# Patient Record
Sex: MALE | Race: WHITE | NOT HISPANIC OR LATINO | Employment: OTHER | URBAN - METROPOLITAN AREA
[De-identification: names, ages, dates, MRNs, and addresses within clinical notes are randomized per-mention and may not be internally consistent; named-entity substitution may affect disease eponyms.]

---

## 2017-01-16 DIAGNOSIS — N52.1 TYPE 2 DIABETES WITH CIRCULATORY DISORDER CAUSING ERECTILE DYSFUNCTION (H): Primary | ICD-10-CM

## 2017-01-16 DIAGNOSIS — E11.59 TYPE 2 DIABETES WITH CIRCULATORY DISORDER CAUSING ERECTILE DYSFUNCTION (H): Primary | ICD-10-CM

## 2017-01-16 RX ORDER — GLIPIZIDE 5 MG/1
5 TABLET, FILM COATED, EXTENDED RELEASE ORAL DAILY
Qty: 90 TABLET | Refills: 0 | Status: SHIPPED | OUTPATIENT
Start: 2017-01-16 | End: 2017-05-08

## 2017-01-16 NOTE — TELEPHONE ENCOUNTER
glipizide         Last Written Prescription Date: 9/30/16  Last Fill Quantity: 90, # refills: 0  Last Office Visit with G, P or [facility] prescribing provider:  9/30/16        BP Readings from Last 3 Encounters:   09/30/16 138/78   05/09/16 130/80   09/28/15 118/62     MICROL      148   5/9/2016  No results found for this basename: microalbumin  CREATININE   Date Value Ref Range Status   05/09/2016 1.60* 0.66 - 1.25 mg/dL Final   ]  GFR ESTIMATE   Date Value Ref Range Status   05/09/2016 44* >60 mL/min/1.7m2 Final     Comment:     Non  GFR Calc   09/28/2015 30* >60 mL/min/1.7m2 Final     Comment:     Non  GFR Calc   05/23/2014 60* >60 mL/min/1.7m2 Final     GFR ESTIMATE IF BLACK   Date Value Ref Range Status   05/09/2016 54* >60 mL/min/1.7m2 Final     Comment:      GFR Calc   09/28/2015 36* >60 mL/min/1.7m2 Final     Comment:      GFR Calc   05/23/2014 72 >60 mL/min/1.7m2 Final     CHOL      205   5/9/2016  HDL       40   5/9/2016  LDL       95   5/9/2016  TRIG      350   5/9/2016  CHOLHDLRATIO      4.5   9/28/2015  AST       20   5/9/2016  ALT       34   5/9/2016  A1C      6.9   9/30/2016  A1C      9.9   5/9/2016  A1C      7.5   9/28/2015  A1C      7.0   5/23/2014  A1C      6.8   7/30/2013  POTASSIUM   Date Value Ref Range Status   05/09/2016 4.3 3.4 - 5.3 mmol/L Final

## 2017-01-16 NOTE — TELEPHONE ENCOUNTER
Routing refill request to provider for review/approval because:  --Labs out of range:  Scr, microalbumin     Last office visit : 9/30/16 plan = Follow-up visit in 3-6 months     CREATININE   Date Value Ref Range Status   05/09/2016 1.60* 0.66 - 1.25 mg/dL Final   ]    CHOL      205   5/9/2016  HDL       40   5/9/2016  LDL       95   5/9/2016  TRIG      350   5/9/2016  CHOLHDLRATIO      4.5   9/28/2015    MICROL      148   5/9/2016  No results found for this basename: microalbumin    BP Readings from Last 1 Encounters:   09/30/16 138/78       A1C      6.9   9/30/2016  A1C      9.9   5/9/2016  A1C      7.5   9/28/2015  A1C      7.0   5/23/2014  A1C      6.8   7/30/2013

## 2017-01-23 DIAGNOSIS — E11.59 TYPE 2 DIABETES WITH CIRCULATORY DISORDER CAUSING ERECTILE DYSFUNCTION (H): Primary | ICD-10-CM

## 2017-01-23 DIAGNOSIS — N52.1 TYPE 2 DIABETES WITH CIRCULATORY DISORDER CAUSING ERECTILE DYSFUNCTION (H): Primary | ICD-10-CM

## 2017-01-23 NOTE — TELEPHONE ENCOUNTER
metformin         Last Written Prescription Date: 9/30/16  Last Fill Quantity: 180, # refills: 0  Last Office Visit with [facility], [facility] or [facility] prescribing provider:  9/30/16        BP Readings from Last 3 Encounters:   09/30/16 138/78   05/09/16 130/80   09/28/15 118/62     MICROL      148   5/9/2016  No results found for this basename: microalbumin  CREATININE   Date Value Ref Range Status   05/09/2016 1.60* 0.66 - 1.25 mg/dL Final   ]  GFR ESTIMATE   Date Value Ref Range Status   05/09/2016 44* >60 mL/min/1.7m2 Final     Comment:     Non  GFR Calc   09/28/2015 30* >60 mL/min/1.7m2 Final     Comment:     Non  GFR Calc   05/23/2014 60* >60 mL/min/1.7m2 Final     GFR ESTIMATE IF BLACK   Date Value Ref Range Status   05/09/2016 54* >60 mL/min/1.7m2 Final     Comment:      GFR Calc   09/28/2015 36* >60 mL/min/1.7m2 Final     Comment:      GFR Calc   05/23/2014 72 >60 mL/min/1.7m2 Final     CHOL      205   5/9/2016  HDL       40   5/9/2016  LDL       95   5/9/2016  TRIG      350   5/9/2016  CHOLHDLRATIO      4.5   9/28/2015  AST       20   5/9/2016  ALT       34   5/9/2016  A1C      6.9   9/30/2016  A1C      9.9   5/9/2016  A1C      7.5   9/28/2015  A1C      7.0   5/23/2014  A1C      6.8   7/30/2013  POTASSIUM   Date Value Ref Range Status   05/09/2016 4.3 3.4 - 5.3 mmol/L Final

## 2017-01-24 NOTE — TELEPHONE ENCOUNTER
At 9/30/16 Dr Zepeda advised 3-6 months f/u. Not sure if ok for another 3 months refill    Refill request to MD/primary care provider.  Susan Nathan RN

## 2017-04-05 ENCOUNTER — OFFICE VISIT (OUTPATIENT)
Dept: FAMILY MEDICINE | Facility: CLINIC | Age: 62
End: 2017-04-05
Payer: COMMERCIAL

## 2017-04-05 VITALS
SYSTOLIC BLOOD PRESSURE: 140 MMHG | RESPIRATION RATE: 18 BRPM | WEIGHT: 299 LBS | HEART RATE: 76 BPM | TEMPERATURE: 96.5 F | OXYGEN SATURATION: 97 % | BODY MASS INDEX: 43.52 KG/M2 | DIASTOLIC BLOOD PRESSURE: 88 MMHG

## 2017-04-05 DIAGNOSIS — I10 HYPERTENSION GOAL BP (BLOOD PRESSURE) < 140/90: ICD-10-CM

## 2017-04-05 DIAGNOSIS — M54.2 NECK PAIN: ICD-10-CM

## 2017-04-05 DIAGNOSIS — N18.30 CKD (CHRONIC KIDNEY DISEASE) STAGE 3, GFR 30-59 ML/MIN (H): ICD-10-CM

## 2017-04-05 DIAGNOSIS — Z12.11 SCREENING FOR COLON CANCER: ICD-10-CM

## 2017-04-05 DIAGNOSIS — E11.65 TYPE 2 DIABETES MELLITUS WITH HYPERGLYCEMIA, WITHOUT LONG-TERM CURRENT USE OF INSULIN (H): ICD-10-CM

## 2017-04-05 LAB — HBA1C MFR BLD: 7.7 % (ref 4.3–6)

## 2017-04-05 PROCEDURE — 36415 COLL VENOUS BLD VENIPUNCTURE: CPT | Performed by: FAMILY MEDICINE

## 2017-04-05 PROCEDURE — 99214 OFFICE O/P EST MOD 30 MIN: CPT | Performed by: FAMILY MEDICINE

## 2017-04-05 PROCEDURE — 83036 HEMOGLOBIN GLYCOSYLATED A1C: CPT | Performed by: FAMILY MEDICINE

## 2017-04-05 RX ORDER — CYCLOBENZAPRINE HCL 10 MG
5-10 TABLET ORAL 3 TIMES DAILY PRN
Qty: 30 TABLET | Refills: 0 | Status: SHIPPED | OUTPATIENT
Start: 2017-04-05 | End: 2017-08-02

## 2017-04-05 NOTE — PATIENT INSTRUCTIONS
Please schedule Tylenol 500 to 1000 mg every 8 hours, maximum daily dose is 3, 000 mg   Flexeril 5 to 10 mg every 8 hours as needed for pain, medication makes you sleepy, please do not drive or take it with alcohol.   Ice or heat packs once daily for 5 minutes.   Follow if symptoms worsen or fail to improve.

## 2017-04-05 NOTE — NURSING NOTE
"Chief Complaint   Patient presents with     Hypertension       Initial BP (!) 144/96 (BP Location: Right arm, Patient Position: Chair, Cuff Size: Adult Large)  Pulse 76  Temp 96.5  F (35.8  C) (Tympanic)  Resp 18  Wt 299 lb (135.6 kg)  SpO2 97%  BMI 43.52 kg/m2 Estimated body mass index is 43.52 kg/(m^2) as calculated from the following:    Height as of 9/30/16: 5' 9.5\" (1.765 m).    Weight as of this encounter: 299 lb (135.6 kg).  Medication Reconciliation: complete     Tammi Salvador MA      "

## 2017-04-05 NOTE — MR AVS SNAPSHOT
After Visit Summary   4/5/2017    Booker Brown    MRN: [id number]           Patient Information     Date Of Birth          1955        Visit Information        Provider Department      4/5/2017 8:40 AM Gabe Elder MD [facility]        Today's Diagnoses     Type 2 diabetes mellitus with hyperglycemia, without long-term current use of insulin (H)    -  1    Screening for diabetic peripheral neuropathy        Screening for colon cancer        Right low back pain, unspecified chronicity, with sciatica presence unspecified          Care Instructions    Please schedule Tylenol 500 to 1000 mg every 8 hours, maximum daily dose is 3, 000 mg   Flexeril 5 to 10 mg every 8 hours as needed for pain, medication makes you sleepy, please do not drive or take it with alcohol.   Ice or heat packs once daily for 5 minutes.   Follow if symptoms worsen or fail to improve.         Follow-ups after your visit        Future tests that were ordered for you today     Open Future Orders        Priority Expected Expires Ordered    Fecal colorectal cancer screen (FIT) Routine 4/26/2017 6/28/2017 4/5/2017            Who to contact     If you have questions or need follow up information about today's clinic visit or your schedule please contact [facility] directly at [phone].  Normal or non-critical lab and imaging results will be communicated to you by MyChart, letter or phone within 4 business days after the clinic has received the results. If you do not hear from us within 7 days, please contact the clinic through MyChart or phone. If you have a critical or abnormal lab result, we will notify you by phone as soon as possible.  Submit refill requests through [employer]zat or call your pharmacy and they will forward the refill request to us. Please allow 3 business days for your refill to be completed.          Additional Information About Your Visit        MyChart Information      "[employer] lets you send messages to your doctor, view your test results, renew your prescriptions, schedule appointments and more. To sign up, go to [email or web address] . Click on \"Log in\" on the left side of the screen, which will take you to the Welcome page. Then click on \"Sign up Now\" on the right side of the page.     You will be asked to enter the access code listed below, as well as some personal information. Please follow the directions to create your username and password.     Your access code is: [email or web address]  Expires: 2017  9:25 AM     Your access code will  in 90 days. If you need help or a new code, please call your [facility] clinic or [phone].        Care EveryWhere ID     This is your [facility] EveryWhere ID. This could be used by other organizations to access your [facility] medical records  [id number]        Your Vitals Were     Pulse Temperature Respirations Pulse Oximetry BMI (Body Mass Index)       76 96.5  F (35.8  C) (Tympanic) 18 97% 43.52 kg/m2        Blood Pressure from Last 3 Encounters:   17 (!) 144/96   16 138/78   16 130/80    Weight from Last 3 Encounters:   17 299 lb (135.6 kg)   16 (!) 300 lb 4 oz (136.2 kg)   16 290 lb (131.5 kg)              We Performed the Following     FOOT EXAM  NO CHARGE [[id number]]     HEMOGLOBIN A1C          Where to get your medicines      These medications were sent to [facility]/pharmacy #[id number] - [place], [place] - [address] [address]     Phone:  [phone]     cyclobenzaprine 10 MG tablet          Primary Care Provider Office Phone # Fax #    Gage Zepeda [phone]       [facility] [address] [place] [address]        Thank you!     Thank you for choosing [facility]  for your care. Our goal is always to provide you with excellent care. Hearing back from our patients is one way we can continue to improve " our services. Please take a few minutes to complete the written survey that you may receive in the mail after your visit with us. Thank you!             Your Updated Medication List - Protect others around you: Learn how to safely use, store and throw away your medicines at www.disposemymeds.org.          This list is accurate as of: 4/5/17  9:25 AM.  Always use your most recent med list.                   Brand Name Dispense Instructions for use    atenolol-chlorthalidone 100-25 MG per tablet    TENORETIC    90 tablet    Take 1 tablet by mouth daily       atorvastatin 10 MG tablet    LIPITOR    90 tablet    Take 1 tablet (10 mg) by mouth daily       blood glucose monitoring lancets     1 Box    1 each 2 times daily       blood glucose monitoring test strip    ACCU-CHEK SMARTVIEW    1 Box    100 strips by In Vitro route 2 times daily       cyclobenzaprine 10 MG tablet    FLEXERIL    30 tablet    Take 0.5-1 tablets (5-10 mg) by mouth 3 times daily as needed for muscle spasms       glipiZIDE 5 MG 24 hr tablet    glipiZIDE XL    90 tablet    Take 1 tablet (5 mg) by mouth daily       lisinopril 20 MG tablet    PRINIVIL/ZESTRIL    180 tablet    Take 2 tablets (40 mg) by mouth daily       metFORMIN 1000 MG tablet    GLUCOPHAGE    180 tablet    Take 1 tablet (1,000 mg) by mouth 2 times daily (with meals)       ranitidine 150 MG tablet    ZANTAC    180 tablet    Take 1 tablet (150 mg) by mouth 2 times daily

## 2017-04-05 NOTE — PROGRESS NOTES
SUBJECTIVE:                                                    Booker Brown is a 62 year old male who presents to clinic today for the following health issues:    Pt went to the [facility] clinic a few days ago, for a sore throat. He gets into these coughing fits and he felt that he popped something in his neck. He has left sided neck pain and due to pain, he has difficulty sleeping at night. He is taking Ibuprofen 400 mg X 2 and lotion, which took the edge off the pain. He is having difficulty ROM. No numbness/tingling of the upper extremities. No recent injuries.     At the [facility] clinic, his blood pressure was 180. It was recommended that he f/u with PCP.     Hypertension Follow-up      Outpatient blood pressures are not being checked at home    Low Salt Diet: low salt   No chest pain, shortness of breath or claudication.     Diabetes - Pt was more active and he is now starting to get more active. He has changed job locations and is more active. Pt has also been eating less.     Problem list and histories reviewed & adjusted, as indicated.  Additional history: as documented    Labs reviewed in EPIC    Reviewed and updated as needed this visit by clinical staff       Reviewed and updated as needed this visit by Provider         ROS:  Constitutional, HEENT, cardiovascular, pulmonary, gi and gu systems are negative, except as otherwise noted.    OBJECTIVE:                                                    BP (!) 144/96 (BP Location: Right arm, Patient Position: Chair, Cuff Size: Adult Large)  Pulse 76  Temp 96.5  F (35.8  C) (Tympanic)  Resp 18  Wt 299 lb (135.6 kg)  SpO2 97%  BMI 43.52 kg/m2  Body mass index is 43.52 kg/(m^2).  GENERAL: healthy, alert and no distress  EYES: Eyes grossly normal to inspection  HENT:   nose and mouth without ulcers or lesions  NECK: + left neck tenderness, limited ROM of neck due to pain   RESP: lungs clear to auscultation - no rales, rhonchi or wheezes  CV: regular rate and  rhythm, normal S1 S2    Diagnostic Test Results:  Results for orders placed or performed in visit on 04/05/17 (from the past 24 hour(s))   HEMOGLOBIN A1C   Result Value Ref Range    Hemoglobin A1C 7.7 (H) 4.3 - 6.0 %        ASSESSMENT/PLAN:                                                      ## Neck pain  - Due to h/o CKD III, recommended to avoid NSAIDS  - Please schedule Tylenol 500 to 1000 mg every 8 hours, maximum daily dose is 3, 000 mg   - Flexeril 5 to 10 mg every 8 hours as needed for pain, medication makes you sleepy, please do not drive or take it with alcohol.   - Ice or heat packs once daily for 5 minutes.   - cyclobenzaprine (FLEXERIL) 10 MG tablet; Take 0.5-1 tablets (5-10 mg) by mouth 3 times daily as needed for muscle spasms  Dispense: 30 tablet; Refill: 0  - Follow if symptoms worsen or fail to improve.    ## Hypertension goal BP (blood pressure) < 140/90  - B/P was mildly elevated.   - Pt will follow up at [facility] in 2-3 weeks for MA blood pressure check.    ## Type 2 diabetes mellitus with hyperglycemia, without long-term current use of insulin   - HEMOGLOBIN A1C  -   Lab Results   Component Value Date    A1C 7.7 04/05/2017    A1C 6.9 09/30/2016    A1C 9.9 05/09/2016    A1C 7.5 09/28/2015    A1C 7.0 05/23/2014     - Pt due for hgba1c, slightly higher than last check but within goal.   - Continue current medication. Pt has started being more active, which will improve hgba1c.     ## Screening for colon cancer  - Fecal colorectal cancer screen (FIT); Future    Deqminesh Elder MD  [facility]

## 2017-05-08 DIAGNOSIS — K21.9 GASTROESOPHAGEAL REFLUX DISEASE WITHOUT ESOPHAGITIS: ICD-10-CM

## 2017-05-08 DIAGNOSIS — Z13.6 CARDIOVASCULAR SCREENING; LDL GOAL LESS THAN 100: Primary | ICD-10-CM

## 2017-05-08 DIAGNOSIS — N52.1 TYPE 2 DIABETES WITH CIRCULATORY DISORDER CAUSING ERECTILE DYSFUNCTION (H): ICD-10-CM

## 2017-05-08 DIAGNOSIS — E11.59 TYPE 2 DIABETES WITH CIRCULATORY DISORDER CAUSING ERECTILE DYSFUNCTION (H): ICD-10-CM

## 2017-05-08 RX ORDER — GLIPIZIDE 5 MG/1
TABLET, FILM COATED, EXTENDED RELEASE ORAL
Qty: 90 TABLET | Refills: 0 | Status: SHIPPED | OUTPATIENT
Start: 2017-05-08 | End: 2017-08-02

## 2017-05-08 RX ORDER — ATORVASTATIN CALCIUM 10 MG/1
TABLET, FILM COATED ORAL
Qty: 30 TABLET | Refills: 0 | Status: SHIPPED | OUTPATIENT
Start: 2017-05-08 | End: 2017-06-13

## 2017-05-08 NOTE — TELEPHONE ENCOUNTER
Routing refill request to provider for review/approval because:  --Labs out of range:  Scr and microalbumin for glipizide.  --Appears to be a break in atorvastatin and patient due for FLP.    --  --Please call patient  and ask him to schedule a lab only for annual fasting cholesterol check.  A refill request for below medication  was sent to the provider.     Thank you,  Esther Saxena RN  --Fast for 8-10 hours prior to labs: nothing to eat or drink except plain water and your pills for ten hours prior to appointment. In addition, avoid fatty foods and alcohol for 24 hours prior to appointment    Last office visit : 4/5/17     Creatinine   Date Value Ref Range Status   05/09/2016 1.60 (H) 0.66 - 1.25 mg/dL Final   09/28/2015 2.26 (H) 0.66 - 1.25 mg/dL Final   05/23/2014 1.25 0.66 - 1.25 mg/dL Final   07/30/2013 1.26 (H) 0.66 - 1.25 mg/dL Final   08/07/2012 1.62 (H) 0.66 - 1.25 mg/dL Final     Lab Results   Component Value Date    CHOL 205 05/09/2016     Lab Results   Component Value Date    HDL 40 05/09/2016     Lab Results   Component Value Date    LDL 95 05/09/2016     Lab Results   Component Value Date    TRIG 350 05/09/2016     Lab Results   Component Value Date    CHOLHDLRATIO 4.5 09/28/2015       Lab Results   Component Value Date    UMALCR 121.31 (H) 05/09/2016       BP Readings from Last 1 Encounters:   04/05/17 140/88       Lab Results   Component Value Date    A1C 7.7 04/05/2017    A1C 6.9 09/30/2016    A1C 9.9 05/09/2016    A1C 7.5 09/28/2015    A1C 7.0 05/23/2014

## 2017-06-11 DIAGNOSIS — I10 ESSENTIAL HYPERTENSION WITH GOAL BLOOD PRESSURE LESS THAN 140/90: ICD-10-CM

## 2017-06-11 DIAGNOSIS — E11.59 TYPE 2 DIABETES WITH CIRCULATORY DISORDER CAUSING ERECTILE DYSFUNCTION (H): ICD-10-CM

## 2017-06-11 DIAGNOSIS — N52.1 TYPE 2 DIABETES WITH CIRCULATORY DISORDER CAUSING ERECTILE DYSFUNCTION (H): ICD-10-CM

## 2017-06-13 DIAGNOSIS — E11.59 TYPE 2 DIABETES WITH CIRCULATORY DISORDER CAUSING ERECTILE DYSFUNCTION (H): ICD-10-CM

## 2017-06-13 DIAGNOSIS — I10 ESSENTIAL HYPERTENSION WITH GOAL BLOOD PRESSURE LESS THAN 140/90: ICD-10-CM

## 2017-06-13 DIAGNOSIS — Z13.6 CARDIOVASCULAR SCREENING; LDL GOAL LESS THAN 100: ICD-10-CM

## 2017-06-13 DIAGNOSIS — N52.1 TYPE 2 DIABETES WITH CIRCULATORY DISORDER CAUSING ERECTILE DYSFUNCTION (H): ICD-10-CM

## 2017-06-13 RX ORDER — LISINOPRIL 20 MG/1
TABLET ORAL
Qty: 180 TABLET | Refills: 1 | Status: SHIPPED | OUTPATIENT
Start: 2017-06-13 | End: 2017-07-05

## 2017-06-13 RX ORDER — ATENOLOL AND CHLORTHALIDONE TABLET 100; 25 MG/1; MG/1
TABLET ORAL
Qty: 90 TABLET | Refills: 1 | Status: SHIPPED | OUTPATIENT
Start: 2017-06-13 | End: 2017-08-02

## 2017-06-13 NOTE — TELEPHONE ENCOUNTER
Routing refill request to provider for review/approval because:  Labs out of range:  Creatinine, GFR  -BP elevated  -Overdue labs: AST, ALT. Lipid panel    Labs ordered.    Dr. Zepeda-Please sign if agree.    Team coordinators-Please call patient to schedule fasting lab appt.  These are monitoring labs for metformin. Patient should fast 10 hours, sips of water and taking usual morning medications is fine.    Thank you!  ESEQUIEL WeinerN, RN          lisinopril (PRINIVIL,ZESTRIL) 20 MG tablet/    atenolol-chlorthalidone (TENORETIC) 100-25 MG per tablet   Last Written Prescription Date: 11/9/16  Last Fill Quantity: 180/  90, # refills: 1  Last Office Visit with [facility] or [facility] prescribing provider: 4/5/17 with Dr. Elder       Potassium   Date Value Ref Range Status   05/09/2016 4.3 3.4 - 5.3 mmol/L Final     Creatinine   Date Value Ref Range Status   05/09/2016 1.60 (H) 0.66 - 1.25 mg/dL Final     BP Readings from Last 3 Encounters:   04/05/17 140/88   09/30/16 138/78   05/09/16 130/80       metFORMIN (GLUCOPHAGE) 1000 MG tablet         Last Written Prescription Date: 1/24/17  Last Fill Quantity: 180, # refills: 0  Last Office Visit with [facility], [facility] or [facility] prescribing provider:  4/5/17        BP Readings from Last 3 Encounters:   04/05/17 140/88   09/30/16 138/78   05/09/16 130/80     Lab Results   Component Value Date    MICROL 148 05/09/2016     Lab Results   Component Value Date    UMALCR 121.31 05/09/2016     Creatinine   Date Value Ref Range Status   05/09/2016 1.60 (H) 0.66 - 1.25 mg/dL Final   ]  GFR Estimate   Date Value Ref Range Status   05/09/2016 44 (L) >60 mL/min/1.7m2 Final     Comment:     Non  GFR Calc   09/28/2015 30 (L) >60 mL/min/1.7m2 Final     Comment:     Non  GFR Calc   05/23/2014 60 (L) >60 mL/min/1.7m2 Final     GFR Estimate If Black   Date Value Ref Range Status   05/09/2016 54 (L) >60 mL/min/1.7m2 Final     Comment:      GFR Calc    09/28/2015 36 (L) >60 mL/min/1.7m2 Final     Comment:      GFR Calc   05/23/2014 72 >60 mL/min/1.7m2 Final     Lab Results   Component Value Date    CHOL 205 05/09/2016     Lab Results   Component Value Date    HDL 40 05/09/2016     Lab Results   Component Value Date    LDL 95 05/09/2016     Lab Results   Component Value Date    TRIG 350 05/09/2016     Lab Results   Component Value Date    CHOLHDLRATIO 4.5 09/28/2015     Lab Results   Component Value Date    AST 20 05/09/2016     Lab Results   Component Value Date    ALT 34 05/09/2016     Lab Results   Component Value Date    A1C 7.7 04/05/2017    A1C 6.9 09/30/2016    A1C 9.9 05/09/2016    A1C 7.5 09/28/2015    A1C 7.0 05/23/2014     Potassium   Date Value Ref Range Status   05/09/2016 4.3 3.4 - 5.3 mmol/L Final

## 2017-06-14 RX ORDER — LISINOPRIL 20 MG/1
TABLET ORAL
Qty: 180 TABLET | Refills: 1 | OUTPATIENT
Start: 2017-06-14

## 2017-06-14 RX ORDER — ATORVASTATIN CALCIUM 10 MG/1
TABLET, FILM COATED ORAL
Qty: 30 TABLET | Refills: 0 | Status: SHIPPED | OUTPATIENT
Start: 2017-06-14 | End: 2017-08-02

## 2017-06-14 NOTE — TELEPHONE ENCOUNTER
Routing refill request to provider for review/approval because:  --Barbara refill sent 5/8/17 with reminder due for annual cholesterol labs.  --He was given a reminder call 5/9/17 and today.    Last office visit : 4/5/17     Lab Results   Component Value Date    CHOL 205 05/09/2016     Lab Results   Component Value Date    HDL 40 05/09/2016     Lab Results   Component Value Date    LDL 95 05/09/2016     Lab Results   Component Value Date    TRIG 350 05/09/2016     Lab Results   Component Value Date    CHOLHDLRATIO 4.5 09/28/2015

## 2017-06-28 ENCOUNTER — TELEPHONE (OUTPATIENT)
Dept: FAMILY MEDICINE | Facility: CLINIC | Age: 62
End: 2017-06-28

## 2017-06-28 NOTE — TELEPHONE ENCOUNTER
ALMA ROSAM to schedule office visit with Dr. Zepeda for annual, fasting labs, diabetes and blood pressure.  Please schedule when he calls back.    Thanks,  Elana Schroeder

## 2017-07-05 ENCOUNTER — TELEPHONE (OUTPATIENT)
Dept: FAMILY MEDICINE | Facility: CLINIC | Age: 62
End: 2017-07-05

## 2017-07-05 DIAGNOSIS — I10 ESSENTIAL HYPERTENSION WITH GOAL BLOOD PRESSURE LESS THAN 140/90: ICD-10-CM

## 2017-07-05 RX ORDER — LISINOPRIL 20 MG/1
TABLET ORAL
Qty: 60 TABLET | Refills: 0 | Status: SHIPPED | OUTPATIENT
Start: 2017-07-05 | End: 2017-08-02

## 2017-07-05 NOTE — TELEPHONE ENCOUNTER
**Please see TE from 06/13/17    Reason for Call:  Medication or medication refill:    Do you use a [facility]?  Name of the pharmacy and phone number for the current request:  [facility]/pharmacy #[id number] [facility] [address] [facility]    Name of the medication requested: lisinopril (PRINIVIL/ZESTRIL) 20 MG tablet    Other request: Patient has not received this refill. Writer contacted pharmacy & they stated that it was inactivated in March and would need it sent in again. Please advise, thank you!    Can we leave a detailed message on this number? YES    Phone number patient can be reached at: Home number on file [phone] (home)    Best Time: Anytime    Call taken on 7/5/2017 at 11:43 AM by Johanna Man

## 2017-07-05 NOTE — TELEPHONE ENCOUNTER
Routing refill request to provider for review/approval because:  Labs out of range:  creatinine  Labs not current:  Potassium and creatinine    Labs ordered.    Dr. Zepeda-Please sign if agree.    Team coordinators-please contact patient to schedule lab appt.  Patient does have other lab orders pended for future and one is lipid panel.  Patient should fast 10 hours.  Sips of water and taking usual morning medications is fine.    Thank you!  PAM Weiner, RN          Potassium   Date Value Ref Range Status   05/09/2016 4.3 3.4 - 5.3 mmol/L Final     Creatinine   Date Value Ref Range Status   05/09/2016 1.60 (H) 0.66 - 1.25 mg/dL Final     BP Readings from Last 3 Encounters:   04/05/17 140/88   09/30/16 138/78   05/09/16 130/80

## 2017-08-02 ENCOUNTER — OFFICE VISIT (OUTPATIENT)
Dept: FAMILY MEDICINE | Facility: CLINIC | Age: 62
End: 2017-08-02
Payer: COMMERCIAL

## 2017-08-02 VITALS
OXYGEN SATURATION: 97 % | TEMPERATURE: 98.6 F | SYSTOLIC BLOOD PRESSURE: 138 MMHG | HEART RATE: 66 BPM | BODY MASS INDEX: 42.2 KG/M2 | WEIGHT: 294.75 LBS | RESPIRATION RATE: 20 BRPM | HEIGHT: 70 IN | DIASTOLIC BLOOD PRESSURE: 83 MMHG

## 2017-08-02 DIAGNOSIS — Z00.00 ROUTINE GENERAL MEDICAL EXAMINATION AT A HEALTH CARE FACILITY: Primary | ICD-10-CM

## 2017-08-02 DIAGNOSIS — I10 ESSENTIAL HYPERTENSION WITH GOAL BLOOD PRESSURE LESS THAN 140/90: ICD-10-CM

## 2017-08-02 DIAGNOSIS — E66.01 MORBID OBESITY, UNSPECIFIED OBESITY TYPE (H): ICD-10-CM

## 2017-08-02 DIAGNOSIS — Z12.11 SPECIAL SCREENING FOR MALIGNANT NEOPLASMS, COLON: ICD-10-CM

## 2017-08-02 DIAGNOSIS — Z13.6 CARDIOVASCULAR SCREENING; LDL GOAL LESS THAN 100: ICD-10-CM

## 2017-08-02 DIAGNOSIS — E11.59 TYPE 2 DIABETES WITH CIRCULATORY DISORDER CAUSING ERECTILE DYSFUNCTION (H): ICD-10-CM

## 2017-08-02 DIAGNOSIS — K21.9 GASTROESOPHAGEAL REFLUX DISEASE WITHOUT ESOPHAGITIS: ICD-10-CM

## 2017-08-02 DIAGNOSIS — N52.1 TYPE 2 DIABETES WITH CIRCULATORY DISORDER CAUSING ERECTILE DYSFUNCTION (H): ICD-10-CM

## 2017-08-02 LAB — HBA1C MFR BLD: 7.1 % (ref 4.3–6)

## 2017-08-02 PROCEDURE — 99396 PREV VISIT EST AGE 40-64: CPT | Performed by: FAMILY MEDICINE

## 2017-08-02 PROCEDURE — 82043 UR ALBUMIN QUANTITATIVE: CPT | Performed by: FAMILY MEDICINE

## 2017-08-02 PROCEDURE — 80061 LIPID PANEL: CPT | Performed by: FAMILY MEDICINE

## 2017-08-02 PROCEDURE — 36415 COLL VENOUS BLD VENIPUNCTURE: CPT | Performed by: FAMILY MEDICINE

## 2017-08-02 PROCEDURE — 83036 HEMOGLOBIN GLYCOSYLATED A1C: CPT | Performed by: FAMILY MEDICINE

## 2017-08-02 PROCEDURE — 80053 COMPREHEN METABOLIC PANEL: CPT | Performed by: FAMILY MEDICINE

## 2017-08-02 PROCEDURE — 99207 C FOOT EXAM  NO CHARGE: CPT | Mod: 25 | Performed by: FAMILY MEDICINE

## 2017-08-02 RX ORDER — GLIPIZIDE 5 MG/1
TABLET, FILM COATED, EXTENDED RELEASE ORAL
Qty: 90 TABLET | Refills: 1 | Status: SHIPPED | OUTPATIENT
Start: 2017-08-02 | End: 2018-04-01

## 2017-08-02 RX ORDER — ATORVASTATIN CALCIUM 10 MG/1
TABLET, FILM COATED ORAL
Qty: 90 TABLET | Refills: 1 | Status: SHIPPED | OUTPATIENT
Start: 2017-08-02 | End: 2018-06-13

## 2017-08-02 RX ORDER — ATENOLOL AND CHLORTHALIDONE TABLET 100; 25 MG/1; MG/1
1 TABLET ORAL DAILY
Qty: 90 TABLET | Refills: 1 | Status: SHIPPED | OUTPATIENT
Start: 2017-08-02 | End: 2018-10-05

## 2017-08-02 RX ORDER — LISINOPRIL 20 MG/1
TABLET ORAL
Qty: 180 TABLET | Refills: 1 | Status: SHIPPED | OUTPATIENT
Start: 2017-08-02 | End: 2018-02-05

## 2017-08-02 NOTE — PROGRESS NOTES
SUBJECTIVE:   CC: Booker Brown is an 62 year old male who presents for preventative health visit.     Physical   Annual:     Getting at least 3 servings of Calcium per day::  Yes    Bi-annual eye exam::  NO    Dental care twice a year::  NO    Sleep apnea or symptoms of sleep apnea::  None    Diet::  Regular (no restrictions)    Frequency of exercise::  2-3 days/week    Duration of exercise::  15-30 minutes    Taking medications regularly::  Yes    Medication side effects::  None    Additional concerns today::  No    Today's PHQ-2 Score:   PHQ-2 ( 1999 [employer]) 8/2/2017   Q1: Little interest or pleasure in doing things 0   Q2: Feeling down, depressed or hopeless 0   PHQ-2 Score 0   Q1: Little interest or pleasure in doing things Not at all   Q2: Feeling down, depressed or hopeless Not at all   PHQ-2 Score 0       Abuse: Current or Past(Physical, Sexual or Emotional)- No  Do you feel safe in your environment - Yes    Social History   Substance Use Topics     Smoking status: Former Smoker     Packs/day: 3.00     Years: 16.00     Types: Cigarettes     Quit date: 9/14/1987     Smokeless tobacco: Never Used      Comment: 17 years ago     Alcohol use No     The patient does not drink >3 drinks per day nor >7 drinks per week.    Last PSA:   PSA   Date Value Ref Range Status   05/23/2014 0.55 0 - 4 ug/L Final     Reviewed orders with patient. Reviewed health maintenance and updated orders accordingly - Yes     Reviewed and updated as needed this visit by clinical staff    Reviewed and updated as needed this visit by Provider.     ROS:  C: NEGATIVE for fever, chills, change in weight  I: NEGATIVE for worrisome rashes, moles or lesions  E: NEGATIVE for vision changes or irritation  ENT: NEGATIVE for ear, mouth and throat problems  R: NEGATIVE for significant cough or SOB  CV: NEGATIVE for chest pain, palpitations or peripheral edema  GI: NEGATIVE for nausea, abdominal pain, heartburn, or change in bowel habits   male:  "negative for dysuria, hematuria, decreased urinary stream, erectile dysfunction, urethral discharge  M: NEGATIVE for significant arthralgias or myalgia  N: NEGATIVE for weakness, dizziness or paresthesias  P: NEGATIVE for changes in mood or affect    OBJECTIVE:   /83 (Cuff Size: Adult Large)  Pulse 66  Temp 98.6  F (37  C) (Oral)  Resp 20  Ht 5' 9.75\" (177.2 cm)  Wt 294 lb 12 oz (133.7 kg)  SpO2 97%  BMI 42.6 kg/m2    EXAM:  GENERAL: healthy, alert and no distress  EYES: Eyes grossly normal to inspection, PERRL and conjunctivae and sclerae normal  HENT: ear canals and TM's normal, nose and mouth without ulcers or lesions  NECK: no adenopathy, no asymmetry, masses, or scars and thyroid normal to palpation  RESP: lungs clear to auscultation - no rales, rhonchi or wheezes  CV: regular rate and rhythm, normal S1 S2, no S3 or S4, no murmur, click or rub, no peripheral edema and peripheral pulses strong  ABDOMEN: soft, nontender, no hepatosplenomegaly, no masses and bowel sounds normal  MS: no gross musculoskeletal defects noted, no edema  SKIN: no suspicious lesions or rashes  NEURO: Normal strength and tone, mentation intact and speech normal, normal feet exam.   PSYCH: mentation appears normal, affect normal/bright    ASSESSMENT/PLAN:   1. Routine general medical examination at a health care facility       2. Essential hypertension with goal blood pressure less than 140/90  Patient is tolerating current medication without any major side effects of concerns and current dose seems reasonable too.  Current medication regime is effective. Continue current treatment without any changes.   - lisinopril (PRINIVIL/ZESTRIL) 20 MG tablet; TAKE 2 TABLETS (40 MG) BY MOUTH DAILY  Dispense: 180 tablet; Refill: 1  - atenolol-chlorthalidone (TENORETIC) 100-25 MG per tablet; Take 1 tablet by mouth daily  Dispense: 90 tablet; Refill: 1    3. Type 2 diabetes with circulatory disorder causing erectile dysfunction (H)   " "Patient is tolerating current medication without any major side effects of concerns and current dose seems reasonable too.  Current medication regime is effective. Continue current treatment without any changes.   - atorvastatin (LIPITOR) 10 MG tablet; TAKE 1 TABLET (10 MG) BY MOUTH DAILY  Dispense: 90 tablet; Refill: 1  - metFORMIN (GLUCOPHAGE) 1000 MG tablet; TAKE 1 TABLET (1,000 MG) BY MOUTH 2 TIMES DAILY (WITH MEALS)  Dispense: 180 tablet; Refill: 1  - glipiZIDE (GLUCOTROL XL) 5 MG 24 hr tablet; TAKE 1 TABLET (5 MG) BY MOUTH DAILY  Dispense: 90 tablet; Refill: 1  - Lipid panel reflex to direct LDL  - Albumin Random Urine Quantitative  - Hemoglobin A1c  - Comprehensive metabolic panel  - C FOOT EXAM  NO CHARGE    4. CARDIOVASCULAR SCREENING; LDL GOAL LESS THAN 100     - atorvastatin (LIPITOR) 10 MG tablet; TAKE 1 TABLET (10 MG) BY MOUTH DAILY  Dispense: 90 tablet; Refill: 1    5. Gastroesophageal reflux disease without esophagitis     - ranitidine (ZANTAC) 150 MG tablet; Take 1 tablet (150 mg) by mouth 2 times daily  Dispense: 180 tablet; Refill: 1    6. Special screening for malignant neoplasms, colon  Needs FIT.     7. Morbid obesity, unspecified obesity type (H)  Body mass index is 42.6 kg/(m^2).offered weight loss clinic - he declined interventino.       COUNSELING:   Reviewed preventive health counseling, as reflected in patient instructions  Special attention given to:        Regular exercise       Healthy diet/nutrition       Vision screening       Hearing screening       Colon cancer screening       Prostate cancer screening           reports that he quit smoking about 29 years ago. His smoking use included Cigarettes. He has a 48.00 pack-year smoking history. He has never used smokeless tobacco.      Estimated body mass index is 42.6 kg/(m^2) as calculated from the following:    Height as of this encounter: 5' 9.75\" (177.2 cm).    Weight as of this encounter: 294 lb 12 oz (133.7 kg).   Weight management " plan: Discussed healthy diet and exercise guidelines and patient will follow up in 6 months in clinic to re-evaluate. weight loss clinic referral offered - he declined.     Counseling Resources:  ATP IV Guidelines  Pooled Cohorts Equation Calculator  FRAX Risk Assessment  ICSI Preventive Guidelines  Dietary Guidelines for Americans, 2010  USDA's MyPlate  ASA Prophylaxis  Lung CA Screening    Gage Zepeda MD  [facility]  Answers for HPI/ROS submitted by the patient on 8/2/2017   PHQ-2 Score: 0

## 2017-08-02 NOTE — NURSING NOTE
"Chief Complaint   Patient presents with     Physical     pt is fasting        Initial /83 (Cuff Size: Adult Large)  Pulse 66  Temp 98.6  F (37  C) (Oral)  Resp 20  Ht 5' 9.75\" (1.772 m)  Wt 294 lb 12 oz (133.7 kg)  SpO2 97%  BMI 42.6 kg/m2 Estimated body mass index is 42.6 kg/(m^2) as calculated from the following:    Height as of this encounter: 5' 9.75\" (1.772 m).    Weight as of this encounter: 294 lb 12 oz (133.7 kg).  Medication Reconciliation: complete     Renata Ratliff, AUDELIA      "

## 2017-08-02 NOTE — MR AVS SNAPSHOT
After Visit Summary   8/2/2017    Booker Brown    MRN: [id number]           Patient Information     Date Of Birth          1955        Visit Information        Provider Department      8/2/2017 11:40 Gage Mera MD Formerly named [facility]        Today's Diagnoses     Routine general medical examination at a health care facility    -  1    Essential hypertension with goal blood pressure less than 140/90        Type 2 diabetes with circulatory disorder causing erectile dysfunction (H)        CARDIOVASCULAR SCREENING; LDL GOAL LESS THAN 100        Gastroesophageal reflux disease without esophagitis        Special screening for malignant neoplasms, colon        Morbid obesity, unspecified obesity type (H)          Care Instructions      Preventive Health Recommendations  Male Ages 50 - 64    Yearly exam:             See your health care provider every year in order to  o   Review health changes.   o   Discuss preventive care.    o   Review your medicines if your doctor has prescribed any.     Have a cholesterol test every 5 years, or more frequently if you are at risk for high cholesterol/heart disease.     Have a diabetes test (fasting glucose) every three years. If you are at risk for diabetes, you should have this test more often.     Have a colonoscopy at age 50, or have a yearly FIT test (stool test). These exams will check for colon cancer.      Talk with your health care provider about whether or not a prostate cancer screening test (PSA) is right for you.    You should be tested each year for STDs (sexually transmitted diseases), if you re at risk.     Shots: Get a flu shot each year. Get a tetanus shot every 10 years.     Nutrition:    Eat at least 5 servings of fruits and vegetables daily.     Eat whole-grain bread, whole-wheat pasta and brown rice instead of white grains and rice.     Talk to your provider about Calcium and Vitamin D.     Lifestyle    Exercise for at  "least 150 minutes a week (30 minutes a day, 5 days a week). This will help you control your weight and prevent disease.     Limit alcohol to one drink per day.     No smoking.     Wear sunscreen to prevent skin cancer.     See your dentist every six months for an exam and cleaning.     See your eye doctor every 1 to 2 years.            Follow-ups after your visit        Who to contact     If you have questions or need follow up information about today's clinic visit or your schedule please contact [facility] directly at [phone].  Normal or non-critical lab and imaging results will be communicated to you by [employer], letter or phone within 4 business days after the clinic has received the results. If you do not hear from us within 7 days, please contact the clinic through [employer] or phone. If you have a critical or abnormal lab result, we will notify you by phone as soon as possible.  Submit refill requests through [employer] or call your pharmacy and they will forward the refill request to us. Please allow 3 business days for your refill to be completed.          Additional Information About Your Visit        [employer] Information     [employer] lets you send messages to your doctor, view your test results, renew your prescriptions, schedule appointments and more. To sign up, go to [email or web address] . Click on \"Log in\" on the left side of the screen, which will take you to the Welcome page. Then click on \"Sign up Now\" on the right side of the page.     You will be asked to enter the access code listed below, as well as some personal information. Please follow the directions to create your username and password.     Your access code is: [email or web address]  Expires: 10/31/2017 12:00 PM     Your access code will  in 90 days. If you need help or a new code, please call your [place] clinic or [phone].        Care EveryWhere ID     This is your Care EveryWhere ID. This could be used by other " "organizations to access your [place] medical records  [id number]        Your Vitals Were     Pulse Temperature Respirations Height Pulse Oximetry BMI (Body Mass Index)    66 98.6  F (37  C) (Oral) 20 5' 9.75\" (1.772 m) 97% 42.6 kg/m2       Blood Pressure from Last 3 Encounters:   08/02/17 138/83   04/05/17 140/88   09/30/16 138/78    Weight from Last 3 Encounters:   08/02/17 294 lb 12 oz (133.7 kg)   04/05/17 299 lb (135.6 kg)   09/30/16 (!) 300 lb 4 oz (136.2 kg)              We Performed the Following     Albumin Random Urine Quantitative     C FOOT EXAM  NO CHARGE     Comprehensive metabolic panel     Hemoglobin A1c     Lipid panel reflex to direct LDL          Today's Medication Changes          These changes are accurate as of: 8/2/17 12:01 PM.  If you have any questions, ask your nurse or doctor.               These medicines have changed or have updated prescriptions.        Dose/Directions    atenolol-chlorthalidone 100-25 MG per tablet   Commonly known as:  TENORETIC   This may have changed:  See the new instructions.   Used for:  Essential hypertension with goal blood pressure less than 140/90   Changed by:  Gage Zepeda MD        Dose:  1 tablet   Take 1 tablet by mouth daily   Quantity:  90 tablet   Refills:  1       atorvastatin 10 MG tablet   Commonly known as:  LIPITOR   This may have changed:  See the new instructions.   Used for:  Type 2 diabetes with circulatory disorder causing erectile dysfunction (H), CARDIOVASCULAR SCREENING; LDL GOAL LESS THAN 100   Changed by:  Gage Zepeda MD        TAKE 1 TABLET (10 MG) BY MOUTH DAILY   Quantity:  90 tablet   Refills:  1       glipiZIDE 5 MG 24 hr tablet   Commonly known as:  GLUCOTROL XL   This may have changed:  See the new instructions.   Used for:  Type 2 diabetes with circulatory disorder causing erectile dysfunction (H)   Changed by:  Gage Zepeda MD        TAKE 1 TABLET (5 MG) BY MOUTH DAILY   Quantity:  90 " tablet   Refills:  1       metFORMIN 1000 MG tablet   Commonly known as:  GLUCOPHAGE   This may have changed:  See the new instructions.   Used for:  Type 2 diabetes with circulatory disorder causing erectile dysfunction (H)   Changed by:  Gage Zepeda MD        TAKE 1 TABLET (1,000 MG) BY MOUTH 2 TIMES DAILY (WITH MEALS)   Quantity:  180 tablet   Refills:  1       ranitidine 150 MG tablet   Commonly known as:  ZANTAC   This may have changed:  See the new instructions.   Used for:  Gastroesophageal reflux disease without esophagitis   Changed by:  Gage Zepeda MD        Dose:  150 mg   Take 1 tablet (150 mg) by mouth 2 times daily   Quantity:  180 tablet   Refills:  1         Stop taking these medicines if you haven't already. Please contact your care team if you have questions.     cyclobenzaprine 10 MG tablet   Commonly known as:  FLEXERIL   Stopped by:  Gage Zepeda MD                Where to get your medicines      These medications were sent to [facility]/pharmacy #[id number] - [place], [place] - [address] [address]     Phone:  [phone]     atenolol-chlorthalidone 100-25 MG per tablet    atorvastatin 10 MG tablet    glipiZIDE 5 MG 24 hr tablet    lisinopril 20 MG tablet    metFORMIN 1000 MG tablet    ranitidine 150 MG tablet                Primary Care Provider Office Phone # Fax #    Gage Zepeda [phone]       [address] [address]        Equal Access to Services     CICI FRAZIER AH: Hadii maria de jesus villanuevao Sotevin, waaxda luqadaha, qaybta kaalmada adeegyacynthia, waxay emily mccain. So [facility] [phone].    ATENCIÓN: Si habla español, tiene a whatley disposición servicios gratuitos de asistencia lingüística. Avinash al [phone].    We comply with applicable federal civil rights laws and [place] laws. We do not discriminate on the basis of race, color,  national origin, age, disability sex, sexual orientation or gender identity.            Thank you!     Thank you for choosing [facility]  for your care. Our goal is always to provide you with excellent care. Hearing back from our patients is one way we can continue to improve our services. Please take a few minutes to complete the written survey that you may receive in the mail after your visit with us. Thank you!             Your Updated Medication List - Protect others around you: Learn how to safely use, store and throw away your medicines at www.disposemymeds.org.          This list is accurate as of: 8/2/17 12:01 PM.  Always use your most recent med list.                   Brand Name Dispense Instructions for use Diagnosis    atenolol-chlorthalidone 100-25 MG per tablet    TENORETIC    90 tablet    Take 1 tablet by mouth daily    Essential hypertension with goal blood pressure less than 140/90       atorvastatin 10 MG tablet    LIPITOR    90 tablet    TAKE 1 TABLET (10 MG) BY MOUTH DAILY    Type 2 diabetes with circulatory disorder causing erectile dysfunction (H), CARDIOVASCULAR SCREENING; LDL GOAL LESS THAN 100       blood glucose monitoring lancets     1 Box    1 each 2 times daily    Type 2 diabetes with circulatory disorder causing erectile dysfunction (H)       blood glucose monitoring test strip    ACCU-CHEK SMARTVIEW    1 Box    100 strips by In Vitro route 2 times daily    Type 2 diabetes with circulatory disorder causing erectile dysfunction (H)       glipiZIDE 5 MG 24 hr tablet    GLUCOTROL XL    90 tablet    TAKE 1 TABLET (5 MG) BY MOUTH DAILY    Type 2 diabetes with circulatory disorder causing erectile dysfunction (H)       lisinopril 20 MG tablet    PRINIVIL/ZESTRIL    180 tablet    TAKE 2 TABLETS (40 MG) BY MOUTH DAILY    Essential hypertension with goal blood pressure less than 140/90       metFORMIN 1000 MG tablet    GLUCOPHAGE    180 tablet    TAKE 1 TABLET (1,000 MG) BY MOUTH  2 TIMES DAILY (WITH MEALS)    Type 2 diabetes with circulatory disorder causing erectile dysfunction (H)       ranitidine 150 MG tablet    ZANTAC    180 tablet    Take 1 tablet (150 mg) by mouth 2 times daily    Gastroesophageal reflux disease without esophagitis

## 2017-08-02 NOTE — LETTER
Booker Brown  [address] [facility] [address]        August 3, 2017          Dear ,    We are writing to inform you of your test results.    A1c is slightly better than last time which is reassuring.  Urine test shows protein leak which improves from better control of diabetes. It is better than last time.  Cholesterol is worse than last time. Your good cholesterol (HDL) is low, which improves with exercise. The triglycerides are high. Lowering  the amount of sugar ,alcohol and sweets in the diet helps to control this.Exercise and weight loss helps.   Continue same cholesterol medication.   Kidney function is abnormal but stable.   Liver function is fine.  Follow up with me in 6 months.     Resulted Orders   Lipid panel reflex to direct LDL   Result Value Ref Range    Cholesterol 168 <200 mg/dL    Triglycerides 284 (H) <150 mg/dL      Comment:      Borderline high:  150-199 mg/dl   High:             200-499 mg/dl   Very high:       >499 mg/dl   Fasting specimen      HDL Cholesterol 35 (L) >39 mg/dL    LDL Cholesterol Calculated 76 <100 mg/dL      Comment:      Desirable:       <100 mg/dl    Non HDL Cholesterol 133 (H) <130 mg/dL      Comment:      Above Desirable:  130-159 mg/dl   Borderline high:  160-189 mg/dl   High:             190-219 mg/dl   Very high:       >219 mg/dl     Albumin Random Urine Quantitative   Result Value Ref Range    Creatinine Urine 114 mg/dL    Albumin Urine mg/L 72 mg/L    Albumin Urine mg/g Cr 63.60 (H) 0 - 17 mg/g Cr   Hemoglobin A1c   Result Value Ref Range    Hemoglobin A1C 7.1 (H) 4.3 - 6.0 %   Comprehensive metabolic panel   Result Value Ref Range    Sodium 138 133 - 144 mmol/L    Potassium 4.2 3.4 - 5.3 mmol/L    Chloride 105 94 - 109 mmol/L    Carbon Dioxide 26 20 - 32 mmol/L    Anion Gap 7 3 - 14 mmol/L    Glucose 143 (H) 70 - 99 mg/dL      Comment:      Fasting specimen    Urea Nitrogen 35 (H) 7 - 30 mg/dL    Creatinine 1.53 (H) 0.66 - 1.25 mg/dL     GFR Estimate 47 (L) >60 mL/min/1.7m2      Comment:      Non  GFR Calc    GFR Estimate If Black 56 (L) >60 mL/min/1.7m2      Comment:       GFR Calc    Calcium 9.4 8.5 - 10.1 mg/dL    Bilirubin Total 0.5 0.2 - 1.3 mg/dL    Albumin 4.3 3.4 - 5.0 g/dL    Protein Total 8.0 6.8 - 8.8 g/dL    Alkaline Phosphatase 51 40 - 150 U/L    ALT 34 0 - 70 U/L    AST 21 0 - 45 U/L       If you have any questions or concerns, please call the clinic at the number listed above.       Sincerely,        Gage Zepeda MD/nr

## 2017-08-03 LAB
ALBUMIN SERPL-MCNC: 4.3 G/DL (ref 3.4–5)
ALP SERPL-CCNC: 51 U/L (ref 40–150)
ALT SERPL W P-5'-P-CCNC: 34 U/L (ref 0–70)
ANION GAP SERPL CALCULATED.3IONS-SCNC: 7 MMOL/L (ref 3–14)
AST SERPL W P-5'-P-CCNC: 21 U/L (ref 0–45)
BILIRUB SERPL-MCNC: 0.5 MG/DL (ref 0.2–1.3)
BUN SERPL-MCNC: 35 MG/DL (ref 7–30)
CALCIUM SERPL-MCNC: 9.4 MG/DL (ref 8.5–10.1)
CHLORIDE SERPL-SCNC: 105 MMOL/L (ref 94–109)
CHOLEST SERPL-MCNC: 168 MG/DL
CO2 SERPL-SCNC: 26 MMOL/L (ref 20–32)
CREAT SERPL-MCNC: 1.53 MG/DL (ref 0.66–1.25)
CREAT UR-MCNC: 114 MG/DL
GFR SERPL CREATININE-BSD FRML MDRD: 47 ML/MIN/1.7M2
GLUCOSE SERPL-MCNC: 143 MG/DL (ref 70–99)
HDLC SERPL-MCNC: 35 MG/DL
LDLC SERPL CALC-MCNC: 76 MG/DL
MICROALBUMIN UR-MCNC: 72 MG/L
MICROALBUMIN/CREAT UR: 63.6 MG/G CR (ref 0–17)
NONHDLC SERPL-MCNC: 133 MG/DL
POTASSIUM SERPL-SCNC: 4.2 MMOL/L (ref 3.4–5.3)
PROT SERPL-MCNC: 8 G/DL (ref 6.8–8.8)
SODIUM SERPL-SCNC: 138 MMOL/L (ref 133–144)
TRIGL SERPL-MCNC: 284 MG/DL

## 2017-08-07 DIAGNOSIS — E11.59 TYPE 2 DIABETES WITH CIRCULATORY DISORDER CAUSING ERECTILE DYSFUNCTION (H): ICD-10-CM

## 2017-08-07 DIAGNOSIS — I10 ESSENTIAL HYPERTENSION WITH GOAL BLOOD PRESSURE LESS THAN 140/90: ICD-10-CM

## 2017-08-07 DIAGNOSIS — N52.1 TYPE 2 DIABETES WITH CIRCULATORY DISORDER CAUSING ERECTILE DYSFUNCTION (H): ICD-10-CM

## 2017-08-09 NOTE — TELEPHONE ENCOUNTER
Received call from pt's pharmacy. Pharmacy rep states this is a duplicate refill request. Please disregard. Thanks!

## 2017-08-10 RX ORDER — GLIPIZIDE 5 MG/1
TABLET, FILM COATED, EXTENDED RELEASE ORAL
Qty: 90 TABLET | Refills: 0 | OUTPATIENT
Start: 2017-08-10

## 2017-08-10 RX ORDER — LISINOPRIL 20 MG/1
TABLET ORAL
Qty: 60 TABLET | Refills: 0 | OUTPATIENT
Start: 2017-08-10

## 2018-02-05 DIAGNOSIS — I10 ESSENTIAL HYPERTENSION WITH GOAL BLOOD PRESSURE LESS THAN 140/90: ICD-10-CM

## 2018-02-05 NOTE — TELEPHONE ENCOUNTER
"Requested Prescriptions   Pending Prescriptions Disp Refills     lisinopril (PRINIVIL/ZESTRIL) 20 MG tablet [Pharmacy Med Name: LISINOPRIL 20 MG TABLET]  Last Written Prescription Date:  8/2/2017  Last Fill Quantity: 180 tablet,  # refills: 1   Last Office Visit: 8/2/2017   Future Office Visit:      180 tablet 1     Sig: TAKE 2 TABLETS (40 MG) BY MOUTH DAILY    ACE Inhibitors (Including Combos) Protocol Failed    2/5/2018 10:48 AM       Failed - Normal serum creatinine on file in past 12 months    Recent Labs   Lab Test  08/02/17   1205   CR  1.53*          Passed - Blood pressure under 140/90    BP Readings from Last 3 Encounters:   08/02/17 138/83   04/05/17 140/88   09/30/16 138/78          Passed - Recent or future visit with authorizing provider's specialty    Patient had office visit in the last year or has a visit in the next 30 days with authorizing provider.  See \"Patient Info\" tab in inbasket, or \"Choose Columns\" in Meds & Orders section of the refill encounter.          Passed - Patient is age 18 or older       Passed - Normal serum potassium on file in past 12 months    Recent Labs   Lab Test  08/02/17   1205   POTASSIUM  4.2               "

## 2018-02-09 RX ORDER — LISINOPRIL 20 MG/1
TABLET ORAL
Qty: 180 TABLET | Refills: 1 | Status: SHIPPED | OUTPATIENT
Start: 2018-02-09 | End: 2018-06-13

## 2018-04-01 DIAGNOSIS — E11.59 TYPE 2 DIABETES WITH CIRCULATORY DISORDER CAUSING ERECTILE DYSFUNCTION (H): ICD-10-CM

## 2018-04-01 DIAGNOSIS — N52.1 TYPE 2 DIABETES WITH CIRCULATORY DISORDER CAUSING ERECTILE DYSFUNCTION (H): ICD-10-CM

## 2018-04-02 NOTE — TELEPHONE ENCOUNTER
"Requested Prescriptions   Pending Prescriptions Disp Refills     glipiZIDE (GLUCOTROL XL) 5 MG 24 hr tablet [Pharmacy Med Name: GLIPIZIDE ER 5 MG TABLET]  Last Written Prescription Date:  8/2/2017  Last Fill Quantity: 90 tablet,  # refills: 1   Last Office Visit: 8/2/2017   Future Office Visit:      90 tablet 1     Sig: TAKE 1 TABLET (5 MG) BY MOUTH DAILY    Sulfonylurea Agents Failed    4/1/2018  9:12 AM       Failed - Blood pressure less than 140/90 in past 6 months    BP Readings from Last 3 Encounters:   08/02/17 138/83   04/05/17 140/88   09/30/16 138/78          Failed - Patient has documented A1c within the specified period of time.    Recent Labs   Lab Test  08/02/17   1205   A1C  7.1*          Failed - Patient has a recent creatinine (normal) within the past 12 mos.    Recent Labs   Lab Test  08/02/17   1205   CR  1.53*          Failed - Recent (6 mo) or future (30 days) visit within the authorizing provider's specialty    Patient had office visit in the last 6 months or has a visit in the next 30 days with authorizing provider or within the authorizing provider's specialty.  See \"Patient Info\" tab in inbasket, or \"Choose Columns\" in Meds & Orders section of the refill encounter.           Passed - Patient has documented LDL within the past 12 mos.    Recent Labs   Lab Test  08/02/17   1205   LDL  76          Passed - Patient has had a Microalbumin in the past 12 mos.    Recent Labs   Lab Test  08/02/17   1205   MICROL  72   UMALCR  63.60*          Passed - Patient is age 18 or older          "

## 2018-04-05 RX ORDER — GLIPIZIDE 5 MG/1
TABLET, FILM COATED, EXTENDED RELEASE ORAL
Qty: 30 TABLET | Refills: 0 | Status: SHIPPED | OUTPATIENT
Start: 2018-04-05 | End: 2018-05-02

## 2018-04-06 DIAGNOSIS — E11.59 TYPE 2 DIABETES WITH CIRCULATORY DISORDER CAUSING ERECTILE DYSFUNCTION (H): ICD-10-CM

## 2018-04-06 DIAGNOSIS — N52.1 TYPE 2 DIABETES WITH CIRCULATORY DISORDER CAUSING ERECTILE DYSFUNCTION (H): ICD-10-CM

## 2018-04-06 NOTE — TELEPHONE ENCOUNTER
"Requested Prescriptions   Pending Prescriptions Disp Refills     atorvastatin (LIPITOR) 10 MG tablet [Pharmacy Med Name: ATORVASTATIN 10 MG TABLET]  Last Written Prescription Date:  8/2/2017  Last Fill Quantity: 90 tabs,  # refills: 1   Last office visit: 8/2/2017 with prescribing provider:  Katelyn   Future Office Visit:     90 tablet 1     Sig: TAKE 1 TABLET (10 MG) BY MOUTH DAILY    Statins Protocol Passed    4/6/2018  4:48 PM       Passed - LDL on file in past 12 months    Recent Labs   Lab Test  08/02/17   1205   LDL  76            Passed - No abnormal creatine kinase in past 12 months    No lab results found.            Passed - Recent (12 mo) or future (30 days) visit within the authorizing provider's specialty    Patient had office visit in the last 12 months or has a visit in the next 30 days with authorizing provider or within the authorizing provider's specialty.  See \"Patient Info\" tab in inbasket, or \"Choose Columns\" in Meds & Orders section of the refill encounter.           Passed - Patient is age 18 or older          "

## 2018-04-10 RX ORDER — ATORVASTATIN CALCIUM 10 MG/1
TABLET, FILM COATED ORAL
Qty: 90 TABLET | Refills: 1 | Status: SHIPPED | OUTPATIENT
Start: 2018-04-10 | End: 2018-10-17

## 2018-04-10 NOTE — TELEPHONE ENCOUNTER
Possible break - called [facility] tevin  Last refill: 1/15/2018  Disp: 90  Refills: 0    Prescription approved per [facility] Refill Protocol.    Signed Prescriptions:                        Disp   Refills    atorvastatin (LIPITOR) 10 MG tablet        90 tab*1        Sig: TAKE 1 TABLET (10 MG) BY MOUTH DAILY  Authorizing Provider: ANTOINETTE ASHBY  Ordering User: NETTE SOLANO      Closing encounter - no further actions needed at this time    Nette Solano RN

## 2018-05-02 DIAGNOSIS — N52.1 TYPE 2 DIABETES WITH CIRCULATORY DISORDER CAUSING ERECTILE DYSFUNCTION (H): ICD-10-CM

## 2018-05-02 DIAGNOSIS — E11.59 TYPE 2 DIABETES WITH CIRCULATORY DISORDER CAUSING ERECTILE DYSFUNCTION (H): ICD-10-CM

## 2018-05-02 NOTE — TELEPHONE ENCOUNTER
"Requested Prescriptions   Pending Prescriptions Disp Refills     glipiZIDE (GLUCOTROL XL) 5 MG 24 hr tablet [Pharmacy Med Name: GLIPIZIDE ER 5 MG TABLET]  Last Written Prescription Date:  4/5/2018  Last Fill Quantity: 30 tablet,  # refills: 0   Last Office Visit: 8/2/2017   Future Office Visit:      30 tablet 0     Sig: TAKE 1 TABLET (5 MG) BY MOUTH DAILY    Sulfonylurea Agents Failed    5/2/2018 11:26 AM       Failed - Blood pressure less than 140/90 in past 6 months    BP Readings from Last 3 Encounters:   08/02/17 138/83   04/05/17 140/88   09/30/16 138/78          Failed - Patient has documented A1c within the specified period of time.    Recent Labs   Lab Test  08/02/17   1205   A1C  7.1*          Failed - Patient has a recent creatinine (normal) within the past 12 mos.    Recent Labs   Lab Test  08/02/17   1205   CR  1.53*          Failed - Recent (6 mo) or future (30 days) visit within the authorizing provider's specialty    Patient had office visit in the last 6 months or has a visit in the next 30 days with authorizing provider or within the authorizing provider's specialty.  See \"Patient Info\" tab in inbasket, or \"Choose Columns\" in Meds & Orders section of the refill encounter.           Passed - Patient has documented LDL within the past 12 mos.    Recent Labs   Lab Test  08/02/17   1205   LDL  76          Passed - Patient has had a Microalbumin in the past 12 mos.    Recent Labs   Lab Test  08/02/17   1205   MICROL  72   UMALCR  63.60*          Passed - Patient is age 18 or older          "

## 2018-05-07 RX ORDER — GLIPIZIDE 5 MG/1
TABLET, FILM COATED, EXTENDED RELEASE ORAL
Qty: 15 TABLET | Refills: 0 | Status: SHIPPED | OUTPATIENT
Start: 2018-05-07 | End: 2018-05-25

## 2018-05-07 NOTE — TELEPHONE ENCOUNTER
Elevated creatinine  Patient has received nellie refill and message to schedule follow-up.  No appointment has been scheduled  Message added to refill and 2 week supply pended.  Esther Amanda RN

## 2018-05-17 NOTE — TELEPHONE ENCOUNTER
ED General





- General


Chief Complaint: Vaginal Pain


Stated Complaint: VAGINAL PAIN


Time Seen by Provider: 05/17/18 22:00


Notes: 


Patient is a 4 year 9-month-old female presents with complaint of pain of her 

mother wipes area after she pees.  Mother said just started today.  She has 

never done this before.  No fevers.  No vomiting.  No diarrhea.  She has not 

complained of abdominal pain.  The only time she complained of pain is with her 

mother wipes area.  She has not had any vaginal bleeding or discharge.  No 

other complaints at this time.





TRAVEL OUTSIDE OF THE [place] IN LAST 30 DAYS: No





- Related Data


Allergies/Adverse Reactions: 


 





No Known Allergies Allergy (Verified 12/19/17 17:47)


 











Past Medical History





- Social History


Smoking Status: Never Smoker


Frequency of alcohol use: None


Drug Abuse: None


Family History: Reviewed & Not Pertinent


Renal/ Medical History: Denies: Hx Peritoneal Dialysis





- Immunizations


Immunizations up to date: Yes


Hx Diphtheria, Pertussis, Tetanus Vaccination: Yes





Review of Systems





- Review of Systems


Notes: 





My Normal Review Basic





REVIEW OF SYSTEMS:


CONSTITUTIONAL :  Denies fever,  chills, or sweats.  Denies recent illness.


GASTROINTESTINAL:  Denies abdominal pain.  Denies nausea, vomiting, or 

diarrhea.  


GENITOURINARY:  Denies difficulty urinating, painful urination, burning, 

frequency, or blood in urine.


FEMALE  GENITOURINARY: Pain over her vaginal area when wiping with tissue.  No 

abnormal vaginal discharge or bleeding.


MUSCULOSKELETAL:  Denies neck or back pain or joint pain or swelling.


SKIN:   Denies rash or skin lesions.


NEUROLOGICAL:  Denies altered mental status or loss of consciousness.  Denies 

headache.  Denies weakness or paralysis or loss of use of either side.  Denies 

problems with gait or speech.  Denies sensory or motor loss.


ALL OTHER SYSTEMS REVIEWED AND NEGATIVE.





Physical Exam





- Vital signs


Vitals: 


 











Temp Pulse Resp Pulse Ox


 


 97.7 F   116   20   99 


 


 05/17/18 20:56  05/17/18 20:56  05/17/18 20:56  05/17/18 20:56














- Notes


Notes: 


General Appearance: Well nourished, alert, cooperative, no acute distress, no 

obvious discomfort.  Appearing.


Vitals: reviewed, See vital signs table.


Eyes: PERRL, EOMI, Conjuctiva clear


Lungs: No wheezing, No rales, No rhonci, No accessory muscle use, good air 

exchange bilaterally.


Heart: Normal rate, Regular rythm, No murmur, no rub


Abdomen: Normal BS, soft, No rigidity, No abdominal tenderness, No guarding, no 

rebound, 


Genital: No blood coming from the vaginal area.  Vaginal area is not red or 

swollen or irritated appearing.  No abnormal discharge or foul-smelling odor 

coming from the vaginal area. Female chaperone Jody Mcneal RN was at bedside 

during exam.


Extremities: good pulses in all extremities, no swelling or tenderness in the 

extremities, no edema.


Skin: warm, dry, appropriate color, no rash


Neuro: speech clear, normal affect, responds appropriately to questions.








Course





- Re-evaluation


Re-evalutation: 





05/18/18 05:51


Patient has appears to be urinary tract infection and urinalysis which could 

cause the pain she has with wiping.  I do not see any evidence of pelvic 

irritation or signs of trauma or abuse on exam.  Feel patient safe to discharge 

home and she looks clinically very well.  We will start her on Keflex.  

Encourage mother to have her follow-up with pediatrician in 1-2 days for close 

reevaluation.  I encourage him return to ER if she has fevers, worsening pain, 

difficulty urinating, or if she has any further concerns.  Mother agrees with 

plan and patient will be discharged home.





Dictation of this chart was performed using voice recognition software; 

therefore, there may be some unintended grammatical errors.





- Vital Signs


Vital signs: 


 











Temp Pulse Resp BP Pulse Ox


 


 97.7 F   116   20      99 


 


 05/17/18 20:56  05/17/18 20:56  05/17/18 20:56     05/17/18 20:56














- Laboratory


Laboratory results interpreted by me: 


 











  05/17/18





  21:45


 


Ur Leukocyte Esterase  LARGE H


 


Urine Ascorbic Acid  40 H














Discharge





- Discharge


Clinical Impression: 


UTI (urinary tract infection)


Qualifiers:


 Urinary tract infection type: site unspecified Hematuria presence: without 

hematuria Qualified Code(s): N39.0 - Urinary tract infection, site not specified





Disposition: HOME, SELF-CARE


Additional Instructions: 


Robbi's urine shows evidence of a UTI. Please take the antibiotic as 

prescribed. please return to the ER immediately if Robbi has fevers, vomiting, 

or worsening pain. Follow up with your pediatrician in 2 days for reevaluation.


Prescriptions: 


Cephalexin Monohydrate [Keflex 250 mg/5 ml Susp] 250 mg PO BID 7 Days  ml


Referrals: 


NICKIE MEEHAN MD [Primary Care Provider] - 05/19/18 Medication Detail      Disp Refills Start End PEDRO   lisinopril (PRINIVIL/ZESTRIL) 20 MG tablet 180 tablet 1 8/2/2017  No   Sig: TAKE 2 TABLETS (40 MG) BY MOUTH DAILY       Last Office Visit with [facility], [facility] or [facility] prescribing provider: 8/2/17       Potassium   Date Value Ref Range Status   08/02/2017 4.2 3.4 - 5.3 mmol/L Final     Creatinine   Date Value Ref Range Status   08/02/2017 1.53 (H) 0.66 - 1.25 mg/dL Final     BP Readings from Last 3 Encounters:   08/02/17 138/83   04/05/17 140/88   09/30/16 138/78     Medication Detail      Disp Refills Start End PEDRO   glipiZIDE (GLUCOTROL XL) 5 MG 24 hr tablet 90 tablet 1 8/2/2017  No   Sig: TAKE 1 TABLET (5 MG) BY MOUTH DAILY       Last Office Visit with [facility], [facility] or [facility] prescribing provider:  8/2/17        BP Readings from Last 3 Encounters:   08/02/17 138/83   04/05/17 140/88   09/30/16 138/78     Lab Results   Component Value Date    MICROL 72 08/02/2017     Lab Results   Component Value Date    UMALCR 63.60 08/02/2017     Creatinine   Date Value Ref Range Status   08/02/2017 1.53 (H) 0.66 - 1.25 mg/dL Final   ]  GFR Estimate   Date Value Ref Range Status   08/02/2017 47 (L) >60 mL/min/1.7m2 Final     Comment:     Non  GFR Calc   05/09/2016 44 (L) >60 mL/min/1.7m2 Final     Comment:     Non  GFR Calc   09/28/2015 30 (L) >60 mL/min/1.7m2 Final     Comment:     Non  GFR Calc     GFR Estimate If Black   Date Value Ref Range Status   08/02/2017 56 (L) >60 mL/min/1.7m2 Final     Comment:      GFR Calc   05/09/2016 54 (L) >60 mL/min/1.7m2 Final     Comment:      GFR Calc   09/28/2015 36 (L) >60 mL/min/1.7m2 Final     Comment:      GFR Calc     Lab Results   Component Value Date    CHOL 168 08/02/2017     Lab Results   Component Value Date    HDL 35 08/02/2017     Lab Results   Component Value Date    LDL 76 08/02/2017     Lab Results   Component Value Date    TRIG 284  08/02/2017     Lab Results   Component Value Date    CHOLHDLRATIO 4.5 09/28/2015     Lab Results   Component Value Date    AST 21 08/02/2017     Lab Results   Component Value Date    ALT 34 08/02/2017     Lab Results   Component Value Date    A1C 7.1 08/02/2017    A1C 7.7 04/05/2017    A1C 6.9 09/30/2016    A1C 9.9 05/09/2016    A1C 7.5 09/28/2015     Potassium   Date Value Ref Range Status   08/02/2017 4.2 3.4 - 5.3 mmol/L Final

## 2018-05-25 DIAGNOSIS — N52.1 TYPE 2 DIABETES WITH CIRCULATORY DISORDER CAUSING ERECTILE DYSFUNCTION (H): ICD-10-CM

## 2018-05-25 DIAGNOSIS — E11.59 TYPE 2 DIABETES WITH CIRCULATORY DISORDER CAUSING ERECTILE DYSFUNCTION (H): ICD-10-CM

## 2018-05-25 NOTE — TELEPHONE ENCOUNTER
"Requested Prescriptions   Pending Prescriptions Disp Refills     glipiZIDE (GLUCOTROL XL) 5 MG 24 hr tablet [Pharmacy Med Name: GLIPIZIDE ER 5 MG TABLET]  Last Written Prescription Date:  5/7/2018  Last Fill Quantity: 15 tabs,  # refills: 0   Last office visit: 8/2/2017 with prescribing provider:  Katelyn   Future Office Visit:     15 tablet 0     Sig: TAKE 1 TABLET BY MOUTH EVERY DAY    Sulfonylurea Agents Failed    5/25/2018 10:09 AM       Failed - Blood pressure less than 140/90 in past 6 months    BP Readings from Last 3 Encounters:   08/02/17 138/83   04/05/17 140/88   09/30/16 138/78                Failed - Patient has documented A1c within the specified period of time.    If HgbA1C is 8 or greater, it needs to be on file within the past 3 months.  If less than 8, must be on file within the past 6 months.     Recent Labs   Lab Test  08/02/17   1205   A1C  7.1*            Failed - Patient has a recent creatinine (normal) within the past 12 mos.    Recent Labs   Lab Test  08/02/17   1205   CR  1.53*            Failed - Recent (6 mo) or future (30 days) visit within the authorizing provider's specialty    Patient had office visit in the last 6 months or has a visit in the next 30 days with authorizing provider or within the authorizing provider's specialty.  See \"Patient Info\" tab in inbasket, or \"Choose Columns\" in Meds & Orders section of the refill encounter.           Passed - Patient has documented LDL within the past 12 mos.    Recent Labs   Lab Test  08/02/17   1205   LDL  76            Passed - Patient has had a Microalbumin in the past 12 mos.    Recent Labs   Lab Test  08/02/17   1205   MICROL  72   UMALCR  63.60*            Passed - Patient is age 18 or older          "

## 2018-05-29 NOTE — TELEPHONE ENCOUNTER
I have attempted to contact this patient by phone with the following results: Related the message below, state pt will find time to make an appointment.      1st attempt    Roxy Salvador MA

## 2018-05-30 DIAGNOSIS — E11.59 TYPE 2 DIABETES WITH CIRCULATORY DISORDER CAUSING ERECTILE DYSFUNCTION (H): ICD-10-CM

## 2018-05-30 DIAGNOSIS — N52.1 TYPE 2 DIABETES WITH CIRCULATORY DISORDER CAUSING ERECTILE DYSFUNCTION (H): ICD-10-CM

## 2018-05-30 RX ORDER — GLIPIZIDE 5 MG/1
TABLET, FILM COATED, EXTENDED RELEASE ORAL
Qty: 7 TABLET | Refills: 0 | Status: SHIPPED | OUTPATIENT
Start: 2018-05-30 | End: 2018-06-13

## 2018-05-30 NOTE — TELEPHONE ENCOUNTER
5/2/18 refill TE states pt was informed to make appt.  2 week nellie period already given.  No appt made.  Routing to pcp.  KUNAL Alcantara

## 2018-05-30 NOTE — TELEPHONE ENCOUNTER
[facility] is calling to find out status of this refill.  Please call today if possible.

## 2018-05-30 NOTE — TELEPHONE ENCOUNTER
"Requested Prescriptions   Pending Prescriptions Disp Refills     metFORMIN (GLUCOPHAGE) 1000 MG tablet [Pharmacy Med Name: METFORMIN HCL 1,000 MG TABLET]  Last Written Prescription Date:  8/2/2017  Last Fill Quantity: 180 tablet,  # refills: 1   Last Office Visit: 8/2/2017   Future Office Visit:      180 tablet 0     Sig: TAKE 1 TABLET (1,000 MG) BY MOUTH 2 TIMES DAILY (WITH MEALS)*LAB WORK NEEDED BEFORE FUTHER REFILLS*    Biguanide Agents Failed    5/30/2018  1:55 AM       Failed - Blood pressure less than 140/90 in past 6 months    BP Readings from Last 3 Encounters:   08/02/17 138/83   04/05/17 140/88   09/30/16 138/78          Failed - Patient has documented A1c within the specified period of time.    If HgbA1C is 8 or greater, it needs to be on file within the past 3 months.  If less than 8, must be on file within the past 6 months.     Recent Labs   Lab Test  08/02/17   1205   A1C  7.1*          Failed - Patient's CR is NOT>1.4 OR Patient's EGFR is NOT<45 within past 12 mos.    Recent Labs   Lab Test  08/02/17   1205   GFRESTIMATED  47*   GFRESTBLACK  56*     Recent Labs   Lab Test  08/02/17   1205   CR  1.53*          Failed - Recent (6 mo) or future (30 days) visit within the authorizing provider's specialty    Patient had office visit in the last 6 months or has a visit in the next 30 days with authorizing provider or within the authorizing provider's specialty.  See \"Patient Info\" tab in inbasket, or \"Choose Columns\" in Meds & Orders section of the refill encounter.           Passed - Patient has documented LDL within the past 12 mos.    Recent Labs   Lab Test  08/02/17   1205   LDL  76          Passed - Patient has had a Microalbumin in the past 12 mos.    Recent Labs   Lab Test  08/02/17   1205   MICROL  72   UMALCR  63.60*          Passed - Patient is age 10 or older       Passed - Patient does NOT have a diagnosis of CHF.          "

## 2018-06-04 NOTE — TELEPHONE ENCOUNTER
Routing refill request to provider for review/approval because:  Barbara given x1 and patient did not follow up, please advise  Patient needs to be seen because:  Overdue for 6 month Diabetes check  Routing to provider and scheduling  1 month supply pended.  Esther Amanda RN

## 2018-06-13 ENCOUNTER — OFFICE VISIT (OUTPATIENT)
Dept: FAMILY MEDICINE | Facility: CLINIC | Age: 63
End: 2018-06-13
Payer: COMMERCIAL

## 2018-06-13 VITALS
TEMPERATURE: 98.3 F | SYSTOLIC BLOOD PRESSURE: 138 MMHG | BODY MASS INDEX: 42.34 KG/M2 | HEIGHT: 70 IN | DIASTOLIC BLOOD PRESSURE: 75 MMHG | OXYGEN SATURATION: 97 % | HEART RATE: 64 BPM | WEIGHT: 295.75 LBS | RESPIRATION RATE: 20 BRPM

## 2018-06-13 DIAGNOSIS — N52.1 TYPE 2 DIABETES WITH CIRCULATORY DISORDER CAUSING ERECTILE DYSFUNCTION (H): Primary | ICD-10-CM

## 2018-06-13 DIAGNOSIS — E11.59 TYPE 2 DIABETES WITH CIRCULATORY DISORDER CAUSING ERECTILE DYSFUNCTION (H): Primary | ICD-10-CM

## 2018-06-13 DIAGNOSIS — Z11.4 SCREENING FOR HIV (HUMAN IMMUNODEFICIENCY VIRUS): ICD-10-CM

## 2018-06-13 DIAGNOSIS — Z12.11 SCREEN FOR COLON CANCER: ICD-10-CM

## 2018-06-13 DIAGNOSIS — I10 ESSENTIAL HYPERTENSION WITH GOAL BLOOD PRESSURE LESS THAN 140/90: ICD-10-CM

## 2018-06-13 DIAGNOSIS — E66.01 MORBIDLY OBESE (H): ICD-10-CM

## 2018-06-13 LAB — HBA1C MFR BLD: 7 % (ref 0–5.6)

## 2018-06-13 PROCEDURE — 36415 COLL VENOUS BLD VENIPUNCTURE: CPT | Performed by: FAMILY MEDICINE

## 2018-06-13 PROCEDURE — 80061 LIPID PANEL: CPT | Performed by: FAMILY MEDICINE

## 2018-06-13 PROCEDURE — 99214 OFFICE O/P EST MOD 30 MIN: CPT | Performed by: FAMILY MEDICINE

## 2018-06-13 PROCEDURE — 80053 COMPREHEN METABOLIC PANEL: CPT | Performed by: FAMILY MEDICINE

## 2018-06-13 PROCEDURE — 87389 HIV-1 AG W/HIV-1&-2 AB AG IA: CPT | Performed by: FAMILY MEDICINE

## 2018-06-13 PROCEDURE — 84439 ASSAY OF FREE THYROXINE: CPT | Performed by: FAMILY MEDICINE

## 2018-06-13 PROCEDURE — 84443 ASSAY THYROID STIM HORMONE: CPT | Performed by: FAMILY MEDICINE

## 2018-06-13 PROCEDURE — 83036 HEMOGLOBIN GLYCOSYLATED A1C: CPT | Performed by: FAMILY MEDICINE

## 2018-06-13 RX ORDER — LISINOPRIL 20 MG/1
TABLET ORAL
Qty: 180 TABLET | Refills: 1 | Status: SHIPPED | OUTPATIENT
Start: 2018-06-13 | End: 2018-10-17

## 2018-06-13 RX ORDER — AMLODIPINE BESYLATE 5 MG/1
5 TABLET ORAL DAILY
Qty: 90 TABLET | Refills: 1 | Status: SHIPPED | OUTPATIENT
Start: 2018-06-13 | End: 2018-10-17

## 2018-06-13 RX ORDER — GLIPIZIDE 5 MG/1
5 TABLET, FILM COATED, EXTENDED RELEASE ORAL DAILY
Qty: 90 TABLET | Refills: 1 | Status: SHIPPED | OUTPATIENT
Start: 2018-06-13 | End: 2018-10-17

## 2018-06-13 NOTE — PROGRESS NOTES
SUBJECTIVE:   Booker Brown is a 63 year old male who presents to clinic today for the following health issues:      Diabetes Follow-up      Patient is checking blood sugars: not at all- out of battery     Diabetic concerns: None     Symptoms of hypoglycemia (low blood sugar): none     Paresthesias (numbness or burning in feet) or sores: No     Date of last diabetic eye exam: a while ago     BP Readings from Last 2 Encounters:   08/02/17 138/83   04/05/17 140/88     Hemoglobin A1C (%)   Date Value   06/13/2018 7.0 (H)   08/02/2017 7.1 (H)     LDL Cholesterol Calculated (mg/dL)   Date Value   08/02/2017 76   05/09/2016 95     Hypertension Follow-up      Outpatient blood pressures are not being checked.    Low Salt Diet: no added salt      Amount of exercise or physical activity: physical job     Problems taking medications regularly: No    Medication side effects: none    Diet: regular (no restrictions)    Problem list and histories reviewed & adjusted, as indicated.  Additional history: as documented    Labs reviewed in EPIC    Reviewed and updated as needed this visit by clinical staff  Tobacco  Allergies  Meds  Med Hx  Surg Hx  Fam Hx  Soc Hx      Reviewed and updated as needed this visit by Provider           Social History     Social History     Marital status:      Spouse name: N/A     Number of children: N/A     Years of education: N/A     Occupational History     Not on file.     Social History Main Topics     Smoking status: Former Smoker     Packs/day: 3.00     Years: 16.00     Types: Cigarettes     Quit date: 9/14/1987     Smokeless tobacco: Never Used      Comment: 17 years ago     Alcohol use No     Drug use: No     Sexual activity: Yes     Partners: Female     Other Topics Concern     Parent/Sibling W/ Cabg, Mi Or Angioplasty Before 65f 55m? Yes      Service No     Blood Transfusions No     Caffeine Concern No     Occupational Exposure No     Hobby Hazards No     Sleep Concern No  "    Stress Concern No     Weight Concern Yes     Special Diet No     Back Care No     Exercise Yes     Bike Helmet No     n/a     Seat Belt Yes     Self-Exams No     Social History Narrative     Allergies   Allergen Reactions     No Known Allergies      Patient Active Problem List   Diagnosis     erectile dysfunction     obese bmi over 35     GERD (gastroesophageal reflux disease)     Hypertension goal BP (blood pressure) < 140/90     Advanced directives, counseling/discussion     CARDIOVASCULAR SCREENING; LDL GOAL LESS THAN 100     Type 2 diabetes with circulatory disorder causing erectile dysfunction (H)     Type 2 diabetes mellitus with hyperglycemia (H)     Reviewed medications, social history and  past medical and surgical history.    Review of system: for general, respiratory, CVS, GI and psychiatry negative except for noted above.     EXAM:  /75  Pulse 64  Temp 98.3  F (36.8  C) (Oral)  Resp 20  Ht 5' 9.75\" (1.772 m)  Wt 295 lb 12 oz (134.2 kg)  SpO2 97%  BMI 42.74 kg/m2  Constitutional: healthy, alert and no distress   Psychiatric: mentation appears normal and affect normal/bright  Cardiovascular: RRR. No murmurs,  Respiratory: negative, Lungs clear. No crackles or wheezing. No tachypnea.      ASSESSMENT / PLAN:  (E11.59,  N52.1) Type 2 diabetes with circulatory disorder causing erectile dysfunction (H)  (primary encounter diagnosis)  Comment: Patient is tolerating current medication without any major side effects of concerns and current dose seems reasonable too.  Current medication regime is effective. Continue current treatment without any changes.   Plan: Lipid panel reflex to direct LDL Fasting, TSH         with free T4 reflex, Hemoglobin A1c,         Comprehensive metabolic panel, glipiZIDE         (GLUCOTROL XL) 5 MG 24 hr tablet, metFORMIN         (GLUCOPHAGE) 1000 MG tablet             (I10) Essential hypertension with goal blood pressure less than 140/90  Comment: His blood pressure " has been consistently on higher side and repeat blood pressure today is just barely below the goal.  He is on the highest dose of lisinopril and atenolol chlorthalidone combo.  Today we discussed about continue to monitor and work on weight and checking his blood pressure versus adding low-dose of amlodipine.  He agreed to start a low dose of amlodipine.  We discussed about side effects.  Plan: lisinopril (PRINIVIL/ZESTRIL) 20 MG tablet,         amLODIPine (NORVASC) 5 MG tablet             (E66.01) obese bmi over 35  Comment:  Body mass index is 42.74 kg/(m^2).  Plan:  Can contribute adversely to his sugar and bp control. He understands.     (Z12.11) Screen for colon cancer  Comment:    Plan: Fecal colorectal cancer screen FIT - Future         (S+30)             (Z11.4) Screening for HIV (human immunodeficiency virus)  Comment:    Plan: HIV Screening               Patient Instructions   Shingles vaccine - shingrix.     Follow up with 6 months.

## 2018-06-13 NOTE — LETTER
Heidi 15, 2018      Booker Brown  [address]  [place] [id number]        Dear ,    We are writing to inform you of your test results.    Your cholesterol is okay.  Your good cholesterol HDL is low which improves with exercise.  Your kidney function is abnormal but stable.  Your liver function test is normal.  Thyroid test is slightly abnormal but your free thyroid test is normal.  We are going to recheck your thyroid when I see you next.  Your HIV test is negative    Resulted Orders   Lipid panel reflex to direct LDL Fasting   Result Value Ref Range    Cholesterol 126 <200 mg/dL    Triglycerides 176 (H) <150 mg/dL      Comment:      Borderline high:  150-199 mg/dl  High:             200-499 mg/dl  Very high:       >499 mg/dl  Fasting specimen      HDL Cholesterol 32 (L) >39 mg/dL    LDL Cholesterol Calculated 59 <100 mg/dL      Comment:      Desirable:       <100 mg/dl    Non HDL Cholesterol 94 <130 mg/dL   TSH with free T4 reflex   Result Value Ref Range    TSH 0.28 (L) 0.40 - 4.00 mU/L   Hemoglobin A1c   Result Value Ref Range    Hemoglobin A1C 7.0 (H) 0 - 5.6 %      Comment:      Normal <5.7% Prediabetes 5.7-6.4%  Diabetes 6.5% or higher - adopted from ADA   consensus guidelines.     Comprehensive metabolic panel   Result Value Ref Range    Sodium 142 133 - 144 mmol/L    Potassium 4.4 3.4 - 5.3 mmol/L    Chloride 109 94 - 109 mmol/L    Carbon Dioxide 24 20 - 32 mmol/L    Anion Gap 9 3 - 14 mmol/L    Glucose 167 (H) 70 - 99 mg/dL      Comment:      Fasting specimen    Urea Nitrogen 34 (H) 7 - 30 mg/dL    Creatinine 1.87 (H) 0.66 - 1.25 mg/dL    GFR Estimate 37 (L) >60 mL/min/1.7m2      Comment:      Non  GFR Calc    GFR Estimate If Black 45 (L) >60 mL/min/1.7m2      Comment:       GFR Calc    Calcium 9.2 8.5 - 10.1 mg/dL    Bilirubin Total 0.5 0.2 - 1.3 mg/dL    Albumin 4.0 3.4 - 5.0 g/dL    Protein Total 7.5 6.8 - 8.8 g/dL    Alkaline Phosphatase 53 40 - 150  U/L    ALT 32 0 - 70 U/L    AST 17 0 - 45 U/L   HIV Screening   Result Value Ref Range    HIV Antigen Antibody Combo Nonreactive NR^Nonreactive          Comment:      HIV-1 p24 Ag & HIV-1/HIV-2 Ab Not Detected   T4 free   Result Value Ref Range    T4 Free 1.16 0.76 - 1.46 ng/dL       If you have any questions or concerns, please call the clinic at the number listed above.       Sincerely,        Gage Zepeda MD/nr

## 2018-06-13 NOTE — MR AVS SNAPSHOT
After Visit Summary   6/13/2018    Booker Brown    MRN: [id number]           Patient Information     Date Of Birth          1955        Visit Information        Provider Department      6/13/2018 10:40 AM Gage Zepeda MD [facility]        Today's Diagnoses     Type 2 diabetes with circulatory disorder causing erectile dysfunction (H)    -  1    Essential hypertension with goal blood pressure less than 140/90        Screen for colon cancer        Screening for HIV (human immunodeficiency virus)        Screening for diabetic retinopathy        obese bmi over 35          Care Instructions    Shingles vaccine - shingrix.     Follow up with 6 months.           Follow-ups after your visit        Future tests that were ordered for you today     Open Future Orders        Priority Expected Expires Ordered    Fecal colorectal cancer screen FIT - Future (S+30) Routine 7/4/2018 7/13/2018 6/13/2018            Who to contact     If you have questions or need follow up information about today's clinic visit or your schedule please contact [facility] directly at [phone].  Normal or non-critical lab and imaging results will be communicated to you by MyChart, letter or phone within 4 business days after the clinic has received the results. If you do not hear from us within 7 days, please contact the clinic through [employer] or phone. If you have a critical or abnormal lab result, we will notify you by phone as soon as possible.  Submit refill requests through [employer] or call your pharmacy and they will forward the refill request to us. Please allow 3 business days for your refill to be completed.          Additional Information About Your Visit        Care EveryWhere ID     This is your Care EveryWhere ID. This could be used by other organizations to access your [facility] medical records  [id number]        Your Vitals Were     Pulse Temperature Respirations Height  "Pulse Oximetry BMI (Body Mass Index)    64 98.3  F (36.8  C) (Oral) 20 5' 9.75\" (1.772 m) 97% 42.74 kg/m2       Blood Pressure from Last 3 Encounters:   06/13/18 138/75   08/02/17 138/83   04/05/17 140/88    Weight from Last 3 Encounters:   06/13/18 295 lb 12 oz (134.2 kg)   08/02/17 294 lb 12 oz (133.7 kg)   04/05/17 299 lb (135.6 kg)              We Performed the Following     Comprehensive metabolic panel     Hemoglobin A1c     HIV Screening     Lipid panel reflex to direct LDL Fasting     TSH with free T4 reflex          Today's Medication Changes          These changes are accurate as of 6/13/18 11:20 AM.  If you have any questions, ask your nurse or doctor.               Start taking these medicines.        Dose/Directions    amLODIPine 5 MG tablet   Commonly known as:  NORVASC   Used for:  Essential hypertension with goal blood pressure less than 140/90   Started by:  Gage Zepeda MD        Dose:  5 mg   Take 1 tablet (5 mg) by mouth daily   Quantity:  90 tablet   Refills:  1         These medicines have changed or have updated prescriptions.        Dose/Directions    atorvastatin 10 MG tablet   Commonly known as:  LIPITOR   This may have changed:  Another medication with the same name was removed. Continue taking this medication, and follow the directions you see here.   Used for:  Type 2 diabetes with circulatory disorder causing erectile dysfunction (H)   Changed by:  Gage Zepeda MD        TAKE 1 TABLET (10 MG) BY MOUTH DAILY   Quantity:  90 tablet   Refills:  1       glipiZIDE 5 MG 24 hr tablet   Commonly known as:  GLUCOTROL XL   This may have changed:  See the new instructions.   Used for:  Type 2 diabetes with circulatory disorder causing erectile dysfunction (H)   Changed by:  Gage Zepeda MD        Dose:  5 mg   Take 1 tablet (5 mg) by mouth daily   Quantity:  90 tablet   Refills:  1       metFORMIN 1000 MG tablet   Commonly known as:  GLUCOPHAGE   This may " have changed:    - See the new instructions.  - Another medication with the same name was removed. Continue taking this medication, and follow the directions you see here.   Used for:  Type 2 diabetes with circulatory disorder causing erectile dysfunction (H)   Changed by:  Gage Zepeda MD        TAKE 1 TABLET (1,000 MG) BY MOUTH 2 TIMES DAILY (WITH MEALS)   Quantity:  180 tablet   Refills:  1            Where to get your medicines      These medications were sent to [facility]/pharmacy #[id number] - [place], [place] - [address] [address]     Phone:  [phone]     amLODIPine 5 MG tablet    glipiZIDE 5 MG 24 hr tablet    lisinopril 20 MG tablet    metFORMIN 1000 MG tablet                Primary Care Provider Office Phone # Fax #    Gage Zepeda [phone] [address] [place] [address]        Equal Access to Services     [facility]: Hadii maria de jesus camara hadasho Soomaali, waaxda luqadaha, qaybta kaalmada adeegyada, waxay borisin haynoellen carmen king . So [facility] [phone].    ATENCIÓN: Si habla español, tiene a whatley disposición servicios gratuitos de asistencia lingüística. [facility] [phone].    We comply with applicable federal civil rights laws and [place] laws. We do not discriminate on the basis of race, color, national origin, age, disability, sex, sexual orientation, or gender identity.            Thank you!     Thank you for choosing [facility]  for your care. Our goal is always to provide you with excellent care. Hearing back from our patients is one way we can continue to improve our services. Please take a few minutes to complete the written survey that you may receive in the mail after your visit with us. Thank you!             Your Updated Medication List - Protect others around you: Learn how to safely use, store and throw away your medicines at www.disposemymeds.org.          This list is accurate  as of 6/13/18 11:20 AM.  Always use your most recent med list.                   Brand Name Dispense Instructions for use Diagnosis    amLODIPine 5 MG tablet    NORVASC    90 tablet    Take 1 tablet (5 mg) by mouth daily    Essential hypertension with goal blood pressure less than 140/90       atenolol-chlorthalidone 100-25 MG per tablet    TENORETIC    90 tablet    Take 1 tablet by mouth daily    Essential hypertension with goal blood pressure less than 140/90       atorvastatin 10 MG tablet    LIPITOR    90 tablet    TAKE 1 TABLET (10 MG) BY MOUTH DAILY    Type 2 diabetes with circulatory disorder causing erectile dysfunction (H)       blood glucose monitoring lancets     1 Box    1 each 2 times daily    Type 2 diabetes with circulatory disorder causing erectile dysfunction (H)       blood glucose monitoring test strip    ACCU-CHEK SMARTVIEW    1 Box    100 strips by In Vitro route 2 times daily    Type 2 diabetes with circulatory disorder causing erectile dysfunction (H)       glipiZIDE 5 MG 24 hr tablet    GLUCOTROL XL    90 tablet    Take 1 tablet (5 mg) by mouth daily    Type 2 diabetes with circulatory disorder causing erectile dysfunction (H)       lisinopril 20 MG tablet    PRINIVIL/ZESTRIL    180 tablet    TAKE 2 TABLETS (40 MG) BY MOUTH DAILY    Essential hypertension with goal blood pressure less than 140/90       metFORMIN 1000 MG tablet    GLUCOPHAGE    180 tablet    TAKE 1 TABLET (1,000 MG) BY MOUTH 2 TIMES DAILY (WITH MEALS)    Type 2 diabetes with circulatory disorder causing erectile dysfunction (H)       ranitidine 150 MG tablet    ZANTAC    180 tablet    Take 1 tablet (150 mg) by mouth 2 times daily    Gastroesophageal reflux disease without esophagitis

## 2018-06-14 LAB
ALBUMIN SERPL-MCNC: 4 G/DL (ref 3.4–5)
ALP SERPL-CCNC: 53 U/L (ref 40–150)
ALT SERPL W P-5'-P-CCNC: 32 U/L (ref 0–70)
ANION GAP SERPL CALCULATED.3IONS-SCNC: 9 MMOL/L (ref 3–14)
AST SERPL W P-5'-P-CCNC: 17 U/L (ref 0–45)
BILIRUB SERPL-MCNC: 0.5 MG/DL (ref 0.2–1.3)
BUN SERPL-MCNC: 34 MG/DL (ref 7–30)
CALCIUM SERPL-MCNC: 9.2 MG/DL (ref 8.5–10.1)
CHLORIDE SERPL-SCNC: 109 MMOL/L (ref 94–109)
CHOLEST SERPL-MCNC: 126 MG/DL
CO2 SERPL-SCNC: 24 MMOL/L (ref 20–32)
CREAT SERPL-MCNC: 1.87 MG/DL (ref 0.66–1.25)
GFR SERPL CREATININE-BSD FRML MDRD: 37 ML/MIN/1.7M2
GLUCOSE SERPL-MCNC: 167 MG/DL (ref 70–99)
HDLC SERPL-MCNC: 32 MG/DL
HIV 1+2 AB+HIV1 P24 AG SERPL QL IA: NONREACTIVE
LDLC SERPL CALC-MCNC: 59 MG/DL
NONHDLC SERPL-MCNC: 94 MG/DL
POTASSIUM SERPL-SCNC: 4.4 MMOL/L (ref 3.4–5.3)
PROT SERPL-MCNC: 7.5 G/DL (ref 6.8–8.8)
SODIUM SERPL-SCNC: 142 MMOL/L (ref 133–144)
T4 FREE SERPL-MCNC: 1.16 NG/DL (ref 0.76–1.46)
TRIGL SERPL-MCNC: 176 MG/DL
TSH SERPL DL<=0.005 MIU/L-ACNC: 0.28 MU/L (ref 0.4–4)

## 2018-07-09 DIAGNOSIS — Z12.11 SCREEN FOR COLON CANCER: ICD-10-CM

## 2018-07-09 PROCEDURE — 82274 ASSAY TEST FOR BLOOD FECAL: CPT | Performed by: FAMILY MEDICINE

## 2018-07-11 ENCOUNTER — TELEPHONE (OUTPATIENT)
Dept: FAMILY MEDICINE | Facility: CLINIC | Age: 63
End: 2018-07-11

## 2018-07-11 DIAGNOSIS — R19.5 POSITIVE FIT (FECAL IMMUNOCHEMICAL TEST): Primary | ICD-10-CM

## 2018-07-11 LAB — HEMOCCULT STL QL IA: POSITIVE

## 2018-07-11 NOTE — TELEPHONE ENCOUNTER
Left message for patient to call back and speak to a nurse.    Keily Nathan, ESEQUIELN, RN  [facility]

## 2018-07-11 NOTE — TELEPHONE ENCOUNTER
Positive FIT test.  Should have colonoscopy to rule out colon cancer.  He should never have repeat FIT test due to positive test.  Colonoscopy referral signed.   Ask him to call for colonoscopy. [phone]

## 2018-07-11 NOTE — LETTER
[address] [address] [phone]          July 23, 2018    Booker Brown                                                                                                                     [address] [facility] [address]            Dear Booker,    We have been unable to reach you by phone.  Your recent FIT test came back positive.  You should have colonoscopy to rule out colon cancer.  Because your FIT test was positive this time, you should never have a repeat FIT Test.    Please call [phone] to schedule a colonoscopy.    Please contact the clinic with any questions at [phone].        Sincerely,         Gage Zepeda MD/tb

## 2018-07-15 DIAGNOSIS — K21.9 GASTROESOPHAGEAL REFLUX DISEASE WITHOUT ESOPHAGITIS: ICD-10-CM

## 2018-07-16 NOTE — TELEPHONE ENCOUNTER
"Requested Prescriptions   Pending Prescriptions Disp Refills     ranitidine (ZANTAC) 150 MG tablet [Pharmacy Med Name: RANITIDINE 150 MG TABLET]  Last Written Prescription Date:  8/2/17  Last Fill Quantity: 180 tablet,  # refills: 1   Last Office Visit: 6/13/2018   Future Office Visit:      180 tablet 1     Sig: TAKE 1 TABLET (150 MG) BY MOUTH 2 TIMES DAILY    H2 Blockers Protocol Passed    7/15/2018  7:37 PM       Passed - Patient is age 12 or older       Passed - Recent (12 mo) or future (30 days) visit within the authorizing provider's specialty    Patient had office visit in the last 12 months or has a visit in the next 30 days with authorizing provider or within the authorizing provider's specialty.  See \"Patient Info\" tab in inbasket, or \"Choose Columns\" in Meds & Orders section of the refill encounter.              "

## 2018-07-16 NOTE — TELEPHONE ENCOUNTER
Attempted to reach, unable. Left message  to call back.  He may call [phone] to schedule.   Susan Nathan RN

## 2018-07-19 NOTE — TELEPHONE ENCOUNTER
Prescription approved per [facility] Refill Protocol. Tara Tian RN

## 2018-07-19 NOTE — TELEPHONE ENCOUNTER
Left message to call back.  Ask to speak to an RN, let them know it's a return call.    Leave a number and time that you can be reached.   Esther Amanda, RN

## 2018-08-24 ENCOUNTER — SURGERY (OUTPATIENT)
Age: 63
End: 2018-08-24

## 2018-08-24 ENCOUNTER — HOSPITAL ENCOUNTER (OUTPATIENT)
Facility: CLINIC | Age: 63
Discharge: HOME | End: 2018-08-24
Attending: COLON & RECTAL SURGERY | Admitting: COLON & RECTAL SURGERY
Payer: COMMERCIAL

## 2018-08-24 VITALS
DIASTOLIC BLOOD PRESSURE: 69 MMHG | HEIGHT: 70 IN | OXYGEN SATURATION: 95 % | BODY MASS INDEX: 42.23 KG/M2 | RESPIRATION RATE: 16 BRPM | SYSTOLIC BLOOD PRESSURE: 140 MMHG | WEIGHT: 295 LBS

## 2018-08-24 LAB
COLONOSCOPY: NORMAL
GLUCOSE BLDC GLUCOMTR-MCNC: 177 MG/DL (ref 70–99)

## 2018-08-24 PROCEDURE — 88305 TISSUE EXAM BY PATHOLOGIST: CPT | Mod: 26 | Performed by: COLON & RECTAL SURGERY

## 2018-08-24 PROCEDURE — 45385 COLONOSCOPY W/LESION REMOVAL: CPT | Performed by: COLON & RECTAL SURGERY

## 2018-08-24 PROCEDURE — 25000128 H RX IP 250 OP 636: Performed by: COLON & RECTAL SURGERY

## 2018-08-24 PROCEDURE — G0500 MOD SEDAT ENDO SERVICE >5YRS: HCPCS | Performed by: COLON & RECTAL SURGERY

## 2018-08-24 PROCEDURE — 45380 COLONOSCOPY AND BIOPSY: CPT | Mod: 59 | Performed by: COLON & RECTAL SURGERY

## 2018-08-24 PROCEDURE — 82962 GLUCOSE BLOOD TEST: CPT

## 2018-08-24 PROCEDURE — 88305 TISSUE EXAM BY PATHOLOGIST: CPT | Performed by: COLON & RECTAL SURGERY

## 2018-08-24 RX ORDER — LIDOCAINE 40 MG/G
CREAM TOPICAL
Status: DISCONTINUED | OUTPATIENT
Start: 2018-08-24 | End: 2018-08-24 | Stop reason: HOSPADM

## 2018-08-24 RX ORDER — FENTANYL CITRATE 50 UG/ML
INJECTION, SOLUTION INTRAMUSCULAR; INTRAVENOUS PRN
Status: DISCONTINUED | OUTPATIENT
Start: 2018-08-24 | End: 2018-08-24 | Stop reason: HOSPADM

## 2018-08-24 RX ORDER — NALOXONE HYDROCHLORIDE 0.4 MG/ML
.1-.4 INJECTION, SOLUTION INTRAMUSCULAR; INTRAVENOUS; SUBCUTANEOUS
Status: CANCELLED | OUTPATIENT
Start: 2018-08-24 | End: 2018-08-25

## 2018-08-24 RX ORDER — FLUMAZENIL 0.1 MG/ML
0.2 INJECTION, SOLUTION INTRAVENOUS
Status: CANCELLED | OUTPATIENT
Start: 2018-08-24 | End: 2018-08-25

## 2018-08-24 RX ORDER — ONDANSETRON 4 MG/1
4 TABLET, ORALLY DISINTEGRATING ORAL EVERY 6 HOURS PRN
Status: CANCELLED | OUTPATIENT
Start: 2018-08-24

## 2018-08-24 RX ORDER — ONDANSETRON 2 MG/ML
4 INJECTION INTRAMUSCULAR; INTRAVENOUS
Status: DISCONTINUED | OUTPATIENT
Start: 2018-08-24 | End: 2018-08-24 | Stop reason: HOSPADM

## 2018-08-24 RX ORDER — ONDANSETRON 2 MG/ML
4 INJECTION INTRAMUSCULAR; INTRAVENOUS EVERY 6 HOURS PRN
Status: CANCELLED | OUTPATIENT
Start: 2018-08-24

## 2018-08-24 RX ADMIN — MIDAZOLAM 2 MG: 1 INJECTION INTRAMUSCULAR; INTRAVENOUS at 14:53

## 2018-08-24 RX ADMIN — FENTANYL CITRATE 100 MCG: 50 INJECTION, SOLUTION INTRAMUSCULAR; INTRAVENOUS at 14:53

## 2018-08-24 NOTE — H&P
Pre-Endoscopy History and Physical     Booker Brown MRN# [id number]   YOB: 1955 Age: 63 year old     Date of Procedure: (Not on file)  Primary care provider: Gage Zepeda  Type of Endoscopy: Colonoscopy  Reason for Procedure: Screening, positive FIT test  Type of Anesthesia Anticipated: Moderate Sedation    HPI:    Booker is a 63 year old male who will be undergoing the above procedure.      A history and physical has been performed. The patient's medications and allergies have been reviewed. The risks and benefits of the procedure and the sedation options and risks were discussed with the patient.  All questions were answered and informed consent was obtained.      He denies a personal or family history of anesthesia complications or bleeding disorders.     Allergies   Allergen Reactions     No Known Allergies           No current facility-administered medications on file prior to encounter.   Current Outpatient Prescriptions on File Prior to Encounter:  amLODIPine (NORVASC) 5 MG tablet Take 1 tablet (5 mg) by mouth daily   atenolol-chlorthalidone (TENORETIC) 100-25 MG per tablet Take 1 tablet by mouth daily   atorvastatin (LIPITOR) 10 MG tablet TAKE 1 TABLET (10 MG) BY MOUTH DAILY   lisinopril (PRINIVIL/ZESTRIL) 20 MG tablet TAKE 2 TABLETS (40 MG) BY MOUTH DAILY   ranitidine (ZANTAC) 150 MG tablet TAKE 1 TABLET (150 MG) BY MOUTH 2 TIMES DAILY   blood glucose monitoring (ACCU-CHEK SMARTVIEW) test strip 100 strips by In Vitro route 2 times daily   blood glucose monitoring (AUBRIE MICROLET) lancets 1 each 2 times daily   glipiZIDE (GLUCOTROL XL) 5 MG 24 hr tablet Take 1 tablet (5 mg) by mouth daily   metFORMIN (GLUCOPHAGE) 1000 MG tablet TAKE 1 TABLET (1,000 MG) BY MOUTH 2 TIMES DAILY (WITH MEALS)       Patient Active Problem List   Diagnosis     erectile dysfunction     obese bmi over 35     GERD (gastroesophageal reflux disease)     Hypertension goal BP (blood pressure) < 140/90      "Advanced directives, counseling/discussion     CARDIOVASCULAR SCREENING; LDL GOAL LESS THAN 100     Type 2 diabetes with circulatory disorder causing erectile dysfunction (H)     Type 2 diabetes mellitus with hyperglycemia (H)        Past Medical History:   Diagnosis Date     Diabetes (H)      HTN      Hyperlipidemia         No past surgical history on file.    Social History   Substance Use Topics     Smoking status: Former Smoker     Packs/day: 3.00     Years: 16.00     Types: Cigarettes     Quit date: 9/14/1987     Smokeless tobacco: Never Used      Comment: 17 years ago     Alcohol use No       Family History   Problem Relation Age of Onset     Diabetes Mother      dx at 70       REVIEW OF SYSTEMS:     5 point ROS negative except as noted above in HPI, including Gen., Resp., CV, GI &  system review.      PHYSICAL EXAM:   /84  Ht 1.778 m (5' 10\")  Wt 133.8 kg (295 lb)  SpO2 97%  BMI 42.33 kg/m2 Estimated body mass index is 42.33 kg/(m^2) as calculated from the following:    Height as of this encounter: 1.778 m (5' 10\").    Weight as of this encounter: 133.8 kg (295 lb).   GENERAL APPEARANCE: healthy and alert  MENTAL STATUS: alert  RESP: lungs clear to auscultation - no rales, rhonchi or wheezes  CV: regular rates and rhythm and normal S1 S2, no S3 or S4      IMPRESSION   ASA Class 2 - Mild systemic disease        PLAN:     Plan for colonoscopy. We discussed the risks, benefits and alternatives and the patient wished to proceed.    The above has been forwarded to the consulting provider.      Lawrence Mata MD  [facility]  Phone: [phone]  August 24, 2018    "

## 2018-08-28 LAB — COPATH REPORT: NORMAL

## 2018-10-05 DIAGNOSIS — I10 ESSENTIAL HYPERTENSION WITH GOAL BLOOD PRESSURE LESS THAN 140/90: ICD-10-CM

## 2018-10-05 NOTE — TELEPHONE ENCOUNTER
"Requested Prescriptions   Pending Prescriptions Disp Refills     atenolol-chlorthalidone (TENORETIC) 100-25 MG per tablet [Pharmacy Med Name: ATENOLOL-CHLORTHAL 100-25 TB]  Last Written Prescription Date:  8/2/2017  Last Fill Quantity: 90 tabs,  # refills: 1  Last office visit: 6/13/2018 with prescribing provider:  Katelyn   Future Office Visit:     90 tablet 1     Sig: TAKE 1 TABLET BY MOUTH DAILY    Beta-Blockers Protocol Failed    10/5/2018  7:15 AM       Failed - Blood pressure under 140/90 in past 12 months    BP Readings from Last 3 Encounters:   08/24/18 140/69   06/13/18 138/75   08/02/17 138/83                Passed - Patient is age 6 or older       Passed - Recent (12 mo) or future (30 days) visit within the authorizing provider's specialty    Patient had office visit in the last 12 months or has a visit in the next 30 days with authorizing provider or within the authorizing provider's specialty.  See \"Patient Info\" tab in inbasket, or \"Choose Columns\" in Meds & Orders section of the refill encounter.              "

## 2018-10-08 RX ORDER — ATENOLOL AND CHLORTHALIDONE TABLET 100; 25 MG/1; MG/1
TABLET ORAL
Qty: 90 TABLET | Refills: 0 | Status: SHIPPED | OUTPATIENT
Start: 2018-10-08 | End: 2018-10-17

## 2018-10-17 ENCOUNTER — OFFICE VISIT (OUTPATIENT)
Dept: FAMILY MEDICINE | Facility: CLINIC | Age: 63
End: 2018-10-17
Payer: COMMERCIAL

## 2018-10-17 VITALS
HEIGHT: 70 IN | OXYGEN SATURATION: 98 % | WEIGHT: 301.25 LBS | BODY MASS INDEX: 43.13 KG/M2 | DIASTOLIC BLOOD PRESSURE: 90 MMHG | TEMPERATURE: 97.7 F | HEART RATE: 62 BPM | SYSTOLIC BLOOD PRESSURE: 162 MMHG | RESPIRATION RATE: 16 BRPM

## 2018-10-17 DIAGNOSIS — I10 ESSENTIAL HYPERTENSION WITH GOAL BLOOD PRESSURE LESS THAN 140/90: Primary | ICD-10-CM

## 2018-10-17 DIAGNOSIS — Z23 NEED FOR PROPHYLACTIC VACCINATION AND INOCULATION AGAINST INFLUENZA: ICD-10-CM

## 2018-10-17 DIAGNOSIS — K21.9 GASTROESOPHAGEAL REFLUX DISEASE WITHOUT ESOPHAGITIS: ICD-10-CM

## 2018-10-17 DIAGNOSIS — E11.59 TYPE 2 DIABETES WITH CIRCULATORY DISORDER CAUSING ERECTILE DYSFUNCTION (H): ICD-10-CM

## 2018-10-17 DIAGNOSIS — N52.1 TYPE 2 DIABETES WITH CIRCULATORY DISORDER CAUSING ERECTILE DYSFUNCTION (H): ICD-10-CM

## 2018-10-17 PROCEDURE — 90682 RIV4 VACC RECOMBINANT DNA IM: CPT | Performed by: FAMILY MEDICINE

## 2018-10-17 PROCEDURE — 99214 OFFICE O/P EST MOD 30 MIN: CPT | Mod: 25 | Performed by: FAMILY MEDICINE

## 2018-10-17 PROCEDURE — 90471 IMMUNIZATION ADMIN: CPT | Performed by: FAMILY MEDICINE

## 2018-10-17 RX ORDER — ATORVASTATIN CALCIUM 10 MG/1
TABLET, FILM COATED ORAL
Qty: 90 TABLET | Refills: 1 | Status: SHIPPED | OUTPATIENT
Start: 2018-10-17 | End: 2019-04-24

## 2018-10-17 RX ORDER — LISINOPRIL 20 MG/1
TABLET ORAL
Qty: 180 TABLET | Refills: 1 | Status: SHIPPED | OUTPATIENT
Start: 2018-10-17 | End: 2019-10-30

## 2018-10-17 RX ORDER — GLIPIZIDE 5 MG/1
5 TABLET, FILM COATED, EXTENDED RELEASE ORAL DAILY
Qty: 90 TABLET | Refills: 1 | Status: SHIPPED | OUTPATIENT
Start: 2018-10-17 | End: 2019-09-01

## 2018-10-17 RX ORDER — ATENOLOL AND CHLORTHALIDONE TABLET 100; 25 MG/1; MG/1
1 TABLET ORAL DAILY
Qty: 90 TABLET | Refills: 1 | Status: SHIPPED | OUTPATIENT
Start: 2018-10-17 | End: 2019-07-13

## 2018-10-17 RX ORDER — AMLODIPINE BESYLATE 10 MG/1
10 TABLET ORAL DAILY
Qty: 90 TABLET | Refills: 0 | Status: SHIPPED | OUTPATIENT
Start: 2018-10-17 | End: 2019-10-30

## 2018-10-17 NOTE — PROGRESS NOTES
SUBJECTIVE:   Booker Brown is a 63 year old male who presents to clinic today for the following health issues:    Hypertension Follow-up      Outpatient blood pressures are not being checked.    Low Salt Diet: no added salt      Amount of exercise or physical activity: None    Problems taking medications regularly: No    Medication side effects: none    Diet: regular (no restrictions)    Problem list and histories reviewed & adjusted, as indicated.  Additional history: as documented    Labs reviewed in EPIC    Reviewed and updated as needed this visit by clinical staff       Reviewed and updated as needed this visit by Provider        Social History     Social History     Marital status:      Spouse name: N/A     Number of children: N/A     Years of education: N/A     Occupational History     Not on file.     Social History Main Topics     Smoking status: Former Smoker     Packs/day: 3.00     Years: 16.00     Types: Cigarettes     Quit date: 9/14/1987     Smokeless tobacco: Never Used      Comment: 17 years ago     Alcohol use No     Drug use: No     Sexual activity: Yes     Partners: Female     Other Topics Concern     Parent/Sibling W/ Cabg, Mi Or Angioplasty Before 65f 55m? Yes      Service No     Blood Transfusions No     Caffeine Concern No     Occupational Exposure No     Hobby Hazards No     Sleep Concern No     Stress Concern No     Weight Concern Yes     Special Diet No     Back Care No     Exercise Yes     Bike Helmet No     n/a     Seat Belt Yes     Self-Exams No     Social History Narrative     Allergies   Allergen Reactions     No Known Allergies      Patient Active Problem List   Diagnosis     erectile dysfunction     obese bmi over 35     GERD (gastroesophageal reflux disease)     Hypertension goal BP (blood pressure) < 140/90     Advanced directives, counseling/discussion     CARDIOVASCULAR SCREENING; LDL GOAL LESS THAN 100     Type 2 diabetes with circulatory disorder causing  "erectile dysfunction (H)     Type 2 diabetes mellitus with hyperglycemia (H)     Reviewed medications, social history and  past medical and surgical history.    Review of system: for general, respiratory, CVS, GI and psychiatry negative except for noted above.     EXAM:  /90 (BP Location: Right arm, Patient Position: Sitting, Cuff Size: Adult Large)  Pulse 62  Temp 97.7  F (36.5  C) (Tympanic)  Resp 16  Ht 5' 10\" (1.778 m)  Wt 301 lb 4 oz (136.6 kg)  SpO2 98%  BMI 43.22 kg/m2  Constitutional: healthy, alert and no distress   Psychiatric: mentation appears normal and affect normal/bright  Cardiovascular: RRR. No murmurs,  Respiratory: negative, Lungs clear. No crackles or wheezing. No tachypnea.      ASSESSMENT / PLAN:  (I10) Essential hypertension with goal blood pressure less than 140/90  (primary encounter diagnosis)  Comment: bp above goal.  We will increase amlodipine to 10 mg/day.  He is already on the max dose of atenolol, chlorthalidone, lisinopril.  Plan: amLODIPine (NORVASC) 10 MG tablet,         atenolol-chlorthalidone (TENORETIC) 100-25 MG         per tablet, lisinopril (PRINIVIL/ZESTRIL) 20 MG        tablet            (E11.59,  N52.1) Type 2 diabetes with circulatory disorder causing erectile dysfunction (H)  Comment: Patient is tolerating current medication without any major side effects of concerns and current dose seems reasonable too.  Current medication regime is effective. Continue current treatment without any changes.   Plan: atorvastatin (LIPITOR) 10 MG tablet, glipiZIDE         (GLUCOTROL XL) 5 MG 24 hr tablet, metFORMIN         (GLUCOPHAGE) 1000 MG tablet             (K21.9) Gastroesophageal reflux disease without esophagitis  Comment:  Patient is tolerating current medication without any major side effects of concerns and current dose seems reasonable too.  Current medication regime is effective. Continue current treatment without any changes.  Had positive FIT and negative " colonoscopy but multiple polyps. Going for another colonoscopy next year.   Plan: ranitidine (ZANTAC) 150 MG tablet             (Z23) Need for prophylactic vaccination and inoculation against influenza  Comment:    Plan: FLU VACCINE, (RIV4) RECOMBINANT HA  , IM         (FluBlok, egg free) [65893]- >18 YRS ([facility]         recommended  50-64 YRS), Vaccine         Administration, Initial [08292]                 Follow up: 1 month MA only for bp check and if fine -  6 months.     The above note was dictated using voice recognition. Although reviewed after completion, some word and grammatical error may remain .            Injectable Influenza Immunization Documentation    1.  Is the person to be vaccinated sick today?   No    2. Does the person to be vaccinated have an allergy to a component   of the vaccine?   No  Egg Allergy Algorithm Link    3. Has the person to be vaccinated ever had a serious reaction   to influenza vaccine in the past?   No    4. Has the person to be vaccinated ever had Guillain-Barré syndrome?   No    Form completed by Renata Ratliff [facility]

## 2018-10-17 NOTE — MR AVS SNAPSHOT
After Visit Summary   10/17/2018    Booker Brown    MRN: [id number]           Patient Information     Date Of Birth          1955        Visit Information        Provider Department      10/17/2018 10:00 AM Gage Zepeda MD [facility]        Today's Diagnoses     Need for prophylactic vaccination and inoculation against influenza    -  1    Essential hypertension with goal blood pressure less than 140/90        Type 2 diabetes with circulatory disorder causing erectile dysfunction (H)        Gastroesophageal reflux disease without esophagitis           Follow-ups after your visit        Follow-up notes from your care team     Return for schedule appointment for -, MA only for bp recheck 1 month..      Your next 10 appointments already scheduled     Nov 15, 2018 11:00 AM CST   Nurse Only with HW MEDICAL ASSISTANT   [facility] ([facility])    [address] [place] [id number] [phone]              Who to contact     If you have questions or need follow up information about today's clinic visit or your schedule please contact [facility] directly at [phone].  Normal or non-critical lab and imaging results will be communicated to you by MyChart, letter or phone within 4 business days after the clinic has received the results. If you do not hear from us within 7 days, please contact the clinic through MyChart or phone. If you have a critical or abnormal lab result, we will notify you by phone as soon as possible.  Submit refill requests through [employer] or call your pharmacy and they will forward the refill request to us. Please allow 3 business days for your refill to be completed.          Additional Information About Your Visit        Care EveryWhere ID     This is your Care EveryWhere ID. This could be used by other organizations to access your [place] medical records  [id number]        Your Vitals  "Were     Pulse Temperature Respirations Height Pulse Oximetry BMI (Body Mass Index)    62 97.7  F (36.5  C) (Tympanic) 16 5' 10\" (1.778 m) 98% 43.22 kg/m2       Blood Pressure from Last 3 Encounters:   10/17/18 162/90   08/24/18 140/69   06/13/18 138/75    Weight from Last 3 Encounters:   10/17/18 301 lb 4 oz (136.6 kg)   08/24/18 295 lb (133.8 kg)   06/13/18 295 lb 12 oz (134.2 kg)              We Performed the Following     FLU VACCINE, (RIV4) RECOMBINANT HA  , IM (FluBlok, egg free) [43008]- >18 YRS ([facility] recommended  50-64 YRS)     Vaccine Administration, Initial [04517]          Today's Medication Changes          These changes are accurate as of 10/17/18 10:35 AM.  If you have any questions, ask your nurse or doctor.               These medicines have changed or have updated prescriptions.        Dose/Directions    amLODIPine 10 MG tablet   Commonly known as:  NORVASC   This may have changed:    - medication strength  - how much to take   Used for:  Essential hypertension with goal blood pressure less than 140/90   Changed by:  Gage Zepeda MD        Dose:  10 mg   Take 1 tablet (10 mg) by mouth daily   Quantity:  90 tablet   Refills:  0            Where to get your medicines      These medications were sent to [facility]/pharmacy #[id number] - [address] [address]     Phone:  [phone]     amLODIPine 10 MG tablet    atenolol-chlorthalidone 100-25 MG per tablet    atorvastatin 10 MG tablet    glipiZIDE 5 MG 24 hr tablet    lisinopril 20 MG tablet    metFORMIN 1000 MG tablet    ranitidine 150 MG tablet                Primary Care Provider Office Phone # Fax #    Gage Zepeda [phone] [address] [facility] [address]        Equal Access to Services     CICI FRAZIER AH: Bismark Avila, waaxda luqadaha, qaybta kaalmacynthia griggs, mirtha king ah. So wa " [phone].    ATENCIÓN: Si shobha kelly, tiene a whatley disposición servicios gratuitos de asistencia lingüística. Avinash villarreal [phone].    We comply with applicable federal civil rights laws and [place] laws. We do not discriminate on the basis of race, color, national origin, age, disability, sex, sexual orientation, or gender identity.            Thank you!     Thank you for choosing [facility]  for your care. Our goal is always to provide you with excellent care. Hearing back from our patients is one way we can continue to improve our services. Please take a few minutes to complete the written survey that you may receive in the mail after your visit with us. Thank you!             Your Updated Medication List - Protect others around you: Learn how to safely use, store and throw away your medicines at www.disposemymeds.org.          This list is accurate as of 10/17/18 10:35 AM.  Always use your most recent med list.                   Brand Name Dispense Instructions for use Diagnosis    amLODIPine 10 MG tablet    NORVASC    90 tablet    Take 1 tablet (10 mg) by mouth daily    Essential hypertension with goal blood pressure less than 140/90       atenolol-chlorthalidone 100-25 MG per tablet    TENORETIC    90 tablet    Take 1 tablet by mouth daily    Essential hypertension with goal blood pressure less than 140/90       atorvastatin 10 MG tablet    LIPITOR    90 tablet    TAKE 1 TABLET (10 MG) BY MOUTH DAILY    Type 2 diabetes with circulatory disorder causing erectile dysfunction (H)       blood glucose monitoring lancets     1 Box    1 each 2 times daily    Type 2 diabetes with circulatory disorder causing erectile dysfunction (H)       blood glucose monitoring test strip    ACCU-CHEK SMARTVIEW    1 Box    100 strips by In Vitro route 2 times daily    Type 2 diabetes with circulatory disorder causing erectile dysfunction (H)       glipiZIDE 5 MG 24 hr tablet    GLUCOTROL XL    90 tablet     Take 1 tablet (5 mg) by mouth daily    Type 2 diabetes with circulatory disorder causing erectile dysfunction (H)       lisinopril 20 MG tablet    PRINIVIL/ZESTRIL    180 tablet    TAKE 2 TABLETS (40 MG) BY MOUTH DAILY    Essential hypertension with goal blood pressure less than 140/90       metFORMIN 1000 MG tablet    GLUCOPHAGE    180 tablet    TAKE 1 TABLET (1,000 MG) BY MOUTH 2 TIMES DAILY (WITH MEALS)    Type 2 diabetes with circulatory disorder causing erectile dysfunction (H)       ranitidine 150 MG tablet    ZANTAC    180 tablet    TAKE 1 TABLET (150 MG) BY MOUTH 2 TIMES DAILY    Gastroesophageal reflux disease without esophagitis

## 2018-11-15 ENCOUNTER — ALLIED HEALTH/NURSE VISIT (OUTPATIENT)
Dept: NURSING | Facility: CLINIC | Age: 63
End: 2018-11-15
Payer: COMMERCIAL

## 2018-11-15 VITALS — HEART RATE: 65 BPM | DIASTOLIC BLOOD PRESSURE: 85 MMHG | SYSTOLIC BLOOD PRESSURE: 159 MMHG

## 2018-11-15 DIAGNOSIS — I10 HYPERTENSION GOAL BP (BLOOD PRESSURE) < 140/90: Primary | ICD-10-CM

## 2018-11-15 PROCEDURE — 99207 ZZC NO CHARGE NURSE ONLY: CPT

## 2018-11-15 NOTE — Clinical Note
Booker Brown is a 63 year old patient who comes in today for a Blood Pressure check. Initial BP:  /85  Pulse 65    65 Disposition: results routed to provider and BP elevated.  Triage RN notified, patient asked to wait. Per Ranjit RN, we will send a message to Dr. Zepeda and see if he needs to be seen.  Onofre Aden CMA

## 2018-11-15 NOTE — MR AVS SNAPSHOT
After Visit Summary   11/15/2018    Booker Brown    MRN: [id number]           Patient Information     Date Of Birth          1955        Visit Information        Provider Department      11/15/2018 11:00 AM [facility]ICAL [facility]        Today's Diagnoses     Hypertension goal BP (blood pressure) < 140/90    -  1       Follow-ups after your visit        Who to contact     If you have questions or need follow up information about today's clinic visit or your schedule please contact [facility] directly at [phone].  Normal or non-critical lab and imaging results will be communicated to you by MyChart, letter or phone within 4 business days after the clinic has received the results. If you do not hear from us within 7 days, please contact the clinic through MyChart or phone. If you have a critical or abnormal lab result, we will notify you by phone as soon as possible.  Submit refill requests through [employer] or call your pharmacy and they will forward the refill request to us. Please allow 3 business days for your refill to be completed.          Additional Information About Your Visit        Care EveryWhere ID     This is your Care EveryWhere ID. This could be used by other organizations to access your [place] medical records  [id number]        Your Vitals Were     Pulse                   65            Blood Pressure from Last 3 Encounters:   11/15/18 159/85   10/17/18 162/90   08/24/18 140/69    Weight from Last 3 Encounters:   10/17/18 301 lb 4 oz (136.6 kg)   08/24/18 295 lb (133.8 kg)   06/13/18 295 lb 12 oz (134.2 kg)              Today, you had the following     No orders found for display       Primary Care Provider Office Phone # Fax #    Gage Frances Zepeda [phone] [address] [place] [address]        Equal Access to Services     CICI FRAZIER AH: juan Boo qaybta  mirtha navarropaola king ah. Karen [facility] [phone].    ATENCIÓN: Si shobha kelly, tiene a whatley disposición servicios gratuitos de asistencia lingüística. Avinash al [phone].    We comply with applicable federal civil rights laws and [place] laws. We do not discriminate on the basis of race, color, national origin, age, disability, sex, sexual orientation, or gender identity.            Thank you!     Thank you for choosing [facility]  for your care. Our goal is always to provide you with excellent care. Hearing back from our patients is one way we can continue to improve our services. Please take a few minutes to complete the written survey that you may receive in the mail after your visit with us. Thank you!             Your Updated Medication List - Protect others around you: Learn how to safely use, store and throw away your medicines at www.disposemymeds.org.          This list is accurate as of 11/15/18 11:17 AM.  Always use your most recent med list.                   Brand Name Dispense Instructions for use Diagnosis    amLODIPine 10 MG tablet    NORVASC    90 tablet    Take 1 tablet (10 mg) by mouth daily    Essential hypertension with goal blood pressure less than 140/90       atenolol-chlorthalidone 100-25 MG per tablet    TENORETIC    90 tablet    Take 1 tablet by mouth daily    Essential hypertension with goal blood pressure less than 140/90       atorvastatin 10 MG tablet    LIPITOR    90 tablet    TAKE 1 TABLET (10 MG) BY MOUTH DAILY    Type 2 diabetes with circulatory disorder causing erectile dysfunction (H)       blood glucose monitoring lancets     1 Box    1 each 2 times daily    Type 2 diabetes with circulatory disorder causing erectile dysfunction (H)       blood glucose monitoring test strip    ACCU-CHEK SMARTVIEW    1 Box    100 strips by In Vitro route 2 times daily    Type 2 diabetes with circulatory disorder causing erectile dysfunction  (H)       glipiZIDE 5 MG 24 hr tablet    GLUCOTROL XL    90 tablet    Take 1 tablet (5 mg) by mouth daily    Type 2 diabetes with circulatory disorder causing erectile dysfunction (H)       lisinopril 20 MG tablet    PRINIVIL/ZESTRIL    180 tablet    TAKE 2 TABLETS (40 MG) BY MOUTH DAILY    Essential hypertension with goal blood pressure less than 140/90       metFORMIN 1000 MG tablet    GLUCOPHAGE    180 tablet    TAKE 1 TABLET (1,000 MG) BY MOUTH 2 TIMES DAILY (WITH MEALS)    Type 2 diabetes with circulatory disorder causing erectile dysfunction (H)       ranitidine 150 MG tablet    ZANTAC    180 tablet    TAKE 1 TABLET (150 MG) BY MOUTH 2 TIMES DAILY    Gastroesophageal reflux disease without esophagitis

## 2018-11-16 NOTE — PROGRESS NOTES
Yes, he needs to be seen for bp follow up with me.   Please help him to schedule an appointment with me.

## 2018-11-23 ENCOUNTER — TELEPHONE (OUTPATIENT)
Dept: FAMILY MEDICINE | Facility: CLINIC | Age: 63
End: 2018-11-23

## 2018-11-23 NOTE — TELEPHONE ENCOUNTER
Spoke with patient and gave him providers message that he needs to schedule appt to see provider for his BP

## 2018-12-10 DIAGNOSIS — I10 ESSENTIAL HYPERTENSION WITH GOAL BLOOD PRESSURE LESS THAN 140/90: ICD-10-CM

## 2018-12-11 NOTE — TELEPHONE ENCOUNTER
"Requested Prescriptions   Pending Prescriptions Disp Refills     amLODIPine (NORVASC) 5 MG tablet [Pharmacy Med Name: AMLODIPINE BESYLATE 5 MG TAB]  Last Written Prescription Date:  10/17/2018  Last Fill Quantity: 90 tablet,  # refills: 0   Last Office Visit: 10/17/2018   Future Office Visit:      90 tablet 1     Sig: TAKE 1 TABLET BY MOUTH EVERY DAY    Calcium Channel Blockers Protocol  Failed - 12/10/2018  2:28 AM       Failed - Blood pressure under 140/90 in past 12 months    BP Readings from Last 3 Encounters:   11/15/18 159/85   10/17/18 162/90   08/24/18 140/69          Failed - Normal serum creatinine on file in past 12 months    Recent Labs   Lab Test 06/13/18  1039   CR 1.87*          Passed - Recent (12 mo) or future (30 days) visit within the authorizing provider's specialty    Patient had office visit in the last 12 months or has a visit in the next 30 days with authorizing provider or within the authorizing provider's specialty.  See \"Patient Info\" tab in inbasket, or \"Choose Columns\" in Meds & Orders section of the refill encounter.           Passed - Patient is age 18 or older          "

## 2018-12-12 RX ORDER — AMLODIPINE BESYLATE 5 MG/1
TABLET ORAL
Qty: 30 TABLET | Refills: 0 | Status: SHIPPED | OUTPATIENT
Start: 2018-12-12 | End: 2019-10-30 | Stop reason: DRUGHIGH

## 2018-12-12 NOTE — TELEPHONE ENCOUNTER
Routing refill request to provider for review/approval because:  BP Readings from Last 3 Encounters:   11/15/18 159/85   10/17/18 162/90   08/24/18 140/69

## 2018-12-17 ENCOUNTER — TELEPHONE (OUTPATIENT)
Dept: FAMILY MEDICINE | Facility: CLINIC | Age: 63
End: 2018-12-17

## 2019-03-04 ENCOUNTER — TELEPHONE (OUTPATIENT)
Dept: FAMILY MEDICINE | Facility: CLINIC | Age: 64
End: 2019-03-04

## 2019-03-04 NOTE — TELEPHONE ENCOUNTER
Called pt and spoke with pt. Told him needs to schedule OV with PCP. He states he did not have time right now and will call back.    Please sched. OV with PCP for BP.       Renata Ratliff, CMA

## 2019-04-24 DIAGNOSIS — E11.59 TYPE 2 DIABETES WITH CIRCULATORY DISORDER CAUSING ERECTILE DYSFUNCTION (H): ICD-10-CM

## 2019-04-24 DIAGNOSIS — N52.1 TYPE 2 DIABETES WITH CIRCULATORY DISORDER CAUSING ERECTILE DYSFUNCTION (H): ICD-10-CM

## 2019-04-24 RX ORDER — ATORVASTATIN CALCIUM 10 MG/1
TABLET, FILM COATED ORAL
Qty: 90 TABLET | Refills: 0 | Status: SHIPPED | OUTPATIENT
Start: 2019-04-24 | End: 2019-08-17

## 2019-04-24 NOTE — TELEPHONE ENCOUNTER
"Prescription approved per [facility] Refill Protocol.  ESEQUIEL QuinonesN, RN        Requested Prescriptions   Pending Prescriptions Disp Refills     atorvastatin (LIPITOR) 10 MG tablet [Pharmacy Med Name: ATORVASTATIN 10 MG TABLET] 90 tablet 1     Sig: TAKE 1 TABLET BY MOUTH EVERY DAY       Statins Protocol Passed - 4/24/2019  6:46 AM        Passed - LDL on file in past 12 months     Recent Labs   Lab Test 06/13/18  1039   LDL 59             Passed - No abnormal creatine kinase in past 12 months     No lab results found.             Passed - Recent (12 mo) or future (30 days) visit within the authorizing provider's specialty     Patient had office visit in the last 12 months or has a visit in the next 30 days with authorizing provider or within the authorizing provider's specialty.  See \"Patient Info\" tab in inbasket, or \"Choose Columns\" in Meds & Orders section of the refill encounter.              Passed - Medication is active on med list        Passed - Patient is age 18 or older          "

## 2019-07-13 DIAGNOSIS — I10 ESSENTIAL HYPERTENSION WITH GOAL BLOOD PRESSURE LESS THAN 140/90: ICD-10-CM

## 2019-07-15 NOTE — TELEPHONE ENCOUNTER
"Requested Prescriptions   Pending Prescriptions Disp Refills     atenolol-chlorthalidone (TENORETIC) 100-25 MG tablet [Pharmacy Med Name: ATENOLOL-CHLORTHAL 100-25 TB] 90 tablet 1     Sig: TAKE 1 TABLET BY MOUTH EVERY DAY  Last Written Prescription Date:  10/17/2018  Last Fill Quantity: 90 tablet,  # refills: 1   Last Office Visit: 10/17/2018   Future Office Visit:            Beta-Blockers Protocol Failed - 7/13/2019  1:07 PM        Failed - Blood pressure under 140/90 in past 12 months     BP Readings from Last 3 Encounters:   11/15/18 159/85   10/17/18 162/90   08/24/18 140/69           Passed - Patient is age 6 or older        Passed - Recent (12 mo) or future (30 days) visit within the authorizing provider's specialty     Patient had office visit in the last 12 months or has a visit in the next 30 days with authorizing provider or within the authorizing provider's specialty.  See \"Patient Info\" tab in inbasket, or \"Choose Columns\" in Meds & Orders section of the refill encounter.            Passed - Medication is active on med list          "

## 2019-07-16 RX ORDER — ATENOLOL AND CHLORTHALIDONE TABLET 100; 25 MG/1; MG/1
TABLET ORAL
Qty: 15 TABLET | Refills: 0 | Status: SHIPPED | OUTPATIENT
Start: 2019-07-16 | End: 2019-10-30

## 2019-07-16 NOTE — TELEPHONE ENCOUNTER
BP is high and out of range.  Last OV was 10/17/18.  11/23/18- pt was told to f/u with pcp after MA bp check was high.  3/4/19- PT was again told he needed to f/u with provider. Pt says he is too busy.    Sending this refill to pcp.  KUNAL Alcantara

## 2019-08-01 ENCOUNTER — TRANSFERRED RECORDS (OUTPATIENT)
Dept: MULTI SPECIALTY CLINIC | Facility: CLINIC | Age: 64
End: 2019-08-01

## 2019-08-01 LAB — RETINOPATHY: NORMAL

## 2019-08-02 ENCOUNTER — TELEPHONE (OUTPATIENT)
Dept: FAMILY MEDICINE | Facility: CLINIC | Age: 64
End: 2019-08-02

## 2019-08-02 NOTE — TELEPHONE ENCOUNTER
Panel Management Review      Patient has the following on his problem list:     Hypertension   Last three blood pressure readings:  BP Readings from Last 3 Encounters:   11/15/18 159/85   10/17/18 162/90   08/24/18 140/69     Blood pressure: FAILED    HTN Guidelines:  Less than 140/90      Composite cancer screening  Chart review shows that this patient is due/due soon for the following None  Summary:    Patient is due/failing the following:   BP CHECK    Action needed:   Patient needs office visit for BP with pcp .    Type of outreach:    Phone, left message for patient to call back.     Questions for provider review:    None                                                                                                                                    Renata Ratliff CMA       Chart routed to Care Team .

## 2019-08-17 DIAGNOSIS — E11.59 TYPE 2 DIABETES WITH CIRCULATORY DISORDER CAUSING ERECTILE DYSFUNCTION (H): ICD-10-CM

## 2019-08-17 DIAGNOSIS — N52.1 TYPE 2 DIABETES WITH CIRCULATORY DISORDER CAUSING ERECTILE DYSFUNCTION (H): ICD-10-CM

## 2019-08-18 RX ORDER — ATORVASTATIN CALCIUM 10 MG/1
TABLET, FILM COATED ORAL
Qty: 30 TABLET | Refills: 0 | Status: SHIPPED | OUTPATIENT
Start: 2019-08-18 | End: 2019-09-23

## 2019-08-19 NOTE — TELEPHONE ENCOUNTER
"Requested Prescriptions   Pending Prescriptions Disp Refills     metFORMIN (GLUCOPHAGE) 1000 MG tablet [Pharmacy Med Name: METFORMIN HCL 1,000 MG TABLET] 180 tablet 1     Sig: TAKE 1 TABLET (1,000 MG) BY MOUTH 2 TIMES DAILY (WITH MEALS)       Biguanide Agents Failed - 8/17/2019 10:11 AM        Failed - Blood pressure less than 140/90 in past 6 months     BP Readings from Last 3 Encounters:   11/15/18 159/85   10/17/18 162/90   08/24/18 140/69                 Failed - Patient has documented LDL within the past 12 mos.     Recent Labs   Lab Test 06/13/18  1039   LDL 59             Failed - Patient has had a Microalbumin in the past 15 mos.     Recent Labs   Lab Test 08/02/17  1205   MICROL 72   UMALCR 63.60*             Failed - Patient has documented A1c within the specified period of time.     If HgbA1C is 8 or greater, it needs to be on file within the past 3 months.  If less than 8, must be on file within the past 6 months.     Recent Labs   Lab Test 06/13/18  1039   A1C 7.0*             Failed - Patient's CR is NOT>1.4 OR Patient's EGFR is NOT<45 within past 12 mos.     Recent Labs   Lab Test 06/13/18  1039   GFRESTIMATED 37*   GFRESTBLACK 45*       Recent Labs   Lab Test 06/13/18  1039   CR 1.87*             Failed - Recent (6 mo) or future (30 days) visit within the authorizing provider's specialty     Patient had office visit in the last 6 months or has a visit in the next 30 days with authorizing provider or within the authorizing provider's specialty.  See \"Patient Info\" tab in inbasket, or \"Choose Columns\" in Meds & Orders section of the refill encounter.            Passed - Patient is age 10 or older        Passed - Patient does NOT have a diagnosis of CHF.        Passed - Medication is active on med list        atorvastatin (LIPITOR) 10 MG tablet [Pharmacy Med Name: ATORVASTATIN 10 MG TABLET] 90 tablet 0     Sig: TAKE 1 TABLET BY MOUTH EVERY DAY       Statins Protocol Failed - 8/17/2019 10:11 AM        " "Failed - LDL on file in past 12 months     Recent Labs   Lab Test 06/13/18  1039   LDL 59             Passed - No abnormal creatine kinase in past 12 months     No lab results found.             Passed - Recent (12 mo) or future (30 days) visit within the authorizing provider's specialty     Patient had office visit in the last 12 months or has a visit in the next 30 days with authorizing provider or within the authorizing provider's specialty.  See \"Patient Info\" tab in inbasket, or \"Choose Columns\" in Meds & Orders section of the refill encounter.              Passed - Medication is active on med list        Passed - Patient is age 18 or older        Medication is being filled for 1 time refill only due to:  Patient needs labs FLP. Future labs ordered FLP. Patient needs to be seen because due for 6 month f/u for diabetes.     Signed Prescriptions:                        Disp   Refills    metFORMIN (GLUCOPHAGE) 1000 MG tablet      60 tab*0        Sig: TAKE 1 TABLET (1,000 MG) BY MOUTH 2 TIMES DAILY (WITH           MEALS)  Authorizing Provider: ANTOINETTE ASHBY  Ordering User: NETTE SOLANO    atorvastatin (LIPITOR) 10 MG tablet        30 tab*0        Sig: TAKE 1 TABLET BY MOUTH EVERY DAY  Authorizing Provider: ANTOINETTE ASHBY  Ordering User: NETTE SOLANO     Reception - one month due for diabetes f/u office visit and fasting lab work    "

## 2019-09-06 NOTE — TELEPHONE ENCOUNTER
Panel Management Review      Patient has the following on his problem list:     Diabetes    ASA: Failed    Last A1C  Lab Results   Component Value Date    A1C 7.0 06/13/2018    A1C 7.1 08/02/2017    A1C 7.7 04/05/2017    A1C 6.9 09/30/2016    A1C 9.9 05/09/2016     A1C tested: FAILED    Last LDL:    Lab Results   Component Value Date    CHOL 126 06/13/2018     Lab Results   Component Value Date    HDL 32 06/13/2018     Lab Results   Component Value Date    LDL 59 06/13/2018     Lab Results   Component Value Date    TRIG 176 06/13/2018     Lab Results   Component Value Date    CHOLHDLRATIO 4.5 09/28/2015     Lab Results   Component Value Date    NHDL 94 06/13/2018       Is the patient on a Statin? YES             Is the patient on Aspirin? NO    Medications     HMG CoA Reductase Inhibitors     atorvastatin (LIPITOR) 10 MG tablet             Last three blood pressure readings:  BP Readings from Last 3 Encounters:   11/15/18 159/85   10/17/18 162/90   08/24/18 140/69       Date of last diabetes office visit: 10/17/2018     Tobacco History:     History   Smoking Status     Former Smoker     Packs/day: 3.00     Years: 16.00     Types: Cigarettes     Quit date: 9/14/1987   Smokeless Tobacco     Never Used     Comment: 17 years ago         Hypertension   Last three blood pressure readings:  BP Readings from Last 3 Encounters:   11/15/18 159/85   10/17/18 162/90   08/24/18 140/69     Blood pressure: MONITOR    HTN Guidelines:  Less than 140/90      Composite cancer screening  Chart review shows that this patient is due/due soon for the following None  Summary:    Patient is due/failing the following:   A1C and BP CHECK    Action needed:   Patient needs office visit for diabetes and bp check with PCP.    Type of outreach:    Phone, left message for patient to call back.     Questions for provider review:    None                                                                                                                                     Renata Ratliff [facility]       Chart routed to Care Team .

## 2019-09-22 DIAGNOSIS — N52.1 TYPE 2 DIABETES WITH CIRCULATORY DISORDER CAUSING ERECTILE DYSFUNCTION (H): ICD-10-CM

## 2019-09-22 DIAGNOSIS — E11.59 TYPE 2 DIABETES WITH CIRCULATORY DISORDER CAUSING ERECTILE DYSFUNCTION (H): ICD-10-CM

## 2019-09-23 DIAGNOSIS — E11.59 TYPE 2 DIABETES WITH CIRCULATORY DISORDER CAUSING ERECTILE DYSFUNCTION (H): ICD-10-CM

## 2019-09-23 DIAGNOSIS — N52.1 TYPE 2 DIABETES WITH CIRCULATORY DISORDER CAUSING ERECTILE DYSFUNCTION (H): ICD-10-CM

## 2019-09-23 RX ORDER — ATORVASTATIN CALCIUM 10 MG/1
TABLET, FILM COATED ORAL
Qty: 15 TABLET | Refills: 0 | Status: SHIPPED | OUTPATIENT
Start: 2019-09-23 | End: 2019-10-30

## 2019-09-23 NOTE — TELEPHONE ENCOUNTER
"Routing refill request to provider for review/approval because:  --Last refill order sent 8/18/19 was nellie refill with reminder due for visit.  --VM left for patient 8/20/19 - no response.  --Last Office Visit: 10/17/2018 plan - RTC in 6 months for diabetes visit.  --LDL overdue.       ,  --Please contact patient and ask him to schedule a diabetes visit with .    --Please send letter if not able to reach by phone.  --A refill request for below medication was sent to the provider.         Future Office Visit:  None      Requested Prescriptions   Pending Prescriptions Disp Refills     atorvastatin (LIPITOR) 10 MG tablet [Pharmacy Med Name: ATORVASTATIN 10 MG TABLET] 30 tablet 0     Sig: TAKE 1 TABLET BY MOUTH EVERY DAY       Statins Protocol Failed - 9/23/2019  2:20 AM        Failed - LDL on file in past 12 months     Recent Labs   Lab Test 06/13/18  1039   LDL 59             Passed - No abnormal creatine kinase in past 12 months     No lab results found.             Passed - Recent (12 mo) or future (30 days) visit within the authorizing provider's specialty     Patient had office visit in the last 12 months or has a visit in the next 30 days with authorizing provider or within the authorizing provider's specialty.  See \"Patient Info\" tab in inbasket, or \"Choose Columns\" in Meds & Orders section of the refill encounter.              Passed - Medication is active on med list        Passed - Patient is age 18 or older            "

## 2019-09-23 NOTE — TELEPHONE ENCOUNTER
Lm on vm and mailed letter to patient stating needs to be seen for his diabetes for further refills

## 2019-10-16 DIAGNOSIS — I10 ESSENTIAL HYPERTENSION WITH GOAL BLOOD PRESSURE LESS THAN 140/90: ICD-10-CM

## 2019-10-16 NOTE — TELEPHONE ENCOUNTER
"Requested Prescriptions   Pending Prescriptions Disp Refills     atenolol-chlorthalidone (TENORETIC) 100-25 MG tablet [Pharmacy Med Name: ATENOLOL-CHLORTHAL 100-25 TB] 90 tablet 1     Sig: TAKE 1 TABLET BY MOUTH EVERY DAY  Last Written Prescription Date:  9/4/2019  Last Fill Quantity: 15 tablet,  # refills: 0   Last Office Visit: 10/17/2018   Future Office Visit:            Beta-Blockers Protocol Failed - 10/16/2019 11:16 AM        Failed - Blood pressure under 140/90 in past 12 months     BP Readings from Last 3 Encounters:   11/15/18 159/85   10/17/18 162/90   08/24/18 140/69           Passed - Patient is age 6 or older        Passed - Recent (12 mo) or future (30 days) visit within the authorizing provider's specialty     Patient has had an office visit with the authorizing provider or a provider within the authorizing providers department within the previous 12 mos or has a future within next 30 days. See \"Patient Info\" tab in inbasket, or \"Choose Columns\" in Meds & Orders section of the refill encounter.            Passed - Medication is active on med list          "

## 2019-10-19 NOTE — TELEPHONE ENCOUNTER
Routing refill request to provider for review/approval because:  Abrbara given x1 and patient did not follow up, please advise

## 2019-10-21 RX ORDER — ATENOLOL AND CHLORTHALIDONE TABLET 100; 25 MG/1; MG/1
TABLET ORAL
Qty: 7 TABLET | Refills: 0 | Status: SHIPPED | OUTPATIENT
Start: 2019-10-21 | End: 2019-10-30

## 2019-10-30 ENCOUNTER — OFFICE VISIT (OUTPATIENT)
Dept: FAMILY MEDICINE | Facility: CLINIC | Age: 64
End: 2019-10-30
Payer: COMMERCIAL

## 2019-10-30 VITALS
OXYGEN SATURATION: 97 % | TEMPERATURE: 99 F | HEART RATE: 62 BPM | WEIGHT: 297.5 LBS | DIASTOLIC BLOOD PRESSURE: 82 MMHG | BODY MASS INDEX: 42.59 KG/M2 | HEIGHT: 70 IN | SYSTOLIC BLOOD PRESSURE: 136 MMHG | RESPIRATION RATE: 16 BRPM

## 2019-10-30 DIAGNOSIS — I10 ESSENTIAL HYPERTENSION WITH GOAL BLOOD PRESSURE LESS THAN 140/90: ICD-10-CM

## 2019-10-30 DIAGNOSIS — Z23 NEED FOR PROPHYLACTIC VACCINATION AND INOCULATION AGAINST INFLUENZA: ICD-10-CM

## 2019-10-30 DIAGNOSIS — E11.59 TYPE 2 DIABETES WITH CIRCULATORY DISORDER CAUSING ERECTILE DYSFUNCTION (H): Primary | ICD-10-CM

## 2019-10-30 DIAGNOSIS — N52.1 TYPE 2 DIABETES WITH CIRCULATORY DISORDER CAUSING ERECTILE DYSFUNCTION (H): Primary | ICD-10-CM

## 2019-10-30 LAB — HBA1C MFR BLD: 8.8 % (ref 0–5.6)

## 2019-10-30 PROCEDURE — 90471 IMMUNIZATION ADMIN: CPT | Performed by: FAMILY MEDICINE

## 2019-10-30 PROCEDURE — 99207 C FOOT EXAM  NO CHARGE: CPT | Mod: 25 | Performed by: FAMILY MEDICINE

## 2019-10-30 PROCEDURE — 99214 OFFICE O/P EST MOD 30 MIN: CPT | Mod: 25 | Performed by: FAMILY MEDICINE

## 2019-10-30 PROCEDURE — 36415 COLL VENOUS BLD VENIPUNCTURE: CPT | Performed by: FAMILY MEDICINE

## 2019-10-30 PROCEDURE — 83036 HEMOGLOBIN GLYCOSYLATED A1C: CPT | Performed by: FAMILY MEDICINE

## 2019-10-30 PROCEDURE — 80061 LIPID PANEL: CPT | Performed by: FAMILY MEDICINE

## 2019-10-30 PROCEDURE — 82043 UR ALBUMIN QUANTITATIVE: CPT | Performed by: FAMILY MEDICINE

## 2019-10-30 PROCEDURE — 90682 RIV4 VACC RECOMBINANT DNA IM: CPT | Performed by: FAMILY MEDICINE

## 2019-10-30 PROCEDURE — 80053 COMPREHEN METABOLIC PANEL: CPT | Performed by: FAMILY MEDICINE

## 2019-10-30 RX ORDER — ATENOLOL AND CHLORTHALIDONE TABLET 100; 25 MG/1; MG/1
1 TABLET ORAL DAILY
Qty: 90 TABLET | Refills: 0 | Status: SHIPPED | OUTPATIENT
Start: 2019-10-30 | End: 2020-02-03

## 2019-10-30 RX ORDER — LANCETS
EACH MISCELLANEOUS
Qty: 1 EACH | Refills: 6 | Status: SHIPPED | OUTPATIENT
Start: 2019-10-30 | End: 2024-01-01

## 2019-10-30 RX ORDER — GLUCOSAMINE HCL/CHONDROITIN SU 500-400 MG
CAPSULE ORAL
Qty: 100 EACH | Refills: 3 | Status: SHIPPED | OUTPATIENT
Start: 2019-10-30

## 2019-10-30 RX ORDER — METFORMIN HCL 500 MG
2000 TABLET, EXTENDED RELEASE 24 HR ORAL
Qty: 360 TABLET | Refills: 0 | Status: SHIPPED | OUTPATIENT
Start: 2019-10-30 | End: 2021-04-07

## 2019-10-30 RX ORDER — AMLODIPINE BESYLATE 10 MG/1
10 TABLET ORAL DAILY
Qty: 90 TABLET | Refills: 0 | Status: SHIPPED | OUTPATIENT
Start: 2019-10-30 | End: 2020-02-03

## 2019-10-30 RX ORDER — GLIPIZIDE 10 MG/1
10 TABLET, FILM COATED, EXTENDED RELEASE ORAL DAILY
Qty: 90 TABLET | Refills: 0 | Status: SHIPPED | OUTPATIENT
Start: 2019-10-30 | End: 2020-02-03

## 2019-10-30 RX ORDER — LISINOPRIL 40 MG/1
40 TABLET ORAL DAILY
Qty: 90 TABLET | Refills: 0 | Status: SHIPPED | OUTPATIENT
Start: 2019-10-30 | End: 2020-02-28

## 2019-10-30 RX ORDER — ATORVASTATIN CALCIUM 10 MG/1
10 TABLET, FILM COATED ORAL DAILY
Qty: 90 TABLET | Refills: 0 | Status: SHIPPED | OUTPATIENT
Start: 2019-10-30 | End: 2020-02-02

## 2019-10-30 ASSESSMENT — MIFFLIN-ST. JEOR: SCORE: 2147.76

## 2019-10-30 NOTE — PROGRESS NOTES
Subjective     Booker Brown is a 64 year old male who presents to clinic today for the following health issues:    HPI   Diabetes Follow-up      How often are you checking your blood sugar? Not at all    What concerns do you have today about your diabetes? None     Do you have any of these symptoms? (Select all that apply)  No numbness or tingling in feet.  No redness, sores or blisters on feet.  No complaints of excessive thirst.  No reports of blurry vision.  No significant changes to weight.     Have you had a diabetic eye exam in the last 12 months? No    BP Readings from Last 2 Encounters:   11/15/18 159/85   10/17/18 162/90     Hemoglobin A1C (%)   Date Value   06/13/2018 7.0 (H)   08/02/2017 7.1 (H)     LDL Cholesterol Calculated (mg/dL)   Date Value   06/13/2018 59   08/02/2017 76       Diabetes Management Resources    Hypertension Follow-up      Do you check your blood pressure regularly outside of the clinic? No     Are you following a low salt diet? No    Are your blood pressures ever more than 140 on the top number (systolic) OR more   than 90 on the bottom number (diastolic), for example 140/90? No      How many servings of fruits and vegetables do you eat daily?  0-1    On average, how many sweetened beverages do you drink each day (soda, juice, sweet tea, etc)?   1    How many days per week do you miss taking your medication? 0    Medication Followup of zantac    Taking Medication as prescribed: NO-would like alternative medication for acid reflux    Side Effects:  None    Medication Helping Symptoms:  not applicable     havent check blood sugars recently.     Blood sugars are ups and down. Weekend - higher.     Metformin - diarrhea. If having family events - skips dose.       Social History     Occupational History     Not on file   Tobacco Use     Smoking status: Former Smoker     Packs/day: 3.00     Years: 16.00     Pack years: 48.00     Types: Cigarettes     Last attempt to quit: 9/14/1987      "Years since quittin.1     Smokeless tobacco: Never Used     Tobacco comment: 17 years ago   Substance and Sexual Activity     Alcohol use: No     Drug use: No     Sexual activity: Yes     Partners: Female     Allergies   Allergen Reactions     No Known Allergies      Patient Active Problem List   Diagnosis     erectile dysfunction     obese bmi over 35     GERD (gastroesophageal reflux disease)     Hypertension goal BP (blood pressure) < 140/90     Advanced directives, counseling/discussion     CARDIOVASCULAR SCREENING; LDL GOAL LESS THAN 100     Type 2 diabetes with circulatory disorder causing erectile dysfunction (H)     Type 2 diabetes mellitus with hyperglycemia (H)     Reviewed medications, social history and  past medical and surgical history.    Review of system: for general, respiratory, CVS, GI and psychiatry negative except for noted above.     EXAM:  /82 (BP Location: Right arm, Patient Position: Sitting, Cuff Size: Adult Large)   Pulse 62   Temp 99  F (37.2  C) (Oral)   Resp 16   Ht 1.765 m (5' 9.5\")   Wt 134.9 kg (297 lb 8 oz)   SpO2 97%   BMI 43.30 kg/m    Constitutional: healthy, alert and no distress   Psychiatric: mentation appears normal and affect normal/bright  Abdomen: Morbidly obese  Both lower extremities monofilament examination negative.    ASSESSMENT / PLAN:  (E11.59,  N52.1) Type 2 diabetes with circulatory disorder causing erectile dysfunction (H)  (primary encounter diagnosis)  Comment: Uncontrolled.  Patient states that he is compliant with medication but many of his medications are not filled recently.  At this point we will go up on the dose of glipizide.  He is also having diarrhea with metformin and will switch metformin to extended release.  We discussed about GLP-1 agent.  He has no major concerns but he would like to hold off on for now.  Plan: Lipid panel reflex to direct LDL Fasting,         Albumin Random Urine Quantitative with Creat         Ratio, " Hemoglobin A1c, C FOOT EXAM  NO CHARGE,         Comprehensive metabolic panel, glipiZIDE         (GLUCOTROL XL) 10 MG 24 hr tablet, metFORMIN         (GLUCOPHAGE-XR) 500 MG 24 hr tablet,         atorvastatin (LIPITOR) 10 MG tablet, blood         glucose monitoring (NO BRAND SPECIFIED) meter         device kit, blood glucose (NO BRAND SPECIFIED)         test strip, blood glucose calibration (NO BRAND        SPECIFIED) solution, thin (NO BRAND SPECIFIED)         lancets, alcohol swab prep pads    (I10) Essential hypertension with goal blood pressure less than 140/90  Comment: Patient is tolerating current medication without any major side effects of concerns and current dose seems reasonable too.  Current medication regime is effective. Continue current treatment without any changes.   Plan: atenolol-chlorthalidone (TENORETIC) 100-25 MG         tablet, lisinopril (PRINIVIL/ZESTRIL) 40 MG         tablet, amLODIPine (NORVASC) 10 MG tablet             (Z23) Need for prophylactic vaccination and inoculation against influenza  Comment:    Plan: INFLUENZA QUAD, RECOMBINANT, P-FREE (RIV4)         (FLUBLOCK) [20919], Vaccine Administration,         Initial [35978]             Follow-up in 6 to 8 weeks    The above note was dictated using voice recognition. Although reviewed after completion, some word and grammatical error may remain .

## 2019-10-30 NOTE — LETTER
November 1, 2019      Booker Brown  [address]  [facility] [address]        Dear ,    We are writing to inform you of your test results.    Kidney function is still abnormal.  No major changes.  We should continue to work on controlling blood sugar, as it will improve your kidney function.  You are still in stage 3 kidney failure.  Your liver function tests are within normal limit.   Your urine test for leaking protein shows some leaking of protein Which is getting worse, which is a sign of early kidney damage from diabetes.  Better control of blood sugar is the key.   Good cholesterol HDL is low which improves with exercise.     Resulted Orders   Lipid panel reflex to direct LDL Fasting   Result Value Ref Range    Cholesterol 163 <200 mg/dL    Triglycerides 290 (H) <150 mg/dL      Comment:      Borderline high:  150-199 mg/dl  High:             200-499 mg/dl  Very high:       >499 mg/dl      HDL Cholesterol 31 (L) >39 mg/dL    LDL Cholesterol Calculated 74 <100 mg/dL      Comment:      Desirable:       <100 mg/dl    Non HDL Cholesterol 132 (H) <130 mg/dL      Comment:      Above Desirable:  130-159 mg/dl  Borderline high:  160-189 mg/dl  High:             190-219 mg/dl  Very high:       >219 mg/dl     Albumin Random Urine Quantitative with Creat Ratio   Result Value Ref Range    Creatinine Urine 88 mg/dL    Albumin Urine mg/L 240 mg/L    Albumin Urine mg/g Cr 273.04 (H) 0 - 17 mg/g Cr   Hemoglobin A1c   Result Value Ref Range    Hemoglobin A1C 8.8 (H) 0 - 5.6 %      Comment:      Normal <5.7% Prediabetes 5.7-6.4%  Diabetes 6.5% or higher - adopted from ADA   consensus guidelines.     Comprehensive metabolic panel   Result Value Ref Range    Sodium 136 133 - 144 mmol/L    Potassium 4.5 3.4 - 5.3 mmol/L    Chloride 104 94 - 109 mmol/L    Carbon Dioxide 24 20 - 32 mmol/L    Anion Gap 8 3 - 14 mmol/L    Glucose 170 (H) 70 - 99 mg/dL    Urea Nitrogen 41 (H) 7 - 30 mg/dL    Creatinine 1.86 (H)  0.66 - 1.25 mg/dL    GFR Estimate 38 (L) >60 mL/min/[1.73_m2]      Comment:      Non  GFR Calc  Starting 12/18/2018, serum creatinine based estimated GFR (eGFR) will be   calculated using the Chronic Kidney Disease Epidemiology Collaboration   (CKD-EPI) equation.      GFR Estimate If Black 44 (L) >60 mL/min/[1.73_m2]      Comment:       GFR Calc  Starting 12/18/2018, serum creatinine based estimated GFR (eGFR) will be   calculated using the Chronic Kidney Disease Epidemiology Collaboration   (CKD-EPI) equation.      Calcium 9.3 8.5 - 10.1 mg/dL    Bilirubin Total 0.7 0.2 - 1.3 mg/dL    Albumin 4.3 3.4 - 5.0 g/dL    Protein Total 7.5 6.8 - 8.8 g/dL    Alkaline Phosphatase 59 40 - 150 U/L    ALT 36 0 - 70 U/L    AST 20 0 - 45 U/L     If you have any questions or concerns, please call the clinic at the number listed above.     Sincerely,      Gage Zepeda MD/nr

## 2019-10-31 LAB
ALBUMIN SERPL-MCNC: 4.3 G/DL (ref 3.4–5)
ALP SERPL-CCNC: 59 U/L (ref 40–150)
ALT SERPL W P-5'-P-CCNC: 36 U/L (ref 0–70)
ANION GAP SERPL CALCULATED.3IONS-SCNC: 8 MMOL/L (ref 3–14)
AST SERPL W P-5'-P-CCNC: 20 U/L (ref 0–45)
BILIRUB SERPL-MCNC: 0.7 MG/DL (ref 0.2–1.3)
BUN SERPL-MCNC: 41 MG/DL (ref 7–30)
CALCIUM SERPL-MCNC: 9.3 MG/DL (ref 8.5–10.1)
CHLORIDE SERPL-SCNC: 104 MMOL/L (ref 94–109)
CHOLEST SERPL-MCNC: 163 MG/DL
CO2 SERPL-SCNC: 24 MMOL/L (ref 20–32)
CREAT SERPL-MCNC: 1.86 MG/DL (ref 0.66–1.25)
CREAT UR-MCNC: 88 MG/DL
GFR SERPL CREATININE-BSD FRML MDRD: 38 ML/MIN/{1.73_M2}
GLUCOSE SERPL-MCNC: 170 MG/DL (ref 70–99)
HDLC SERPL-MCNC: 31 MG/DL
LDLC SERPL CALC-MCNC: 74 MG/DL
MICROALBUMIN UR-MCNC: 240 MG/L
MICROALBUMIN/CREAT UR: 273.04 MG/G CR (ref 0–17)
NONHDLC SERPL-MCNC: 132 MG/DL
POTASSIUM SERPL-SCNC: 4.5 MMOL/L (ref 3.4–5.3)
PROT SERPL-MCNC: 7.5 G/DL (ref 6.8–8.8)
SODIUM SERPL-SCNC: 136 MMOL/L (ref 133–144)
TRIGL SERPL-MCNC: 290 MG/DL

## 2019-12-03 DIAGNOSIS — K21.9 GASTROESOPHAGEAL REFLUX DISEASE WITHOUT ESOPHAGITIS: ICD-10-CM

## 2019-12-03 NOTE — TELEPHONE ENCOUNTER
"Requested Prescriptions   Pending Prescriptions Disp Refills     ranitidine (ZANTAC) 150 MG tablet [Pharmacy Med Name: RANITIDINE 150 MG TABLET] 180 tablet 1     Sig: TAKE 1 TABLET (150 MG) BY MOUTH 2 TIMES DAILY  Not on current med list  Last Office Visit: 10/30/2019   Future Office Visit:            H2 Blockers Protocol Failed - 12/3/2019  6:49 AM        Failed - Medication is active on med list        Passed - Patient is age 12 or older        Passed - Recent (12 mo) or future (30 days) visit within the authorizing provider's specialty     Patient has had an office visit with the authorizing provider or a provider within the authorizing providers department within the previous 12 mos or has a future within next 30 days. See \"Patient Info\" tab in inbasket, or \"Choose Columns\" in Meds & Orders section of the refill encounter.                "

## 2019-12-04 NOTE — TELEPHONE ENCOUNTER
Team Coordinators: please contact patient. At office visit on 10/30/2019 patient reported he stopped taking this medication. Did he request this refill or was refill generated by pharmacy?    Thank You!  Evelyn Madrigal RN  Triage Nurse

## 2019-12-04 NOTE — TELEPHONE ENCOUNTER
Spoke to patient and he did request a refill & stating he wasn't taking because of the recall therefore, it wasn't available.  So, patient is still taking and thought Dr Zepeda still sent a prescription with all his others

## 2019-12-04 NOTE — TELEPHONE ENCOUNTER
Dr. Zepeda/[facility]:  Routing refill request to provider for review/approval because:  Drug not active on patient's medication list -- patient reported not taking this medication at 10/30/2019 office visit. Per patient, he stopped after the recall, but the pharmacy told him they are now dispensing from an unaffected lot -- he is out of medication and would like refill    Thank You!  Evelyn Madrigal, RN  Triage Nurse

## 2020-01-31 DIAGNOSIS — N52.1 TYPE 2 DIABETES WITH CIRCULATORY DISORDER CAUSING ERECTILE DYSFUNCTION (H): ICD-10-CM

## 2020-01-31 DIAGNOSIS — E11.59 TYPE 2 DIABETES WITH CIRCULATORY DISORDER CAUSING ERECTILE DYSFUNCTION (H): ICD-10-CM

## 2020-01-31 DIAGNOSIS — I10 ESSENTIAL HYPERTENSION WITH GOAL BLOOD PRESSURE LESS THAN 140/90: ICD-10-CM

## 2020-01-31 NOTE — TELEPHONE ENCOUNTER
"Requested Prescriptions   Pending Prescriptions Disp Refills     glipiZIDE (GLUCOTROL XL) 10 MG 24 hr tablet [Pharmacy Med Name: GLIPIZIDE ER 10 MG TABLET]  Last Written Prescription Date:  10/30/2019  Last Fill Quantity: 90 tabs,  # refills: 0   Last office visit: 10/30/2019 with prescribing provider:  Katelyn   Future Office Visit:   90 tablet 0     Sig: TAKE 1 TABLET BY MOUTH EVERY DAY       Sulfonylurea Agents Failed - 1/31/2020  2:32 AM        Failed - Patient has documented A1c within the specified period of time.     If HgbA1C is 8 or greater, it needs to be on file within the past 3 months.  If less than 8, must be on file within the past 6 months.     Recent Labs   Lab Test 10/30/19  1144   A1C 8.8*             Failed - Patient has a recent creatinine (normal) within the past 12 mos.     Recent Labs   Lab Test 10/30/19  1144   CR 1.86*             Passed - Blood pressure less than 140/90 in past 6 months     BP Readings from Last 3 Encounters:   10/30/19 136/82   11/15/18 159/85   10/17/18 162/90                 Passed - Patient has documented LDL within the past 12 mos.     Recent Labs   Lab Test 10/30/19  1144   LDL 74             Passed - Patient has had a Microalbumin in the past 15 mos.     Recent Labs   Lab Test 10/30/19  1145   MICROL 240   UMALCR 273.04*             Passed - Medication is active on med list        Passed - Patient is age 18 or older        Passed - Recent (6 mo) or future (30 days) visit within the authorizing provider's specialty     Patient had office visit in the last 6 months or has a visit in the next 30 days with authorizing provider or within the authorizing provider's specialty.  See \"Patient Info\" tab in inbasket, or \"Choose Columns\" in Meds & Orders section of the refill encounter.            atenolol-chlorthalidone (TENORETIC) 100-25 MG tablet [Pharmacy Med Name: ATENOLOL-CHLORTHAL 100-25 TB]  Last Written Prescription Date:  10/30/2019  Last Fill Quantity: 90 tabs,  # " "refills: 0   Last office visit: 10/30/2019 with prescribing provider:  Katelyn   Future Office Visit:   90 tablet 0     Sig: TAKE 1 TABLET BY MOUTH EVERY DAY       Beta-Blockers Protocol Passed - 1/31/2020  2:32 AM        Passed - Blood pressure under 140/90 in past 12 months     BP Readings from Last 3 Encounters:   10/30/19 136/82   11/15/18 159/85   10/17/18 162/90                 Passed - Patient is age 6 or older        Passed - Recent (12 mo) or future (30 days) visit within the authorizing provider's specialty     Patient has had an office visit with the authorizing provider or a provider within the authorizing providers department within the previous 12 mos or has a future within next 30 days. See \"Patient Info\" tab in inbasket, or \"Choose Columns\" in Meds & Orders section of the refill encounter.              Passed - Medication is active on med list        amLODIPine (NORVASC) 10 MG tablet [Pharmacy Med Name: AMLODIPINE BESYLATE 10 MG TAB]  Last Written Prescription Date:  10/30/2019  Last Fill Quantity: 90 tabs,  # refills: 0   Last office visit: 10/30/2019 with prescribing provider:  Katelyn   Future Office Visit:   90 tablet 0     Sig: TAKE 1 TABLET BY MOUTH EVERY DAY       Calcium Channel Blockers Protocol  Failed - 1/31/2020  2:32 AM        Failed - Normal serum creatinine on file in past 12 months     Recent Labs   Lab Test 10/30/19  1144   CR 1.86*             Passed - Blood pressure under 140/90 in past 12 months     BP Readings from Last 3 Encounters:   10/30/19 136/82   11/15/18 159/85   10/17/18 162/90                 Passed - Recent (12 mo) or future (30 days) visit within the authorizing provider's specialty     Patient has had an office visit with the authorizing provider or a provider within the authorizing providers department within the previous 12 mos or has a future within next 30 days. See \"Patient Info\" tab in inbasket, or \"Choose Columns\" in Meds & Orders section of the refill " encounter.              Passed - Medication is active on med list        Passed - Patient is age 18 or older

## 2020-02-01 DIAGNOSIS — N52.1 TYPE 2 DIABETES WITH CIRCULATORY DISORDER CAUSING ERECTILE DYSFUNCTION (H): ICD-10-CM

## 2020-02-01 DIAGNOSIS — E11.59 TYPE 2 DIABETES WITH CIRCULATORY DISORDER CAUSING ERECTILE DYSFUNCTION (H): ICD-10-CM

## 2020-02-02 RX ORDER — ATORVASTATIN CALCIUM 10 MG/1
TABLET, FILM COATED ORAL
Qty: 90 TABLET | Refills: 2 | Status: SHIPPED | OUTPATIENT
Start: 2020-02-02 | End: 2021-03-03

## 2020-02-02 NOTE — TELEPHONE ENCOUNTER
"Requested Prescriptions   Pending Prescriptions Disp Refills     atorvastatin (LIPITOR) 10 MG tablet [Pharmacy Med Name: ATORVASTATIN 10 MG TABLET] 90 tablet 0     Sig: TAKE 1 TABLET BY MOUTH EVERY DAY       Statins Protocol Passed - 2/1/2020  1:02 AM        Passed - LDL on file in past 12 months     Recent Labs   Lab Test 10/30/19  1144   LDL 74             Passed - No abnormal creatine kinase in past 12 months     No lab results found.             Passed - Recent (12 mo) or future (30 days) visit within the authorizing provider's specialty     Patient has had an office visit with the authorizing provider or a provider within the authorizing providers department within the previous 12 mos or has a future within next 30 days. See \"Patient Info\" tab in inbasket, or \"Choose Columns\" in Meds & Orders section of the refill encounter.              Passed - Medication is active on med list        Passed - Patient is age 18 or older        Signed Prescriptions:                        Disp   Refills    atorvastatin (LIPITOR) 10 MG tablet        90 tab*2        Sig: TAKE 1 TABLET BY MOUTH EVERY DAY  Authorizing Provider: ANTOINETTE ASHBY  Ordering User: NETTE SOLANO      "

## 2020-02-03 RX ORDER — ATENOLOL AND CHLORTHALIDONE TABLET 100; 25 MG/1; MG/1
TABLET ORAL
Qty: 30 TABLET | Refills: 0 | Status: SHIPPED | OUTPATIENT
Start: 2020-02-02 | End: 2020-02-28

## 2020-02-03 RX ORDER — GLIPIZIDE 10 MG/1
TABLET, FILM COATED, EXTENDED RELEASE ORAL
Qty: 30 TABLET | Refills: 0 | Status: SHIPPED | OUTPATIENT
Start: 2020-02-02 | End: 2020-02-28

## 2020-02-03 RX ORDER — AMLODIPINE BESYLATE 10 MG/1
TABLET ORAL
Qty: 30 TABLET | Refills: 0 | Status: SHIPPED | OUTPATIENT
Start: 2020-02-02 | End: 2020-02-28

## 2020-02-03 NOTE — TELEPHONE ENCOUNTER
Notes recorded by Antoinette Zepeda MD on 11/1/2019 at 11:41 AM CDT  Kidney function is still abnormal.  No major changes.  We should continue to work on controlling blood sugar, as it will improve your kidney function.  You are still in stage 3 kidney failure.  Your liver function tests are within normal limit.  Your urine test for leaking protein shows some leaking of protein Which is getting worse, which is a sign of early kidney damage from diabetes.  Better control of blood sugar is the key.  Good cholesterol HDL is low which improves with exercise.    HW Reception Medication is being filled for 1 time refill only due to:  Patient needs to be seen because due for 2 month f/u appointment.     Signed Prescriptions:                        Disp   Refills    glipiZIDE (GLUCOTROL XL) 10 MG 24 hr [id number] tab*0        Sig: TAKE 1 TABLET BY MOUTH EVERY DAY  Authorizing Provider: ANTOINETTE ZEPEDA  Ordering User: NETTE SOLANO    atenolol-chlorthalidone (TENORETIC) 100-25*30 tab*0        Sig: TAKE 1 TABLET BY MOUTH EVERY DAY  Authorizing Provider: ANTOINETTE ZEPEDA  Ordering User: NETTE SOLANO    amLODIPine (NORVASC) 10 MG tablet          30 tab*0        Sig: TAKE 1 TABLET BY MOUTH EVERY DAY  Authorizing Provider: ANTOINETTE ZEPEDA  Ordering User: NETTE SOLANO

## 2020-02-28 ENCOUNTER — OFFICE VISIT (OUTPATIENT)
Dept: FAMILY MEDICINE | Facility: CLINIC | Age: 65
End: 2020-02-28
Payer: COMMERCIAL

## 2020-02-28 VITALS
SYSTOLIC BLOOD PRESSURE: 116 MMHG | RESPIRATION RATE: 16 BRPM | WEIGHT: 299 LBS | HEIGHT: 70 IN | DIASTOLIC BLOOD PRESSURE: 68 MMHG | BODY MASS INDEX: 42.8 KG/M2 | OXYGEN SATURATION: 99 % | HEART RATE: 60 BPM | TEMPERATURE: 98 F

## 2020-02-28 DIAGNOSIS — N52.1 TYPE 2 DIABETES WITH CIRCULATORY DISORDER CAUSING ERECTILE DYSFUNCTION (H): Primary | ICD-10-CM

## 2020-02-28 DIAGNOSIS — I10 ESSENTIAL HYPERTENSION WITH GOAL BLOOD PRESSURE LESS THAN 140/90: ICD-10-CM

## 2020-02-28 DIAGNOSIS — E11.59 TYPE 2 DIABETES WITH CIRCULATORY DISORDER CAUSING ERECTILE DYSFUNCTION (H): Primary | ICD-10-CM

## 2020-02-28 DIAGNOSIS — J01.41 ACUTE RECURRENT PANSINUSITIS: ICD-10-CM

## 2020-02-28 LAB — HBA1C MFR BLD: 7.7 % (ref 0–5.6)

## 2020-02-28 PROCEDURE — 99214 OFFICE O/P EST MOD 30 MIN: CPT | Performed by: FAMILY MEDICINE

## 2020-02-28 PROCEDURE — 36415 COLL VENOUS BLD VENIPUNCTURE: CPT | Performed by: FAMILY MEDICINE

## 2020-02-28 PROCEDURE — 83036 HEMOGLOBIN GLYCOSYLATED A1C: CPT | Performed by: FAMILY MEDICINE

## 2020-02-28 RX ORDER — AMLODIPINE BESYLATE 10 MG/1
10 TABLET ORAL DAILY
Qty: 90 TABLET | Refills: 1 | Status: SHIPPED | OUTPATIENT
Start: 2020-02-28 | End: 2020-10-26

## 2020-02-28 RX ORDER — LISINOPRIL 40 MG/1
40 TABLET ORAL DAILY
Qty: 90 TABLET | Refills: 0 | Status: SHIPPED | OUTPATIENT
Start: 2020-05-20 | End: 2021-01-19

## 2020-02-28 RX ORDER — ATENOLOL AND CHLORTHALIDONE TABLET 100; 25 MG/1; MG/1
1 TABLET ORAL DAILY
Qty: 90 TABLET | Refills: 1 | Status: SHIPPED | OUTPATIENT
Start: 2020-02-28 | End: 2020-10-26

## 2020-02-28 RX ORDER — LISINOPRIL 40 MG/1
40 TABLET ORAL DAILY
Qty: 90 TABLET | Refills: 0 | Status: SHIPPED | OUTPATIENT
Start: 2020-02-28 | End: 2020-02-28

## 2020-02-28 RX ORDER — GLIPIZIDE 10 MG/1
10 TABLET, FILM COATED, EXTENDED RELEASE ORAL DAILY
Qty: 90 TABLET | Refills: 1 | Status: SHIPPED | OUTPATIENT
Start: 2020-02-28 | End: 2020-10-26

## 2020-02-28 ASSESSMENT — MIFFLIN-ST. JEOR: SCORE: 2157.51

## 2020-02-28 NOTE — PROGRESS NOTES
Subjective     Booker Brown is a 65 year old male who presents to clinic today for the following health issues:    HPI   Acute Illness   Acute illness concerns: sinus problem  Onset: ongoing since winter started, went to [facility] had sinus infection.    Fever: no    Chills/Sweats: no    Headache (location?): YES- top of head     Sinus Pressure:YES- tender, post-nasal drainage, facial pain and teeth pain    Conjunctivitis:  no    Ear Pain: no    Rhinorrhea: YES    Congestion: YES    Sore Throat: no     Cough: YES-non-productive, improving over time    Wheeze: no    Decreased Appetite: no    Nausea: no    Vomiting: no    Diarrhea:  no    Dysuria/Freq.: no    Fatigue/Achiness: YES- both    Sick/Strep Exposure: YES- wife has bronchitis      Therapies Tried and outcome: medication from [facility] did not help    Been to [facility] clinic - got some pills.   Cracked tooth - hurting.   Went to dentist - xray - was told to have sinus infection.   Got doxy and astelin nasal spray.   No pcn allergy.     Diabetes Follow-up    How often are you checking your blood sugar? A few times a week  What time of day are you checking your blood sugars (select all that apply)?  After meals  Have you had any blood sugars above 200?  Yes   Have you had any blood sugars below 70?  No    What symptoms do you notice when your blood sugar is low?  None    What concerns do you have today about your diabetes? None     Do you have any of these symptoms? (Select all that apply)  No numbness or tingling in feet.  No redness, sores or blisters on feet.  No complaints of excessive thirst.  No reports of blurry vision.  No significant changes to weight.    Have you had a diabetic eye exam in the last 12 months? Yes- Date of last eye exam: 6 months ago ,  Location: [facility]    BP Readings from Last 2 Encounters:   10/30/19 136/82   11/15/18 159/85     Hemoglobin A1C (%)   Date Value   02/28/2020 7.7 (H)   10/30/2019 8.8 (H)     LDL  "Cholesterol Calculated (mg/dL)   Date Value   10/30/2019 74   2018 59               Hypertension Follow-up      Do you check your blood pressure regularly outside of the clinic? No     Are you following a low salt diet? Yes    Are your blood pressures ever more than 140 on the top number (systolic) OR more   than 90 on the bottom number (diastolic), for example 140/90? No              Social History     Occupational History     Not on file   Tobacco Use     Smoking status: Former Smoker     Packs/day: 3.00     Years: 16.00     Pack years: 48.00     Types: Cigarettes     Last attempt to quit: 1987     Years since quittin.4     Smokeless tobacco: Never Used     Tobacco comment: 17 years ago   Substance and Sexual Activity     Alcohol use: No     Drug use: No     Sexual activity: Yes     Partners: Female     Allergies   Allergen Reactions     No Known Allergies      Patient Active Problem List   Diagnosis     erectile dysfunction     obese bmi over 35     GERD (gastroesophageal reflux disease)     Hypertension goal BP (blood pressure) < 140/90     Advanced directives, counseling/discussion     CARDIOVASCULAR SCREENING; LDL GOAL LESS THAN 100     Type 2 diabetes with circulatory disorder causing erectile dysfunction (H)     Type 2 diabetes mellitus with hyperglycemia (H)     Reviewed medications, social history and  past medical and surgical history.    Review of system: for general, respiratory, CVS, GI and psychiatry negative except for noted above.     EXAM:  /68 (BP Location: Left arm, Patient Position: Sitting, Cuff Size: Adult Large)   Pulse 60   Temp 98  F (36.7  C) (Oral)   Resp 16   Ht 1.778 m (5' 10\")   Wt 135.6 kg (299 lb)   SpO2 99%   BMI 42.90 kg/m    Constitutional: healthy, alert and no distress   Psychiatric: mentation appears normal and affect normal/bright  Cardiovascular: RRR. No murmurs,  Respiratory: negative, Lungs clear. No crackles or wheezing. No tachypnea.   ENT: " Both TM exam normal, both frontal and maxillary sinus tenderness left more than right.  Mild cervical adenopathy       ASSESSMENT / PLAN:  (E11.59,  N52.1) Type 2 diabetes with circulatory disorder causing erectile dysfunction (H)  (primary encounter diagnosis)  Comment: He is technically due to get refills on his medications but according to patient he has enough of Rx.  He will notify me when he is needing to get a refill.  Plan: Hemoglobin A1c, glipiZIDE (GLUCOTROL XL) 10 MG         24 hr tablet             (J01.41) Acute recurrent pansinusitis  Comment: Discussed about side effects and how to use.  Home care discussed.  Use probiotic or yogurt.  Plan: amoxicillin-clavulanate (AUGMENTIN) 875-125 MG         tablet             (I10) Essential hypertension with goal blood pressure less than 140/90  Comment:  Patient is tolerating current medication without any major side effects of concerns and current dose seems reasonable too.  Current medication regime is effective. Continue current treatment without any changes.   Plan: amLODIPine (NORVASC) 10 MG tablet,         atenolol-chlorthalidone (TENORETIC) 100-25 MG         tablet, lisinopril (ZESTRIL) 40 MG tablet,         DISCONTINUED: lisinopril (ZESTRIL) 40 MG tablet             Follow-up in 6 months    The above note was dictated using voice recognition. Although reviewed after completion, some word and grammatical error may remain .

## 2020-02-28 NOTE — PATIENT INSTRUCTIONS
- flonase (fluticasone) nasal spray twice per day.    Lots of water.     Complete course of antibiotics.     Take probiotics or yogurt with antibiotics.     If that does not help -- see ENT for further evaluation.

## 2020-03-17 ENCOUNTER — TELEPHONE (OUTPATIENT)
Dept: FAMILY MEDICINE | Facility: CLINIC | Age: 65
End: 2020-03-17

## 2020-03-17 NOTE — TELEPHONE ENCOUNTER
Left message on machine to call back.  Ask to speak to any triage nurse, let them know it's a return call.    Leave a number and time that you can be reached.   Esther Amanda RN

## 2020-03-17 NOTE — TELEPHONE ENCOUNTER
Reason for Call:  Other prescription- amoxicillin-clavulanate (AUGMENTIN) 875-125 MG tablet     Detailed comments: Patient finished the round of antibiotics & still has symptoms. He's wondering if he can get a refill. Please advise, thank you!     *Pharmacy loaded*    Phone Number Patient can be reached at: Home number on file [phone] (home)    Best Time: anytime    Can we leave a detailed message on this number? YES    Call taken on 3/17/2020 at 8:07 AM by Johanna Man

## 2020-06-29 DIAGNOSIS — K21.9 GASTROESOPHAGEAL REFLUX DISEASE WITHOUT ESOPHAGITIS: ICD-10-CM

## 2020-07-01 ENCOUNTER — TELEPHONE (OUTPATIENT)
Dept: FAMILY MEDICINE | Facility: CLINIC | Age: 65
End: 2020-07-01

## 2020-07-01 DIAGNOSIS — K21.9 GASTROESOPHAGEAL REFLUX DISEASE WITHOUT ESOPHAGITIS: Primary | ICD-10-CM

## 2020-07-01 RX ORDER — FAMOTIDINE 20 MG/1
20 TABLET, FILM COATED ORAL 2 TIMES DAILY
Qty: 180 TABLET | Refills: 1 | Status: SHIPPED | OUTPATIENT
Start: 2020-07-01 | End: 2020-07-09

## 2020-07-01 NOTE — TELEPHONE ENCOUNTER
Pt requesting new Rx: Pt looking for a replacement for ranitidine (ZANTAC) 150 MG tablet.  Please send new Rx.  Thanks.

## 2020-07-09 ENCOUNTER — TELEPHONE (OUTPATIENT)
Dept: FAMILY MEDICINE | Facility: CLINIC | Age: 65
End: 2020-07-09

## 2020-07-09 DIAGNOSIS — K21.9 GASTROESOPHAGEAL REFLUX DISEASE WITHOUT ESOPHAGITIS: Primary | ICD-10-CM

## 2020-07-09 RX ORDER — NIZATIDINE 150 MG/1
150 CAPSULE ORAL 2 TIMES DAILY
Qty: 180 CAPSULE | Refills: 0 | Status: SHIPPED | OUTPATIENT
Start: 2020-07-09 | End: 2020-07-15

## 2020-07-09 NOTE — TELEPHONE ENCOUNTER
Refill Request (famotidine (PEPCID) 20 MG tablet  on backorder requesting alternative)    Please advise.    Thanks! Anastasiia Amato RN

## 2020-07-14 ENCOUNTER — TELEPHONE (OUTPATIENT)
Dept: FAMILY MEDICINE | Facility: CLINIC | Age: 65
End: 2020-07-14

## 2020-07-14 DIAGNOSIS — I10 ESSENTIAL HYPERTENSION WITH GOAL BLOOD PRESSURE LESS THAN 140/90: ICD-10-CM

## 2020-07-14 DIAGNOSIS — K21.9 GASTROESOPHAGEAL REFLUX DISEASE WITHOUT ESOPHAGITIS: ICD-10-CM

## 2020-07-14 NOTE — TELEPHONE ENCOUNTER
Contact pharmacy and find out which H2 blockers are available and reroute message as we have tried all S1kgcyopoo for him except simetidine which is not most ideal.   If no other drug, notify patient and ask him to get OTC famotidine.

## 2020-07-14 NOTE — TELEPHONE ENCOUNTER
This Rx for nizatidine (AXID) 150 MG capsule, famotidine (PEPCID) 20 MG tablet and Cimetidine cannot be ordered.  All are on backorder.  Please put in a new Rx for an alternative.  Thanks

## 2020-07-15 RX ORDER — FAMOTIDINE 20 MG/1
20 TABLET, FILM COATED ORAL 2 TIMES DAILY
Qty: 180 TABLET | Refills: 1 | Status: SHIPPED | OUTPATIENT
Start: 2020-07-15 | End: 2021-04-29

## 2020-07-15 NOTE — TELEPHONE ENCOUNTER
Signed Rx.  In future, selecting medication and selecting pharmacy before routing message would expedite the process.

## 2020-10-23 DIAGNOSIS — N52.1 TYPE 2 DIABETES WITH CIRCULATORY DISORDER CAUSING ERECTILE DYSFUNCTION (H): ICD-10-CM

## 2020-10-23 DIAGNOSIS — I10 ESSENTIAL HYPERTENSION WITH GOAL BLOOD PRESSURE LESS THAN 140/90: ICD-10-CM

## 2020-10-23 DIAGNOSIS — E11.59 TYPE 2 DIABETES WITH CIRCULATORY DISORDER CAUSING ERECTILE DYSFUNCTION (H): ICD-10-CM

## 2020-10-25 NOTE — TELEPHONE ENCOUNTER
Routing refill request to provider for review/approval because:  Labs out of range:    Creatinine   Date Value Ref Range Status   10/30/2019 1.86 (H) 0.66 - 1.25 mg/dL Final

## 2020-10-26 RX ORDER — AMLODIPINE BESYLATE 10 MG/1
TABLET ORAL
Qty: 30 TABLET | Refills: 0 | Status: SHIPPED | OUTPATIENT
Start: 2020-10-26 | End: 2021-02-17

## 2020-10-26 RX ORDER — GLIPIZIDE 10 MG/1
TABLET, FILM COATED, EXTENDED RELEASE ORAL
Qty: 30 TABLET | Refills: 0 | Status: SHIPPED | OUTPATIENT
Start: 2020-10-26 | End: 2021-02-17

## 2020-10-26 RX ORDER — ATENOLOL AND CHLORTHALIDONE TABLET 100; 25 MG/1; MG/1
TABLET ORAL
Qty: 30 TABLET | Refills: 0 | Status: SHIPPED | OUTPATIENT
Start: 2020-10-26 | End: 2021-02-17

## 2021-01-17 DIAGNOSIS — I10 ESSENTIAL HYPERTENSION WITH GOAL BLOOD PRESSURE LESS THAN 140/90: ICD-10-CM

## 2021-01-19 RX ORDER — LISINOPRIL 40 MG/1
TABLET ORAL
Qty: 90 TABLET | Refills: 0 | Status: SHIPPED | OUTPATIENT
Start: 2021-01-19 | End: 2021-04-07

## 2021-02-11 DIAGNOSIS — N52.1 TYPE 2 DIABETES WITH CIRCULATORY DISORDER CAUSING ERECTILE DYSFUNCTION (H): ICD-10-CM

## 2021-02-11 DIAGNOSIS — E11.59 TYPE 2 DIABETES WITH CIRCULATORY DISORDER CAUSING ERECTILE DYSFUNCTION (H): ICD-10-CM

## 2021-02-11 DIAGNOSIS — I10 ESSENTIAL HYPERTENSION WITH GOAL BLOOD PRESSURE LESS THAN 140/90: ICD-10-CM

## 2021-02-17 RX ORDER — AMLODIPINE BESYLATE 10 MG/1
TABLET ORAL
Qty: 14 TABLET | Refills: 0 | Status: SHIPPED | OUTPATIENT
Start: 2021-02-17 | End: 2021-04-07

## 2021-02-17 RX ORDER — ATENOLOL AND CHLORTHALIDONE TABLET 100; 25 MG/1; MG/1
TABLET ORAL
Qty: 14 TABLET | Refills: 0 | Status: SHIPPED | OUTPATIENT
Start: 2021-02-17 | End: 2021-04-07

## 2021-02-17 RX ORDER — GLIPIZIDE 10 MG/1
TABLET, FILM COATED, EXTENDED RELEASE ORAL
Qty: 14 TABLET | Refills: 0 | Status: SHIPPED | OUTPATIENT
Start: 2021-02-17 | End: 2021-04-07

## 2021-02-17 NOTE — TELEPHONE ENCOUNTER
Dr. Zepeda-Please review and sign if agree.  Barbara refills given and patient did not follow up.  Two week supplies pended.  Patient was contacted to schedule office visit on 10/26/20 and stated patient would call back to schedule appt.    Thank you!  ESEQUIEL QuinonesN, RN  [facility]

## 2021-03-30 ENCOUNTER — APPOINTMENT (OUTPATIENT)
Dept: CARDIOLOGY | Facility: CLINIC | Age: 66
End: 2021-03-30
Attending: EMERGENCY MEDICINE
Payer: COMMERCIAL

## 2021-03-30 ENCOUNTER — APPOINTMENT (OUTPATIENT)
Dept: CT IMAGING | Facility: CLINIC | Age: 66
End: 2021-03-30
Attending: EMERGENCY MEDICINE
Payer: COMMERCIAL

## 2021-03-30 ENCOUNTER — HOSPITAL ENCOUNTER (EMERGENCY)
Facility: CLINIC | Age: 66
Discharge: HOME | End: 2021-03-30
Attending: EMERGENCY MEDICINE | Admitting: EMERGENCY MEDICINE
Payer: COMMERCIAL

## 2021-03-30 VITALS
DIASTOLIC BLOOD PRESSURE: 88 MMHG | RESPIRATION RATE: 18 BRPM | HEART RATE: 98 BPM | TEMPERATURE: 98.2 F | OXYGEN SATURATION: 98 % | SYSTOLIC BLOOD PRESSURE: 132 MMHG

## 2021-03-30 DIAGNOSIS — I48.91 NEW ONSET ATRIAL FIBRILLATION (H): ICD-10-CM

## 2021-03-30 DIAGNOSIS — I48.91 ATRIAL FIBRILLATION WITH RVR (H): ICD-10-CM

## 2021-03-30 DIAGNOSIS — N20.1 URETEROLITHIASIS: ICD-10-CM

## 2021-03-30 LAB
ALBUMIN SERPL-MCNC: 3.9 G/DL (ref 3.4–5)
ALBUMIN UR-MCNC: 100 MG/DL
ALP SERPL-CCNC: 68 U/L (ref 40–150)
ALT SERPL W P-5'-P-CCNC: 29 U/L (ref 0–70)
ANION GAP SERPL CALCULATED.3IONS-SCNC: 8 MMOL/L (ref 3–14)
APPEARANCE UR: CLEAR
AST SERPL W P-5'-P-CCNC: 14 U/L (ref 0–45)
BASOPHILS # BLD AUTO: 0.1 10E9/L (ref 0–0.2)
BASOPHILS NFR BLD AUTO: 0.5 %
BILIRUB SERPL-MCNC: 0.7 MG/DL (ref 0.2–1.3)
BILIRUB UR QL STRIP: NEGATIVE
BUN SERPL-MCNC: 39 MG/DL (ref 7–30)
CALCIUM SERPL-MCNC: 9.3 MG/DL (ref 8.5–10.1)
CHLORIDE SERPL-SCNC: 103 MMOL/L (ref 94–109)
CO2 SERPL-SCNC: 23 MMOL/L (ref 20–32)
COLOR UR AUTO: ABNORMAL
CREAT SERPL-MCNC: 1.87 MG/DL (ref 0.66–1.25)
DIFFERENTIAL METHOD BLD: ABNORMAL
EOSINOPHIL # BLD AUTO: 0.1 10E9/L (ref 0–0.7)
EOSINOPHIL NFR BLD AUTO: 0.3 %
ERYTHROCYTE [DISTWIDTH] IN BLOOD BY AUTOMATED COUNT: 14.1 % (ref 10–15)
GFR SERPL CREATININE-BSD FRML MDRD: 37 ML/MIN/{1.73_M2}
GLUCOSE SERPL-MCNC: 311 MG/DL (ref 70–99)
GLUCOSE UR STRIP-MCNC: 200 MG/DL
HCT VFR BLD AUTO: 45.3 % (ref 40–53)
HGB BLD-MCNC: 14.9 G/DL (ref 13.3–17.7)
HGB UR QL STRIP: ABNORMAL
IMM GRANULOCYTES # BLD: 0.1 10E9/L (ref 0–0.4)
IMM GRANULOCYTES NFR BLD: 0.3 %
KETONES UR STRIP-MCNC: NEGATIVE MG/DL
LACTATE BLD-SCNC: 2 MMOL/L (ref 0.7–2)
LEUKOCYTE ESTERASE UR QL STRIP: NEGATIVE
LIPASE SERPL-CCNC: 351 U/L (ref 73–393)
LYMPHOCYTES # BLD AUTO: 1.5 10E9/L (ref 0.8–5.3)
LYMPHOCYTES NFR BLD AUTO: 10 %
MAGNESIUM SERPL-MCNC: 1.7 MG/DL (ref 1.6–2.3)
MCH RBC QN AUTO: 27.5 PG (ref 26.5–33)
MCHC RBC AUTO-ENTMCNC: 32.9 G/DL (ref 31.5–36.5)
MCV RBC AUTO: 84 FL (ref 78–100)
MONOCYTES # BLD AUTO: 0.8 10E9/L (ref 0–1.3)
MONOCYTES NFR BLD AUTO: 5.5 %
MUCOUS THREADS #/AREA URNS LPF: PRESENT /LPF
NEUTROPHILS # BLD AUTO: 12.8 10E9/L (ref 1.6–8.3)
NEUTROPHILS NFR BLD AUTO: 83.4 %
NITRATE UR QL: NEGATIVE
NRBC # BLD AUTO: 0 10*3/UL
NRBC BLD AUTO-RTO: 0 /100
PH UR STRIP: 5.5 PH (ref 5–7)
PLATELET # BLD AUTO: 296 10E9/L (ref 150–450)
POTASSIUM SERPL-SCNC: 4.1 MMOL/L (ref 3.4–5.3)
PROT SERPL-MCNC: 7.7 G/DL (ref 6.8–8.8)
RBC # BLD AUTO: 5.41 10E12/L (ref 4.4–5.9)
RBC #/AREA URNS AUTO: 1 /HPF (ref 0–2)
SODIUM SERPL-SCNC: 134 MMOL/L (ref 133–144)
SOURCE: ABNORMAL
SP GR UR STRIP: 1.02 (ref 1–1.03)
SQUAMOUS #/AREA URNS AUTO: 0 /HPF (ref 0–1)
TROPONIN I SERPL-MCNC: <0.015 UG/L (ref 0–0.04)
TSH SERPL DL<=0.005 MIU/L-ACNC: 0.49 MU/L (ref 0.4–4)
UROBILINOGEN UR STRIP-MCNC: 0 MG/DL (ref 0–2)
WBC # BLD AUTO: 15.3 10E9/L (ref 4–11)
WBC #/AREA URNS AUTO: 1 /HPF (ref 0–5)

## 2021-03-30 PROCEDURE — 96374 THER/PROPH/DIAG INJ IV PUSH: CPT | Mod: 59

## 2021-03-30 PROCEDURE — 250N000013 HC RX MED GY IP 250 OP 250 PS 637: Performed by: EMERGENCY MEDICINE

## 2021-03-30 PROCEDURE — 81001 URINALYSIS AUTO W/SCOPE: CPT | Performed by: EMERGENCY MEDICINE

## 2021-03-30 PROCEDURE — 83690 ASSAY OF LIPASE: CPT | Performed by: EMERGENCY MEDICINE

## 2021-03-30 PROCEDURE — 99285 EMERGENCY DEPT VISIT HI MDM: CPT | Mod: 25

## 2021-03-30 PROCEDURE — 250N000011 HC RX IP 250 OP 636: Performed by: EMERGENCY MEDICINE

## 2021-03-30 PROCEDURE — 258N000003 HC RX IP 258 OP 636: Performed by: EMERGENCY MEDICINE

## 2021-03-30 PROCEDURE — 93242 EXT ECG>48HR<7D RECORDING: CPT

## 2021-03-30 PROCEDURE — 93244 EXT ECG>48HR<7D REV&INTERPJ: CPT | Performed by: INTERNAL MEDICINE

## 2021-03-30 PROCEDURE — 83735 ASSAY OF MAGNESIUM: CPT | Performed by: EMERGENCY MEDICINE

## 2021-03-30 PROCEDURE — 83605 ASSAY OF LACTIC ACID: CPT | Performed by: EMERGENCY MEDICINE

## 2021-03-30 PROCEDURE — 84443 ASSAY THYROID STIM HORMONE: CPT | Performed by: EMERGENCY MEDICINE

## 2021-03-30 PROCEDURE — 96375 TX/PRO/DX INJ NEW DRUG ADDON: CPT

## 2021-03-30 PROCEDURE — 96361 HYDRATE IV INFUSION ADD-ON: CPT

## 2021-03-30 PROCEDURE — 80053 COMPREHEN METABOLIC PANEL: CPT | Performed by: EMERGENCY MEDICINE

## 2021-03-30 PROCEDURE — 250N000009 HC RX 250: Performed by: EMERGENCY MEDICINE

## 2021-03-30 PROCEDURE — 74177 CT ABD & PELVIS W/CONTRAST: CPT

## 2021-03-30 PROCEDURE — 84484 ASSAY OF TROPONIN QUANT: CPT | Performed by: EMERGENCY MEDICINE

## 2021-03-30 PROCEDURE — 93005 ELECTROCARDIOGRAM TRACING: CPT

## 2021-03-30 PROCEDURE — 85025 COMPLETE CBC W/AUTO DIFF WBC: CPT | Performed by: EMERGENCY MEDICINE

## 2021-03-30 RX ORDER — TAMSULOSIN HYDROCHLORIDE 0.4 MG/1
0.4 CAPSULE ORAL DAILY
Qty: 7 CAPSULE | Refills: 0 | Status: SHIPPED | OUTPATIENT
Start: 2021-03-30 | End: 2021-04-06

## 2021-03-30 RX ORDER — IOPAMIDOL 755 MG/ML
135 INJECTION, SOLUTION INTRAVASCULAR ONCE
Status: COMPLETED | OUTPATIENT
Start: 2021-03-30 | End: 2021-03-30

## 2021-03-30 RX ORDER — KETOROLAC TROMETHAMINE 15 MG/ML
15 INJECTION, SOLUTION INTRAMUSCULAR; INTRAVENOUS ONCE
Status: COMPLETED | OUTPATIENT
Start: 2021-03-30 | End: 2021-03-30

## 2021-03-30 RX ORDER — ACETAMINOPHEN 500 MG
1000 TABLET ORAL ONCE
Status: COMPLETED | OUTPATIENT
Start: 2021-03-30 | End: 2021-03-30

## 2021-03-30 RX ORDER — ONDANSETRON 4 MG/1
4 TABLET, ORALLY DISINTEGRATING ORAL EVERY 8 HOURS PRN
Qty: 10 TABLET | Refills: 0 | Status: SHIPPED | OUTPATIENT
Start: 2021-03-30 | End: 2021-04-29

## 2021-03-30 RX ORDER — DILTIAZEM HYDROCHLORIDE 5 MG/ML
25 INJECTION INTRAVENOUS ONCE
Status: COMPLETED | OUTPATIENT
Start: 2021-03-30 | End: 2021-03-30

## 2021-03-30 RX ORDER — ONDANSETRON 2 MG/ML
4 INJECTION INTRAMUSCULAR; INTRAVENOUS ONCE
Status: COMPLETED | OUTPATIENT
Start: 2021-03-30 | End: 2021-03-30

## 2021-03-30 RX ADMIN — ONDANSETRON 4 MG: 2 INJECTION INTRAMUSCULAR; INTRAVENOUS at 09:56

## 2021-03-30 RX ADMIN — KETOROLAC TROMETHAMINE 15 MG: 15 INJECTION, SOLUTION INTRAMUSCULAR; INTRAVENOUS at 09:56

## 2021-03-30 RX ADMIN — SODIUM CHLORIDE 1000 ML: 9 INJECTION, SOLUTION INTRAVENOUS at 09:55

## 2021-03-30 RX ADMIN — DILTIAZEM HYDROCHLORIDE 25 MG: 5 INJECTION INTRAVENOUS at 11:30

## 2021-03-30 RX ADMIN — ACETAMINOPHEN 1000 MG: 500 TABLET, FILM COATED ORAL at 09:56

## 2021-03-30 RX ADMIN — SODIUM CHLORIDE 79 ML: 9 INJECTION, SOLUTION INTRAVENOUS at 10:28

## 2021-03-30 RX ADMIN — IOPAMIDOL 135 ML: 755 INJECTION, SOLUTION INTRAVENOUS at 10:28

## 2021-03-30 ASSESSMENT — ENCOUNTER SYMPTOMS
ABDOMINAL PAIN: 1
BLOOD IN STOOL: 0
FEVER: 0
PALPITATIONS: 0
NAUSEA: 1
VOMITING: 0
DYSURIA: 0
DIARRHEA: 0

## 2021-03-30 NOTE — DISCHARGE INSTRUCTIONS
For kidney stone:  Your kidney stone will likely pass on its own. To help, take Flomax until it has passed. Strain your urine to look for stone.  Use Tylenol for pain.  Use Zofran as needed for nausea or vomiting.  Drink lots of water.  Limit caffeine.  Follow up with urology.  Return to the ER with uncontrolled pain, fever >100.4F, decreased urine output, or any other new or concerning symptoms.     For atrial fibrillation (arrhythmia):   Start the blood thinner.  If you hit your head you can have bleeding inside your brain so you need to return to the emergency department.  It will also take you longer to stop bleeding if you cut yourself and if you cannot get it to stop or have uncontrolled nosebleeds or black or bloody stools, you need to return to the emergency department.  Limit aspirin and NSAIDs while on blood thinner.  Return the heart monitor as instructed.  Call cardiology today or tomorrow to arrange follow-up with an EP physician to discuss this arrhythmia.

## 2021-03-30 NOTE — ED PROVIDER NOTES
History   Chief Complaint:  Abdominal Pain       The history is provided by the patient.      Booker Brown is a 66 year old man with history of NIDDM, hypertension, and HLD who presents with lower abdominal pain. This pain started suddenly about 10 hours prior to arrival and has been constant, currently 7/10 in intenstiy. He has had nausea without vomiting. Resting supine makes the pain worse and it was not improved with Pepto Bismol or Gas-X. He had a normal bowel movement since onset of pain. He denies fever or dysuria and has never had abdominal surgery. He had a colonoscopy less than 2 years ago which he reports showed some colon polyps.    Booker denies chest pain, shortness of breath, or palpitations and has no history of atrial fibrillation.     Review of Systems   Constitutional: Negative for fever.   Respiratory: Negative for shortness of breath.    Cardiovascular: Negative for chest pain and palpitations.   Gastrointestinal: Positive for abdominal pain and nausea. Negative for blood in stool, constipation, diarrhea and vomiting.   Genitourinary: Negative for dysuria.   All other systems reviewed and are negative.    Allergies:  The patient has no known allergies.     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  No smoking or alcohol use    Physical Exam     Patient Vitals for the past 24 hrs:   BP Temp Temp src Pulse Resp SpO2   03/30/21 1400 -- -- -- 98 18 --   03/30/21 1330 132/88 -- -- 92 24 --   03/30/21 1300 -- -- -- 84 14 --   03/30/21 1230 134/89 -- -- 92 15 --   03/30/21 1215 129/82 -- -- 86 19 --   03/30/21 1200 109/85 -- -- 100 23 --   03/30/21 1145 125/79 -- -- 93 16 --   03/30/21 1130 (!) 144/101  -- -- 117 12 --   03/30/21 1100 139/74 -- -- 113 9 --   03/30/21 1000 (!) 138/113 -- -- 128 16 --   03/30/21 0930 (!) 164/111 -- -- 139 -- 98 %   03/30/21 0928 -- 98.2  F (36.8  C) Oral -- 20 91 %   03/30/21 0927 (!) 190/114 -- -- 136 -- --       Physical Exam  General: Well-developed and well-nourished. Well appearing middle aged  man. Cooperative.  Head:  Atraumatic.  Eyes:  Conjunctivae, lids, and sclerae are normal.  Neck:  Supple. Normal range of motion.  CV:  Tachycardic rate and irregularly irregular rhythm. Normal heart sounds with no murmurs, rubs, or gallops detected.  Resp:  No respiratory distress. Clear to auscultation bilaterally without decreased breath sounds, wheezing, rales, or rhonchi.  GI:  Soft. Non-distended.  Mild left lower quadrant tenderness.    MS:  Normal ROM.   Skin:  Warm. Non-diaphoretic. No pallor.  Neuro:  Awake. A&Ox3. Normal strength.  Psych: Normal mood and affect. Normal speech.  Vitals reviewed.    Emergency Department Course   EKG  Indication: Atrial fibrillation  Time: 1053  Rate 127 bpm. QRS duration 100. QT/QTc 346/502.   Atrial fibrillation with rapid ventricular response. Cannot rule out anterior infarct, age undetermined. Abnormal ECG.  No acute ST changes.  No previous for comparison.    Imaging:  CT Abdomen/Pelvis with IV contrast:   1.  Mild left hydronephrosis and hydroureter secondary to 2 mm  calculus in the distal left ureter near the ureterovesical junction.  2.  Relatively atrophic left kidney with delayed left nephrogram. This  could be related to obstructive uropathy or secondary to high-grade  left main renal artery stenosis at the origin.  3.  Bilateral calcified pleural plaques compatible with prior asbestos  exposure. There is a trace right pleural effusion. As per radiology.    Laboratory:   CBC: WBC: 15.3 (H), HGB: 14.9, PLT: 296    CMP: Glucose 311 (H), Urea Nitrogen: 39 (H), Creatinine: 1.87 (H), GFR: 37 (L), o/w WNL     UA: Glucose: 200 (A),  Blood: Small (A), Protein Albumin: 100 (A), Mucous: Present, o/w Negative    Troponin (Collected 0952): <0.015    Lactic acid (Resulted 1013): 2.0    Lipase: 351    TSH: 0.49    Magnesium: 1.7    Emergency Department Course:    Reviewed:  I reviewed nursing notes, vitals, and past medical history.    Assessments:  0931 I obtained history and examined the patient as noted above.     1105 I rechecked the patient and explained findings. He feels improved. Understands that he needs to follow-up regarding his new on-set atrial fibrillation.     1312 Rechecked the patient. Feels really well and comfortable with discharge.     1344 Rechecked patient after Zio Patch placement. Ready for discharge.     Consults:   1255 Spoke with Dr. Rico, cardiologist, regarding new onset atrial fibrillation.    Interventions:  0955 NS 1L IV  0956 Tylenol 1000 mg PO  0956 Zofran 4 mg IV  0956 Toradol 15 mg IV  1130 Cardizem 25 mg IV    Disposition:  The patient was discharged home.     AJDNV-5-Mkef Score  (calculator)  Background  Calculates overall risk of atrial fibrillation related CVA based on 7 factors: Age>=65-75, CHF, Htn, CVA/TIA, DM, vascular disease, female  Data  66 year old year old adult  Erectile dysfunction; obese bmi over 35; GERD (gastroesophageal reflux disease); Hypertension goal BP (blood pressure) < 140/90; CARDIOVASCULAR SCREENING; LDL GOAL LESS THAN 100; Type 2 diabetes with circulatory disorder causing erectile dysfunction (H); and Type 2 diabetes mellitus with hyperglycemia (H)   Criteria   Of possible 6 points for 5 possible items  1 point: Hypertension  1 point: Diabetes Mellitus    Interpretation  EDZMR-0-Glvv Score: 2  CHADS Score 2+ (CVA risk >5% per year): Warfarin with goal INR 2.0 to 3.0    Impression & Plan     Medical Decision Making:  Booker is a 66-year-old man who developed lower abdominal pain and nausea about 10 hours prior to arrival.  He denies other concerns and appears well on exam.  He does  have some mild tenderness in the left lower quadrant.  He was also incidentally found to be tachycardic with an irregularly irregular rate.  Regarding his presenting complaint, CT reveals a 2mm left ureterolithiasis near the UVJ and relatively atrophic left kidney with delayed left nephrogram possibly related to obstructive uropathy or high-grade left main renal artery stenosis.  Interestingly, urinalysis is without evidence of microscopic hematuria.  There is no evidence of UTI despite leukocytosis to 15.3 which is likely stress demarginalization.  Creatinine of 1.87 appears to be his baseline.  He has hyperglycemia to 311 without evidence of DKA.  There is no pancreatitis, hepatitis, or biliary obstruction.  He felt much improved with Toradol, Zofran, Tylenol, and IV fluids.  Regarding the kidney stone, I recommended he take Flomax and strain his urine until the stone has passed.  He will use Tylenol for pain with Zofran as needed for nausea and vomiting.  I recommended he follow-up with urology, particularly given the relatively atrophic left kidney and chronic kidney disease, though we did discuss return precautions for his kidney stone.    Regarding Booker's tachycardia with an irregularly irregular rhythm, an EKG was obtained which revealed atrial fibrillation with RVR.  Review of medical records and in discussion with Booker, this is noted to be a first-time atrial fibrillation.  As above, there are no significant electrolyte derangements.  Troponin is undetectable.  TSH is normal.  He was given Cardizem and had resolution of RVR although he remained in atrial fibrillation.  He was able to ambulate in the emergency department and did not become tachycardic again with heart rates typically in the 90s beat per minute range.  I discussed his case with Dr. Rico, cardiology, who agrees that Booker is appropriate for discharge with outpatient work-up of his new atrial fibrillation with RVR.  She requested a  Zio patch be placed prior to his discharge, which was completed.  We did discuss possibility of diltiazem or metoprolol as an outpatient although review of his most recent visit in 2020 indicates his heart rate was only 60 bpm at that time.  We elected not to change his antihypertensives (he is on atenolol) such that he does not become bradycardic should he convert to sinus rhythm.  He has a XHZLT-0-Laan score of 2 and Dr. Rico agreed with initiation of anticoagulants until he can be seen by electrophysiology.  I discussed this at length with Booker and he agrees to follow-up closely with electrophysiology and start Xarelto.  We discussed the use of blood thinners and bleeding risk/return precautions thereof.  All of Booker's questions were answered and he verbalized understanding.  He is amenable to discharge.    Diagnosis:    ICD-10-CM    1. Ureterolithiasis  N20.1    2. New onset atrial fibrillation (H)  I48.91    3. Atrial fibrillation with RVR (H)  I48.91        Discharge Medications:  New Prescriptions    ONDANSETRON (ZOFRAN ODT) 4 MG ODT TAB    Take 1 tablet (4 mg) by mouth every 8 hours as needed for nausea    RIVAROXABAN ANTICOAGULANT (XARELTO ANTICOAGULANT) 20 MG TABS TABLET    Take 1 tablet (20 mg) by mouth daily (with dinner)    TAMSULOSIN (FLOMAX) 0.4 MG CAPSULE    Take 1 capsule (0.4 mg) by mouth daily for 7 days       Scribe Disclosure:  Arthur LOVE, am serving as a scribe at 9:31 AM on 3/30/2021 to document services personally performed by Suha Gonzalez MD based on my observations and the provider's statements to me.            Suha Gonzalez MD  03/31/21 0666

## 2021-03-31 LAB — INTERPRETATION ECG - MUSE: NORMAL

## 2021-03-31 ASSESSMENT — ENCOUNTER SYMPTOMS
CONSTIPATION: 0
SHORTNESS OF BREATH: 0

## 2021-04-07 ENCOUNTER — VIRTUAL VISIT (OUTPATIENT)
Dept: INTERNAL MEDICINE | Facility: CLINIC | Age: 66
End: 2021-04-07
Payer: COMMERCIAL

## 2021-04-07 DIAGNOSIS — E11.59 TYPE 2 DIABETES WITH CIRCULATORY DISORDER CAUSING ERECTILE DYSFUNCTION (H): ICD-10-CM

## 2021-04-07 DIAGNOSIS — E78.5 HYPERLIPIDEMIA LDL GOAL <70: Primary | ICD-10-CM

## 2021-04-07 DIAGNOSIS — I10 ESSENTIAL HYPERTENSION WITH GOAL BLOOD PRESSURE LESS THAN 140/90: ICD-10-CM

## 2021-04-07 DIAGNOSIS — N52.1 TYPE 2 DIABETES WITH CIRCULATORY DISORDER CAUSING ERECTILE DYSFUNCTION (H): ICD-10-CM

## 2021-04-07 PROCEDURE — 99214 OFFICE O/P EST MOD 30 MIN: CPT | Mod: TEL | Performed by: FAMILY MEDICINE

## 2021-04-07 RX ORDER — GLIPIZIDE 10 MG/1
10 TABLET, FILM COATED, EXTENDED RELEASE ORAL DAILY
Qty: 90 TABLET | Refills: 0 | Status: SHIPPED | OUTPATIENT
Start: 2021-04-07 | End: 2021-07-14

## 2021-04-07 RX ORDER — LISINOPRIL 40 MG/1
40 TABLET ORAL DAILY
Qty: 90 TABLET | Refills: 0 | Status: SHIPPED | OUTPATIENT
Start: 2021-04-07 | End: 2022-03-09

## 2021-04-07 RX ORDER — ATENOLOL AND CHLORTHALIDONE TABLET 100; 25 MG/1; MG/1
1 TABLET ORAL DAILY
Qty: 90 TABLET | Refills: 0 | Status: SHIPPED | OUTPATIENT
Start: 2021-04-07 | End: 2021-05-28

## 2021-04-07 RX ORDER — AMLODIPINE BESYLATE 10 MG/1
10 TABLET ORAL DAILY
Qty: 90 TABLET | Refills: 0 | Status: SHIPPED | OUTPATIENT
Start: 2021-04-07 | End: 2021-07-14

## 2021-04-07 RX ORDER — METFORMIN HCL 500 MG
2000 TABLET, EXTENDED RELEASE 24 HR ORAL
Qty: 360 TABLET | Refills: 0 | Status: SHIPPED | OUTPATIENT
Start: 2021-04-07 | End: 2022-05-03

## 2021-04-07 RX ORDER — ATORVASTATIN CALCIUM 10 MG/1
10 TABLET, FILM COATED ORAL DAILY
Qty: 90 TABLET | Refills: 0 | Status: SHIPPED | OUTPATIENT
Start: 2021-04-07 | End: 2021-07-14

## 2021-04-07 NOTE — PROGRESS NOTES
Booker is a 66 year old who is being evaluated via a billable telephone visit.      What phone number would you like to be contacted at? [phone]  How would you like to obtain your AVS? MyChart    Assessment & Plan     Essential hypertension with goal blood pressure less than 140/90    - lisinopril (ZESTRIL) 40 MG tablet; Take 1 tablet (40 mg) by mouth daily  - amLODIPine (NORVASC) 10 MG tablet; Take 1 tablet (10 mg) by mouth daily  - atenolol-chlorthalidone (TENORETIC) 100-25 MG tablet; Take 1 tablet by mouth daily  - Basic metabolic panel; Future  - CBC with platelets; Future  - Prostate spec antigen screen; Future    Type 2 diabetes with circulatory disorder causing erectile dysfunction (H)    - atorvastatin (LIPITOR) 10 MG tablet; Take 1 tablet (10 mg) by mouth daily LAST REFILL NEEDS office VISIT AND LABS  - glipiZIDE (GLUCOTROL XL) 10 MG 24 hr tablet; Take 1 tablet (10 mg) by mouth daily  - metFORMIN (GLUCOPHAGE-XR) 500 MG 24 hr tablet; Take 4 tablets (2,000 mg) by mouth daily (with dinner)  - Hemoglobin A1c; Future  - TSH with free T4 reflex; Future    Hyperlipidemia LDL goal <70    - ALT; Future      25 minutes spent on the date of the encounter doing chart review, history and exam, documentation and further activities per the note       There are no Patient Instructions on file for this visit.    Return in about 3 months (around 7/7/2021) for preventive visit, diabetes, labs.    Trenton Angulo MD  [facility]   Booker is a 66 year old who presents for the following health issues     HPI     Diabetes Follow-up    How often are you checking your blood sugar? A few times a month  What time of day are you checking your blood sugars (select all that apply)?  After meals  Have you had any blood sugars above 200?  Yes   Have you had any blood sugars below 70?  No    What symptoms do you notice when your blood sugar is low?  None    What concerns do you  have today about your diabetes? None     Do you have any of these symptoms? (Select all that apply)  Numbness in feet    Have you had a diabetic eye exam in the last 12 months? No        BP Readings from Last 2 Encounters:   03/30/21 132/88   02/28/20 116/68     Hemoglobin A1C (%)   Date Value   02/28/2020 7.7 (H)   10/30/2019 8.8 (H)     LDL Cholesterol Calculated (mg/dL)   Date Value   10/30/2019 74   06/13/2018 59               Hypertension Follow-up      Do you check your blood pressure regularly outside of the clinic? No     Are you following a low salt diet? Yes    Are your blood pressures ever more than 140 on the top number (systolic) OR more   than 90 on the bottom number (diastolic), for example 140/90? No      How many servings of fruits and vegetables do you eat daily?  2-3    On average, how many sweetened beverages do you drink each day (Examples: soda, juice, sweet tea, etc.  Do NOT count diet or artificially sweetened beverages)?   0    How many days per week do you exercise enough to make your heart beat faster? 6    How many minutes a day do you exercise enough to make your heart beat faster? 30 - 60     How many days per week do you miss taking your medication? 1    What makes it hard for you to take your medications?  currently out of some medications    Hyperlipidemia Follow-Up      Are you regularly taking any medication or supplement to lower your cholesterol?   Yes- statin    Are you having muscle aches or other side effects that you think could be caused by your cholesterol lowering medication?  No    Review of Systems   Constitutional, HEENT, cardiovascular, pulmonary, gi and gu systems are negative, except as otherwise noted.      Objective           Vitals:  No vitals were obtained today due to virtual visit.    Physical Exam   healthy, alert and no distress  PSYCH: Alert and oriented times 3; coherent speech, normal   rate and volume, able to articulate logical thoughts, able   to  abstract reason, no tangential thoughts, no hallucinations   or delusions  His affect is normal  RESP: No cough, no audible wheezing, able to talk in full sentences  Remainder of exam unable to be completed due to telephone visits                Phone call duration: 20 minutes

## 2021-04-29 ENCOUNTER — OFFICE VISIT (OUTPATIENT)
Dept: CARDIOLOGY | Facility: CLINIC | Age: 66
End: 2021-04-29
Payer: COMMERCIAL

## 2021-04-29 VITALS
HEART RATE: 90 BPM | OXYGEN SATURATION: 96 % | BODY MASS INDEX: 41.5 KG/M2 | HEIGHT: 70 IN | WEIGHT: 289.9 LBS | DIASTOLIC BLOOD PRESSURE: 78 MMHG | SYSTOLIC BLOOD PRESSURE: 136 MMHG

## 2021-04-29 DIAGNOSIS — I10 HYPERTENSION GOAL BP (BLOOD PRESSURE) < 140/90: Primary | ICD-10-CM

## 2021-04-29 PROCEDURE — 93000 ELECTROCARDIOGRAM COMPLETE: CPT | Performed by: INTERNAL MEDICINE

## 2021-04-29 PROCEDURE — 99204 OFFICE O/P NEW MOD 45 MIN: CPT | Performed by: INTERNAL MEDICINE

## 2021-04-29 ASSESSMENT — MIFFLIN-ST. JEOR: SCORE: 2111.23

## 2021-04-29 NOTE — PROGRESS NOTES
Electrophysiology/ Clinic Note         H&P and Plan:     REASON FOR VISIT: Electrophysiology evaluation.      HISTORY OF PRESENT ILLNESS: This is a pleasant 66-year-old gentleman with a history of hypertension, hyperlipidemia, chronic kidney disease and diabetes, who is here for evaluation of atrial fibrillation.    He was recent evaluated in the ED with abdominal pain (3/30/2021) and was found to have kidney stones.  In the ED, he was found to be in A. fib with RVR (which was a new diagnosis for him).  He was treated with 2000 which control his heart rate and he was started on Xarelto.      Today, he informs he is doing well.  He continues to be in A. fib, however he appears to be completely asymptomatic with atrial fibrillation.    Denies any symptoms of chest pain, shortness of breath, palpitations, lightheadedness, near-syncope or syncope.    Previous EKG shows atrial fibrillation.    PREVIOUS STUDIES (personally reviewed):  -ZIO Patch monitor (3/30/2021): Persist atrial fibrillation with average heart rate of 110 bpm.      ASSESSMENT AND PLAN:   1.  Persistent atrial fibrillation.  Patient seems to be asymptomatic with A. fib and the duration of atrial fibrillation is unknown at this time.  Heart rate was mildly evaded on monitor.    We discussed options including continue rate control strategy or pursue a cardioversion.  Since this is the first time that he has diagnosis of A. fib, I favor an attempt of cardioversion. Additionally, he confessed that he has not been very compliant with Xarelto.  Therefore, I favor VIVEK/cardioversion.    I was explained procedure in details and he understands that his 1% risk of cases here procedure.  At this time, he will like to think about it.  He will contact our office in 1-2 weeks to let us know if you like to have VIVEK cardioversion.    The meantime, I recommend obtaining an echocardiogram and continue current therapy with atenolol.     2.  Embolic prevention.  CHADS-VASc  "score of 3.  Continue anticoagulation with Eliquis indefinitely.       Kj Mejias MD    Physical Exam:  Vitals: /78   Pulse 90   Ht 1.778 m (5' 10\")   Wt 131.5 kg (289 lb 14.4 oz)   SpO2 96%   BMI 41.60 kg/m      Constitutional:  AAO x3.  Pt is in NAD.  HEAD: normocephalic.  SKIN: Skin normal color, texture and turgor with no lesions or eruptions.  Eyes: PERRL, EOMI.  ENT:  Supple, normal JVP. No lymphadenopathy or thyroid enlargement.  Chest:  CTAB.  Cardiac:  RRR, normal  S1 and S2.  No murmurs rubs or gallop.   Abdomen:  Normal BS.  Soft, non-tender and non-distended.  No rebound or guarding.   Extremities:  Pedious pulses palpable B/L. LE edema noticed (2+).   Neurological: Strength and sensation grossly symmetric and intact throughout.       CURRENT MEDICATIONS:  Current Outpatient Medications   Medication Sig Dispense Refill     alcohol swab prep pads Use to swab area of injection/fariha as directed. 100 each 3     amLODIPine (NORVASC) 10 MG tablet Take 1 tablet (10 mg) by mouth daily 90 tablet 0     atenolol-chlorthalidone (TENORETIC) 100-25 MG tablet Take 1 tablet by mouth daily 90 tablet 0     atorvastatin (LIPITOR) 10 MG tablet Take 1 tablet (10 mg) by mouth daily LAST REFILL NEEDS office VISIT AND LABS 90 tablet 0     blood glucose (NO BRAND SPECIFIED) test strip Use to test blood sugar 1 times daily or as directed. To accompany: Blood Glucose Monitor Brands: per insurance. 100 strip 6     blood glucose calibration (NO BRAND SPECIFIED) solution To accompany: Blood Glucose Monitor Brands: per insurance. 1 Bottle 3     blood glucose monitoring (NO BRAND SPECIFIED) meter device kit Use to test blood sugar 1 times daily or as directed. : per insurance. 1 kit 0     famotidine (PEPCID) 20 MG tablet Take 1 tablet (20 mg) by mouth 2 times daily 180 tablet 1     glipiZIDE (GLUCOTROL XL) 10 MG 24 hr tablet Take 1 tablet (10 mg) by mouth daily 90 tablet 0     lisinopril (ZESTRIL) 40 MG tablet Take 1 " tablet (40 mg) by mouth daily 90 tablet 0     metFORMIN (GLUCOPHAGE-XR) 500 MG 24 hr tablet Take 4 tablets (2,000 mg) by mouth daily (with dinner) 360 tablet 0     ondansetron (ZOFRAN ODT) 4 MG ODT tab Take 1 tablet (4 mg) by mouth every 8 hours as needed for nausea 10 tablet 0     rivaroxaban ANTICOAGULANT (XARELTO ANTICOAGULANT) 20 MG TABS tablet Take 1 tablet (20 mg) by mouth daily (with dinner) 30 tablet 0     thin (NO BRAND SPECIFIED) lancets Use with lanceting device. To accompany: Blood Glucose Monitor Brands: per insurance. 1 each 6       ALLERGIES     Allergies   Allergen Reactions     No Known Allergies        PAST MEDICAL HISTORY:  Past Medical History:   Diagnosis Date     Diabetes (H)      HTN      Hyperlipidemia        PAST SURGICAL HISTORY:  Past Surgical History:   Procedure Laterality Date     COLONOSCOPY N/A 2018    Procedure: COMBINED COLONOSCOPY, SINGLE OR MULTIPLE BIOPSY/POLYPECTOMY BY BIOPSY;;  Surgeon: Lawrence Mata MD;  Location: [facility]       FAMILY HISTORY:  Family History   Problem Relation Age of Onset     Diabetes Mother         dx at 70       SOCIAL HISTORY:  Social History     Socioeconomic History     Marital status:      Spouse name: Not on file     Number of children: Not on file     Years of education: Not on file     Highest education level: Not on file   Occupational History     Not on file   Social Needs     Financial resource strain: Not on file     Food insecurity     Worry: Not on file     Inability: Not on file     Transportation needs     Medical: Not on file     Non-medical: Not on file   Tobacco Use     Smoking status: Former Smoker     Packs/day: 3.00     Years: 16.00     Pack years: 48.00     Types: Cigarettes     Quit date: 1987     Years since quittin.6     Smokeless tobacco: Never Used     Tobacco comment: 17 years ago   Substance and Sexual Activity     Alcohol use: No     Drug use: No     Sexual activity: Yes     Partners: Female   Lifestyle      Physical activity     Days per week: Not on file     Minutes per session: Not on file     Stress: Not on file   Relationships     Social connections     Talks on phone: Not on file     Gets together: Not on file     Attends Oriental orthodox service: Not on file     Active member of club or organization: Not on file     Attends meetings of clubs or organizations: Not on file     Relationship status: Not on file     Intimate partner violence     Fear of current or ex partner: Not on file     Emotionally abused: Not on file     Physically abused: Not on file     Forced sexual activity: Not on file   Other Topics Concern     Parent/sibling w/ CABG, MI or angioplasty before 65F 55M? Yes      Service No     Blood Transfusions No     Caffeine Concern No     Occupational Exposure No     Hobby Hazards No     Sleep Concern No     Stress Concern No     Weight Concern Yes     Special Diet No     Back Care No     Exercise Yes     Bike Helmet No     Comment: n/a     Seat Belt Yes     Self-Exams No   Social History Narrative     Not on file       Review of Systems:  Skin:        Eyes:       ENT:       Respiratory:       Cardiovascular:       Gastroenterology:      Genitourinary:       Musculoskeletal:       Neurologic:       Psychiatric:       Heme/Lymph/Imm:       Endocrine:           Recent Lab Results:  LIPID RESULTS:  Lab Results   Component Value Date    CHOL 163 10/30/2019    HDL 31 (L) 10/30/2019    LDL 74 10/30/2019    TRIG 290 (H) 10/30/2019    CHOLHDLRATIO 4.5 09/28/2015       LIVER ENZYME RESULTS:  Lab Results   Component Value Date    AST 14 03/30/2021    ALT 29 03/30/2021       CBC RESULTS:  Lab Results   Component Value Date    WBC 15.3 (H) 03/30/2021    RBC 5.41 03/30/2021    HGB 14.9 03/30/2021    HCT 45.3 03/30/2021    MCV 84 03/30/2021    MCH 27.5 03/30/2021    MCHC 32.9 03/30/2021    RDW 14.1 03/30/2021     03/30/2021       BMP RESULTS:  Lab Results   Component Value Date     03/30/2021     POTASSIUM 4.1 03/30/2021    CHLORIDE 103 03/30/2021    CO2 23 03/30/2021    ANIONGAP 8 03/30/2021     (H) 03/30/2021    BUN 39 (H) 03/30/2021    CR 1.87 (H) 03/30/2021    GFRESTIMATED 37 (L) 03/30/2021    GFRESTBLACK 43 (L) 03/30/2021    KURT 9.3 03/30/2021        A1C RESULTS:  Lab Results   Component Value Date    A1C 7.7 (H) 02/28/2020       INR RESULTS:  No results found for: INR      ECHOCARDIOGRAM  No results found for this or any previous visit (from the past 8760 hour(s)).      No orders of the defined types were placed in this encounter.    No orders of the defined types were placed in this encounter.    There are no discontinued medications.      No diagnosis found.      CC  No referring provider defined for this encounter.

## 2021-04-29 NOTE — LETTER
4/29/2021    Gage Zepeda MD, MD  [address] [place] [id number]    RE: Booker MICHELLE Harvinderchuck       Dear Colleague,    I had the pleasure of seeing Booker Brown in the [facility].    Electrophysiology/ Clinic Note         H&P and Plan:     REASON FOR VISIT: Electrophysiology evaluation.      HISTORY OF PRESENT ILLNESS: This is a pleasant 66-year-old gentleman with a history of hypertension, hyperlipidemia, chronic kidney disease and diabetes, who is here for evaluation of atrial fibrillation.    He was recent evaluated in the ED with abdominal pain (3/30/2021) and was found to have kidney stones.  In the ED, he was found to be in A. fib with RVR (which was a new diagnosis for him).  He was treated with 2000 which control his heart rate and he was started on Xarelto.      Today, he informs he is doing well.  He continues to be in A. fib, however he appears to be completely asymptomatic with atrial fibrillation.    Denies any symptoms of chest pain, shortness of breath, palpitations, lightheadedness, near-syncope or syncope.    Previous EKG shows atrial fibrillation.    PREVIOUS STUDIES (personally reviewed):  -ZIO Patch monitor (3/30/2021): Persist atrial fibrillation with average heart rate of 110 bpm.      ASSESSMENT AND PLAN:   1.  Persistent atrial fibrillation.  Patient seems to be asymptomatic with A. fib and the duration of atrial fibrillation is unknown at this time.  Heart rate was mildly evaded on monitor.    We discussed options including continue rate control strategy or pursue a cardioversion.  Since this is the first time that he has diagnosis of A. fib, I favor an attempt of cardioversion. Additionally, he confessed that he has not been very compliant with Xarelto.  Therefore, I favor VIVEK/cardioversion.    I was explained procedure in details and he understands that his 1% risk of cases here procedure.  At this time, he will  "like to think about it.  He will contact our office in 1-2 weeks to let us know if you like to have VIVEK cardioversion.    The meantime, I recommend obtaining an echocardiogram and continue current therapy with atenolol.     2.  Embolic prevention.  CHADS-VASc score of 3.  Continue anticoagulation with Eliquis indefinitely.       Kj Mejias MD    Physical Exam:  Vitals: /78   Pulse 90   Ht 1.778 m (5' 10\")   Wt 131.5 kg (289 lb 14.4 oz)   SpO2 96%   BMI 41.60 kg/m      Constitutional:  AAO x3.  Pt is in NAD.  HEAD: normocephalic.  SKIN: Skin normal color, texture and turgor with no lesions or eruptions.  Eyes: PERRL, EOMI.  ENT:  Supple, normal JVP. No lymphadenopathy or thyroid enlargement.  Chest:  CTAB.  Cardiac:  RRR, normal  S1 and S2.  No murmurs rubs or gallop.   Abdomen:  Normal BS.  Soft, non-tender and non-distended.  No rebound or guarding.   Extremities:  Pedious pulses palpable B/L. LE edema noticed (2+).   Neurological: Strength and sensation grossly symmetric and intact throughout.       CURRENT MEDICATIONS:  Current Outpatient Medications   Medication Sig Dispense Refill     alcohol swab prep pads Use to swab area of injection/fariha as directed. 100 each 3     amLODIPine (NORVASC) 10 MG tablet Take 1 tablet (10 mg) by mouth daily 90 tablet 0     atenolol-chlorthalidone (TENORETIC) 100-25 MG tablet Take 1 tablet by mouth daily 90 tablet 0     atorvastatin (LIPITOR) 10 MG tablet Take 1 tablet (10 mg) by mouth daily LAST REFILL NEEDS office VISIT AND LABS 90 tablet 0     blood glucose (NO BRAND SPECIFIED) test strip Use to test blood sugar 1 times daily or as directed. To accompany: Blood Glucose Monitor Brands: per insurance. 100 strip 6     blood glucose calibration (NO BRAND SPECIFIED) solution To accompany: Blood Glucose Monitor Brands: per insurance. 1 Bottle 3     blood glucose monitoring (NO BRAND SPECIFIED) meter device kit Use to test blood sugar 1 times daily or as directed. : " per insurance. 1 kit 0     famotidine (PEPCID) 20 MG tablet Take 1 tablet (20 mg) by mouth 2 times daily 180 tablet 1     glipiZIDE (GLUCOTROL XL) 10 MG 24 hr tablet Take 1 tablet (10 mg) by mouth daily 90 tablet 0     lisinopril (ZESTRIL) 40 MG tablet Take 1 tablet (40 mg) by mouth daily 90 tablet 0     metFORMIN (GLUCOPHAGE-XR) 500 MG 24 hr tablet Take 4 tablets (2,000 mg) by mouth daily (with dinner) 360 tablet 0     ondansetron (ZOFRAN ODT) 4 MG ODT tab Take 1 tablet (4 mg) by mouth every 8 hours as needed for nausea 10 tablet 0     rivaroxaban ANTICOAGULANT (XARELTO ANTICOAGULANT) 20 MG TABS tablet Take 1 tablet (20 mg) by mouth daily (with dinner) 30 tablet 0     thin (NO BRAND SPECIFIED) lancets Use with lanceting device. To accompany: Blood Glucose Monitor Brands: per insurance. 1 each 6       ALLERGIES     Allergies   Allergen Reactions     No Known Allergies        PAST MEDICAL HISTORY:  Past Medical History:   Diagnosis Date     Diabetes (H)      HTN      Hyperlipidemia        PAST SURGICAL HISTORY:  Past Surgical History:   Procedure Laterality Date     COLONOSCOPY N/A 8/24/2018    Procedure: COMBINED COLONOSCOPY, SINGLE OR MULTIPLE BIOPSY/POLYPECTOMY BY BIOPSY;;  Surgeon: Lawrence Mata MD;  Location:  GI       FAMILY HISTORY:  Family History   Problem Relation Age of Onset     Diabetes Mother         dx at 70       SOCIAL HISTORY:  Social History     Socioeconomic History     Marital status:      Spouse name: Not on file     Number of children: Not on file     Years of education: Not on file     Highest education level: Not on file   Occupational History     Not on file   Social Needs     Financial resource strain: Not on file     Food insecurity     Worry: Not on file     Inability: Not on file     Transportation needs     Medical: Not on file     Non-medical: Not on file   Tobacco Use     Smoking status: Former Smoker     Packs/day: 3.00     Years: 16.00     Pack years: 48.00     Types:  Cigarettes     Quit date: 1987     Years since quittin.6     Smokeless tobacco: Never Used     Tobacco comment: 17 years ago   Substance and Sexual Activity     Alcohol use: No     Drug use: No     Sexual activity: Yes     Partners: Female   Lifestyle     Physical activity     Days per week: Not on file     Minutes per session: Not on file     Stress: Not on file   Relationships     Social connections     Talks on phone: Not on file     Gets together: Not on file     Attends Yarsanism service: Not on file     Active member of club or organization: Not on file     Attends meetings of clubs or organizations: Not on file     Relationship status: Not on file     Intimate partner violence     Fear of current or ex partner: Not on file     Emotionally abused: Not on file     Physically abused: Not on file     Forced sexual activity: Not on file   Other Topics Concern     Parent/sibling w/ CABG, MI or angioplasty before 65F 55M? Yes      Service No     Blood Transfusions No     Caffeine Concern No     Occupational Exposure No     Hobby Hazards No     Sleep Concern No     Stress Concern No     Weight Concern Yes     Special Diet No     Back Care No     Exercise Yes     Bike Helmet No     Comment: n/a     Seat Belt Yes     Self-Exams No   Social History Narrative     Not on file       Review of Systems:  Skin:        Eyes:       ENT:       Respiratory:       Cardiovascular:       Gastroenterology:      Genitourinary:       Musculoskeletal:       Neurologic:       Psychiatric:       Heme/Lymph/Imm:       Endocrine:           Recent Lab Results:  LIPID RESULTS:  Lab Results   Component Value Date    CHOL 163 10/30/2019    HDL 31 (L) 10/30/2019    LDL 74 10/30/2019    TRIG 290 (H) 10/30/2019    CHOLHDLRATIO 4.5 2015       LIVER ENZYME RESULTS:  Lab Results   Component Value Date    AST 14 2021    ALT 29 2021       CBC RESULTS:  Lab Results   Component Value Date    WBC 15.3 (H) 2021     RBC 5.41 03/30/2021    HGB 14.9 03/30/2021    HCT 45.3 03/30/2021    MCV 84 03/30/2021    MCH 27.5 03/30/2021    MCHC 32.9 03/30/2021    RDW 14.1 03/30/2021     03/30/2021       BMP RESULTS:  Lab Results   Component Value Date     03/30/2021    POTASSIUM 4.1 03/30/2021    CHLORIDE 103 03/30/2021    CO2 23 03/30/2021    ANIONGAP 8 03/30/2021     (H) 03/30/2021    BUN 39 (H) 03/30/2021    CR 1.87 (H) 03/30/2021    GFRESTIMATED 37 (L) 03/30/2021    GFRESTBLACK 43 (L) 03/30/2021    KURT 9.3 03/30/2021        A1C RESULTS:  Lab Results   Component Value Date    A1C 7.7 (H) 02/28/2020       INR RESULTS:  No results found for: INR      ECHOCARDIOGRAM  No results found for this or any previous visit (from the past 8760 hour(s)).      No orders of the defined types were placed in this encounter.    No orders of the defined types were placed in this encounter.    There are no discontinued medications.      No diagnosis found.    Thank you for allowing me to participate in the care of your patient.      Sincerely,     Kj Mejias MD     [facility]    cc:   No referring provider defined for this encounter.

## 2021-05-03 DIAGNOSIS — Z11.59 ENCOUNTER FOR SCREENING FOR OTHER VIRAL DISEASES: ICD-10-CM

## 2021-05-09 ENCOUNTER — HEALTH MAINTENANCE LETTER (OUTPATIENT)
Age: 66
End: 2021-05-09

## 2021-05-10 ENCOUNTER — HOSPITAL ENCOUNTER (OUTPATIENT)
Facility: CLINIC | Age: 66
End: 2021-05-10
Payer: COMMERCIAL

## 2021-05-10 DIAGNOSIS — Z11.59 ENCOUNTER FOR SCREENING FOR OTHER VIRAL DISEASES: ICD-10-CM

## 2021-05-10 LAB
SARS-COV-2 RNA RESP QL NAA+PROBE: NORMAL
SPECIMEN SOURCE: NORMAL

## 2021-05-10 PROCEDURE — U0005 INFEC AGEN DETEC AMPLI PROBE: HCPCS | Performed by: INTERNAL MEDICINE

## 2021-05-10 PROCEDURE — U0003 INFECTIOUS AGENT DETECTION BY NUCLEIC ACID (DNA OR RNA); SEVERE ACUTE RESPIRATORY SYNDROME CORONAVIRUS 2 (SARS-COV-2) (CORONAVIRUS DISEASE [COVID-19]), AMPLIFIED PROBE TECHNIQUE, MAKING USE OF HIGH THROUGHPUT TECHNOLOGIES AS DESCRIBED BY CMS-2020-01-R: HCPCS | Performed by: INTERNAL MEDICINE

## 2021-05-11 LAB
LABORATORY COMMENT REPORT: NORMAL
SARS-COV-2 RNA RESP QL NAA+PROBE: NEGATIVE
SPECIMEN SOURCE: NORMAL

## 2021-05-12 ENCOUNTER — OFFICE VISIT (OUTPATIENT)
Dept: INTERNAL MEDICINE | Facility: CLINIC | Age: 66
End: 2021-05-12
Payer: COMMERCIAL

## 2021-05-12 VITALS
BODY MASS INDEX: 41.37 KG/M2 | WEIGHT: 289 LBS | HEIGHT: 70 IN | DIASTOLIC BLOOD PRESSURE: 76 MMHG | SYSTOLIC BLOOD PRESSURE: 118 MMHG | OXYGEN SATURATION: 97 % | HEART RATE: 97 BPM | TEMPERATURE: 97.5 F

## 2021-05-12 DIAGNOSIS — E11.59 TYPE 2 DIABETES WITH CIRCULATORY DISORDER CAUSING ERECTILE DYSFUNCTION (H): ICD-10-CM

## 2021-05-12 DIAGNOSIS — E78.5 HYPERLIPIDEMIA LDL GOAL <70: ICD-10-CM

## 2021-05-12 DIAGNOSIS — N18.31 STAGE 3A CHRONIC KIDNEY DISEASE (H): ICD-10-CM

## 2021-05-12 DIAGNOSIS — I48.19 PERSISTENT ATRIAL FIBRILLATION (H): ICD-10-CM

## 2021-05-12 DIAGNOSIS — N52.1 TYPE 2 DIABETES WITH CIRCULATORY DISORDER CAUSING ERECTILE DYSFUNCTION (H): ICD-10-CM

## 2021-05-12 DIAGNOSIS — Z00.00 ROUTINE MEDICAL EXAM: Primary | ICD-10-CM

## 2021-05-12 DIAGNOSIS — E11.65 TYPE 2 DIABETES MELLITUS WITH HYPERGLYCEMIA, WITHOUT LONG-TERM CURRENT USE OF INSULIN (H): ICD-10-CM

## 2021-05-12 DIAGNOSIS — K21.9 GASTROESOPHAGEAL REFLUX DISEASE WITHOUT ESOPHAGITIS: ICD-10-CM

## 2021-05-12 DIAGNOSIS — E66.01 MORBIDLY OBESE (H): ICD-10-CM

## 2021-05-12 DIAGNOSIS — I10 HYPERTENSION GOAL BP (BLOOD PRESSURE) < 140/90: ICD-10-CM

## 2021-05-12 LAB
ALBUMIN SERPL-MCNC: 3.9 G/DL (ref 3.4–5)
ALBUMIN UR-MCNC: 100 MG/DL
ALP SERPL-CCNC: 61 U/L (ref 40–150)
ALT SERPL W P-5'-P-CCNC: 28 U/L (ref 0–70)
ANION GAP SERPL CALCULATED.3IONS-SCNC: 4 MMOL/L (ref 3–14)
APPEARANCE UR: CLEAR
AST SERPL W P-5'-P-CCNC: 20 U/L (ref 0–45)
BACTERIA #/AREA URNS HPF: ABNORMAL /HPF
BASOPHILS # BLD AUTO: 0 10E9/L (ref 0–0.2)
BASOPHILS NFR BLD AUTO: 0.3 %
BILIRUB SERPL-MCNC: 0.6 MG/DL (ref 0.2–1.3)
BILIRUB UR QL STRIP: NEGATIVE
BUN SERPL-MCNC: 36 MG/DL (ref 7–30)
CALCIUM SERPL-MCNC: 9.2 MG/DL (ref 8.5–10.1)
CHLORIDE SERPL-SCNC: 104 MMOL/L (ref 94–109)
CHOLEST SERPL-MCNC: 124 MG/DL
CO2 SERPL-SCNC: 29 MMOL/L (ref 20–32)
COLOR UR AUTO: YELLOW
CREAT SERPL-MCNC: 1.87 MG/DL (ref 0.66–1.25)
CREAT UR-MCNC: 162 MG/DL
DIFFERENTIAL METHOD BLD: ABNORMAL
EOSINOPHIL # BLD AUTO: 0.1 10E9/L (ref 0–0.7)
EOSINOPHIL NFR BLD AUTO: 1.2 %
ERYTHROCYTE [DISTWIDTH] IN BLOOD BY AUTOMATED COUNT: 14.7 % (ref 10–15)
GFR SERPL CREATININE-BSD FRML MDRD: 37 ML/MIN/{1.73_M2}
GLUCOSE SERPL-MCNC: 240 MG/DL (ref 70–99)
GLUCOSE UR STRIP-MCNC: NEGATIVE MG/DL
HBA1C MFR BLD: 8.6 % (ref 0–5.6)
HCT VFR BLD AUTO: 44.2 % (ref 40–53)
HDLC SERPL-MCNC: 31 MG/DL
HGB BLD-MCNC: 14.1 G/DL (ref 13.3–17.7)
HGB UR QL STRIP: NEGATIVE
KETONES UR STRIP-MCNC: NEGATIVE MG/DL
LDLC SERPL CALC-MCNC: 70 MG/DL
LEUKOCYTE ESTERASE UR QL STRIP: NEGATIVE
LYMPHOCYTES # BLD AUTO: 1.6 10E9/L (ref 0.8–5.3)
LYMPHOCYTES NFR BLD AUTO: 14.9 %
MCH RBC QN AUTO: 27.4 PG (ref 26.5–33)
MCHC RBC AUTO-ENTMCNC: 31.9 G/DL (ref 31.5–36.5)
MCV RBC AUTO: 86 FL (ref 78–100)
MICROALBUMIN UR-MCNC: 347 MG/L
MICROALBUMIN/CREAT UR: 214.2 MG/G CR (ref 0–17)
MONOCYTES # BLD AUTO: 0.7 10E9/L (ref 0–1.3)
MONOCYTES NFR BLD AUTO: 6.1 %
NEUTROPHILS # BLD AUTO: 8.5 10E9/L (ref 1.6–8.3)
NEUTROPHILS NFR BLD AUTO: 77.5 %
NITRATE UR QL: NEGATIVE
NON-SQ EPI CELLS #/AREA URNS LPF: ABNORMAL /LPF
NONHDLC SERPL-MCNC: 93 MG/DL
PH UR STRIP: 5.5 PH (ref 5–7)
PLATELET # BLD AUTO: 234 10E9/L (ref 150–450)
POTASSIUM SERPL-SCNC: 4.5 MMOL/L (ref 3.4–5.3)
PROT SERPL-MCNC: 7.7 G/DL (ref 6.8–8.8)
PSA SERPL-ACNC: 0.82 UG/L (ref 0–4)
RBC # BLD AUTO: 5.15 10E12/L (ref 4.4–5.9)
RBC #/AREA URNS AUTO: ABNORMAL /HPF
SODIUM SERPL-SCNC: 137 MMOL/L (ref 133–144)
SOURCE: ABNORMAL
SP GR UR STRIP: >1.03 (ref 1–1.03)
TRIGL SERPL-MCNC: 116 MG/DL
TSH SERPL DL<=0.005 MIU/L-ACNC: 0.31 MU/L (ref 0.4–4)
UROBILINOGEN UR STRIP-ACNC: 0.2 EU/DL (ref 0.2–1)
WBC # BLD AUTO: 11 10E9/L (ref 4–11)
WBC #/AREA URNS AUTO: ABNORMAL /HPF

## 2021-05-12 PROCEDURE — 83036 HEMOGLOBIN GLYCOSYLATED A1C: CPT | Performed by: FAMILY MEDICINE

## 2021-05-12 PROCEDURE — 81001 URINALYSIS AUTO W/SCOPE: CPT | Performed by: FAMILY MEDICINE

## 2021-05-12 PROCEDURE — 80050 GENERAL HEALTH PANEL: CPT | Performed by: FAMILY MEDICINE

## 2021-05-12 PROCEDURE — 36415 COLL VENOUS BLD VENIPUNCTURE: CPT | Performed by: FAMILY MEDICINE

## 2021-05-12 PROCEDURE — G0103 PSA SCREENING: HCPCS | Performed by: FAMILY MEDICINE

## 2021-05-12 PROCEDURE — 99396 PREV VISIT EST AGE 40-64: CPT | Performed by: FAMILY MEDICINE

## 2021-05-12 PROCEDURE — 80061 LIPID PANEL: CPT | Performed by: FAMILY MEDICINE

## 2021-05-12 PROCEDURE — 82043 UR ALBUMIN QUANTITATIVE: CPT | Performed by: FAMILY MEDICINE

## 2021-05-12 PROCEDURE — 99214 OFFICE O/P EST MOD 30 MIN: CPT | Mod: 25 | Performed by: FAMILY MEDICINE

## 2021-05-12 ASSESSMENT — MIFFLIN-ST. JEOR: SCORE: 2103.18

## 2021-05-12 NOTE — PROGRESS NOTES
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)    - Albumin Random Urine Quantitative with Creat Ratio  - Hemoglobin A1c  - TSH    obese bmi over 35      Type 2 diabetes mellitus with hyperglycemia, without long-term current use of insulin (H)    - Albumin Random Urine Quantitative with Creat Ratio  - Hemoglobin A1c  - TSH    Hypertension goal BP (blood pressure) < 140/90    - UA with Microscopic  - CBC with platelets differential  - Comprehensive metabolic panel    Hyperlipidemia LDL goal <70    - Lipid Profile    Stage 3a chronic kidney disease      Gastroesophageal reflux disease without esophagitis      Persistent atrial fibrillation (H)                 BMI:   Estimated body mass index is 41.76 kg/m  as calculated from the following:    Height as of this encounter: 1.772 m (5' 9.75\").    Weight as of this encounter: 131.1 kg (289 lb).   Weight management plan: Discussed healthy diet and exercise guidelines    There are no Patient Instructions on file for this visit.    Return in about 3 months (around 8/12/2021) for diabetes, hypertension.    Trenton Angulo MD  [facility]   Conner is a 66 year old who presents for the following health issues     Healthy Habits:     Getting at least 3 servings of Calcium per day:  Yes    Bi-annual eye exam:  Yes    Dental care twice a year:  Yes    Sleep apnea or symptoms of sleep apnea:  None    Diet:  Low salt and Low fat/cholesterol    Frequency of exercise:  2-3 days/week    Duration of exercise:  45-60 minutes    Taking medications regularly:  Yes    Medication side effects:  None    PHQ-2 Total Score: 0    Additional concerns today:  Yes       Follow up kidney stones  Hematuria: no  Abdominal/flank pain: no  Other symptoms: no    Diabetes Follow-up    How often are you checking your blood sugar? A few times a " month  What time of day are you checking your blood sugars (select all that apply)?  between meals  Have you had any blood sugars above 200?  No  Have you had any blood sugars below 70?  No    What symptoms do you notice when your blood sugar is low?  None    What concerns do you have today about your diabetes? None     Do you have any of these symptoms? (Select all that apply)  Numbness in feet and Redness, sores, or blisters on feet    Have you had a diabetic eye exam in the last 12 months? No          Hyperlipidemia Follow-Up      Are you regularly taking any medication or supplement to lower your cholesterol?   Yes- Atorvastatin    Are you having muscle aches or other side effects that you think could be caused by your cholesterol lowering medication?  No    Hypertension Follow-up      Do you check your blood pressure regularly outside of the clinic? No     Are you following a low salt diet? Yes    Are your blood pressures ever more than 140 on the top number (systolic) OR more   than 90 on the bottom number (diastolic), for example 140/90? No        once at clinic 3-30-/101    BP Readings from Last 2 Encounters:   05/12/21 118/76   04/29/21 136/78     Hemoglobin A1C (%)   Date Value   02/28/2020 7.7 (H)   10/30/2019 8.8 (H)     LDL Cholesterol Calculated (mg/dL)   Date Value   10/30/2019 74   06/13/2018 59         How many servings of fruits and vegetables do you eat daily?  0-1    On average, how many sweetened beverages do you drink each day (Examples: soda, juice, sweet tea, etc.  Do NOT count diet or artificially sweetened beverages)?   3 per week    How many days per week do you exercise enough to make your heart beat faster? 5    How many minutes a day do you exercise enough to make your heart beat faster? Physical at work 8 hr shifts    How many days per week do you miss taking your medication? 0    Diabetes Follow-up    How often are you checking your blood sugar? A few times a month  What time of  "day are you checking your blood sugars (select all that apply)?  Before meals  Have you had any blood sugars above 200?  No  Have you had any blood sugars below 70?  No    What symptoms do you notice when your blood sugar is low?  None    What concerns do you have today about your diabetes? None     Do you have any of these symptoms? (Select all that apply)  No numbness or tingling in feet.  No redness, sores or blisters on feet.  No complaints of excessive thirst.  No reports of blurry vision.  No significant changes to weight.    Have you had a diabetic eye exam in the last 12 months? No          Hyperlipidemia Follow-Up      Are you regularly taking any medication or supplement to lower your cholesterol?   Yes- statin    Are you having muscle aches or other side effects that you think could be caused by your cholesterol lowering medication?  No    Hypertension Follow-up      Do you check your blood pressure regularly outside of the clinic? No     Are you following a low salt diet? Yes    Are your blood pressures ever more than 140 on the top number (systolic) OR more   than 90 on the bottom number (diastolic), for example 140/90? No    BP Readings from Last 2 Encounters:   05/12/21 118/76   04/29/21 136/78     Hemoglobin A1C (%)   Date Value   02/28/2020 7.7 (H)   10/30/2019 8.8 (H)     LDL Cholesterol Calculated (mg/dL)   Date Value   10/30/2019 74   06/13/2018 59         Review of Systems   Constitutional, HEENT, cardiovascular, pulmonary, gi and gu systems are negative, except as otherwise noted.      Objective    /76   Pulse 97   Temp 97.5  F (36.4  C) (Tympanic)   Ht 1.772 m (5' 9.75\")   Wt 131.1 kg (289 lb)   SpO2 97%   Breastfeeding No   BMI 41.76 kg/m    Body mass index is 41.76 kg/m .  Physical Exam   GENERAL: healthy, alert and no distress  EYES: Eyes grossly normal to inspection, PERRL and conjunctivae and sclerae normal  HENT: ear canals and TM's normal, nose and mouth without ulcers or " lesions  NECK: no adenopathy, no asymmetry, masses, or scars and thyroid normal to palpation  RESP: lungs clear to auscultation - no rales, rhonchi or wheezes  CV: regular rate and rhythm, normal S1 S2, no S3 or S4, no murmur, click or rub, no peripheral edema and peripheral pulses strong  ABDOMEN: soft, nontender, no hepatosplenomegaly, no masses and bowel sounds normal   (male): normal male genitalia without lesions or urethral discharge, no hernia  RECTAL: normal sphincter tone, no rectal masses, prostate normal size, smooth, nontender without nodules or masses  MS: no gross musculoskeletal defects noted, no edema  SKIN: no suspicious lesions or rashes  NEURO: Normal strength and tone, mentation intact and speech normal  PSYCH: mentation appears normal, affect normal/bright  LYMPH: no cervical, supraclavicular, axillary, or inguinal adenopathy

## 2021-05-13 ENCOUNTER — TELEPHONE (OUTPATIENT)
Dept: CARDIOLOGY | Facility: CLINIC | Age: 66
End: 2021-05-13

## 2021-05-13 DIAGNOSIS — I48.19 PERSISTENT ATRIAL FIBRILLATION (H): Primary | ICD-10-CM

## 2021-05-14 ENCOUNTER — HOSPITAL ENCOUNTER (OUTPATIENT)
Dept: CARDIOLOGY | Facility: CLINIC | Age: 66
Discharge: HOME | End: 2021-05-14
Attending: INTERNAL MEDICINE | Admitting: INTERNAL MEDICINE
Payer: COMMERCIAL

## 2021-05-14 ENCOUNTER — HOSPITAL ENCOUNTER (OUTPATIENT)
Dept: MEDSURG UNIT | Facility: CLINIC | Age: 66
End: 2021-05-14
Attending: INTERNAL MEDICINE | Admitting: INTERNAL MEDICINE
Payer: COMMERCIAL

## 2021-05-14 ENCOUNTER — ANESTHESIA (OUTPATIENT)
Dept: MEDSURG UNIT | Facility: CLINIC | Age: 66
End: 2021-05-14
Payer: COMMERCIAL

## 2021-05-14 ENCOUNTER — ANESTHESIA EVENT (OUTPATIENT)
Dept: SURGERY | Facility: CLINIC | Age: 66
End: 2021-05-14

## 2021-05-14 ENCOUNTER — ANESTHESIA EVENT (OUTPATIENT)
Dept: MEDSURG UNIT | Facility: CLINIC | Age: 66
End: 2021-05-14
Payer: COMMERCIAL

## 2021-05-14 ENCOUNTER — HOSPITAL ENCOUNTER (OUTPATIENT)
Facility: CLINIC | Age: 66
Discharge: HOME | End: 2021-05-14
Attending: INTERNAL MEDICINE | Admitting: INTERNAL MEDICINE
Payer: COMMERCIAL

## 2021-05-14 VITALS
HEART RATE: 80 BPM | RESPIRATION RATE: 11 BRPM | TEMPERATURE: 97.9 F | DIASTOLIC BLOOD PRESSURE: 68 MMHG | HEIGHT: 70 IN | OXYGEN SATURATION: 99 % | WEIGHT: 288 LBS | SYSTOLIC BLOOD PRESSURE: 133 MMHG | BODY MASS INDEX: 41.23 KG/M2

## 2021-05-14 DIAGNOSIS — I10 HYPERTENSION GOAL BP (BLOOD PRESSURE) < 140/90: ICD-10-CM

## 2021-05-14 LAB
MAGNESIUM SERPL-MCNC: 1.9 MG/DL (ref 1.6–2.3)
POTASSIUM SERPL-SCNC: 3.9 MMOL/L (ref 3.4–5.3)

## 2021-05-14 PROCEDURE — 92960 CARDIOVERSION ELECTRIC EXT: CPT

## 2021-05-14 PROCEDURE — 93325 DOPPLER ECHO COLOR FLOW MAPG: CPT | Mod: 26 | Performed by: INTERNAL MEDICINE

## 2021-05-14 PROCEDURE — 36415 COLL VENOUS BLD VENIPUNCTURE: CPT

## 2021-05-14 PROCEDURE — 84132 ASSAY OF SERUM POTASSIUM: CPT | Performed by: INTERNAL MEDICINE

## 2021-05-14 PROCEDURE — 83735 ASSAY OF MAGNESIUM: CPT | Performed by: INTERNAL MEDICINE

## 2021-05-14 PROCEDURE — 250N000011 HC RX IP 250 OP 636: Performed by: INTERNAL MEDICINE

## 2021-05-14 PROCEDURE — 99152 MOD SED SAME PHYS/QHP 5/>YRS: CPT | Performed by: INTERNAL MEDICINE

## 2021-05-14 PROCEDURE — 999N000183 HC STATISTIC TEE INCLUDES SEDATION

## 2021-05-14 PROCEDURE — 250N000009 HC RX 250: Performed by: INTERNAL MEDICINE

## 2021-05-14 PROCEDURE — 93325 DOPPLER ECHO COLOR FLOW MAPG: CPT

## 2021-05-14 PROCEDURE — 93005 ELECTROCARDIOGRAM TRACING: CPT

## 2021-05-14 PROCEDURE — 258N000003 HC RX IP 258 OP 636: Performed by: INTERNAL MEDICINE

## 2021-05-14 PROCEDURE — 250N000011 HC RX IP 250 OP 636: Performed by: NURSE ANESTHETIST, CERTIFIED REGISTERED

## 2021-05-14 PROCEDURE — 999N000184 HC STATISTIC TELEMETRY

## 2021-05-14 PROCEDURE — 93320 DOPPLER ECHO COMPLETE: CPT | Mod: 26 | Performed by: INTERNAL MEDICINE

## 2021-05-14 PROCEDURE — 92960 CARDIOVERSION ELECTRIC EXT: CPT | Performed by: INTERNAL MEDICINE

## 2021-05-14 PROCEDURE — 93312 ECHO TRANSESOPHAGEAL: CPT | Mod: 26 | Performed by: INTERNAL MEDICINE

## 2021-05-14 RX ORDER — POTASSIUM CHLORIDE 1500 MG/1
20 TABLET, EXTENDED RELEASE ORAL
Status: DISCONTINUED | OUTPATIENT
Start: 2021-05-14 | End: 2021-05-14 | Stop reason: HOSPADM

## 2021-05-14 RX ORDER — LIDOCAINE HYDROCHLORIDE 40 MG/ML
1.5 SOLUTION TOPICAL ONCE
Status: COMPLETED | OUTPATIENT
Start: 2021-05-14 | End: 2021-05-14

## 2021-05-14 RX ORDER — GLYCOPYRROLATE 0.2 MG/ML
0.1 INJECTION, SOLUTION INTRAMUSCULAR; INTRAVENOUS ONCE
Status: COMPLETED | OUTPATIENT
Start: 2021-05-14 | End: 2021-05-14

## 2021-05-14 RX ORDER — LIDOCAINE 50 MG/G
OINTMENT TOPICAL ONCE
Status: COMPLETED | OUTPATIENT
Start: 2021-05-14 | End: 2021-05-14

## 2021-05-14 RX ORDER — NALOXONE HYDROCHLORIDE 0.4 MG/ML
0.2 INJECTION, SOLUTION INTRAMUSCULAR; INTRAVENOUS; SUBCUTANEOUS
Status: DISCONTINUED | OUTPATIENT
Start: 2021-05-14 | End: 2021-05-14 | Stop reason: HOSPADM

## 2021-05-14 RX ORDER — FENTANYL CITRATE 50 UG/ML
25 INJECTION, SOLUTION INTRAMUSCULAR; INTRAVENOUS
Status: DISCONTINUED | OUTPATIENT
Start: 2021-05-14 | End: 2021-05-14 | Stop reason: HOSPADM

## 2021-05-14 RX ORDER — FLUMAZENIL 0.1 MG/ML
0.2 INJECTION, SOLUTION INTRAVENOUS
Status: DISCONTINUED | OUTPATIENT
Start: 2021-05-14 | End: 2021-05-14 | Stop reason: HOSPADM

## 2021-05-14 RX ORDER — PROPOFOL 10 MG/ML
INJECTION, EMULSION INTRAVENOUS PRN
Status: DISCONTINUED | OUTPATIENT
Start: 2021-05-14 | End: 2021-05-14

## 2021-05-14 RX ORDER — ATROPINE SULFATE 0.1 MG/ML
.5-1 INJECTION INTRAVENOUS
Status: DISCONTINUED | OUTPATIENT
Start: 2021-05-14 | End: 2021-05-14 | Stop reason: HOSPADM

## 2021-05-14 RX ORDER — SODIUM CHLORIDE 9 MG/ML
1000 INJECTION, SOLUTION INTRAVENOUS CONTINUOUS
Status: DISCONTINUED | OUTPATIENT
Start: 2021-05-14 | End: 2021-05-14 | Stop reason: HOSPADM

## 2021-05-14 RX ORDER — DEXTROSE MONOHYDRATE 25 G/50ML
9.5 INJECTION, SOLUTION INTRAVENOUS
Status: DISCONTINUED | OUTPATIENT
Start: 2021-05-14 | End: 2021-05-14 | Stop reason: HOSPADM

## 2021-05-14 RX ORDER — NALOXONE HYDROCHLORIDE 0.4 MG/ML
0.4 INJECTION, SOLUTION INTRAMUSCULAR; INTRAVENOUS; SUBCUTANEOUS
Status: DISCONTINUED | OUTPATIENT
Start: 2021-05-14 | End: 2021-05-14 | Stop reason: HOSPADM

## 2021-05-14 RX ORDER — FENTANYL CITRATE 50 UG/ML
50 INJECTION, SOLUTION INTRAMUSCULAR; INTRAVENOUS ONCE
Status: COMPLETED | OUTPATIENT
Start: 2021-05-14 | End: 2021-05-14

## 2021-05-14 RX ORDER — METOPROLOL TARTRATE 1 MG/ML
2.5-5 INJECTION, SOLUTION INTRAVENOUS
Status: DISCONTINUED | OUTPATIENT
Start: 2021-05-14 | End: 2021-05-14 | Stop reason: HOSPADM

## 2021-05-14 RX ORDER — POTASSIUM CHLORIDE 1500 MG/1
40 TABLET, EXTENDED RELEASE ORAL
Status: DISCONTINUED | OUTPATIENT
Start: 2021-05-14 | End: 2021-05-14 | Stop reason: HOSPADM

## 2021-05-14 RX ORDER — MAGNESIUM SULFATE HEPTAHYDRATE 40 MG/ML
2 INJECTION, SOLUTION INTRAVENOUS
Status: DISCONTINUED | OUTPATIENT
Start: 2021-05-14 | End: 2021-05-14 | Stop reason: HOSPADM

## 2021-05-14 RX ADMIN — TOPICAL ANESTHETIC 1 ML: 200 SPRAY DENTAL; PERIODONTAL at 09:23

## 2021-05-14 RX ADMIN — SODIUM CHLORIDE 1000 ML: 9 INJECTION, SOLUTION INTRAVENOUS at 09:11

## 2021-05-14 RX ADMIN — MIDAZOLAM 1 MG: 1 INJECTION INTRAMUSCULAR; INTRAVENOUS at 09:28

## 2021-05-14 RX ADMIN — FENTANYL CITRATE 50 MCG: 50 INJECTION, SOLUTION INTRAMUSCULAR; INTRAVENOUS at 09:29

## 2021-05-14 RX ADMIN — MIDAZOLAM 1 MG: 1 INJECTION INTRAMUSCULAR; INTRAVENOUS at 09:33

## 2021-05-14 RX ADMIN — GLYCOPYRROLATE 0.1 MG: 0.2 INJECTION, SOLUTION INTRAMUSCULAR; INTRAVENOUS at 09:10

## 2021-05-14 RX ADMIN — MIDAZOLAM 1 MG: 1 INJECTION INTRAMUSCULAR; INTRAVENOUS at 09:37

## 2021-05-14 RX ADMIN — LIDOCAINE HYDROCHLORIDE 1.5 ML: 40 SOLUTION TOPICAL at 09:12

## 2021-05-14 RX ADMIN — MIDAZOLAM 1 MG: 1 INJECTION INTRAMUSCULAR; INTRAVENOUS at 09:31

## 2021-05-14 RX ADMIN — PROPOFOL 80 MG: 10 INJECTION, EMULSION INTRAVENOUS at 09:57

## 2021-05-14 RX ADMIN — MIDAZOLAM 0.5 MG: 1 INJECTION INTRAMUSCULAR; INTRAVENOUS at 09:52

## 2021-05-14 ASSESSMENT — MIFFLIN-ST. JEOR: SCORE: 2102.61

## 2021-05-14 ASSESSMENT — ENCOUNTER SYMPTOMS: DYSRHYTHMIAS: 1

## 2021-05-14 NOTE — ANESTHESIA PREPROCEDURE EVALUATION
Anesthesia Pre-Procedure Evaluation    Patient: Booker Brown   MRN: [id number] : 1955        Preoperative Diagnosis: * No surgery found *   Procedure :      Past Medical History:   Diagnosis Date     Diabetes (H)      HTN      Hyperlipidemia       Past Surgical History:   Procedure Laterality Date     COLONOSCOPY N/A 2018    Procedure: COMBINED COLONOSCOPY, SINGLE OR MULTIPLE BIOPSY/POLYPECTOMY BY BIOPSY;;  Surgeon: Lawrence Mata MD;  Location:  GI      Allergies   Allergen Reactions     No Known Allergies       Social History     Tobacco Use     Smoking status: Former Smoker     Packs/day: 3.00     Years: 16.00     Pack years: 48.00     Types: Cigarettes     Start date:      Quit date: 1987     Years since quittin.6     Smokeless tobacco: Never Used   Substance Use Topics     Alcohol use: No      Wt Readings from Last 1 Encounters:   21 130.6 kg (288 lb)        Anesthesia Evaluation   Pt has had prior anesthetic.     No history of anesthetic complications       ROS/MED HX  ENT/Pulmonary:    (-) sleep apnea   Neurologic:       Cardiovascular:     (+) Dyslipidemia hypertension-----dysrhythmias, a-fib, Previous cardiac testing   Echo: Date: 21 Results:  Per cardiologist, normal EF and no valve problems  Stress Test: Date: Results:    ECG Reviewed: Date: Results:    Cath: Date: Results:      METS/Exercise Tolerance:     Hematologic:       Musculoskeletal:       GI/Hepatic:     (+) GERD,     Renal/Genitourinary:       Endo:     (+) type II DM, Obesity (BMI 41),     Psychiatric/Substance Use:       Infectious Disease:       Malignancy:       Other:            Physical Exam    Airway        Mallampati: III   TM distance: > 3 FB   Neck ROM: full   Mouth opening: > 3 cm    Respiratory Devices and Support         Dental  no notable dental history         Cardiovascular          Rhythm and rate: irregular and tachycardia     Pulmonary   pulmonary exam normal                 OUTSIDE LABS:  CBC:   Lab Results   Component Value Date    WBC 11.0 05/12/2021    WBC 15.3 (H) 03/30/2021    HGB 14.1 05/12/2021    HGB 14.9 03/30/2021    HCT 44.2 05/12/2021    HCT 45.3 03/30/2021     05/12/2021     03/30/2021     BMP:   Lab Results   Component Value Date     05/12/2021     03/30/2021    POTASSIUM 3.9 05/14/2021    POTASSIUM 4.5 05/12/2021    CHLORIDE 104 05/12/2021    CHLORIDE 103 03/30/2021    CO2 29 05/12/2021    CO2 23 03/30/2021    BUN 36 (H) 05/12/2021    BUN 39 (H) 03/30/2021    CR 1.87 (H) 05/12/2021    CR 1.87 (H) 03/30/2021     (H) 05/12/2021     (H) 03/30/2021     COAGS: No results found for: PTT, INR, FIBR  POC:   Lab Results   Component Value Date     (H) 08/24/2018     HEPATIC:   Lab Results   Component Value Date    ALBUMIN 3.9 05/12/2021    PROTTOTAL 7.7 05/12/2021    ALT 28 05/12/2021    AST 20 05/12/2021    ALKPHOS 61 05/12/2021    BILITOTAL 0.6 05/12/2021     OTHER:   Lab Results   Component Value Date    LACT 2.0 03/30/2021    A1C 8.6 (H) 05/12/2021    KURT 9.2 05/12/2021    MAG 1.9 05/14/2021    LIPASE 351 03/30/2021    TSH 0.31 (L) 05/12/2021    T4 1.16 06/13/2018       Anesthesia Plan    ASA Status:  3   NPO Status:  NPO Appropriate    Anesthesia Type: General.   Induction: Intravenous.           Consents    Anesthesia Plan(s) and associated risks, benefits, and realistic alternatives discussed. Questions answered and patient/representative(s) expressed understanding.     - Discussed with:  Patient         Postoperative Care            Comments:                Paulo Caceres MD

## 2021-05-14 NOTE — PROGRESS NOTES
Care Suites Discharge Nursing Note    Patient Information  Name: Booker Brown  Age: 66 year old    Discharge Education:  Discharge instructions reviewed: Yes  Additional education/resources provided: NA  Patient/patient representative verbalizes understanding: Yes  Patient discharging on new medications: No  Medication education completed: N/A    Discharge Plans:   Discharge location: home  Discharge ride contacted: N/A  Approximate discharge time: 1115    Discharge Criteria:  Discharge criteria met and vital signs stable: Yes    Patient Belongs:  Patient belongings returned to patient: Yes    Fouzia Garcia RN

## 2021-05-14 NOTE — PROGRESS NOTES
PATIENT/VISITOR WELLNESS SCREENING    Step 1 Patient Screening    1. In the last month, have you been in contact with someone who was confirmed or suspected to have Coronavirus/COVID-19? No    2. Do you have the following symptoms?  Fever/Chills? No   Cough? No   Shortness of breath? No   New loss of taste or smell? No  Sore throat? No  Muscle or body aches? No  Headaches? No  Fatigue? No  Vomiting or diarrhea? No    Step 2 Visitor Screening    1. Name of Visitor (1 visitor per patient): Ruby    2. In the last month, have you been in contact with someone who was confirmed or suspected to have Coronavirus/COVID-19? No    3. Do you have the following symptoms?  Fever/Chills? No   Cough? No   Shortness of breath? No   Skin rash? No   Loss of taste or smell? No  Sore throat? No  Runny or stuffy nose? No  Muscle or body aches? No  Headaches? No  Fatigue? No  Vomiting or diarrhea? No    If the visitor has positive symptoms, notify supervisor/manger  Per policy, the visitor will need to leave the facility     Step 3 Refer to logic grid below for actions    NO SYMPTOM(S)    ACTIONS:  1. Standard rooming process  2. Provider to assess per normal protocol  3. Implement precautions as needed and per guidelines     POSITIVE SYMPTOM(S)  If positive for ANY of the following symptoms: fever, cough, shortness of breath, rash    ACTION:  1. Continue to have the patient wear a mask   2. Room patient as soon as possible  3. Don appropriate PPE when entering room  4. Provider evaluation    Care Suites Admission Nursing Note    Patient Information  Name: Booker Brown  Age: 66 year old  Reason for admission: VIVEK/DCCV  Care Suites arrival time: 0735    Visitor Information  Name: Ruby  Informed of visitor restrictions: Yes  1 visitor allowed per patient   Visitor must screen negative for COVID symptoms   Visitor must wear a mask  Waiting rooms closed to visitors    Patient Admission/Assessment   Pre-procedure assessment complete:  Yes  If abnormal assessment/labs, provider notified: Yes K+ 3.9, Mg+ 1.9, Dr. Almazan aware, Not replaced per order.  NPO: Yes  Medications held per instructions/orders: Yes  Consent: obtained  If applicable, pregnancy test status: deferred  Patient oriented to room: Yes  Education/questions answered: Yes  Plan/other: Proceed with VIVEK and DCCV if no clot    Discharge Planning  Discharge name/phone number: NA  Overnight post sedation caregiver: Ruby  Discharge location: home    Fouzia Garcia RN

## 2021-05-14 NOTE — ANESTHESIA CARE TRANSFER NOTE
Patient: Booker Brown    * No procedures listed *    Diagnosis: * No pre-op diagnosis entered *  Diagnosis Additional Information: No value filed.    Anesthesia Type:   General     Note:    Oropharynx: spontaneously breathing  Level of Consciousness: awake  Oxygen Supplementation: nasal cannula  Level of Supplemental Oxygen (L/min / FiO2): 3  Independent Airway: airway patency satisfactory and stable  Dentition: dentition unchanged  Vital Signs Stable: post-procedure vital signs reviewed and stable  Report to RN Given: handoff report given  Destination: Care Suites.  Comments: Diagnosis:  Atrial Fibrillation  Procedure: Cardioversion  Cardiologist: Dr. Almazan  Location: Care Suites 20        Vitals: (Last set prior to Anesthesia Care Transfer)  CRNA VITALS  5/14/2021 0955 - 5/14/2021 1025      5/14/2021             SpO2:  99 %    Resp Rate (observed):  12    EKG:  Sinus rhythm        Electronically Signed By: TERESA Alcantara CRNA  May 14, 2021  10:06 AM

## 2021-05-14 NOTE — DISCHARGE INSTRUCTIONS
VIVEK  (Transesophageal Echocardiogram)  with Cardioversion Discharge Instructions    After you go home:      Have an adult stay with you for 6 hours.       For 24 hours - due to the sedation you received:    Relax and take it easy.    Do NOT make any important or legal decisions.    Do NOT drive or operate machines at home or at work.    Do NOT drink alcohol.    Diet:      You may resume your normal diet, but no scratchy foods for two days.    If your throat is sore, eat cold, bland or soft foods.    You may have heartburn if the tube used in the exam entered your stomach.  If so:   - Do not eat acidic and spicy foods.   - Do not eat three hours before bedtime.  Clear liquids are okay.   - When lying down, use two pillows to raise your head.    Medicines:      Take your medications, including blood thinners, unless your provider tells you not to.    If you have stopped any medicines, check with your provider about when to restart them.    You may take Tylenol (Acetaminophen) if your throat is sore.    You may take antacids if you have heartburn.      Follow Up Appointments:      Follow up with your cardiologist at [facility] of patient preference as instructed.    Follow up with your primary care provider as needed.    If you ve had a cardioversion:    The skin on your chest or back may feel tender for 48 hours.  If your skin is tender, you may:    Use a cold pack on the site. Never use ice directly on your skin. Use the cold pack for 20 minutes. Remove it for at least 30 minutes before re-using.    Apply 1% hydrocortisone cream to the skin (sold at drug stores)    Take Advil (Ibuprofen) or Tylenol (Acetaminophen).      Call the clinic if:      You have heartburn that is severe or lasts more than 72 hours.    You have a sore throat that feels worse after 72 hours.    You have shortness of breath, neck pain, chest pain, fever, chills, coughing up blood, or other unusual signs.    Call your cardiologist right  away if you have an irregular heartbeat, shortness of breath or feel dizzy.    Questions or concerns      [facility] [place]:    [phone] UMP (7 days a week)

## 2021-05-14 NOTE — PROGRESS NOTES
VIVEK, Pt received total of 4.5 mg IV versed and 50 mcg IV fentanyl for sedation. Tolerated procedure well. Cardioversion per Dr. Almazan and BORA.  Converted with 1 200j shock to sinus rhythm. Neuro intact. VSS, Pt states feeling well. Wife at bedside, spoke with Dr. Almazan.

## 2021-05-14 NOTE — PROCEDURES
[facility]    Procedure: Cardioversion    Date/Time: 5/14/2021 10:10 AM  Performed by: Major Almazan MD  Authorized by: Kj Mejias MD     UNIVERSAL PROTOCOL   Site Marked: NA  Prior Images Obtained and Reviewed:  Yes  Required items: Required blood products, implants, devices and special equipment available    Patient identity confirmed:  Verbally with patient, arm band, provided demographic data and hospital-assigned identification number  Patient was reevaluated immediately before administering moderate or deep sedation or anesthesia  Confirmation Checklist:  Patient's identity using two indicators, relevant allergies, procedure was appropriate and matched the consent or emergent situation and correct equipment/implants were available  Time out: Immediately prior to the procedure a time out was called    Universal Protocol: the Joint Commission Universal Protocol was followed    Preparation: Patient was prepped and draped in usual sterile fashion           ANESTHESIA  Anesthesia was administered and monitored by anesthesiology.  See anesthesia documentation for details.    SEDATION    Patient Sedated: Yes    Sedation Type:  Deep  Sedation:  Propofol  Vital signs: Vital signs monitored during sedation      PROCEDURE DETAILS  Cardioversion basis: elective  Indications: failure of anti-arrhythmic medications  Pre-procedure rhythm: atrial fibrillation  Patient position: patient was placed in a supine position  Chest area: chest area exposed  Electrodes: pads  Electrodes placed: anterior-lateral  Number of attempts: 1    Details of Attempts:  A single, 200 J, synchronized shock was delivered with pads in anterolateral position, converting the pt to SR   Post-procedure rhythm: normal sinus rhythm  Complications: no complications    PROCEDURE   Patient Tolerance:  Patient tolerated the procedure well with no immediate complications

## 2021-05-14 NOTE — ANESTHESIA POSTPROCEDURE EVALUATION
Patient: Booker Brown    * No procedures listed *    Diagnosis:* No pre-op diagnosis entered *  Diagnosis Additional Information: No value filed.    Anesthesia Type:  General    Note:  Disposition: Outpatient   Postop Pain Control: Uneventful            Sign Out: Well controlled pain   PONV: No   Neuro/Psych: Uneventful            Sign Out: Acceptable/Baseline neuro status   Airway/Respiratory: Uneventful            Sign Out: Acceptable/Baseline resp. status   CV/Hemodynamics: Uneventful            Sign Out: Acceptable CV status; No obvious hypovolemia; No obvious fluid overload   Other NRE: NONE   DID A NON-ROUTINE EVENT OCCUR? No           Last vitals:  There were no vitals filed for this visit.    Last vitals prior to Anesthesia Care Transfer:      Electronically Signed By: Paulo Caceres MD  May 14, 2021  10:58 AM

## 2021-05-14 NOTE — PRE-PROCEDURE
GENERAL PRE-PROCEDURE:   Procedure:  VIVEK with possible cardioversion  Date/Time:  5/14/2021 9:17 AM    Verbal consent obtained?: Yes    Written consent obtained?: Yes    Risks and benefits: Risks, benefits and alternatives were discussed    DC Plan: Appropriate discharge home plan in place for patients who are going home after procedure   Consent given by:  Patient  Patient states understanding of procedure being performed: Yes    Patient's understanding of procedure matches consent: Yes    Procedure consent matches procedure scheduled: Yes    Expected level of sedation:  Moderate  Appropriately NPO:  Yes  ASA Class:  Class 2- mild systemic disease, no acute problems, no functional limitations  Mallampati  :  Grade 2- soft palate, base of uvula, tonsillar pillars, and portion of posterior pharyngeal wall visible  Lungs:  Lungs clear with good breath sounds bilaterally  Heart:  A-fib  History & Physical reviewed:  History and physical reviewed and no updates needed  Statement of review:  I have reviewed the lab findings, diagnostic data, medications, and the plan for sedation

## 2021-05-15 LAB — INTERPRETATION ECG - MUSE: NORMAL

## 2021-05-17 ENCOUNTER — TELEPHONE (OUTPATIENT)
Dept: CARDIOLOGY | Facility: CLINIC | Age: 66
End: 2021-05-17

## 2021-05-17 DIAGNOSIS — I48.0 PAROXYSMAL ATRIAL FIBRILLATION (H): Primary | ICD-10-CM

## 2021-05-17 NOTE — TELEPHONE ENCOUNTER
Spoke to patient in follow up to successful VIVEK/cardioversion completed on 5/14.  Patient reports feeling well.  Reviewed follow up appt with patient.  No echo needed as he had VIVEK done.with procedure.  Patient requesting refill for his xarelto.  Sent to preferred pharmacy.  KUNAL Hawley

## 2021-05-18 ENCOUNTER — ANESTHESIA (OUTPATIENT)
Dept: SURGERY | Facility: CLINIC | Age: 66
End: 2021-05-18

## 2021-05-18 LAB — INTERPRETATION ECG - MUSE: NORMAL

## 2021-05-25 NOTE — PROGRESS NOTES
"[facility]    I had the pleasure of seeing Conner when he came for follow up of recent DCCV.  This 66 year old sees Dr. Mejias for his history of:    1. Persistent AFib; nl EF - noted in setting of kidney stones 3/2021. S/p VIVEK/DCCV 5/14/2021 which restored SR. Back in AFib at time of follow-up appt 5/28  2. HTN  3. HL   4. DM  5. CKD  6. CHADSVASc 3 (HTN, DM, aortic plaquing)     Dr. Mejias saw him in consultation 4/29 at which time they discussed new onset AFib in setting of kidney stones. He was started on Xarelto in the ER. ZioPatch 3/30/2021 showed avg  bpm despite atenolol 100. He'd not been compliant with Xarelto and underwent VIVEK/DCCV.     VIVEK 5/14 showed no thrombus; EF 60-65%. Mild MR, mild TR. DCCV with Dr. Almazan restored SR with 1 200 J shock.    Interval History:  Since his DCCV, Conner has felt no different. Doesn't check HR or BP at home. No complaints and was surprised to hear he was back in AFib. No complaints of chest pain, pressure or tightness.  No orthopnea, PND or edema. No change in exercise tolerance or breathing. Overall, he is feeling well.     Remains with L>R LE edema, unchanged    VITALS:  Vitals: /77   Pulse 85   Ht 1.778 m (5' 10\")   Wt 131.5 kg (290 lb)   BMI 41.61 kg/m      Diagnostic Testing:  EKG today, which I overread, showed recurrent AFib 106 bpm  VIVEK 5/14/2021 showed EF 60-65%. Nl LA size. 1+ TR, 1+ MR. No thrombus. Mild Ao sclerosis.  Mild atherosclerotic plaque in desc aorta  ZioPatch 3/30-4/8/2021 on atenolol 100 with AFib avg  bpm (range  bpm). 2 runs of NSVT, longest 9 beats      Plan:  1. Stop Tenoretic  2. Start chlorthalidone 25 mg daily separately  3. Start Metoprolol Xl 100 mg BID  4. 24 hour Holter in 3 weeks. Will contact to review    Assessment/Plan:    1. Persistent AFib; nl EF    Noted 3/2021. Now s/p VIVEK/DCCV but back in AFib after being successful initially.    He's completely unaware of recurrent AFib. No change in " stamina, lightheadedness, SOB, etc    Remains on atenolol 100 mg daily in form of Tenoretic    Remains on Xarelto for CHADSVASc 3 (HTN, DM, aortic plaquing)    Discussed rate vs rhythm control. As asx'c, will attempt the former.    PLAN:    Stop Tenoretic    Start chlorthalidone 25 mg daily separately    Start Metoprolol Xl 100 mg BID    24 hour Holter in 3 weeks. Will contact to review results    Continue Xarelto      2. HTN    BP looks greaet    Remains on Tenoretic 100/25, amlodipine 10, lisinopril 40    PLAN:    Continue current meds but switch the Tenoretic to chlorthalidone and Toprol XL    Krista Fallon PA-C, MSPAS      Orders Placed This Encounter   Procedures     EKG 12-lead complete w/read - Clinics (performed today)     Holter Monitor 24 hour Adult Pediatric     Orders Placed This Encounter   Medications     chlorthalidone (HYGROTON) 25 MG tablet     Sig: Take 1 tablet (25 mg) by mouth daily     Dispense:  90 tablet     Refill:  3     Replaces atenolol/chlorthalidone 100/25     metoprolol succinate ER (TOPROL-XL) 100 MG 24 hr tablet     Sig: Take 1 tablet (100 mg) by mouth daily     Dispense:  60 tablet     Refill:  5     Replaces the atenolol/chlorthalidone combo pill. Separate chlorthalidone Rx sent in     Medications Discontinued During This Encounter   Medication Reason     atenolol-chlorthalidone (TENORETIC) 100-25 MG tablet          Encounter Diagnosis   Name Primary?     Persistent atrial fibrillation (H)        CURRENT MEDICATIONS:  Current Outpatient Medications   Medication Sig Dispense Refill     amLODIPine (NORVASC) 10 MG tablet Take 1 tablet (10 mg) by mouth daily 90 tablet 0     atorvastatin (LIPITOR) 10 MG tablet Take 1 tablet (10 mg) by mouth daily LAST REFILL NEEDS office VISIT AND LABS 90 tablet 0     blood glucose (NO BRAND SPECIFIED) test strip Use to test blood sugar 1 times daily or as directed. To accompany: Blood Glucose Monitor Brands: per insurance. 100 strip 6     blood glucose  "calibration (NO BRAND SPECIFIED) solution To accompany: Blood Glucose Monitor Brands: per insurance. 1 Bottle 3     blood glucose monitoring (NO BRAND SPECIFIED) meter device kit Use to test blood sugar 1 times daily or as directed. : per insurance. 1 kit 0     chlorthalidone (HYGROTON) 25 MG tablet Take 1 tablet (25 mg) by mouth daily 90 tablet 3     glipiZIDE (GLUCOTROL XL) 10 MG 24 hr tablet Take 1 tablet (10 mg) by mouth daily 90 tablet 0     lisinopril (ZESTRIL) 40 MG tablet Take 1 tablet (40 mg) by mouth daily 90 tablet 0     metFORMIN (GLUCOPHAGE-XR) 500 MG 24 hr tablet Take 4 tablets (2,000 mg) by mouth daily (with dinner) 360 tablet 0     metoprolol succinate ER (TOPROL-XL) 100 MG 24 hr tablet Take 1 tablet (100 mg) by mouth daily 60 tablet 5     rivaroxaban ANTICOAGULANT (XARELTO ANTICOAGULANT) 20 MG TABS tablet Take 1 tablet (20 mg) by mouth daily (with dinner) 30 tablet 3     thin (NO BRAND SPECIFIED) lancets Use with lanceting device. To accompany: Blood Glucose Monitor Brands: per insurance. 1 each 6     alcohol swab prep pads Use to swab area of injection/fariha as directed. 100 each 3       ALLERGIES     Allergies   Allergen Reactions     No Known Allergies          Review of Systems:  Skin:  Negative     Eyes:  Positive for glasses  ENT:  Negative    Respiratory:  Positive for dyspnea on exertion  Cardiovascular:  Negative for;palpitations;lightheadedness;dizziness;fatigue;chest pain Positive for;edema  Gastroenterology: Negative for melena;hematochezia  Genitourinary:  Positive for nocturia  Musculoskeletal:  Positive for joint pain  Neurologic:  Positive for numbness or tingling of feet  Psychiatric:  Negative    Heme/Lymph/Imm:  Negative    Endocrine:  Positive for diabetes    Physical Exam:  Vitals: /77   Pulse 85   Ht 1.778 m (5' 10\")   Wt 131.5 kg (290 lb)   BMI 41.61 kg/m      Constitutional:  cooperative, alert and oriented, well developed, well nourished, in no acute distress    "     Skin:  warm and dry to the touch, no apparent skin lesions or masses noted        Head:  normocephalic, no masses or lesions        Eyes:  pupils equal and round;conjunctivae and lids unremarkable;sclera white        ENT:  not assessed this visit        Neck:  JVP normal;no carotid bruit        Chest:  normal breath sounds, clear to auscultation, normal A-P diameter, normal symmetry, normal respiratory excursion, no use of accessory muscles        Cardiac:   tachycardic;irregularly irregular rhythm                Abdomen:  abdomen soft obese      Vascular: pulses full and equal                                      Extremities and Back:  no deformities, clubbing, cyanosis, erythema observed        Neurological:  no gross motor deficits            PAST MEDICAL HISTORY:  Past Medical History:   Diagnosis Date     Diabetes (H)      HTN      Hyperlipidemia        PAST SURGICAL HISTORY:  Past Surgical History:   Procedure Laterality Date     COLONOSCOPY N/A 2018    Procedure: COMBINED COLONOSCOPY, SINGLE OR MULTIPLE BIOPSY/POLYPECTOMY BY BIOPSY;;  Surgeon: Lawrence Mata MD;  Location:  GI       FAMILY HISTORY:  Family History   Problem Relation Age of Onset     Diabetes Mother         dx at 70     Bladder Cancer Brother        SOCIAL HISTORY:  Social History     Socioeconomic History     Marital status:      Spouse name: None     Number of children: None     Years of education: None     Highest education level: None   Occupational History     None   Social Needs     Financial resource strain: None     Food insecurity     Worry: None     Inability: None     Transportation needs     Medical: None     Non-medical: None   Tobacco Use     Smoking status: Former Smoker     Packs/day: 3.00     Years: 16.00     Pack years: 48.00     Types: Cigarettes     Start date:      Quit date: 1987     Years since quittin.7     Smokeless tobacco: Never Used   Substance and Sexual Activity     Alcohol use:  No     Drug use: No     Sexual activity: Yes     Partners: Female   Lifestyle     Physical activity     Days per week: None     Minutes per session: None     Stress: None   Relationships     Social connections     Talks on phone: None     Gets together: None     Attends Anabaptism service: None     Active member of club or organization: None     Attends meetings of clubs or organizations: None     Relationship status: None     Intimate partner violence     Fear of current or ex partner: None     Emotionally abused: None     Physically abused: None     Forced sexual activity: None   Other Topics Concern     Parent/sibling w/ CABG, MI or angioplasty before 65F 55M? Yes      Service No     Blood Transfusions No     Caffeine Concern No     Occupational Exposure No     Hobby Hazards No     Sleep Concern No     Stress Concern No     Weight Concern Yes     Special Diet No     Back Care No     Exercise Yes     Bike Helmet No     Comment: n/a     Seat Belt Yes     Self-Exams No   Social History Narrative     None

## 2021-05-28 ENCOUNTER — DOCUMENTATION ONLY (OUTPATIENT)
Dept: CARDIOLOGY | Facility: CLINIC | Age: 66
End: 2021-05-28

## 2021-05-28 ENCOUNTER — OFFICE VISIT (OUTPATIENT)
Dept: CARDIOLOGY | Facility: CLINIC | Age: 66
End: 2021-05-28
Attending: INTERNAL MEDICINE
Payer: COMMERCIAL

## 2021-05-28 VITALS
DIASTOLIC BLOOD PRESSURE: 77 MMHG | WEIGHT: 290 LBS | SYSTOLIC BLOOD PRESSURE: 114 MMHG | HEART RATE: 85 BPM | HEIGHT: 70 IN | BODY MASS INDEX: 41.52 KG/M2

## 2021-05-28 DIAGNOSIS — I48.19 PERSISTENT ATRIAL FIBRILLATION (H): ICD-10-CM

## 2021-05-28 PROCEDURE — 93000 ELECTROCARDIOGRAM COMPLETE: CPT | Performed by: PHYSICIAN ASSISTANT

## 2021-05-28 PROCEDURE — 99214 OFFICE O/P EST MOD 30 MIN: CPT | Performed by: PHYSICIAN ASSISTANT

## 2021-05-28 RX ORDER — METOPROLOL SUCCINATE 100 MG/1
100 TABLET, EXTENDED RELEASE ORAL DAILY
Qty: 60 TABLET | Refills: 5 | Status: SHIPPED | OUTPATIENT
Start: 2021-05-28 | End: 2021-06-21

## 2021-05-28 RX ORDER — CHLORTHALIDONE 25 MG/1
25 TABLET ORAL DAILY
Qty: 90 TABLET | Refills: 3 | Status: ON HOLD | OUTPATIENT
Start: 2021-05-28 | End: 2022-02-20

## 2021-05-28 ASSESSMENT — MIFFLIN-ST. JEOR: SCORE: 2111.68

## 2021-05-28 NOTE — PATIENT INSTRUCTIONS
Conner - it was good to see you today!    1. Reviewed that EKG today showed you're back in AFib despite the cardioversion  2. Discussed that we could keep you in AFib and just keep heart rate down (RATE CONTROL STRATEGY) vs trying to get you back in normal rhythm with arrhythmia medication (RHYTHM CONTROL STRATEGY).  Agreed that as you are feeling good without any awareness of AFib, will do the former (rate control strategy)    PLAN:  1. Stop atenolol/chlorthalidone (tenoretic) 100/25 daily  2. Start chlorthalidone 25 mg separately  3. Start metoprolol  mg twice a day (this replaces the atenolol)  4. Continue Xarelto  5. CALL if issues prior!    6. In 3 weeks, get 24 hour monitor to assess heart rate control    [phone] - AFib nurses Ibis Qureshi and Llay  [phone] #2  - on-call over holidays/nights

## 2021-05-28 NOTE — PROGRESS NOTES
Dr. Randell LANGE      I saw Conner back in follow-up post VIVEK/DCCV done 5/14. Initially successful, back in AFib at 106 bpm today.    Completely asx'c.  HR today too high 106 on EKG  EF wnl on VIVEK 60-65%  On Xarelto for CHADSVASc 3    Discussed rhythm control with stress test following by flecainide (multiple RFs for CAD) vs Amio load and repeat DCCV VS rate control    Pt opted for rate control given asx'c status.     I switched Tenoretic to metoprolol XL and Holter will be worn in 3 weeks.    Wanted to run it by you as he's pretty young, just completely asx'c. Let me know if you'd like rhythm control with AAD instead    Thx Krista      Your note from 4/29/2021:  Electrophysiology/ Clinic Note          H&P and Plan:      REASON FOR VISIT: Electrophysiology evaluation.      HISTORY OF PRESENT ILLNESS: This is a pleasant 66-year-old gentleman with a history of hypertension, hyperlipidemia, chronic kidney disease and diabetes, who is here for evaluation of atrial fibrillation.     He was recent evaluated in the ED with abdominal pain (3/30/2021) and was found to have kidney stones.  In the ED, he was found to be in A. fib with RVR (which was a new diagnosis for him).  He was treated with 2000 which control his heart rate and he was started on Xarelto.       Today, he informs he is doing well.  He continues to be in A. fib, however he appears to be completely asymptomatic with atrial fibrillation.     Denies any symptoms of chest pain, shortness of breath, palpitations, lightheadedness, near-syncope or syncope.     Previous EKG shows atrial fibrillation.     PREVIOUS STUDIES (personally reviewed):  -ZIO Patch monitor (3/30/2021): Persist atrial fibrillation with average heart rate of 110 bpm.      ASSESSMENT AND PLAN:   1.  Persistent atrial fibrillation.  Patient seems to be asymptomatic with A. fib and the duration of atrial fibrillation is unknown at this time.  Heart rate was mildly evaded on monitor.     We discussed  options including continue rate control strategy or pursue a cardioversion.  Since this is the first time that he has diagnosis of A. fib, I favor an attempt of cardioversion. Additionally, he confessed that he has not been very compliant with Xarelto.  Therefore, I favor VIVEK/cardioversion.     I was explained procedure in details and he understands that his 1% risk of cases here procedure.  At this time, he will like to think about it.  He will contact our office in 1-2 weeks to let us know if you like to have VIVEK cardioversion.     The meantime, I recommend obtaining an echocardiogram and continue current therapy with atenolol.     2.  Embolic prevention.  CHADS-VASc score of 3.  Continue anticoagulation with Eliquis indefinitely.       Kj Mejias MD

## 2021-05-28 NOTE — LETTER
"5/28/2021    Trenton Angulo MD  [facility] [place] [address] [place] [facility]  [facility] [id number]    RE: Booker Brown       Dear Colleague,    I had the pleasure of seeing Booker MICHELLE Kevin in the [facility].    [facility]    I had the pleasure of seeing Conner when he came for follow up of recent DCCV.  This 66 year old sees Dr. Mejias for his history of:    1. Persistent AFib; nl EF - noted in setting of kidney stones 3/2021. S/p VIVEK/DCCV 5/14/2021 which restored SR. Back in AFib at time of follow-up appt 5/28  2. HTN  3. HL   4. DM  5. CKD  6. CHADSVASc 3 (HTN, DM, aortic plaquing)     Dr. Mejias saw him in consultation 4/29 at which time they discussed new onset AFib in setting of kidney stones. He was started on Xarelto in the ER. ZioPatch 3/30/2021 showed avg  bpm despite atenolol 100. He'd not been compliant with Xarelto and underwent VIVEK/DCCV.     VIVEK 5/14 showed no thrombus; EF 60-65%. Mild MR, mild TR. DCCV with Dr. Almazan restored SR with 1 200 J shock.    Interval History:  Since his DCCV, Conner has felt no different. Doesn't check HR or BP at home. No complaints and was surprised to hear he was back in AFib. No complaints of chest pain, pressure or tightness.  No orthopnea, PND or edema. No change in exercise tolerance or breathing. Overall, he is feeling well.     Remains with L>R LE edema, unchanged    VITALS:  Vitals: /77   Pulse 85   Ht 1.778 m (5' 10\")   Wt 131.5 kg (290 lb)   BMI 41.61 kg/m      Diagnostic Testing:  EKG today, which I overread, showed recurrent AFib 106 bpm  VIVEK 5/14/2021 showed EF 60-65%. Nl LA size. 1+ TR, 1+ MR. No thrombus. Mild Ao sclerosis.  Mild atherosclerotic plaque in desc aorta  ZioPatch 3/30-4/8/2021 on atenolol 100 with AFib avg  bpm (range  bpm). 2 runs of NSVT, longest 9 beats      Plan:  1. Stop Tenoretic  2. Start chlorthalidone 25 mg " daily separately  3. Start Metoprolol Xl 100 mg BID  4. 24 hour Holter in 3 weeks. Will contact to review    Assessment/Plan:    1. Persistent AFib; nl EF    Noted 3/2021. Now s/p VIVEK/DCCV but back in AFib after being successful initially.    He's completely unaware of recurrent AFib. No change in stamina, lightheadedness, SOB, etc    Remains on atenolol 100 mg daily in form of Tenoretic    Remains on Xarelto for CHADSVASc 3 (HTN, DM, aortic plaquing)    Discussed rate vs rhythm control. As asx'c, will attempt the former.    PLAN:    Stop Tenoretic    Start chlorthalidone 25 mg daily separately    Start Metoprolol Xl 100 mg BID    24 hour Holter in 3 weeks. Will contact to review results    Continue Xarelto      2. HTN    BP looks greaet    Remains on Tenoretic 100/25, amlodipine 10, lisinopril 40    PLAN:    Continue current meds but switch the Tenoretic to chlorthalidone and Toprol XL    Krista Fallon PA-C, MSPAS      Orders Placed This Encounter   Procedures     EKG 12-lead complete w/read - Clinics (performed today)     Holter Monitor 24 hour Adult Pediatric     Orders Placed This Encounter   Medications     chlorthalidone (HYGROTON) 25 MG tablet     Sig: Take 1 tablet (25 mg) by mouth daily     Dispense:  90 tablet     Refill:  3     Replaces atenolol/chlorthalidone 100/25     metoprolol succinate ER (TOPROL-XL) 100 MG 24 hr tablet     Sig: Take 1 tablet (100 mg) by mouth daily     Dispense:  60 tablet     Refill:  5     Replaces the atenolol/chlorthalidone combo pill. Separate chlorthalidone Rx sent in     Medications Discontinued During This Encounter   Medication Reason     atenolol-chlorthalidone (TENORETIC) 100-25 MG tablet          Encounter Diagnosis   Name Primary?     Persistent atrial fibrillation (H)        CURRENT MEDICATIONS:  Current Outpatient Medications   Medication Sig Dispense Refill     amLODIPine (NORVASC) 10 MG tablet Take 1 tablet (10 mg) by mouth daily 90 tablet 0     atorvastatin  (LIPITOR) 10 MG tablet Take 1 tablet (10 mg) by mouth daily LAST REFILL NEEDS office VISIT AND LABS 90 tablet 0     blood glucose (NO BRAND SPECIFIED) test strip Use to test blood sugar 1 times daily or as directed. To accompany: Blood Glucose Monitor Brands: per insurance. 100 strip 6     blood glucose calibration (NO BRAND SPECIFIED) solution To accompany: Blood Glucose Monitor Brands: per insurance. 1 Bottle 3     blood glucose monitoring (NO BRAND SPECIFIED) meter device kit Use to test blood sugar 1 times daily or as directed. : per insurance. 1 kit 0     chlorthalidone (HYGROTON) 25 MG tablet Take 1 tablet (25 mg) by mouth daily 90 tablet 3     glipiZIDE (GLUCOTROL XL) 10 MG 24 hr tablet Take 1 tablet (10 mg) by mouth daily 90 tablet 0     lisinopril (ZESTRIL) 40 MG tablet Take 1 tablet (40 mg) by mouth daily 90 tablet 0     metFORMIN (GLUCOPHAGE-XR) 500 MG 24 hr tablet Take 4 tablets (2,000 mg) by mouth daily (with dinner) 360 tablet 0     metoprolol succinate ER (TOPROL-XL) 100 MG 24 hr tablet Take 1 tablet (100 mg) by mouth daily 60 tablet 5     rivaroxaban ANTICOAGULANT (XARELTO ANTICOAGULANT) 20 MG TABS tablet Take 1 tablet (20 mg) by mouth daily (with dinner) 30 tablet 3     thin (NO BRAND SPECIFIED) lancets Use with lanceting device. To accompany: Blood Glucose Monitor Brands: per insurance. 1 each 6     alcohol swab prep pads Use to swab area of injection/fariha as directed. 100 each 3       ALLERGIES     Allergies   Allergen Reactions     No Known Allergies          Review of Systems:  Skin:  Negative     Eyes:  Positive for glasses  ENT:  Negative    Respiratory:  Positive for dyspnea on exertion  Cardiovascular:  Negative for;palpitations;lightheadedness;dizziness;fatigue;chest pain Positive for;edema  Gastroenterology: Negative for melena;hematochezia  Genitourinary:  Positive for nocturia  Musculoskeletal:  Positive for joint pain  Neurologic:  Positive for numbness or tingling of  "feet  Psychiatric:  Negative    Heme/Lymph/Imm:  Negative    Endocrine:  Positive for diabetes    Physical Exam:  Vitals: /77   Pulse 85   Ht 1.778 m (5' 10\")   Wt 131.5 kg (290 lb)   BMI 41.61 kg/m      Constitutional:  cooperative, alert and oriented, well developed, well nourished, in no acute distress        Skin:  warm and dry to the touch, no apparent skin lesions or masses noted        Head:  normocephalic, no masses or lesions        Eyes:  pupils equal and round;conjunctivae and lids unremarkable;sclera white        ENT:  not assessed this visit        Neck:  JVP normal;no carotid bruit        Chest:  normal breath sounds, clear to auscultation, normal A-P diameter, normal symmetry, normal respiratory excursion, no use of accessory muscles        Cardiac:   tachycardic;irregularly irregular rhythm                Abdomen:  abdomen soft obese      Vascular: pulses full and equal                                      Extremities and Back:  no deformities, clubbing, cyanosis, erythema observed        Neurological:  no gross motor deficits            PAST MEDICAL HISTORY:  Past Medical History:   Diagnosis Date     Diabetes (H)      HTN      Hyperlipidemia        PAST SURGICAL HISTORY:  Past Surgical History:   Procedure Laterality Date     COLONOSCOPY N/A 8/24/2018    Procedure: COMBINED COLONOSCOPY, SINGLE OR MULTIPLE BIOPSY/POLYPECTOMY BY BIOPSY;;  Surgeon: Lawrence Mata MD;  Location:  GI       FAMILY HISTORY:  Family History   Problem Relation Age of Onset     Diabetes Mother         dx at 70     Bladder Cancer Brother        SOCIAL HISTORY:  Social History     Socioeconomic History     Marital status:      Spouse name: None     Number of children: None     Years of education: None     Highest education level: None   Occupational History     None   Social Needs     Financial resource strain: None     Food insecurity     Worry: None     Inability: None     Transportation needs     " Medical: None     Non-medical: None   Tobacco Use     Smoking status: Former Smoker     Packs/day: 3.00     Years: 16.00     Pack years: 48.00     Types: Cigarettes     Start date:      Quit date: 1987     Years since quittin.7     Smokeless tobacco: Never Used   Substance and Sexual Activity     Alcohol use: No     Drug use: No     Sexual activity: Yes     Partners: Female   Lifestyle     Physical activity     Days per week: None     Minutes per session: None     Stress: None   Relationships     Social connections     Talks on phone: None     Gets together: None     Attends Pentecostal service: None     Active member of club or organization: None     Attends meetings of clubs or organizations: None     Relationship status: None     Intimate partner violence     Fear of current or ex partner: None     Emotionally abused: None     Physically abused: None     Forced sexual activity: None   Other Topics Concern     Parent/sibling w/ CABG, MI or angioplasty before 65F 55M? Yes      Service No     Blood Transfusions No     Caffeine Concern No     Occupational Exposure No     Hobby Hazards No     Sleep Concern No     Stress Concern No     Weight Concern Yes     Special Diet No     Back Care No     Exercise Yes     Bike Helmet No     Comment: n/a     Seat Belt Yes     Self-Exams No   Social History Narrative     None     Thank you for allowing me to participate in the care of your patient.      Sincerely,     Ping Fallon PA-C     [facility]    cc:   Kj Mejias MD  [address]  [place] [address]

## 2021-06-02 NOTE — PROGRESS NOTES
Kj Mejias MD  You 20 hours ago (9:13 AM)     He has an incomplete RBBB pattern and baseline EKG.  It is very likely that he would not tolerate flecainide.  We should consider sotalol load/cardioversion.     Kj Antonio text

## 2021-06-02 NOTE — PROGRESS NOTES
OK - will see him back to review Holter and discuss inpatient Sotalol.  He's terrance damian'ottoniel with recurrent AFib but young.    Julianne Driscoll

## 2021-06-16 ENCOUNTER — HOSPITAL ENCOUNTER (OUTPATIENT)
Dept: CARDIOLOGY | Facility: CLINIC | Age: 66
Discharge: HOME | End: 2021-06-16
Attending: PHYSICIAN ASSISTANT | Admitting: PHYSICIAN ASSISTANT
Payer: COMMERCIAL

## 2021-06-16 DIAGNOSIS — I48.19 PERSISTENT ATRIAL FIBRILLATION (H): ICD-10-CM

## 2021-06-16 PROCEDURE — 93227 XTRNL ECG REC<48 HR R&I: CPT | Performed by: INTERNAL MEDICINE

## 2021-06-16 PROCEDURE — 93225 XTRNL ECG REC<48 HRS REC: CPT

## 2021-06-18 ENCOUNTER — DOCUMENTATION ONLY (OUTPATIENT)
Dept: CARDIOLOGY | Facility: CLINIC | Age: 66
End: 2021-06-18

## 2021-06-18 DIAGNOSIS — I48.19 PERSISTENT ATRIAL FIBRILLATION (H): ICD-10-CM

## 2021-06-18 NOTE — PROGRESS NOTES
"Pls call Conner and let him know Holter showed continued AFib but rate control isn't good enough on the metoprolol  mg daily    Avg  bpm, range 66 @ 1140 on 6/17--206 bpm @ 1545 on 6/16    1. How is he feeling? At our OV, he was unaware he was back in AFib as no change in breathing, stamina, etc after DCCV  2. What are BPs running?    I'll adjust meds depending on the above.  Thx!  Krista    ADDENDUM: LM for patient to call back to discuss. Laly Warren RN Cardiology June 18, 2021, 8:22 AM    ADDENDUM: Pt called back to discuss. States he feels great--has not really ever felt different due to the AFib. States he has not taken his BP since having his monitor placed. States it has been \"fine\" though prior to that--did not have numbers for me. Will discuss with Krista BOSWELL for recommendations. Laly Warren RN Cardiology June 21, 2021, 10:01 AM    "

## 2021-06-21 RX ORDER — METOPROLOL SUCCINATE 100 MG/1
100 TABLET, EXTENDED RELEASE ORAL 2 TIMES DAILY
Qty: 180 TABLET | Refills: 3 | Status: SHIPPED | OUTPATIENT
Start: 2021-06-21 | End: 2022-08-23

## 2021-06-21 NOTE — PROGRESS NOTES
Pls have him increase metoprolol XL to 100 mg BID.     Start checking BP 2-3 times/day and write down.    Pls call him 2 weeks to get update on BP and HR averages.    Julianne eVlasco    ADDENDUM: Pt notified of increase in Metoprolol to BID and request to check his BPs at home. Pt states he does not have a cuff at home. Disc that he should think about getting one that he could easily use himself. He will look at the pharmacy when he picks up his meds today. If he is unable to get a cuff, he will call and let me know. Laly Warren RN Cardiology June 21, 2021, 1:23 PM'

## 2021-07-14 DIAGNOSIS — E11.59 TYPE 2 DIABETES WITH CIRCULATORY DISORDER CAUSING ERECTILE DYSFUNCTION (H): ICD-10-CM

## 2021-07-14 DIAGNOSIS — N52.1 TYPE 2 DIABETES WITH CIRCULATORY DISORDER CAUSING ERECTILE DYSFUNCTION (H): ICD-10-CM

## 2021-07-14 DIAGNOSIS — I10 ESSENTIAL HYPERTENSION WITH GOAL BLOOD PRESSURE LESS THAN 140/90: ICD-10-CM

## 2021-07-14 RX ORDER — AMLODIPINE BESYLATE 10 MG/1
TABLET ORAL
Qty: 90 TABLET | Refills: 0 | Status: SHIPPED | OUTPATIENT
Start: 2021-07-14 | End: 2021-11-09

## 2021-07-14 RX ORDER — GLIPIZIDE 10 MG/1
TABLET, FILM COATED, EXTENDED RELEASE ORAL
Qty: 90 TABLET | Refills: 0 | Status: SHIPPED | OUTPATIENT
Start: 2021-07-14 | End: 2021-11-09

## 2021-07-14 RX ORDER — ATORVASTATIN CALCIUM 10 MG/1
10 TABLET, FILM COATED ORAL DAILY
Qty: 90 TABLET | Refills: 2 | Status: SHIPPED | OUTPATIENT
Start: 2021-07-14 | End: 2022-05-03

## 2021-07-14 NOTE — TELEPHONE ENCOUNTER
Failed protocol.    please route to  team if patient needs an appointment     Ok per protocol to refill Lipitor      Laly TAMAYORN BSN  [facility]  [phone]

## 2021-09-24 ENCOUNTER — TELEPHONE (OUTPATIENT)
Dept: CARDIOLOGY | Facility: CLINIC | Age: 66
End: 2021-09-24

## 2021-09-24 DIAGNOSIS — I48.0 PAROXYSMAL ATRIAL FIBRILLATION (H): ICD-10-CM

## 2021-09-24 NOTE — TELEPHONE ENCOUNTER
Reason for Call:  Other prescription    Detailed comments: patient called and states that his pharmacy doesn't have refills left on medicatio Zerelto.  But his My Chart states does have refills.  Prescribed by Dr. Fallon at [facility].  Patient has 3 days left.  If needed, would like to pickup today at [facility] [employer] [address] and [facility].  Please contact patient.  Thank you.    Phone Number Patient can be reached at: Home number on file [phone] (home)    Best Time: any    Can we leave a detailed message on this number? YES    Call taken on 9/24/2021 at 7:24 AM by Nighat Camacho

## 2021-10-07 ENCOUNTER — ANCILLARY PROCEDURE (OUTPATIENT)
Dept: GENERAL RADIOLOGY | Facility: CLINIC | Age: 66
End: 2021-10-07
Attending: INTERNAL MEDICINE
Payer: COMMERCIAL

## 2021-10-07 ENCOUNTER — OFFICE VISIT (OUTPATIENT)
Dept: INTERNAL MEDICINE | Facility: CLINIC | Age: 66
End: 2021-10-07
Payer: COMMERCIAL

## 2021-10-07 VITALS
SYSTOLIC BLOOD PRESSURE: 126 MMHG | DIASTOLIC BLOOD PRESSURE: 82 MMHG | TEMPERATURE: 98 F | OXYGEN SATURATION: 99 % | BODY MASS INDEX: 42.47 KG/M2 | WEIGHT: 296 LBS | HEART RATE: 109 BPM

## 2021-10-07 DIAGNOSIS — M25.562 CHRONIC PAIN OF BOTH KNEES: Primary | ICD-10-CM

## 2021-10-07 DIAGNOSIS — I48.19 PERSISTENT ATRIAL FIBRILLATION (H): ICD-10-CM

## 2021-10-07 DIAGNOSIS — E66.01 MORBID OBESITY (H): ICD-10-CM

## 2021-10-07 DIAGNOSIS — M25.561 CHRONIC PAIN OF BOTH KNEES: Primary | ICD-10-CM

## 2021-10-07 DIAGNOSIS — G89.29 CHRONIC PAIN OF BOTH KNEES: ICD-10-CM

## 2021-10-07 DIAGNOSIS — M25.561 CHRONIC PAIN OF BOTH KNEES: ICD-10-CM

## 2021-10-07 DIAGNOSIS — G89.29 CHRONIC PAIN OF BOTH KNEES: Primary | ICD-10-CM

## 2021-10-07 DIAGNOSIS — Z23 NEED FOR VACCINATION: ICD-10-CM

## 2021-10-07 DIAGNOSIS — N18.32 STAGE 3B CHRONIC KIDNEY DISEASE (H): ICD-10-CM

## 2021-10-07 DIAGNOSIS — M25.562 CHRONIC PAIN OF BOTH KNEES: ICD-10-CM

## 2021-10-07 PROCEDURE — 73562 X-RAY EXAM OF KNEE 3: CPT | Mod: LT | Performed by: RADIOLOGY

## 2021-10-07 PROCEDURE — 73562 X-RAY EXAM OF KNEE 3: CPT | Mod: RT | Performed by: RADIOLOGY

## 2021-10-07 PROCEDURE — 90682 RIV4 VACC RECOMBINANT DNA IM: CPT | Performed by: INTERNAL MEDICINE

## 2021-10-07 PROCEDURE — 90471 IMMUNIZATION ADMIN: CPT | Performed by: INTERNAL MEDICINE

## 2021-10-07 PROCEDURE — 99214 OFFICE O/P EST MOD 30 MIN: CPT | Mod: 25 | Performed by: INTERNAL MEDICINE

## 2021-10-07 NOTE — PATIENT INSTRUCTIONS
- I will send you a message on [employer] when I am able to look at the results of your Xrays from today

## 2021-10-07 NOTE — PROGRESS NOTES
Assessment & Plan     Chronic pain of both knees  DDX includes OA vs meniscal damage vs other. Favor OA. No past XR of knees. Will check 3v of both given pain with patellar pressure bilaterally. Not a good NSAID candidate given he's anticoagulated. He has found benefit in the past from Voltaren gel when he hurt his hip. He'll try that on his knees. Sports med referral placed in case he'd like to discuss knee injections in the future.  - XR Knee Right 3 Views; Future  - XR Knee Left 3 Views; Future  - Orthopedic  Referral; Future    Persistent atrial fibrillation (H)  Had a long discussion today regarding his a-fib. I discussed what a-fib is and why we care about it, including complications seen such as strokes, RVR, etc. He has seen cardiology in the past. Encouraged him to reach out to them to discuss any follow-up plan that's needed, though discussed that if he's asymptomatic on metoprolol for rate control and rivaroxaban for anticoagulation that it's reasonable to just continue these medications without pursuing other measures such as another round of cardioversion or an ablation. He'll continue his meds and discuss with his cardiology team.    Stage 3b chronic kidney disease (H)  Known issue that I take into account for their medical decisions, no current exacerbations or new concerns.    Morbid obesity (H)  Known issue that I take into account for their medical decisions, no current exacerbations or new concerns. Discussed that weight loss would help with his knee pain. Would also help with BP, cholesterol.    Need for vaccination  Flu shot given.    Return in about 8 months (around 6/7/2022) for Physical Exam.    Salinas Mora MD  [facility]    Nesha Prieto is a 66 year old who presents for the following health issues:    Musculoskeletal problem/pain  Onset/Duration: many   Description  Location: knee - bilateral  Joint Swelling: no  Redness: no  Pain:  YES  Warmth: no  Intensity:  mild  Progression of Symptoms:  same  Accompanying signs and symptoms:   Fevers: no  Numbness/tingling/weakness: YES, in feet  History  Trauma to the area: YES, recent fall   Recent illness:  no  Previous similar problem: YES  Previous evaluation:  no  Precipitating or alleviating factors:  Aggravating factors include: walking long distances  Therapies tried and outcome:     Also hoping I can listen to his heart to see if he's in a-fib.    Review of Systems   Constitutional, cardiovascular, pulmonary, MSK systems are negative, except as otherwise noted.      Objective    /82   Pulse 109   Temp 98  F (36.7  C) (Tympanic)   Wt 134.3 kg (296 lb)   SpO2 99%   BMI 42.47 kg/m    Body mass index is 42.47 kg/m .     Physical Exam   GENERAL: alert and in no distress.  EYES: conjunctivae/corneas clear. EOMs grossly intact  HENT: NC/AT, facies symmetric.  RESP: CTAB, no w/r/r.  CV: IRR IRR, no m/r/g.  GI: NT, ND, without rebound tenderness or guarding  MSK: Bilateral knees. No joint swelling noted. No overlying erythema noted. Joint line mildly TTP over medial aspects bilaterally. Anterior and posterior drawers negative. Varus/valgus negative. Cynthia negative. Mild pain with patellar pressure bilaterally.  SKIN: No significant ulcers, lesions, or rashes on the visualized portions of the skin  NEURO: Alert. Oriented. Sensation grossly WNL.

## 2021-11-15 ENCOUNTER — APPOINTMENT (OUTPATIENT)
Dept: URGENT CARE | Facility: CLINIC | Age: 66
End: 2021-11-15
Payer: COMMERCIAL

## 2021-11-17 ENCOUNTER — VIRTUAL VISIT (OUTPATIENT)
Dept: INTERNAL MEDICINE | Facility: CLINIC | Age: 66
End: 2021-11-17
Payer: COMMERCIAL

## 2021-11-17 DIAGNOSIS — I10 ESSENTIAL HYPERTENSION: ICD-10-CM

## 2021-11-17 DIAGNOSIS — N18.32 STAGE 3B CHRONIC KIDNEY DISEASE (H): ICD-10-CM

## 2021-11-17 DIAGNOSIS — R42 DIZZINESS: Primary | ICD-10-CM

## 2021-11-17 DIAGNOSIS — I48.19 PERSISTENT ATRIAL FIBRILLATION (H): ICD-10-CM

## 2021-11-17 DIAGNOSIS — E11.65 TYPE 2 DIABETES MELLITUS WITH HYPERGLYCEMIA, WITHOUT LONG-TERM CURRENT USE OF INSULIN (H): ICD-10-CM

## 2021-11-17 PROCEDURE — 99214 OFFICE O/P EST MOD 30 MIN: CPT | Mod: TEL | Performed by: NURSE PRACTITIONER

## 2021-11-17 NOTE — PROGRESS NOTES
Conner is a 66 year old who is being evaluated via a billable telephone visit.      What phone number would you like to be contacted at? [phone]  How would you like to obtain your AVS? Tyler[place]    Assessment & Plan     Dizziness  - Patient is evaluated via Telephone visit.  Reports concern re: dizziness over the past couple of weeks- describing pre-syncopal symptoms.  Endorses fast/pounding HR noticeable when he has these symptoms.  He reports that when he was initially diagnosed with atrial fibrillation, he was asymptomatic.  Per chart review, has mild persistent tachycardia notable on Holter monitor but no arrhythmia. VIVEK results from May, 2021 noted- LVEF 60%, no thrombus noted.   - Differentials include afib with RVR, arrhythmia, vs other.  Discussed with patient that I am very limited as to what evaluation I can do via telephone call and he warrants an EKG/VS and in person evaluation.  Thankfully, he's had no symptoms for the past few days.  Will order CBC/BMP.  Advised patient to come in for in person visit ASAP.  If he has recurrent symptoms, advised ED visit for in person ASAP evaluation.  Patient verbalized an understanding and agrees to plan.   - CBC with platelets  - Basic metabolic panel  (Ca, Cl, CO2, Creat, Gluc, K, Na, BUN)    Persistent atrial fibrillation (H)  - Currently maintained on metoprolol, Xarelto.  Per chart review has had persistent, mild tachycardia  - Rec in person visit for further evaluation, f/u with Cardiology.  May need Event monitor to r/o arrhythmia     Essential hypertension  - Per chart review, appears well controlled, but again, recommend in person visit for evaluation.  Is maintained on metoprolol, lisinopril, chlorthalidone    Stage 3b chronic kidney disease (H)  - Per chart review, creatinine ranges 1.8 range  - Rec in person visit and labs ordered  0956}     BMI:   Estimated body mass index is 42.47 kg/m  as calculated from the following:    Height as of 5/28/21: 1.778 m  "(5' 10\").    Weight as of 10/7/21: 134.3 kg (296 lb).        See Patient Instructions    Return if symptoms worsen or fail to improve, for Please schedule ASAP for in person TERESA Leyva CNP  [facility] NERY Prieto is a 66 year old who presents for the following health issues:  Dizziness.    Conner reports that he's had some episodes of dizziness, lightheadedness (Not room spinning).  He reports this has occurred 3-4 times as of late.  He reports he had some symptoms a few weeks ago, then a week later. Had an episode while at work that lasted 3 hours- was able to continue working but was very uncomfortable.  Will happen without warning- comes and goes.  Sometimes occurs in the morning and then by noon is gone.  On Monday night he thought he was going to pass out- had thought of going to the ED.  Now, over the past 2 days he's felt much better- no episodes of dizziness.  Denies SOB.  Denies CP.  Does feel his heart beating fast and hard when he has dizziness.  Denies blood in stool.  No abd pain.  States he takes his medicine daily, without missed doses.  States he has mild LE edema, which is not new.  Eating/drinking fine.         Review of Systems   CONSTITUTIONAL:NEGATIVE for fever, chills, change in weight  RESP:NEGATIVE for significant cough or SOB  CV: NEGATIVE for chest pain, palpitations or peripheral edema  GI: NEGATIVE for nausea, abdominal pain, heartburn, or change in bowel habits  MUSCULOSKELETAL: NEGATIVE for significant arthralgias or myalgia  NEUROLOGIC:  POSITIVE for dizziness      Objective         Vitals:  No vitals were obtained today due to virtual visit.    Physical Exam   healthy, alert and no distress  PSYCH: Alert and oriented times 3; coherent speech, normal   rate and volume, able to articulate logical thoughts, able   to abstract reason, no tangential thoughts, no hallucinations   or delusions  His affect is normal  RESP: No " "cough, no audible wheezing, able to talk in full sentences  Remainder of exam unable to be completed due to telephone visits    Review of prior tests:  Holter Monitor 6/16/21:  \"Afib with poor VR control; Avg ; Minimum HR 66; Max ; QTc 0.516  TTE 5/14/21:  LVEF 60-65%; Mild mitral regurg; mild tricuspid regurg; no thrombus in LA appendage.  Labs:  5/12/21 reviewed           Phone call duration: 13 minutes  "

## 2021-11-17 NOTE — PATIENT INSTRUCTIONS
-Conner, it's really hard to determine what's causing your dizziness without an in person evaluation.  It's possible that atrial fibrillation can cause symptoms.  It would be helpful to check your vital signs, get an EKG, and labs  -Labs have been ordered.  Please go to lab if you are able to obtain  -Please schedule an in person visit ASAP- I'm new and have availability  -I'm glad your symptoms have improved, but if they do recur, it's ok to go to the ER for an evaluation.

## 2021-11-20 ENCOUNTER — LAB (OUTPATIENT)
Dept: LAB | Facility: CLINIC | Age: 66
End: 2021-11-20
Payer: COMMERCIAL

## 2021-11-20 DIAGNOSIS — R42 DIZZINESS: ICD-10-CM

## 2021-11-20 LAB
ANION GAP SERPL CALCULATED.3IONS-SCNC: 3 MMOL/L (ref 3–14)
BUN SERPL-MCNC: 43 MG/DL (ref 7–30)
CALCIUM SERPL-MCNC: 9.7 MG/DL (ref 8.5–10.1)
CHLORIDE BLD-SCNC: 106 MMOL/L (ref 94–109)
CO2 SERPL-SCNC: 31 MMOL/L (ref 20–32)
CREAT SERPL-MCNC: 1.78 MG/DL (ref 0.52–1.25)
ERYTHROCYTE [DISTWIDTH] IN BLOOD BY AUTOMATED COUNT: 14.3 % (ref 10–15)
GFR SERPL CREATININE-BSD FRML MDRD: 39 ML/MIN/1.73M2
GLUCOSE BLD-MCNC: 134 MG/DL (ref 70–99)
HCT VFR BLD AUTO: 49.7 % (ref 35–53)
HGB BLD-MCNC: 15.7 G/DL (ref 11.7–17.7)
MCH RBC QN AUTO: 27.8 PG (ref 26.5–33)
MCHC RBC AUTO-ENTMCNC: 31.6 G/DL (ref 31.5–36.5)
MCV RBC AUTO: 88 FL (ref 78–100)
PLATELET # BLD AUTO: 264 10E3/UL (ref 150–450)
POTASSIUM BLD-SCNC: 4.4 MMOL/L (ref 3.4–5.3)
RBC # BLD AUTO: 5.64 10E6/UL (ref 3.8–5.9)
SODIUM SERPL-SCNC: 140 MMOL/L (ref 133–144)
WBC # BLD AUTO: 10.4 10E3/UL (ref 4–11)

## 2021-11-20 PROCEDURE — 80048 BASIC METABOLIC PNL TOTAL CA: CPT

## 2021-11-20 PROCEDURE — 85027 COMPLETE CBC AUTOMATED: CPT

## 2021-11-20 PROCEDURE — 36415 COLL VENOUS BLD VENIPUNCTURE: CPT

## 2021-12-19 ENCOUNTER — HEALTH MAINTENANCE LETTER (OUTPATIENT)
Age: 66
End: 2021-12-19

## 2022-01-01 ENCOUNTER — TRANSFERRED RECORDS (OUTPATIENT)
Dept: HEALTH INFORMATION MANAGEMENT | Facility: CLINIC | Age: 67
End: 2022-01-01
Payer: COMMERCIAL

## 2022-01-01 LAB — RETINOPATHY: NORMAL

## 2022-02-09 ENCOUNTER — IMMUNIZATION (OUTPATIENT)
Dept: NURSING | Facility: CLINIC | Age: 67
End: 2022-02-09
Payer: COMMERCIAL

## 2022-02-09 PROCEDURE — 0064A COVID-19,PF,MODERNA (18+ YRS BOOSTER .25ML): CPT

## 2022-02-09 PROCEDURE — 91306 COVID-19,PF,MODERNA (18+ YRS BOOSTER .25ML): CPT

## 2022-02-19 ENCOUNTER — HOSPITAL ENCOUNTER (INPATIENT)
Facility: CLINIC | Age: 67
LOS: 1 days | Discharge: HOME | DRG: 309 | End: 2022-02-20
Attending: EMERGENCY MEDICINE | Admitting: STUDENT IN AN ORGANIZED HEALTH CARE EDUCATION/TRAINING PROGRAM
Payer: COMMERCIAL

## 2022-02-19 ENCOUNTER — ANCILLARY PROCEDURE (OUTPATIENT)
Dept: GENERAL RADIOLOGY | Facility: CLINIC | Age: 67
End: 2022-02-19
Attending: PHYSICIAN ASSISTANT
Payer: COMMERCIAL

## 2022-02-19 ENCOUNTER — OFFICE VISIT (OUTPATIENT)
Dept: URGENT CARE | Facility: URGENT CARE | Age: 67
End: 2022-02-19
Payer: COMMERCIAL

## 2022-02-19 ENCOUNTER — APPOINTMENT (OUTPATIENT)
Dept: GENERAL RADIOLOGY | Facility: CLINIC | Age: 67
DRG: 309 | End: 2022-02-19
Attending: EMERGENCY MEDICINE
Payer: COMMERCIAL

## 2022-02-19 VITALS
SYSTOLIC BLOOD PRESSURE: 124 MMHG | BODY MASS INDEX: 40.18 KG/M2 | RESPIRATION RATE: 15 BRPM | OXYGEN SATURATION: 97 % | WEIGHT: 280 LBS | TEMPERATURE: 97 F | DIASTOLIC BLOOD PRESSURE: 86 MMHG | HEART RATE: 149 BPM

## 2022-02-19 DIAGNOSIS — N18.32 STAGE 3B CHRONIC KIDNEY DISEASE (H): ICD-10-CM

## 2022-02-19 DIAGNOSIS — E66.01 MORBID OBESITY (H): ICD-10-CM

## 2022-02-19 DIAGNOSIS — I48.91 ATRIAL FIBRILLATION WITH TACHYCARDIC VENTRICULAR RATE (H): Primary | ICD-10-CM

## 2022-02-19 DIAGNOSIS — I48.91 ATRIAL FIBRILLATION WITH RVR (H): Primary | ICD-10-CM

## 2022-02-19 DIAGNOSIS — R06.02 SOB (SHORTNESS OF BREATH): ICD-10-CM

## 2022-02-19 DIAGNOSIS — R68.83 CHILLS: ICD-10-CM

## 2022-02-19 DIAGNOSIS — I10 ESSENTIAL HYPERTENSION: ICD-10-CM

## 2022-02-19 DIAGNOSIS — R05.9 COUGH: ICD-10-CM

## 2022-02-19 DIAGNOSIS — E11.65 TYPE 2 DIABETES MELLITUS WITH HYPERGLYCEMIA, WITHOUT LONG-TERM CURRENT USE OF INSULIN (H): ICD-10-CM

## 2022-02-19 PROBLEM — I48.19 PERSISTENT ATRIAL FIBRILLATION (H): Status: ACTIVE | Noted: 2021-05-12

## 2022-02-19 PROBLEM — N18.30 CHRONIC KIDNEY DISEASE, STAGE 3 (H): Status: ACTIVE | Noted: 2021-05-12

## 2022-02-19 LAB
ALBUMIN SERPL-MCNC: 3.2 G/DL (ref 3.4–5)
ALP SERPL-CCNC: 68 U/L (ref 40–150)
ALT SERPL W P-5'-P-CCNC: 25 U/L (ref 0–70)
ANION GAP SERPL CALCULATED.3IONS-SCNC: 9 MMOL/L (ref 3–14)
AST SERPL W P-5'-P-CCNC: 14 U/L (ref 0–45)
ATRIAL RATE - MUSE: 416 BPM
BASOPHILS # BLD AUTO: 0 10E3/UL (ref 0–0.2)
BASOPHILS NFR BLD AUTO: 0 %
BILIRUB SERPL-MCNC: 1.3 MG/DL (ref 0.2–1.3)
BUN SERPL-MCNC: 27 MG/DL (ref 7–30)
CALCIUM SERPL-MCNC: 9.2 MG/DL (ref 8.5–10.1)
CHLORIDE BLD-SCNC: 101 MMOL/L (ref 94–109)
CO2 SERPL-SCNC: 28 MMOL/L (ref 20–32)
CREAT SERPL-MCNC: 1.54 MG/DL (ref 0.52–1.25)
DIASTOLIC BLOOD PRESSURE - MUSE: NORMAL MMHG
EOSINOPHIL # BLD AUTO: 0.1 10E3/UL (ref 0–0.7)
EOSINOPHIL NFR BLD AUTO: 1 %
ERYTHROCYTE [DISTWIDTH] IN BLOOD BY AUTOMATED COUNT: 14.9 % (ref 10–15)
ERYTHROCYTE [SEDIMENTATION RATE] IN BLOOD BY WESTERGREN METHOD: 27 MM/HR (ref 0–30)
FLUAV AG SPEC QL IA: NEGATIVE
FLUBV AG SPEC QL IA: NEGATIVE
GFR SERPL CREATININE-BSD FRML MDRD: 50 ML/MIN/1.73M2
GLUCOSE BLD-MCNC: 200 MG/DL (ref 70–99)
GLUCOSE BLDC GLUCOMTR-MCNC: 233 MG/DL (ref 70–99)
HCT VFR BLD AUTO: 44 % (ref 35–53)
HGB BLD-MCNC: 13.5 G/DL (ref 11.7–17.7)
HOLD SPECIMEN: NORMAL
HOLD SPECIMEN: NORMAL
INTERPRETATION ECG - MUSE: NORMAL
LYMPHOCYTES # BLD AUTO: 1.4 10E3/UL (ref 0.8–5.3)
LYMPHOCYTES NFR BLD AUTO: 13 %
MCH RBC QN AUTO: 26.9 PG (ref 26.5–33)
MCHC RBC AUTO-ENTMCNC: 30.7 G/DL (ref 31.5–36.5)
MCV RBC AUTO: 88 FL (ref 78–100)
MONOCYTES # BLD AUTO: 0.7 10E3/UL (ref 0–1.3)
MONOCYTES NFR BLD AUTO: 7 %
NEUTROPHILS # BLD AUTO: 8.2 10E3/UL (ref 1.6–8.3)
NEUTROPHILS NFR BLD AUTO: 80 %
NT-PROBNP SERPL-MCNC: 3427 PG/ML (ref 0–900)
P AXIS - MUSE: NORMAL DEGREES
PLATELET # BLD AUTO: 289 10E3/UL (ref 150–450)
POTASSIUM BLD-SCNC: 3.5 MMOL/L (ref 3.4–5.3)
PR INTERVAL - MUSE: NORMAL MS
PROT SERPL-MCNC: 7.8 G/DL (ref 6.8–8.8)
QRS DURATION - MUSE: 102 MS
QT - MUSE: 314 MS
QTC - MUSE: 499 MS
R AXIS - MUSE: 91 DEGREES
RBC # BLD AUTO: 5.01 10E6/UL (ref 3.8–5.9)
SARS-COV-2 RNA RESP QL NAA+PROBE: NEGATIVE
SODIUM SERPL-SCNC: 138 MMOL/L (ref 133–144)
SYSTOLIC BLOOD PRESSURE - MUSE: NORMAL MMHG
T AXIS - MUSE: 11 DEGREES
TROPONIN I SERPL HS-MCNC: 27 NG/L
TROPONIN I SERPL HS-MCNC: 33 NG/L
VENTRICULAR RATE- MUSE: 152 BPM
WBC # BLD AUTO: 10.3 10E3/UL (ref 4–11)

## 2022-02-19 PROCEDURE — U0005 INFEC AGEN DETEC AMPLI PROBE: HCPCS | Performed by: PHYSICIAN ASSISTANT

## 2022-02-19 PROCEDURE — 93000 ELECTROCARDIOGRAM COMPLETE: CPT | Performed by: PHYSICIAN ASSISTANT

## 2022-02-19 PROCEDURE — 83880 ASSAY OF NATRIURETIC PEPTIDE: CPT | Performed by: EMERGENCY MEDICINE

## 2022-02-19 PROCEDURE — 71046 X-RAY EXAM CHEST 2 VIEWS: CPT | Performed by: RADIOLOGY

## 2022-02-19 PROCEDURE — 250N000009 HC RX 250: Performed by: STUDENT IN AN ORGANIZED HEALTH CARE EDUCATION/TRAINING PROGRAM

## 2022-02-19 PROCEDURE — 99213 OFFICE O/P EST LOW 20 MIN: CPT | Performed by: PHYSICIAN ASSISTANT

## 2022-02-19 PROCEDURE — 36415 COLL VENOUS BLD VENIPUNCTURE: CPT | Performed by: PHYSICIAN ASSISTANT

## 2022-02-19 PROCEDURE — 87804 INFLUENZA ASSAY W/OPTIC: CPT | Performed by: PHYSICIAN ASSISTANT

## 2022-02-19 PROCEDURE — 96376 TX/PRO/DX INJ SAME DRUG ADON: CPT

## 2022-02-19 PROCEDURE — 96366 THER/PROPH/DIAG IV INF ADDON: CPT

## 2022-02-19 PROCEDURE — 85652 RBC SED RATE AUTOMATED: CPT | Performed by: PHYSICIAN ASSISTANT

## 2022-02-19 PROCEDURE — 84484 ASSAY OF TROPONIN QUANT: CPT | Performed by: EMERGENCY MEDICINE

## 2022-02-19 PROCEDURE — U0003 INFECTIOUS AGENT DETECTION BY NUCLEIC ACID (DNA OR RNA); SEVERE ACUTE RESPIRATORY SYNDROME CORONAVIRUS 2 (SARS-COV-2) (CORONAVIRUS DISEASE [COVID-19]), AMPLIFIED PROBE TECHNIQUE, MAKING USE OF HIGH THROUGHPUT TECHNOLOGIES AS DESCRIBED BY CMS-2020-01-R: HCPCS | Performed by: PHYSICIAN ASSISTANT

## 2022-02-19 PROCEDURE — 258N000003 HC RX IP 258 OP 636: Performed by: STUDENT IN AN ORGANIZED HEALTH CARE EDUCATION/TRAINING PROGRAM

## 2022-02-19 PROCEDURE — 93005 ELECTROCARDIOGRAM TRACING: CPT

## 2022-02-19 PROCEDURE — 210N000002 HC R&B HEART CARE

## 2022-02-19 PROCEDURE — 250N000013 HC RX MED GY IP 250 OP 250 PS 637: Performed by: STUDENT IN AN ORGANIZED HEALTH CARE EDUCATION/TRAINING PROGRAM

## 2022-02-19 PROCEDURE — 99291 CRITICAL CARE FIRST HOUR: CPT | Mod: 25

## 2022-02-19 PROCEDURE — 85025 COMPLETE CBC W/AUTO DIFF WBC: CPT | Performed by: PHYSICIAN ASSISTANT

## 2022-02-19 PROCEDURE — 71046 X-RAY EXAM CHEST 2 VIEWS: CPT

## 2022-02-19 PROCEDURE — 80053 COMPREHEN METABOLIC PANEL: CPT | Performed by: PHYSICIAN ASSISTANT

## 2022-02-19 PROCEDURE — C9803 HOPD COVID-19 SPEC COLLECT: HCPCS

## 2022-02-19 PROCEDURE — 250N000009 HC RX 250: Performed by: EMERGENCY MEDICINE

## 2022-02-19 PROCEDURE — 99223 1ST HOSP IP/OBS HIGH 75: CPT | Mod: AI | Performed by: STUDENT IN AN ORGANIZED HEALTH CARE EDUCATION/TRAINING PROGRAM

## 2022-02-19 PROCEDURE — 36415 COLL VENOUS BLD VENIPUNCTURE: CPT | Performed by: EMERGENCY MEDICINE

## 2022-02-19 PROCEDURE — 258N000003 HC RX IP 258 OP 636: Performed by: EMERGENCY MEDICINE

## 2022-02-19 PROCEDURE — 96365 THER/PROPH/DIAG IV INF INIT: CPT

## 2022-02-19 PROCEDURE — 84484 ASSAY OF TROPONIN QUANT: CPT | Performed by: PHYSICIAN ASSISTANT

## 2022-02-19 RX ORDER — CHLORTHALIDONE 25 MG/1
25 TABLET ORAL DAILY
Status: DISCONTINUED | OUTPATIENT
Start: 2022-02-20 | End: 2022-02-20

## 2022-02-19 RX ORDER — DILTIAZEM HYDROCHLORIDE 5 MG/ML
25 INJECTION INTRAVENOUS ONCE
Status: COMPLETED | OUTPATIENT
Start: 2022-02-19 | End: 2022-02-19

## 2022-02-19 RX ORDER — LISINOPRIL 40 MG/1
40 TABLET ORAL DAILY
Status: DISCONTINUED | OUTPATIENT
Start: 2022-02-20 | End: 2022-02-20 | Stop reason: HOSPADM

## 2022-02-19 RX ORDER — AMLODIPINE BESYLATE 10 MG/1
10 TABLET ORAL DAILY
Status: DISCONTINUED | OUTPATIENT
Start: 2022-02-20 | End: 2022-02-20

## 2022-02-19 RX ORDER — ATORVASTATIN CALCIUM 10 MG/1
10 TABLET, FILM COATED ORAL EVERY EVENING
Status: DISCONTINUED | OUTPATIENT
Start: 2022-02-19 | End: 2022-02-20 | Stop reason: HOSPADM

## 2022-02-19 RX ORDER — NICOTINE POLACRILEX 4 MG
15-30 LOZENGE BUCCAL
Status: DISCONTINUED | OUTPATIENT
Start: 2022-02-19 | End: 2022-02-20 | Stop reason: HOSPADM

## 2022-02-19 RX ORDER — DEXTROSE MONOHYDRATE 25 G/50ML
25-50 INJECTION, SOLUTION INTRAVENOUS
Status: DISCONTINUED | OUTPATIENT
Start: 2022-02-19 | End: 2022-02-20 | Stop reason: HOSPADM

## 2022-02-19 RX ORDER — LIDOCAINE 40 MG/G
CREAM TOPICAL
Status: DISCONTINUED | OUTPATIENT
Start: 2022-02-19 | End: 2022-02-20 | Stop reason: HOSPADM

## 2022-02-19 RX ORDER — ONDANSETRON 4 MG/1
4 TABLET, ORALLY DISINTEGRATING ORAL EVERY 6 HOURS PRN
Status: DISCONTINUED | OUTPATIENT
Start: 2022-02-19 | End: 2022-02-20 | Stop reason: HOSPADM

## 2022-02-19 RX ORDER — ONDANSETRON 2 MG/ML
4 INJECTION INTRAMUSCULAR; INTRAVENOUS EVERY 6 HOURS PRN
Status: DISCONTINUED | OUTPATIENT
Start: 2022-02-19 | End: 2022-02-20 | Stop reason: HOSPADM

## 2022-02-19 RX ORDER — METOPROLOL SUCCINATE 100 MG/1
100 TABLET, EXTENDED RELEASE ORAL 2 TIMES DAILY
Status: DISCONTINUED | OUTPATIENT
Start: 2022-02-19 | End: 2022-02-20 | Stop reason: HOSPADM

## 2022-02-19 RX ADMIN — ATORVASTATIN CALCIUM 10 MG: 10 TABLET, FILM COATED ORAL at 20:29

## 2022-02-19 RX ADMIN — METOPROLOL SUCCINATE 100 MG: 100 TABLET, EXTENDED RELEASE ORAL at 20:29

## 2022-02-19 RX ADMIN — DILTIAZEM HYDROCHLORIDE 25 MG: 5 INJECTION INTRAVENOUS at 13:51

## 2022-02-19 RX ADMIN — DILTIAZEM HYDROCHLORIDE 15 MG/HR: 5 INJECTION INTRAVENOUS at 22:42

## 2022-02-19 RX ADMIN — DILTIAZEM HYDROCHLORIDE 5 MG/HR: 5 INJECTION INTRAVENOUS at 14:23

## 2022-02-19 ASSESSMENT — ENCOUNTER SYMPTOMS
PALPITATIONS: 1
UNEXPECTED WEIGHT CHANGE: 1
SHORTNESS OF BREATH: 1

## 2022-02-19 ASSESSMENT — ACTIVITIES OF DAILY LIVING (ADL)
ADLS_ACUITY_SCORE: 3
ADLS_ACUITY_SCORE: 12
ADLS_ACUITY_SCORE: 3
ADLS_ACUITY_SCORE: 3
ADLS_ACUITY_SCORE: 12
ADLS_ACUITY_SCORE: 12

## 2022-02-19 NOTE — PHARMACY-ADMISSION MEDICATION HISTORY
Pharmacy Medication History  Admission medication history interview status for the 2/19/2022  admission is complete. See EPIC admission navigator for prior to admission medications     Location of Interview: Patient room  Medication history sources: Patient    Significant changes made to the medication list:  No significant changes were made to the medication list.    In the past week, patient estimated taking medication this percent of the time: 50-90% due to other    Additional medication history information:   Patient reports taking Lisinopril and Metformin last night. Surescripts says that they were both last picked up in April of 2021     Medication reconciliation completed by provider prior to medication history? No    Time spent in this activity: 15 minutes    Prior to Admission medications    Medication Sig Last Dose Taking? Auth Provider   amLODIPine (NORVASC) 10 MG tablet Take 1 tablet (10 mg) by mouth daily 2/18/2022 at pm Yes Salinas Bee MD   atorvastatin (LIPITOR) 10 MG tablet Take 1 tablet (10 mg) by mouth daily 2/18/2022 at pm Yes Trenton Angulo MD   chlorthalidone (HYGROTON) 25 MG tablet Take 1 tablet (25 mg) by mouth daily 2/18/2022 at pm Yes Ping Fallon PA-C   glipiZIDE (GLUCOTROL XL) 10 MG 24 hr tablet Take 1 tablet (10 mg) by mouth daily Please make an appointment for further refills 2/18/2022 at pm Yes Salinas Bee MD   lisinopril (ZESTRIL) 40 MG tablet Take 1 tablet (40 mg) by mouth daily 2/18/2022 at pm Yes Trenton Angulo MD   metFORMIN (GLUCOPHAGE-XR) 500 MG 24 hr tablet Take 4 tablets (2,000 mg) by mouth daily (with dinner) 2/18/2022 at pm Yes Trenton Angulo MD   metoprolol succinate ER (TOPROL-XL) 100 MG 24 hr tablet Take 1 tablet (100 mg) by mouth 2 times daily 2/19/2022 at am- about 0900 Yes Ping Fallon PA-C   rivaroxaban ANTICOAGULANT (XARELTO ANTICOAGULANT) 20 MG TABS tablet Take 1 tablet (20 mg) by mouth daily  (with dinner) 2/19/2022 at am-about 0900 Yes Diane Haney, APRN CNP   alcohol swab prep pads Use to swab area of injection/fariha as directed.   Gage Zepeda MD   blood glucose (NO BRAND SPECIFIED) test strip Use to test blood sugar 1 times daily or as directed. To accompany: Blood Glucose Monitor Brands: per insurance.   Gage Zepeda MD   blood glucose calibration (NO BRAND SPECIFIED) solution To accompany: Blood Glucose Monitor Brands: per insurance.   Gage Zepeda MD   blood glucose monitoring (NO BRAND SPECIFIED) meter device kit Use to test blood sugar 1 times daily or as directed. : per insurance.   Gage Zepeda MD   thin (NO BRAND SPECIFIED) lancets Use with lanceting device. To accompany: Blood Glucose Monitor Brands: per insurance.   Gage Zepeda MD       The information provided in this note is only as accurate as the sources available at the time of update(s)

## 2022-02-19 NOTE — ED PROVIDER NOTES
History   Chief Complaint:  Shortness of Breath and Palpitations (tachy )       The history is provided by the patient.      Booker Brown is a 67 year old adult on Xarelto with history of atrial fibrillation who presents for evaluation of palpitations and shortness of breath. The patient states that his shortness of breath initially began in December in addition to congestion, while his congestive symptoms have resolved he continued to have fluctuating shortness of breath since that time. His symptoms have slowly worsened and are now affecting his work performance, patient has a physical job in shipping/receiving and has significant shortness of breath with minimal exertion or speech. Symptoms have also been affecting his appetite and patient states that he has lost ~20 pounds since [place]. His shortness of breath is not exacerbated with certain positions nor presents during the night. He went to urgent care today for evaluation and was subsequently referred to the ED for A fib and tachycardia with heart rate of 149. Here patient denies having the sensation of palpitations but notes that he is typically asymptomatic. He does have history of atrial fibrillation with unsuccessful cardioversion, though this did eventually resolve on its own. Patient has not missed any doses of Xarelto and has been taking the medication as instructed. No chest pain.    Review of Systems   Constitutional: Positive for unexpected weight change (loss).   HENT: Negative for congestion.    Respiratory: Positive for shortness of breath.    Cardiovascular: Positive for palpitations. Negative for chest pain.   All other systems reviewed and are negative.    Allergies:  No Known Drug Allergies    Medications:  Amlodipine  Atorvastatin  Chlorthalidone  Glipizide  Lisinopril  Metformin  Metoprolol succinate  Xarelto    Past Medical History:     Type 2 diabetes mellitus  Hypertension  Hyperlipidemia  GERD  CKD  Atrial fibrillation  Morbid  "obesity    Past Surgical History:    Colonoscopy with biopsy/polypectomy    Family History:    Mother - diabetes  Brother - bladder cancer    Social History:  The patient was not accompanied to the ED.  Marital status:     Physical Exam     Patient Vitals for the past 24 hrs:   BP Temp Temp src Pulse Resp SpO2 Height Weight   02/19/22 1515 (!) 128/91 -- -- (!) 123 11 98 % -- --   02/19/22 1500 120/83 -- -- (!) 123 15 98 % -- --   02/19/22 1430 124/86 -- -- (!) 121 20 98 % -- --   02/19/22 1415 128/87 -- -- (!) 123 27 97 % -- --   02/19/22 1405 -- -- -- 112 19 99 % -- --   02/19/22 1400 (!) 121/90 -- -- 117 20 98 % -- --   02/19/22 1352 -- -- -- (!) 141 25 99 % -- --   02/19/22 1351 (!) 119/95 -- -- (!) 147 24 -- -- --   02/19/22 1332 (!) 159/99 97.9  F (36.6  C) Temporal (!) 152 22 98 % 1.778 m (5' 10\") 127 kg (280 lb)       Physical Exam  Physical Exam   Nursing note and vitals reviewed.  General: Oriented to person, place, and time. Appears well-developed and well-nourished.   Head: No signs of trauma.   Mouth/Throat: Oropharynx is clear and moist.   Eyes: Conjunctivae are normal. Pupils are equal, round, and reactive to light.   Neck: Normal range of motion. No nuchal rigidity.   Cardiovascular: Tachycardic rate and irregular rhythm.    Respiratory: Effort normal and breath sounds normal. No respiratory distress.   Abdominal: Soft. There is no tenderness. There is no guarding.   Musculoskeletal: Normal range of motion. no edema.   Neurological: The patient is alert and oriented to person, place, and time.  PERRLA, EOMI, visual fields intact, strength in upper/lower extremities normal and symmetrical.   Sensation normal. Speech normal  GCS eye subscore is 4. GCS verbal subscore is 5. GCS motor subscore is 6.   Skin: Skin is warm and dry. No rash noted.   Psychiatric: normal mood and affect. behavior is normal.     Emergency Department Course   ECG  ECG obtained at 1333, ECG read at 1338  Atrial fibrillation " with rapid ventricular response  Rightward axis  Incomplete right bundle branch block   Agree with computer interpretation.   Rate 152 bpm. NY interval * ms. QRS duration 102 ms. QT/QTc 341/499 ms. P-R-T axes * 91 11.     Imaging:  XR Chest 2 Views:  Mild cardiomegaly is unchanged. Mild pulmonary venous hypertension has developed. Interstitial changes in both lungs appear unchanged and are likely due to and/or fibrosis. Bilateral calcified pleural plaques. No pleural effusions.  Report per radiology.    Laboratory:  Troponin I high sensitivity (Collected 1347): 33    BNP: 3,427 (H)    Procedures  None.    Emergency Department Course:     Reviewed:  I reviewed nursing notes, vitals, past medical history and Care Everywhere    Assessments:  1345 I obtained history and examined the patient in ED room 03 as noted above.   1538 I spoke with the patient and explained findings. Discussed plan for admission.    Consults:  1547 I consulted with Dr. Lopez of the hospitalist service regarding patient, who agrees to admit patient to hospital in monitored cardiac tele bed.     Interventions:  1351 Cardizem 25 mg IV  1423 Cardizem 125 mg IV Bolus    Disposition:  The patient was admitted to the hospital under the care of Dr. Lopez.     Impression & Plan     Medical Decision Making:  Booker Brown is a 67 year old adult who presents for evaluation of palpitations.  This is consistent with atrial fibrillation with rapid ventricular response.  Based on chronicity of symptoms and unclear nature of onset, elected to control rate instead of rhythm control with diltiazem bolus and drip. This was started to control theventricular rate. I think less likely this is acute coronary syndrome, thyroid issues, PE, dissection, drug ingestion, acute electrolyte imbalance, etc. Will admit to medicine, telemetry, for further cares.      Critical Care Time: was 45 minutes for this patient excluding procedures.    Diagnosis:    ICD-10-CM    1.  Atrial fibrillation with RVR (H)  I48.91          Scribe Disclosure:  I, Cherie Michael, am serving as a scribe at 1:45 PM on 2/19/2022 to document services personally performed by Evaristo Bailey MD based on my observations and the provider's statements to me.     This note was completed in part using Dragon voice recognition software. Although reviewed after completion, some word and grammatical errors may occur.            Evaristo Bailey MD  02/19/22 [id number]

## 2022-02-19 NOTE — ED NOTES
[facility]  ED Nurse Handoff Report    ED Chief complaint: Shortness of Breath and Palpitations (tachy )      ED Diagnosis:   Final diagnoses:   None       Code Status: Full Code, to be confirmed by admitting MD    Allergies:   Allergies   Allergen Reactions     No Known Allergies        Patient Story: has had SOB for several months. Went to  today and found to be in a.fib with RVR. Occasionaly over past few months felt dizzy but denies currently and denies CP/pressure. History of a.fib - on xarelto. Labs and covid test done at  today.  Focused Assessment:  Tachycardic but other VS stable, denies palpitations or CP.    Treatments and/or interventions provided: diltiazem bolus and gtt  Patient's response to treatments and/or interventions: reduction in HR    To be done/followed up on inpatient unit:  per admitting    Does this patient have any cognitive concerns?: n/a    Activity level - Baseline/Home:  Independent  Activity Level - Current:   Independent    Patient's Preferred language: English   Needed?: No    Isolation: None  Infection: Not Applicable  Patient tested for COVID 19 prior to admission: YES  Bariatric?: No    Vital Signs:   Vitals:    02/19/22 1400 02/19/22 1405 02/19/22 1415 02/19/22 1430   BP: (!) 121/90  128/87 124/86   Pulse: 117 112 (!) 123 (!) 121   Resp: 20 19 27 20   Temp:       TempSrc:       SpO2: 98% 99% 97% 98%   Weight:       Height:           Cardiac Rhythm:Cardiac Rhythm: Atrial fibrillation    Was the PSS-3 completed:   Yes  What interventions are required if any?               Family Comments: none present in ED  OBS brochure/video discussed/provided to patient/family: N/A    For the majority of the shift this patient's behavior was Green.   Behavioral interventions performed were care and rounding.    ED NURSE PHONE NUMBER: *[phone]

## 2022-02-19 NOTE — ED TRIAGE NOTES
Pt has been feeling more and more sob since having PNA around Xmas time - HR today in Urgent care was found to be 149 - pt is feeling short of breath - has hx of AFIB . He had gonei n because of his increased SOB   Sent here .

## 2022-02-19 NOTE — PROGRESS NOTES
Assessment & Plan     Chills  Influenza neg  CBC neg  ESR neg  Chest xay Negative for acute findings, read by Kenneth Swanson PA-C [facility] at time of visit.    - Influenza A & B Antigen  - CBC with platelets and differential; Future  - ESR: Erythrocyte sedimentation rate; Future  - CBC with platelets and differential  - ESR: Erythrocyte sedimentation rate    Cough  covid pending  Cough is dry cough  - Symptomatic; Unknown COVID-19 Virus (Coronavirus) by PCR Nose  - CBC with platelets and differential; Future  - CBC with platelets and differential    Type 2 diabetes mellitus with hyperglycemia, without long-term current use of insulin (H)  Concern with CKD and diabetes, checking to make sure renal function has not suffered due to illness    Results for orders placed or performed in visit on 02/19/22   XR Chest 2 Views     Status: None (Preliminary result)    Narrative    EXAM: XR CHEST 2 VIEW  LOCATION: [facility]  DATE/TIME: 02/19/2022, 11:00 AM    INDICATION: Shortness of breath.  COMPARISON: None available.      Impression    IMPRESSION: PA and lateral views of the chest were obtained. Cardiomediastinal silhouette is within normal limits. Scattered calcified pleural plaques, most prominent along the right hemidiaphragm. Mild bibasilar pulmonary opacities, likely atelectasis.   No significant pleural effusion or pneumothorax.     Results for orders placed or performed in visit on 02/19/22   Comprehensive metabolic panel (BMP + Alb, Alk Phos, ALT, AST, Total. Bili, TP)     Status: Abnormal   Result Value Ref Range    Sodium 138 133 - 144 mmol/L    Potassium 3.5 3.4 - 5.3 mmol/L    Chloride 101 94 - 109 mmol/L    Carbon Dioxide (CO2) 28 20 - 32 mmol/L    Anion Gap 9 3 - 14 mmol/L    Urea Nitrogen 27 7 - 30 mg/dL    Creatinine 1.54 (H) 0.52 - 1.25 mg/dL    Calcium 9.2 8.5 - 10.1 mg/dL    Glucose 200 (H) 70 - 99 mg/dL    Alkaline Phosphatase 68 40 - 150 U/L    AST 14 0 - 45 U/L    ALT 25 0 - 70 U/L     Protein Total 7.8 6.8 - 8.8 g/dL    Albumin 3.2 (L) 3.4 - 5.0 g/dL    Bilirubin Total 1.3 0.2 - 1.3 mg/dL    GFR Estimate 50 (L) >60 mL/min/1.73m2    Narrative    The sex of this patient cannot be reliably determined based on discrepancies in demographics (legal sex, sex assigned at birth, gender identity).  Both male and female reference ranges are provided where applicable.  Careful evaluation of the patient s results as compared to the gender specific reference intervals is required in this setting.    Troponin I     Status: Normal   Result Value Ref Range    Troponin I High Sensitivity 27 <54 ng/L    Narrative    The sex of this patient cannot be reliably determined based on discrepancies in demographics (legal sex, sex assigned at birth, gender identity).  Both male and female reference ranges are provided where applicable.  Careful evaluation of the patient s results as compared to the gender specific reference intervals is required in this setting.    ESR: Erythrocyte sedimentation rate     Status: Normal   Result Value Ref Range    Erythrocyte Sedimentation Rate 27 0 - 30 mm/hr    Narrative    The sex of this patient cannot be reliably determined based on discrepancies in demographics (legal sex, sex assigned at birth, gender identity).  Both male and female reference ranges are provided where applicable.  Careful evaluation of the patient's results as compared to the gender specific reference intervals is required in this setting.    CBC with platelets and differential     Status: Abnormal   Result Value Ref Range    WBC Count 10.3 4.0 - 11.0 10e3/uL    RBC Count 5.01 3.80 - 5.90 10e6/uL    Hemoglobin 13.5 11.7 - 17.7 g/dL    Hematocrit 44.0 35.0 - 53.0 %    MCV 88 78 - 100 fL    MCH 26.9 26.5 - 33.0 pg    MCHC 30.7 (L) 31.5 - 36.5 g/dL    RDW 14.9 10.0 - 15.0 %    Platelet Count 289 150 - 450 10e3/uL    % Neutrophils 80 %    % Lymphocytes 13 %    % Monocytes 7 %    % Eosinophils 1 %    % Basophils 0 %     Absolute Neutrophils 8.2 1.6 - 8.3 10e3/uL    Absolute Lymphocytes 1.4 0.8 - 5.3 10e3/uL    Absolute Monocytes 0.7 0.0 - 1.3 10e3/uL    Absolute Eosinophils 0.1 0.0 - 0.7 10e3/uL    Absolute Basophils 0.0 0.0 - 0.2 10e3/uL    Narrative    The sex of this patient cannot be reliably determined based on discrepancies in demographics (legal sex, sex assigned at birth, gender identity).  Both male and female reference ranges are provided where applicable.  Careful evaluation of the patient s results as compared to the gender specific reference intervals is required in this setting.    Influenza A & B Antigen     Status: Normal    Specimen: Nasopharyngeal; Swab   Result Value Ref Range    Influenza A antigen Negative Negative    Influenza B antigen Negative Negative    Narrative    Test results must be correlated with clinical data. If necessary, results should be confirmed by a molecular assay or viral culture.   CBC with platelets and differential     Status: Abnormal    Narrative    The following orders were created for panel order CBC with platelets and differential.  Procedure                               Abnormality         Status                     ---------                               -----------         ------                     CBC with platelets and d...[[id number]]  Abnormal            Final result                 Please view results for these tests on the individual orders.       - Comprehensive metabolic panel (BMP + Alb, Alk Phos, ALT, AST, Total. Bili, TP); Future  - Comprehensive metabolic panel (BMP + Alb, Alk Phos, ALT, AST, Total. Bili, TP)    Stage 3b chronic kidney disease (H)  Chronic kidney disease stable  - Comprehensive metabolic panel (BMP + Alb, Alk Phos, ALT, AST, Total. Bili, TP); Future  - Comprehensive metabolic panel (BMP + Alb, Alk Phos, ALT, AST, Total. Bili, TP)  Estimated Creatinine Clearance (based on SCr of 1.52 mg/dL (H))  Female: 53.5 mL/min (A)  Male: 64.8 mL/min  "(A)      Morbid obesity (H)  noted    Essential hypertension  noted    SOB (shortness of breath)  EKG shows tachycardia  Troponin neg for cardiac disease  SOB is likely due to tachycardia, poor rate control  Patient sent to the ED for rate control due to active SOB  - Troponin I; Future  - EKG 12-lead complete w/read - Clinics  - XR Chest 2 Views; Future  - Troponin I    Atrial fibrillation with tachycardic ventricular rate (H)  Rate uncontrolled at 150 on EKG  Patient sent to the ED for rate control     BMI:   Estimated body mass index is 40.18 kg/m  as calculated from the following:    Height as of 5/28/21: 1.778 m (5' 10\").    Weight as of this encounter: 127 kg (280 lb).     Sent to the ED    No follow-ups on file.    Kenneth Swanson PA-C  [facility] NERY Prieto is a 67 year old who presents for the following health issues     HPI     SOB  fatigue    Review of Systems   Constitutional, HEENT, cardiovascular, pulmonary, GI, , musculoskeletal, neuro, skin, endocrine and psych systems are negative, except as otherwise noted.      Objective    /86 (BP Location: Left arm, Patient Position: Sitting, Cuff Size: Adult Large)   Pulse (!) 149   Temp 97  F (36.1  C) (Tympanic)   Resp 15   Wt 127 kg (280 lb)   SpO2 97%   BMI 40.18 kg/m    Body mass index is 40.18 kg/m .  Physical Exam   GENERAL: healthy, alert and no distress  EYES: Eyes grossly normal to inspection, PERRL and conjunctivae and sclerae normal  HENT: ear canals and TM's normal, nose and mouth without ulcers or lesions  NECK: no adenopathy, no asymmetry, masses, or scars and thyroid normal to palpation  RESP: lungs clear to auscultation - no rales, rhonchi or wheezes  CV: tachycardia, peripheral pulses strong and no peripheral edema  ABDOMEN: soft, nontender, no hepatosplenomegaly, no masses and bowel sounds normal  MS: no gross musculoskeletal defects noted, no edema  SKIN: no suspicious lesions or " rashes  NEURO: Normal strength and tone, mentation intact and speech normal    Results for orders placed or performed in visit on 02/19/22   XR Chest 2 Views     Status: None (Preliminary result)    Narrative    EXAM: XR CHEST 2 VIEW  LOCATION: [facility]  DATE/TIME: 02/19/2022, 11:00 AM    INDICATION: Shortness of breath.  COMPARISON: None available.      Impression    IMPRESSION: PA and lateral views of the chest were obtained. Cardiomediastinal silhouette is within normal limits. Scattered calcified pleural plaques, most prominent along the right hemidiaphragm. Mild bibasilar pulmonary opacities, likely atelectasis.   No significant pleural effusion or pneumothorax.     Results for orders placed or performed in visit on 02/19/22   Comprehensive metabolic panel (BMP + Alb, Alk Phos, ALT, AST, Total. Bili, TP)     Status: Abnormal   Result Value Ref Range    Sodium 138 133 - 144 mmol/L    Potassium 3.5 3.4 - 5.3 mmol/L    Chloride 101 94 - 109 mmol/L    Carbon Dioxide (CO2) 28 20 - 32 mmol/L    Anion Gap 9 3 - 14 mmol/L    Urea Nitrogen 27 7 - 30 mg/dL    Creatinine 1.54 (H) 0.52 - 1.25 mg/dL    Calcium 9.2 8.5 - 10.1 mg/dL    Glucose 200 (H) 70 - 99 mg/dL    Alkaline Phosphatase 68 40 - 150 U/L    AST 14 0 - 45 U/L    ALT 25 0 - 70 U/L    Protein Total 7.8 6.8 - 8.8 g/dL    Albumin 3.2 (L) 3.4 - 5.0 g/dL    Bilirubin Total 1.3 0.2 - 1.3 mg/dL    GFR Estimate 50 (L) >60 mL/min/1.73m2    Narrative    The sex of this patient cannot be reliably determined based on discrepancies in demographics (legal sex, sex assigned at birth, gender identity).  Both male and female reference ranges are provided where applicable.  Careful evaluation of the patient s results as compared to the gender specific reference intervals is required in this setting.    Troponin I     Status: Normal   Result Value Ref Range    Troponin I High Sensitivity 27 <54 ng/L    Narrative    The sex of this patient cannot be reliably  determined based on discrepancies in demographics (legal sex, sex assigned at birth, gender identity).  Both male and female reference ranges are provided where applicable.  Careful evaluation of the patient s results as compared to the gender specific reference intervals is required in this setting.    ESR: Erythrocyte sedimentation rate     Status: Normal   Result Value Ref Range    Erythrocyte Sedimentation Rate 27 0 - 30 mm/hr    Narrative    The sex of this patient cannot be reliably determined based on discrepancies in demographics (legal sex, sex assigned at birth, gender identity).  Both male and female reference ranges are provided where applicable.  Careful evaluation of the patient's results as compared to the gender specific reference intervals is required in this setting.    CBC with platelets and differential     Status: Abnormal   Result Value Ref Range    WBC Count 10.3 4.0 - 11.0 10e3/uL    RBC Count 5.01 3.80 - 5.90 10e6/uL    Hemoglobin 13.5 11.7 - 17.7 g/dL    Hematocrit 44.0 35.0 - 53.0 %    MCV 88 78 - 100 fL    MCH 26.9 26.5 - 33.0 pg    MCHC 30.7 (L) 31.5 - 36.5 g/dL    RDW 14.9 10.0 - 15.0 %    Platelet Count 289 150 - 450 10e3/uL    % Neutrophils 80 %    % Lymphocytes 13 %    % Monocytes 7 %    % Eosinophils 1 %    % Basophils 0 %    Absolute Neutrophils 8.2 1.6 - 8.3 10e3/uL    Absolute Lymphocytes 1.4 0.8 - 5.3 10e3/uL    Absolute Monocytes 0.7 0.0 - 1.3 10e3/uL    Absolute Eosinophils 0.1 0.0 - 0.7 10e3/uL    Absolute Basophils 0.0 0.0 - 0.2 10e3/uL    Narrative    The sex of this patient cannot be reliably determined based on discrepancies in demographics (legal sex, sex assigned at birth, gender identity).  Both male and female reference ranges are provided where applicable.  Careful evaluation of the patient s results as compared to the gender specific reference intervals is required in this setting.    Influenza A & B Antigen     Status: Normal    Specimen: Nasopharyngeal; Swab    Result Value Ref Range    Influenza A antigen Negative Negative    Influenza B antigen Negative Negative    Narrative    Test results must be correlated with clinical data. If necessary, results should be confirmed by a molecular assay or viral culture.   CBC with platelets and differential     Status: Abnormal    Narrative    The following orders were created for panel order CBC with platelets and differential.  Procedure                               Abnormality         Status                     ---------                               -----------         ------                     CBC with platelets and d...[[id number]]  Abnormal            Final result                 Please view results for these tests on the individual orders.

## 2022-02-19 NOTE — ED NOTES
Pt stood at side of bed to urinate. Pt stable and steady on his feet and denies feeling dizzy/lightheaded

## 2022-02-20 ENCOUNTER — APPOINTMENT (OUTPATIENT)
Dept: CARDIOLOGY | Facility: CLINIC | Age: 67
DRG: 309 | End: 2022-02-20
Attending: STUDENT IN AN ORGANIZED HEALTH CARE EDUCATION/TRAINING PROGRAM
Payer: COMMERCIAL

## 2022-02-20 VITALS
HEIGHT: 70 IN | OXYGEN SATURATION: 95 % | DIASTOLIC BLOOD PRESSURE: 65 MMHG | TEMPERATURE: 98.1 F | BODY MASS INDEX: 38.97 KG/M2 | SYSTOLIC BLOOD PRESSURE: 99 MMHG | WEIGHT: 272.2 LBS | RESPIRATION RATE: 22 BRPM | HEART RATE: 96 BPM

## 2022-02-20 LAB
ANION GAP SERPL CALCULATED.3IONS-SCNC: 5 MMOL/L (ref 3–14)
BUN SERPL-MCNC: 27 MG/DL (ref 7–30)
CALCIUM SERPL-MCNC: 9.2 MG/DL (ref 8.5–10.1)
CHLORIDE BLD-SCNC: 100 MMOL/L (ref 94–109)
CO2 SERPL-SCNC: 28 MMOL/L (ref 20–32)
CREAT SERPL-MCNC: 1.52 MG/DL (ref 0.52–1.25)
ERYTHROCYTE [DISTWIDTH] IN BLOOD BY AUTOMATED COUNT: 14.4 % (ref 10–15)
GFR SERPL CREATININE-BSD FRML MDRD: 51 ML/MIN/1.73M2
GLUCOSE BLD-MCNC: 231 MG/DL (ref 70–99)
GLUCOSE BLDC GLUCOMTR-MCNC: 202 MG/DL (ref 70–99)
GLUCOSE BLDC GLUCOMTR-MCNC: 204 MG/DL (ref 70–99)
GLUCOSE BLDC GLUCOMTR-MCNC: 214 MG/DL (ref 70–99)
HCT VFR BLD AUTO: 42.8 % (ref 35–53)
HGB BLD-MCNC: 13.3 G/DL (ref 11.7–17.7)
LACTATE SERPL-SCNC: 1.6 MMOL/L (ref 0.7–2)
LVEF ECHO: NORMAL
MCH RBC QN AUTO: 27 PG (ref 26.5–33)
MCHC RBC AUTO-ENTMCNC: 31.1 G/DL (ref 31.5–36.5)
MCV RBC AUTO: 87 FL (ref 78–100)
PLATELET # BLD AUTO: 296 10E3/UL (ref 150–450)
POTASSIUM BLD-SCNC: 3.4 MMOL/L (ref 3.4–5.3)
RBC # BLD AUTO: 4.93 10E6/UL (ref 3.8–5.9)
SODIUM SERPL-SCNC: 133 MMOL/L (ref 133–144)
T4 FREE SERPL-MCNC: 1.59 NG/DL
TSH SERPL DL<=0.005 MIU/L-ACNC: 0.34 MU/L (ref 0.4–4)
WBC # BLD AUTO: 13.8 10E3/UL (ref 4–11)

## 2022-02-20 PROCEDURE — 999N000208 ECHOCARDIOGRAM COMPLETE

## 2022-02-20 PROCEDURE — 85018 HEMOGLOBIN: CPT | Performed by: STUDENT IN AN ORGANIZED HEALTH CARE EDUCATION/TRAINING PROGRAM

## 2022-02-20 PROCEDURE — 99222 1ST HOSP IP/OBS MODERATE 55: CPT | Performed by: INTERNAL MEDICINE

## 2022-02-20 PROCEDURE — 83605 ASSAY OF LACTIC ACID: CPT | Performed by: INTERNAL MEDICINE

## 2022-02-20 PROCEDURE — 250N000013 HC RX MED GY IP 250 OP 250 PS 637: Performed by: STUDENT IN AN ORGANIZED HEALTH CARE EDUCATION/TRAINING PROGRAM

## 2022-02-20 PROCEDURE — 36415 COLL VENOUS BLD VENIPUNCTURE: CPT | Performed by: STUDENT IN AN ORGANIZED HEALTH CARE EDUCATION/TRAINING PROGRAM

## 2022-02-20 PROCEDURE — 84439 ASSAY OF FREE THYROXINE: CPT | Performed by: INTERNAL MEDICINE

## 2022-02-20 PROCEDURE — 99238 HOSP IP/OBS DSCHRG MGMT 30/<: CPT | Performed by: INTERNAL MEDICINE

## 2022-02-20 PROCEDURE — 93306 TTE W/DOPPLER COMPLETE: CPT | Mod: 26 | Performed by: INTERNAL MEDICINE

## 2022-02-20 PROCEDURE — 36415 COLL VENOUS BLD VENIPUNCTURE: CPT | Performed by: INTERNAL MEDICINE

## 2022-02-20 PROCEDURE — 255N000002 HC RX 255 OP 636: Performed by: STUDENT IN AN ORGANIZED HEALTH CARE EDUCATION/TRAINING PROGRAM

## 2022-02-20 PROCEDURE — 80048 BASIC METABOLIC PNL TOTAL CA: CPT | Performed by: STUDENT IN AN ORGANIZED HEALTH CARE EDUCATION/TRAINING PROGRAM

## 2022-02-20 PROCEDURE — 84443 ASSAY THYROID STIM HORMONE: CPT | Performed by: INTERNAL MEDICINE

## 2022-02-20 RX ORDER — DILTIAZEM HYDROCHLORIDE 240 MG/1
240 CAPSULE, COATED, EXTENDED RELEASE ORAL DAILY
Qty: 30 CAPSULE | Refills: 0 | Status: SHIPPED | OUTPATIENT
Start: 2022-02-20 | End: 2022-03-29

## 2022-02-20 RX ORDER — POTASSIUM CHLORIDE 1500 MG/1
20 TABLET, EXTENDED RELEASE ORAL DAILY
Status: DISCONTINUED | OUTPATIENT
Start: 2022-02-20 | End: 2022-02-20 | Stop reason: HOSPADM

## 2022-02-20 RX ORDER — FUROSEMIDE 40 MG
40 TABLET ORAL DAILY
Status: DISCONTINUED | OUTPATIENT
Start: 2022-02-20 | End: 2022-02-20 | Stop reason: HOSPADM

## 2022-02-20 RX ORDER — FUROSEMIDE 40 MG
40 TABLET ORAL DAILY
Qty: 30 TABLET | Refills: 0 | Status: SHIPPED | OUTPATIENT
Start: 2022-02-20 | End: 2022-03-02

## 2022-02-20 RX ORDER — DILTIAZEM HYDROCHLORIDE 240 MG/1
240 CAPSULE, COATED, EXTENDED RELEASE ORAL DAILY
Status: DISCONTINUED | OUTPATIENT
Start: 2022-02-20 | End: 2022-02-20 | Stop reason: HOSPADM

## 2022-02-20 RX ADMIN — LISINOPRIL 40 MG: 40 TABLET ORAL at 08:38

## 2022-02-20 RX ADMIN — METOPROLOL SUCCINATE 100 MG: 100 TABLET, EXTENDED RELEASE ORAL at 08:38

## 2022-02-20 RX ADMIN — HUMAN ALBUMIN MICROSPHERES AND PERFLUTREN 9 ML: 10; .22 INJECTION, SOLUTION INTRAVENOUS at 12:38

## 2022-02-20 RX ADMIN — AMLODIPINE BESYLATE 10 MG: 10 TABLET ORAL at 08:38

## 2022-02-20 RX ADMIN — CHLORTHALIDONE 25 MG: 25 TABLET ORAL at 08:38

## 2022-02-20 ASSESSMENT — ACTIVITIES OF DAILY LIVING (ADL)
ADLS_ACUITY_SCORE: 3

## 2022-02-20 NOTE — PLAN OF CARE
Remains on diltiazem drip at 15mg/hour, Afib per tele monitor with HRs 90s-110s. Denies SOB at rest but has RODRIGUEZ. Denies pain. BP stable. Slept minimally overnight. Echo today.

## 2022-02-20 NOTE — DISCHARGE SUMMARY
[facility]    Hospitalist Discharge Summary       Date of Admission:  2/19/2022  Date of Discharge:  2/20/2022  Discharging Provider: Nate Key MD      Discharge Diagnoses   Atrial fibrillation with RVR  LE pitting edema    Follow-ups Needed After Discharge   Follow-up Appointments     Follow-up and recommended labs and tests       Follow up with primary care provider, Physician No Ref-Primary, within 7   days for hospital follow- up.  No follow up labs or test are needed.  - Follow up with Electrophysiology () in 2 weeks           Hospital Course   Conner Brown is a 66yo M with PMH of atrial fibrillation, obesity, HLD, GERD, DM2, CKD3, who was admitted on 2/19/2022 with atrial fibrillation with RVR and dyspnea. He had an echocardiogram which shows normal EF, mild mitral regurgitation. Cardiology was consulted. His rate improved with a diltiazem infusion. They discontinued his amlodipine and chlorthalidone in favor of lasix and diltiazem. He has a history of afib for which he saw Dr. Mejias in May 2021, he is recommended to follow up with him. Patient was recommended to remain inpatient for further management of his atrial fibrillation but he declined citing requirements to go back to work. He describes that he does lift up to 70lbs, but does not carry that amount of weight. He should be able to return to work but he was cautioned to monitor for dyspnea or tachycardia.    Obesity  Lower extremity pitting edema  Weight loss  Patient reports approx 20lb weight loss in the past 3 months. He describes a significant change in his diet and cutting of carbs. He denies any snoring or daytime sleepiness. On exam he has LE pitting edema which he states is chronic. The etiology of his LE edema should be further evaluated. It may be related to diastolic heart failure. Alternatively he could have VAHID, however this is felt to be less likely given that he is losing weight. His amlodipine  was stopped this admission.  - Encouraged improved diet  - Follow up TSH  - Outpatient evaluation    Consultations This Hospital Stay   CARDIOLOGY IP CONSULT    Code Status   Full Code    Time Spent on this Encounter   I, Nate Key, personally saw the patient today and spent approximately 25 minutes discharging this patient.       Nate Key MD  [facility]  ______________________________________________________________________    Physical Exam   Vital Signs: Temp: 98.1  F (36.7  C) Temp src: Oral BP: 99/65 Pulse: 96   Resp: 22 SpO2: 95 % O2 Device: None (Room air)    Weight: 272 lbs 3.2 oz    Constitutional: Obese male in NAD  Eyes: Nonicteric, normal ocular movements  HEENT: Normocephalic, atraumatic  Respiratory: CTAB, no wheezing or crackles  Cardiovascular: RRR, normal S1/2, no m/r/g  GI: No organomegaly, normoactive bowel sounds, nontender, nondistended  Skin: No rashes  Musculoskeletal: Moves all extremities  Neurologic: A&Ox3  Psychiatric: Appropriate affect and mood       Primary Care Physician   Physician No Ref-Primary    Discharge Disposition   Discharged to home  Condition at discharge: Stable    Significant Results and Procedures   Most Recent 3 CBC's:Recent Labs   Lab Test 02/20/22  0615 02/19/22  1113 11/20/21  1459   WBC 13.8* 10.3 10.4   HGB 13.3 13.5 15.7   MCV 87 88 88    289 264     Most Recent 3 BMP's:Recent Labs   Lab Test 02/20/22  1150 02/20/22  0726 02/20/22  0615 02/19/22  2104 02/19/22  1113 11/20/21  1459   NA  --   --  133  --  138 140   POTASSIUM  --   --  3.4  --  3.5 4.4   CHLORIDE  --   --  100  --  101 106   CO2  --   --  28  --  28 31   BUN  --   --  27  --  27 43*   CR  --   --  1.52*  --  1.54* 1.78*   ANIONGAP  --   --  5  --  9 3   KURT  --   --  9.2  --  9.2 9.7   * 204* 231*   < > 200* 134*    < > = values in this interval not displayed.     Most Recent 2 LFT's:Recent Labs   Lab Test 02/19/22  1113 05/12/21  0842   AST 14  20   ALT 25 28   ALKPHOS 68 61   BILITOTAL 1.3 0.6   ,   Results for orders placed or performed during the hospital encounter of 22   XR Chest 2 Views    Narrative    EXAM: XR CHEST 2 VW  LOCATION: [facility]  DATE/TIME: 2022 2:31 PM    INDICATION: Atrial fibrillation with rapid ventricular rate. Shortness of breath.  COMPARISON: 2022      Impression    IMPRESSION: Mild cardiomegaly is unchanged. Mild pulmonary venous hypertension has developed. Interstitial changes in both lungs appear unchanged and are likely due to and/or fibrosis. Bilateral calcified pleural plaques. No pleural effusions.   Echocardiogram Complete     Value    LVEF  50-55%    Narrative    [id number]  [id number]  [id number]  [id number]^KASIA^CHONG     [facility]  Echocardiography Laboratory  [address] [place] [address]     Name: OMEGA WILLIS  MRN: [id number]  : 1955  Study Date: 2022 12:00 PM  Age: 67 yrs  Gender: Male  Patient Location: [facility]  Reason For Study: Atrial Fibrillation  Ordering Physician: CHONG PEDROZA  Performed By: Ze Erazo     BSA: 2.4 m2  Height: 70 in  Weight: 272 lb  HR: 99  BP: 99/62 mmHg  ______________________________________________________________________________  Procedure  Complete Portable Echo Adult. Optison (NDC #6004-2310) given intravenously.  ______________________________________________________________________________  Interpretation Summary     Technically difficult imaging  The rhythm was atrial fibrillation.  Left ventricular systolic function is low normal.  The visual ejection fraction is 50-55%.  The left ventricle is normal in size.  There is mild concentric left ventricular hypertrophy.  There is moderate biatrial enlargement.  There is mild (1+) mitral regurgitation. 2021     No previous TTE available, however, likely no significant change since the  VIVEK  fro  ______________________________________________________________________________  Left Ventricle  The left ventricle is normal in size. There is mild concentric left  ventricular hypertrophy. Left ventricular systolic function is low normal. The  visual ejection fraction is 50-55%. Left ventricular diastolic function is  indeterminate. No regional wall motion abnormalities noted. There is no  thrombus seen in the left ventricle.     Right Ventricle  The right ventricle is normal in structure, function and size. There is no  mass or thrombus in the right ventricle.     Atria  There is moderate biatrial enlargement. There is no color Doppler evidence of  an atrial shunt. The left atrial appendage is not well visualized.     Mitral Valve  The mitral valve leaflets appear normal. There is no evidence of stenosis,  fluttering, or prolapse. There is moderate mitral annular calcification. There  is mild (1+) mitral regurgitation. There is no mitral valve stenosis.     Tricuspid Valve  The tricuspid valve is not well visualized, but is grossly normal. The right  ventricular systolic pressure is approximated at 28.0 mmHg plus the right  atrial pressure. Right ventricle systolic pressure estimate normal. There is  mild (1+) tricuspid regurgitation. There is no tricuspid stenosis.     Aortic Valve  There is mild trileaflet aortic sclerosis. No aortic regurgitation is present.  No aortic stenosis is present.     Pulmonic Valve  The pulmonic valve is not well seen, but is grossly normal. There is no  pulmonic valvular regurgitation. There is no pulmonic valvular stenosis.     Vessels  Borderline aortic root dilatation. Normal size ascending aorta. The inferior  vena cava is normal. The pulmonary artery is normal size.     Pericardium  The pericardium appears normal. There is no pleural effusion.     Rhythm  The rhythm was atrial  fibrillation.  ______________________________________________________________________________  MMode/2D Measurements & Calculations     IVSd: 1.3 cm  LVIDd: 6.0 cm  LVIDs: 4.8 cm  LVPWd: 0.94 cm  FS: 19.1 %  LV mass(C)d: 279.1 grams  LV mass(C)dI: 117.3 grams/m2  Ao root diam: 3.8 cm  LA dimension: 4.3 cm  asc Aorta Diam: 3.3 cm  LA/Ao: 1.1  LVOT diam: 2.4 cm  LVOT area: 4.6 cm2  LA Volume (BP): 126.0 ml  LA Volume Index (BP): 52.9 ml/m2  RWT: 0.31     Doppler Measurements & Calculations  MV E max conner: 114.0 cm/sec  MV max P.3 mmHg  MV mean P.9 mmHg  MV V2 VTI: 25.7 cm  MV dec slope: 600.4 cm/sec2  PA acc time: 0.10 sec  TR max conner: 264.7 cm/sec  TR max P.0 mmHg  E/E' av.1  Lateral E/e': 14.4  Medial E/e': 19.8     ______________________________________________________________________________  Report approved by: Dr. Booker Mejia 2022 01:13 PM               Discharge Orders      Follow-Up with Cardiology GABRIELA      EKG 12-lead complete w/read  (to be scheduled)     Reason for your hospital stay    You were hospitalized with atrial fibrillation with rapid ventricular rate. Your medicines were adjusted this admission     Follow-up and recommended labs and tests     Follow up with primary care provider, Physician No Ref-Primary, within 7 days for hospital follow- up.  No follow up labs or test are needed.  - Follow up with Electrophysiology () in 2 weeks     Activity    Your activity upon discharge: activity as tolerated     Diet    Follow this diet upon discharge: Low Saturated Fat Na <2400mg Diet     Discharge Medications   Current Discharge Medication List      START taking these medications    Details   diltiazem ER COATED BEADS (CARDIZEM CD/CARTIA XT) 240 MG 24 hr capsule Take 1 capsule (240 mg) by mouth daily  Qty: 30 capsule, Refills: 0    Associated Diagnoses: Atrial fibrillation with RVR (H)      furosemide (LASIX) 40 MG tablet Take 1 tablet (40 mg) by mouth daily  Qty: 30  tablet, Refills: 0    Associated Diagnoses: Atrial fibrillation with RVR (H)         CONTINUE these medications which have NOT CHANGED    Details   atorvastatin (LIPITOR) 10 MG tablet Take 1 tablet (10 mg) by mouth daily  Qty: 90 tablet, Refills: 2    Associated Diagnoses: Type 2 diabetes with circulatory disorder causing erectile dysfunction (H)      glipiZIDE (GLUCOTROL XL) 10 MG 24 hr tablet Take 1 tablet (10 mg) by mouth daily Please make an appointment for further refills  Qty: 90 tablet, Refills: 0    Associated Diagnoses: Type 2 diabetes with circulatory disorder causing erectile dysfunction (H)      lisinopril (ZESTRIL) 40 MG tablet Take 1 tablet (40 mg) by mouth daily  Qty: 90 tablet, Refills: 0    Associated Diagnoses: Essential hypertension with goal blood pressure less than 140/90      metFORMIN (GLUCOPHAGE-XR) 500 MG 24 hr tablet Take 4 tablets (2,000 mg) by mouth daily (with dinner)  Qty: 360 tablet, Refills: 0    Associated Diagnoses: Type 2 diabetes with circulatory disorder causing erectile dysfunction (H)      metoprolol succinate ER (TOPROL-XL) 100 MG 24 hr tablet Take 1 tablet (100 mg) by mouth 2 times daily  Qty: 180 tablet, Refills: 3    Comments: Increased dose  Associated Diagnoses: Persistent atrial fibrillation (H)      rivaroxaban ANTICOAGULANT (XARELTO ANTICOAGULANT) 20 MG TABS tablet Take 1 tablet (20 mg) by mouth daily (with dinner)  Qty: 90 tablet, Refills: 3    Associated Diagnoses: Paroxysmal atrial fibrillation (H)      alcohol swab prep pads Use to swab area of injection/fariha as directed.  Qty: 100 each, Refills: 3    Associated Diagnoses: Type 2 diabetes with circulatory disorder causing erectile dysfunction (H)      blood glucose (NO BRAND SPECIFIED) test strip Use to test blood sugar 1 times daily or as directed. To accompany: Blood Glucose Monitor Brands: per insurance.  Qty: 100 strip, Refills: 6    Associated Diagnoses: Type 2 diabetes with circulatory disorder causing  erectile dysfunction (H)      blood glucose calibration (NO BRAND SPECIFIED) solution To accompany: Blood Glucose Monitor Brands: per insurance.  Qty: 1 Bottle, Refills: 3    Associated Diagnoses: Type 2 diabetes with circulatory disorder causing erectile dysfunction (H)      blood glucose monitoring (NO BRAND SPECIFIED) meter device kit Use to test blood sugar 1 times daily or as directed. : per insurance.  Qty: 1 kit, Refills: 0    Associated Diagnoses: Type 2 diabetes with circulatory disorder causing erectile dysfunction (H)      thin (NO BRAND SPECIFIED) lancets Use with lanceting device. To accompany: Blood Glucose Monitor Brands: per insurance.  Qty: 1 each, Refills: 6    Associated Diagnoses: Type 2 diabetes with circulatory disorder causing erectile dysfunction (H)         STOP taking these medications       amLODIPine (NORVASC) 10 MG tablet Comments:   Reason for Stopping:         chlorthalidone (HYGROTON) 25 MG tablet Comments:   Reason for Stopping:             Allergies   Allergies   Allergen Reactions     No Known Allergies

## 2022-02-20 NOTE — PLAN OF CARE
Goal Outcome Evaluation:  Alert and oriented x 4. VS stable, on RA and no complaints of pain. Patient was discharged this afternoon with family as transport home. 2 new medications were filled here and sent home with the patient. I reviewed all DC paperwork, new medications, orders and appointments with the patient and family, all were able to verbalize understanding of teaching. All questions answered. All IVs were removed and patient had all belongings at the time of DC.

## 2022-02-20 NOTE — PROGRESS NOTES
RECEIVING UNIT ED HANDOFF REVIEW    ED Nurse Handoff Report was reviewed by: Mary Alvarez RN on February 19, 2022 at 6:38 PM

## 2022-02-20 NOTE — CONSULTS
Consult Date: 02/20/2022    REFERRING PHYSICIAN:  Dr. Lopez.    REASON FOR CONSULTATION:  Atrial fibrillation.    HISTORY OF PRESENT ILLNESS:  Mr. Brown is a 67-year-old white male with a history of known atrial fibrillation.  He had a cardioversion, but failed to maintain sinus rhythm.  The patient was on rate control and anticoagulation prior to this admission.  He was on Toprol- mg p.o. b.i.d. before this admission.  He has noticed progressive shortness of breath, also he has lost weight, about 30 pounds.  He came to the ER and was found to be in atrial fibrillation with rapid ventricular rate.  After that admission, he has been put on IV diltiazem.  His current heart rates in the 90s and he feels comfortable.  The troponin is negative although the BNP was elevated.    PAST MEDICAL HISTORY:  Type 2 diabetes, hypertension, obesity and chronic renal insufficiency.    FAMILY HISTORY:  Noncontributory to atrial fibrillation.    SOCIAL HISTORY:  He does not smoke or abuse alcohol.  He is full code.    REVIEW OF SYSTEMS:  Comprehensive review of the systems reported a dry cough with no fever or diarrhea.  The COVID test has been negative.    CURRENT MEDICATIONS:  1.  Lipitor 10 mg p.o. daily.  2.  Chlorthalidone 25 mg p.o. daily.  3.  Lisinopril 40 mg p.o. daily.  4.  Toprol- mg p.o. b.i.d.  5.  Xarelto 20 mg p.o. daily.    ALLERGIES:  NONE.    PHYSICAL EXAMINATION:    GENERAL:  He is alert and oriented with no acute distress.  VITAL SIGNS:  Blood pressure 119/95, heart rate 96 beats per minute, temperature 98.1 degrees, oxygen 95% on room air.  HEENT:  The eyes and ENT were unremarkable.  SKIN AND NECK:  There was no skin rash or lymph node enlargement. Jugular vein was not elevated.  LUNGS:  No crackles or wheezing.  HEART:  Rhythm was irregularly irregular.  Heart sounds were normal without murmur.  ABDOMEN:  Showed moderate obesity.  EXTREMITIES:  There was bilateral at least 1+ leg edema.  NEUROLOGIC:   Showed no focal deficit.    The echocardiography here reported normal ejection fraction.  The TSH is below the lower limit of normal, suggesting questionable hyperthyroidism.    ASSESSMENT AND RECOMMENDATIONS:  Mr. Willis has chronic atrial fibrillation.  He is admitted for uncontrolled heart rate and has apparent leg edema.  The heart rate is better controlled on IV diltiazem.  I agree for continuation of Xarelto for anticoagulation.  I do not recommend cardioversion at this point.  He definitely needs more rate control medications.  In addition to Toprol- mg p.o. b.i.d. I have started him on Cardizem  mg once daily.  Hopefully, that would achieve satisfactory rate control.  That will be assessed further in the clinic visit.    The patient insisted that he will go home in order to go to work.  I will need to turn off the IV diltiazem and watch him for a couple hours to make sure that he does not have a rebound rapid ventricular rate.    He may benefit from outpatient endocrinology consultation for his thyroid issue.  He will return for Cardiology evaluation in 1 month.    Lauri Cantu MD        D: 2022   T: 2022   MT: WILBUR    Name:     OMEGA WILLIS  MRN:      [id number]        Account:      [id number]   :      1955           Consult Date: 2022     Document: [id number]

## 2022-02-20 NOTE — PROGRESS NOTES
67 year-old white male, presented for SOB. Found to have AF with RVR. HR is now 90s while on iv diltiazem. No other complaints.  Previous cardioversion, failed to maintain sinus rhythm.  EF normal.  Apparent bilateral leg edema.  He was on Toprol  mg bid before the admission.  History of type II diabetes, hypertension, chronic renal insufficiency, obesity.    Recommend:  Switch amlodipine 10 mg daily to Cardizem  mg daily.  Start furosemide 40 mg daily. Stopped chlorthalidone.  Continue Toprol  mg bid.  Pt insisted on going home today. Needs to turn off iv diltiazem for 1-2 hours to make sure that his HR <140 bpm.  Outpatient cardiology visit in 1 week ordered.  TSH is low more than once. Endocrinology consultation as an outpatient is recommended.

## 2022-02-20 NOTE — H&P
[facility]    History and Physical - Hospitalist Service       Date of Admission:  2/19/2022    Assessment & Plan      Booker Brown is a 67 year old adult admitted on 2/19/2022. He presents with palpitations and shortness of breath.      Atrial fibrillation with RVR (H)    Persistent atrial fibrillation (H)    Assessment: Presents with 1 month history of dyspnea with exertion, found to be in A. fib with RVR.  At baseline patient is rate controlled with metoprolol 100 mg twice daily.  Fully anticoagulated with Xarelto, he denies skipping any doses of his cardiac meds.    Plan:   -Admit inpatient  -Continue diltiazem infusion  -Cardiology consulted  -Obtain echocardiogram  -Continue prior to mission metoprolol  -Monitor on telemetry  -Continue prior to admission Xarelto      Hyperlipidemia LDL goal <70    Essential hypertension    Assessment/Plan: Continue prior to admission chlorthalidone/lisinopril/Norvasc once verified.  Continue prior to admission Lipitor      GERD (gastroesophageal reflux disease)    Assessment/Plan: Stable, can order PPI as needed      Type 2 diabetes with circulatory disorder causing erectile dysfunction (H)    Assessment: PTA on Metformin 2000 mg daily    Plan:   -Hold prior to admission Metformin  -Sliding scale insulin as needed  -Hypoglycemia protocol      Chronic kidney disease, stage 3 (H)    Assessment/Plan: Creatinine at baseline on admission, avoid NSAIDs/nephrotoxins       Diet: Regular Diet Adult  Combination Diet Low Saturated Fat Na <2400mg Diet, No Caffeine Diet    DVT Prophylaxis: DOAC  Sofia Catheter: Not present  Central Lines: None  Cardiac Monitoring: ACTIVE order. Indication: Tachyarrhythmias, acute (48 hours)  Code Status: No Order      Clinically Significant Risk Factors Present on Admission              # Coagulation Defect: home medication list includes an anticoagulant medication    # Obesity: Estimated body mass index is 39.13 kg/m  as  "calculated from the following:    Height as of this encounter: 1.778 m (5' 10\").    Weight as of this encounter: 123.7 kg (272 lb 11.2 oz).      Disposition Plan   Expected Discharge:    Anticipated discharge location:  Awaiting care coordination huddle  Delays:            The patient's care was discussed with the Patient and ED Provider.    Felipe Lopez MD  Hospitalist Service  [facility]  Securely message with the Vocera Web Console (learn more here)  Text page via Walk-in Appointment Scheduler Paging/Directory         ______________________________________________________________________    Chief Complaint     Palpitations and shortness of breath    History is obtained from the patient    History of Present Illness      Booker Brown is a 67 year old adult with past medical history of atrial fibrillation on Xarelto, hypertension, hyperlipidemia, type 2 diabetes mellitus, who presents for evaluation of shortness of breath and palpitations.    Patient reports that starting in December of last year, he said intermittent dyspnea and respiratory congestion.  Although his congestion has improved, he reports that he has still been dealing with intermittent dyspnea.  Mostly with exertion.  He works in shipping/receiving and over the past several weeks, has had more dyspnea with just minimal exertion.  He reports that he has lost 20 pounds since [place] secondary to his dyspnea and minimal appetite.  He ultimately sought further evaluation in the urgent care today for his symptoms, and was diagnosed with atrial fibrillation with heart rates up to the 140s.  He was ultimately referred to the emergency department for further evaluation.  He otherwise denies any chest pain.  At baseline he is anticoagulated with Xarelto for his atrial fibrillation.  He denies missing any doses of his anticoagulation.  He denies any fevers or chills, he denies any PND/orthopnea.  He denies any nausea/vomiting or abdominal pain.  He " denies any recent blood in stool, no night sweats.  He denies any urinary complaints urgency or frequency.  At this time he has no other complaints.    Review of Systems      The 10 point Review of Systems is negative other than noted in the HPI or here.     Past Medical History    I have reviewed this patient's medical history and updated it with pertinent information if needed.   Past Medical History:   Diagnosis Date     Diabetes (H)      HTN      Hyperlipidemia        Past Surgical History   I have reviewed this patient's surgical history and updated it with pertinent information if needed.  Past Surgical History:   Procedure Laterality Date     COLONOSCOPY N/A 2018    Procedure: COMBINED COLONOSCOPY, SINGLE OR MULTIPLE BIOPSY/POLYPECTOMY BY BIOPSY;;  Surgeon: Lawrence Mata MD;  Location:  GI       Social History   I have reviewed this patient's social history and updated it with pertinent information if needed.  Social History     Tobacco Use     Smoking status: Former Smoker     Packs/day: 3.00     Years: 16.00     Pack years: 48.00     Types: Cigarettes     Start date:      Quit date: 1987     Years since quittin.4     Smokeless tobacco: Never Used   Substance Use Topics     Alcohol use: No     Drug use: No       Family History   I have reviewed this patient's family history and updated it with pertinent information if needed.  Family History   Problem Relation Age of Onset     Diabetes Mother         dx at 70     Bladder Cancer Brother        Prior to Admission Medications   Prior to Admission Medications   Prescriptions Last Dose Informant Patient Reported? Taking?   alcohol swab prep pads   No No   Sig: Use to swab area of injection/fariha as directed.   amLODIPine (NORVASC) 10 MG tablet 2022 at pm  No Yes   Sig: Take 1 tablet (10 mg) by mouth daily   atorvastatin (LIPITOR) 10 MG tablet 2022 at pm  No Yes   Sig: Take 1 tablet (10 mg) by mouth daily   blood glucose (NO BRAND  SPECIFIED) test strip   No No   Sig: Use to test blood sugar 1 times daily or as directed. To accompany: Blood Glucose Monitor Brands: per insurance.   blood glucose calibration (NO BRAND SPECIFIED) solution   No No   Sig: To accompany: Blood Glucose Monitor Brands: per insurance.   blood glucose monitoring (NO BRAND SPECIFIED) meter device kit   No No   Sig: Use to test blood sugar 1 times daily or as directed. : per insurance.   chlorthalidone (HYGROTON) 25 MG tablet 2/18/2022 at pm  No Yes   Sig: Take 1 tablet (25 mg) by mouth daily   glipiZIDE (GLUCOTROL XL) 10 MG 24 hr tablet 2/18/2022 at pm  No Yes   Sig: Take 1 tablet (10 mg) by mouth daily Please make an appointment for further refills   lisinopril (ZESTRIL) 40 MG tablet 2/18/2022 at pm  No Yes   Sig: Take 1 tablet (40 mg) by mouth daily   metFORMIN (GLUCOPHAGE-XR) 500 MG 24 hr tablet 2/18/2022 at pm  No Yes   Sig: Take 4 tablets (2,000 mg) by mouth daily (with dinner)   metoprolol succinate ER (TOPROL-XL) 100 MG 24 hr tablet 2/19/2022 at am- about 0900  No Yes   Sig: Take 1 tablet (100 mg) by mouth 2 times daily   rivaroxaban ANTICOAGULANT (XARELTO ANTICOAGULANT) 20 MG TABS tablet 2/19/2022 at am-about 0900  No Yes   Sig: Take 1 tablet (20 mg) by mouth daily (with dinner)   thin (NO BRAND SPECIFIED) lancets   No No   Sig: Use with lanceting device. To accompany: Blood Glucose Monitor Brands: per insurance.      Facility-Administered Medications: None     Allergies   Allergies   Allergen Reactions     No Known Allergies        Physical Exam   Vital Signs: Temp: 98  F (36.7  C) Temp src: Oral BP: (!) 135/90 Pulse: (!) 121   Resp: 25 SpO2: 98 % O2 Device: None (Room air)    Weight: 272 lbs 11.2 oz    Constitutional: awake, alert, cooperative, no apparent distress.   Eyes: Lids and lashes normal, pupils equal, round and reactive to light   ENT: Normocephalic, without obvious abnormality, atraumatic, sinuses nontender on palpation   Hematologic / Lymphatic: no  cervical lymphadenopathy   Respiratory: CTABL   Cardiovascular: Tachycardic with irregularly irregular rhythm with no m/r/g   GI: Normal bowel sounds, soft, non-distended, non-tender.   Skin: normal skin color, texture, turgor   Musculoskeletal: There is no redness, warmth, or swelling of the joints. Full range of motion noted.   Neurologic: Awake, alert, oriented to name, place and time. Cranial nerves II-XII are grossly intact. Motor is 5 out of 5 bilaterally. Sensory is intact.   Neuropsychiatric: normal mood and affect      Data   Data reviewed today: I reviewed all medications, new labs and imaging results over the last 24 hours. I personally reviewed the EKG tracing showing afib with RVR and the chest x-ray image(s) showing see below.    CXR  XR Chest 2 Views:  Mild cardiomegaly is unchanged. Mild pulmonary venous hypertension has developed. Interstitial changes in both lungs appear unchanged and are likely due to and/or fibrosis. Bilateral calcified pleural plaques. No pleural effusions.  Report per radiology.    Most Recent 3 CBC's:Recent Labs   Lab Test 02/19/22  1113 11/20/21  1459 05/12/21  0842   WBC 10.3 10.4 11.0   HGB 13.5 15.7 14.1   MCV 88 88 86    264 234     Most Recent 3 BMP's:Recent Labs   Lab Test 02/19/22  1113 11/20/21  1459 05/14/21  0827 05/12/21  0842    140  --  137   POTASSIUM 3.5 4.4 3.9 4.5   CHLORIDE 101 106  --  104   CO2 28 31  --  29   BUN 27 43*  --  36*   CR 1.54* 1.78*  --  1.87*   ANIONGAP 9 3  --  4   KURT 9.2 9.7  --  9.2   * 134*  --  240*     Most Recent 2 LFT's:Recent Labs   Lab Test 02/19/22  1113 05/12/21  0842   AST 14 20   ALT 25 28   ALKPHOS 68 61   BILITOTAL 1.3 0.6     Most Recent 3 INR's:No lab results found.  Most Recent 3 Troponin's:Recent Labs   Lab Test 03/30/21  0952   TROPI <0.015     Most Recent 3 BNP's:Recent Labs   Lab Test 02/19/22  1347   NTBNPI 3,427*     Recent Results (from the past 24 hour(s))   XR Chest 2 Views    Narrative     EXAM: XR CHEST 2 VIEW  LOCATION: [facility]  DATE/TIME: 02/19/2022, 11:00 AM    INDICATION: Shortness of breath.  COMPARISON: None available.      Impression    IMPRESSION: PA and lateral views of the chest were obtained. Cardiomediastinal silhouette is within normal limits. Scattered calcified pleural plaques, most prominent along the right hemidiaphragm. Mild bibasilar pulmonary opacities, likely atelectasis.   No significant pleural effusion or pneumothorax.     XR Chest 2 Views    Narrative    EXAM: XR CHEST 2 VW  LOCATION: [facility]  DATE/TIME: 2/19/2022 2:31 PM    INDICATION: Atrial fibrillation with rapid ventricular rate. Shortness of breath.  COMPARISON: 2/19/2022      Impression    IMPRESSION: Mild cardiomegaly is unchanged. Mild pulmonary venous hypertension has developed. Interstitial changes in both lungs appear unchanged and are likely due to and/or fibrosis. Bilateral calcified pleural plaques. No pleural effusions.

## 2022-02-27 ENCOUNTER — HOSPITAL ENCOUNTER (EMERGENCY)
Facility: CLINIC | Age: 67
Discharge: HOME | End: 2022-02-27
Attending: EMERGENCY MEDICINE | Admitting: EMERGENCY MEDICINE
Payer: COMMERCIAL

## 2022-02-27 ENCOUNTER — APPOINTMENT (OUTPATIENT)
Dept: GENERAL RADIOLOGY | Facility: CLINIC | Age: 67
End: 2022-02-27
Payer: COMMERCIAL

## 2022-02-27 ENCOUNTER — OFFICE VISIT (OUTPATIENT)
Dept: URGENT CARE | Facility: URGENT CARE | Age: 67
End: 2022-02-27
Payer: COMMERCIAL

## 2022-02-27 VITALS
OXYGEN SATURATION: 93 % | HEIGHT: 70 IN | TEMPERATURE: 97.8 F | SYSTOLIC BLOOD PRESSURE: 133 MMHG | BODY MASS INDEX: 38.94 KG/M2 | WEIGHT: 272 LBS | HEART RATE: 100 BPM | RESPIRATION RATE: 18 BRPM | DIASTOLIC BLOOD PRESSURE: 95 MMHG

## 2022-02-27 VITALS
HEART RATE: 122 BPM | TEMPERATURE: 97.5 F | SYSTOLIC BLOOD PRESSURE: 139 MMHG | OXYGEN SATURATION: 95 % | DIASTOLIC BLOOD PRESSURE: 88 MMHG | BODY MASS INDEX: 39.31 KG/M2 | WEIGHT: 274 LBS | RESPIRATION RATE: 14 BRPM

## 2022-02-27 DIAGNOSIS — U07.1 INFECTION DUE TO 2019 NOVEL CORONAVIRUS: ICD-10-CM

## 2022-02-27 DIAGNOSIS — I48.91 ATRIAL FIBRILLATION WITH RVR (H): ICD-10-CM

## 2022-02-27 DIAGNOSIS — R06.02 SHORTNESS OF BREATH: ICD-10-CM

## 2022-02-27 DIAGNOSIS — R06.02 SOB (SHORTNESS OF BREATH): Primary | ICD-10-CM

## 2022-02-27 DIAGNOSIS — U07.1 PNEUMONIA DUE TO 2019 NOVEL CORONAVIRUS: ICD-10-CM

## 2022-02-27 DIAGNOSIS — J12.82 PNEUMONIA DUE TO 2019 NOVEL CORONAVIRUS: ICD-10-CM

## 2022-02-27 LAB
ANION GAP SERPL CALCULATED.3IONS-SCNC: 6 MMOL/L (ref 3–14)
ATRIAL RATE - MUSE: 100 BPM
BASOPHILS # BLD AUTO: 0.1 10E3/UL (ref 0–0.2)
BASOPHILS NFR BLD AUTO: 1 %
BUN SERPL-MCNC: 32 MG/DL (ref 7–30)
CALCIUM SERPL-MCNC: 9.2 MG/DL (ref 8.5–10.1)
CHLORIDE BLD-SCNC: 102 MMOL/L (ref 94–109)
CO2 SERPL-SCNC: 27 MMOL/L (ref 20–32)
CREAT SERPL-MCNC: 1.63 MG/DL (ref 0.52–1.25)
DIASTOLIC BLOOD PRESSURE - MUSE: NORMAL MMHG
EOSINOPHIL # BLD AUTO: 0 10E3/UL (ref 0–0.7)
EOSINOPHIL NFR BLD AUTO: 0 %
ERYTHROCYTE [DISTWIDTH] IN BLOOD BY AUTOMATED COUNT: 14.7 % (ref 10–15)
FLUAV RNA SPEC QL NAA+PROBE: NEGATIVE
FLUBV RNA RESP QL NAA+PROBE: NEGATIVE
GFR SERPL CREATININE-BSD FRML MDRD: 46 ML/MIN/1.73M2
GLUCOSE BLD-MCNC: 202 MG/DL (ref 70–99)
HCT VFR BLD AUTO: 40.2 % (ref 35–53)
HGB BLD-MCNC: 12.5 G/DL (ref 11.7–17.7)
IMM GRANULOCYTES # BLD: 0.1 10E3/UL
IMM GRANULOCYTES NFR BLD: 1 %
INTERPRETATION ECG - MUSE: NORMAL
LYMPHOCYTES # BLD AUTO: 1.1 10E3/UL (ref 0.8–5.3)
LYMPHOCYTES NFR BLD AUTO: 7 %
MAGNESIUM SERPL-MCNC: 1.9 MG/DL (ref 1.6–2.3)
MCH RBC QN AUTO: 27.3 PG (ref 26.5–33)
MCHC RBC AUTO-ENTMCNC: 31.1 G/DL (ref 31.5–36.5)
MCV RBC AUTO: 88 FL (ref 78–100)
MONOCYTES # BLD AUTO: 1 10E3/UL (ref 0–1.3)
MONOCYTES NFR BLD AUTO: 7 %
NEUTROPHILS # BLD AUTO: 12.6 10E3/UL (ref 1.6–8.3)
NEUTROPHILS NFR BLD AUTO: 84 %
NRBC # BLD AUTO: 0 10E3/UL
NRBC BLD AUTO-RTO: 0 /100
NT-PROBNP SERPL-MCNC: 7959 PG/ML (ref 0–900)
P AXIS - MUSE: NORMAL DEGREES
PLATELET # BLD AUTO: 294 10E3/UL (ref 150–450)
POTASSIUM BLD-SCNC: 4 MMOL/L (ref 3.4–5.3)
PR INTERVAL - MUSE: NORMAL MS
QRS DURATION - MUSE: 102 MS
QT - MUSE: 346 MS
QTC - MUSE: 484 MS
R AXIS - MUSE: 67 DEGREES
RBC # BLD AUTO: 4.58 10E6/UL (ref 3.8–5.9)
SARS-COV-2 RNA RESP QL NAA+PROBE: POSITIVE
SODIUM SERPL-SCNC: 135 MMOL/L (ref 133–144)
SYSTOLIC BLOOD PRESSURE - MUSE: NORMAL MMHG
T AXIS - MUSE: 9 DEGREES
TROPONIN I SERPL HS-MCNC: 38 NG/L
VENTRICULAR RATE- MUSE: 118 BPM
WBC # BLD AUTO: 14.8 10E3/UL (ref 4–11)

## 2022-02-27 PROCEDURE — 99214 OFFICE O/P EST MOD 30 MIN: CPT | Performed by: FAMILY MEDICINE

## 2022-02-27 PROCEDURE — 71045 X-RAY EXAM CHEST 1 VIEW: CPT

## 2022-02-27 PROCEDURE — 36415 COLL VENOUS BLD VENIPUNCTURE: CPT

## 2022-02-27 PROCEDURE — 83735 ASSAY OF MAGNESIUM: CPT

## 2022-02-27 PROCEDURE — 87636 SARSCOV2 & INF A&B AMP PRB: CPT

## 2022-02-27 PROCEDURE — 84484 ASSAY OF TROPONIN QUANT: CPT

## 2022-02-27 PROCEDURE — C9803 HOPD COVID-19 SPEC COLLECT: HCPCS

## 2022-02-27 PROCEDURE — 99285 EMERGENCY DEPT VISIT HI MDM: CPT | Mod: 25

## 2022-02-27 PROCEDURE — 93005 ELECTROCARDIOGRAM TRACING: CPT

## 2022-02-27 PROCEDURE — 85025 COMPLETE CBC W/AUTO DIFF WBC: CPT

## 2022-02-27 PROCEDURE — 80048 BASIC METABOLIC PNL TOTAL CA: CPT

## 2022-02-27 PROCEDURE — 83880 ASSAY OF NATRIURETIC PEPTIDE: CPT

## 2022-02-27 RX ORDER — AZITHROMYCIN 250 MG/1
TABLET, FILM COATED ORAL
Qty: 6 TABLET | Refills: 0 | Status: SHIPPED | OUTPATIENT
Start: 2022-02-27 | End: 2022-03-04

## 2022-02-27 ASSESSMENT — ENCOUNTER SYMPTOMS
DIARRHEA: 0
COUGH: 0
ABDOMINAL PAIN: 0
NAUSEA: 0
PALPITATIONS: 0
HEADACHES: 0
VOMITING: 0
LIGHT-HEADEDNESS: 0
SHORTNESS OF BREATH: 1
FEVER: 0
CHEST TIGHTNESS: 0

## 2022-02-27 NOTE — PROGRESS NOTES
Assessment & Plan     SOB (shortness of breath)  Atrial fibrillation with RVR (H)  67-year-old male presented with orthopnea, exertional dyspnea, worse for last 5 days.  Patient was in ER recently, was started on diltiazem and furosemide.  Hospital discharge summary reviewed..  Physical examination remarkable for dyspnea, atrial fibrillation with RVR and significant pedal edema.  Differentials discussed in detail including congestive cardiac failure, persistent atrial fibrillation with RVR and coronary ischemia.  Needs comprehensive evaluation for further management.  Recommended to go ER for further evaluation and management.  Patient understood and in agreement with above plan.  All questions answered.      Jeffrey Cao MD  [facility] NERY Prieto is a 67 year old who presents for the following health issues    HPI     Concern -   shortness of breath wakes him at night onset since Wednesday after patient experienced acid reflux.  Onset: worse since last friday   Description: SOB, worse with exertion  Intensity: moderate  Progression of Symptoms:  same  Accompanying Signs & Symptoms: no fever, chills, chest pain  Previous history of similar problem:   Therapies tried and outcome: famotidine   Was seen in UC and ER last weekend       Review of Systems   Constitutional, HEENT, cardiovascular, pulmonary, GI, , musculoskeletal, neuro, skin, endocrine and psych systems are negative, except as otherwise noted.      Objective    /88 (BP Location: Left arm, Patient Position: Sitting, Cuff Size: Adult Large)   Pulse (!) 122   Temp 97.5  F (36.4  C) (Tympanic)   Resp 14   Wt 124.3 kg (274 lb)   SpO2 95%   BMI 39.31 kg/m    Body mass index is 39.31 kg/m .  Physical Exam   GENERAL: alert, obese and dyspneic in appearance  EYES: Eyes grossly normal to inspection, PERRL and conjunctivae and sclerae normal  NECK: no adenopathy, no asymmetry, masses, or scars and thyroid normal  to palpation  RESP: No wheeze or rhonchi auscultated, dyspneic in appearance  CV: irregularly irregular rhythm, no murmur, click or rub, peripheral pulses strong and.  Pulses edema 2+ bilaterally  ABDOMEN: soft, nontender  MS: No joint swelling noted  NEURO: Normal strength and tone, mentation intact and speech normal  PSYCH: mentation appears normal, affect normal/bright

## 2022-02-27 NOTE — ED PROVIDER NOTES
"  MASSBP Score 2/27/2022   Age Greater than or equal to 65 years 2   BMI greater than or equal to 35 kg/m2 2   Has Diabetes Mellitus 2   Has Chronic Kidney Disease 3   Has Cardiovascular Disease and 55 years or older 2   Has Chronic Respiratory Disease and 55 years or older 0   Has Hypertension and 55 years or older 1   Is Immunocompromised 0   Is Pregnant 0   Member of [facility] community (Black/, /, ,  or , or  or [place] Native)  0   MASSBP Score 12   Has the patient had a positive COVID test outside our system?  No   What day did symptoms start?  2/24/2022       Estimated body mass index is 39.03 kg/m  as calculated from the following:    Height as of this encounter: 1.778 m (5' 10\").    Weight as of this encounter: 123.4 kg (272 lb).     GFR Estimate   Date Value Ref Range Status   02/27/2022 46 (L) >60 mL/min/1.73m2 Final     Comment:     GFR not calculated when sex unspecified or nonbinary.  Effective December 21, 2021 eGFRcr in adults is calculated using the 2021 CKD-EPI creatinine equation which includes age and gender (Naye et al., NEJM, DOI: 10.1056/WLCFef3357815)   05/12/2021 37 (L) >60 mL/min/[1.73_m2] Final     Comment:     Non  GFR Calc  Starting 12/18/2018, serum creatinine based estimated GFR (eGFR) will be   calculated using the Chronic Kidney Disease Epidemiology Collaboration   (CKD-EPI) equation.          FDA Facts Sheet  [employer] Drug Interaction review    Medications were reviewed with the patient and held or adjusted where applicable.    No current facility-administered medications for this encounter.     Current Outpatient Medications   Medication     alcohol swab prep pads     atorvastatin (LIPITOR) 10 MG tablet     blood glucose (NO BRAND SPECIFIED) test strip     blood glucose calibration (NO BRAND SPECIFIED) solution     blood glucose monitoring (NO BRAND SPECIFIED) meter device kit     " diltiazem ER COATED BEADS (CARDIZEM CD/CARTIA XT) 240 MG 24 hr capsule     furosemide (LASIX) 40 MG tablet     glipiZIDE (GLUCOTROL XL) 10 MG 24 hr tablet     lisinopril (ZESTRIL) 40 MG tablet     metFORMIN (GLUCOPHAGE-XR) 500 MG 24 hr tablet     metoprolol succinate ER (TOPROL-XL) 100 MG 24 hr tablet     rivaroxaban ANTICOAGULANT (XARELTO ANTICOAGULANT) 20 MG TABS tablet     thin (NO BRAND SPECIFIED) atul CASE APC-T  I saw and evaluated this patient under attending provider Dr. Aly with consultation from Siobhan Rogers PA-C  02/27/22 1864

## 2022-02-27 NOTE — DISCHARGE INSTRUCTIONS
Paxlovid is prescribed for 5 day course.  During this 5 days, hold your Xarelto and hold your statin.  You may continue your Diltiazem, however watch for lightheadedness, dizziness and check blood pressure.

## 2022-02-27 NOTE — ED TRIAGE NOTES
Pt presents with Complaints of SOB and palpitations since Thursday.Pt discharged from hospital on 2/20/22 for afib. Started on dilt and asa at that time.

## 2022-02-27 NOTE — ED PROVIDER NOTES
History   Chief Complaint:  Palpitations       HPI   Booker Brown is a 67 year old adult with history of atrial fibrillation (rate controlled on diltiazem, anticoagulated on Xarelto), hypertension, hyperlipidemia, type 2 diabetes, CKD stage III, obesity and GERD who presents to the ED today for evaluation of palpitations.  The patient was admitted here 2/19/22-2/20/22 for A. fib with RVR and lower extremity swelling.  During that stay he had an echocardiogram that showed normal ejection fraction and mild mitral regurgitation.  Cardiology was consulted.  They discontinued his amlodipine and chlorthalidone and switched him to Lasix and diltiazem.  The patient was recommended to stay inpatient, but wanted to get back to work.  Here now, the patient states he has been short of breath for the past 3 days.  He states 3 days ago he had an acid reflux attack that hit him hard.  It finally went away with famotidine.  However the shortness of breath he felt after that really never resolved.  He went to urgent care today to get this checked out and they sent him here for further evaluation of his A. fib.  The patient denies any chest pain, chest tightness, numbness/tingling, headache, fevers, chills, cough, cold symptoms, nausea, vomiting, diarrhea.  He has been compliant with all of his medications since hospital discharge. He states he hasn't felt heart palpitations and that he went to urgent care today because of the shortness of breath.    Review of Systems   Constitutional: Negative for fever.   Respiratory: Positive for shortness of breath. Negative for cough and chest tightness.    Cardiovascular: Negative for chest pain and palpitations.   Gastrointestinal: Negative for abdominal pain, diarrhea, nausea and vomiting.   Neurological: Negative for light-headedness and headaches.   All other systems reviewed and are negative.    Allergies:  No Known Allergies    Medications:  atorvastatin (LIPITOR) 10 MG  "tablet  diltiazem ER COATED BEADS (CARDIZEM CD/CARTIA XT) 240 MG 24 hr capsule  furosemide (LASIX) 40 MG tablet  glipiZIDE (GLUCOTROL XL) 10 MG 24 hr tablet  lisinopril (ZESTRIL) 40 MG tablet  metFORMIN (GLUCOPHAGE-XR) 500 MG 24 hr tablet  metoprolol succinate ER (TOPROL-XL) 100 MG 24 hr tablet  rivaroxaban ANTICOAGULANT (XARELTO ANTICOAGULANT) 20 MG TABS tablet    Past Medical History:     Obesity  Atrial fibrillation  HLD  HTN  Diabetes Type II  CKD 3  GERD  ED    Past Surgical History:    Past Surgical History:   Procedure Laterality Date     COLONOSCOPY N/A 8/24/2018    Procedure: COMBINED COLONOSCOPY, SINGLE OR MULTIPLE BIOPSY/POLYPECTOMY BY BIOPSY;;  Surgeon: Lawrence Mata MD;  [facility]: [facility]      Family History:    Family History   Problem Relation Age of Onset     Diabetes Mother         dx at 70     Bladder Cancer Brother      Social History:    Former smoker 48 pack years  No ETOH use  Works in     Physical Exam     Patient Vitals for the past 24 hrs:   BP Temp Temp src Pulse Resp SpO2 Height Weight   02/27/22 1820 (!) 133/95 -- -- 100 18 93 % -- --   02/27/22 1525 (!) 166/94 97.8  F (36.6  C) Temporal 116 18 96 % 1.778 m (5' 10\") 123.4 kg (272 lb)       Physical Exam  General: Adult male sitting up on [facility] talking on his iPhone  HENT: Patient wearing face mask. When taken off, mucous membranes appear moist.  Eyes: Conjunctive and sclera clear.  CV: Irregularly irregular. No appreciable murmurs, gallops or rubs.  Resp: Lungs clear to auscultation bilaterally. Normal respiratory effort. Speaks in full sentences. No stridor or cough observed.  GI: Abdomen soft, non distended and nontender. No rebound or guarding.  MSK: Moves all extremities without difficulty. No pitting LE edema.  Skin: Warm and dry.  Neuro: Awake, alert, oriented x 3. Cranial nerves grossly intact.  Psych: Cooperative. Normal affect.    Emergency Department Course   ECG  ECG taken at 1531, ECG " read at 1559  Atrial fibrillation with rapid ventricular response   Possible anterior infarct, age undetermined   Rate 118 bpm. [place] interval * ms. QRS duration 102 ms. QT/QTc 346/484 ms. P-R-T axes * 67 9.     Imaging:  XR Chest Port 1 View   Final Result   IMPRESSION: Cardiomegaly appears larger. Bilateral vascular and interstitial prominence noted may be a degree of edema. Some patchy peripheral pulmonary opacities appear increased at the right mid to inferior lung that could represent atypical pneumonia    including potentially COVID pneumonia.        Report per radiology    Laboratory:  Labs Ordered and Resulted from Time of ED Arrival to Time of ED Departure   INFLUENZA A/B & SARS-COV2 PCR MULTIPLEX - Abnormal       Result Value    Influenza A PCR Negative      Influenza B PCR Negative      SARS CoV2 PCR Positive (*)    NT PROBNP INPATIENT - Abnormal    N terminal Pro BNP Inpatient 7,959 (*)    BASIC METABOLIC PANEL - Abnormal    Sodium 135      Potassium 4.0      Chloride 102      Carbon Dioxide (CO2) 27      Anion Gap 6      Urea Nitrogen 32 (*)     Creatinine 1.63 (*)     Calcium 9.2      Glucose 202 (*)     GFR Estimate 46 (*)    CBC WITH PLATELETS AND DIFFERENTIAL - Abnormal    WBC Count 14.8 (*)     RBC Count 4.58      Hemoglobin 12.5      Hematocrit 40.2      MCV 88      MCH 27.3      MCHC 31.1 (*)     RDW 14.7      Platelet Count 294      % Neutrophils 84      % Lymphocytes 7      % Monocytes 7      % Eosinophils 0      % Basophils 1      % Immature Granulocytes 1      NRBCs per 100 WBC 0      Absolute Neutrophils 12.6 (*)     Absolute Lymphocytes 1.1      Absolute Monocytes 1.0      Absolute Eosinophils 0.0      Absolute Basophils 0.1      Absolute Immature Granulocytes 0.1      Absolute NRBCs 0.0     MAGNESIUM - Normal    Magnesium 1.9     TROPONIN I - Normal    Troponin I High Sensitivity 38        Emergency Department Course:    Reviewed:  I reviewed nursing notes, vitals and past medical  history    Assessments:  1540 I obtained history and examined the patient as noted above. I discussed his case with my attending physician Dr. Aly.    1631    Joseph the [facility] ambulated the patient and his SpO2 dropped from 95% to 92% and his HR increased from 85 to 120 bpm.    1741  I discussed the patient's case with my attending provider Dr. Aly. I spoke with the Pharmacist Haley regarding initiation of Paxlovid. I rechecked the patient and explained findings.     Disposition:  The patient was discharged to home with close return precautions.     Impression & Plan     Medical Decision Making:  Conner is a 67-year-old male was seen and evaluated in the emergency department today for shortness of breath he has experienced over the past 3 days per detailed HPI above.  In the setting of being recently discharged February 20 for A. fib with RVR, I obtained an initial EKG per above.  EKG showed A. fib with RVR.  It appears he is rate controlled on diltiazem.  He is also anticoagulated on Xarelto.  He denied any chest pain and troponin was within normal limits, reassuring he was not experiencing ACS.  He showed no electrolyte derangement on metabolic panel per above.  Kidney function is about at baseline.  I checked a BNP to trend it from his last admission.  Recent echocardiogram showed normal ejection fraction.  BNP today was elevated to 7959 up from previous 3427.  I did obtain a chest x-ray which did not show any pleural effusions and the patient did not appear significantly volume overloaded.  He has Lasix to take at home.  I obtained a Covid test, which returned positive.  Fortunately the patient is triple vaccinated, which should help his course.  I calculated his MASSBP score at 12.  He has significant comorbidities including age, obesity, diabetes, hypertension, CKD.  I discussed this with my attending physician as well as the pharmacist Haley.  We agreed the patient would benefit from a 5-day course of  renally dosed Paxlovid.  We provided the patient for prescription of this.  We discussed with him that during the 5 days that he takes the Paxlovid, he will need to stop his Xarelto and his statin.  He can continue his diltiazem, but needs to watch for signs of hypotension including lightheadedness and dizziness.  Once his course of the Paxlovid is complete he should resume the Xarelto and a statin.  The patient understands that the Paxlovid is intended to lessen the severity of his illness and duration.  I obtained a chest x-ray which showed patchy peripheral opacities consistent with atypical pneumonia for potential Covid pneumonia.  Given his elevated white count on CBC, we prescribed a Z-Khai.  I discussed with the patient reasons to return to the emergency department including declining oxygen saturation, chest pain, worsening shortness of breath or other new or worsening symptoms.  He expressed understanding. The patient was provided with a pulse oximeter at discharge.  He was instructed to monitor his pulse ox at home.  If it drops below 90% he should return to the emergency department for further evaluation.  His oxygen saturation at discharge was 93% on room air, which was reassuring.  His pulse ox did not drop below 92% on room air with ambulation when the [employer] Joseph walked him through the department.    Diagnosis:    ICD-10-CM    1. Infection due to 2019 novel coronavirus  U07.1 COVID-19 GetWell Loop Referral     Care Coordination Referral   2. Pneumonia due to 2019 novel coronavirus  U07.1 COVID-19 GetWell Loop Referral    J12.82 Care Coordination Referral   3. Shortness of breath  R06.02        Discharge Medications:  Discharge Medication List as of 2/27/2022  6:26 PM      START taking these medications    Details   azithromycin (ZITHROMAX Z-KHAI) 250 MG tablet Two tablets on the first day, then one tablet daily for the next 4 days, Disp-6 tablet, R-0, Local Print      nirmatrelvir and ritonavir  (PAXLOVID) therapy pack Take 2 tablets by mouth 2 times daily for 5 days Take 1 tablet of Nirmatrelvir and 1 tablet of Ritonavir twice daily for 5 days.  Throw away the extra Nirmatrelvir tablets., Disp-20 tablet, R-0, E-PrescribeIf Paxlovid unavailable and no Molnupiravir ord ered, refer patient to MNRAP at [email or web address]. If Molnupiravir ordered along with Paxlovid, dispense Molnupiravir and review embryotoxicity.             Siobhan CASE APC-T  I saw and evaluated this patient under attending provider Dr. Jermain Reyes, Siobhan MATHIAS PA-C  02/27/22 8540

## 2022-02-27 NOTE — ED PROVIDER NOTES
"Emergency Department Attending Supervision Note  2/27/2022  3:38 PM      I evaluated this patient in conjunction with Siobhan BOSWELL      Briefly, the patient presented with  shortness of breath and palpitations for 3 days. He was discharged from the hospital on 2/20/22 for atrial fibrillation and was evaluated by Dr. Lopez and Dr. Bailey, cardiology. He was prescribed Diltiazem and Lasik. He had a massive GERD attack 3 days ago and has had shortness of breath and palpitations since then.     BP (!) 133/95   Pulse 100   Temp 97.8  F (36.6  C) (Temporal)   Resp 18   Ht 1.778 m (5' 10\")   Wt 123.4 kg (272 lb)   SpO2 93%   BMI 39.03 kg/m      On my exam,   General: Alert, appears well-developed and well-nourished. Cooperative.     In mild distress  HEENT:  Head:  Atraumatic  Ears:  External ears are normal  Mouth/Throat:  Oropharynx is without erythema or exudate and mucous membranes are moist.   Eyes:   Conjunctivae normal and EOM are normal. No scleral icterus.    Pupils are equal, round, and reactive to light.   Neck:   Normal range of motion. Neck supple.  CV:  Normal rate, regular rhythm, normal heart sounds and radial pulses are 2+ and symmetric.  No murmur.  Resp:  Breath sounds are clear bilaterally    Non-labored, no retractions or accessory muscle use  GI:  Abdomen is soft, no distension, no tenderness. No rebound or guarding.  No CVA tenderness bilaterally  MS:  Normal range of motion. 1+ BLE pitting edema.    Normal strength in all 4 extremities.     Back atraumatic.    No midline cervical, thoracic, or lumbar tenderness  Skin:  Warm and dry.  No rash or lesions noted.  Neuro: Alert. Normal strength.  GCS: 15  Psych:  Normal mood and affect.  Lymph: No anterior or posterior cervical lymphadenopathy noted.    Results:  ECG  ECG taken at 1531, ECG read at 1559  Atrial fibrillation with rapid ventricular response   Possible anterior infarct, age undetermined   Rate 118 bpm. [place] interval * ms. QRS " duration 102 ms. QT/QTc 346/484 ms. P-R-T axes * 67 9.     XR Chest Port 1 View   Preliminary Result   IMPRESSION: Cardiomegaly appears larger. Bilateral vascular and interstitial prominence noted may be a degree of edema. Some patchy peripheral pulmonary opacities appear increased at the right mid to inferior lung that could represent atypical pneumonia    including potentially COVID pneumonia.        Labs Ordered and Resulted from Time of ED Arrival to Time of ED Departure   INFLUENZA A/B & SARS-COV2 PCR MULTIPLEX - Abnormal       Result Value    Influenza A PCR Negative      Influenza B PCR Negative      SARS CoV2 PCR Positive (*)    NT PROBNP INPATIENT - Abnormal    N terminal Pro BNP Inpatient 7,959 (*)    BASIC METABOLIC PANEL - Abnormal    Sodium 135      Potassium 4.0      Chloride 102      Carbon Dioxide (CO2) 27      Anion Gap 6      Urea Nitrogen 32 (*)     Creatinine 1.63 (*)     Calcium 9.2      Glucose 202 (*)     GFR Estimate 46 (*)    CBC WITH PLATELETS AND DIFFERENTIAL - Abnormal    WBC Count 14.8 (*)     RBC Count 4.58      Hemoglobin 12.5      Hematocrit 40.2      MCV 88      MCH 27.3      MCHC 31.1 (*)     RDW 14.7      Platelet Count 294      % Neutrophils 84      % Lymphocytes 7      % Monocytes 7      % Eosinophils 0      % Basophils 1      % Immature Granulocytes 1      NRBCs per 100 WBC 0      Absolute Neutrophils 12.6 (*)     Absolute Lymphocytes 1.1      Absolute Monocytes 1.0      Absolute Eosinophils 0.0      Absolute Basophils 0.1      Absolute Immature Granulocytes 0.1      Absolute NRBCs 0.0     MAGNESIUM - Normal    Magnesium 1.9     TROPONIN I - Normal    Troponin I High Sensitivity 38         ED course:    1651 I obtained a history and examined the patient    MDM:  Patient is a 67-year-old male with complex past medical history pertinent for atrial fibrillation, CHF on chronic anticoagulation and diuretics who presents with acute on chronic shortness of breath.  He developed new  shortness of breath on Thursday and has had persistent symptoms since.  Broad work-up pursued in the emergency department today revealing for positive COVID-19 in the setting of full vaccination.  Chest x-ray concerning for faint infiltrates that may represent a viral pneumonia versus superimposed atypical bacterial pneumonia.  Due to length of symptoms he would qualify for paxlovid and so we have adjusted his medications accordingly.  In terms of his history of heart failure his lower extremity edema has significantly improved although he does continue to have 1+ bilateral lower extremity pitting edema.  He thankfully does not have any crackles nor hypoxia requiring inpatient treatment of chronic CHF.  I do not suspect that there is a acute exacerbation of his heart failure even though his BNP is moderately elevated at over 7000 today.  Due to concern for potential superimposed atypical bacterial pneumonia we will treat with a course of azithromycin in addition to paxlovid.  We will continue with his diuretic medications and plan to hold his statin and anticoagulant medications while taking antiviral for Covid.  He understands the timing for reinitiation of these medications.  He understands return precautions for development of worsening shortness of breath or hypoxia.  Close outpatient follow-up encouraged and he was enrolled in our get well follow-up loop regarding COVID-19.  Discharged home.      Diagnosis    ICD-10-CM    1. Infection due to 2019 novel coronavirus  U07.1 COVID-19 GetWell Loop Referral     Care Coordination Referral   2. Pneumonia due to 2019 novel coronavirus  U07.1 COVID-19 GetWell Loop Referral    J12.82 Care Coordination Referral   3. Shortness of breath  R06.02          Andre Aly MD White, Scott, MD  02/27/22 1845

## 2022-02-28 ENCOUNTER — PATIENT OUTREACH (OUTPATIENT)
Dept: CARE COORDINATION | Facility: CLINIC | Age: 67
End: 2022-02-28
Payer: COMMERCIAL

## 2022-02-28 NOTE — PROGRESS NOTES
Clinic Care Coordination Contact    Referral Type: Virtual Home Monitoring - GetWell Loop Program    Hospital/ED Discharge date: 2/27/22    Reason/Diagnosis for Hospital/ED visit: COVID PNA. AF w/ RVR    Are you feeling better, the same, or worse since your Hospital/ED visit? better    Symptoms:     Cough -  Occasional, occas need to expectorate    Shortness of breath: RODRIGUEZ walking 20 feet. Pt is speaking in full sentences    Chest pain:  No    Fever: No, doesn't feel feverish    Current temperature:  hasn't checked    Headache:  No    Sore throat:  No    Nasal congestion:  No    Nausea/vomiting/diarrhea:  No    Body aches/joint pains:  No    Fatigue:  No      Pulse Oximeter/Oxygen Questions:    Were you sent home with a pulse oximeter?  Yes    Are you currently utilizing the pulse oximeter?  No    Do you understand how to use the home oximeter?  Yes  Reveiewed what SpO2 is and to check mallorie Q4 hrs w/A  What are your current oxygen saturation levels?  Pt hasn't checked    Oxygen saturation levels in ED/IP:  93-96%    Were you sent home with home oxygen?  No      Medications:    Were you prescribed any new medications?  Yes  Paxlovid and azithromycin  If yes, have you picked up these new medications?  Yes    Are the medications helping? too soon    Do you have any questions about your new medications?  Pt understands directions of Paxlovid       Follow Up:    Do you have a follow up appointment scheduled with your PCP or specialist?  Yes 3/14 w/ cardiology. Instructed pt to discuss CHF mgmt with cardiologist. Pt doesn't weigh daily and eats CÉSAR diet    Do you have a plan in place in the event of an emergency?  Yes    If patient is established or planning to establish primary care within [facility], Care Coordination was offered. Care Coordination accepted/declined:  Yes, delined. Pt states he is coping with stressors okay    GetWell Loop invitation received?  Yes    GetWell Loop invitation accepted, pending  patient activation or declined:  pending and encouraged    Hannah Cotto, KUNAL  [facility] - RN Care Coordinator

## 2022-02-28 NOTE — PROGRESS NOTES
Care Coordination ED Discharge Follow up Note  Pt on home virtual monitoring program for COVID-19, call made to do assessment, verify follow-up appt and offer care coordination. Pt is waing in parking lot at pharmacy for his meds. He states the pharmacy only has one rx. Chart review. Call to pharmacy, [facility] rx was printed. This RN gave pharmacist okay to fill it, based on ED notes. RN will call pt back later today to complete assessment

## 2022-03-02 ENCOUNTER — VIRTUAL VISIT (OUTPATIENT)
Dept: INTERNAL MEDICINE | Facility: CLINIC | Age: 67
End: 2022-03-02
Payer: COMMERCIAL

## 2022-03-02 DIAGNOSIS — U07.1 PNEUMONIA DUE TO 2019 NOVEL CORONAVIRUS: Primary | ICD-10-CM

## 2022-03-02 DIAGNOSIS — I48.91 ATRIAL FIBRILLATION WITH RVR (H): ICD-10-CM

## 2022-03-02 DIAGNOSIS — J12.82 PNEUMONIA DUE TO 2019 NOVEL CORONAVIRUS: Primary | ICD-10-CM

## 2022-03-02 PROCEDURE — 99214 OFFICE O/P EST MOD 30 MIN: CPT | Mod: 95 | Performed by: INTERNAL MEDICINE

## 2022-03-02 RX ORDER — FUROSEMIDE 40 MG
40 TABLET ORAL DAILY
Qty: 90 TABLET | Refills: 3 | Status: SHIPPED | OUTPATIENT
Start: 2022-03-02 | End: 2022-05-06

## 2022-03-02 NOTE — PROGRESS NOTES
TELEPHONE VISIT                                                      ASSESSMENT/PLAN                                                      (U07.1,  J12.82) Pneumonia due to 2019 novel coronavirus  (primary encounter diagnosis)  Comment: patient is clinically doing well; dyspnea with minimal exertion is still present, but is slowly improving; oxygen saturations are within normal limits; no conversational dyspnea or increased work of breathing noted during telephone call; patient sounds upbeat and positive.  Plan: continue and complete current therapies; patient is scheduled to follow-up with cardiology, in person, 3/14/2022; CXR should be scheduled for mid April or later; if symptoms worsen, change, or do not improve, patient to contact MD.      (I48.91) Atrial fibrillation with RVR (H)  Plan: refills of Lasix provided.    Total time of call between patient and provider was 10 minutes. Provider location: office. Patient location: home.    Arline White MD   [address] [address]  T: [phone], F: [phone]    SUBJECTIVE                                                      Booker Brown is a very pleasant 67 year old adult who requested a telephone visit to discuss his recent ER visit:    Patient presented to the ER with shortness of breath and palpitations. Tested positive for COVID-19 in the setting of full vaccination. CXR demonstrated bilateral vascular and interstitial prominence and patchy peripheral pulmonary opacities right mid to inferior lung.  Patient qualified for Paxlovid. Course of azithromycin was also prescribed in case of superimposed bacterial pneumonia and patient was discharged home.    Patient is at home doing okay.  He reports shortness of breath with minimal exertion, but this has improved somewhat since being home. At first he was only able to walk within a single room, now he is able to walk between rooms before needing to rest again. No  shortness of breath at rest. Oxygen levels have remained within normal limits at home. He did expect to feel better and he is currently feeling, however.    No conversational dyspnea or increased work of breathing noted during telephone call.  Patient sounds upbeat and positive.    Unrelated to above, patient needs refills of Lasix 20 mg daily.    ---    (Note was completed, in part, with [employer] 2020 voice-recognition software. Documentation reviewed, but some grammatical, spelling, and word errors may remain.)

## 2022-03-02 NOTE — ADDENDUM NOTE
Addended by: CARLITO SOLIS on: 3/2/2022 01:35 PM     Modules accepted: Level of Service    
pt follows commands inconsistently/able to follow single-step instructions/50% of the time

## 2022-03-09 DIAGNOSIS — I10 ESSENTIAL HYPERTENSION WITH GOAL BLOOD PRESSURE LESS THAN 140/90: ICD-10-CM

## 2022-03-09 RX ORDER — LISINOPRIL 40 MG/1
40 TABLET ORAL DAILY
Qty: 90 TABLET | Refills: 0 | Status: SHIPPED | OUTPATIENT
Start: 2022-03-09 | End: 2022-05-03

## 2022-03-09 NOTE — TELEPHONE ENCOUNTER
Routing refill request to provider for review/approval because:  Break in medication   Blood pressure under 140/90 in past 12 months    Normal serum creatinine on file in past 12 months     BP Readings from Last 3 Encounters:   02/27/22 (!) 133/95   02/27/22 139/88   02/20/22 99/65     Last Written Prescription Date:  4/7/21  Last Fill Quantity: 90,  # refills: 0   Last office visit: 3/2/22 virtual with cristiana White at [facility].  Not seen at this clinic since 2/28/20.  Prescribed by Adele  Future Office Visit:      Johana Vigil RN  [facility]

## 2022-03-09 NOTE — TELEPHONE ENCOUNTER
Routing refill request to provider for review/approval because:    Ordering provider is retired. Routing to provider who last saw patient.     Apryl Conrad RN  [facility] Triage Nurse

## 2022-03-17 ENCOUNTER — ANCILLARY PROCEDURE (OUTPATIENT)
Dept: GENERAL RADIOLOGY | Facility: CLINIC | Age: 67
End: 2022-03-17
Attending: FAMILY MEDICINE
Payer: COMMERCIAL

## 2022-03-17 ENCOUNTER — OFFICE VISIT (OUTPATIENT)
Dept: FAMILY MEDICINE | Facility: CLINIC | Age: 67
End: 2022-03-17
Payer: COMMERCIAL

## 2022-03-17 VITALS
WEIGHT: 271 LBS | HEART RATE: 101 BPM | SYSTOLIC BLOOD PRESSURE: 131 MMHG | DIASTOLIC BLOOD PRESSURE: 71 MMHG | OXYGEN SATURATION: 95 % | TEMPERATURE: 98.1 F | BODY MASS INDEX: 38.88 KG/M2

## 2022-03-17 DIAGNOSIS — U07.1 PNEUMONIA DUE TO 2019 NOVEL CORONAVIRUS: Primary | ICD-10-CM

## 2022-03-17 DIAGNOSIS — J12.82 PNEUMONIA DUE TO 2019 NOVEL CORONAVIRUS: Primary | ICD-10-CM

## 2022-03-17 DIAGNOSIS — J12.82 PNEUMONIA DUE TO 2019 NOVEL CORONAVIRUS: ICD-10-CM

## 2022-03-17 DIAGNOSIS — N52.1 TYPE 2 DIABETES WITH CIRCULATORY DISORDER CAUSING ERECTILE DYSFUNCTION (H): ICD-10-CM

## 2022-03-17 DIAGNOSIS — E11.59 TYPE 2 DIABETES WITH CIRCULATORY DISORDER CAUSING ERECTILE DYSFUNCTION (H): ICD-10-CM

## 2022-03-17 DIAGNOSIS — U07.1 PNEUMONIA DUE TO 2019 NOVEL CORONAVIRUS: ICD-10-CM

## 2022-03-17 LAB
BASOPHILS # BLD AUTO: 0.1 10E3/UL (ref 0–0.2)
BASOPHILS NFR BLD AUTO: 1 %
EOSINOPHIL # BLD AUTO: 0 10E3/UL (ref 0–0.7)
EOSINOPHIL NFR BLD AUTO: 0 %
ERYTHROCYTE [DISTWIDTH] IN BLOOD BY AUTOMATED COUNT: 14.6 % (ref 10–15)
HBA1C MFR BLD: 7.2 % (ref 0–5.6)
HCT VFR BLD AUTO: 42.5 % (ref 35–53)
HGB BLD-MCNC: 13.4 G/DL (ref 11.7–17.7)
IMM GRANULOCYTES # BLD: 0 10E3/UL
IMM GRANULOCYTES NFR BLD: 0 %
LYMPHOCYTES # BLD AUTO: 1.3 10E3/UL (ref 0.8–5.3)
LYMPHOCYTES NFR BLD AUTO: 11 %
MCH RBC QN AUTO: 26.3 PG (ref 26.5–33)
MCHC RBC AUTO-ENTMCNC: 31.5 G/DL (ref 31.5–36.5)
MCV RBC AUTO: 83 FL (ref 78–100)
MONOCYTES # BLD AUTO: 0.7 10E3/UL (ref 0–1.3)
MONOCYTES NFR BLD AUTO: 5 %
NEUTROPHILS # BLD AUTO: 10.3 10E3/UL (ref 1.6–8.3)
NEUTROPHILS NFR BLD AUTO: 83 %
PLATELET # BLD AUTO: 314 10E3/UL (ref 150–450)
RBC # BLD AUTO: 5.1 10E6/UL (ref 3.8–5.9)
WBC # BLD AUTO: 12.4 10E3/UL (ref 4–11)

## 2022-03-17 PROCEDURE — 83036 HEMOGLOBIN GLYCOSYLATED A1C: CPT | Performed by: FAMILY MEDICINE

## 2022-03-17 PROCEDURE — 99213 OFFICE O/P EST LOW 20 MIN: CPT | Performed by: FAMILY MEDICINE

## 2022-03-17 PROCEDURE — 36415 COLL VENOUS BLD VENIPUNCTURE: CPT | Performed by: FAMILY MEDICINE

## 2022-03-17 PROCEDURE — 71046 X-RAY EXAM CHEST 2 VIEWS: CPT | Performed by: RADIOLOGY

## 2022-03-17 PROCEDURE — 85025 COMPLETE CBC W/AUTO DIFF WBC: CPT | Performed by: FAMILY MEDICINE

## 2022-03-17 NOTE — PROGRESS NOTES
Assessment & Plan     (U07.1,  J12.82) Pneumonia due to 2019 novel coronavirus  (primary encounter diagnosis)  Comment: improving, although still has significant sob.  Oxygen saturation normal on room air.  Continue to add in activity daily, and may rtw next week with no restrictions, per patient request.  If he finds full days difficult to manage, he can contact me to try 1/2 days for a couple of weeks.  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 Persistent small right pleural effusion. Slight interval  improvement in the bilateral patchy airspace opacities, which may  reflect improving pneumonitis. No pneumothorax. Question calcified  pleural plaques.  2. Normal cardiac and mediastinal silhouette.    FER LAWS MD         SYSTEM ID:  [id number]   Results for orders placed or performed in visit on 03/17/22   CBC with platelets and differential     Status: Abnormal   Result Value Ref Range    WBC Count 12.4 (H) 4.0 - 11.0 10e3/uL    RBC Count 5.10 3.80 - 5.90 10e6/uL    Hemoglobin 13.4 11.7 - 17.7 g/dL    Hematocrit 42.5 35.0 - 53.0 %    MCV 83 78 - 100 fL    MCH 26.3 (L) 26.5 - 33.0 pg    MCHC 31.5 31.5 - 36.5 g/dL    RDW 14.6 10.0 - 15.0 %    Platelet Count 314 150 - 450 10e3/uL    % Neutrophils 83 %    % Lymphocytes 11 %    % Monocytes 5 %    % Eosinophils 0 %    % Basophils 1 %    % Immature Granulocytes 0 %    Absolute Neutrophils 10.3 (H) 1.6 - 8.3 10e3/uL    Absolute Lymphocytes 1.3 0.8 - 5.3 10e3/uL    Absolute Monocytes 0.7 0.0 - 1.3  10e3/uL    Absolute Eosinophils 0.0 0.0 - 0.7 10e3/uL    Absolute Basophils 0.1 0.0 - 0.2 10e3/uL    Absolute Immature Granulocytes 0.0 <=0.4 10e3/uL    Narrative    The sex of this patient cannot be reliably determined based on discrepancies in demographics (legal sex, sex assigned at birth, gender identity).  Both male and female reference ranges are provided where applicable.  Careful evaluation of the patient's results as compared to the gender specific reference intervals is required in this setting.    Hemoglobin A1c     Status: Abnormal   Result Value Ref Range    Hemoglobin A1C 7.2 (H) 0.0 - 5.6 %   CBC with platelets and differential     Status: Abnormal    Narrative    The following orders were created for panel order CBC with platelets and differential.  Procedure                               Abnormality         Status                     ---------                               -----------         ------                     CBC with platelets and d...[[id number]]  Abnormal            Final result                 Please view results for these tests on the individual orders.          Return in about 2 weeks (around 3/31/2022) for follow up if not improving.    Kathy Pollock MD  [facility]    Nesha Prieto is a 67 year old who presents for the following health issues   He was diagnosed with covid 3 1/2 weeks ago, has been struggling with sob, exercise intolerance. He's been getting better day by day. He was able to walk into our clinic today without getting too breathless. 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 He was treated with antiviral medication and antibiotics. He would like return to work next week.  He's never been this sick, has never had to take off more than 2 days in a row ever in his working life.  No one else in his household contracted covid.    History of Present Illness       Reason for visit:  To determine work  availability  Symptom onset:  3-4 weeks ago  Symptoms include:  Shortness of breath  Symptom intensity:  Moderate  Symptom progression:  Improving  Had these symptoms before:  No  What makes it worse:  No  What makes it better:  No    He eats 2-3 servings of fruits and vegetables daily.He consumes 1 sweetened beverage(s) daily.He exercises with enough effort to increase his heart rate 20 to 29 minutes per day.  He exercises with enough effort to increase his heart rate 3 or less days per week.   He is taking medications regularly.       Review of Systems   Constitutional, HEENT, cardiovascular, pulmonary, GI, , musculoskeletal, neuro, skin, endocrine and psych systems are negative, except as otherwise noted.      Objective    /71 (BP Location: Right arm, Patient Position: Chair, Cuff Size: Adult Regular)   Pulse 101   Temp 98.1  F (36.7  C) (Temporal)   Wt 122.9 kg (271 lb)   SpO2 95%   BMI 38.88 kg/m    Body mass index is 38.88 kg/m .  Physical Exam   GENERAL: healthy, alert and no distress  NECK: no adenopathy, no asymmetry, masses, or scars and thyroid normal to palpation  RESP: lungs clear to auscultation - no rales, rhonchi or wheezes  CV: regular rate and rhythm, normal S1 S2, no S3 or S4, no murmur, click or rub, no peripheral edema and peripheral pulses strong  MS: no gross musculoskeletal defects noted, no edema    CXR - Reviewed and interpreted by me patchy infiltrates consistent with prior xray 2/27/2022 but improved.    Results for orders placed or performed in visit on 03/17/22 (from the past 24 hour(s))   CBC with platelets and differential    Narrative    The following orders were created for panel order CBC with platelets and differential.  Procedure                               Abnormality         Status                     ---------                               -----------         ------                     CBC with platelets and d...[[id number]]  Abnormal            Final result                  Please view results for these tests on the individual orders.   CBC with platelets and differential   Result Value Ref Range    WBC Count 12.4 (H) 4.0 - 11.0 10e3/uL    RBC Count 5.10 3.80 - 5.90 10e6/uL    Hemoglobin 13.4 11.7 - 17.7 g/dL    Hematocrit 42.5 35.0 - 53.0 %    MCV 83 78 - 100 fL    MCH 26.3 (L) 26.5 - 33.0 pg    MCHC 31.5 31.5 - 36.5 g/dL    RDW 14.6 10.0 - 15.0 %    Platelet Count 314 150 - 450 10e3/uL    % Neutrophils 83 %    % Lymphocytes 11 %    % Monocytes 5 %    % Eosinophils 0 %    % Basophils 1 %    % Immature Granulocytes 0 %    Absolute Neutrophils 10.3 (H) 1.6 - 8.3 10e3/uL    Absolute Lymphocytes 1.3 0.8 - 5.3 10e3/uL    Absolute Monocytes 0.7 0.0 - 1.3 10e3/uL    Absolute Eosinophils 0.0 0.0 - 0.7 10e3/uL    Absolute Basophils 0.1 0.0 - 0.2 10e3/uL    Absolute Immature Granulocytes 0.0 <=0.4 10e3/uL    Narrative    The sex of this patient cannot be reliably determined based on discrepancies in demographics (legal sex, sex assigned at birth, gender identity).  Both male and female reference ranges are provided where applicable.  Careful evaluation of the patient's results as compared to the gender specific reference intervals is required in this setting.    Hemoglobin A1c   Result Value Ref Range    Hemoglobin A1C 7.2 (H) 0.0 - 5.6 %

## 2022-03-17 NOTE — RESULT ENCOUNTER NOTE
Patient was seen today in clinic.  I discussed results in clinic, please see clinic progress note.    Kathy Pollock MD 3/17/2022

## 2022-03-18 NOTE — RESULT ENCOUNTER NOTE
Hello!  It was a pleasure to see you in clinic!  Thank you for getting labs done.     Your HgA1C, also called glycosylated hemoglobin, which measures the level of sugar in your blood over the past few months, is at goal, which is great! Congratulations!    Sincerely,  Dr. Kathy Pollock MD  3/17/2022

## 2022-03-29 ENCOUNTER — ANCILLARY PROCEDURE (OUTPATIENT)
Dept: GENERAL RADIOLOGY | Facility: CLINIC | Age: 67
End: 2022-03-29
Attending: INTERNAL MEDICINE
Payer: COMMERCIAL

## 2022-03-29 ENCOUNTER — OFFICE VISIT (OUTPATIENT)
Dept: INTERNAL MEDICINE | Facility: CLINIC | Age: 67
End: 2022-03-29
Payer: COMMERCIAL

## 2022-03-29 ENCOUNTER — HOSPITAL ENCOUNTER (OUTPATIENT)
Dept: CT IMAGING | Facility: CLINIC | Age: 67
Discharge: HOME | End: 2022-03-29
Attending: INTERNAL MEDICINE | Admitting: INTERNAL MEDICINE
Payer: COMMERCIAL

## 2022-03-29 VITALS
DIASTOLIC BLOOD PRESSURE: 94 MMHG | BODY MASS INDEX: 40.33 KG/M2 | TEMPERATURE: 96.5 F | OXYGEN SATURATION: 99 % | HEART RATE: 129 BPM | WEIGHT: 281.1 LBS | SYSTOLIC BLOOD PRESSURE: 130 MMHG

## 2022-03-29 DIAGNOSIS — R79.89 ELEVATED D-DIMER: ICD-10-CM

## 2022-03-29 DIAGNOSIS — I48.19 PERSISTENT ATRIAL FIBRILLATION (H): ICD-10-CM

## 2022-03-29 DIAGNOSIS — R07.9 ACUTE CHEST PAIN: ICD-10-CM

## 2022-03-29 DIAGNOSIS — R07.9 ACUTE CHEST PAIN: Primary | ICD-10-CM

## 2022-03-29 LAB
ALBUMIN SERPL-MCNC: 3.1 G/DL (ref 3.4–5)
ALP SERPL-CCNC: 71 U/L (ref 40–150)
ALT SERPL W P-5'-P-CCNC: 20 U/L (ref 0–70)
ANION GAP SERPL CALCULATED.3IONS-SCNC: 6 MMOL/L (ref 3–14)
AST SERPL W P-5'-P-CCNC: 11 U/L (ref 0–45)
BASOPHILS # BLD AUTO: 0.1 10E3/UL (ref 0–0.2)
BASOPHILS NFR BLD AUTO: 1 %
BILIRUB SERPL-MCNC: 0.7 MG/DL (ref 0.2–1.3)
BUN SERPL-MCNC: 24 MG/DL (ref 7–30)
CALCIUM SERPL-MCNC: 9.3 MG/DL (ref 8.5–10.1)
CHLORIDE BLD-SCNC: 107 MMOL/L (ref 94–109)
CO2 SERPL-SCNC: 24 MMOL/L (ref 20–32)
CREAT SERPL-MCNC: 1.44 MG/DL (ref 0.52–1.25)
D DIMER PPP FEU-MCNC: 1.24 UG/ML FEU (ref 0–0.5)
EOSINOPHIL # BLD AUTO: 0 10E3/UL (ref 0–0.7)
EOSINOPHIL NFR BLD AUTO: 0 %
ERYTHROCYTE [DISTWIDTH] IN BLOOD BY AUTOMATED COUNT: 15.4 % (ref 10–15)
GFR SERPL CREATININE-BSD FRML MDRD: 54 ML/MIN/1.73M2
GLUCOSE BLD-MCNC: 166 MG/DL (ref 70–99)
HBA1C MFR BLD: 7 % (ref 0–5.6)
HCT VFR BLD AUTO: 43.4 % (ref 35–53)
HGB BLD-MCNC: 13.2 G/DL (ref 11.7–17.7)
IMM GRANULOCYTES # BLD: 0.1 10E3/UL
IMM GRANULOCYTES NFR BLD: 0 %
LYMPHOCYTES # BLD AUTO: 1.5 10E3/UL (ref 0.8–5.3)
LYMPHOCYTES NFR BLD AUTO: 13 %
MCH RBC QN AUTO: 27 PG (ref 26.5–33)
MCHC RBC AUTO-ENTMCNC: 30.4 G/DL (ref 31.5–36.5)
MCV RBC AUTO: 89 FL (ref 78–100)
MONOCYTES # BLD AUTO: 1.1 10E3/UL (ref 0–1.3)
MONOCYTES NFR BLD AUTO: 9 %
NEUTROPHILS # BLD AUTO: 9.2 10E3/UL (ref 1.6–8.3)
NEUTROPHILS NFR BLD AUTO: 77 %
NRBC # BLD AUTO: 0 10E3/UL
NRBC BLD AUTO-RTO: 0 /100
PLATELET # BLD AUTO: 278 10E3/UL (ref 150–450)
POTASSIUM BLD-SCNC: 4 MMOL/L (ref 3.4–5.3)
PROT SERPL-MCNC: 7.9 G/DL (ref 6.8–8.8)
RBC # BLD AUTO: 4.89 10E6/UL (ref 3.8–5.9)
SODIUM SERPL-SCNC: 137 MMOL/L (ref 133–144)
TROPONIN I SERPL HS-MCNC: 20 NG/L
WBC # BLD AUTO: 11.9 10E3/UL (ref 4–11)

## 2022-03-29 PROCEDURE — 250N000009 HC RX 250: Performed by: INTERNAL MEDICINE

## 2022-03-29 PROCEDURE — 71046 X-RAY EXAM CHEST 2 VIEWS: CPT | Performed by: RADIOLOGY

## 2022-03-29 PROCEDURE — 85025 COMPLETE CBC W/AUTO DIFF WBC: CPT | Performed by: INTERNAL MEDICINE

## 2022-03-29 PROCEDURE — 36415 COLL VENOUS BLD VENIPUNCTURE: CPT | Performed by: INTERNAL MEDICINE

## 2022-03-29 PROCEDURE — 93000 ELECTROCARDIOGRAM COMPLETE: CPT | Performed by: INTERNAL MEDICINE

## 2022-03-29 PROCEDURE — 85379 FIBRIN DEGRADATION QUANT: CPT | Performed by: INTERNAL MEDICINE

## 2022-03-29 PROCEDURE — 84484 ASSAY OF TROPONIN QUANT: CPT | Performed by: INTERNAL MEDICINE

## 2022-03-29 PROCEDURE — 83036 HEMOGLOBIN GLYCOSYLATED A1C: CPT | Performed by: INTERNAL MEDICINE

## 2022-03-29 PROCEDURE — 71275 CT ANGIOGRAPHY CHEST: CPT

## 2022-03-29 PROCEDURE — 250N000011 HC RX IP 250 OP 636: Performed by: INTERNAL MEDICINE

## 2022-03-29 PROCEDURE — 80053 COMPREHEN METABOLIC PANEL: CPT | Performed by: INTERNAL MEDICINE

## 2022-03-29 PROCEDURE — 99214 OFFICE O/P EST MOD 30 MIN: CPT | Performed by: INTERNAL MEDICINE

## 2022-03-29 RX ORDER — DILTIAZEM HYDROCHLORIDE 240 MG/1
240 CAPSULE, COATED, EXTENDED RELEASE ORAL DAILY
Qty: 90 CAPSULE | Refills: 3 | Status: SHIPPED | OUTPATIENT
Start: 2022-03-29 | End: 2022-07-08

## 2022-03-29 RX ORDER — IOPAMIDOL 755 MG/ML
83 INJECTION, SOLUTION INTRAVASCULAR ONCE
Status: COMPLETED | OUTPATIENT
Start: 2022-03-29 | End: 2022-03-29

## 2022-03-29 RX ADMIN — IOPAMIDOL 83 ML: 755 INJECTION, SOLUTION INTRAVENOUS at 15:40

## 2022-03-29 RX ADMIN — SODIUM CHLORIDE 100 ML: 9 INJECTION, SOLUTION INTRAVENOUS at 15:40

## 2022-03-29 NOTE — PROGRESS NOTES
ASSESSMENT/PLAN                                                      (R07.9) Acute chest pain  (primary encounter diagnosis)  (I48.19) Persistent atrial fibrillation (H)  Comment: suspect acute chest pain may be due to demand ischemia from recurrent atrial fibrillation with RVR since he ran out of diltiazem last week; PE less likely as he is on chronic AC with Xarelto, though possible; aortic dissection and pulmonary pathology are also on the differential.  Plan: EKG, CXR, D-dimer, troponin, CBC, CMP today; RESTART diltiazem - refills sent in; will make cardiology team aware.     Arline White MD   [facility]  [address] [place] [address]  T: [phone], F: [phone]    SUBJECTIVE                                                      Booker Brown is a very pleasant 67 year old adult who presents for follow-up:    Patient tested positive for COVID-19 2/272022. CXR changes consistent with COVID-19 pneumonia at that time. Patient qualified for Paxlovid, which was started. Patient was also prescribed azithromycin in case of superimposed bacterial pneumonia.    Patient was seen by Dr. Pollock 3/17/2022 for continued shortness of breath and exercise intolerance. Labs significant for mild leukocytosis with left shift. CXR demonstrated a persistent small right pleural effusion with slight interval improvement in bilateral patchy airspace opacities.    Patient returned to work last week and was doing well. Yesterday, however, he developed acute onset substernal chest pain. Chest pain continued and worsened over the course of yesterday. It continues today, but is stable since yesterday evening. Patient describes the chest pain as sharp and worse with deep inspiration.    Patient reports palpitations as of late (ran out of diltiazem last week). Patient also reports shortness of breath, but stable from before (recovering from COVID-19 in March).  No nausea, vomiting, or diaphoresis.    Recent  hospitalization for atrial fibrillation with RVR. Improved and discharged on diltiazem.    On chronic AC with Xarelto - no recent missed doses.     OBJECTIVE                                                      BP (!) 130/94   Pulse (!) 129   Temp (!) 96.5  F (35.8  C) (Tympanic)   Wt 127.5 kg (281 lb 1.6 oz)   SpO2 99%   BMI 40.33 kg/m    Constitutional: well-appearing  Respiratory: normal respiratory effort; clear to auscultation bilaterally  Cardiovascular: irregularly irregular and tachycardic  Musculoskeletal: normal gait and station  Psych: normal judgment and insight; normal mood and affect; recent and remote memory intact    ---    (Note documentation was completed, in part, with [email or web address] voice-recognition software. Documentation was reviewed, but some grammatical, spelling, and word errors may remain.)

## 2022-03-31 DIAGNOSIS — I26.99 PULMONARY EMBOLISM (H): Primary | ICD-10-CM

## 2022-04-01 NOTE — RESULT ENCOUNTER NOTE
Hello!  It was a pleasure to see you in clinic!  Thank you for getting labs done.     Your HgA1C, also called glycosylated hemoglobin, which measures the level of sugar in your blood over the past few months, is at goal, which is great! Congratulations! Keep up the good work!    If you have any questions, please contact the clinic or schedule an appointment with me, thank you!    Sincerely,    TORREY MEIER MD   4/1/2022

## 2022-04-25 DIAGNOSIS — E11.59 TYPE 2 DIABETES WITH CIRCULATORY DISORDER CAUSING ERECTILE DYSFUNCTION (H): ICD-10-CM

## 2022-04-25 DIAGNOSIS — N52.1 TYPE 2 DIABETES WITH CIRCULATORY DISORDER CAUSING ERECTILE DYSFUNCTION (H): ICD-10-CM

## 2022-04-25 NOTE — LETTER
[facility]  [address] [place] [address]  [phone]  April 27, 2022  Booker Brown  [id number]  [facility] [address]    Dear Conner,    I am contacting you regarding the refill request we received for you. After reviewing your chart it looks like you are overdue for your annual and for a med check. Please call [phone] or schedule this through my chart to continue to receive refills. If you anticipate running out before your appointment let us know and we can send in a nellie refill.       Thank you,     [facility] nursing staff

## 2022-04-26 RX ORDER — ATORVASTATIN CALCIUM 10 MG/1
10 TABLET, FILM COATED ORAL DAILY
Qty: 90 TABLET | Refills: 2 | OUTPATIENT
Start: 2022-04-26

## 2022-04-26 NOTE — TELEPHONE ENCOUNTER
Routing refill request to provider for review/approval because:  Pt of Dr. Angulo; new primary needed

## 2022-05-03 ENCOUNTER — OFFICE VISIT (OUTPATIENT)
Dept: INTERNAL MEDICINE | Facility: CLINIC | Age: 67
End: 2022-05-03
Payer: COMMERCIAL

## 2022-05-03 VITALS
SYSTOLIC BLOOD PRESSURE: 135 MMHG | HEART RATE: 106 BPM | OXYGEN SATURATION: 96 % | RESPIRATION RATE: 16 BRPM | DIASTOLIC BLOOD PRESSURE: 80 MMHG | HEIGHT: 70 IN | WEIGHT: 285.2 LBS | TEMPERATURE: 98.4 F | BODY MASS INDEX: 40.83 KG/M2

## 2022-05-03 DIAGNOSIS — E05.90 SUBCLINICAL HYPERTHYROIDISM: ICD-10-CM

## 2022-05-03 DIAGNOSIS — I48.19 PERSISTENT ATRIAL FIBRILLATION (H): ICD-10-CM

## 2022-05-03 DIAGNOSIS — E78.5 HYPERLIPIDEMIA LDL GOAL <70: ICD-10-CM

## 2022-05-03 DIAGNOSIS — N18.31 STAGE 3A CHRONIC KIDNEY DISEASE (H): ICD-10-CM

## 2022-05-03 DIAGNOSIS — R06.02 SOB (SHORTNESS OF BREATH): ICD-10-CM

## 2022-05-03 DIAGNOSIS — I10 ESSENTIAL HYPERTENSION: ICD-10-CM

## 2022-05-03 DIAGNOSIS — E11.65 TYPE 2 DIABETES MELLITUS WITH HYPERGLYCEMIA, WITHOUT LONG-TERM CURRENT USE OF INSULIN (H): Primary | ICD-10-CM

## 2022-05-03 LAB
CHOLEST SERPL-MCNC: 103 MG/DL
CREAT UR-MCNC: 128 MG/DL
FASTING STATUS PATIENT QL REPORTED: YES
HDLC SERPL-MCNC: 32 MG/DL
LDLC SERPL CALC-MCNC: 61 MG/DL
MICROALBUMIN UR-MCNC: 955 MG/L
MICROALBUMIN/CREAT UR: 746.09 MG/G CR
NONHDLC SERPL-MCNC: 71 MG/DL
TRIGL SERPL-MCNC: 50 MG/DL
TSH SERPL DL<=0.005 MIU/L-ACNC: 0.4 MU/L (ref 0.4–4)

## 2022-05-03 PROCEDURE — 80048 BASIC METABOLIC PNL TOTAL CA: CPT | Performed by: INTERNAL MEDICINE

## 2022-05-03 PROCEDURE — 84443 ASSAY THYROID STIM HORMONE: CPT | Performed by: INTERNAL MEDICINE

## 2022-05-03 PROCEDURE — 82043 UR ALBUMIN QUANTITATIVE: CPT | Performed by: INTERNAL MEDICINE

## 2022-05-03 PROCEDURE — 80061 LIPID PANEL: CPT | Performed by: INTERNAL MEDICINE

## 2022-05-03 PROCEDURE — 99214 OFFICE O/P EST MOD 30 MIN: CPT | Performed by: INTERNAL MEDICINE

## 2022-05-03 PROCEDURE — 83880 ASSAY OF NATRIURETIC PEPTIDE: CPT | Performed by: INTERNAL MEDICINE

## 2022-05-03 PROCEDURE — 36415 COLL VENOUS BLD VENIPUNCTURE: CPT | Performed by: INTERNAL MEDICINE

## 2022-05-03 RX ORDER — ATORVASTATIN CALCIUM 10 MG/1
10 TABLET, FILM COATED ORAL DAILY
Qty: 90 TABLET | Refills: 3 | Status: SHIPPED | OUTPATIENT
Start: 2022-05-03 | End: 2022-11-01

## 2022-05-03 RX ORDER — LISINOPRIL 40 MG/1
40 TABLET ORAL DAILY
Qty: 90 TABLET | Refills: 3 | Status: SHIPPED | OUTPATIENT
Start: 2022-05-03 | End: 2023-03-27

## 2022-05-03 RX ORDER — METFORMIN HCL 500 MG
1000 TABLET, EXTENDED RELEASE 24 HR ORAL
Qty: 180 TABLET | Refills: 3 | Status: SHIPPED | OUTPATIENT
Start: 2022-05-03 | End: 2022-12-09

## 2022-05-03 NOTE — PATIENT INSTRUCTIONS
- I will send you a message on [employer] when I am able to look at the results of your tests from today  - Stop by our pharmacy downstairs or your preferred pharmacy to discuss obtaining the Shingrix (shingles) vaccine series and a tetanus booster  - Call [phone] to schedule an appointment with your cardiology team

## 2022-05-03 NOTE — PROGRESS NOTES
Assessment & Plan     Type 2 diabetes mellitus with hyperglycemia, without long-term current use of insulin (H)  Taking metformin 1,000mg daily. Did not tolerate 2,000mg daily due to diarrhea. Recent A1c reviewed, ~7%. Continue metformin and glipizide. [facility] today.  - Albumin Random Urine Quantitative with Creat Ratio; Future  - metFORMIN (GLUCOPHAGE-XR) 500 MG 24 hr tablet; Take 2 tablets (1,000 mg) by mouth daily (with dinner)    Persistent atrial fibrillation (H)  Lost to follow-up after Feb 2022 hospitalization from a cardiology standpoint. Provided Conner with cardiology clinic number for him to call to get plugged back in as I do think his Lasix (started during Feb 2022 hospitalization) may need to be adjusted in future.    Essential hypertension  Tolerating well. Refilled.  - lisinopril (ZESTRIL) 40 MG tablet; Take 1 tablet (40 mg) by mouth daily    Subclinical hyperthyroidism  TSH intermittently mildly decreased, though free T4s have been WNL. Obviously if he's hyperthyroid then a-fib may be hard to control. Will repeat thyroid studies today.  - TSH with free T4 reflex; Future    Hyperlipidemia LDL goal <70  Due for repeat labs.  - Lipid panel reflex to direct LDL Fasting; Future  - atorvastatin (LIPITOR) 10 MG tablet; Take 1 tablet (10 mg) by mouth daily    Stage 3a chronic kidney disease (H)  Known issue that I take into account for their medical decisions, no current exacerbations or new concerns.    Return in about 6 months (around 11/3/2022) for Physical Exam.    Salinas Mora MD  [facility]    Nesha Prieto is a 67 year old who presents for the following health issues:    HPI   History of Present Illness   Pt is not on Medicare as of yet he is still working full time and on [employer]- too early for physical. Last one was on 5/12/2021.     CKD: He uses over the counter pain medication, including Tylenol, a few times a week.    Diabetes:   He presents for follow up of  "diabetes.  He is checking home blood glucose a few times a month. He checks blood glucose before meals.  Blood glucose is sometimes over 200 and never under 70. When his blood glucose is low, the patient is asymptomatic for confusion, blurred vision, lethargy and reports not feeling dizzy, shaky, or weak.  He has no concerns regarding his diabetes at this time.  He is having numbness in feet. The patient has had a diabetic eye exam in the last 12 months. Location of last eye exam [facility].    Hyperlipidemia:  He presents for follow up of hyperlipidemia.  He is taking medication to lower cholesterol. He is not having myalgia or other side effects to statin medications.    Hypertension: He presents for follow up of hypertension.  He does not check blood pressure  regularly outside of the clinic. Outpatient blood pressures have not been over 140/90. He follows a low salt diet.     Reason for visit:  Recommended med check  Symptoms include:  N/aHe consumes 0 sweetened beverage(s) daily.He exercises with enough effort to increase his heart rate 20 to 29 minutes per day.  He exercises with enough effort to increase his heart rate 5 days per week.   He is taking medications regularly.    Review of Systems   Constitutional, cardiovascular, pulmonary, gi systems are negative, except as otherwise noted.      Objective    /80 (BP Location: Left arm, Patient Position: Chair, Cuff Size: Adult Large)   Pulse 106   Temp 98.4  F (36.9  C) (Oral)   Resp 16   Ht 1.778 m (5' 10\")   Wt 129.4 kg (285 lb 3.2 oz)   SpO2 96%   BMI 40.92 kg/m    Body mass index is 40.92 kg/m .     Physical Exam   GENERAL: alert and in no distress.  EYES: conjunctivae/corneas clear.  RESP: CTAB, no w/r/r.  CV: IRR IRR, no m/r/g.  MSK: 2+ pedal bilateral edema. Moves all four extremities freely.  SKIN: No significant ulcers, lesions, or rashes on the visualized portions of the skin  NEURO: CN II-XII grossly intact.  "

## 2022-05-04 NOTE — PROGRESS NOTES
"[facility]    I had the pleasure of seeing Glenn when he came for follow up of AFib.  This 67 year old sees Dr. Mejias for his history of:    1. Persistent AFib; nl EF - noted in setting of kidney stones 3/2021. S/p VIVEK/DCCV 5/14/2021 which restored SR. Back in AFib at time of follow-up appt 5/28 and rate control strategy rec'd.   2. HTN  3. HL   4. DM - A1c 7% 3/2022  5. CKD - Cr 1.4-1.8 in the past 12m  6. CHADSVASc 3 (HTN, DM, aortic plaquing)  On Xarelto      I saw Conner last in 5/2021 at which time he felt no different after his DCCV.  He was surprised to note that he was back in AFib at that visit.  Given his young age, Dr. Mejias recommended rhythm control be pursued, with consideration of sotalol load/DCCV.     Conner was hesitant to commit to hospitalization for sotalol given he \"felt fine.\"  Therefore, we proceeded with rate control strategy.      He was hospitalized 2/19-20/2022 with c/o RODRIGUEZ. Dr. Cantu was consulted and recommended continued rate control for his RVR.  Diltiazem was started and lieu of amlodipine.    Unfortunately, he was back in the ER 2/27 with c/o SOB. He was COVID+, with CXR concerning for PNA.  Paxlovid was started, prompting statin and AC hold.    He saw Dr. Pollock 3/17 with continued c/o SOB/exercise tolerance and CXR showed persistent small R pleural effusion and furosemide started. He was then seen 3/29 by Dr. White d/t increasing palpitations and pleuritic CP. CT PE 3/29/2022 negative for PEs. 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     Saw Dr. Bee from [facility] for DM control just a few days ago    Interval History:  Continues to note RODRIGUEZ. No cough.  No fever. No c/o CP, palpitations, dizziness, lightheadedness.      Notes he feels \"uneasy\" after a few hours while sleeping and has to get up and sleep in a chair. Notes LE edema as well.  He does not think his furosemide 40 mg daily does anything for his LE " "swelling.    Notes HR at home 80-100s.     VITALS:  Vitals: BP (!) 155/86   Pulse 102   Ht 1.778 m (5' 10\")   Wt 129.4 kg (285 lb 4.8 oz)   BMI 40.94 kg/m      Diagnostic Testing:  Echocardiogram 2/2022  - LVEF 50-55%. Nl RV Mod NATHLAY. 1+ MR with mod MAC. RVSP 28+RAP 1+TR. Mild aortic sclerosis.   VIVEK 5/14/2021 showed EF 60-65%. Nl LA size. 1+ TR, 1+ MR. No thrombus. Mild Ao sclerosis.  Mild atherosclerotic plaque in desc aorta  ZioPatch 3/30-4/8/2021 on atenolol 100 with AFib avg  bpm (range  bpm). 2 runs of NSVT, longest 9 beats      Plan:  1. Will add BMP and NTpBNP to blood work done a few days ago  2.  We will likely modify diuretic dosing given exam depending on blood work  3.  See me next week with EKG and blood work    Assessment/Plan:    1. Persistent AFib    Dr. Mejias recommended attempt to maintain SR with sotalol given his young age.  Dr. Mejias did not believe he would be a good candidate for amiodarone given his young age/underlying thyroid disease.  Flecainide not a good choice given underlying conduction disease. Conner has opted for rate control only given that he felt \"totally normal.\"        Echo 2/2022 while hospitalized showed EF 50-55%.  1+ MR.      Reviewed that he was in the ER 2/2022 and was noted to have RVR.  Diltiazem added.  He was subsequently diagnosed with COVID, and I wonder if his AFib could have been d/t early infection     Currently on Diltiazem 240 mg and metoprolol  mg BID.       Remains on Xarelto for [id number] 3. Hgb 3/2022 nl 13.2 g/dL    PLAN:    I think his HR in AFib is under poor control which flipped him into HFpEF    Will focus on heart rate control and diuresis.  Want to get some fluid off of him before we increase his diltiazem or add digoxin.    See me next week with an EKG    2. HFpEF    Reviewed CT PE showing pleural effusions.  On exam, has minimal crackles, but HJR present and at least 2+ pitting edema to the knee noted    Furosemide 40 mg is " "\"helping breathing\" but does not seem to be helping edema at all    PLAN:    Will add on BNP and NTpBNP to blood work done a few days ago    I will send a [employer] message or contact him to let him know what we need to do with his diuretic.  I anticipate that torsemide will be a better choice for him given his LVEF of 50-55% and elevated RVSP    Will see me back in 1 week with BMP and CBC        Krista Fallon PA-C, MSPAS      Orders Placed This Encounter   Procedures     Basic metabolic panel     N terminal pro BNP outpatient     Basic metabolic panel     CBC with platelets     Follow-Up with Cardiology GABRIELA     No orders of the defined types were placed in this encounter.    There are no discontinued medications.      Encounter Diagnoses   Name Primary?     Paroxysmal atrial fibrillation (H) Yes     SOB (shortness of breath)      Persistent atrial fibrillation (H)        CURRENT MEDICATIONS:  Current Outpatient Medications   Medication Sig Dispense Refill     atorvastatin (LIPITOR) 10 MG tablet Take 1 tablet (10 mg) by mouth daily 90 tablet 3     diltiazem ER COATED BEADS (CARDIZEM CD/CARTIA XT) 240 MG 24 hr capsule Take 1 capsule (240 mg) by mouth daily 90 capsule 3     furosemide (LASIX) 40 MG tablet Take 1 tablet (40 mg) by mouth daily 90 tablet 3     glipiZIDE (GLUCOTROL XL) 10 MG 24 hr tablet Take 1 tablet (10 mg) by mouth daily Please make an appointment for further refills 90 tablet 1     lisinopril (ZESTRIL) 40 MG tablet Take 1 tablet (40 mg) by mouth daily 90 tablet 3     metFORMIN (GLUCOPHAGE-XR) 500 MG 24 hr tablet Take 2 tablets (1,000 mg) by mouth daily (with dinner) 180 tablet 3     metoprolol succinate ER (TOPROL-XL) 100 MG 24 hr tablet Take 1 tablet (100 mg) by mouth 2 times daily 180 tablet 3     rivaroxaban ANTICOAGULANT (XARELTO ANTICOAGULANT) 20 MG TABS tablet Take 1 tablet (20 mg) by mouth daily (with dinner) 90 tablet 3     alcohol swab prep pads Use to swab area of injection/fariha as directed. " "100 each 3     blood glucose (NO BRAND SPECIFIED) test strip Use to test blood sugar 1 times daily or as directed. To accompany: Blood Glucose Monitor Brands: per insurance. 100 strip 6     blood glucose calibration (NO BRAND SPECIFIED) solution To accompany: Blood Glucose Monitor Brands: per insurance. 1 Bottle 3     blood glucose monitoring (NO BRAND SPECIFIED) meter device kit Use to test blood sugar 1 times daily or as directed. : per insurance. 1 kit 0     thin (NO BRAND SPECIFIED) lancets Use with lanceting device. To accompany: Blood Glucose Monitor Brands: per insurance. 1 each 6       ALLERGIES     Allergies   Allergen Reactions     No Known Allergies          Review of Systems:  Skin:  Negative     Eyes:  Positive for glasses  ENT:  Negative    Respiratory:  Positive for dyspnea on exertion;cough  Cardiovascular:  Negative for;palpitations;lightheadedness;dizziness;fatigue;chest pain Positive for;edema  Gastroenterology: Negative for melena;hematochezia  Genitourinary:  Positive for nocturia  Musculoskeletal:  Positive for joint pain  Neurologic:  Positive for numbness or tingling of feet  Psychiatric:  Negative    Heme/Lymph/Imm:  Negative    Endocrine:  Positive for diabetes    Physical Exam:  Vitals: BP (!) 155/86   Pulse 102   Ht 1.778 m (5' 10\")   Wt 129.4 kg (285 lb 4.8 oz)   BMI 40.94 kg/m      Constitutional:  cooperative, alert and oriented, well developed, well nourished, in no acute distress        Skin:  warm and dry to the touch, no apparent skin lesions or masses noted        Head:  normocephalic, no masses or lesions        Eyes:  pupils equal and round;conjunctivae and lids unremarkable;sclera white        ENT:  not assessed this visit        Neck:  JVP normal;no carotid bruit        Chest:  normal breath sounds, clear to auscultation, normal A-P diameter, normal symmetry, normal respiratory excursion, no use of accessory muscles        Cardiac:   tachycardic;irregularly irregular " rhythm                Abdomen:  abdomen soft obese      Vascular: pulses full and equal                                      Extremities and Back:  no deformities, clubbing, cyanosis, erythema observed        Neurological:  no gross motor deficits            PAST MEDICAL HISTORY:  Past Medical History:   Diagnosis Date     Diabetes (H)      HTN      Hyperlipidemia        PAST SURGICAL HISTORY:  Past Surgical History:   Procedure Laterality Date     COLONOSCOPY N/A 2018    Procedure: COMBINED COLONOSCOPY, SINGLE OR MULTIPLE BIOPSY/POLYPECTOMY BY BIOPSY;;  Surgeon: Lawrence Mata MD;  Location:  GI       FAMILY HISTORY:  Family History   Problem Relation Age of Onset     Diabetes Mother         dx at 70     Bladder Cancer Brother        SOCIAL HISTORY:  Social History     Socioeconomic History     Marital status:      Spouse name: None     Number of children: None     Years of education: None     Highest education level: None   Tobacco Use     Smoking status: Former Smoker     Packs/day: 3.00     Years: 16.00     Pack years: 48.00     Types: Cigarettes     Start date:      Quit date: 1987     Years since quittin.6     Smokeless tobacco: Never Used   Substance and Sexual Activity     Alcohol use: No     Drug use: No     Sexual activity: Yes     Partners: Female   Other Topics Concern     Parent/sibling w/ CABG, MI or angioplasty before 65F 55M? Yes      Service No     Blood Transfusions No     Caffeine Concern No     Occupational Exposure No     Hobby Hazards No     Sleep Concern No     Stress Concern No     Weight Concern Yes     Special Diet No     Back Care No     Exercise Yes     Bike Helmet No     Comment: n/a     Seat Belt Yes     Self-Exams No

## 2022-05-06 ENCOUNTER — OFFICE VISIT (OUTPATIENT)
Dept: CARDIOLOGY | Facility: CLINIC | Age: 67
End: 2022-05-06
Payer: COMMERCIAL

## 2022-05-06 VITALS
HEIGHT: 70 IN | WEIGHT: 285.3 LBS | HEART RATE: 102 BPM | SYSTOLIC BLOOD PRESSURE: 155 MMHG | BODY MASS INDEX: 40.84 KG/M2 | DIASTOLIC BLOOD PRESSURE: 86 MMHG

## 2022-05-06 DIAGNOSIS — R06.02 SOB (SHORTNESS OF BREATH): Primary | ICD-10-CM

## 2022-05-06 DIAGNOSIS — I48.19 PERSISTENT ATRIAL FIBRILLATION (H): ICD-10-CM

## 2022-05-06 DIAGNOSIS — I48.0 PAROXYSMAL ATRIAL FIBRILLATION (H): Primary | ICD-10-CM

## 2022-05-06 DIAGNOSIS — R06.02 SOB (SHORTNESS OF BREATH): ICD-10-CM

## 2022-05-06 LAB
ANION GAP SERPL CALCULATED.3IONS-SCNC: 11 MMOL/L (ref 3–14)
BUN SERPL-MCNC: 33 MG/DL (ref 7–30)
CALCIUM SERPL-MCNC: 9.6 MG/DL (ref 8.5–10.1)
CHLORIDE BLD-SCNC: 106 MMOL/L (ref 94–109)
CO2 SERPL-SCNC: 24 MMOL/L (ref 20–32)
CREAT SERPL-MCNC: 1.6 MG/DL (ref 0.52–1.25)
GFR SERPL CREATININE-BSD FRML MDRD: 48 ML/MIN/1.73M2
GLUCOSE BLD-MCNC: 177 MG/DL (ref 70–99)
NT-PROBNP SERPL-MCNC: 3153 PG/ML (ref 0–900)
POTASSIUM BLD-SCNC: 4.5 MMOL/L (ref 3.4–5.3)
SODIUM SERPL-SCNC: 141 MMOL/L (ref 133–144)

## 2022-05-06 PROCEDURE — 99214 OFFICE O/P EST MOD 30 MIN: CPT | Performed by: PHYSICIAN ASSISTANT

## 2022-05-06 RX ORDER — TORSEMIDE 20 MG/1
40 TABLET ORAL DAILY
Qty: 60 TABLET | Refills: 1 | Status: SHIPPED | OUTPATIENT
Start: 2022-05-06 | End: 2022-05-19

## 2022-05-06 NOTE — PATIENT INSTRUCTIONS
"Conner - it was good to see you today!    Reviewed your hospitalization visit 2/2022 and subsequent addition of Diltiazem  Reviewed your COVID dx at the end of 2/2022    PLAN:  Will get blood work added on today and we'll call/MyChart with instructions for \"water pill\" - anticipate will either increase furosemide or switch to torsemide    Start checking HR 4-5 times/write down - with exertion also ... possible that rapid HR is contributing to fluid overload  3. Compression stockings/ACE bandages. Continue to limit sodium in diet.  4. See me back in ~1 week with more blood work    5. See Pulmonology as planned  "

## 2022-05-06 NOTE — LETTER
"5/6/2022    [facility]  [address] [address]    RE: Booker MICHELLE Kevin       Dear Colleague,     I had the pleasure of seeing Booker Brown in the [facility].  [facility]ART [facility]    I had the pleasure of seeing Glenn when he came for follow up of AFib.  This 67 year old sees Dr. Mejias for his history of:    1. Persistent AFib; nl EF - noted in setting of kidney stones 3/2021. S/p VIVEK/DCCV 5/14/2021 which restored SR. Back in AFib at time of follow-up appt 5/28 and rate control strategy rec'd.   2. HTN  3. HL   4. DM - A1c 7% 3/2022  5. CKD - Cr 1.4-1.8 in the past 12m  6. CHADSVASc 3 (HTN, DM, aortic plaquing)  On Xarelto      I saw Conner last in 5/2021 at which time he felt no different after his DCCV.  He was surprised to note that he was back in AFib at that visit.  Given his young age, Dr. Mejias recommended rhythm control be pursued, with consideration of sotalol load/DCCV.     Conner was hesitant to commit to hospitalization for sotalol given he \"felt fine.\"  Therefore, we proceeded with rate control strategy.      He was hospitalized 2/19-20/2022 with c/o RODRIGUEZ. Dr. Cantu was consulted and recommended continued rate control for his RVR.  Diltiazem was started and lieu of amlodipine.    Unfortunately, he was back in the ER 2/27 with c/o SOB. He was COVID+, with CXR concerning for PNA.  Paxlovid was started, prompting statin and AC hold.    He saw Dr. Pollock 3/17 with continued c/o SOB/exercise tolerance and CXR showed persistent small R pleural effusion and furosemide started. He was then seen 3/29 by Dr. White d/t increasing palpitations and pleuritic CP. CT PE 3/29/2022 negative for PEs. 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     Saw Dr. Bee from [facility] for DM control just a few days ago    Interval History:  Continues to note RODRIGUEZ. No cough.  No fever. No c/o CP, palpitations, dizziness, " "lightheadedness.      Notes he feels \"uneasy\" after a few hours while sleeping and has to get up and sleep in a chair. Notes LE edema as well.  He does not think his furosemide 40 mg daily does anything for his LE swelling.    Notes HR at home 80-100s.     VITALS:  Vitals: BP (!) 155/86   Pulse 102   Ht 1.778 m (5' 10\")   Wt 129.4 kg (285 lb 4.8 oz)   BMI 40.94 kg/m      Diagnostic Testing:  Echocardiogram 2/2022  - LVEF 50-55%. Nl RV Mod NATHALY. 1+ MR with mod MAC. RVSP 28+RAP 1+TR. Mild aortic sclerosis.   VIVEK 5/14/2021 showed EF 60-65%. Nl LA size. 1+ TR, 1+ MR. No thrombus. Mild Ao sclerosis.  Mild atherosclerotic plaque in desc aorta  ZioPatch 3/30-4/8/2021 on atenolol 100 with AFib avg  bpm (range  bpm). 2 runs of NSVT, longest 9 beats      Plan:  1. Will add BMP and NTpBNP to blood work done a few days ago  2.  We will likely modify diuretic dosing given exam depending on blood work  3.  See me next week with EKG and blood work    Assessment/Plan:    1. Persistent AFib    Dr. Mejias recommended attempt to maintain SR with sotalol given his young age.  Dr. Mejias did not believe he would be a good candidate for amiodarone given his young age/underlying thyroid disease.  Flecainide not a good choice given underlying conduction disease. Conner has opted for rate control only given that he felt \"totally normal.\"        Echo 2/2022 while hospitalized showed EF 50-55%.  1+ MR.      Reviewed that he was in the ER 2/2022 and was noted to have RVR.  Diltiazem added.  He was subsequently diagnosed with COVID, and I wonder if his AFib could have been d/t early infection     Currently on Diltiazem 240 mg and metoprolol  mg BID.       Remains on Xarelto for [id number] 3. Hgb 3/2022 nl 13.2 g/dL    PLAN:    I think his HR in AFib is under poor control which flipped him into HFpEF    Will focus on heart rate control and diuresis.  Want to get some fluid off of him before we increase his diltiazem or add " "digoxin.    See me next week with an EKG    2. HFpEF    Reviewed CT PE showing pleural effusions.  On exam, has minimal crackles, but HJR present and at least 2+ pitting edema to the knee noted    Furosemide 40 mg is \"helping breathing\" but does not seem to be helping edema at all    PLAN:    Will add on BNP and NTpBNP to blood work done a few days ago    I will send a [employer] message or contact him to let him know what we need to do with his diuretic.  I anticipate that torsemide will be a better choice for him given his LVEF of 50-55% and elevated RVSP    Will see me back in 1 week with BMP and CBC        Krista Fallon PA-C, MSPAS      Orders Placed This Encounter   Procedures     Basic metabolic panel     N terminal pro BNP outpatient     Basic metabolic panel     CBC with platelets     Follow-Up with Cardiology GABRIELA     No orders of the defined types were placed in this encounter.    There are no discontinued medications.      Encounter Diagnoses   Name Primary?     Paroxysmal atrial fibrillation (H) Yes     SOB (shortness of breath)      Persistent atrial fibrillation (H)        CURRENT MEDICATIONS:  Current Outpatient Medications   Medication Sig Dispense Refill     atorvastatin (LIPITOR) 10 MG tablet Take 1 tablet (10 mg) by mouth daily 90 tablet 3     diltiazem ER COATED BEADS (CARDIZEM CD/CARTIA XT) 240 MG 24 hr capsule Take 1 capsule (240 mg) by mouth daily 90 capsule 3     furosemide (LASIX) 40 MG tablet Take 1 tablet (40 mg) by mouth daily 90 tablet 3     glipiZIDE (GLUCOTROL XL) 10 MG 24 hr tablet Take 1 tablet (10 mg) by mouth daily Please make an appointment for further refills 90 tablet 1     lisinopril (ZESTRIL) 40 MG tablet Take 1 tablet (40 mg) by mouth daily 90 tablet 3     metFORMIN (GLUCOPHAGE-XR) 500 MG 24 hr tablet Take 2 tablets (1,000 mg) by mouth daily (with dinner) 180 tablet 3     metoprolol succinate ER (TOPROL-XL) 100 MG 24 hr tablet Take 1 tablet (100 mg) by mouth 2 times daily 180 " "tablet 3     rivaroxaban ANTICOAGULANT (XARELTO ANTICOAGULANT) 20 MG TABS tablet Take 1 tablet (20 mg) by mouth daily (with dinner) 90 tablet 3     alcohol swab prep pads Use to swab area of injection/fariha as directed. 100 each 3     blood glucose (NO BRAND SPECIFIED) test strip Use to test blood sugar 1 times daily or as directed. To accompany: Blood Glucose Monitor Brands: per insurance. 100 strip 6     blood glucose calibration (NO BRAND SPECIFIED) solution To accompany: Blood Glucose Monitor Brands: per insurance. 1 Bottle 3     blood glucose monitoring (NO BRAND SPECIFIED) meter device kit Use to test blood sugar 1 times daily or as directed. : per insurance. 1 kit 0     thin (NO BRAND SPECIFIED) lancets Use with lanceting device. To accompany: Blood Glucose Monitor Brands: per insurance. 1 each 6       ALLERGIES     Allergies   Allergen Reactions     No Known Allergies          Review of Systems:  Skin:  Negative     Eyes:  Positive for glasses  ENT:  Negative    Respiratory:  Positive for dyspnea on exertion;cough  Cardiovascular:  Negative for;palpitations;lightheadedness;dizziness;fatigue;chest pain Positive for;edema  Gastroenterology: Negative for melena;hematochezia  Genitourinary:  Positive for nocturia  Musculoskeletal:  Positive for joint pain  Neurologic:  Positive for numbness or tingling of feet  Psychiatric:  Negative    Heme/Lymph/Imm:  Negative    Endocrine:  Positive for diabetes    Physical Exam:  Vitals: BP (!) 155/86   Pulse 102   Ht 1.778 m (5' 10\")   Wt 129.4 kg (285 lb 4.8 oz)   BMI 40.94 kg/m      Constitutional:  cooperative, alert and oriented, well developed, well nourished, in no acute distress        Skin:  warm and dry to the touch, no apparent skin lesions or masses noted        Head:  normocephalic, no masses or lesions        Eyes:  pupils equal and round;conjunctivae and lids unremarkable;sclera white        ENT:  not assessed this visit        Neck:  JVP normal;no carotid " bruit        Chest:  normal breath sounds, clear to auscultation, normal A-P diameter, normal symmetry, normal respiratory excursion, no use of accessory muscles        Cardiac:   tachycardic;irregularly irregular rhythm                Abdomen:  abdomen soft obese      Vascular: pulses full and equal                                      Extremities and Back:  no deformities, clubbing, cyanosis, erythema observed        Neurological:  no gross motor deficits            PAST MEDICAL HISTORY:  Past Medical History:   Diagnosis Date     Diabetes (H)      HTN      Hyperlipidemia        PAST SURGICAL HISTORY:  Past Surgical History:   Procedure Laterality Date     COLONOSCOPY N/A 2018    Procedure: COMBINED COLONOSCOPY, SINGLE OR MULTIPLE BIOPSY/POLYPECTOMY BY BIOPSY;;  Surgeon: Lawrence Mata MD;  Location:  GI       FAMILY HISTORY:  Family History   Problem Relation Age of Onset     Diabetes Mother         dx at 70     Bladder Cancer Brother        SOCIAL HISTORY:  Social History     Socioeconomic History     Marital status:      Spouse name: None     Number of children: None     Years of education: None     Highest education level: None   Tobacco Use     Smoking status: Former Smoker     Packs/day: 3.00     Years: 16.00     Pack years: 48.00     Types: Cigarettes     Start date:      Quit date: 1987     Years since quittin.6     Smokeless tobacco: Never Used   Substance and Sexual Activity     Alcohol use: No     Drug use: No     Sexual activity: Yes     Partners: Female   Other Topics Concern     Parent/sibling w/ CABG, MI or angioplasty before 65F 55M? Yes      Service No     Blood Transfusions No     Caffeine Concern No     Occupational Exposure No     Hobby Hazards No     Sleep Concern No     Stress Concern No     Weight Concern Yes     Special Diet No     Back Care No     Exercise Yes     Bike Helmet No     Comment: n/a     Seat Belt Yes     Self-Exams No           Thank you  for allowing me to participate in the care of your patient.      Sincerely,     Ping Fallon PA-C     [facility]  cc:   Referred Self, MD  No address on file

## 2022-05-06 NOTE — LETTER
Date:May 8, 2022      Patient was self referred, no letter generated. Do not send.        [facility] Health Information

## 2022-05-07 NOTE — PROGRESS NOTES
"Jonet Comment sent on BMP/NTpBNP results      Conner - your NTpBNP (measure of amount of strain the L side of the heart is under due to fluid is high, >3000).  Your electrolytes are normal and but your kidneys are a bit \"stressed\" on the furosemide 40 mg daily (Lasix).     Based on your exam, I think you'll better results if we switch your furosemide 40 mg daily to TORSEMIDE 40 mg daily. You'll take TWO 20 mg tabs daily in AM.      1. Torsemide 40 mg daily - I've sent new Rx in.   2. Stop furosemide  3. Start weighing yourself first thing every morning and write down. Call if weight >3#/1 day or >5#/1 week  4. Continue to limit sodium in diet and elevate legs  5. Compression stockings/ACE bandages.     CALL if any concerns/questions/rapid weight loss or problems with new med! [phone]     Krista  "

## 2022-05-12 ENCOUNTER — TELEPHONE (OUTPATIENT)
Dept: PULMONOLOGY | Facility: CLINIC | Age: 67
End: 2022-05-12
Payer: COMMERCIAL

## 2022-05-12 NOTE — TELEPHONE ENCOUNTER
LVM for PT to call [phone] to reschedule the 7.1 Dr. Hopkins appt.  We have some appt on hold 8.3 if PT calls back.

## 2022-05-12 NOTE — PROGRESS NOTES
"[facility]    I had the pleasure of seeing Conner when he came for follow up of AFib.  This 67 year old sees Dr. Mejias for his history of:    1. Persistent AFib; nl EF - noted in setting of kidney stones 3/2021. S/p VIVEK/DCCV 5/14/2021 which restored SR. Back in AFib at time of follow-up appt 5/28 and rate control strategy rec'd as he declined rate control with sotalol as didn't feel any different.   2. HTN  3. HL   4. DM - A1c 7% 3/2022  5. CKD - Cr 1.4-1.8 in the past 12m  6. CHADSVASc 3 (HTN, DM, aortic plaquing)  On Xarelto  7. COVID infection - 2/2022  8. Asbestosis - CT PE noted 3/29/2022 was negative for PE but showed asbestos-related pleural disease with minimal adjacent fibrosis and mild pulmonary edema was noted    I saw Conner just 5/6/2022 at which time we reviewed his continued RODRIGUEZ. He'd been hospitalized 2/2022 for this and rate control for AFib rec'd. At his ER visit 2/27 for continued SOB, noted to be COVID+ with CXR concerning for PNA. Small R pleural effusion noted 3/17 and Lasix started.  He noted feeling \"uneasy\" after a few hours while sleeping which was concerning for PND/orthopnea. On exam, he was noted to have 2+ pitting LE edema to knee and HJR. Just minimal crackles. Add-on BMP, NTpBNP on Lasix 40 mg was done and I asked him to stop Lasix 40 mg daily and start torsemide 40 mg daily given NTpBNP 3000. Daily weights and compression stockings recommended.    Close follow-up with labs rec'd     Interval History:  Conner is down 10# today after starting torsemide 40mg on Saturday 5/7 (275# today). His breathing is \"much better\" on the torsemide but hasn't tried sleeping back in bed b/c he's \"nervous\" to try it again.  His feet and legs are \"way softer\" and have much less swelling.  He notes that he did not have to stop while going over the [place] today.  Denies any problems with chest pain, pressure, tightness.  He is limiting the salt in his diet.    HRs have been in in 90s since " "we started diuresing, coming down from the 110s.  O2 sats remain >95%        VITALS:  Vitals: /81 (BP Location: Left arm, Patient Position: Sitting)   Pulse 95   Ht 1.778 m (5' 10\")   Wt 125.1 kg (275 lb 14.4 oz)   SpO2 98%   BMI 39.59 kg/m      Diagnostic Testing:  Echocardiogram 2/2022  - LVEF 50-55%. Nl RV Mod NATHALY. 1+ MR with mod MAC. RVSP 28+RAP 1+TR. Mild aortic sclerosis.   VIVEK 5/14/2021 showed EF 60-65%. Nl LA size. 1+ TR, 1+ MR. No thrombus. Mild Ao sclerosis.  Mild atherosclerotic plaque in desc aorta  ZioPatch 3/30-4/8/2021 on atenolol 100 with AFib avg  bpm (range  bpm). 2 runs of NSVT, longest 9 beats  Component      Latest Ref Rng & Units 2/19/2022 2/27/2022 5/3/2022   N-Terminal Pro BNP Inpatient      0 - 900 pg/mL 3,427 (H) 7,959 (H)    N-Terminal Pro Bnp      0 - 900 pg/mL   3,153 (H)     Component      Latest Ref Rng & Units 2/27/2022 3/29/2022 5/13/2022   WBC      4.0 - 11.0 10e3/uL 14.8 (H) 11.9 (H) 10.0   RBC Count      3.80 - 5.90 10e6/uL 4.58 4.89 5.27   Hemoglobin      11.7 - 17.7 g/dL 12.5 13.2 13.5   Hematocrit      35.0 - 53.0 % 40.2 43.4 44.9   MCV      78 - 100 fL 88 89 85   MCH      26.5 - 33.0 pg 27.3 27.0 25.6 (L)   MCHC      31.5 - 36.5 g/dL 31.1 (L) 30.4 (L) 30.1 (L)   RDW      10.0 - 15.0 % 14.7 15.4 (H) 16.1 (H)   Platelet Count      150 - 450 10e3/uL 294 278 311     Component      Latest Ref Rng & Units 2/27/2022 3/29/2022 5/3/2022 5/13/2022   Sodium      133 - 144 mmol/L 135 137 141 138   Potassium      3.4 - 5.3 mmol/L 4.0 4.0 4.5 3.9   Chloride      94 - 109 mmol/L 102 107 106 102   Carbon Dioxide      20 - 32 mmol/L 27 24 24 29   Anion Gap      3 - 14 mmol/L 6 6 11 7   Urea Nitrogen      7 - 30 mg/dL 32 (H) 24 33 (H) 43 (H)   Creatinine      0.52 - 1.25 mg/dL 1.63 (H) 1.44 (H) 1.60 (H) 1.73 (H)   Calcium      8.5 - 10.1 mg/dL 9.2 9.3 9.6 9.5   Glucose      70 - 99 mg/dL 202 (H) 166 (H) 177 (H) 129 (H)       Plan:  1. Continue torsemide 40 mg daily until " "Thursday.  On Thursday, 5/19, contact us with update.  At that time, we will likely decrease torsemide to 20 mg daily given concerns for worsening renal function    Assessment/Plan:    1. HFpEF    Likely d/t rapid AFib in setting of respiratory illness.    Echo 2/2022 with LVEF 50-55% and RVSP 28+RAP    CT Chest PE done 3/2022 showed small pleural effusion      He started torsemide 40 mg daily on 5/7/2022 (read [employer] message 5/7 AM) and really feels like it has done a great job.  His breathing is much easier and swelling has improved dramatically    Blood work as above with nl Hgb.  Worsening renal function noted, however      HR has come down with diuresis    Weight was 285# at 5/6 appt. Today, down 10# to 275#     PLAN:    Continue torsemide 40 mg daily until Thursday 5/19    He will call us Thursday 5/19 with update on weight (275#today, swelling, breathing, ability to lay in bed, HRs and conversely, any concerns for dehydration dizziness, dry mouth)))    On Thursday, anticipate will reduce torsemide to 20 mg daily    He will then see me in ~2 weeks with updated NTpBNP and BMP.  I have encouraged him to contact [facility] to see if his labs could be done there the day before our visit as it is on his way to work    2. Persistent AFib    Dr. Mejias rec'd attempts at rhythm control last year, but Conner declined as he \"felt fine.\" Therefore, we've focused on rate control    He was hospitalized 2/2022 and SOB and noted to be in AFib/RVR. Diltiazem added to his metoprolol  mg BID. He was then found to be COVID+ (initially had tested negative).     Wanted to work on diuresis before adjusting rate controlling meds, and HRs have improved with diuresis    PLAN:    Monitor heart rate as he continues diuresis.  Given his size, I do think he would tolerate increase in metoprolol         Krista Fallon PA-C, MSPAS      Orders Placed This Encounter   Procedures     Basic metabolic panel     N terminal pro BNP outpatient "     Follow-Up with Cardiology GABRIELA     EKG 12-lead complete w/read - Clinics (performed today)     No orders of the defined types were placed in this encounter.    There are no discontinued medications.      Encounter Diagnoses   Name Primary?     SOB (shortness of breath)      Persistent atrial fibrillation (H)        CURRENT MEDICATIONS:  Current Outpatient Medications   Medication Sig Dispense Refill     alcohol swab prep pads Use to swab area of injection/fariha as directed. 100 each 3     atorvastatin (LIPITOR) 10 MG tablet Take 1 tablet (10 mg) by mouth daily 90 tablet 3     blood glucose (NO BRAND SPECIFIED) test strip Use to test blood sugar 1 times daily or as directed. To accompany: Blood Glucose Monitor Brands: per insurance. 100 strip 6     blood glucose calibration (NO BRAND SPECIFIED) solution To accompany: Blood Glucose Monitor Brands: per insurance. 1 Bottle 3     blood glucose monitoring (NO BRAND SPECIFIED) meter device kit Use to test blood sugar 1 times daily or as directed. : per insurance. 1 kit 0     diltiazem ER COATED BEADS (CARDIZEM CD/CARTIA XT) 240 MG 24 hr capsule Take 1 capsule (240 mg) by mouth daily 90 capsule 3     glipiZIDE (GLUCOTROL XL) 10 MG 24 hr tablet Take 1 tablet (10 mg) by mouth daily Please make an appointment for further refills 90 tablet 1     lisinopril (ZESTRIL) 40 MG tablet Take 1 tablet (40 mg) by mouth daily 90 tablet 3     metFORMIN (GLUCOPHAGE-XR) 500 MG 24 hr tablet Take 2 tablets (1,000 mg) by mouth daily (with dinner) 180 tablet 3     metoprolol succinate ER (TOPROL-XL) 100 MG 24 hr tablet Take 1 tablet (100 mg) by mouth 2 times daily 180 tablet 3     rivaroxaban ANTICOAGULANT (XARELTO ANTICOAGULANT) 20 MG TABS tablet Take 1 tablet (20 mg) by mouth daily (with dinner) 90 tablet 3     thin (NO BRAND SPECIFIED) lancets Use with lanceting device. To accompany: Blood Glucose Monitor Brands: per insurance. 1 each 6     torsemide (DEMADEX) 20 MG tablet Take 2 tablets  "(40 mg) by mouth daily 60 tablet 1       ALLERGIES     Allergies   Allergen Reactions     No Known Allergies          Review of Systems:  Skin:  Negative     Eyes:  Positive for glasses  ENT:  Negative    Respiratory:  Positive for dyspnea on exertion;cough  Cardiovascular:  Negative for;palpitations;lightheadedness;dizziness;fatigue;chest pain Positive for;edema  Gastroenterology: Negative for melena;hematochezia  Genitourinary:  Positive for nocturia  Musculoskeletal:  Positive for joint pain  Neurologic:  Positive for numbness or tingling of feet  Psychiatric:  Negative    Heme/Lymph/Imm:  Negative    Endocrine:  Positive for diabetes    Physical Exam:  Vitals: /81 (BP Location: Left arm, Patient Position: Sitting)   Pulse 95   Ht 1.778 m (5' 10\")   Wt 125.1 kg (275 lb 14.4 oz)   SpO2 98%   BMI 39.59 kg/m      Constitutional:  cooperative, alert and oriented, well developed, well nourished, in no acute distress        Skin:  warm and dry to the touch, no apparent skin lesions or masses noted        Head:  normocephalic, no masses or lesions        Eyes:  pupils equal and round;conjunctivae and lids unremarkable;sclera white        ENT:  not assessed this visit        Neck:  JVP normal;no carotid bruit hepatojugular reflux      Chest:  normal breath sounds, clear to auscultation, normal A-P diameter, normal symmetry, normal respiratory excursion, no use of accessory muscles        Cardiac:   irregularly irregular rhythm                Abdomen:  abdomen soft obese      Vascular: pulses full and equal                                      Extremities and Back:  no deformities, clubbing, cyanosis, erythema observed        Neurological:  no gross motor deficits            PAST MEDICAL HISTORY:  Past Medical History:   Diagnosis Date     Diabetes (H)      HTN      Hyperlipidemia        PAST SURGICAL HISTORY:  Past Surgical History:   Procedure Laterality Date     COLONOSCOPY N/A 8/24/2018    Procedure: " COMBINED COLONOSCOPY, SINGLE OR MULTIPLE BIOPSY/POLYPECTOMY BY BIOPSY;;  Surgeon: Lawrence Mata MD;  Location:  GI       FAMILY HISTORY:  Family History   Problem Relation Age of Onset     Diabetes Mother         dx at 70     Bladder Cancer Brother        SOCIAL HISTORY:  Social History     Socioeconomic History     Marital status:      Spouse name: None     Number of children: None     Years of education: None     Highest education level: None   Tobacco Use     Smoking status: Former Smoker     Packs/day: 3.00     Years: 16.00     Pack years: 48.00     Types: Cigarettes     Start date:      Quit date: 1987     Years since quittin.6     Smokeless tobacco: Never Used   Substance and Sexual Activity     Alcohol use: No     Drug use: No     Sexual activity: Yes     Partners: Female   Other Topics Concern     Parent/sibling w/ CABG, MI or angioplasty before 65F 55M? Yes      Service No     Blood Transfusions No     Caffeine Concern No     Occupational Exposure No     Hobby Hazards No     Sleep Concern No     Stress Concern No     Weight Concern Yes     Special Diet No     Back Care No     Exercise Yes     Bike Helmet No     Comment: n/a     Seat Belt Yes     Self-Exams No

## 2022-05-13 ENCOUNTER — LAB (OUTPATIENT)
Dept: LAB | Facility: CLINIC | Age: 67
End: 2022-05-13

## 2022-05-13 ENCOUNTER — OFFICE VISIT (OUTPATIENT)
Dept: CARDIOLOGY | Facility: CLINIC | Age: 67
End: 2022-05-13
Attending: PHYSICIAN ASSISTANT
Payer: COMMERCIAL

## 2022-05-13 VITALS
WEIGHT: 275.9 LBS | SYSTOLIC BLOOD PRESSURE: 120 MMHG | BODY MASS INDEX: 39.5 KG/M2 | DIASTOLIC BLOOD PRESSURE: 81 MMHG | HEIGHT: 70 IN | HEART RATE: 95 BPM | OXYGEN SATURATION: 98 %

## 2022-05-13 DIAGNOSIS — R06.02 SOB (SHORTNESS OF BREATH): ICD-10-CM

## 2022-05-13 DIAGNOSIS — I48.19 PERSISTENT ATRIAL FIBRILLATION (H): ICD-10-CM

## 2022-05-13 LAB
ANION GAP SERPL CALCULATED.3IONS-SCNC: 7 MMOL/L (ref 3–14)
BUN SERPL-MCNC: 43 MG/DL (ref 7–30)
CALCIUM SERPL-MCNC: 9.5 MG/DL (ref 8.5–10.1)
CHLORIDE BLD-SCNC: 102 MMOL/L (ref 94–109)
CO2 SERPL-SCNC: 29 MMOL/L (ref 20–32)
CREAT SERPL-MCNC: 1.73 MG/DL (ref 0.52–1.25)
ERYTHROCYTE [DISTWIDTH] IN BLOOD BY AUTOMATED COUNT: 16.1 % (ref 10–15)
GFR SERPL CREATININE-BSD FRML MDRD: 43 ML/MIN/1.73M2
GLUCOSE BLD-MCNC: 129 MG/DL (ref 70–99)
HCT VFR BLD AUTO: 44.9 % (ref 35–53)
HGB BLD-MCNC: 13.5 G/DL (ref 11.7–17.7)
MCH RBC QN AUTO: 25.6 PG (ref 26.5–33)
MCHC RBC AUTO-ENTMCNC: 30.1 G/DL (ref 31.5–36.5)
MCV RBC AUTO: 85 FL (ref 78–100)
PLATELET # BLD AUTO: 311 10E3/UL (ref 150–450)
POTASSIUM BLD-SCNC: 3.9 MMOL/L (ref 3.4–5.3)
RBC # BLD AUTO: 5.27 10E6/UL (ref 3.8–5.9)
SODIUM SERPL-SCNC: 138 MMOL/L (ref 133–144)
WBC # BLD AUTO: 10 10E3/UL (ref 4–11)

## 2022-05-13 PROCEDURE — 85027 COMPLETE CBC AUTOMATED: CPT

## 2022-05-13 PROCEDURE — 36415 COLL VENOUS BLD VENIPUNCTURE: CPT

## 2022-05-13 PROCEDURE — 93005 ELECTROCARDIOGRAM TRACING: CPT | Performed by: PHYSICIAN ASSISTANT

## 2022-05-13 PROCEDURE — 80048 BASIC METABOLIC PNL TOTAL CA: CPT

## 2022-05-13 PROCEDURE — 99214 OFFICE O/P EST MOD 30 MIN: CPT | Performed by: PHYSICIAN ASSISTANT

## 2022-05-13 NOTE — PATIENT INSTRUCTIONS
Conner - it was good to see you again!    Reviewed that you're feeling A LOT better on the new diuretic (torsemide instaed of furosemide)  Weight is down 10# in 1 week!    PLAN:  Continue torsemide 40 mg (2 tabs) every morning as they're working well!  2. Increase potassium in diet (OJ, oranges, citrus, spinach, sweet potato, potatoes, bananas, broccoli)    3. On Thursday 5/19, CALL [phone] with update on:   Weight (275# today)   HRs   Leg swelling    Breathing   Have you been able to get back into bed to sleep?   Any concerns of dehydration - BP getting too low, dry mouth    4. We'll figure out when we should switch you to lower dose of torsemide (don't want to stress kidneys)    ANY CONCERNS/QUESTIONS! [phone]

## 2022-05-13 NOTE — LETTER
"5/13/2022    [facility]  [address] [address]    RE: Booker Brown       Dear Colleague,     I had the pleasure of seeing Booker Brown in the [facility].  [facility]    I had the pleasure of seeing Conner when he came for follow up of AFib.  This 67 year old sees Dr. Mejias for his history of:    1. Persistent AFib; nl EF - noted in setting of kidney stones 3/2021. S/p VIVEK/DCCV 5/14/2021 which restored SR. Back in AFib at time of follow-up appt 5/28 and rate control strategy rec'd as he declined rate control with sotalol as didn't feel any different.   2. HTN  3. HL   4. DM - A1c 7% 3/2022  5. CKD - Cr 1.4-1.8 in the past 12m  6. CHADSVASc 3 (HTN, DM, aortic plaquing)  On Xarelto  7. COVID infection - 2/2022  8. Asbestosis - CT PE noted 3/29/2022 was negative for PE but showed asbestos-related pleural disease with minimal adjacent fibrosis and mild pulmonary edema was noted    I saw Conner just 5/6/2022 at which time we reviewed his continued RODRIGUEZ. He'd been hospitalized 2/2022 for this and rate control for AFib rec'd. At his ER visit 2/27 for continued SOB, noted to be COVID+ with CXR concerning for PNA. Small R pleural effusion noted 3/17 and Lasix started.  He noted feeling \"uneasy\" after a few hours while sleeping which was concerning for PND/orthopnea. On exam, he was noted to have 2+ pitting LE edema to knee and HJR. Just minimal crackles. Add-on BMP, NTpBNP on Lasix 40 mg was done and I asked him to stop Lasix 40 mg daily and start torsemide 40 mg daily given NTpBNP 3000. Daily weights and compression stockings recommended.    Close follow-up with labs rec'd     Interval History:  Conner is down 10# today after starting torsemide 40mg on Saturday 5/7 (275# today). His breathing is \"much better\" on the torsemide but hasn't tried sleeping back in bed b/c he's \"nervous\" to try it again.  His feet and legs are \"way softer\" and have much less " "swelling.  He notes that he did not have to stop while going [place] today.  Denies any problems with chest pain, pressure, tightness.  He is limiting the salt in his diet.    HRs have been in in 90s since we started diuresing, coming down from the 110s.  O2 sats remain >95%        VITALS:  Vitals: /81 (BP Location: Left arm, Patient Position: Sitting)   Pulse 95   Ht 1.778 m (5' 10\")   Wt 125.1 kg (275 lb 14.4 oz)   SpO2 98%   BMI 39.59 kg/m      Diagnostic Testing:  Echocardiogram 2/2022  - LVEF 50-55%. Nl RV Mod NATHALY. 1+ MR with mod MAC. RVSP 28+RAP 1+TR. Mild aortic sclerosis.   VIVEK 5/14/2021 showed EF 60-65%. Nl LA size. 1+ TR, 1+ MR. No thrombus. Mild Ao sclerosis.  Mild atherosclerotic plaque in desc aorta  ZioPatch 3/30-4/8/2021 on atenolol 100 with AFib avg  bpm (range  bpm). 2 runs of NSVT, longest 9 beats  Component      Latest Ref Rng & Units 2/19/2022 2/27/2022 5/3/2022   N-Terminal Pro BNP Inpatient      0 - 900 pg/mL 3,427 (H) 7,959 (H)    N-Terminal Pro Bnp      0 - 900 pg/mL   3,153 (H)     Component      Latest Ref Rng & Units 2/27/2022 3/29/2022 5/13/2022   WBC      4.0 - 11.0 10e3/uL 14.8 (H) 11.9 (H) 10.0   RBC Count      3.80 - 5.90 10e6/uL 4.58 4.89 5.27   Hemoglobin      11.7 - 17.7 g/dL 12.5 13.2 13.5   Hematocrit      35.0 - 53.0 % 40.2 43.4 44.9   MCV      78 - 100 fL 88 89 85   MCH      26.5 - 33.0 pg 27.3 27.0 25.6 (L)   MCHC      31.5 - 36.5 g/dL 31.1 (L) 30.4 (L) 30.1 (L)   RDW      10.0 - 15.0 % 14.7 15.4 (H) 16.1 (H)   Platelet Count      150 - 450 10e3/uL 294 278 311     Component      Latest Ref Rng & Units 2/27/2022 3/29/2022 5/3/2022 5/13/2022   Sodium      133 - 144 mmol/L 135 137 141 138   Potassium      3.4 - 5.3 mmol/L 4.0 4.0 4.5 3.9   Chloride      94 - 109 mmol/L 102 107 106 102   Carbon Dioxide      20 - 32 mmol/L 27 24 24 29   Anion Gap      3 - 14 mmol/L 6 6 11 7   Urea Nitrogen      7 - 30 mg/dL 32 (H) 24 33 (H) 43 (H)   Creatinine      " "0.52 - 1.25 mg/dL 1.63 (H) 1.44 (H) 1.60 (H) 1.73 (H)   Calcium      8.5 - 10.1 mg/dL 9.2 9.3 9.6 9.5   Glucose      70 - 99 mg/dL 202 (H) 166 (H) 177 (H) 129 (H)       Plan:  1. Continue torsemide 40 mg daily until Thursday.  On Thursday, 5/19, contact us with update.  At that time, we will likely decrease torsemide to 20 mg daily given concerns for worsening renal function    Assessment/Plan:    1. HFpEF    Likely d/t rapid AFib in setting of respiratory illness.    Echo 2/2022 with LVEF 50-55% and RVSP 28+RAP    CT Chest PE done 3/2022 showed small pleural effusion      He started torsemide 40 mg daily on 5/7/2022 (read [employer] message 5/7 AM) and really feels like it has done a great job.  His breathing is much easier and swelling has improved dramatically    Blood work as above with nl Hgb.  Worsening renal function noted, however      HR has come down with diuresis    Weight was 285# at 5/6 appt. Today, down 10# to 275#     PLAN:    Continue torsemide 40 mg daily until Thursday 5/19    He will call us Thursday 5/19 with update on weight (275#today, swelling, breathing, ability to lay in bed, HRs and conversely, any concerns for dehydration dizziness, dry mouth)))    On Thursday, anticipate will reduce torsemide to 20 mg daily    He will then see me in ~2 weeks with updated NTpBNP and BMP.  I have encouraged him to contact [facility] to see if his labs could be done there the day before our visit as it is on his way to work    2. Persistent AFib    Dr. Mejias rec'd attempts at rhythm control last year, but Conner declined as he \"felt fine.\" Therefore, we've focused on rate control    He was hospitalized 2/2022 and SOB and noted to be in AFib/RVR. Diltiazem added to his metoprolol  mg BID. He was then found to be COVID+ (initially had tested negative).     Wanted to work on diuresis before adjusting rate controlling meds, and HRs have improved with diuresis    PLAN:    Monitor heart rate as he continues " diuresis.  Given his size, I do think he would tolerate increase in metoprolol         Krista Fallon PA-C, MSPAS      Orders Placed This Encounter   Procedures     Basic metabolic panel     N terminal pro BNP outpatient     Follow-Up with Cardiology GABRIELA     EKG 12-lead complete w/read - Clinics (performed today)     No orders of the defined types were placed in this encounter.    There are no discontinued medications.      Encounter Diagnoses   Name Primary?     SOB (shortness of breath)      Persistent atrial fibrillation (H)        CURRENT MEDICATIONS:  Current Outpatient Medications   Medication Sig Dispense Refill     alcohol swab prep pads Use to swab area of injection/fariha as directed. 100 each 3     atorvastatin (LIPITOR) 10 MG tablet Take 1 tablet (10 mg) by mouth daily 90 tablet 3     blood glucose (NO BRAND SPECIFIED) test strip Use to test blood sugar 1 times daily or as directed. To accompany: Blood Glucose Monitor Brands: per insurance. 100 strip 6     blood glucose calibration (NO BRAND SPECIFIED) solution To accompany: Blood Glucose Monitor Brands: per insurance. 1 Bottle 3     blood glucose monitoring (NO BRAND SPECIFIED) meter device kit Use to test blood sugar 1 times daily or as directed. : per insurance. 1 kit 0     diltiazem ER COATED BEADS (CARDIZEM CD/CARTIA XT) 240 MG 24 hr capsule Take 1 capsule (240 mg) by mouth daily 90 capsule 3     glipiZIDE (GLUCOTROL XL) 10 MG 24 hr tablet Take 1 tablet (10 mg) by mouth daily Please make an appointment for further refills 90 tablet 1     lisinopril (ZESTRIL) 40 MG tablet Take 1 tablet (40 mg) by mouth daily 90 tablet 3     metFORMIN (GLUCOPHAGE-XR) 500 MG 24 hr tablet Take 2 tablets (1,000 mg) by mouth daily (with dinner) 180 tablet 3     metoprolol succinate ER (TOPROL-XL) 100 MG 24 hr tablet Take 1 tablet (100 mg) by mouth 2 times daily 180 tablet 3     rivaroxaban ANTICOAGULANT (XARELTO ANTICOAGULANT) 20 MG TABS tablet Take 1 tablet (20 mg) by mouth  "daily (with dinner) 90 tablet 3     thin (NO BRAND SPECIFIED) lancets Use with lanceting device. To accompany: Blood Glucose Monitor Brands: per insurance. 1 each 6     torsemide (DEMADEX) 20 MG tablet Take 2 tablets (40 mg) by mouth daily 60 tablet 1       ALLERGIES     Allergies   Allergen Reactions     No Known Allergies          Review of Systems:  Skin:  Negative     Eyes:  Positive for glasses  ENT:  Negative    Respiratory:  Positive for dyspnea on exertion;cough  Cardiovascular:  Negative for;palpitations;lightheadedness;dizziness;fatigue;chest pain Positive for;edema  Gastroenterology: Negative for melena;hematochezia  Genitourinary:  Positive for nocturia  Musculoskeletal:  Positive for joint pain  Neurologic:  Positive for numbness or tingling of feet  Psychiatric:  Negative    Heme/Lymph/Imm:  Negative    Endocrine:  Positive for diabetes    Physical Exam:  Vitals: /81 (BP Location: Left arm, Patient Position: Sitting)   Pulse 95   Ht 1.778 m (5' 10\")   Wt 125.1 kg (275 lb 14.4 oz)   SpO2 98%   BMI 39.59 kg/m      Constitutional:  cooperative, alert and oriented, well developed, well nourished, in no acute distress        Skin:  warm and dry to the touch, no apparent skin lesions or masses noted        Head:  normocephalic, no masses or lesions        Eyes:  pupils equal and round;conjunctivae and lids unremarkable;sclera white        ENT:  not assessed this visit        Neck:  JVP normal;no carotid bruit hepatojugular reflux      Chest:  normal breath sounds, clear to auscultation, normal A-P diameter, normal symmetry, normal respiratory excursion, no use of accessory muscles        Cardiac:   irregularly irregular rhythm                Abdomen:  abdomen soft obese      Vascular: pulses full and equal                                      Extremities and Back:  no deformities, clubbing, cyanosis, erythema observed        Neurological:  no gross motor deficits            PAST MEDICAL " HISTORY:  Past Medical History:   Diagnosis Date     Diabetes (H)      HTN      Hyperlipidemia        PAST SURGICAL HISTORY:  Past Surgical History:   Procedure Laterality Date     COLONOSCOPY N/A 2018    Procedure: COMBINED COLONOSCOPY, SINGLE OR MULTIPLE BIOPSY/POLYPECTOMY BY BIOPSY;;  Surgeon: Lawrence Mata MD;  Location:  GI       FAMILY HISTORY:  Family History   Problem Relation Age of Onset     Diabetes Mother         dx at 70     Bladder Cancer Brother        SOCIAL HISTORY:  Social History     Socioeconomic History     Marital status:      Spouse name: None     Number of children: None     Years of education: None     Highest education level: None   Tobacco Use     Smoking status: Former Smoker     Packs/day: 3.00     Years: 16.00     Pack years: 48.00     Types: Cigarettes     Start date:      Quit date: 1987     Years since quittin.6     Smokeless tobacco: Never Used   Substance and Sexual Activity     Alcohol use: No     Drug use: No     Sexual activity: Yes     Partners: Female   Other Topics Concern     Parent/sibling w/ CABG, MI or angioplasty before 65F 55M? Yes      Service No     Blood Transfusions No     Caffeine Concern No     Occupational Exposure No     Hobby Hazards No     Sleep Concern No     Stress Concern No     Weight Concern Yes     Special Diet No     Back Care No     Exercise Yes     Bike Helmet No     Comment: n/a     Seat Belt Yes     Self-Exams No       Thank you for allowing me to participate in the care of your patient.      Sincerely,     Ping Fallon PA-C     [facility]  cc:   Ping Fallon PA-C  [address] [address]

## 2022-05-19 ENCOUNTER — TELEPHONE (OUTPATIENT)
Dept: CARDIOLOGY | Facility: CLINIC | Age: 67
End: 2022-05-19
Payer: COMMERCIAL

## 2022-05-19 DIAGNOSIS — R06.02 SOB (SHORTNESS OF BREATH): Primary | ICD-10-CM

## 2022-05-19 RX ORDER — TORSEMIDE 20 MG/1
20 TABLET ORAL DAILY
Qty: 30 TABLET | Refills: 4 | Status: SHIPPED | OUTPATIENT
Start: 2022-05-19 | End: 2022-07-05

## 2022-05-19 NOTE — TELEPHONE ENCOUNTER
That's great news!  When I saw him 5/13 he was down 10#; Cr had bumped to 1.73.    Pls have him reduce torsemide to 20 mg daily. He needs to call us if weight starts climbing, breathing worsens, swelling worsens, etc.    See me 5/31 as planned.  jennifer

## 2022-05-19 NOTE — TELEPHONE ENCOUNTER
5/19/22 Pt called to report he is going much better after OV sherice Velasco on 5/13. His weight is stable, swelling is gone and he is having no problems breathing. He has been sleeping in his bed and laying flat with no problems.  Pt wants to have his labs at [facility] and has already changed the appt. Re- entered lab orders so [facility] can see.  Will update Krista Lomeli PA-c  Pt voiced understanding and agreement with plan.   Kodi 410 pm

## 2022-05-19 NOTE — TELEPHONE ENCOUNTER
5/19/22 Spoke w pt and explained recommendations per Krista Fallon PA-C    Pls have him reduce torsemide to 20 mg daily. He needs to call us if weight starts climbing, breathing worsens, swelling worsens, etc.    Spoke w pt and explained recommendations.   Pt voiced understanding and agreement with plan.   Kodi 445 pm

## 2022-05-25 ENCOUNTER — LAB (OUTPATIENT)
Dept: LAB | Facility: CLINIC | Age: 67
End: 2022-05-25
Payer: COMMERCIAL

## 2022-05-25 DIAGNOSIS — R06.02 SOB (SHORTNESS OF BREATH): ICD-10-CM

## 2022-05-25 LAB
ANION GAP SERPL CALCULATED.3IONS-SCNC: 8 MMOL/L (ref 3–14)
BUN SERPL-MCNC: 33 MG/DL (ref 7–30)
CALCIUM SERPL-MCNC: 9.3 MG/DL (ref 8.5–10.1)
CHLORIDE BLD-SCNC: 106 MMOL/L (ref 94–109)
CO2 SERPL-SCNC: 26 MMOL/L (ref 20–32)
CREAT SERPL-MCNC: 1.57 MG/DL (ref 0.52–1.25)
GFR SERPL CREATININE-BSD FRML MDRD: 49 ML/MIN/1.73M2
GLUCOSE BLD-MCNC: 111 MG/DL (ref 70–99)
NT-PROBNP SERPL-MCNC: 2095 PG/ML (ref 0–900)
POTASSIUM BLD-SCNC: 4 MMOL/L (ref 3.4–5.3)
SODIUM SERPL-SCNC: 140 MMOL/L (ref 133–144)

## 2022-05-25 PROCEDURE — 36415 COLL VENOUS BLD VENIPUNCTURE: CPT

## 2022-05-25 PROCEDURE — 80048 BASIC METABOLIC PNL TOTAL CA: CPT

## 2022-05-25 PROCEDURE — 83880 ASSAY OF NATRIURETIC PEPTIDE: CPT

## 2022-05-25 NOTE — TELEPHONE ENCOUNTER
Routing refill request to provider for review/approval because:  --Labs out of range:  Scr.  --Labs not current:  Scr, potassium  --Based on order history it appears patient may not be taking daily.  I attempted to reach patient to ask, but no answer and past blood pressure's have been fine.  --Due for a visit.  --Last visit:  2/28/2020 Katelyn's plan RTC 6 months.       ,  --Please contact patient and ask to schedule a visit with  as planned in last visit. He can get annual labs drawn at that visit..    --A refill request for medication was sent to the provider.                  Creatinine   Date Value Ref Range Status   10/30/2019 1.86 (H) 0.66 - 1.25 mg/dL Final   06/13/2018 1.87 (H) 0.66 - 1.25 mg/dL Final   08/02/2017 1.53 (H) 0.66 - 1.25 mg/dL Final   05/09/2016 1.60 (H) 0.66 - 1.25 mg/dL Final   09/28/2015 2.26 (H) 0.66 - 1.25 mg/dL Final       BP Readings from Last 6 Encounters:   02/28/20 116/68   10/30/19 136/82   11/15/18 159/85   10/17/18 162/90   08/24/18 140/69   06/13/18 138/75                Otezla Counseling: The side effects of Otezla were discussed with the patient, including but not limited to worsening or new depression, weight loss, diarrhea, nausea, upper respiratory tract infection, and headache. Patient instructed to call the office should any adverse effect occur.  The patient verbalized understanding of the proper use and possible adverse effects of Otezla.  All the patient's questions and concerns were addressed.

## 2022-06-05 ENCOUNTER — HEALTH MAINTENANCE LETTER (OUTPATIENT)
Age: 67
End: 2022-06-05

## 2022-06-09 NOTE — PROGRESS NOTES
[facility]    I had the pleasure of seeing Conner when he came for follow up of AFib and fluid overload.  This 67 year old sees Dr. Mejias for his history of:    1. Persistent AFib; nl EF - noted in setting of kidney stones 3/2021. S/p VIVEK/DCCV 5/14/2021 which restored SR. Back in AFib at time of follow-up appt 5/28 and rate control strategy rec'd as he declined rate control with sotalol as didn't feel any different.   2. HTN  3. HL   4. DM - A1c 7% 3/2022  5. CKD - Cr 1.4-1.8 in the past 12m  6. CHADSVASc 3 (HTN, DM, aortic plaquing)  On Xarelto  7. COVID infection - 2/2022  8. Asbestosis - CT PE noted 3/29/2022 was negative for PE but showed asbestos-related pleural disease with minimal adjacent fibrosis and mild pulmonary edema was noted       I saw Conner 5/13 at which time he was feeling quite a bit better after switching his furosemide to torsemide back on 5/6.  He was down 10# (275# at our visit 5/6). HRs had improved to the 90s since we started diuresing him.  He had much softer legs and did not have to stop while walking [place] the [place] that day. D/t bump in cr to 1.73, I had him continue torsemide 40 mg daily until 5/19, then decreased to 20 mg daily.    On 5/19, he contacted us noting that he was still feeling quite a bit better and had been back to laying in bed to sleep.    I asked him to see me with close follow-up and repeat blood work. D/t my schedule, this was postponed until today.    Interval History:  Feeling much better and doing well on the torsemide 20 mg daily, and really feels like this is the correct dose for him.  He has excellent urination right after he takes it, and this tapers off throughout the day, which he is pleased with.  He denies orthopnea or PND.  Weight has been stable, and today he remains at 275#.  His edema remains improved, and his venous ulceration is nearly healed.  He does wrap his legs with Ace bandages to help with the swelling, which he thinks proves  "this more than compression stockings.      Checks his heart rates routinely, and they are typically 90-1 100s.  I told him that I was hopeful that his heart rate would come down with improvement in his fluid status, but it has not.  No awareness of his AFib and denies problems with palpitations, dizziness, lightheadedness.    His appointment with Dr. Hopkins in Pulmonology had to be canceled d/t Dr. Hopkins's schedule.  He was able to get his PFTs done, which showed severe airflow obstruction and restriction with moderate diffusion defect.  He is going to see Dr. Hopkins to review this 7/27/2022 which she is pleased about.    Denies bleeding issues on the Xarelto    VITALS:  Vitals: /85 (BP Location: Right arm, Cuff Size: Adult Large)   Pulse 108   Ht 1.778 m (5' 10\")   Wt 124.7 kg (275 lb)   BMI 39.46 kg/m      Diagnostic Testing:  EKG today, which I overread, showed AFib 104 bpm  Echocardiogram 2/2022  - LVEF 50-55%. Nl RV Mod NATHALY. 1+ MR with mod MAC. RVSP 28+RAP 1+TR. Mild aortic sclerosis.   VIVEK 5/14/2021 showed EF 60-65%. Nl LA size. 1+ TR, 1+ MR. No thrombus. Mild Ao sclerosis.  Mild atherosclerotic plaque in desc aorta  ZioPatch 3/30-4/8/2021 on atenolol 100 with AFib avg  bpm (range  bpm). 2 runs of NSVT, longest 9 beats  Component      Latest Ref Rng & Units 2/27/2022 5/3/2022 5/25/2022   N-Terminal Pro BNP Inpatient      0 - 900 pg/mL 7,959 (H)     N-Terminal Pro Bnp      0 - 900 pg/mL  3,153 (H) 2,095 (H)     Component      Latest Ref Rng & Units 3/29/2022 5/3/2022 5/13/2022 5/25/2022   Sodium      133 - 144 mmol/L 137 141 138 140   Potassium      3.4 - 5.3 mmol/L 4.0 4.5 3.9 4.0   Chloride      94 - 109 mmol/L 107 106 102 106   Carbon Dioxide      20 - 32 mmol/L 24 24 29 26   Anion Gap      3 - 14 mmol/L 6 11 7 8   Urea Nitrogen      7 - 30 mg/dL 24 33 (H) 43 (H) 33 (H)   Creatinine      0.52 - 1.25 mg/dL 1.44 (H) 1.60 (H) 1.73 (H) 1.57 (H)   Calcium      8.5 - 10.1 mg/dL 9.3 9.6 9.5 9.3 " "  Glucose      70 - 99 mg/dL 166 (H) 177 (H) 129 (H) 111 (H)     Component      Latest Ref Rng & Units 3/29/2022 5/13/2022   WBC      4.0 - 11.0 10e3/uL 11.9 (H) 10.0   RBC Count      3.80 - 5.90 10e6/uL 4.89 5.27   Hemoglobin      11.7 - 17.7 g/dL 13.2 13.5   Hematocrit      35.0 - 53.0 % 43.4 44.9   MCV      78 - 100 fL 89 85   MCH      26.5 - 33.0 pg 27.0 25.6 (L)   MCHC      31.5 - 36.5 g/dL 30.4 (L) 30.1 (L)   RDW      10.0 - 15.0 % 15.4 (H) 16.1 (H)   Platelet Count      150 - 450 10e3/uL 278 311       Plan:  Increase Diltiazem to 360 mg daily to improve blood pressure  24-hour Holter monitor in 1 month-we will contact with results  See Dr. Mejias in follow-up    Assessment/Plan:    1. HFpEF    Likely d/t rapid AFib in setting of respiratory illness    Echo 2/2022 with LVEF 50-55 percentage RVSP 28+ RAP.  CT chest PE 3/2022 showed small pleural effusion    After starting torsemide 40 mg 5/7/2022, he noted 10# weight loss in the first week, was breathing easier and edema was much better.    Since decreasing his torsemide to 20 mg 5/19/2022 based on renal function, thinks breathing has not changed.  His edema continues to improve.  He wears his leg wraps, which helps this, as does elevation.    On exam, still with LE edema.  No crackles in lungs, but he did have an inspiratory wheeze that cleared with cough.    Blood work on decreased dose as above shows improved renal function and stable electrolytes.    PLAN:    Continue torsemide 20 mg daily    2. Persistent AFib    Dr. Mejias rec'd attempts at rhythm control last year, but Conner declined as he \"felt fine.\" Therefore, we've focused on rate control    He was hospitalized 2/2022 and SOB and noted to be in AFib/RVR. Diltiazem added to his metoprolol  mg BID. He was then found to be COVID+ (initially had tested negative).     Note heart rate got slightly better with diuresis, but is still too fast despite Diltiazem 240 metoprolol  mg daily " BID    PLAN:    Increase Diltiazem to 360 mg daily-cautioned to watch for increase in LE edema    Could consider digoxin if needed, but he is already on a high dose of metoprolol XL and has known restrictive lung disease 24-hour Holter monitor in ~ 1 m.  We will contact with results    We will see Dr. Mejias in follow-up to ensure rate control strategy is continuing to work    Krista Fallon PA-C, MSPAS      Orders Placed This Encounter   Procedures     Follow-Up with Cardiology EP     EKG 12-lead complete w/read - Clinics (performed today)     Holter Monitor 24 hour Adult Pediatric     Orders Placed This Encounter   Medications     diltiazem ER COATED BEADS (CARDIZEM CD) 360 MG 24 hr capsule     Sig: Take 1 capsule (360 mg) by mouth daily     Dispense:  90 capsule     Refill:  3     Note dose increase     Medications Discontinued During This Encounter   Medication Reason     diltiazem ER COATED BEADS (CARDIZEM CD/CARTIA XT) 240 MG 24 hr capsule          Encounter Diagnosis   Name Primary?     Persistent atrial fibrillation (H) Yes       CURRENT MEDICATIONS:  Current Outpatient Medications   Medication Sig Dispense Refill     alcohol swab prep pads Use to swab area of injection/fariha as directed. 100 each 3     atorvastatin (LIPITOR) 10 MG tablet Take 1 tablet (10 mg) by mouth daily 90 tablet 3     blood glucose (NO BRAND SPECIFIED) test strip Use to test blood sugar 1 times daily or as directed. To accompany: Blood Glucose Monitor Brands: per insurance. 100 strip 6     blood glucose calibration (NO BRAND SPECIFIED) solution To accompany: Blood Glucose Monitor Brands: per insurance. 1 Bottle 3     blood glucose monitoring (NO BRAND SPECIFIED) meter device kit Use to test blood sugar 1 times daily or as directed. : per insurance. 1 kit 0     diltiazem ER COATED BEADS (CARDIZEM CD) 360 MG 24 hr capsule Take 1 capsule (360 mg) by mouth daily 90 capsule 3     glipiZIDE (GLUCOTROL XL) 10 MG 24 hr tablet Take 1 tablet (10 mg)  "by mouth daily Please make an appointment for further refills 90 tablet 1     lisinopril (ZESTRIL) 40 MG tablet Take 1 tablet (40 mg) by mouth daily 90 tablet 3     metFORMIN (GLUCOPHAGE-XR) 500 MG 24 hr tablet Take 2 tablets (1,000 mg) by mouth daily (with dinner) 180 tablet 3     metoprolol succinate ER (TOPROL-XL) 100 MG 24 hr tablet Take 1 tablet (100 mg) by mouth 2 times daily 180 tablet 3     rivaroxaban ANTICOAGULANT (XARELTO ANTICOAGULANT) 20 MG TABS tablet Take 1 tablet (20 mg) by mouth daily (with dinner) 90 tablet 3     thin (NO BRAND SPECIFIED) lancets Use with lanceting device. To accompany: Blood Glucose Monitor Brands: per insurance. 1 each 6     torsemide (DEMADEX) 20 MG tablet Take 1 tablet (20 mg) by mouth daily 30 tablet 4       ALLERGIES     Allergies   Allergen Reactions     No Known Allergies          Review of Systems:  Skin:  Negative     Eyes:  Positive for glasses  ENT:  Negative    Respiratory:  Positive for dyspnea on exertion;cough  Cardiovascular:  Negative for;palpitations;lightheadedness;dizziness;fatigue;chest pain Positive for;edema  Gastroenterology: Negative for melena;hematochezia  Genitourinary:  Positive for nocturia  Musculoskeletal:  Positive for joint pain  Neurologic:  Positive for numbness or tingling of feet  Psychiatric:  Negative    Heme/Lymph/Imm:  Negative    Endocrine:  Positive for diabetes    Physical Exam:  Vitals: /85 (BP Location: Right arm, Cuff Size: Adult Large)   Pulse 108   Ht 1.778 m (5' 10\")   Wt 124.7 kg (275 lb)   BMI 39.46 kg/m      Constitutional:  cooperative, alert and oriented, well developed, well nourished, in no acute distress        Skin:  warm and dry to the touch, no apparent skin lesions or masses noted        Head:  normocephalic, no masses or lesions        Eyes:  pupils equal and round;conjunctivae and lids unremarkable;sclera white        ENT:  not assessed this visit        Neck:  JVP normal;no carotid bruit hepatojugular " reflux      Chest:    wheezes      Cardiac:   irregularly irregular rhythm                Abdomen:  abdomen soft obese      Vascular: pulses full and equal                                      Extremities and Back:    stasis pigmentation;erythema R LE ulceration, ~dime sized 2022    Neurological:  no gross motor deficits            PAST MEDICAL HISTORY:  Past Medical History:   Diagnosis Date     Diabetes (H)      HTN      Hyperlipidemia        PAST SURGICAL HISTORY:  Past Surgical History:   Procedure Laterality Date     COLONOSCOPY N/A 2018    Procedure: COMBINED COLONOSCOPY, SINGLE OR MULTIPLE BIOPSY/POLYPECTOMY BY BIOPSY;;  Surgeon: Lawrence Mata MD;  Location:  GI       FAMILY HISTORY:  Family History   Problem Relation Age of Onset     Diabetes Mother         dx at 70     Bladder Cancer Brother        SOCIAL HISTORY:  Social History     Socioeconomic History     Marital status:      Spouse name: None     Number of children: None     Years of education: None     Highest education level: None   Tobacco Use     Smoking status: Former Smoker     Packs/day: 3.00     Years: 16.00     Pack years: 48.00     Types: Cigarettes     Start date:      Quit date: 1987     Years since quittin.8     Smokeless tobacco: Never Used   Substance and Sexual Activity     Alcohol use: No     Drug use: No     Sexual activity: Yes     Partners: Female   Other Topics Concern     Parent/sibling w/ CABG, MI or angioplasty before 65F 55M? Yes      Service No     Blood Transfusions No     Caffeine Concern No     Occupational Exposure No     Hobby Hazards No     Sleep Concern No     Stress Concern No     Weight Concern Yes     Special Diet No     Back Care No     Exercise Yes     Bike Helmet No     Comment: n/a     Seat Belt Yes     Self-Exams No

## 2022-07-01 ENCOUNTER — ALLIED HEALTH/NURSE VISIT (OUTPATIENT)
Dept: PULMONOLOGY | Facility: CLINIC | Age: 67
End: 2022-07-01
Payer: COMMERCIAL

## 2022-07-01 DIAGNOSIS — I26.99 PULMONARY EMBOLISM (H): ICD-10-CM

## 2022-07-01 PROCEDURE — 94729 DIFFUSING CAPACITY: CPT | Performed by: INTERNAL MEDICINE

## 2022-07-01 PROCEDURE — 94727 GAS DIL/WSHOT DETER LNG VOL: CPT | Performed by: INTERNAL MEDICINE

## 2022-07-01 PROCEDURE — 94060 EVALUATION OF WHEEZING: CPT | Performed by: INTERNAL MEDICINE

## 2022-07-02 LAB
DLCOUNC-%PRED-PRE: 52 %
DLCOUNC-PRE: 14.14 ML/MIN/MMHG
DLCOUNC-PRED: 26.77 ML/MIN/MMHG
ERV-%PRED-PRE: 138 %
ERV-PRE: 0.73 L
ERV-PRED: 0.53 L
EXPTIME-PRE: 7.58 SEC
FEF2575-%PRED-POST: 29 %
FEF2575-%PRED-PRE: 20 %
FEF2575-POST: 0.8 L/SEC
FEF2575-PRE: 0.55 L/SEC
FEF2575-PRED: 2.71 L/SEC
FEFMAX-%PRED-PRE: 52 %
FEFMAX-PRE: 4.67 L/SEC
FEFMAX-PRED: 8.82 L/SEC
FEV1-%PRED-PRE: 38 %
FEV1-PRE: 1.3 L
FEV1FEV6-PRE: 59 %
FEV1FEV6-PRED: 78 %
FEV1FVC-PRE: 60 %
FEV1FVC-PRED: 77 %
FEV1SVC-PRE: 59 %
FEV1SVC-PRED: 70 %
FIFMAX-PRE: 3.05 L/SEC
FRCPLETH-%PRED-PRE: 92 %
FRCPLETH-PRE: 3.37 L
FRCPLETH-PRED: 3.66 L
FVC-%PRED-PRE: 48 %
FVC-PRE: 2.17 L
FVC-PRED: 4.45 L
IC-%PRED-PRE: 33 %
IC-PRE: 1.47 L
IC-PRED: 4.34 L
RVPLETH-%PRED-PRE: 104 %
RVPLETH-PRE: 2.64 L
RVPLETH-PRED: 2.53 L
TLCPLETH-%PRED-PRE: 67 %
TLCPLETH-PRE: 4.84 L
TLCPLETH-PRED: 7.13 L
VA-%PRED-PRE: 54 %
VA-PRE: 3.55 L
VC-%PRED-PRE: 45 %
VC-PRE: 2.21 L
VC-PRED: 4.88 L

## 2022-07-05 DIAGNOSIS — R06.02 SOB (SHORTNESS OF BREATH): ICD-10-CM

## 2022-07-05 RX ORDER — TORSEMIDE 20 MG/1
20 TABLET ORAL DAILY
Qty: 30 TABLET | Refills: 4 | Status: SHIPPED | OUTPATIENT
Start: 2022-07-05 | End: 2022-10-14

## 2022-07-05 NOTE — TELEPHONE ENCOUNTER
[facility] Cardiology Refill Guideline reviewed.  Medication meets criteria for refill.   Danika Fontanez RN on 7/5/2022 at 3:43 PM

## 2022-07-08 ENCOUNTER — OFFICE VISIT (OUTPATIENT)
Dept: CARDIOLOGY | Facility: CLINIC | Age: 67
End: 2022-07-08
Payer: COMMERCIAL

## 2022-07-08 VITALS
HEIGHT: 70 IN | HEART RATE: 108 BPM | BODY MASS INDEX: 39.37 KG/M2 | WEIGHT: 275 LBS | DIASTOLIC BLOOD PRESSURE: 85 MMHG | SYSTOLIC BLOOD PRESSURE: 138 MMHG

## 2022-07-08 DIAGNOSIS — I48.19 PERSISTENT ATRIAL FIBRILLATION (H): Primary | ICD-10-CM

## 2022-07-08 PROCEDURE — 99214 OFFICE O/P EST MOD 30 MIN: CPT | Performed by: PHYSICIAN ASSISTANT

## 2022-07-08 PROCEDURE — 93000 ELECTROCARDIOGRAM COMPLETE: CPT | Performed by: PHYSICIAN ASSISTANT

## 2022-07-08 RX ORDER — DILTIAZEM HYDROCHLORIDE 360 MG/1
360 CAPSULE, EXTENDED RELEASE ORAL DAILY
Qty: 90 CAPSULE | Refills: 3 | Status: SHIPPED | OUTPATIENT
Start: 2022-07-08 | End: 2023-05-09

## 2022-07-08 NOTE — LETTER
7/8/2022    [facility]  [address] [address]    RE: Booker Brown       Dear Colleague,     I had the pleasure of seeing Booker Brown in the [facility].  [facility]    I had the pleasure of seeing Conner when he came for follow up of AFib and fluid overload.  This 67 year old sees Dr. Mejias for his history of:    1. Persistent AFib; nl EF - noted in setting of kidney stones 3/2021. S/p VIVEK/DCCV 5/14/2021 which restored SR. Back in AFib at time of follow-up appt 5/28 and rate control strategy rec'd as he declined rate control with sotalol as didn't feel any different.   2. HTN  3. HL   4. DM - A1c 7% 3/2022  5. CKD - Cr 1.4-1.8 in the past 12m  6. CHADSVASc 3 (HTN, DM, aortic plaquing)  On Xarelto  7. COVID infection - 2/2022  8. Asbestosis - CT PE noted 3/29/2022 was negative for PE but showed asbestos-related pleural disease with minimal adjacent fibrosis and mild pulmonary edema was noted       I saw Conner 5/13 at which time he was feeling quite a bit better after switching his furosemide to torsemide back on 5/6.  He was down 10# (275# at our visit 5/6). HRs had improved to the 90s since we started diuresing him.  He had much softer legs and did not have to stop while walking [place] the [place] that day. D/t bump in cr to 1.73, I had him continue torsemide 40 mg daily until 5/19, then decreased to 20 mg daily.    On 5/19, he contacted us noting that he was still feeling quite a bit better and had been back to laying in bed to sleep.    I asked him to see me with close follow-up and repeat blood work. D/t my schedule, this was postponed until today.    Interval History:  Feeling much better and doing well on the torsemide 20 mg daily, and really feels like this is the correct dose for him.  He has excellent urination right after he takes it, and this tapers off throughout the day, which he is pleased with.  He denies orthopnea or PND.   "Weight has been stable, and today he remains at 275#.  His edema remains improved, and his venous ulceration is nearly healed.  He does wrap his legs with Ace bandages to help with the swelling, which he thinks proves this more than compression stockings.      Checks his heart rates routinely, and they are typically 90-1 100s.  I told him that I was hopeful that his heart rate would come down with improvement in his fluid status, but it has not.  No awareness of his AFib and denies problems with palpitations, dizziness, lightheadedness.    His appointment with Dr. Hopkins in Pulmonology had to be canceled d/t Dr. Hopkins's schedule.  He was able to get his PFTs done, which showed severe airflow obstruction and restriction with moderate diffusion defect.  He is going to see Dr. Hopkins to review this 7/27/2022 which she is pleased about.    Denies bleeding issues on the Xarelto    VITALS:  Vitals: /85 (BP Location: Right arm, Cuff Size: Adult Large)   Pulse 108   Ht 1.778 m (5' 10\")   Wt 124.7 kg (275 lb)   BMI 39.46 kg/m      Diagnostic Testing:  EKG today, which I overread, showed AFib 104 bpm  Echocardiogram 2/2022  - LVEF 50-55%. Nl RV Mod NATHALY. 1+ MR with mod MAC. RVSP 28+RAP 1+TR. Mild aortic sclerosis.   VIVEK 5/14/2021 showed EF 60-65%. Nl LA size. 1+ TR, 1+ MR. No thrombus. Mild Ao sclerosis.  Mild atherosclerotic plaque in desc aorta  ZioPatch 3/30-4/8/2021 on atenolol 100 with AFib avg  bpm (range  bpm). 2 runs of NSVT, longest 9 beats  Component      Latest Ref Rng & Units 2/27/2022 5/3/2022 5/25/2022   N-Terminal Pro BNP Inpatient      0 - 900 pg/mL 7,959 (H)     N-Terminal Pro Bnp      0 - 900 pg/mL  3,153 (H) 2,095 (H)     Component      Latest Ref Rng & Units 3/29/2022 5/3/2022 5/13/2022 5/25/2022   Sodium      133 - 144 mmol/L 137 141 138 140   Potassium      3.4 - 5.3 mmol/L 4.0 4.5 3.9 4.0   Chloride      94 - 109 mmol/L 107 106 102 106   Carbon Dioxide      20 - 32 mmol/L 24 24 29 26 " "  Anion Gap      3 - 14 mmol/L 6 11 7 8   Urea Nitrogen      7 - 30 mg/dL 24 33 (H) 43 (H) 33 (H)   Creatinine      0.52 - 1.25 mg/dL 1.44 (H) 1.60 (H) 1.73 (H) 1.57 (H)   Calcium      8.5 - 10.1 mg/dL 9.3 9.6 9.5 9.3   Glucose      70 - 99 mg/dL 166 (H) 177 (H) 129 (H) 111 (H)     Component      Latest Ref Rng & Units 3/29/2022 5/13/2022   WBC      4.0 - 11.0 10e3/uL 11.9 (H) 10.0   RBC Count      3.80 - 5.90 10e6/uL 4.89 5.27   Hemoglobin      11.7 - 17.7 g/dL 13.2 13.5   Hematocrit      35.0 - 53.0 % 43.4 44.9   MCV      78 - 100 fL 89 85   MCH      26.5 - 33.0 pg 27.0 25.6 (L)   MCHC      31.5 - 36.5 g/dL 30.4 (L) 30.1 (L)   RDW      10.0 - 15.0 % 15.4 (H) 16.1 (H)   Platelet Count      150 - 450 10e3/uL 278 311       Plan:  Increase Diltiazem to 360 mg daily to improve blood pressure  24-hour Holter monitor in 1 month-we will contact with results  See Dr. Mejias in follow-up    Assessment/Plan:    1. HFpEF    Likely d/t rapid AFib in setting of respiratory illness    Echo 2/2022 with LVEF 50-55 percentage RVSP 28+ RAP.  CT chest PE 3/2022 showed small pleural effusion    After starting torsemide 40 mg 5/7/2022, he noted 10# weight loss in the first week, was breathing easier and edema was much better.    Since decreasing his torsemide to 20 mg 5/19/2022 based on renal function, thinks breathing has not changed.  His edema continues to improve.  He wears his leg wraps, which helps this, as does elevation.    On exam, still with LE edema.  No crackles in lungs, but he did have an inspiratory wheeze that cleared with cough.    Blood work on decreased dose as above shows improved renal function and stable electrolytes.    PLAN:    Continue torsemide 20 mg daily    2. Persistent AFib    Dr. Mejias rec'd attempts at rhythm control last year, but Conner declined as he \"felt fine.\" Therefore, we've focused on rate control    He was hospitalized 2/2022 and SOB and noted to be in AFib/RVR. Diltiazem added to his metoprolol "  mg BID. He was then found to be COVID+ (initially had tested negative).     Note heart rate got slightly better with diuresis, but is still too fast despite Diltiazem 240 metoprolol  mg daily BID    PLAN:    Increase Diltiazem to 360 mg daily-cautioned to watch for increase in LE edema    Could consider digoxin if needed, but he is already on a high dose of metoprolol XL and has known restrictive lung disease 24-hour Holter monitor in ~ 1 m.  We will contact with results    We will see Dr. Mejias in follow-up to ensure rate control strategy is continuing to work    Krista Fallon PA-C, MSPAS      Orders Placed This Encounter   Procedures     Follow-Up with Cardiology EP     EKG 12-lead complete w/read - Clinics (performed today)     Holter Monitor 24 hour Adult Pediatric     Orders Placed This Encounter   Medications     diltiazem ER COATED BEADS (CARDIZEM CD) 360 MG 24 hr capsule     Sig: Take 1 capsule (360 mg) by mouth daily     Dispense:  90 capsule     Refill:  3     Note dose increase     Medications Discontinued During This Encounter   Medication Reason     diltiazem ER COATED BEADS (CARDIZEM CD/CARTIA XT) 240 MG 24 hr capsule          Encounter Diagnosis   Name Primary?     Persistent atrial fibrillation (H) Yes       CURRENT MEDICATIONS:  Current Outpatient Medications   Medication Sig Dispense Refill     alcohol swab prep pads Use to swab area of injection/fariha as directed. 100 each 3     atorvastatin (LIPITOR) 10 MG tablet Take 1 tablet (10 mg) by mouth daily 90 tablet 3     blood glucose (NO BRAND SPECIFIED) test strip Use to test blood sugar 1 times daily or as directed. To accompany: Blood Glucose Monitor Brands: per insurance. 100 strip 6     blood glucose calibration (NO BRAND SPECIFIED) solution To accompany: Blood Glucose Monitor Brands: per insurance. 1 Bottle 3     blood glucose monitoring (NO BRAND SPECIFIED) meter device kit Use to test blood sugar 1 times daily or as directed. : per  "insurance. 1 kit 0     diltiazem ER COATED BEADS (CARDIZEM CD) 360 MG 24 hr capsule Take 1 capsule (360 mg) by mouth daily 90 capsule 3     glipiZIDE (GLUCOTROL XL) 10 MG 24 hr tablet Take 1 tablet (10 mg) by mouth daily Please make an appointment for further refills 90 tablet 1     lisinopril (ZESTRIL) 40 MG tablet Take 1 tablet (40 mg) by mouth daily 90 tablet 3     metFORMIN (GLUCOPHAGE-XR) 500 MG 24 hr tablet Take 2 tablets (1,000 mg) by mouth daily (with dinner) 180 tablet 3     metoprolol succinate ER (TOPROL-XL) 100 MG 24 hr tablet Take 1 tablet (100 mg) by mouth 2 times daily 180 tablet 3     rivaroxaban ANTICOAGULANT (XARELTO ANTICOAGULANT) 20 MG TABS tablet Take 1 tablet (20 mg) by mouth daily (with dinner) 90 tablet 3     thin (NO BRAND SPECIFIED) lancets Use with lanceting device. To accompany: Blood Glucose Monitor Brands: per insurance. 1 each 6     torsemide (DEMADEX) 20 MG tablet Take 1 tablet (20 mg) by mouth daily 30 tablet 4       ALLERGIES     Allergies   Allergen Reactions     No Known Allergies          Review of Systems:  Skin:  Negative     Eyes:  Positive for glasses  ENT:  Negative    Respiratory:  Positive for dyspnea on exertion;cough  Cardiovascular:  Negative for;palpitations;lightheadedness;dizziness;fatigue;chest pain Positive for;edema  Gastroenterology: Negative for melena;hematochezia  Genitourinary:  Positive for nocturia  Musculoskeletal:  Positive for joint pain  Neurologic:  Positive for numbness or tingling of feet  Psychiatric:  Negative    Heme/Lymph/Imm:  Negative    Endocrine:  Positive for diabetes    Physical Exam:  Vitals: /85 (BP Location: Right arm, Cuff Size: Adult Large)   Pulse 108   Ht 1.778 m (5' 10\")   Wt 124.7 kg (275 lb)   BMI 39.46 kg/m      Constitutional:  cooperative, alert and oriented, well developed, well nourished, in no acute distress        Skin:  warm and dry to the touch, no apparent skin lesions or masses noted        Head:  " normocephalic, no masses or lesions        Eyes:  pupils equal and round;conjunctivae and lids unremarkable;sclera white        ENT:  not assessed this visit        Neck:  JVP normal;no carotid bruit hepatojugular reflux      Chest:    wheezes      Cardiac:   irregularly irregular rhythm                Abdomen:  abdomen soft obese      Vascular: pulses full and equal                                      Extremities and Back:    stasis pigmentation;erythema R LE ulceration, ~dime sized 2022    Neurological:  no gross motor deficits            PAST MEDICAL HISTORY:  Past Medical History:   Diagnosis Date     Diabetes (H)      HTN      Hyperlipidemia        PAST SURGICAL HISTORY:  Past Surgical History:   Procedure Laterality Date     COLONOSCOPY N/A 2018    Procedure: COMBINED COLONOSCOPY, SINGLE OR MULTIPLE BIOPSY/POLYPECTOMY BY BIOPSY;;  Surgeon: Lawrence Mata MD;  Location:  GI       FAMILY HISTORY:  Family History   Problem Relation Age of Onset     Diabetes Mother         dx at 70     Bladder Cancer Brother        SOCIAL HISTORY:  Social History     Socioeconomic History     Marital status:      Spouse name: None     Number of children: None     Years of education: None     Highest education level: None   Tobacco Use     Smoking status: Former Smoker     Packs/day: 3.00     Years: 16.00     Pack years: 48.00     Types: Cigarettes     Start date:      Quit date: 1987     Years since quittin.8     Smokeless tobacco: Never Used   Substance and Sexual Activity     Alcohol use: No     Drug use: No     Sexual activity: Yes     Partners: Female   Other Topics Concern     Parent/sibling w/ CABG, MI or angioplasty before 65F 55M? Yes      Service No     Blood Transfusions No     Caffeine Concern No     Occupational Exposure No     Hobby Hazards No     Sleep Concern No     Stress Concern No     Weight Concern Yes     Special Diet No     Back Care No     Exercise Yes     Bike Helmet  No     Comment: n/a     Seat Belt Yes     Self-Exams No           Thank you for allowing me to participate in the care of your patient.      Sincerely,     Ping Fallon PA-C     [facility]  cc:   Referred Self,

## 2022-07-08 NOTE — PATIENT INSTRUCTIONS
Conner - it was great to see you today!    Reviewed breathing really is better on the torsemide  PFTs showed severe restriction and obstruciton - glad you're seeing DR. Hopkins!  Reviewed that fluid wise, you thiknk you're doing well  4.   EKG showed AFib ... still too fast!     PLAN:  INCREASE Diltiazem 240 to 360 mg daily to help HR come down. Watch for swelling!  Continue the torsemide 20 mg daily  Keep weighing daily  Holter 24 hours in 1 month ... will contact with results  See Dr. Mejias in 2-3 months  [phone]

## 2022-07-19 ENCOUNTER — OFFICE VISIT (OUTPATIENT)
Dept: INTERNAL MEDICINE | Facility: CLINIC | Age: 67
End: 2022-07-19
Payer: COMMERCIAL

## 2022-07-19 VITALS
OXYGEN SATURATION: 95 % | HEART RATE: 115 BPM | SYSTOLIC BLOOD PRESSURE: 134 MMHG | RESPIRATION RATE: 16 BRPM | DIASTOLIC BLOOD PRESSURE: 80 MMHG | BODY MASS INDEX: 37.78 KG/M2 | WEIGHT: 263.3 LBS | TEMPERATURE: 98.3 F

## 2022-07-19 DIAGNOSIS — R60.0 PERIPHERAL EDEMA: Primary | ICD-10-CM

## 2022-07-19 PROCEDURE — 99213 OFFICE O/P EST LOW 20 MIN: CPT | Performed by: PHYSICIAN ASSISTANT

## 2022-07-19 NOTE — PROGRESS NOTES
"  Assessment & Plan     Peripheral edema  Reviewed concerns and edema is improving with change in diuretic done by cardiology         I spent a total of 20 minutes on the day of the visit.   Time spent doing chart review, history and exam, documentation and further activities per the note       BMI:   Estimated body mass index is 37.78 kg/m  as calculated from the following:    Height as of 7/8/22: 1.778 m (5' 10\").    Weight as of this encounter: 119.4 kg (263 lb 4.8 oz).   Weight management plan: Patient was referred to their PCP to discuss a diet and exercise plan.    Reviewed elevated legs, wraps  Good moisturizing of skin    Return in about 4 weeks (around 8/16/2022) for recheck if not improving, regular primary provider.    Francie Levine PA-C  [facility]    Nesha Prieto is a 67 year old, presenting for the following health issues:  Musculoskeletal Problem      History of Present Illness       Reason for visit:  Knots on the back of my legs  Symptom onset:  3-7 days ago  Symptoms include:  Lumps  Symptom intensity:  Mild  Symptom progression:  Improving  Had these symptoms before:  No  What makes it worse:  No  What makes it better:  No    He eats 2-3 servings of fruits and vegetables daily.He consumes 1 sweetened beverage(s) daily.He exercises with enough effort to increase his heart rate 60 or more minutes per day.  He exercises with enough effort to increase his heart rate 5 days per week.   He is taking medications regularly.             Review of Systems   Constitutional, HEENT, cardiovascular, pulmonary, gi and gu systems are negative, except as otherwise noted.      Objective    /80   Pulse 115   Temp 98.3  F (36.8  C) (Tympanic)   Resp 16   Wt 119.4 kg (263 lb 4.8 oz)   SpO2 95%   BMI 37.78 kg/m    Body mass index is 37.78 kg/m .  Physical Exam   GENERAL: healthy, alert and no distress  RESP: lungs clear to auscultation - no rales, rhonchi or " wheezes  CV: regular rates and rhythm, normal S1 S2, no S3 or S4, no murmur, click or rub, peripheral pulses strong and 1-- 2 + bilateral lower extremity pitting edema to tibial turberosity area   No masses noted     SKIN: some excoriation, slight redness no warmth bilaterally   With chronic venous status changes of the legs as well                     .  ..

## 2022-07-26 NOTE — PROGRESS NOTES
Pulmonary Clinic Note    Date of Service: 7/27/2022    Chief Complaint   Patient presents with     New Patient     Abnormal CT scan of lung        A/P:  67M former smoker being seen for abnormal CT and RODRIGUEZ.     His CT shows e/o asbestos related pleural disease and minimal peripheral fibrosis. 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 This may be a progressive disease, but there are no specific treatments for this. Prior to attributing this all to asbestos, we should ensure his fibrosis is not related to other underlying systemic disease.     His PFTs show a mixed severe obstructive and restrictive pattern. I think this is multifactorial; asthma/smoking-related, fibrosis, obesity, cardiac disease, all of which may cause dyspnea on exertion. Again, we will search for other underlying systemic disease to see if we identify other etiologies.     - send ILD panel  - start ICS-LABA (Advair) and prn albuterol  - repeat CT chest in 6 months    RTC 6mo    History:  67M HTN, HLD, DM2, Afib (on rivaroxaban) being seen for abnormal CT. Pt had COVID 2/2022, received paxlovid and empiric azthromycin. Pt seen by medicine ~1mo later and had acute onset CP, CTPE done. CT did not show PE, but did show asbestos related pleural disease w/ minimal fibrosis.     He has been feeling SOB for ~1 year, has improved. No SOB at rest. RODRIGUEZ w/ walking and strenuous activity. Improves with rest. No CP, palpitations. No cough. Sleeps in a recliner, has been doing so for some time, has not changed recently. No PND. Improving LE edema. Hears wheezing when laying on his side, not with activity.     No personal hx of lung dz.        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    /79 (BP Location: Left arm, Patient Position: Sitting, Cuff Size: Adult Regular)   Pulse 97   Ht 1.778 m (5' 10\")   Wt 119.3 kg (263 lb)   SpO2 96%   BMI 37.74 kg/m    Gen: well-appearing  HEENT: anicteric  Card: RRR  Pulm: clear bilaterally   Abd: soft  MSK: no edema  Skin: no obvious rash  Psych: normal affect  Neuro: alert and oriented     Labs:  Personally reviewed    Imaging/Studies: Personally reviewed  PFTs (2022) - severe obstruction and restriction, e/o air-trapping, moderate diffusion defect  CTPE (3/2022) - no PE, asbestos related pleural dz w/ minimal adjacent fibrosis, mild GGOs b/l    TTE (2022) - EF 50-55%, mild LVH, moderate biatrial enlargement    Past Medical History:   Diagnosis Date     Diabetes (H)      HTN      Hyperlipidemia      Past Surgical History:   Procedure Laterality Date     COLONOSCOPY N/A 2018    Procedure: COMBINED COLONOSCOPY, SINGLE OR MULTIPLE BIOPSY/POLYPECTOMY BY BIOPSY;;  Surgeon: Lawrence Mata MD;  Location:  GI     Family History   Problem Relation Age of Onset     Diabetes Mother         dx at 70     Bladder Cancer Brother      Social History     Socioeconomic History     Marital status:      Spouse name: Not on file     Number of children: Not on file     Years of education: Not on file     Highest education level: Not on file   Occupational History     Not on file   Tobacco Use     Smoking status: Former Smoker     Packs/day: 3.00     Years: 16.00     Pack years: 48.00     Types: Cigarettes     Start date:      Quit date: 1987     Years since quittin.8     Smokeless tobacco: Never Used   Substance and Sexual Activity     Alcohol use: No     Drug use: No     " Sexual activity: Yes     Partners: Female   Other Topics Concern     Parent/sibling w/ CABG, MI or angioplasty before 65F 55M? Yes      Service No     Blood Transfusions No     Caffeine Concern No     Occupational Exposure No     Hobby Hazards No     Sleep Concern No     Stress Concern No     Weight Concern Yes     Special Diet No     Back Care No     Exercise Yes     Bike Helmet No     Comment: n/a     Seat Belt Yes     Self-Exams No   Social History Narrative     Not on file     Social Determinants of Health     Financial Resource Strain: Not on file   Food Insecurity: Not on file   Transportation Needs: Not on file   Physical Activity: Not on file   Stress: Not on file   Social Connections: Not on file   Intimate Partner Violence: Not on file   Housing Stability: Not on file       50 minutes spent reviewing chart, reviewing test results, talking with and examining patient, formulating plan, and documentation on the day of the encounter.    Booker Hopkins MD  Pulmonary and Critical Care Medicine  [facility]

## 2022-07-27 ENCOUNTER — OFFICE VISIT (OUTPATIENT)
Dept: PULMONOLOGY | Facility: CLINIC | Age: 67
End: 2022-07-27
Attending: INTERNAL MEDICINE
Payer: COMMERCIAL

## 2022-07-27 VITALS
HEIGHT: 70 IN | OXYGEN SATURATION: 96 % | HEART RATE: 97 BPM | BODY MASS INDEX: 37.65 KG/M2 | SYSTOLIC BLOOD PRESSURE: 128 MMHG | WEIGHT: 263 LBS | DIASTOLIC BLOOD PRESSURE: 79 MMHG

## 2022-07-27 DIAGNOSIS — R91.8 ABNORMAL CT SCAN OF LUNG: Primary | ICD-10-CM

## 2022-07-27 PROCEDURE — 99204 OFFICE O/P NEW MOD 45 MIN: CPT | Performed by: STUDENT IN AN ORGANIZED HEALTH CARE EDUCATION/TRAINING PROGRAM

## 2022-07-27 RX ORDER — FLUTICASONE PROPIONATE AND SALMETEROL XINAFOATE 230; 21 UG/1; UG/1
2 AEROSOL, METERED RESPIRATORY (INHALATION) 2 TIMES DAILY
Qty: 12 G | Refills: 3 | Status: SHIPPED | OUTPATIENT
Start: 2022-07-27 | End: 2022-11-16

## 2022-07-27 RX ORDER — ALBUTEROL SULFATE 90 UG/1
2 AEROSOL, METERED RESPIRATORY (INHALATION) EVERY 6 HOURS PRN
Qty: 18 G | Refills: 3 | Status: SHIPPED | OUTPATIENT
Start: 2022-07-27 | End: 2022-11-23

## 2022-07-27 NOTE — NURSING NOTE
"Chief Complaint   Patient presents with     New Patient     Abnormal CT scan of lung        Vitals:    07/27/22 0747   BP: 128/79   BP Location: Left arm   Patient Position: Sitting   Cuff Size: Adult Regular   Pulse: 97   SpO2: 96%   Weight: 119.3 kg (263 lb)   Height: 1.778 m (5' 10\")       Body mass index is 37.74 kg/m .      PATI Lucas    "

## 2022-07-27 NOTE — LETTER
7/27/2022         RE: Booker Brown  [address]  [facility] [address]        Dear Colleague,    Thank you for referring your patient, Booker Brown, to the [facility] [place]. Please see a copy of my visit note below.    Pulmonary Clinic Note    Date of Service: 7/27/2022    Chief Complaint   Patient presents with     New Patient     Abnormal CT scan of lung        A/P:  67M former smoker being seen for abnormal CT and RODRIGUEZ.     His CT shows e/o asbestos related pleural disease and minimal peripheral fibrosis. 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 This may be a progressive disease, but there are no specific treatments for this. Prior to attributing this all to asbestos, we should ensure his fibrosis is not related to other underlying systemic disease.     His PFTs show a mixed severe obstructive and restrictive pattern. I think this is multifactorial; asthma/smoking-related, fibrosis, obesity, cardiac disease, all of which may cause dyspnea on exertion. Again, we will search for other underlying systemic disease to see if we identify other etiologies.     - send ILD panel  - start ICS-LABA (Advair) and prn albuterol  - repeat CT chest in 6 months    RTC 6mo    History:  67M HTN, HLD, DM2, Afib (on rivaroxaban) being seen for abnormal CT. Pt had COVID 2/2022, received paxlovid and empiric azthromycin. Pt seen by medicine ~1mo later and had acute onset CP, CTPE done. CT did not show PE, but did show asbestos related pleural disease w/ minimal fibrosis.     He has been feeling SOB for ~1 year, has improved. No SOB at rest. RODRIGUEZ w/ walking and strenuous activity. Improves with rest. No CP, palpitations. No cough. Sleeps in a recliner, has been doing so for some time, has not changed recently. No PND. Improving LE edema. Hears wheezing when laying on his side,  "not with activity.     No personal hx of lung dz.        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    /79 (BP Location: Left arm, Patient Position: Sitting, Cuff Size: Adult Regular)   Pulse 97   Ht 1.778 m (5' 10\")   Wt 119.3 kg (263 lb)   SpO2 96%   BMI 37.74 kg/m    Gen: well-appearing  HEENT: anicteric  Card: RRR  Pulm: clear bilaterally   Abd: soft  MSK: no edema  Skin: no obvious rash  Psych: normal affect  Neuro: alert and oriented     Labs:  Personally reviewed    Imaging/Studies: Personally reviewed  PFTs (7/2022) - severe obstruction and restriction, e/o air-trapping, moderate diffusion defect  CTPE (3/2022) - no PE, asbestos related pleural dz w/ minimal adjacent fibrosis, mild GGOs b/l    TTE (2/2022) - EF 50-55%, mild LVH, moderate biatrial enlargement    Past Medical History:   Diagnosis Date     Diabetes (H)      HTN      Hyperlipidemia      Past Surgical History:   Procedure Laterality Date     COLONOSCOPY N/A 8/24/2018    Procedure: COMBINED COLONOSCOPY, SINGLE OR MULTIPLE BIOPSY/POLYPECTOMY BY BIOPSY;;  Surgeon: Lawrence Mata MD;  Location:  GI     Family History   Problem Relation Age of Onset     Diabetes Mother         dx at 70     Bladder Cancer Brother      Social History     Socioeconomic History     Marital status:      Spouse name: Not on file     Number of children: Not on file     Years of education: Not on file     Highest education level: Not on file   Occupational History     Not on file   Tobacco Use     Smoking status: Former Smoker     " Packs/day: 3.00     Years: 16.00     Pack years: 48.00     Types: Cigarettes     Start date:      Quit date: 1987     Years since quittin.8     Smokeless tobacco: Never Used   Substance and Sexual Activity     Alcohol use: No     Drug use: No     Sexual activity: Yes     Partners: Female   Other Topics Concern     Parent/sibling w/ CABG, MI or angioplasty before 65F 55M? Yes      Service No     Blood Transfusions No     Caffeine Concern No     Occupational Exposure No     Hobby Hazards No     Sleep Concern No     Stress Concern No     Weight Concern Yes     Special Diet No     Back Care No     Exercise Yes     Bike Helmet No     Comment: n/a     Seat Belt Yes     Self-Exams No   Social History Narrative     Not on file     Social Determinants of Health     Financial Resource Strain: Not on file   Food Insecurity: Not on file   Transportation Needs: Not on file   Physical Activity: Not on file   Stress: Not on file   Social Connections: Not on file   Intimate Partner Violence: Not on file   Housing Stability: Not on file       50 minutes spent reviewing chart, reviewing test results, talking with and examining patient, formulating plan, and documentation on the day of the encounter.    Booker Hopkins MD  Pulmonary and Critical Care Medicine  [facility]

## 2022-07-27 NOTE — PATIENT INSTRUCTIONS
Thank you for coming to pulmonary clinic. Your CT scan shows some evidence of asbestos-related disease, at times this may progress. I want to send some labs to ensure that we are not missing any other underlying disease. I would also like to start you on an inhaler, Advair, which you will use twice daily regardless of how you feel, rinse your mouth out after. You may also use albuterol as needed for worsening shortness of breath. I will see you back in 6 months.

## 2022-08-10 ENCOUNTER — HOSPITAL ENCOUNTER (OUTPATIENT)
Dept: CARDIOLOGY | Facility: CLINIC | Age: 67
Discharge: HOME | End: 2022-08-10
Attending: PHYSICIAN ASSISTANT | Admitting: PHYSICIAN ASSISTANT
Payer: COMMERCIAL

## 2022-08-10 DIAGNOSIS — I48.19 PERSISTENT ATRIAL FIBRILLATION (H): ICD-10-CM

## 2022-08-10 PROCEDURE — 93227 XTRNL ECG REC<48 HR R&I: CPT | Performed by: INTERNAL MEDICINE

## 2022-08-10 PROCEDURE — 93226 XTRNL ECG REC<48 HR SCAN A/R: CPT

## 2022-08-22 ENCOUNTER — OFFICE VISIT (OUTPATIENT)
Dept: PEDIATRICS | Facility: CLINIC | Age: 67
End: 2022-08-22
Payer: COMMERCIAL

## 2022-08-22 VITALS
BODY MASS INDEX: 38.37 KG/M2 | SYSTOLIC BLOOD PRESSURE: 126 MMHG | HEIGHT: 70 IN | TEMPERATURE: 97.8 F | HEART RATE: 90 BPM | WEIGHT: 268 LBS | RESPIRATION RATE: 16 BRPM | DIASTOLIC BLOOD PRESSURE: 74 MMHG | OXYGEN SATURATION: 96 %

## 2022-08-22 DIAGNOSIS — Z23 NEED FOR PNEUMOCOCCAL VACCINATION: ICD-10-CM

## 2022-08-22 DIAGNOSIS — R60.0 LOWER EXTREMITY EDEMA: Primary | ICD-10-CM

## 2022-08-22 PROCEDURE — 99213 OFFICE O/P EST LOW 20 MIN: CPT | Mod: 25 | Performed by: INTERNAL MEDICINE

## 2022-08-22 PROCEDURE — 90677 PCV20 VACCINE IM: CPT | Performed by: INTERNAL MEDICINE

## 2022-08-22 PROCEDURE — 90471 IMMUNIZATION ADMIN: CPT | Performed by: INTERNAL MEDICINE

## 2022-08-22 ASSESSMENT — PAIN SCALES - GENERAL: PAINLEVEL: NO PAIN (0)

## 2022-08-22 NOTE — PROGRESS NOTES
ASSESSMENT:      ICD-10-CM    1. Lower extremity edema  R60.0    2. Need for pneumococcal vaccination  Z23 Pneumococcal 20 Valent Conjugate (PCV20)     Lower extremity edema.   Likely a component of lymphedema associated with morbid obesity. Recommended elevating legs when possible.  Patient is able to complete leg wrappings with Ace wraps at home daily/ states this has helped significantly with his edema.  No changes to diuretic regimen today.  Encouraged to limit sodium intake.  Continues to work on weight loss with some success     Subjective   Conner is a 67 year old, presenting for the following health issues:      History of Present Illness       Reason for visit:  Blisters on legs    He eats 2-3 servings of fruits and vegetables daily.He consumes 1 sweetened beverage(s) daily.He exercises with enough effort to increase his heart rate 30 to 60 minutes per day.  He exercises with enough effort to increase his heart rate 5 days per week.   He is taking medications regularly.     67-year-old with a history of type 2 diabetes, CKD, morbid obesity and A. fib presents for evaluation of recent blisters on his lower legs.  Has had ongoing difficulty with lower extremity edema and notes that the swelling worsened recently to the point where he developed a few small blisters.  He scheduled a visit regarding the blisters on his lower legs which have since resolved.  Current diuretic regimen: Torsemide 20 mg daily.  Does try to elevate legs and wraps his legs with Ace wraps most days per week.    Echocardiogram 2/22:  The visual ejection fraction is 50-55%.  The left ventricle is normal in size.  There is mild concentric left ventricular hypertrophy.  There is moderate biatrial enlargement.  There is mild (1+) mitral regurgitation. 5/14/2021    Patient Active Problem List   Diagnosis     erectile dysfunction     Hyperlipidemia LDL goal <70     GERD (gastroesophageal reflux disease)     Essential hypertension     Advanced  directives, counseling/discussion     Type 2 diabetes with circulatory disorder causing erectile dysfunction (H)     Type 2 diabetes mellitus with hyperglycemia (H)     Chronic kidney disease, stage 3 (H)     Persistent atrial fibrillation (H)     Morbid obesity (H)     Atrial fibrillation with RVR (H)     Current Outpatient Medications   Medication Sig Dispense Refill     albuterol (PROAIR HFA/PROVENTIL HFA/VENTOLIN HFA) 108 (90 Base) MCG/ACT inhaler Inhale 2 puffs into the lungs every 6 hours as needed for shortness of breath / dyspnea or wheezing 18 g 3     alcohol swab prep pads Use to swab area of injection/fariha as directed. 100 each 3     atorvastatin (LIPITOR) 10 MG tablet Take 1 tablet (10 mg) by mouth daily 90 tablet 3     blood glucose (NO BRAND SPECIFIED) test strip Use to test blood sugar 1 times daily or as directed. To accompany: Blood Glucose Monitor Brands: per insurance. 100 strip 6     blood glucose calibration (NO BRAND SPECIFIED) solution To accompany: Blood Glucose Monitor Brands: per insurance. 1 Bottle 3     blood glucose monitoring (NO BRAND SPECIFIED) meter device kit Use to test blood sugar 1 times daily or as directed. : per insurance. 1 kit 0     diltiazem ER COATED BEADS (CARDIZEM CD) 360 MG 24 hr capsule Take 1 capsule (360 mg) by mouth daily 90 capsule 3     fluticasone-salmeterol (ADVAIR-HFA) 230-21 MCG/ACT inhaler Inhale 2 puffs into the lungs 2 times daily 12 g 3     glipiZIDE (GLUCOTROL XL) 10 MG 24 hr tablet Take 1 tablet (10 mg) by mouth daily Please make an appointment for further refills 90 tablet 1     lisinopril (ZESTRIL) 40 MG tablet Take 1 tablet (40 mg) by mouth daily 90 tablet 3     metFORMIN (GLUCOPHAGE-XR) 500 MG 24 hr tablet Take 2 tablets (1,000 mg) by mouth daily (with dinner) 180 tablet 3     metoprolol succinate ER (TOPROL-XL) 100 MG 24 hr tablet Take 1 tablet (100 mg) by mouth 2 times daily 180 tablet 3     rivaroxaban ANTICOAGULANT (XARELTO ANTICOAGULANT) 20 MG  "TABS tablet Take 1 tablet (20 mg) by mouth daily (with dinner) 90 tablet 3     thin (NO BRAND SPECIFIED) lancets Use with lanceting device. To accompany: Blood Glucose Monitor Brands: per insurance. 1 each 6     torsemide (DEMADEX) 20 MG tablet Take 1 tablet (20 mg) by mouth daily 30 tablet 4            Objective    /74 (Cuff Size: Adult Large)   Pulse 90   Temp 97.8  F (36.6  C) (Tympanic)   Resp 16   Ht 1.778 m (5' 10\")   Wt 121.6 kg (268 lb)   SpO2 96%   BMI 38.45 kg/m    Body mass index is 38.45 kg/m .  Physical Exam   GENERAL: healthy, alert and no distress  MS: Bilateral lower extremity edema 1-2+ with stasis changes without vesicles/bullae or ulcerations.  PSYCH: mentation appears normal, affect normal/bright    "

## 2022-08-22 NOTE — PATIENT INSTRUCTIONS
Elevate legs as much as possible  Continue wrappings during day when out of bed  Continue current diuretic regimen   Good job losing weight!

## 2022-08-23 DIAGNOSIS — I48.19 PERSISTENT ATRIAL FIBRILLATION (H): ICD-10-CM

## 2022-08-23 RX ORDER — METOPROLOL SUCCINATE 100 MG/1
100 TABLET, EXTENDED RELEASE ORAL 2 TIMES DAILY
Qty: 180 TABLET | Refills: 3 | Status: SHIPPED | OUTPATIENT
Start: 2022-08-23 | End: 2023-08-24

## 2022-09-20 DIAGNOSIS — N52.1 TYPE 2 DIABETES WITH CIRCULATORY DISORDER CAUSING ERECTILE DYSFUNCTION (H): ICD-10-CM

## 2022-09-20 DIAGNOSIS — E11.59 TYPE 2 DIABETES WITH CIRCULATORY DISORDER CAUSING ERECTILE DYSFUNCTION (H): ICD-10-CM

## 2022-09-22 NOTE — TELEPHONE ENCOUNTER
Routing refill request to provider for review/approval because:   Patient has a recent creatinine (normal) within the past 12 mos.    Recent (6 mo) or future (30 days) visit within the authorizing provider's specialty     Last Written Prescription Date:  3/22/22  Last Fill Quantity: 90,  # refills: 1   Last office visit: 3/17/2022 was a COVID visit, last visit  2/28/20 with Katelyn  Future Office Visit:      Scheduling: please reach out to patient for annual     JOHANNA Vigil RN  [facility]

## 2022-09-23 RX ORDER — GLIPIZIDE 10 MG/1
10 TABLET, FILM COATED, EXTENDED RELEASE ORAL DAILY
Qty: 90 TABLET | Refills: 0 | Status: SHIPPED | OUTPATIENT
Start: 2022-09-23 | End: 2022-10-07

## 2022-09-26 ENCOUNTER — MYC REFILL (OUTPATIENT)
Dept: INTERNAL MEDICINE | Facility: CLINIC | Age: 67
End: 2022-09-26

## 2022-09-26 DIAGNOSIS — E11.59 TYPE 2 DIABETES WITH CIRCULATORY DISORDER CAUSING ERECTILE DYSFUNCTION (H): ICD-10-CM

## 2022-09-26 DIAGNOSIS — N52.1 TYPE 2 DIABETES WITH CIRCULATORY DISORDER CAUSING ERECTILE DYSFUNCTION (H): ICD-10-CM

## 2022-09-28 ENCOUNTER — MYC REFILL (OUTPATIENT)
Dept: INTERNAL MEDICINE | Facility: CLINIC | Age: 67
End: 2022-09-28

## 2022-09-28 DIAGNOSIS — E11.59 TYPE 2 DIABETES WITH CIRCULATORY DISORDER CAUSING ERECTILE DYSFUNCTION (H): ICD-10-CM

## 2022-09-28 DIAGNOSIS — I48.0 PAROXYSMAL ATRIAL FIBRILLATION (H): ICD-10-CM

## 2022-09-28 DIAGNOSIS — N52.1 TYPE 2 DIABETES WITH CIRCULATORY DISORDER CAUSING ERECTILE DYSFUNCTION (H): ICD-10-CM

## 2022-09-28 RX ORDER — GLIPIZIDE 10 MG/1
10 TABLET, FILM COATED, EXTENDED RELEASE ORAL DAILY
Qty: 90 TABLET | Refills: 0 | OUTPATIENT
Start: 2022-09-28

## 2022-09-28 NOTE — TELEPHONE ENCOUNTER
This refill is a duplicate of something already sent to the pharmacy or another refill already in process.  Ludin Sierra RN  [facility]

## 2022-09-29 RX ORDER — GLIPIZIDE 10 MG/1
10 TABLET, FILM COATED, EXTENDED RELEASE ORAL DAILY
Qty: 90 TABLET | Refills: 0 | OUTPATIENT
Start: 2022-09-29

## 2022-10-05 ENCOUNTER — TELEPHONE (OUTPATIENT)
Dept: PULMONOLOGY | Facility: CLINIC | Age: 67
End: 2022-10-05

## 2022-10-06 ENCOUNTER — MYC MEDICAL ADVICE (OUTPATIENT)
Dept: PEDIATRICS | Facility: CLINIC | Age: 67
End: 2022-10-06

## 2022-10-11 ENCOUNTER — OFFICE VISIT (OUTPATIENT)
Dept: CARDIOLOGY | Facility: CLINIC | Age: 67
End: 2022-10-11
Attending: PHYSICIAN ASSISTANT
Payer: COMMERCIAL

## 2022-10-11 VITALS
DIASTOLIC BLOOD PRESSURE: 87 MMHG | OXYGEN SATURATION: 97 % | HEIGHT: 70 IN | WEIGHT: 266.3 LBS | SYSTOLIC BLOOD PRESSURE: 131 MMHG | HEART RATE: 104 BPM | BODY MASS INDEX: 38.12 KG/M2

## 2022-10-11 DIAGNOSIS — I48.19 PERSISTENT ATRIAL FIBRILLATION (H): ICD-10-CM

## 2022-10-11 PROCEDURE — 99214 OFFICE O/P EST MOD 30 MIN: CPT | Performed by: INTERNAL MEDICINE

## 2022-10-11 PROCEDURE — 93000 ELECTROCARDIOGRAM COMPLETE: CPT | Performed by: INTERNAL MEDICINE

## 2022-10-11 NOTE — PROGRESS NOTES
Electrophysiology/ Clinic Note         H&P and Plan:     REASON FOR VISIT: Electrophysiology evaluation.      HISTORY OF PRESENT ILLNESS: Patient is a pleasant 67-year-old gentleman with a history of hypertension, hyperlipidemia, chronic kidney disease, lymphedema, diabetes, and persistent atrial fibrillation, who is here for evaluation.      Patient was found to have persistent A. fib in March of last year.  He underwent successful VIVEK cardioversion in May, however he had recurrence of A. fib.  He refused antiarrhythmic therapy, and has been on rate control strategy since May of last year.    He was hospitalized in February of this year (2/19-2/20) with A. fib with RVR, and was started on combination metoprolol and diltiazem.  Later in February, he was readmitted with shortness of breath and was found to have COVID.  He has been complaining of shortness of breath/dyspnea on exertion since February of this year.  Due to persistent respiratory symptoms, a chest CT was obtained in March which ruled out PE and was suggestive asbestos related lung disease.    Today, he informs he is doing well.  He continues to be asymptomatic from A. fib standpoint.  However, he continues to complain of shortness of breath and exertion.    He is currently on torsemide and appears to be overall euvolemic on physical exam.  He was also started on inhalers.     PREVIOUS STUDIES (personally reviewed):  -ZIO Patch monitor (3/30/2021): Persist atrial fibrillation with average heart rate of 110 bpm.  -Echo (2/20/2022): EF of 50-55%.  Mild LVH.  Mild MR.  -Holter (8/10/2022): .  Persistent A. fib. Average HR was 92 bpm, maximum HR was 126 bpm, minimum HR was 76 bpm.       ASSESSMENT AND PLAN:   1.  Persistent atrial fibrillation.   He has been on rate control strategy for a year.  Heart rate seems to be well controlled.  Continue therapy of metoprolol and diltiazem.    2.  Embolic prevention.  CHADS-VASc score of 3.  Continue anticoagulation  "with Eliquis indefinitely.   3.  Shortness of breath.  Differential diagnosis including asbestos related lung disease versus preserved EF heart failure.  We will continue torsemide.  We will check proBNP, BMP CBC        Kj Mejias MD    Physical Exam:  Vitals: /87 (BP Location: Right arm, Patient Position: Sitting)   Pulse 104   Ht 1.778 m (5' 10\")   Wt 120.8 kg (266 lb 4.8 oz)   SpO2 97%   BMI 38.21 kg/m      Constitutional:  AAO x3.  Pt is in NAD.  HEAD: normocephalic.  SKIN: Skin normal color, texture and turgor with no lesions or eruptions.  Eyes: PERRL, EOMI.  ENT:  Supple, normal JVP. No lymphadenopathy or thyroid enlargement.  Chest:  CTAB.  Cardiac:   RRR, normal  S1 and S2.  No murmurs rubs or gallop.    Abdomen:  Normal BS.  Soft, non-tender and non-distended.  No rebound or guarding.    Extremities:  Pedious pulses palpable B/L.  No LE edema noticed.   Neurological: Strength and sensation grossly symmetric and intact throughout.       CURRENT MEDICATIONS:  Current Outpatient Medications   Medication Sig Dispense Refill     albuterol (PROAIR HFA/PROVENTIL HFA/VENTOLIN HFA) 108 (90 Base) MCG/ACT inhaler Inhale 2 puffs into the lungs every 6 hours as needed for shortness of breath / dyspnea or wheezing 18 g 3     alcohol swab prep pads Use to swab area of injection/fariha as directed. 100 each 3     atorvastatin (LIPITOR) 10 MG tablet Take 1 tablet (10 mg) by mouth daily 90 tablet 3     blood glucose (NO BRAND SPECIFIED) test strip Use to test blood sugar 1 times daily or as directed. To accompany: Blood Glucose Monitor Brands: per insurance. 100 strip 6     blood glucose calibration (NO BRAND SPECIFIED) solution To accompany: Blood Glucose Monitor Brands: per insurance. 1 Bottle 3     blood glucose monitoring (NO BRAND SPECIFIED) meter device kit Use to test blood sugar 1 times daily or as directed. : per insurance. 1 kit 0     diltiazem ER COATED BEADS (CARDIZEM CD) 360 MG 24 hr capsule Take " 1 capsule (360 mg) by mouth daily 90 capsule 3     fluticasone-salmeterol (ADVAIR-HFA) 230-21 MCG/ACT inhaler Inhale 2 puffs into the lungs 2 times daily 12 g 3     glipiZIDE (GLUCOTROL XL) 10 MG 24 hr tablet TAKE 1 TABLET (10 MG) BY MOUTH DAILY PLEASE MAKE AN APPOINTMENT FOR FURTHER REFILLS 90 tablet 0     lisinopril (ZESTRIL) 40 MG tablet Take 1 tablet (40 mg) by mouth daily 90 tablet 3     metFORMIN (GLUCOPHAGE-XR) 500 MG 24 hr tablet Take 2 tablets (1,000 mg) by mouth daily (with dinner) 180 tablet 3     metoprolol succinate ER (TOPROL XL) 100 MG 24 hr tablet Take 1 tablet (100 mg) by mouth 2 times daily 180 tablet 3     rivaroxaban ANTICOAGULANT (XARELTO ANTICOAGULANT) 20 MG TABS tablet Take 1 tablet (20 mg) by mouth daily (with dinner) 90 tablet 3     thin (NO BRAND SPECIFIED) lancets Use with lanceting device. To accompany: Blood Glucose Monitor Brands: per insurance. 1 each 6     torsemide (DEMADEX) 20 MG tablet Take 1 tablet (20 mg) by mouth daily 30 tablet 4       ALLERGIES     Allergies   Allergen Reactions     No Known Allergies        PAST MEDICAL HISTORY:  Past Medical History:   Diagnosis Date     Diabetes (H)      HTN      Hyperlipidemia        PAST SURGICAL HISTORY:  Past Surgical History:   Procedure Laterality Date     COLONOSCOPY N/A 2018    Procedure: COMBINED COLONOSCOPY, SINGLE OR MULTIPLE BIOPSY/POLYPECTOMY BY BIOPSY;;  Surgeon: Lawrence Mata MD;  Location:  GI       FAMILY HISTORY:  The patient's family history was reviewed and is non-contributory for heart disease.    SOCIAL HISTORY:  Social History     Socioeconomic History     Marital status:      Spouse name: None     Number of children: None     Years of education: None     Highest education level: None   Tobacco Use     Smoking status: Former     Packs/day: 3.00     Years: 16.00     Pack years: 48.00     Types: Cigarettes     Start date:      Quit date: 1987     Years since quittin.0     Smokeless  tobacco: Never   Substance and Sexual Activity     Alcohol use: No     Drug use: No     Sexual activity: Yes     Partners: Female   Other Topics Concern     Parent/sibling w/ CABG, MI or angioplasty before 65F 55M? Yes      Service No     Blood Transfusions No     Caffeine Concern No     Occupational Exposure No     Hobby Hazards No     Sleep Concern No     Stress Concern No     Weight Concern Yes     Special Diet No     Back Care No     Exercise Yes     Bike Helmet No     Comment: n/a     Seat Belt Yes     Self-Exams No       Review of Systems:  Skin:        Eyes:       ENT:       Respiratory:       Cardiovascular:       Gastroenterology:      Genitourinary:       Musculoskeletal:       Neurologic:       Psychiatric:       Heme/Lymph/Imm:       Endocrine:           Recent Lab Results:  LIPID RESULTS:  Lab Results   Component Value Date    CHOL 103 05/03/2022    CHOL 124 05/12/2021    HDL 32 (L) 05/03/2022    HDL 31 (L) 05/12/2021    LDL 61 05/03/2022    LDL 70 05/12/2021    TRIG 50 05/03/2022    TRIG 116 05/12/2021    CHOLHDLRATIO 4.5 09/28/2015       LIVER ENZYME RESULTS:  Lab Results   Component Value Date    AST 11 03/29/2022    AST 20 05/12/2021    ALT 20 03/29/2022    ALT 28 05/12/2021       CBC RESULTS:  Lab Results   Component Value Date    WBC 10.0 05/13/2022    WBC 11.0 05/12/2021    RBC 5.27 05/13/2022    RBC 5.15 05/12/2021    HGB 13.5 05/13/2022    HGB 14.1 05/12/2021    HCT 44.9 05/13/2022    HCT 44.2 05/12/2021    MCV 85 05/13/2022    MCV 86 05/12/2021    MCH 25.6 (L) 05/13/2022    MCH 27.4 05/12/2021    MCHC 30.1 (L) 05/13/2022    MCHC 31.9 05/12/2021    RDW 16.1 (H) 05/13/2022    RDW 14.7 05/12/2021     05/13/2022     05/12/2021       BMP RESULTS:  Lab Results   Component Value Date     05/25/2022     05/12/2021    POTASSIUM 4.0 05/25/2022    POTASSIUM 3.9 05/14/2021    CHLORIDE 106 05/25/2022    CHLORIDE 104 05/12/2021    CO2 26 05/25/2022    CO2 29 05/12/2021     ANIONGAP 8 05/25/2022    ANIONGAP 4 05/12/2021     (H) 05/25/2022     (H) 05/12/2021    BUN 33 (H) 05/25/2022    BUN 36 (H) 05/12/2021    CR 1.57 (H) 05/25/2022    CR 1.87 (H) 05/12/2021    GFRESTIMATED 49 (L) 05/25/2022    GFRESTIMATED 37 (L) 05/12/2021    GFRESTBLACK 43 (L) 05/12/2021    KURT 9.3 05/25/2022    KURT 9.2 05/12/2021        A1C RESULTS:  Lab Results   Component Value Date    A1C 7.0 (H) 03/29/2022    A1C 8.6 (H) 05/12/2021       INR RESULTS:  No results found for: INR      ECHOCARDIOGRAM  No results found for this or any previous visit (from the past 8760 hour(s)).      Orders Placed This Encounter   Procedures     EKG 12-lead complete w/read - Clinics (performed today)     No orders of the defined types were placed in this encounter.    There are no discontinued medications.      Encounter Diagnosis   Name Primary?     Persistent atrial fibrillation (H)          RAH Fallon PA-C  [address]  [place] [id number]

## 2022-10-11 NOTE — LETTER
10/11/2022    [facility] - [address] [address]    RE: Booker MICHELLE Kevin       Dear Colleague,     I had the pleasure of seeing Booker MICHELLE Harvinderchuck in the [facility].  Electrophysiology/ Clinic Note         H&P and Plan:     REASON FOR VISIT: Electrophysiology evaluation.      HISTORY OF PRESENT ILLNESS: Patient is a pleasant 67-year-old gentleman with a history of hypertension, hyperlipidemia, chronic kidney disease, lymphedema, diabetes, and persistent atrial fibrillation, who is here for evaluation.      Patient was found to have persistent A. fib in March of last year.  He underwent successful VIVEK cardioversion in May, however he had recurrence of A. fib.  He refused antiarrhythmic therapy, and has been on rate control strategy since May of last year.    He was hospitalized in February of this year (2/19-2/20) with A. fib with RVR, and was started on combination metoprolol and diltiazem.  Later in February, he was readmitted with shortness of breath and was found to have COVID.  He has been complaining of shortness of breath/dyspnea on exertion since February of this year.  Due to persistent respiratory symptoms, a chest CT was obtained in March which ruled out PE and was suggestive asbestos related lung disease.    Today, he informs he is doing well.  He continues to be asymptomatic from A. fib standpoint.  However, he continues to complain of shortness of breath and exertion.    He is currently on torsemide and appears to be overall euvolemic on physical exam.  He was also started on inhalers.     PREVIOUS STUDIES (personally reviewed):  -ZIO Patch monitor (3/30/2021): Persist atrial fibrillation with average heart rate of 110 bpm.  -Echo (2/20/2022): EF of 50-55%.  Mild LVH.  Mild MR.  -Holter (8/10/2022): .  Persistent A. fib. Average HR was 92 bpm, maximum HR was 126 bpm, minimum HR was 76 bpm.       ASSESSMENT AND PLAN:   1.  Persistent atrial  "fibrillation.   He has been on rate control strategy for a year.  Heart rate seems to be well controlled.  Continue therapy of metoprolol and diltiazem.    2.  Embolic prevention.  CHADS-VASc score of 3.  Continue anticoagulation with Eliquis indefinitely.   3.  Shortness of breath.  Differential diagnosis including asbestos related lung disease versus preserved EF heart failure.  We will continue torsemide.  We will check proBNP, BMP CBC        Kj Mejias MD    Physical Exam:  Vitals: /87 (BP Location: Right arm, Patient Position: Sitting)   Pulse 104   Ht 1.778 m (5' 10\")   Wt 120.8 kg (266 lb 4.8 oz)   SpO2 97%   BMI 38.21 kg/m      Constitutional:  AAO x3.  Pt is in NAD.  HEAD: normocephalic.  SKIN: Skin normal color, texture and turgor with no lesions or eruptions.  Eyes: PERRL, EOMI.  ENT:  Supple, normal JVP. No lymphadenopathy or thyroid enlargement.  Chest:  CTAB.  Cardiac:   RRR, normal  S1 and S2.  No murmurs rubs or gallop.    Abdomen:  Normal BS.  Soft, non-tender and non-distended.  No rebound or guarding.    Extremities:  Pedious pulses palpable B/L.  No LE edema noticed.   Neurological: Strength and sensation grossly symmetric and intact throughout.       CURRENT MEDICATIONS:  Current Outpatient Medications   Medication Sig Dispense Refill     albuterol (PROAIR HFA/PROVENTIL HFA/VENTOLIN HFA) 108 (90 Base) MCG/ACT inhaler Inhale 2 puffs into the lungs every 6 hours as needed for shortness of breath / dyspnea or wheezing 18 g 3     alcohol swab prep pads Use to swab area of injection/fariha as directed. 100 each 3     atorvastatin (LIPITOR) 10 MG tablet Take 1 tablet (10 mg) by mouth daily 90 tablet 3     blood glucose (NO BRAND SPECIFIED) test strip Use to test blood sugar 1 times daily or as directed. To accompany: Blood Glucose Monitor Brands: per insurance. 100 strip 6     blood glucose calibration (NO BRAND SPECIFIED) solution To accompany: Blood Glucose Monitor Brands: per " insurance. 1 Bottle 3     blood glucose monitoring (NO BRAND SPECIFIED) meter device kit Use to test blood sugar 1 times daily or as directed. : per insurance. 1 kit 0     diltiazem ER COATED BEADS (CARDIZEM CD) 360 MG 24 hr capsule Take 1 capsule (360 mg) by mouth daily 90 capsule 3     fluticasone-salmeterol (ADVAIR-HFA) 230-21 MCG/ACT inhaler Inhale 2 puffs into the lungs 2 times daily 12 g 3     glipiZIDE (GLUCOTROL XL) 10 MG 24 hr tablet TAKE 1 TABLET (10 MG) BY MOUTH DAILY PLEASE MAKE AN APPOINTMENT FOR FURTHER REFILLS 90 tablet 0     lisinopril (ZESTRIL) 40 MG tablet Take 1 tablet (40 mg) by mouth daily 90 tablet 3     metFORMIN (GLUCOPHAGE-XR) 500 MG 24 hr tablet Take 2 tablets (1,000 mg) by mouth daily (with dinner) 180 tablet 3     metoprolol succinate ER (TOPROL XL) 100 MG 24 hr tablet Take 1 tablet (100 mg) by mouth 2 times daily 180 tablet 3     rivaroxaban ANTICOAGULANT (XARELTO ANTICOAGULANT) 20 MG TABS tablet Take 1 tablet (20 mg) by mouth daily (with dinner) 90 tablet 3     thin (NO BRAND SPECIFIED) lancets Use with lanceting device. To accompany: Blood Glucose Monitor Brands: per insurance. 1 each 6     torsemide (DEMADEX) 20 MG tablet Take 1 tablet (20 mg) by mouth daily 30 tablet 4       ALLERGIES     Allergies   Allergen Reactions     No Known Allergies        PAST MEDICAL HISTORY:  Past Medical History:   Diagnosis Date     Diabetes (H)      HTN      Hyperlipidemia        PAST SURGICAL HISTORY:  Past Surgical History:   Procedure Laterality Date     COLONOSCOPY N/A 8/24/2018    Procedure: COMBINED COLONOSCOPY, SINGLE OR MULTIPLE BIOPSY/POLYPECTOMY BY BIOPSY;;  Surgeon: Lawrence Mata MD;  Location:  GI       FAMILY HISTORY:  The patient's family history was reviewed and is non-contributory for heart disease.    SOCIAL HISTORY:  Social History     Socioeconomic History     Marital status:      Spouse name: None     Number of children: None     Years of education: None     Highest  education level: None   Tobacco Use     Smoking status: Former     Packs/day: 3.00     Years: 16.00     Pack years: 48.00     Types: Cigarettes     Start date:      Quit date: 1987     Years since quittin.0     Smokeless tobacco: Never   Substance and Sexual Activity     Alcohol use: No     Drug use: No     Sexual activity: Yes     Partners: Female   Other Topics Concern     Parent/sibling w/ CABG, MI or angioplasty before 65F 55M? Yes      Service No     Blood Transfusions No     Caffeine Concern No     Occupational Exposure No     Hobby Hazards No     Sleep Concern No     Stress Concern No     Weight Concern Yes     Special Diet No     Back Care No     Exercise Yes     Bike Helmet No     Comment: n/a     Seat Belt Yes     Self-Exams No       Review of Systems:  Skin:        Eyes:       ENT:       Respiratory:       Cardiovascular:       Gastroenterology:      Genitourinary:       Musculoskeletal:       Neurologic:       Psychiatric:       Heme/Lymph/Imm:       Endocrine:           Recent Lab Results:  LIPID RESULTS:  Lab Results   Component Value Date    CHOL 103 2022    CHOL 124 2021    HDL 32 (L) 2022    HDL 31 (L) 2021    LDL 61 2022    LDL 70 2021    TRIG 50 2022    TRIG 116 2021    CHOLHDLRATIO 4.5 2015       LIVER ENZYME RESULTS:  Lab Results   Component Value Date    AST 11 2022    AST 20 2021    ALT 20 2022    ALT 28 2021       CBC RESULTS:  Lab Results   Component Value Date    WBC 10.0 2022    WBC 11.0 2021    RBC 5.27 2022    RBC 5.15 2021    HGB 13.5 2022    HGB 14.1 2021    HCT 44.9 2022    HCT 44.2 2021    MCV 85 2022    MCV 86 2021    MCH 25.6 (L) 2022    MCH 27.4 2021    MCHC 30.1 (L) 2022    MCHC 31.9 2021    RDW 16.1 (H) 2022    RDW 14.7 2021     2022     2021       BMP  RESULTS:  Lab Results   Component Value Date     05/25/2022     05/12/2021    POTASSIUM 4.0 05/25/2022    POTASSIUM 3.9 05/14/2021    CHLORIDE 106 05/25/2022    CHLORIDE 104 05/12/2021    CO2 26 05/25/2022    CO2 29 05/12/2021    ANIONGAP 8 05/25/2022    ANIONGAP 4 05/12/2021     (H) 05/25/2022     (H) 05/12/2021    BUN 33 (H) 05/25/2022    BUN 36 (H) 05/12/2021    CR 1.57 (H) 05/25/2022    CR 1.87 (H) 05/12/2021    GFRESTIMATED 49 (L) 05/25/2022    GFRESTIMATED 37 (L) 05/12/2021    GFRESTBLACK 43 (L) 05/12/2021    KURT 9.3 05/25/2022    KURT 9.2 05/12/2021        A1C RESULTS:  Lab Results   Component Value Date    A1C 7.0 (H) 03/29/2022    A1C 8.6 (H) 05/12/2021       INR RESULTS:  No results found for: INR      ECHOCARDIOGRAM  No results found for this or any previous visit (from the past 8760 hour(s)).      Orders Placed This Encounter   Procedures     EKG 12-lead complete w/read - Clinics (performed today)     No orders of the defined types were placed in this encounter.    There are no discontinued medications.      Encounter Diagnosis   Name Primary?     Persistent atrial fibrillation (H)          CC  Ping Fallon, PA-C  [address]  [place] [address]    Thank you for allowing me to participate in the care of your patient.      Sincerely,     Kj Mejias MD     [facility]

## 2022-10-12 ENCOUNTER — LAB (OUTPATIENT)
Dept: LAB | Facility: CLINIC | Age: 67
End: 2022-10-12
Payer: COMMERCIAL

## 2022-10-12 DIAGNOSIS — I48.19 PERSISTENT ATRIAL FIBRILLATION (H): ICD-10-CM

## 2022-10-12 DIAGNOSIS — E11.65 TYPE 2 DIABETES MELLITUS WITH HYPERGLYCEMIA (H): Primary | ICD-10-CM

## 2022-10-12 LAB
ANION GAP SERPL CALCULATED.3IONS-SCNC: 8 MMOL/L (ref 3–14)
BASOPHILS # BLD AUTO: 0.1 10E3/UL (ref 0–0.2)
BASOPHILS NFR BLD AUTO: 1 %
BUN SERPL-MCNC: 29 MG/DL (ref 7–30)
CALCIUM SERPL-MCNC: 9.6 MG/DL (ref 8.5–10.1)
CHLORIDE BLD-SCNC: 104 MMOL/L (ref 94–109)
CO2 SERPL-SCNC: 28 MMOL/L (ref 20–32)
CREAT SERPL-MCNC: 1.52 MG/DL (ref 0.52–1.25)
EOSINOPHIL # BLD AUTO: 0.1 10E3/UL (ref 0–0.7)
EOSINOPHIL NFR BLD AUTO: 1 %
ERYTHROCYTE [DISTWIDTH] IN BLOOD BY AUTOMATED COUNT: 15.9 % (ref 10–15)
GFR SERPL CREATININE-BSD FRML MDRD: 51 ML/MIN/1.73M2
GLUCOSE BLD-MCNC: 139 MG/DL (ref 70–99)
HBA1C MFR BLD: 7.3 % (ref 0–5.6)
HCT VFR BLD AUTO: 44.7 % (ref 35–53)
HGB BLD-MCNC: 14 G/DL (ref 11.7–17.7)
LYMPHOCYTES # BLD AUTO: 1.3 10E3/UL (ref 0.8–5.3)
LYMPHOCYTES NFR BLD AUTO: 11 %
MCH RBC QN AUTO: 26.1 PG (ref 26.5–33)
MCHC RBC AUTO-ENTMCNC: 31.3 G/DL (ref 31.5–36.5)
MCV RBC AUTO: 83 FL (ref 78–100)
MONOCYTES # BLD AUTO: 0.8 10E3/UL (ref 0–1.3)
MONOCYTES NFR BLD AUTO: 7 %
NEUTROPHILS # BLD AUTO: 9.7 10E3/UL (ref 1.6–8.3)
NEUTROPHILS NFR BLD AUTO: 81 %
NT-PROBNP SERPL-MCNC: 1679 PG/ML (ref 0–900)
PLATELET # BLD AUTO: 341 10E3/UL (ref 150–450)
POTASSIUM BLD-SCNC: 4 MMOL/L (ref 3.4–5.3)
RBC # BLD AUTO: 5.37 10E6/UL (ref 3.8–5.9)
SODIUM SERPL-SCNC: 140 MMOL/L (ref 133–144)
WBC # BLD AUTO: 12 10E3/UL (ref 4–11)

## 2022-10-12 PROCEDURE — 83036 HEMOGLOBIN GLYCOSYLATED A1C: CPT

## 2022-10-12 PROCEDURE — 80048 BASIC METABOLIC PNL TOTAL CA: CPT

## 2022-10-12 PROCEDURE — 36415 COLL VENOUS BLD VENIPUNCTURE: CPT

## 2022-10-12 PROCEDURE — 83880 ASSAY OF NATRIURETIC PEPTIDE: CPT

## 2022-10-12 PROCEDURE — 85025 COMPLETE CBC W/AUTO DIFF WBC: CPT

## 2022-10-13 NOTE — RESULT ENCOUNTER NOTE
Hello!  It was a pleasure to see you in clinic!  Thank you for getting labs done.     Your HgA1C, also called glycosylated hemoglobin, which measures the level of sugar in your blood over the past few months, is at goal, which is great! Congratulations! Keep up the good work!    If you have any questions, please contact the clinic or schedule an appointment with me, thank you!    Sincerely,    TORREY MEIER MD   10/13/2022

## 2022-10-14 ENCOUNTER — TELEPHONE (OUTPATIENT)
Dept: CARDIOLOGY | Facility: CLINIC | Age: 67
End: 2022-10-14

## 2022-10-14 DIAGNOSIS — R06.02 SOB (SHORTNESS OF BREATH): ICD-10-CM

## 2022-10-14 RX ORDER — TORSEMIDE 20 MG/1
20 TABLET ORAL 2 TIMES DAILY
Qty: 60 TABLET | Refills: 0 | Status: ON HOLD | OUTPATIENT
Start: 2022-10-14 | End: 2022-11-18

## 2022-10-15 ENCOUNTER — HEALTH MAINTENANCE LETTER (OUTPATIENT)
Age: 67
End: 2022-10-15

## 2022-10-29 DIAGNOSIS — E11.65 TYPE 2 DIABETES MELLITUS WITH HYPERGLYCEMIA, WITHOUT LONG-TERM CURRENT USE OF INSULIN (H): ICD-10-CM

## 2022-10-31 NOTE — TELEPHONE ENCOUNTER
Routing refill request to provider for review/approval because:  Labs out of range:    Creatinine   Date Value Ref Range Status   10/12/2022 1.52 (H) 0.52 - 1.25 mg/dL Final     Comment:     20 y and older Female0.52-1.04 mg/dL  20 y and older Male0.66-1.25 mg/dL    Varies with the amount of muscle mass present.    GICH  Female: 0.6-1.2 mg/dL  Male: 0.7-1.3 mg/dL     05/12/2021 1.87 (H) 0.66 - 1.25 mg/dL Final     Justice L. Phoenix, RN

## 2022-11-01 ENCOUNTER — OFFICE VISIT (OUTPATIENT)
Dept: CARDIOLOGY | Facility: CLINIC | Age: 67
End: 2022-11-01
Payer: COMMERCIAL

## 2022-11-01 VITALS
HEIGHT: 70 IN | BODY MASS INDEX: 37.51 KG/M2 | WEIGHT: 262 LBS | SYSTOLIC BLOOD PRESSURE: 116 MMHG | DIASTOLIC BLOOD PRESSURE: 80 MMHG | HEART RATE: 89 BPM | OXYGEN SATURATION: 94 %

## 2022-11-01 DIAGNOSIS — I20.89 ANGINAL EQUIVALENT (H): ICD-10-CM

## 2022-11-01 DIAGNOSIS — R06.09 DYSPNEA ON EXERTION: Primary | ICD-10-CM

## 2022-11-01 DIAGNOSIS — R06.02 SOB (SHORTNESS OF BREATH): ICD-10-CM

## 2022-11-01 DIAGNOSIS — I25.84 CORONARY ARTERY CALCIFICATION OF NATIVE ARTERY: ICD-10-CM

## 2022-11-01 DIAGNOSIS — I25.10 CORONARY ARTERY CALCIFICATION OF NATIVE ARTERY: ICD-10-CM

## 2022-11-01 PROCEDURE — 99215 OFFICE O/P EST HI 40 MIN: CPT | Performed by: INTERNAL MEDICINE

## 2022-11-01 RX ORDER — GLIPIZIDE 10 MG/1
TABLET, FILM COATED, EXTENDED RELEASE ORAL
Qty: 90 TABLET | Refills: 0 | Status: SHIPPED | OUTPATIENT
Start: 2022-11-01 | End: 2023-03-27

## 2022-11-01 RX ORDER — ROSUVASTATIN CALCIUM 40 MG/1
40 TABLET, COATED ORAL AT BEDTIME
Qty: 90 TABLET | Refills: 3 | Status: SHIPPED | OUTPATIENT
Start: 2022-11-01 | End: 2023-01-01

## 2022-11-01 NOTE — LETTER
11/1/2022    [facility]  [address] [address]    RE: Booker Brown       Dear Colleague,     I had the pleasure of seeing Booker Brown in the [facility].      Cardiology Clinic Progress Note:    November 1, 2022   Patient Name: Booker Brown  Patient MRN: [id number]     Consult indication: RODRIGUEZ    HPI:    I had the opportunity to see patient Booker Brown in cardiology clinic for a follow up visit. Patient is followed by our colleague [facility] with Primary Care.     Patient is a pleasant 67-year-old male with a past medical history significant for hypertension, hyperlipidemia, diabetes type 2, CKD, persistent atrial fibrillation, mixed severe obstructive/restrictive lung disease (followed by Dr. Hopkins), who presents for further evaluation and management of dyspnea.    Patient was last seen by my colleague Dr. Mejias with cardiology EP.  At that time, patient had been experiencing dyspnea, it was recommended the patient continue a higher dose of torsemide.  Patient has been taking torsemide 20 mg twice daily, however this has resulted in dizziness/lightheadedness, as well as a generalized feeling of dehydration/unwell.  A few days ago, patient self decreased this to 20 mg daily, and notes some mild improvement in his dizziness/lightheadedness.    Patient continues to have severe dyspnea on exertion.  Prior evaluation includes a CT lung scan which demonstrated findings concerning for asbestos, patient was seen by Dr. Hopkins with Pulmonology and was started on inhaler therapy.  Patient reports minimal improvement with inhaler therapy.    Patient denies any chest pain/chest pressure, symptoms of orthopnea/PND, abnormal lower extremity swelling/worsening above baseline.  He does still continue to have severe dyspnea on exertion.    Assessment and Plan/Recommendations:    In summary, patient is a pleasant 67-year-old male  with a past medical history significant for hypertension, hyperlipidemia, diabetes type 2, CKD, persistent atrial fibrillation, mixed severe obstructive/restrictive lung disease (followed by Dr. Hopkins), who presents for further evaluation and management of dyspnea on exertion.    Patient has baseline severe obstructive/restrictive lung disease, however he denies any significant change with initiation of inhaler therapy.  Overall clinical presentation does not seem consistent with decompensated heart failure, NT proBNP is lower than before, weight is lower than baseline.      Given his significant risk factors of severe coronary artery calcifications on CT scan, hypertension, hyperlipidemia, diabetes type 2 age, gender, it is possible that his dyspnea on exertion may reflect, at least in part, an anginal equivalent.  CT did not demonstrate any pulmonary artery dilatation, TTE did not demonstrate findings consistent with pulmonary hypertension.     Unfortunately, options for noninvasive stress testing are limited due to his comorbidities, coronary CTA would be suboptimal given atrial fibrillation, and given baseline lung disease pharmacologic vasodilator stress testing would be suboptimal, similarly stress echocardiogram would also be suboptimal given arrhythmia.  As such, discussed option of cardiac catheterization/coronary angiography and possible intervention.  Discussed risks including but not limited to renal injury leading to dialysis, bleeding complications, stroke, heart attack, death.  Patient voiced understanding and would like to proceed as planned.  He denies any contrast allergies.  Will continue remainder of cardiac regimen including his current regimen of torsemide 20 mg daily with an extra 20 mg as needed for weight gain of 2 to 3 pounds in a day or 5 pounds or more in a week.  We will change atorvastatin to rosuvastatin 40 mg nightly.  Continue cardiac regimen otherwise including rivaroxaban,  diltiazem, lisinopril, metoprolol succinate.  Follow-up with cardiology GABRIELA in about 1 month, or sooner as needed.    Thank you for allowing our team to participate in the care of Booker Brown.  Please do not hesitate to call or page me with any questions or concerns.    Sincerely,     Brenden Felton MD, [facility]  Cardiology  Text Page   November 1, 2022    cc  Kj Mejias MD  [address]  [place] [address]    Voice recognition software utilized.     Total time spent on this encounter: 45 minutes, providing care in this encounter including, but not limited to, reviewing prior medical records, laboratory data, imaging studies, diagnostic studies, procedure notes, formulating an assessment and plan, recommendations, discussion and counseling with patient face to face, dictation.    Past Medical History:     Past Medical History:   Diagnosis Date     Diabetes (H)      HTN      Hyperlipidemia         Past Surgical History:   Past Surgical History:   Procedure Laterality Date     COLONOSCOPY N/A 8/24/2018    Procedure: COMBINED COLONOSCOPY, SINGLE OR MULTIPLE BIOPSY/POLYPECTOMY BY BIOPSY;;  Surgeon: Lawrence Mata MD;  Location:  GI       Medications (outpatient):  Current Outpatient Medications   Medication Sig Dispense Refill     albuterol (PROAIR HFA/PROVENTIL HFA/VENTOLIN HFA) 108 (90 Base) MCG/ACT inhaler Inhale 2 puffs into the lungs every 6 hours as needed for shortness of breath / dyspnea or wheezing 18 g 3     alcohol swab prep pads Use to swab area of injection/fariha as directed. 100 each 3     blood glucose (NO BRAND SPECIFIED) test strip Use to test blood sugar 1 times daily or as directed. To accompany: Blood Glucose Monitor Brands: per insurance. 100 strip 6     blood glucose calibration (NO BRAND SPECIFIED) solution To accompany: Blood Glucose Monitor Brands: per insurance. 1 Bottle 3     blood glucose monitoring (NO BRAND SPECIFIED) meter device kit Use to test  blood sugar 1 times daily or as directed. : per insurance. 1 kit 0     diltiazem ER COATED BEADS (CARDIZEM CD) 360 MG 24 hr capsule Take 1 capsule (360 mg) by mouth daily 90 capsule 3     fluticasone-salmeterol (ADVAIR-HFA) 230-21 MCG/ACT inhaler Inhale 2 puffs into the lungs 2 times daily 12 g 3     glipiZIDE (GLUCOTROL XL) 10 MG 24 hr tablet TAKE 1 TABLET BY MOUTH EVERY DAY 90 tablet 0     lisinopril (ZESTRIL) 40 MG tablet Take 1 tablet (40 mg) by mouth daily 90 tablet 3     metFORMIN (GLUCOPHAGE-XR) 500 MG 24 hr tablet Take 2 tablets (1,000 mg) by mouth daily (with dinner) 180 tablet 3     metoprolol succinate ER (TOPROL XL) 100 MG 24 hr tablet Take 1 tablet (100 mg) by mouth 2 times daily 180 tablet 3     rivaroxaban ANTICOAGULANT (XARELTO ANTICOAGULANT) 20 MG TABS tablet Take 1 tablet (20 mg) by mouth daily (with dinner) 90 tablet 3     rosuvastatin (CRESTOR) 40 MG tablet Take 1 tablet (40 mg) by mouth At Bedtime 90 tablet 3     thin (NO BRAND SPECIFIED) lancets Use with lanceting device. To accompany: Blood Glucose Monitor Brands: per insurance. 1 each 6     torsemide (DEMADEX) 20 MG tablet Take 1 tablet (20 mg) by mouth 2 times daily (Patient taking differently: Take 20 mg by mouth 2 times daily Only taking 1 per day) 60 tablet 0       Allergies:  Allergies   Allergen Reactions     No Known Allergies        Social History:   History   Drug Use No      History   Smoking Status     Former     Packs/day: 3.00     Years: 16.00     Types: Cigarettes     Start date: 1970     Quit date: 9/14/1987   Smokeless Tobacco     Never     Social History    Substance and Sexual Activity      Alcohol use: No       Family History:  Family History   Problem Relation Age of Onset     Diabetes Mother         dx at 70     Bladder Cancer Brother        Review of Systems:   A complete review of systems was negative except as mentioned in the History of Present Illness.     Objective & Physical Exam:  /80 (BP Location: Right  "arm, Patient Position: Sitting, Cuff Size: Adult Large)   Pulse 89   Ht 1.778 m (5' 10\")   Wt 118.8 kg (262 lb)   SpO2 94%   BMI 37.59 kg/m    Wt Readings from Last 2 Encounters:   11/01/22 118.8 kg (262 lb)   10/11/22 120.8 kg (266 lb 4.8 oz)     Body mass index is 37.59 kg/m .   Body surface area is 2.42 meters squared.    Constitutional: appears stated age, in no apparent distress, appears to be well nourished  Head: normocephalic, atraumatic  Neck: supple, trachea midline  Pulmonary: diminished lung sounds bilaterally   Cardiovascular: distant heart sounds but irregularly irregular rhythm  Gastrointestinal: no guarding, non-rigid   Neurologic: awake, alert, moves all extremities  Skin: no jaundice, warm on limited exam  Psychiatric: affect is normal, answers questions appropriately, oriented to self and place    Data reviewed:  Lab Results   Component Value Date    WBC 12.0 (H) 10/12/2022    WBC 11.0 05/12/2021    RBC 5.37 10/12/2022    RBC 5.15 05/12/2021    HGB 14.0 10/12/2022    HGB 14.1 05/12/2021    HCT 44.7 10/12/2022    HCT 44.2 05/12/2021    MCV 83 10/12/2022    MCV 86 05/12/2021    MCH 26.1 (L) 10/12/2022    MCH 27.4 05/12/2021    MCHC 31.3 (L) 10/12/2022    MCHC 31.9 05/12/2021    RDW 15.9 (H) 10/12/2022    RDW 14.7 05/12/2021     10/12/2022     05/12/2021     Sodium   Date Value Ref Range Status   10/12/2022 140 133 - 144 mmol/L Final   05/12/2021 137 133 - 144 mmol/L Final     Potassium   Date Value Ref Range Status   10/12/2022 4.0 3.4 - 5.3 mmol/L Final   05/14/2021 3.9 3.4 - 5.3 mmol/L Final     Chloride   Date Value Ref Range Status   10/12/2022 104 94 - 109 mmol/L Final     Comment:     0-20 years:       Female:  mmol/L  Male:    mmol/L       20 years and older:   Female:  mmol/L   Male:    mmol/L     05/12/2021 104 94 - 109 mmol/L Final     Carbon Dioxide   Date Value Ref Range Status   05/12/2021 29 20 - 32 mmol/L Final     Carbon Dioxide (CO2)   Date " Value Ref Range Status   10/12/2022 28 20 - 32 mmol/L Final     Anion Gap   Date Value Ref Range Status   10/12/2022 8 3 - 14 mmol/L Final   05/12/2021 4 3 - 14 mmol/L Final     Glucose   Date Value Ref Range Status   10/12/2022 139 (H) 70 - 99 mg/dL Final   05/12/2021 240 (H) 70 - 99 mg/dL Final     Urea Nitrogen   Date Value Ref Range Status   10/12/2022 29 7 - 30 mg/dL Final     Comment:     Female  0 to 15 days           3-23  15 days to 1 year      3-17  1 to 10 years          9-22  10 to 19 years         7-19  19 years and older     7-30    Male  0 to 15 days           3-23  15 days to 1 year      3-17  1 to 10 years          9-22  10 to 19 years         7-21  19 years and older     7-30   05/12/2021 36 (H) 7 - 30 mg/dL Final     Creatinine   Date Value Ref Range Status   10/12/2022 1.52 (H) 0.52 - 1.25 mg/dL Final     Comment:     20 y and older Female0.52-1.04 mg/dL  20 y and older Male0.66-1.25 mg/dL    Varies with the amount of muscle mass present.    GICH  Female: 0.6-1.2 mg/dL  Male: 0.7-1.3 mg/dL     05/12/2021 1.87 (H) 0.66 - 1.25 mg/dL Final     GFR Estimate   Date Value Ref Range Status   10/12/2022 51 (L) >60 mL/min/1.73m2 Final     Comment:     GFR not calculated when sex unspecified or nonbinary.  Effective December 21, 2021 eGFRcr in adults is calculated using the 2021 CKD-EPI creatinine equation which includes age and gender (Naye et al., NEJM, DOI: 10.1056/PAIQjn8896593)   05/12/2021 37 (L) >60 mL/min/[1.73_m2] Final     Comment:     Non  GFR Calc  Starting 12/18/2018, serum creatinine based estimated GFR (eGFR) will be   calculated using the Chronic Kidney Disease Epidemiology Collaboration   (CKD-EPI) equation.       Calcium   Date Value Ref Range Status   10/12/2022 9.6 8.5 - 10.1 mg/dL Final   05/12/2021 9.2 8.5 - 10.1 mg/dL Final     Bilirubin Total   Date Value Ref Range Status   03/29/2022 0.7 0.2 - 1.3 mg/dL Final   05/12/2021 0.6 0.2 - 1.3 mg/dL Final     Alkaline  Phosphatase   Date Value Ref Range Status   03/29/2022 71 40 - 150 U/L Final     Comment:     Female:   0-3 years  110-320 U/L  4-9 years  150-420 U/L  10-11 years  130-560 U/L  12-13 years  105-420 U/L  14-15 years   U/L  16 years and older   U/L      Male:  0-3 years  110-320 U/L  4-9 years  150-420 U/L  10-15 years  130-530 U/L  16-19 years   U/L  20 years and older   U/L     05/12/2021 61 40 - 150 U/L Final     ALT   Date Value Ref Range Status   03/29/2022 20 0 - 70 U/L Final     Comment:     Female   All ages0-50 U/L    Male   0 - 19 years      0-50 U/L  20 years and older0-70 U/L          05/12/2021 28 0 - 70 U/L Final     AST   Date Value Ref Range Status   03/29/2022 11 0 - 45 U/L Final   05/12/2021 20 0 - 45 U/L Final     Recent Labs   Lab Test 05/03/22  0950 05/12/21  0842 05/09/16  0925 09/28/15  1523   CHOL 103 124   < > 143   HDL 32* 31*   < > 32*   LDL 61 70   < > 67   TRIG 50 116   < > 221*   CHOLHDLRATIO  --   --   --  4.5    < > = values in this interval not displayed.      Lab Results   Component Value Date    A1C 7.3 10/12/2022    A1C 7.0 03/29/2022    A1C 7.2 03/17/2022    A1C 8.6 05/12/2021    A1C 7.7 02/28/2020    A1C 8.8 10/30/2019    A1C 7.0 06/13/2018    A1C 7.1 08/02/2017        Thank you for allowing me to participate in the care of your patient.      Sincerely,     Brenden Felton MD     [facility]  cc:   Kj Mejias MD  [address]  [place] [address]

## 2022-11-01 NOTE — PROGRESS NOTES
Cardiology Clinic Progress Note:    November 1, 2022   Patient Name: Booker Brown  Patient MRN: [id number]     Consult indication: RODRIGUEZ    HPI:    I had the opportunity to see patient Booker Brown in cardiology clinic for a follow up visit. Patient is followed by our colleague [facility] with Primary Care.     Patient is a jane 67-year-old male with a past medical history significant for hypertension, hyperlipidemia, diabetes type 2, CKD, persistent atrial fibrillation, mixed severe obstructive/restrictive lung disease (followed by Dr. Hopkins), who presents for further evaluation and management of dyspnea.    Patient was last seen by my colleague Dr. Mejias with cardiology EP.  At that time, patient had been experiencing dyspnea, it was recommended the patient continue a higher dose of torsemide.  Patient has been taking torsemide 20 mg twice daily, however this has resulted in dizziness/lightheadedness, as well as a generalized feeling of dehydration/unwell.  A few days ago, patient self decreased this to 20 mg daily, and notes some mild improvement in his dizziness/lightheadedness.    Patient continues to have severe dyspnea on exertion.  Prior evaluation includes a CT lung scan which demonstrated findings concerning for asbestos, patient was seen by Dr. Hopkins with Pulmonology and was started on inhaler therapy.  Patient reports minimal improvement with inhaler therapy.    Patient denies any chest pain/chest pressure, symptoms of orthopnea/PND, abnormal lower extremity swelling/worsening above baseline.  He does still continue to have severe dyspnea on exertion.    Assessment and Plan/Recommendations:    In summary, patient is a jane 67-year-old male with a past medical history significant for hypertension, hyperlipidemia, diabetes type 2, CKD, persistent atrial fibrillation, mixed severe obstructive/restrictive lung disease (followed by Dr. Hopkins), who presents for further  evaluation and management of dyspnea on exertion.    Patient has baseline severe obstructive/restrictive lung disease, however he denies any significant change with initiation of inhaler therapy.  Overall clinical presentation does not seem consistent with decompensated heart failure, NT proBNP is lower than before, weight is lower than baseline.      Given his significant risk factors of severe coronary artery calcifications on CT scan, hypertension, hyperlipidemia, diabetes type 2 age, gender, it is possible that his dyspnea on exertion may reflect, at least in part, an anginal equivalent.  CT did not demonstrate any pulmonary artery dilatation, TTE did not demonstrate findings consistent with pulmonary hypertension.     Unfortunately, options for noninvasive stress testing are limited due to his comorbidities, coronary CTA would be suboptimal given atrial fibrillation, and given baseline lung disease pharmacologic vasodilator stress testing would be suboptimal, similarly stress echocardiogram would also be suboptimal given arrhythmia.  As such, discussed option of cardiac catheterization/coronary angiography and possible intervention.  Discussed risks including but not limited to renal injury leading to dialysis, bleeding complications, stroke, heart attack, death.  Patient voiced understanding and would like to proceed as planned.  He denies any contrast allergies.  Will continue remainder of cardiac regimen including his current regimen of torsemide 20 mg daily with an extra 20 mg as needed for weight gain of 2 to 3 pounds in a day or 5 pounds or more in a week.  We will change atorvastatin to rosuvastatin 40 mg nightly.  Continue cardiac regimen otherwise including rivaroxaban, diltiazem, lisinopril, metoprolol succinate.  Follow-up with cardiology GABRIELA in about 1 month, or sooner as needed.    Thank you for allowing our team to participate in the care of Booker Brown.  Please do not hesitate to call or  page me with any questions or concerns.    Sincerely,     Brenden Felton MD, [facility]  Cardiology  Text Page   November 1, 2022    cc  Kj Mejias MD  [address] [facility] [address]  [place] [address]    Voice recognition software utilized.     Total time spent on this encounter: 45 minutes, providing care in this encounter including, but not limited to, reviewing prior medical records, laboratory data, imaging studies, diagnostic studies, procedure notes, formulating an assessment and plan, recommendations, discussion and counseling with patient face to face, dictation.    Past Medical History:     Past Medical History:   Diagnosis Date     Diabetes (H)      HTN      Hyperlipidemia         Past Surgical History:   Past Surgical History:   Procedure Laterality Date     COLONOSCOPY N/A 8/24/2018    Procedure: COMBINED COLONOSCOPY, SINGLE OR MULTIPLE BIOPSY/POLYPECTOMY BY BIOPSY;;  Surgeon: Lawrence Mata MD;  Location:  GI       Medications (outpatient):  Current Outpatient Medications   Medication Sig Dispense Refill     albuterol (PROAIR HFA/PROVENTIL HFA/VENTOLIN HFA) 108 (90 Base) MCG/ACT inhaler Inhale 2 puffs into the lungs every 6 hours as needed for shortness of breath / dyspnea or wheezing 18 g 3     alcohol swab prep pads Use to swab area of injection/fariha as directed. 100 each 3     blood glucose (NO BRAND SPECIFIED) test strip Use to test blood sugar 1 times daily or as directed. To accompany: Blood Glucose Monitor Brands: per insurance. 100 strip 6     blood glucose calibration (NO BRAND SPECIFIED) solution To accompany: Blood Glucose Monitor Brands: per insurance. 1 Bottle 3     blood glucose monitoring (NO BRAND SPECIFIED) meter device kit Use to test blood sugar 1 times daily or as directed. : per insurance. 1 kit 0     diltiazem ER COATED BEADS (CARDIZEM CD) 360 MG 24 hr capsule Take 1 capsule (360 mg) by mouth daily 90 capsule 3     fluticasone-salmeterol (ADVAIR-HFA) 230-21  "MCG/ACT inhaler Inhale 2 puffs into the lungs 2 times daily 12 g 3     glipiZIDE (GLUCOTROL XL) 10 MG 24 hr tablet TAKE 1 TABLET BY MOUTH EVERY DAY 90 tablet 0     lisinopril (ZESTRIL) 40 MG tablet Take 1 tablet (40 mg) by mouth daily 90 tablet 3     metFORMIN (GLUCOPHAGE-XR) 500 MG 24 hr tablet Take 2 tablets (1,000 mg) by mouth daily (with dinner) 180 tablet 3     metoprolol succinate ER (TOPROL XL) 100 MG 24 hr tablet Take 1 tablet (100 mg) by mouth 2 times daily 180 tablet 3     rivaroxaban ANTICOAGULANT (XARELTO ANTICOAGULANT) 20 MG TABS tablet Take 1 tablet (20 mg) by mouth daily (with dinner) 90 tablet 3     rosuvastatin (CRESTOR) 40 MG tablet Take 1 tablet (40 mg) by mouth At Bedtime 90 tablet 3     thin (NO BRAND SPECIFIED) lancets Use with lanceting device. To accompany: Blood Glucose Monitor Brands: per insurance. 1 each 6     torsemide (DEMADEX) 20 MG tablet Take 1 tablet (20 mg) by mouth 2 times daily (Patient taking differently: Take 20 mg by mouth 2 times daily Only taking 1 per day) 60 tablet 0       Allergies:  Allergies   Allergen Reactions     No Known Allergies        Social History:   History   Drug Use No      History   Smoking Status     Former     Packs/day: 3.00     Years: 16.00     Types: Cigarettes     Start date: 1970     Quit date: 9/14/1987   Smokeless Tobacco     Never     Social History    Substance and Sexual Activity      Alcohol use: No       Family History:  Family History   Problem Relation Age of Onset     Diabetes Mother         dx at 70     Bladder Cancer Brother        Review of Systems:   A complete review of systems was negative except as mentioned in the History of Present Illness.     Objective & Physical Exam:  /80 (BP Location: Right arm, Patient Position: Sitting, Cuff Size: Adult Large)   Pulse 89   Ht 1.778 m (5' 10\")   Wt 118.8 kg (262 lb)   SpO2 94%   BMI 37.59 kg/m    Wt Readings from Last 2 Encounters:   11/01/22 118.8 kg (262 lb)   10/11/22 120.8 kg " (266 lb 4.8 oz)     Body mass index is 37.59 kg/m .   Body surface area is 2.42 meters squared.    Constitutional: appears stated age, in no apparent distress, appears to be well nourished  Head: normocephalic, atraumatic  Neck: supple, trachea midline  Pulmonary: diminished lung sounds bilaterally   Cardiovascular: distant heart sounds but irregularly irregular rhythm  Gastrointestinal: no guarding, non-rigid   Neurologic: awake, alert, moves all extremities  Skin: no jaundice, warm on limited exam  Psychiatric: affect is normal, answers questions appropriately, oriented to self and place    Data reviewed:  Lab Results   Component Value Date    WBC 12.0 (H) 10/12/2022    WBC 11.0 05/12/2021    RBC 5.37 10/12/2022    RBC 5.15 05/12/2021    HGB 14.0 10/12/2022    HGB 14.1 05/12/2021    HCT 44.7 10/12/2022    HCT 44.2 05/12/2021    MCV 83 10/12/2022    MCV 86 05/12/2021    MCH 26.1 (L) 10/12/2022    MCH 27.4 05/12/2021    MCHC 31.3 (L) 10/12/2022    MCHC 31.9 05/12/2021    RDW 15.9 (H) 10/12/2022    RDW 14.7 05/12/2021     10/12/2022     05/12/2021     Sodium   Date Value Ref Range Status   10/12/2022 140 133 - 144 mmol/L Final   05/12/2021 137 133 - 144 mmol/L Final     Potassium   Date Value Ref Range Status   10/12/2022 4.0 3.4 - 5.3 mmol/L Final   05/14/2021 3.9 3.4 - 5.3 mmol/L Final     Chloride   Date Value Ref Range Status   10/12/2022 104 94 - 109 mmol/L Final     Comment:     0-20 years:       Female:  mmol/L  Male:    mmol/L       20 years and older:   Female:  mmol/L   Male:    mmol/L     05/12/2021 104 94 - 109 mmol/L Final     Carbon Dioxide   Date Value Ref Range Status   05/12/2021 29 20 - 32 mmol/L Final     Carbon Dioxide (CO2)   Date Value Ref Range Status   10/12/2022 28 20 - 32 mmol/L Final     Anion Gap   Date Value Ref Range Status   10/12/2022 8 3 - 14 mmol/L Final   05/12/2021 4 3 - 14 mmol/L Final     Glucose   Date Value Ref Range Status   10/12/2022 139  (H) 70 - 99 mg/dL Final   05/12/2021 240 (H) 70 - 99 mg/dL Final     Urea Nitrogen   Date Value Ref Range Status   10/12/2022 29 7 - 30 mg/dL Final     Comment:     Female  0 to 15 days           3-23  15 days to 1 year      3-17  1 to 10 years          9-22  10 to 19 years         7-19  19 years and older     7-30    Male  0 to 15 days           3-23  15 days to 1 year      3-17  1 to 10 years          9-22  10 to 19 years         7-21  19 years and older     7-30   05/12/2021 36 (H) 7 - 30 mg/dL Final     Creatinine   Date Value Ref Range Status   10/12/2022 1.52 (H) 0.52 - 1.25 mg/dL Final     Comment:     20 y and older Female0.52-1.04 mg/dL  20 y and older Male0.66-1.25 mg/dL    Varies with the amount of muscle mass present.    GICH  Female: 0.6-1.2 mg/dL  Male: 0.7-1.3 mg/dL     05/12/2021 1.87 (H) 0.66 - 1.25 mg/dL Final     GFR Estimate   Date Value Ref Range Status   10/12/2022 51 (L) >60 mL/min/1.73m2 Final     Comment:     GFR not calculated when sex unspecified or nonbinary.  Effective December 21, 2021 eGFRcr in adults is calculated using the 2021 CKD-EPI creatinine equation which includes age and gender (Naye et al., NEJ, DOI: 10.1056/UUGGqa3803997)   05/12/2021 37 (L) >60 mL/min/[1.73_m2] Final     Comment:     Non  GFR Calc  Starting 12/18/2018, serum creatinine based estimated GFR (eGFR) will be   calculated using the Chronic Kidney Disease Epidemiology Collaboration   (CKD-EPI) equation.       Calcium   Date Value Ref Range Status   10/12/2022 9.6 8.5 - 10.1 mg/dL Final   05/12/2021 9.2 8.5 - 10.1 mg/dL Final     Bilirubin Total   Date Value Ref Range Status   03/29/2022 0.7 0.2 - 1.3 mg/dL Final   05/12/2021 0.6 0.2 - 1.3 mg/dL Final     Alkaline Phosphatase   Date Value Ref Range Status   03/29/2022 71 40 - 150 U/L Final     Comment:     Female:   0-3 years  110-320 U/L  4-9 years  150-420 U/L  10-11 years  130-560 U/L  12-13 years  105-420 U/L  14-15 years   U/L  16 years  and older   U/L      Male:  0-3 years  110-320 U/L  4-9 years  150-420 U/L  10-15 years  130-530 U/L  16-19 years   U/L  20 years and older   U/L     05/12/2021 61 40 - 150 U/L Final     ALT   Date Value Ref Range Status   03/29/2022 20 0 - 70 U/L Final     Comment:     Female   All ages0-50 U/L    Male   0 - 19 years      0-50 U/L  20 years and older0-70 U/L          05/12/2021 28 0 - 70 U/L Final     AST   Date Value Ref Range Status   03/29/2022 11 0 - 45 U/L Final   05/12/2021 20 0 - 45 U/L Final     Recent Labs   Lab Test 05/03/22  0950 05/12/21  0842 05/09/16  0925 09/28/15  1523   CHOL 103 124   < > 143   HDL 32* 31*   < > 32*   LDL 61 70   < > 67   TRIG 50 116   < > 221*   CHOLHDLRATIO  --   --   --  4.5    < > = values in this interval not displayed.      Lab Results   Component Value Date    A1C 7.3 10/12/2022    A1C 7.0 03/29/2022    A1C 7.2 03/17/2022    A1C 8.6 05/12/2021    A1C 7.7 02/28/2020    A1C 8.8 10/30/2019    A1C 7.0 06/13/2018    A1C 7.1 08/02/2017

## 2022-11-01 NOTE — PATIENT INSTRUCTIONS
November 1, 2022    Thank you for allowing our Cardiology team to participate in your care.     Please note the following changes to your heart treatment plan:     Medication changes:   - change atorvastatin to rosuvastatin 40mg at bedtime   - continue to take torsemide 20mg daily with an extra 20mg as needed for weight gain of 2-3lb/day or 5lb/week    Tests to be done:  - coronary angiogram and possible intervention    Follow up:  - Follow up in about 1 month, or sooner as needed.      For scheduling, please call [phone].    Please contact our team at [phone] (Geraldine SYED) or [phone] for any questions or concerns.     If you are having a medical emergency, please call [phone].     Sincerely,    Brenden Felton MD, [facility]  Cardiology    [facility] - [facility] - [facility] [place]

## 2022-11-12 ENCOUNTER — DOCUMENTATION ONLY (OUTPATIENT)
Dept: LAB | Facility: CLINIC | Age: 67
End: 2022-11-12

## 2022-11-12 NOTE — PROGRESS NOTES
Pt has upcoming lab appt scheduled . Please place orders or contact pt for rescheduling. Thank you.

## 2022-11-14 ENCOUNTER — TELEPHONE (OUTPATIENT)
Dept: CARDIOLOGY | Facility: CLINIC | Age: 67
End: 2022-11-14

## 2022-11-14 DIAGNOSIS — I10 HYPERTENSION GOAL BP (BLOOD PRESSURE) < 140/90: ICD-10-CM

## 2022-11-14 DIAGNOSIS — R06.09 DYSPNEA ON EXERTION: Primary | ICD-10-CM

## 2022-11-14 NOTE — TELEPHONE ENCOUNTER
Coronary angiogram/PCI/Right Heart Cath prep instructions.     Patient is scheduled for a Coronary Angiogram at [facility] - [address], [place] [address] - Main Entrance of the Hospital, on 11/18/2022.  Check in time is at 0630AM and procedure to follow.    Patient instructed to remain NPO for solid foods 8 hours prior to arrival and may have clear liquids up to 2 hours prior to arrival.    Patient is on metformin and has been advised to hold Metformin the day of the procedure. They should continue to hold until after follow up BMP scheduled 11/21/22 is reviewed.  Pt will be called and advised when they can resume their metformin.    Patient is on XARELTO (Eliquis, Pradaxa, Xarelto) and has been advised to hold for 2 days prior to procedure starting 11/16/22.  Patient can resume after the procedure.    Patient is taking torsemide and has been advised to hold this the morning of the procedure.    Patient is not currently taking ASA and has been advised to take one 325 mg tablet the day prior and morning of the procedure.    Pt is not on a SGLT2 inhibitor.    Patient advised to take their other daily medications the morning of the procedure with small sips of water.     Verified patient does not have a contrast allergy.    Verified patient has someone available to drive them home from the hospital and can stay with them for 24 hours after the procedure.     Patient aware that a negative COVID test result is mandatory prior to procedure and should bring result with them the morning of the procedure if not completed at a [place] location.    Patient will check their temperature the morning of procedure and call [facility] at [phone] if temp is >100.0.    Patient is aware of visitor policy.    Patient expresses understanding of above instructions and denies further questions at this time.      MALLIKA Burks RN, BSN.   [facility]   11/15/22 10:02 AM

## 2022-11-14 NOTE — TELEPHONE ENCOUNTER
----- Message from Veronica Jewell sent at 11/1/2022  3:49 PM CDT -----  Regarding: angio  Angiogram Orders    Location: [facility] [address], [place] [address] - Main Entrance of the Hospital    Procedure: Coronary Angiogram    Diagnosis: Dyspnea on Exertion - Anginal equivalent - new onset angina - cardiomyopathy    Procedure Date: 11/18/22    Patient Arrival Time: 06:30 am    Procedure Time: 0830 (pending emergency)    Ordering Cardiologist: Dr. Felton    Performing Cardiologist: Dr. Lucas    Cardiac Assessment Completed: Yes  Date: 11/01/22  Provider: Dr Felton    Pre-Procedure Labs completed: on admit    Post Procedure GABRIELA appointment scheduled: Yes  Date: 12/08/22  Provider: Agatha Olivo    Patient Diabetic on Meds/Insulin:  Yes  If on Metformin, has 2 day post procedure BMP been scheduled: Yes  (Thursday and Friday procedures should have Monday BMP)  Patient on Coumadin/Warfarin:  No  Patient on Pradaxa/Xarelto/Eliquis:  Yes - Xarelto  Patient on Invokana/Farxiga/Jardiance/Steglatro:  No    Does Patient have a history of bypass:  No    If yes, when was it done:   If yes, where was it done:      Pt to complete home COVID testing and will provide negative result day of procedure.    Appointment was scheduled: Face to Face    Special instructions:

## 2022-11-15 DIAGNOSIS — R06.09 DYSPNEA ON EXERTION: Primary | ICD-10-CM

## 2022-11-15 DIAGNOSIS — I25.84 CORONARY ARTERY CALCIFICATION OF NATIVE ARTERY: ICD-10-CM

## 2022-11-15 DIAGNOSIS — I20.89 ANGINAL EQUIVALENT (H): ICD-10-CM

## 2022-11-15 DIAGNOSIS — I25.10 CORONARY ARTERY CALCIFICATION OF NATIVE ARTERY: ICD-10-CM

## 2022-11-15 RX ORDER — SODIUM CHLORIDE 9 MG/ML
INJECTION, SOLUTION INTRAVENOUS CONTINUOUS
Status: CANCELLED | OUTPATIENT
Start: 2022-11-15

## 2022-11-15 RX ORDER — ASPIRIN 325 MG
325 TABLET ORAL ONCE
Status: CANCELLED | OUTPATIENT
Start: 2022-11-15 | End: 2022-11-15

## 2022-11-15 RX ORDER — LIDOCAINE 40 MG/G
CREAM TOPICAL
Status: CANCELLED | OUTPATIENT
Start: 2022-11-15

## 2022-11-15 RX ORDER — ASPIRIN 81 MG/1
243 TABLET, CHEWABLE ORAL ONCE
Status: CANCELLED | OUTPATIENT
Start: 2022-11-15

## 2022-11-15 RX ORDER — POTASSIUM CHLORIDE 750 MG/1
20 TABLET, EXTENDED RELEASE ORAL
Status: CANCELLED | OUTPATIENT
Start: 2022-11-15

## 2022-11-16 DIAGNOSIS — R91.8 ABNORMAL CT SCAN OF LUNG: ICD-10-CM

## 2022-11-16 RX ORDER — FLUTICASONE PROPIONATE AND SALMETEROL XINAFOATE 230; 21 UG/1; UG/1
2 AEROSOL, METERED RESPIRATORY (INHALATION) 2 TIMES DAILY
Qty: 12 G | Refills: 6 | Status: SHIPPED | OUTPATIENT
Start: 2022-11-16 | End: 2022-12-09

## 2022-11-16 NOTE — TELEPHONE ENCOUNTER
Last Written Prescription Date:  7/27/2022  Last Fill Quantity: 12g,  # refills: 3   Last office visit: 7/27/2022 with prescribing provider:  Dr. Hopkins    Future Office Visit:   Next 5 appointments (look out 90 days)    Dec 23, 2022  9:00 AM  Return Visit with Booker Hopkins MD  [facility] ([facility] ) [address] [place] [phone]

## 2022-11-17 ENCOUNTER — TELEPHONE (OUTPATIENT)
Dept: MEDSURG UNIT | Facility: CLINIC | Age: 67
End: 2022-11-17

## 2022-11-18 ENCOUNTER — HOSPITAL ENCOUNTER (OUTPATIENT)
Facility: CLINIC | Age: 67
Discharge: HOME | End: 2022-11-18
Admitting: INTERNAL MEDICINE
Payer: COMMERCIAL

## 2022-11-18 VITALS
TEMPERATURE: 97.6 F | OXYGEN SATURATION: 95 % | SYSTOLIC BLOOD PRESSURE: 104 MMHG | HEIGHT: 70 IN | DIASTOLIC BLOOD PRESSURE: 73 MMHG | HEART RATE: 90 BPM | WEIGHT: 251 LBS | RESPIRATION RATE: 20 BRPM | BODY MASS INDEX: 35.93 KG/M2

## 2022-11-18 DIAGNOSIS — R06.09 DYSPNEA ON EXERTION: ICD-10-CM

## 2022-11-18 DIAGNOSIS — N18.31 STAGE 3A CHRONIC KIDNEY DISEASE (H): Primary | ICD-10-CM

## 2022-11-18 DIAGNOSIS — I25.118 ATHEROSCLEROSIS OF NATIVE CORONARY ARTERY OF NATIVE HEART WITH STABLE ANGINA PECTORIS (H): ICD-10-CM

## 2022-11-18 DIAGNOSIS — I20.89 ANGINAL EQUIVALENT (H): ICD-10-CM

## 2022-11-18 DIAGNOSIS — I25.84 CORONARY ARTERY CALCIFICATION OF NATIVE ARTERY: ICD-10-CM

## 2022-11-18 DIAGNOSIS — I25.10 CORONARY ARTERY CALCIFICATION OF NATIVE ARTERY: ICD-10-CM

## 2022-11-18 DIAGNOSIS — R06.02 SOB (SHORTNESS OF BREATH): ICD-10-CM

## 2022-11-18 PROBLEM — Z98.890 STATUS POST CORONARY ANGIOGRAM: Status: ACTIVE | Noted: 2022-11-18

## 2022-11-18 LAB
ABO/RH(D): NORMAL
ALBUMIN SERPL-MCNC: 3 G/DL (ref 3.4–5)
ALBUMIN UR-MCNC: 50 MG/DL
ALP SERPL-CCNC: 97 U/L (ref 40–150)
ALT SERPL W P-5'-P-CCNC: 27 U/L (ref 0–70)
ANION GAP SERPL CALCULATED.3IONS-SCNC: 5 MMOL/L (ref 3–14)
ANTIBODY SCREEN: NEGATIVE
APPEARANCE UR: CLEAR
APTT PPP: 31 SECONDS (ref 22–38)
AST SERPL W P-5'-P-CCNC: 24 U/L (ref 0–45)
BILIRUB DIRECT SERPL-MCNC: 0.2 MG/DL (ref 0–0.2)
BILIRUB SERPL-MCNC: 0.6 MG/DL (ref 0.2–1.3)
BILIRUB UR QL STRIP: NEGATIVE
BUN SERPL-MCNC: 42 MG/DL (ref 7–30)
CALCIUM SERPL-MCNC: 9.5 MG/DL (ref 8.5–10.1)
CHLORIDE BLD-SCNC: 101 MMOL/L (ref 94–109)
CO2 SERPL-SCNC: 32 MMOL/L (ref 20–32)
COLOR UR AUTO: ABNORMAL
CREAT SERPL-MCNC: 1.91 MG/DL (ref 0.52–1.25)
ERYTHROCYTE [DISTWIDTH] IN BLOOD BY AUTOMATED COUNT: 15.9 % (ref 10–15)
GFR SERPL CREATININE-BSD FRML MDRD: 38 ML/MIN/1.73M2
GLUCOSE BLD-MCNC: 164 MG/DL (ref 70–99)
GLUCOSE UR STRIP-MCNC: NEGATIVE MG/DL
HCT VFR BLD AUTO: 46 % (ref 35–53)
HGB BLD-MCNC: 14.2 G/DL (ref 11.7–17.7)
HGB UR QL STRIP: ABNORMAL
INR PPP: 1.51 (ref 0.85–1.15)
KETONES UR STRIP-MCNC: NEGATIVE MG/DL
LACTATE SERPL-SCNC: 1 MMOL/L (ref 0.7–2)
LEUKOCYTE ESTERASE UR QL STRIP: NEGATIVE
MCH RBC QN AUTO: 25.7 PG (ref 26.5–33)
MCHC RBC AUTO-ENTMCNC: 30.9 G/DL (ref 31.5–36.5)
MCV RBC AUTO: 83 FL (ref 78–100)
NITRATE UR QL: NEGATIVE
PH UR STRIP: 6 [PH] (ref 5–7)
PLATELET # BLD AUTO: 330 10E3/UL (ref 150–450)
POTASSIUM BLD-SCNC: 3.9 MMOL/L (ref 3.4–5.3)
PROT SERPL-MCNC: 7.9 G/DL (ref 6.8–8.8)
RBC # BLD AUTO: 5.52 10E6/UL (ref 3.8–5.9)
RBC URINE: 1 /HPF
SODIUM SERPL-SCNC: 138 MMOL/L (ref 133–144)
SP GR UR STRIP: 1.01 (ref 1–1.03)
SPECIMEN EXPIRATION DATE: NORMAL
UROBILINOGEN UR STRIP-MCNC: 2 MG/DL
WBC # BLD AUTO: 12.8 10E3/UL (ref 4–11)
WBC URINE: 1 /HPF

## 2022-11-18 PROCEDURE — 36415 COLL VENOUS BLD VENIPUNCTURE: CPT | Performed by: INTERNAL MEDICINE

## 2022-11-18 PROCEDURE — 83605 ASSAY OF LACTIC ACID: CPT | Performed by: INTERNAL MEDICINE

## 2022-11-18 PROCEDURE — 999N000071 HC STATISTIC HEART CATH LAB OR EP LAB

## 2022-11-18 PROCEDURE — 258N000003 HC RX IP 258 OP 636: Performed by: INTERNAL MEDICINE

## 2022-11-18 PROCEDURE — 80053 COMPREHEN METABOLIC PANEL: CPT | Performed by: INTERNAL MEDICINE

## 2022-11-18 PROCEDURE — 99152 MOD SED SAME PHYS/QHP 5/>YRS: CPT | Performed by: INTERNAL MEDICINE

## 2022-11-18 PROCEDURE — 999N000054 HC STATISTIC EKG NON-CHARGEABLE

## 2022-11-18 PROCEDURE — 82248 BILIRUBIN DIRECT: CPT | Performed by: SURGERY

## 2022-11-18 PROCEDURE — 250N000009 HC RX 250: Performed by: INTERNAL MEDICINE

## 2022-11-18 PROCEDURE — 36591 DRAW BLOOD OFF VENOUS DEVICE: CPT

## 2022-11-18 PROCEDURE — 93571 IV DOP VEL&/PRESS C FLO 1ST: CPT | Mod: 26 | Performed by: INTERNAL MEDICINE

## 2022-11-18 PROCEDURE — 250N000013 HC RX MED GY IP 250 OP 250 PS 637: Performed by: INTERNAL MEDICINE

## 2022-11-18 PROCEDURE — C1769 GUIDE WIRE: HCPCS | Performed by: INTERNAL MEDICINE

## 2022-11-18 PROCEDURE — 93010 ELECTROCARDIOGRAM REPORT: CPT | Performed by: INTERNAL MEDICINE

## 2022-11-18 PROCEDURE — 99153 MOD SED SAME PHYS/QHP EA: CPT | Performed by: INTERNAL MEDICINE

## 2022-11-18 PROCEDURE — 93571 IV DOP VEL&/PRESS C FLO 1ST: CPT | Mod: 52,LD | Performed by: INTERNAL MEDICINE

## 2022-11-18 PROCEDURE — 93005 ELECTROCARDIOGRAM TRACING: CPT

## 2022-11-18 PROCEDURE — 85027 COMPLETE CBC AUTOMATED: CPT | Performed by: INTERNAL MEDICINE

## 2022-11-18 PROCEDURE — C1894 INTRO/SHEATH, NON-LASER: HCPCS | Performed by: INTERNAL MEDICINE

## 2022-11-18 PROCEDURE — 85730 THROMBOPLASTIN TIME PARTIAL: CPT | Performed by: INTERNAL MEDICINE

## 2022-11-18 PROCEDURE — 250N000011 HC RX IP 250 OP 636: Performed by: INTERNAL MEDICINE

## 2022-11-18 PROCEDURE — 81001 URINALYSIS AUTO W/SCOPE: CPT | Performed by: SURGERY

## 2022-11-18 PROCEDURE — 999N000184 HC STATISTIC TELEMETRY

## 2022-11-18 PROCEDURE — 93460 R&L HRT ART/VENTRICLE ANGIO: CPT | Performed by: INTERNAL MEDICINE

## 2022-11-18 PROCEDURE — 272N000001 HC OR GENERAL SUPPLY STERILE: Performed by: INTERNAL MEDICINE

## 2022-11-18 PROCEDURE — 86901 BLOOD TYPING SEROLOGIC RH(D): CPT | Performed by: SURGERY

## 2022-11-18 PROCEDURE — 93458 L HRT ARTERY/VENTRICLE ANGIO: CPT | Mod: 26 | Performed by: INTERNAL MEDICINE

## 2022-11-18 PROCEDURE — 85610 PROTHROMBIN TIME: CPT | Performed by: INTERNAL MEDICINE

## 2022-11-18 RX ORDER — ASPIRIN 325 MG
325 TABLET ORAL ONCE
Status: COMPLETED | OUTPATIENT
Start: 2022-11-18 | End: 2022-11-18

## 2022-11-18 RX ORDER — FENTANYL CITRATE 50 UG/ML
INJECTION, SOLUTION INTRAMUSCULAR; INTRAVENOUS
Status: DISCONTINUED | OUTPATIENT
Start: 2022-11-18 | End: 2022-11-18 | Stop reason: HOSPADM

## 2022-11-18 RX ORDER — FLUMAZENIL 0.1 MG/ML
0.2 INJECTION, SOLUTION INTRAVENOUS
Status: DISCONTINUED | OUTPATIENT
Start: 2022-11-18 | End: 2022-11-18 | Stop reason: HOSPADM

## 2022-11-18 RX ORDER — METOPROLOL SUCCINATE 100 MG/1
100 TABLET, EXTENDED RELEASE ORAL 2 TIMES DAILY
Status: CANCELLED | OUTPATIENT
Start: 2022-11-18

## 2022-11-18 RX ORDER — LIDOCAINE 40 MG/G
CREAM TOPICAL
Status: DISCONTINUED | OUTPATIENT
Start: 2022-11-18 | End: 2022-11-18 | Stop reason: HOSPADM

## 2022-11-18 RX ORDER — OXYCODONE HYDROCHLORIDE 5 MG/1
10 TABLET ORAL EVERY 4 HOURS PRN
Status: DISCONTINUED | OUTPATIENT
Start: 2022-11-18 | End: 2022-11-18 | Stop reason: HOSPADM

## 2022-11-18 RX ORDER — ATROPINE SULFATE 0.1 MG/ML
0.5 INJECTION INTRAVENOUS
Status: DISCONTINUED | OUTPATIENT
Start: 2022-11-18 | End: 2022-11-18 | Stop reason: HOSPADM

## 2022-11-18 RX ORDER — POTASSIUM CHLORIDE 1500 MG/1
20 TABLET, EXTENDED RELEASE ORAL
Status: DISCONTINUED | OUTPATIENT
Start: 2022-11-18 | End: 2022-11-18 | Stop reason: HOSPADM

## 2022-11-18 RX ORDER — DILTIAZEM HYDROCHLORIDE 360 MG/1
360 CAPSULE, EXTENDED RELEASE ORAL DAILY
Status: CANCELLED | OUTPATIENT
Start: 2022-11-18

## 2022-11-18 RX ORDER — ROSUVASTATIN CALCIUM 20 MG/1
40 TABLET, COATED ORAL AT BEDTIME
Status: CANCELLED | OUTPATIENT
Start: 2022-11-18

## 2022-11-18 RX ORDER — VERAPAMIL HYDROCHLORIDE 2.5 MG/ML
INJECTION, SOLUTION INTRAVENOUS
Status: DISCONTINUED | OUTPATIENT
Start: 2022-11-18 | End: 2022-11-18 | Stop reason: HOSPADM

## 2022-11-18 RX ORDER — FENTANYL CITRATE 50 UG/ML
25 INJECTION, SOLUTION INTRAMUSCULAR; INTRAVENOUS
Status: DISCONTINUED | OUTPATIENT
Start: 2022-11-18 | End: 2022-11-18 | Stop reason: HOSPADM

## 2022-11-18 RX ORDER — SODIUM CHLORIDE 9 MG/ML
INJECTION, SOLUTION INTRAVENOUS CONTINUOUS
Status: DISCONTINUED | OUTPATIENT
Start: 2022-11-18 | End: 2022-11-18 | Stop reason: HOSPADM

## 2022-11-18 RX ORDER — OXYCODONE HYDROCHLORIDE 5 MG/1
5 TABLET ORAL EVERY 4 HOURS PRN
Status: DISCONTINUED | OUTPATIENT
Start: 2022-11-18 | End: 2022-11-18 | Stop reason: HOSPADM

## 2022-11-18 RX ORDER — NALOXONE HYDROCHLORIDE 0.4 MG/ML
0.4 INJECTION, SOLUTION INTRAMUSCULAR; INTRAVENOUS; SUBCUTANEOUS
Status: DISCONTINUED | OUTPATIENT
Start: 2022-11-18 | End: 2022-11-18 | Stop reason: HOSPADM

## 2022-11-18 RX ORDER — ALBUTEROL SULFATE 90 UG/1
2 AEROSOL, METERED RESPIRATORY (INHALATION) EVERY 6 HOURS PRN
Status: CANCELLED | OUTPATIENT
Start: 2022-11-18

## 2022-11-18 RX ORDER — NALOXONE HYDROCHLORIDE 0.4 MG/ML
0.2 INJECTION, SOLUTION INTRAMUSCULAR; INTRAVENOUS; SUBCUTANEOUS
Status: DISCONTINUED | OUTPATIENT
Start: 2022-11-18 | End: 2022-11-18 | Stop reason: HOSPADM

## 2022-11-18 RX ORDER — ASPIRIN 81 MG/1
243 TABLET, CHEWABLE ORAL ONCE
Status: COMPLETED | OUTPATIENT
Start: 2022-11-18 | End: 2022-11-18

## 2022-11-18 RX ORDER — HEPARIN SODIUM 1000 [USP'U]/ML
INJECTION, SOLUTION INTRAVENOUS; SUBCUTANEOUS
Status: DISCONTINUED | OUTPATIENT
Start: 2022-11-18 | End: 2022-11-18 | Stop reason: HOSPADM

## 2022-11-18 RX ORDER — LISINOPRIL 40 MG/1
40 TABLET ORAL DAILY
Status: CANCELLED | OUTPATIENT
Start: 2022-11-18

## 2022-11-18 RX ORDER — IOPAMIDOL 755 MG/ML
INJECTION, SOLUTION INTRAVASCULAR
Status: DISCONTINUED | OUTPATIENT
Start: 2022-11-18 | End: 2022-11-18 | Stop reason: HOSPADM

## 2022-11-18 RX ORDER — NITROGLYCERIN 5 MG/ML
VIAL (ML) INTRAVENOUS
Status: DISCONTINUED | OUTPATIENT
Start: 2022-11-18 | End: 2022-11-18 | Stop reason: HOSPADM

## 2022-11-18 RX ORDER — ACETAMINOPHEN 325 MG/1
650 TABLET ORAL EVERY 4 HOURS PRN
Status: DISCONTINUED | OUTPATIENT
Start: 2022-11-18 | End: 2022-11-18 | Stop reason: HOSPADM

## 2022-11-18 RX ADMIN — ASPIRIN 325 MG ORAL TABLET 325 MG: 325 PILL ORAL at 08:06

## 2022-11-18 RX ADMIN — SODIUM CHLORIDE 150 ML/HR: 9 INJECTION, SOLUTION INTRAVENOUS at 07:26

## 2022-11-18 ASSESSMENT — ACTIVITIES OF DAILY LIVING (ADL)
ADLS_ACUITY_SCORE: 35

## 2022-11-18 NOTE — DISCHARGE INSTRUCTIONS
Cardiac Angiogram Discharge Instructions - Radial    After you go home:    Have an adult stay with you until tomorrow.  Drink extra fluids for 2 days.  You may resume your normal diet.  No smoking       For 24 hours - due to the sedation you received:  Relax and take it easy.  Do NOT make any important or legal decisions.  Do NOT drive or operate machines at home or at work.  Do NOT drink alcohol.    Care of Wrist Puncture Site:    For the first 24 hrs - check the puncture site every 1-2 hours while awake.  It is normal to have soreness at the puncture site and mild tingling in your hand for up to 3 days.  Remove the bandaid after 24 hours. If there is minor oozing, apply another bandaid and remove it after 12 hours.  You may shower tomorrow.  Do NOT take a bath, or use a hot tub or pool for at least 3 days. Do NOT scrub the site. Do not use lotion or powder near the puncture site.           Activity:        For 2 days:   do not use your hand or arm to support your weight (such as rising from a chair)   do not bend your wrist (such as lifting a garage door).  do not lift more than 5 pounds or exercise your arm (such as tennis, golf or bowling).  Do NOT do any heavy activity such as exercise, lifting, or straining.     Bleeding:    If you start bleeding from the site in your wrist, sit down and press firmly on/above the site for 10 minutes.   Once bleeding stops, keep arm still for 2 hours.   Call [facility] as soon as you can.       Call 911 right away if you have heavy bleeding or bleeding that does not stop.      Medicines:    If you are taking an antiplatelet medication such as Plavix, Brilinta or Effient, do not stop taking it until you talk to your cardiologist.      If you are on Metformin (Glucophage), do not restart it until you have blood tests (within 2 to 3 days after discharge).  After you have your blood drawn, you may restart the Metformin.   Take your medications, including blood thinners, unless your  provider tells you not to.    If you take Coumadin (Warfarin), have your INR checked by your provider in  3-5 days. Call your clinic to schedule this.  If you have stopped any medicines, check with your provider about when to restart them.    Follow Up Appointments:    Follow up with [facility] Nurse Practitioner at [facility] of patient preference in 7-10 days.    Call the clinic if:    You have a large or growing hard lump around the site.  The site is red, swollen, hot or tender.  Blood or fluid is draining from the site.  You have chills or a fever greater than 101 F (38 C).  Your arm feels numb, cool or changes color.  You have hives, a rash or unusual itching.  Any questions or concerns.          [facility] [place]:    [phone] [facility] (7 days a week)

## 2022-11-18 NOTE — PRE-PROCEDURE
GENERAL PRE-PROCEDURE:   Procedure:  Coronary angiogram, left heart cath, possible intervention  Date/Time:  11/18/2022 8:05 AM    Written consent obtained?: Yes    Risks and benefits: Risks, benefits and alternatives were discussed    Consent given by:  Patient  Patient states understanding of procedure being performed: Yes    Patient's understanding of procedure matches consent: Yes    Procedure consent matches procedure scheduled: Yes    Expected level of sedation:  Moderate  Appropriately NPO:  Yes  Mallampati  :  Grade 3- soft palate visible, posterior pharyngeal wall not visible  Lungs:  Wheezes  Heart:  A-fib  History & Physical reviewed:  History and physical reviewed and no updates needed  Statement of review:  I have reviewed the lab findings, diagnostic data, medications, and the plan for sedation

## 2022-11-18 NOTE — Clinical Note
Potential access sites were evaluated for patency using ultrasound.   The left radial artery was selected. Access was obtained under with Sonosite guidance using a standard 18 guage needle with direct visualization of needle entry.

## 2022-11-18 NOTE — H&P
[facility]  Cardiology Consultation     Date of Admission:  11/18/2022    Primary Care Physician   [facility]    Reason for Consult   Patient is referred to the cardiac catheterization lab because of dyspnea on exertion, coronary calcium seen on a CT scan and multiple risk factors.    History of Present Illness   Booker Brown is a 67 year old gentleman who follows with my partner Dr. Brenden Felton.  He has hypertension, hyperlipidemia, diabetes mellitus type 2, chronic kidney disease, permanent atrial fibrillation, mixed severe obstructive restrictive lung disease followed by Dr. Wynn who now is referred to the cardiac catheterization lab for further evaluation treatment.  He has no chest pain syndrome.  Due to body habitus, A. fib and COPD it was thought noninvasive studies would be fraught with problems and was referred directly to the cardiac catheterization lab.  He has no family history of coronary disease.  He has no chest pain syndrome.    Assessment & Plan   Booker Brown is a 67 year old adult who was admitted on 11/18/2022 with multiple risk factors and a CT scan demonstrating coronary calcium, now referred to the Cath Lab for further evaluation treatment.  I discussed risk, benefits and alternatives with the patient and his wife.  They appear to understand desire to proceed.    Note his creatinine has risen to 1.9.  BUN is 42.  I suspect he is intravascular volume depleted.  I will hydrate him.  At discharge I will decrease his torsemide further.  He states that torsemide did not help his shortness of breath at all.  It is already been decreased once from 20 mg twice daily to 20 mg daily.  I also told him that given his renal insufficiency will have to be conservative with contrast and is possible we do diagnostic only and no intervention.  He and his wife are okay with that.    He is tachycardic and white count is mildly elevated.  Per protocol a  "lactate will be checked to look for early sepsis.  Patient clinically looks well.    He is in permanent A. fib.  We discussed the fact that if a stent was placed he would be on Xarelto plus Plavix and we would discontinue aspirin to avoid triple therapy.    Clinically Significant Risk Factors Present on Admission               # Drug Induced Coagulation Defect: home medication list includes an anticoagulant medication    # DMII: A1C = 7.3 % (Ref range: 0.0 - 5.6 %) within past 3 months  # Obesity: Estimated body mass index is 36.01 kg/m  as calculated from the following:    Height as of this encounter: 1.778 m (5' 10\").    Weight as of this encounter: 113.9 kg (251 lb).    Cardiovascular: Cardiac Arrhythmia: Atrial fibrillation: Permanent        Nephrology: Acute kidney failure, unspecified            Mason Lucas MD, MD    Patient Active Problem List   Diagnosis     erectile dysfunction     Hyperlipidemia LDL goal <70     GERD (gastroesophageal reflux disease)     Essential hypertension     Advanced directives, counseling/discussion     Type 2 diabetes with circulatory disorder causing erectile dysfunction (H)     Type 2 diabetes mellitus with hyperglycemia (H)     Chronic kidney disease, stage 3 (H)     Persistent atrial fibrillation (H)     Morbid obesity (H)     Atrial fibrillation with RVR (H)       Past Medical History   I have reviewed this patient's medical history and updated it with pertinent information if needed.   Past Medical History:   Diagnosis Date     Diabetes (H)      HTN      Hyperlipidemia        Past Surgical History   I have reviewed this patient's surgical history and updated it with pertinent information if needed.  Past Surgical History:   Procedure Laterality Date     COLONOSCOPY N/A 8/24/2018    Procedure: COMBINED COLONOSCOPY, SINGLE OR MULTIPLE BIOPSY/POLYPECTOMY BY BIOPSY;;  Surgeon: Lawrence Mata MD;  Location:  GI       Prior to Admission Medications   Prior to Admission " Medications   Prescriptions Last Dose Informant Patient Reported? Taking?   albuterol (PROAIR HFA/PROVENTIL HFA/VENTOLIN HFA) 108 (90 Base) MCG/ACT inhaler Past Week  No Yes   Sig: Inhale 2 puffs into the lungs every 6 hours as needed for shortness of breath / dyspnea or wheezing   alcohol swab prep pads   No No   Sig: Use to swab area of injection/fariha as directed.   blood glucose (NO BRAND SPECIFIED) test strip   No No   Sig: Use to test blood sugar 1 times daily or as directed. To accompany: Blood Glucose Monitor Brands: per insurance.   blood glucose calibration (NO BRAND SPECIFIED) solution   No No   Sig: To accompany: Blood Glucose Monitor Brands: per insurance.   blood glucose monitoring (NO BRAND SPECIFIED) meter device kit   No No   Sig: Use to test blood sugar 1 times daily or as directed. : per insurance.   diltiazem ER COATED BEADS (CARDIZEM CD) 360 MG 24 hr capsule 11/17/2022  No Yes   Sig: Take 1 capsule (360 mg) by mouth daily   fluticasone-salmeterol (ADVAIR-HFA) 230-21 MCG/ACT inhaler 11/18/2022  No Yes   Sig: Inhale 2 puffs into the lungs 2 times daily   glipiZIDE (GLUCOTROL XL) 10 MG 24 hr tablet 11/18/2022  No Yes   Sig: TAKE 1 TABLET BY MOUTH EVERY DAY   lisinopril (ZESTRIL) 40 MG tablet 11/18/2022  No Yes   Sig: Take 1 tablet (40 mg) by mouth daily   metFORMIN (GLUCOPHAGE-XR) 500 MG 24 hr tablet Past Week  No Yes   Sig: Take 2 tablets (1,000 mg) by mouth daily (with dinner)   metoprolol succinate ER (TOPROL XL) 100 MG 24 hr tablet 11/18/2022  No Yes   Sig: Take 1 tablet (100 mg) by mouth 2 times daily   rivaroxaban ANTICOAGULANT (XARELTO ANTICOAGULANT) 20 MG TABS tablet Past Week  No Yes   Sig: Take 1 tablet (20 mg) by mouth daily (with dinner)   rosuvastatin (CRESTOR) 40 MG tablet 11/17/2022  No Yes   Sig: Take 1 tablet (40 mg) by mouth At Bedtime   thin (NO BRAND SPECIFIED) lancets   No No   Sig: Use with lanceting device. To accompany: Blood Glucose Monitor Brands: per insurance.  "  torsemide (DEMADEX) 20 MG tablet 11/17/2022  No Yes   Sig: Take 1 tablet (20 mg) by mouth 2 times daily   Patient taking differently: Take 20 mg by mouth daily Only taking 1 per day      Facility-Administered Medications: None     Current Facility-Administered Medications   Medication Dose Route Frequency     sodium chloride (PF)  3 mL Intracatheter Q8H     Current Facility-Administered Medications   Medication Last Rate     - MEDICATION INSTRUCTIONS -       sodium chloride Stopped (11/18/22 0826)     sodium chloride 250 mL/hr (11/18/22 0820)     Allergies   Allergies   Allergen Reactions     No Known Allergies        Social History    reports that he quit smoking about 35 years ago. His smoking use included cigarettes. He started smoking about 52 years ago. He has a 48.00 pack-year smoking history. He has never used smokeless tobacco. He reports that he does not drink alcohol and does not use drugs.    Family History   Family History   Problem Relation Age of Onset     Diabetes Mother         dx at 70     Bladder Cancer Brother        Review of Systems   The comprehensive 10 point Review of Systems is negative other than noted in the HPI or here.     Physical Exam   Vital Signs with Ranges  Temp:  [97.6  F (36.4  C)] 97.6  F (36.4  C)  Pulse:  [110] 110  Resp:  [22] 22  BP: (114-125)/(77-78) 125/78  SpO2:  [93 %] 93 %  Wt Readings from Last 4 Encounters:   11/18/22 113.9 kg (251 lb)   11/01/22 118.8 kg (262 lb)   10/11/22 120.8 kg (266 lb 4.8 oz)   08/22/22 121.6 kg (268 lb)     No intake/output data recorded.      Vitals: /78 (BP Location: Right arm)   Pulse 110   Temp 97.6  F (36.4  C) (Oral)   Resp 22   Ht 1.778 m (5' 10\")   Wt 113.9 kg (251 lb)   SpO2 93%   BMI 36.01 kg/m      Patient is comfortable and in no apparent distress.   Pupils are equal round and reactive.  Sclerae anicteric conjunctiva is noninjected  Neck is supple there are no carotid bruits or jugular venous distention.  Resp " bilateral wheezes  MS There is no kyphosis or scoliosis  Cardiac tachycardic irregularly irregular  GI abdomen is soft nontender with normoactive bowel sounds.  Extremities bilateral trace edema  Neurologic exam is nonfocal.  Awake, alert and oriented ×3  Psych. Pt is calm  Skin is warm and dry.      No lab results found in last 7 days.    Invalid input(s): TROPONINIES    Recent Labs   Lab 11/18/22  0714   WBC 12.8*   HGB 14.2   MCV 83      INR 1.51*      POTASSIUM 3.9   CHLORIDE 101   CO2 32   BUN 42*   CR 1.91*   GFRESTIMATED 38*   ANIONGAP 5   KURT 9.5   *     Recent Labs   Lab Test 05/03/22  0950 05/12/21  0842 05/09/16  0925 09/28/15  1523   CHOL 103 124   < > 143   HDL 32* 31*   < > 32*   LDL 61 70   < > 67   TRIG 50 116   < > 221*   CHOLHDLRATIO  --   --   --  4.5    < > = values in this interval not displayed.     Recent Labs   Lab 11/18/22 0714   WBC 12.8*   HGB 14.2   HCT 46.0   MCV 83        No results for input(s): PH, PHV, PO2, PO2V, SAT, PCO2, PCO2V, HCO3, HCO3V in the last 168 hours.  No results for input(s): NTBNPI, NTBNP in the last 168 hours.  No results for input(s): DD in the last 168 hours.  No results for input(s): SED, CRP in the last 168 hours.  Recent Labs   Lab 11/18/22  0714        No results for input(s): TSH in the last 168 hours.  No results for input(s): COLOR, APPEARANCE, URINEGLC, URINEBILI, URINEKETONE, SG, UBLD, URINEPH, PROTEIN, UROBILINOGEN, NITRITE, LEUKEST, RBCU, WBCU in the last 168 hours.    Imaging:  No results found for this or any previous visit (from the past 48 hour(s)).    Echo:  No results found for this or any previous visit (from the past 4320 hour(s)).

## 2022-11-18 NOTE — RESULT ENCOUNTER NOTE
Results reviewed, not what we were hoping for but I'm glad we found what we did, looks like Dr. Lucas already set him up to see CT surgery, please have him get an updated TTE, ideally before he sees CT surgery if possible, thanks!

## 2022-11-18 NOTE — PROGRESS NOTES
Care Suites Post Procedure Note    Patient Information  Name: Booker Brown  Age: 67 year old    Post Procedure- Heart Cath - [facility] consult   Time patient returned to Care Suites: 1005  Concerns/abnormal assessment:   TR band to left wrist- 10 ml   Painfree   If abnormal assessment, provider notified: N/A  Plan/Other:   Post procedure TR band site and VS checks   1330- TR band off - site CDI       Care Suites Discharge Nursing Note      Discharge Education:  Discharge instructions reviewed: Yes  AVS reviewed - [facility] consult next week   Additional education/resources provided: [facility] consult info  Patient/patient representative verbalizes understanding: Yes  Patient discharging on new medications: No  No new meds stop Demadex   Medication education completed: N/A    Discharge Plans:   Discharge location: home  Discharge ride contacted: Yes  Approximate discharge time: 1400    Discharge Criteria:  Discharge criteria met and vital signs stable: Yes    Patient Belongs:  Patient belongings returned to patient: Yes    Pietro Meredith RN

## 2022-11-18 NOTE — PROGRESS NOTES
Care Suites Admission Nursing Note    Patient Information  Name: Booker Brown  Age: 67 year old  Reason for admission: Heart Cath  Care Suites arrival time: 0645    Visitor Information  Name: Jennifer    Informed of visitor restrictions: Yes  1 visitor allowed per patient   Visitor must screen negative for COVID symptoms   Visitor must wear a mask  Waiting rooms closed to visitors    Patient Admission/Assessment   Pre-procedure assessment complete: Yes  If abnormal assessment/labs, provider notified: Yes  K 3.9  Holding   IV fluids increased to 250 cc  Lactic protocol fired- lab drawn prior to cath   NPO: Yes  Medications held per instructions/orders: Yes  Metformin and Xarelto last dose was Weds.   Consent: obtained  Patient oriented to room: Yes  Education/questions answered: Yes  Plan/other: Heart Cath with Dr. Lucas   Noted patients kidney labs are elevated- patient aware     Discharge Planning  Discharge name/phone number: Geeta  Spouse   [phone]  Overnight post sedation caregiver: spouse   Discharge location: home    Pietro Meredith RN

## 2022-11-21 ENCOUNTER — LAB (OUTPATIENT)
Dept: LAB | Facility: CLINIC | Age: 67
End: 2022-11-21
Payer: COMMERCIAL

## 2022-11-21 DIAGNOSIS — N18.31 STAGE 3A CHRONIC KIDNEY DISEASE (H): ICD-10-CM

## 2022-11-21 LAB
ANION GAP SERPL CALCULATED.3IONS-SCNC: 14 MMOL/L (ref 7–15)
ATRIAL RATE - MUSE: 104 BPM
BUN SERPL-MCNC: 31.2 MG/DL (ref 8–23)
CALCIUM SERPL-MCNC: 9.3 MG/DL (ref 8.8–10.2)
CHLORIDE SERPL-SCNC: 106 MMOL/L (ref 98–107)
CREAT SERPL-MCNC: 1.64 MG/DL (ref 0.51–1.17)
DEPRECATED HCO3 PLAS-SCNC: 24 MMOL/L (ref 22–29)
DIASTOLIC BLOOD PRESSURE - MUSE: NORMAL MMHG
GFR SERPL CREATININE-BSD FRML MDRD: 46 ML/MIN/1.73M2
GLUCOSE SERPL-MCNC: 118 MG/DL (ref 70–99)
INTERPRETATION ECG - MUSE: NORMAL
P AXIS - MUSE: NORMAL DEGREES
POTASSIUM SERPL-SCNC: 4 MMOL/L (ref 3.4–5.3)
PR INTERVAL - MUSE: NORMAL MS
QRS DURATION - MUSE: 106 MS
QT - MUSE: 368 MS
QTC - MUSE: 488 MS
R AXIS - MUSE: 77 DEGREES
SODIUM SERPL-SCNC: 144 MMOL/L (ref 136–145)
SYSTOLIC BLOOD PRESSURE - MUSE: NORMAL MMHG
T AXIS - MUSE: 31 DEGREES
VENTRICULAR RATE- MUSE: 106 BPM

## 2022-11-21 PROCEDURE — 36415 COLL VENOUS BLD VENIPUNCTURE: CPT

## 2022-11-21 PROCEDURE — 80048 BASIC METABOLIC PNL TOTAL CA: CPT

## 2022-11-22 ENCOUNTER — DOCUMENTATION ONLY (OUTPATIENT)
Dept: CARDIOLOGY | Facility: CLINIC | Age: 67
End: 2022-11-22

## 2022-11-22 ENCOUNTER — OFFICE VISIT (OUTPATIENT)
Dept: CARDIOLOGY | Facility: CLINIC | Age: 67
End: 2022-11-22
Payer: COMMERCIAL

## 2022-11-22 VITALS
OXYGEN SATURATION: 92 % | DIASTOLIC BLOOD PRESSURE: 77 MMHG | HEIGHT: 70 IN | HEART RATE: 94 BPM | SYSTOLIC BLOOD PRESSURE: 143 MMHG | WEIGHT: 261.1 LBS | BODY MASS INDEX: 37.38 KG/M2

## 2022-11-22 DIAGNOSIS — I25.118 ATHEROSCLEROSIS OF NATIVE CORONARY ARTERY OF NATIVE HEART WITH STABLE ANGINA PECTORIS (H): ICD-10-CM

## 2022-11-22 DIAGNOSIS — I25.10 CORONARY ARTERY CALCIFICATION OF NATIVE ARTERY: Primary | ICD-10-CM

## 2022-11-22 DIAGNOSIS — I25.84 CORONARY ARTERY CALCIFICATION OF NATIVE ARTERY: Primary | ICD-10-CM

## 2022-11-22 PROCEDURE — 99204 OFFICE O/P NEW MOD 45 MIN: CPT | Performed by: SURGERY

## 2022-11-22 NOTE — PROGRESS NOTES
Met with patient in consultation with Dr. West. Plan for patient to follow up with pulmonologist,  appointment is scheduled for 12/23.  Surgery not offered at this time. Patient given contact information for additional questions.     WOLFGANG MORALES RN  Care Coordinator - [facility] CV Surgery   [facility]  [phone]

## 2022-11-22 NOTE — PROGRESS NOTES
Cardiothoracic Surgery Consult    Date of Service: 11/22/2022    REFERRING CARDIOLOGIST:  Dr. Brenden Felton    REASON FOR CONSULTATION: shortness of breath     HISTORY OF PRESENT ILLNESS: Mr. Brown is a 67 year old male with longstanding shortness of breath which has recently worsened after COVID. He denies any chest pain. States he gets short of breath with small distances in house. He denied any syncope, jaw or shoulder pain.    He was sent to cath lab for angiogram which showed  of the RCA,  distal and 50% IFR 0.68.      IMPRESSION AND PLAN: Mr. Brown is a 67 year old male with severe mixed pulmonary disease with FEV1 38% with coronary disease. I had a long discussion with patient correlating his expectations and the reasons to offer him surgical coronary revascularization. I stated he definitely had coronary disease that should be corrected. However, I explained that this will likely not fix his shortness of breath which is his main complaint.     With his current pulmonary function, his risk of prolonged ventilation, and risk of requiring tracheostomy due to impaired pulmonary function is high.    I discussed with Dr. Brenden Felton that offering a CAB may modify his cardiovascular risk but unlikely to completely fix his dyspnea.  I suggest that we discuss offering PCI to LAD since it's supplying collaterals to circumflex and RCA regions. This would avoid risk of pulmonary complications compared to CABG.    Patient and wife agree that we will discuss PCI options if feasible but surgery is not a great option due to high pulmonary complication risk.    Thank you very much for this referral.     Te West MD   Cardiothoracic Surgery  P: [phone]  C: [phone]      PAST MEDICAL HISTORY:   Past Medical History:   Diagnosis Date     Diabetes (H)      HTN      Hyperlipidemia        PAST SURGICAL HISTORY:   Past Surgical History:   Procedure Laterality Date     COLONOSCOPY N/A 8/24/2018    Procedure:  COMBINED COLONOSCOPY, SINGLE OR MULTIPLE BIOPSY/POLYPECTOMY BY BIOPSY;;  Surgeon: Lawrence Mata MD;  Location:  GI     CV CORONARY ANGIOGRAM N/A 11/18/2022    Procedure: Coronary Angiogram;  Surgeon: Mason Lucas MD;  Location:  HEART CARDIAC CATH LAB     CV INSTANTANEOUS WAVE-FREE RATIO N/A 11/18/2022    Procedure: Instantaneous Wave-Free Ratio;  Surgeon: Mason Lucas MD;  Location:  HEART CARDIAC CATH LAB     CV LEFT HEART CATH N/A 11/18/2022    Procedure: Left Heart Catheterization;  Surgeon: Mason Lucas MD;  Location:  HEART CARDIAC CATH LAB       ALLERGIES:   Allergies   Allergen Reactions     No Known Allergies         CURRENT MEDICATIONS:  Current Outpatient Medications   Medication     alcohol swab prep pads     blood glucose (NO BRAND SPECIFIED) test strip     blood glucose calibration (NO BRAND SPECIFIED) solution     blood glucose monitoring (NO BRAND SPECIFIED) meter device kit     diltiazem ER COATED BEADS (CARDIZEM CD) 360 MG 24 hr capsule     fluticasone-salmeterol (ADVAIR-HFA) 230-21 MCG/ACT inhaler     glipiZIDE (GLUCOTROL XL) 10 MG 24 hr tablet     lisinopril (ZESTRIL) 40 MG tablet     metFORMIN (GLUCOPHAGE-XR) 500 MG 24 hr tablet     metoprolol succinate ER (TOPROL XL) 100 MG 24 hr tablet     rivaroxaban ANTICOAGULANT (XARELTO ANTICOAGULANT) 20 MG TABS tablet     rosuvastatin (CRESTOR) 40 MG tablet     thin (NO BRAND SPECIFIED) lancets     albuterol (PROAIR HFA/PROVENTIL HFA/VENTOLIN HFA) 108 (90 Base) MCG/ACT inhaler     No current facility-administered medications for this visit.         FAMILY HISTORY:   Family History   Problem Relation Age of Onset     Diabetes Mother         dx at 70     Bladder Cancer Brother      The family history was reviewed and is not pertinent to the patient's disease/illness      SOCIAL HISTORY:   Social History     Socioeconomic History     Marital status:      Spouse name: Not on file     Number of children: Not on file  "    Years of education: Not on file     Highest education level: Not on file   Occupational History     Not on file   Tobacco Use     Smoking status: Former     Packs/day: 3.00     Years: 16.00     Pack years: 48.00     Types: Cigarettes     Start date:      Quit date: 1987     Years since quittin.2     Smokeless tobacco: Never   Substance and Sexual Activity     Alcohol use: No     Drug use: No     Sexual activity: Yes     Partners: Female   Other Topics Concern     Parent/sibling w/ CABG, MI or angioplasty before 65F 55M? Yes      Service No     Blood Transfusions No     Caffeine Concern No     Occupational Exposure No     Hobby Hazards No     Sleep Concern No     Stress Concern No     Weight Concern Yes     Special Diet No     Back Care No     Exercise Yes     Bike Helmet No     Comment: n/a     Seat Belt Yes     Self-Exams No   Social History Narrative     Not on file     Social Determinants of Health     Financial Resource Strain: Not on file   Food Insecurity: Not on file   Transportation Needs: Not on file   Physical Activity: Not on file   Stress: Not on file   Social Connections: Not on file   Intimate Partner Violence: Not on file   Housing Stability: Not on file         REVIEW OF SYSTEMS:  Review of Systems - Negative except shortness of breath  A complete 10 point review of systems was obtained and is negative other than the above stated complaints    PHYSICAL EXAMINATION:   Vitals: BP (!) 143/77 (BP Location: Right arm, Patient Position: Sitting)   Pulse 94   Ht 1.778 m (5' 10\")   Wt 118.4 kg (261 lb 1.6 oz)   SpO2 92%   BMI 37.46 kg/m    GENERAL:  Well developed and well nourished   HEENT: conjunctiva anicteric and sclera clear   NECK: no JVD  RESPIRATIONS: dyspnic, accessory muscle breathing at time after long sentences  ABDOMEN: soft, distended  NEUROLOGIC: intact and symmetric with no focal deficits.   PSYCHIATRIC: alert and oriented x3, pleasant     LABORATORY STUDIES: "   Lab Results   Component Value Date    WBC 12.8 (H) 11/18/2022    HGB 14.2 11/18/2022    HCT 46.0 11/18/2022    MCV 83 11/18/2022     11/18/2022      Lab Results   Component Value Date    BUN 31.2 (H) 11/21/2022     11/21/2022    CO2 24 11/21/2022     CARDIAC CATHETERIZATION: This study was personally reviewed by me.    TRANSTHORACIC ECHOCARDIOGRAM: This study was personally reviewed by me.

## 2022-11-23 ENCOUNTER — TELEPHONE (OUTPATIENT)
Dept: PULMONOLOGY | Facility: CLINIC | Age: 67
End: 2022-11-23

## 2022-11-23 DIAGNOSIS — R91.8 ABNORMAL CT SCAN OF LUNG: ICD-10-CM

## 2022-11-23 RX ORDER — ALBUTEROL SULFATE 90 UG/1
2 AEROSOL, METERED RESPIRATORY (INHALATION) EVERY 6 HOURS PRN
Qty: 18 G | Refills: 7 | Status: SHIPPED | OUTPATIENT
Start: 2022-11-23 | End: 2023-01-01

## 2022-11-23 NOTE — RESULT ENCOUNTER NOTE
Results reviewed, please let the patient know that overall findings are reassuring, kidney function has improved since stopping torsemide.  He should monitor for symptoms of worsening dyspnea and weight gain/lower extremity swelling, may need to restart this at a lower dose as needed.      [facility] is [facility] in follow up soon, I discussed case with Dr. West with CT surgery yesterday.  Unfortunately bypass surgery would be associated with a higher risk of prolonged ventilation with risk of requiring tracheostomy due to baseline severe lung disease.  Recommended referral to [facility] clinic for consideration of PCI to the LAD since it is supplying collaterals to the left circumflex and RCA regions thanks!

## 2022-11-23 NOTE — TELEPHONE ENCOUNTER
Health Call Center    Phone Message    May a detailed message be left on voicemail: yes     Reason for Call: Medication Refill Request    Has the patient contacted the pharmacy for the refill? Yes   Name of medication being requested:    albuterol (PROAIR HFA/PROVENTIL HFA/VENTOLIN HFA) 108 (90 Base) MCG/ACT inhaler  Provider who prescribed the medication: Dr Hopkins  Pharmacy: [facility]Y [id number] [place] [phone] [facility]  Date medication is needed: ASAP - pt is out.          Action Taken: Message routed to:  Other: Cielo Muir    Travel Screening: Not Applicable

## 2022-11-29 ENCOUNTER — TELEPHONE (OUTPATIENT)
Dept: CARDIOLOGY | Facility: CLINIC | Age: 67
End: 2022-11-29

## 2022-11-29 DIAGNOSIS — I25.118 ATHEROSCLEROSIS OF NATIVE CORONARY ARTERY OF NATIVE HEART WITH STABLE ANGINA PECTORIS (H): Primary | ICD-10-CM

## 2022-11-29 NOTE — TELEPHONE ENCOUNTER
Call placed to pt to review plan for  / [facility] consult. Pt in agreement with consultation. Message to scheduling to arrange.   MALLIKA Burks RN, BSN.          Message  Received: 6 days ago  Brenden Felton MD Porter, Sarah, RN  Cc: [facility] Olivo, CNP  Results reviewed, please let the patient know that overall findings are reassuring, kidney function has improved since stopping torsemide.  He should monitor for symptoms of worsening dyspnea and weight gain/lower extremity swelling, may need to restart this at a lower dose as needed.       [facility] is [facility] you in follow up soon, I discussed case with Dr. West with CT surgery yesterday.  Unfortunately bypass surgery would be associated with a higher risk of prolonged ventilation with risk of requiring tracheostomy due to baseline severe lung disease.  Recommended referral to [facility] clinic for consideration of PCI to the LAD since it is supplying collaterals to the left circumflex and RCA regions thanks!   MALLIKA Burks RN, BSN. 11/29/22 1:12 PM

## 2022-12-07 ENCOUNTER — OFFICE VISIT (OUTPATIENT)
Dept: CARDIOLOGY | Facility: CLINIC | Age: 67
End: 2022-12-07
Payer: COMMERCIAL

## 2022-12-07 VITALS
BODY MASS INDEX: 37.22 KG/M2 | HEIGHT: 70 IN | WEIGHT: 260 LBS | OXYGEN SATURATION: 93 % | DIASTOLIC BLOOD PRESSURE: 80 MMHG | HEART RATE: 104 BPM | SYSTOLIC BLOOD PRESSURE: 138 MMHG

## 2022-12-07 DIAGNOSIS — I25.118 ATHEROSCLEROSIS OF NATIVE CORONARY ARTERY OF NATIVE HEART WITH STABLE ANGINA PECTORIS (H): ICD-10-CM

## 2022-12-07 PROCEDURE — 99215 OFFICE O/P EST HI 40 MIN: CPT | Performed by: INTERNAL MEDICINE

## 2022-12-07 NOTE — PROGRESS NOTES
Service Date: 12/07/2022    CARDIOLOGY CONSULT     OFFICE NOTE:  I had the pleasure of meeting Booker Brown at the [facility] in [place] accompanied by his wife.  He is a 67-year-old gentleman who was referred in to talk about possible  angioplasty.  Today I reviewed his history, I reviewed Dr. Hopkins's note, Dr. Te West's note, and we reviewed the actual pictures with the family.  The patient has shortness of breath.  His pulmonary function tests to me looked quite abnormal with reduced volume and restriction and obstruction and reduced DLCO.  I actually thought I saw some pleural plaques on his heart catheterization, and Dr. Hopkins's note does mention asbestos, even though the patient states he was never exposed.  However, he did work in a foundry at one point, so I suspect that might be an issue.  He also has atrial fibrillation.  Of note, on exam just walking from the chair to the bench, he was visibly short of breath, and he was rather tachycardic, this despite being on high-dose beta blocker and high-dose diltiazem.  I am not sure if at some point we may want to visit the issue about an AV node ablation and a VVI pacemaker, but I will leave that to Dr. Mejias afterwards.  His angiogram showed that the right coronary artery, although dominant, was probably never all that large.  It was 100% occluded proximally, and I do not know if it is amenable to  angioplasty.  The left circumflex had moderate to moderately severe diffuse proximal disease.  The distal half, which was basically the distal OM, was a .  There was a ramus branch with probably moderate-plus disease, and he had a proximal lesion somewhat focal, which was proximal to severe with an abnormal flow reserve.  The LVEDP was 15 at the time of the angiogram.  The echocardiogram was actually done almost a year ago, and the EF was 50%-55%.  No distinct wall motion abnormalities were seen, although the EF was reduced at 50%, and I am  wondering if it is because there was probably some global hypokinesis.  The right ventricle at that time was normal.  There was biatrial enlargement, mild MR, mild TR with normal estimated right heart pressures.  Aortic valve was relatively normal with sclerosis.      At this time, I think the next steps are repeat the echocardiogram so we get an idea if he has pulmonary hypertension and ejection fraction, since again that echo is almost a year old.  I am going to get an MRI test for viability to determine if there is much viability remaining in the inferior or inferolateral wall. I would probably put our attention towards angioplasty of the LAD and perhaps the ramus branch if its flow is abnormal. We may or may not need an Impella backup device depending on what the ejection fraction is.  Medication wise, I think he is on appropriate medication, and I am not going to make any changes there.  I did explain to the family that this is a higher-risk procedure, and we may need Impella support.  We also talked about the blood thinner issues since he is on Xarelto for his AFib.  We have to talk about blood thinners for the stents, and that was mentioned.  We will reconvene after the echo and MRI study.  It might be warranted to have him follow up with Dr. Mejias to talk about the possibility of AV node ablation and pacemaker, but we can wait until after the angioplasty for that if necessary.  The patient has a followup visit with Dr. Hopkins, and I would like his input, but I suspect that it is exactly what it looks like. It is moderate to severe obstruction and restriction with perhaps some pulmonary fibrosis and asbestos issues.  I do not know if the patient would benefit from an atropine-like drug for his inhalers.  He has a followup visit.  I know that he is on Advair, but perhaps Atrovent or Trelegy or one of the other combo inhalers might be useful.  We will reconvene.      Today's visit was 45 minutes, but a number of  highly complex discussions went on regarding pulmonary, AFib, heart rate control, coronary artery disease and prevention.    cc:  Mason Lucas MD  [facility] [address] [place] [address]     Brenden Felton MD   [facility] [address], [address] [place] [address]     Kj Mejias MD   [facility] [address] [place] [address]     Te West MD  [facility] Thoracic Surgery  [address] [id number]    oBoker Hopkins MD  [facility] Pulmonology  [address] [place], [place] [address]    Nate Marquez MD        D: 2022   T: 2022   MT: sima    Name:     BOOKER WILLIS  MRN:      [id number]        Account:      [id number]   :      1955           Service Date: 2022       Document: [id number]

## 2022-12-07 NOTE — LETTER
12/7/2022    [address] [address]    RE: Booker Brown       Dear Colleague,     I had the pleasure of seeing Booker Brown in the [facility].  HPI and Plan:   See dictation    Orders Placed This Encounter   Procedures     Follow-Up with Cardiology     Echocardiogram Complete     No orders of the defined types were placed in this encounter.    There are no discontinued medications.      Encounter Diagnosis   Name Primary?     Atherosclerosis of native coronary artery of native heart with stable angina pectoris (H)        CURRENT MEDICATIONS:  Current Outpatient Medications   Medication Sig Dispense Refill     albuterol (PROAIR HFA/PROVENTIL HFA/VENTOLIN HFA) 108 (90 Base) MCG/ACT inhaler Inhale 2 puffs into the lungs every 6 hours as needed for shortness of breath / dyspnea or wheezing 18 g 7     alcohol swab prep pads Use to swab area of injection/fariha as directed. 100 each 3     blood glucose (NO BRAND SPECIFIED) test strip Use to test blood sugar 1 times daily or as directed. To accompany: Blood Glucose Monitor Brands: per insurance. 100 strip 6     blood glucose calibration (NO BRAND SPECIFIED) solution To accompany: Blood Glucose Monitor Brands: per insurance. 1 Bottle 3     blood glucose monitoring (NO BRAND SPECIFIED) meter device kit Use to test blood sugar 1 times daily or as directed. : per insurance. 1 kit 0     diltiazem ER COATED BEADS (CARDIZEM CD) 360 MG 24 hr capsule Take 1 capsule (360 mg) by mouth daily 90 capsule 3     fluticasone-salmeterol (ADVAIR-HFA) 230-21 MCG/ACT inhaler Inhale 2 puffs into the lungs 2 times daily 12 g 6     glipiZIDE (GLUCOTROL XL) 10 MG 24 hr tablet TAKE 1 TABLET BY MOUTH EVERY DAY 90 tablet 0     lisinopril (ZESTRIL) 40 MG tablet Take 1 tablet (40 mg) by mouth daily 90 tablet 3     metFORMIN (GLUCOPHAGE-XR) 500 MG 24 hr tablet Take 2 tablets (1,000 mg) by mouth daily (with dinner) 180  tablet 3     metoprolol succinate ER (TOPROL XL) 100 MG 24 hr tablet Take 1 tablet (100 mg) by mouth 2 times daily 180 tablet 3     rivaroxaban ANTICOAGULANT (XARELTO ANTICOAGULANT) 20 MG TABS tablet Take 1 tablet (20 mg) by mouth daily (with dinner) 90 tablet 3     rosuvastatin (CRESTOR) 40 MG tablet Take 1 tablet (40 mg) by mouth At Bedtime 90 tablet 3     thin (NO BRAND SPECIFIED) lancets Use with lanceting device. To accompany: Blood Glucose Monitor Brands: per insurance. 1 each 6       ALLERGIES     Allergies   Allergen Reactions     No Known Allergies        PAST MEDICAL HISTORY:  Past Medical History:   Diagnosis Date     Diabetes (H)      HTN      Hyperlipidemia        PAST SURGICAL HISTORY:  Past Surgical History:   Procedure Laterality Date     COLONOSCOPY N/A 2018    Procedure: COMBINED COLONOSCOPY, SINGLE OR MULTIPLE BIOPSY/POLYPECTOMY BY BIOPSY;;  Surgeon: Lawrence Mata MD;  Location:  GI     CV CORONARY ANGIOGRAM N/A 2022    Procedure: Coronary Angiogram;  Surgeon: Mason Lucas MD;  Location:  HEART CARDIAC CATH LAB     CV INSTANTANEOUS WAVE-FREE RATIO N/A 2022    Procedure: Instantaneous Wave-Free Ratio;  Surgeon: Mason Lucas MD;  Location:  HEART CARDIAC CATH LAB     CV LEFT HEART CATH N/A 2022    Procedure: Left Heart Catheterization;  Surgeon: Mason Lucas MD;  Location:  HEART CARDIAC CATH LAB       FAMILY HISTORY:  Family History   Problem Relation Age of Onset     Diabetes Mother         dx at 70     Bladder Cancer Brother        SOCIAL HISTORY:  Social History     Socioeconomic History     Marital status:      Spouse name: None     Number of children: None     Years of education: None     Highest education level: None   Tobacco Use     Smoking status: Former     Packs/day: 3.00     Years: 16.00     Pack years: 48.00     Types: Cigarettes     Start date:      Quit date: 1987     Years since quittin.2     Smokeless  "tobacco: Never   Substance and Sexual Activity     Alcohol use: No     Drug use: No     Sexual activity: Yes     Partners: Female   Other Topics Concern     Parent/sibling w/ CABG, MI or angioplasty before 65F 55M? Yes      Service No     Blood Transfusions No     Caffeine Concern No     Occupational Exposure No     Hobby Hazards No     Sleep Concern No     Stress Concern No     Weight Concern Yes     Special Diet No     Back Care No     Exercise Yes     Bike Helmet No     Comment: n/a     Seat Belt Yes     Self-Exams No       Review of Systems:  Skin:        Eyes:       ENT:       Respiratory:  Positive for shortness of breath;dyspnea at rest;dyspnea on exertion  Cardiovascular:    Positive for;lightheadedness;dizziness;edema;fatigue  Gastroenterology:      Genitourinary:       Musculoskeletal:       Neurologic:       Psychiatric:       Heme/Lymph/Imm:       Endocrine:         Physical Exam:  Vitals: /80 (BP Location: Right arm, Patient Position: Sitting, Cuff Size: Adult Large)   Pulse 104   Ht 1.778 m (5' 10\")   Wt 117.9 kg (260 lb)   SpO2 93%   BMI 37.31 kg/m      Constitutional:  cooperative, alert and oriented, well developed, well nourished, in no acute distress   visibly sob    Skin:  warm and dry to the touch, no apparent skin lesions or masses noted          Head:  normocephalic, no masses or lesions        Eyes:  pupils equal and round, conjunctivae and lids unremarkable, sclera white, no xanthalasma, EOMS intact, no nystagmus        Lymph:No Cervical lymphadenopathy present;No thyromegaly     ENT:           Neck:  carotid pulses are full and equal bilaterally, JVP normal, no carotid bruit        Respiratory:  normal breath sounds, clear to auscultation, normal A-P diameter, normal symmetry, normal respiratory excursion, no use of accessory muscles    reduced air movt    Cardiac: regular rhythm, normal S1/S2, no S3 or S4, apical impulse not displaced, no murmurs, gallops or rubs         "        pulses full and equal, no bruits auscultated                                        GI:  abdomen soft, non-tender, BS normoactive, no mass, no HSM, no bruits obese      Extremities and Muscular Skeletal:  no deformities, clubbing, cyanosis, erythema observed   bilateral LE edema;trace          Neurological:  no gross motor deficits        Psych:  Alert and Oriented x 3      Recent Lab Results:  LIPID RESULTS:  Lab Results   Component Value Date    CHOL 103 05/03/2022    CHOL 124 05/12/2021    HDL 32 (L) 05/03/2022    HDL 31 (L) 05/12/2021    LDL 61 05/03/2022    LDL 70 05/12/2021    TRIG 50 05/03/2022    TRIG 116 05/12/2021    CHOLHDLRATIO 4.5 09/28/2015       LIVER ENZYME RESULTS:  Lab Results   Component Value Date    AST 24 11/18/2022    AST 20 05/12/2021    ALT 27 11/18/2022    ALT 28 05/12/2021       CBC RESULTS:  Lab Results   Component Value Date    WBC 12.8 (H) 11/18/2022    WBC 11.0 05/12/2021    RBC 5.52 11/18/2022    RBC 5.15 05/12/2021    HGB 14.2 11/18/2022    HGB 14.1 05/12/2021    HCT 46.0 11/18/2022    HCT 44.2 05/12/2021    MCV 83 11/18/2022    MCV 86 05/12/2021    MCH 25.7 (L) 11/18/2022    MCH 27.4 05/12/2021    MCHC 30.9 (L) 11/18/2022    MCHC 31.9 05/12/2021    RDW 15.9 (H) 11/18/2022    RDW 14.7 05/12/2021     11/18/2022     05/12/2021       BMP RESULTS:  Lab Results   Component Value Date     11/21/2022     05/12/2021    POTASSIUM 4.0 11/21/2022    POTASSIUM 3.9 11/18/2022    POTASSIUM 3.9 05/14/2021    CHLORIDE 106 11/21/2022    CHLORIDE 101 11/18/2022    CHLORIDE 104 05/12/2021    CO2 24 11/21/2022    CO2 32 11/18/2022    CO2 29 05/12/2021    ANIONGAP 14 11/21/2022    ANIONGAP 5 11/18/2022    ANIONGAP 4 05/12/2021     (H) 11/21/2022     (H) 11/18/2022     (H) 05/12/2021    BUN 31.2 (H) 11/21/2022    BUN 42 (H) 11/18/2022    BUN 36 (H) 05/12/2021    CR 1.64 (H) 11/21/2022    CR 1.87 (H) 05/12/2021    GFRESTIMATED 46 (L) 11/21/2022     GFRESTIMATED 37 (L) 05/12/2021    GFRESTBLACK 43 (L) 05/12/2021    KURT 9.3 11/21/2022    KURT 9.2 05/12/2021        A1C RESULTS:  Lab Results   Component Value Date    A1C 7.3 (H) 10/12/2022    A1C 8.6 (H) 05/12/2021       INR RESULTS:  Lab Results   Component Value Date    INR 1.51 (H) 11/18/2022           Thank you for allowing me to participate in the care of your patient.    Sincerely,     Nate Marquez MD     [facility]

## 2022-12-07 NOTE — PROGRESS NOTES
HPI and Plan:   See dictation    Orders Placed This Encounter   Procedures     Follow-Up with Cardiology     Echocardiogram Complete     No orders of the defined types were placed in this encounter.    There are no discontinued medications.      Encounter Diagnosis   Name Primary?     Atherosclerosis of native coronary artery of native heart with stable angina pectoris (H)        CURRENT MEDICATIONS:  Current Outpatient Medications   Medication Sig Dispense Refill     albuterol (PROAIR HFA/PROVENTIL HFA/VENTOLIN HFA) 108 (90 Base) MCG/ACT inhaler Inhale 2 puffs into the lungs every 6 hours as needed for shortness of breath / dyspnea or wheezing 18 g 7     alcohol swab prep pads Use to swab area of injection/fariha as directed. 100 each 3     blood glucose (NO BRAND SPECIFIED) test strip Use to test blood sugar 1 times daily or as directed. To accompany: Blood Glucose Monitor Brands: per insurance. 100 strip 6     blood glucose calibration (NO BRAND SPECIFIED) solution To accompany: Blood Glucose Monitor Brands: per insurance. 1 Bottle 3     blood glucose monitoring (NO BRAND SPECIFIED) meter device kit Use to test blood sugar 1 times daily or as directed. : per insurance. 1 kit 0     diltiazem ER COATED BEADS (CARDIZEM CD) 360 MG 24 hr capsule Take 1 capsule (360 mg) by mouth daily 90 capsule 3     fluticasone-salmeterol (ADVAIR-HFA) 230-21 MCG/ACT inhaler Inhale 2 puffs into the lungs 2 times daily 12 g 6     glipiZIDE (GLUCOTROL XL) 10 MG 24 hr tablet TAKE 1 TABLET BY MOUTH EVERY DAY 90 tablet 0     lisinopril (ZESTRIL) 40 MG tablet Take 1 tablet (40 mg) by mouth daily 90 tablet 3     metFORMIN (GLUCOPHAGE-XR) 500 MG 24 hr tablet Take 2 tablets (1,000 mg) by mouth daily (with dinner) 180 tablet 3     metoprolol succinate ER (TOPROL XL) 100 MG 24 hr tablet Take 1 tablet (100 mg) by mouth 2 times daily 180 tablet 3     rivaroxaban ANTICOAGULANT (XARELTO ANTICOAGULANT) 20 MG TABS tablet Take 1 tablet (20 mg) by mouth  daily (with dinner) 90 tablet 3     rosuvastatin (CRESTOR) 40 MG tablet Take 1 tablet (40 mg) by mouth At Bedtime 90 tablet 3     thin (NO BRAND SPECIFIED) lancets Use with lanceting device. To accompany: Blood Glucose Monitor Brands: per insurance. 1 each 6       ALLERGIES     Allergies   Allergen Reactions     No Known Allergies        PAST MEDICAL HISTORY:  Past Medical History:   Diagnosis Date     Diabetes (H)      HTN      Hyperlipidemia        PAST SURGICAL HISTORY:  Past Surgical History:   Procedure Laterality Date     COLONOSCOPY N/A 2018    Procedure: COMBINED COLONOSCOPY, SINGLE OR MULTIPLE BIOPSY/POLYPECTOMY BY BIOPSY;;  Surgeon: Lawrence Mata MD;  Location:  GI     CV CORONARY ANGIOGRAM N/A 2022    Procedure: Coronary Angiogram;  Surgeon: Mason Lucas MD;  Location:  HEART CARDIAC CATH LAB     CV INSTANTANEOUS WAVE-FREE RATIO N/A 2022    Procedure: Instantaneous Wave-Free Ratio;  Surgeon: aMson Lucas MD;  Location:  HEART CARDIAC CATH LAB     CV LEFT HEART CATH N/A 2022    Procedure: Left Heart Catheterization;  Surgeon: Mason Lucas MD;  Location:  HEART CARDIAC CATH LAB       FAMILY HISTORY:  Family History   Problem Relation Age of Onset     Diabetes Mother         dx at 70     Bladder Cancer Brother        SOCIAL HISTORY:  Social History     Socioeconomic History     Marital status:      Spouse name: None     Number of children: None     Years of education: None     Highest education level: None   Tobacco Use     Smoking status: Former     Packs/day: 3.00     Years: 16.00     Pack years: 48.00     Types: Cigarettes     Start date:      Quit date: 1987     Years since quittin.2     Smokeless tobacco: Never   Substance and Sexual Activity     Alcohol use: No     Drug use: No     Sexual activity: Yes     Partners: Female   Other Topics Concern     Parent/sibling w/ CABG, MI or angioplasty before 65F 55M? Yes       "Service No     Blood Transfusions No     Caffeine Concern No     Occupational Exposure No     Hobby Hazards No     Sleep Concern No     Stress Concern No     Weight Concern Yes     Special Diet No     Back Care No     Exercise Yes     Bike Helmet No     Comment: n/a     Seat Belt Yes     Self-Exams No       Review of Systems:  Skin:        Eyes:       ENT:       Respiratory:  Positive for shortness of breath;dyspnea at rest;dyspnea on exertion  Cardiovascular:    Positive for;lightheadedness;dizziness;edema;fatigue  Gastroenterology:      Genitourinary:       Musculoskeletal:       Neurologic:       Psychiatric:       Heme/Lymph/Imm:       Endocrine:         Physical Exam:  Vitals: /80 (BP Location: Right arm, Patient Position: Sitting, Cuff Size: Adult Large)   Pulse 104   Ht 1.778 m (5' 10\")   Wt 117.9 kg (260 lb)   SpO2 93%   BMI 37.31 kg/m      Constitutional:  cooperative, alert and oriented, well developed, well nourished, in no acute distress   visibly sob    Skin:  warm and dry to the touch, no apparent skin lesions or masses noted          Head:  normocephalic, no masses or lesions        Eyes:  pupils equal and round, conjunctivae and lids unremarkable, sclera white, no xanthalasma, EOMS intact, no nystagmus        Lymph:No Cervical lymphadenopathy present;No thyromegaly     ENT:           Neck:  carotid pulses are full and equal bilaterally, JVP normal, no carotid bruit        Respiratory:  normal breath sounds, clear to auscultation, normal A-P diameter, normal symmetry, normal respiratory excursion, no use of accessory muscles    reduced air movt    Cardiac: regular rhythm, normal S1/S2, no S3 or S4, apical impulse not displaced, no murmurs, gallops or rubs                pulses full and equal, no bruits auscultated                                        GI:  abdomen soft, non-tender, BS normoactive, no mass, no HSM, no bruits obese      Extremities and Muscular Skeletal:  no deformities, " clubbing, cyanosis, erythema observed   bilateral LE edema;trace          Neurological:  no gross motor deficits        Psych:  Alert and Oriented x 3      Recent Lab Results:  LIPID RESULTS:  Lab Results   Component Value Date    CHOL 103 05/03/2022    CHOL 124 05/12/2021    HDL 32 (L) 05/03/2022    HDL 31 (L) 05/12/2021    LDL 61 05/03/2022    LDL 70 05/12/2021    TRIG 50 05/03/2022    TRIG 116 05/12/2021    CHOLHDLRATIO 4.5 09/28/2015       LIVER ENZYME RESULTS:  Lab Results   Component Value Date    AST 24 11/18/2022    AST 20 05/12/2021    ALT 27 11/18/2022    ALT 28 05/12/2021       CBC RESULTS:  Lab Results   Component Value Date    WBC 12.8 (H) 11/18/2022    WBC 11.0 05/12/2021    RBC 5.52 11/18/2022    RBC 5.15 05/12/2021    HGB 14.2 11/18/2022    HGB 14.1 05/12/2021    HCT 46.0 11/18/2022    HCT 44.2 05/12/2021    MCV 83 11/18/2022    MCV 86 05/12/2021    MCH 25.7 (L) 11/18/2022    MCH 27.4 05/12/2021    MCHC 30.9 (L) 11/18/2022    MCHC 31.9 05/12/2021    RDW 15.9 (H) 11/18/2022    RDW 14.7 05/12/2021     11/18/2022     05/12/2021       BMP RESULTS:  Lab Results   Component Value Date     11/21/2022     05/12/2021    POTASSIUM 4.0 11/21/2022    POTASSIUM 3.9 11/18/2022    POTASSIUM 3.9 05/14/2021    CHLORIDE 106 11/21/2022    CHLORIDE 101 11/18/2022    CHLORIDE 104 05/12/2021    CO2 24 11/21/2022    CO2 32 11/18/2022    CO2 29 05/12/2021    ANIONGAP 14 11/21/2022    ANIONGAP 5 11/18/2022    ANIONGAP 4 05/12/2021     (H) 11/21/2022     (H) 11/18/2022     (H) 05/12/2021    BUN 31.2 (H) 11/21/2022    BUN 42 (H) 11/18/2022    BUN 36 (H) 05/12/2021    CR 1.64 (H) 11/21/2022    CR 1.87 (H) 05/12/2021    GFRESTIMATED 46 (L) 11/21/2022    GFRESTIMATED 37 (L) 05/12/2021    GFRESTBLACK 43 (L) 05/12/2021    KURT 9.3 11/21/2022    KURT 9.2 05/12/2021        A1C RESULTS:  Lab Results   Component Value Date    A1C 7.3 (H) 10/12/2022    A1C 8.6 (H) 05/12/2021       INR  RESULTS:  Lab Results   Component Value Date    INR 1.51 (H) 11/18/2022           CC  Brenden Felton MD  [address], [address]  [place] [id number]

## 2022-12-08 PROBLEM — E66.01 MORBID OBESITY (H): Status: RESOLVED | Noted: 2021-10-07 | Resolved: 2022-12-08

## 2022-12-08 PROBLEM — E66.9 OBESITY (BMI 30-39.9): Status: ACTIVE | Noted: 2022-12-08

## 2022-12-08 PROBLEM — I48.19 PERSISTENT ATRIAL FIBRILLATION (H): Status: RESOLVED | Noted: 2021-05-12 | Resolved: 2022-12-08

## 2022-12-08 PROBLEM — N18.30 CHRONIC KIDNEY DISEASE, STAGE 3 (H): Status: RESOLVED | Noted: 2021-05-12 | Resolved: 2022-12-08

## 2022-12-08 PROBLEM — E11.9 TYPE 2 DIABETES MELLITUS (H): Status: ACTIVE | Noted: 2022-12-08

## 2022-12-08 PROBLEM — Z98.890 STATUS POST CORONARY ANGIOGRAM: Status: RESOLVED | Noted: 2022-11-18 | Resolved: 2022-12-08

## 2022-12-08 PROBLEM — I48.91 ATRIAL FIBRILLATION WITH RVR (H): Status: RESOLVED | Noted: 2022-02-19 | Resolved: 2022-12-08

## 2022-12-09 ENCOUNTER — OFFICE VISIT (OUTPATIENT)
Dept: INTERNAL MEDICINE | Facility: CLINIC | Age: 67
End: 2022-12-09
Payer: COMMERCIAL

## 2022-12-09 VITALS
TEMPERATURE: 97.4 F | DIASTOLIC BLOOD PRESSURE: 66 MMHG | SYSTOLIC BLOOD PRESSURE: 126 MMHG | OXYGEN SATURATION: 95 % | HEIGHT: 70 IN | BODY MASS INDEX: 36.98 KG/M2 | HEART RATE: 112 BPM | WEIGHT: 258.3 LBS

## 2022-12-09 DIAGNOSIS — E11.9 TYPE 2 DIABETES MELLITUS WITHOUT COMPLICATION, WITHOUT LONG-TERM CURRENT USE OF INSULIN (H): ICD-10-CM

## 2022-12-09 DIAGNOSIS — E66.01 MORBID OBESITY (H): ICD-10-CM

## 2022-12-09 DIAGNOSIS — R06.02 SHORTNESS OF BREATH: Primary | ICD-10-CM

## 2022-12-09 DIAGNOSIS — Z23 HIGH PRIORITY FOR 2019-NCOV VACCINE: ICD-10-CM

## 2022-12-09 DIAGNOSIS — I10 BENIGN ESSENTIAL HYPERTENSION: ICD-10-CM

## 2022-12-09 DIAGNOSIS — Z23 NEED FOR PROPHYLACTIC VACCINATION AND INOCULATION AGAINST INFLUENZA: ICD-10-CM

## 2022-12-09 DIAGNOSIS — Z76.89 ENCOUNTER TO ESTABLISH CARE: ICD-10-CM

## 2022-12-09 DIAGNOSIS — E78.00 PURE HYPERCHOLESTEROLEMIA: ICD-10-CM

## 2022-12-09 PROCEDURE — 90662 IIV NO PRSV INCREASED AG IM: CPT | Performed by: INTERNAL MEDICINE

## 2022-12-09 PROCEDURE — 0134A COVID-19 VACCINE BIVALENT BOOSTER 18+ (MODERNA): CPT | Performed by: INTERNAL MEDICINE

## 2022-12-09 PROCEDURE — 91313 COVID-19 VACCINE BIVALENT BOOSTER 18+ (MODERNA): CPT | Performed by: INTERNAL MEDICINE

## 2022-12-09 PROCEDURE — 90471 IMMUNIZATION ADMIN: CPT | Performed by: INTERNAL MEDICINE

## 2022-12-09 PROCEDURE — 99214 OFFICE O/P EST MOD 30 MIN: CPT | Mod: 25 | Performed by: INTERNAL MEDICINE

## 2022-12-09 RX ORDER — METFORMIN HCL 500 MG
1000 TABLET, EXTENDED RELEASE 24 HR ORAL
Qty: 180 TABLET | Refills: 3 | Status: SHIPPED | OUTPATIENT
Start: 2022-12-09 | End: 2024-01-01

## 2022-12-09 RX ORDER — FLUTICASONE FUROATE, UMECLIDINIUM BROMIDE AND VILANTEROL TRIFENATATE 100; 62.5; 25 UG/1; UG/1; UG/1
1 POWDER RESPIRATORY (INHALATION) DAILY
Qty: 84 EACH | Refills: 3 | Status: SHIPPED | OUTPATIENT
Start: 2022-12-09 | End: 2023-02-17

## 2022-12-09 RX ORDER — GLIPIZIDE 10 MG/1
10 TABLET, FILM COATED, EXTENDED RELEASE ORAL DAILY
Qty: 90 TABLET | Refills: 0 | Status: CANCELLED | OUTPATIENT
Start: 2022-12-09

## 2022-12-09 NOTE — PROGRESS NOTES
ASSESSMENT/PLAN                                                       (R06.02) Shortness of breath  (primary encounter diagnosis)  Comment: poorly-controlled on current regimen.   Plan: TRIAL of replacing Advair with Trelegy; seeing pulm later this month.     (Z76.89) Encounter to establish care  Comment: PMH, PSH, FH, SH, medications, allergies, immunizations, and preventative health measures reviewed and updated as appropriate.  Plan: see below for plans.      (Z23) Need for prophylactic vaccination and inoculation against influenza  Plan: flu shot given today.     (Z23) High priority for 2019-nCoV vaccine  Plan: COVID-19 booster given today.    (E11.9) Type 2 diabetes mellitus without complication, without long-term current use of insulin (H)  Comment: reasonably controlled on current regimen; diabetes follow-up in the spring.   Plan: referred for annual diabetic eye exam - patient will be contacted to schedule.      (I10) Benign essential hypertension  Comment: well-controlled on current regimen.      (E78.00) Pure hypercholesterolemia  Comment: well-controlled on current regimen.           (E66.01) Morbid obesity (H)  Comment: obesity + comorbidities (diabetes, HLD, HTN); known issue that I take into account for their medical decisions, no current exacerbations or new concerns.    Arline White MD   [address] [address]  T: [phone], F: [phone]    SUBJECTIVE                                                      Booker Brown is a very pleasant 67 year old adult who presents to establish care:    Ongoing work-up for severe dyspnea on exertion. Scheduled to meet with pulmonologist later this month.  No significant improvement with Advair.    PMH, PSH, FH, SH, medications, allergies, immunizations, and preventative health measures reviewed and updated as appropriate.    Past Medical History:   Diagnosis Date     Benign essential hypertension      CAD  (coronary artery disease)      Chronic kidney disease, stage III (moderate) (H)      Obesity (BMI 30-39.9)      Persistent atrial fibrillation (H)      Pure hypercholesterolemia      Type 2 diabetes mellitus (H)      Past Surgical History:   Procedure Laterality Date     COLONOSCOPY N/A 2018    Procedure: COMBINED COLONOSCOPY, SINGLE OR MULTIPLE BIOPSY/POLYPECTOMY BY BIOPSY;;  Surgeon: Lawrence Mata MD;  Location:  GI     CV CORONARY ANGIOGRAM N/A 2022    Procedure: Coronary Angiogram;  Surgeon: Mason Lucas MD;  Location:  HEART CARDIAC CATH LAB     CV INSTANTANEOUS WAVE-FREE RATIO N/A 2022    Procedure: Instantaneous Wave-Free Ratio;  Surgeon: Mason Lucas MD;  Location:  HEART CARDIAC CATH LAB     CV LEFT HEART CATH N/A 2022    Procedure: Left Heart Catheterization;  Surgeon: Mason Lucas MD;  Location:  HEART CARDIAC CATH LAB     Family History   Problem Relation Age of Onset     Diabetes Type 2  Mother      Cerebral aneurysm Sister      Bladder Cancer Brother      Myocardial Infarction Brother         in his 50s     Cerebrovascular Disease No family hx of      Coronary Artery Disease Early Onset No family hx of      Prostate Cancer No family hx of      Colon Cancer No family hx of      Clotting Disorder No family hx of      Bleeding Disorder No family hx of      Anesthesia Reaction No family hx of      Social History     Occupational History     Occupation: Shipping & Receiving   Tobacco Use     Smoking status: Former     Packs/day: 2.00     Years: 16.00     Pack years: 32.00     Types: Cigarettes     Quit date: 1987     Years since quittin.2     Smokeless tobacco: Never   Vaping Use     Vaping Use: Never used   Substance and Sexual Activity     Alcohol use: No     Drug use: No     Sexual activity: Yes     Partners: Female   Social History Narrative    .    Two adult children.    Four grandchildren.    No formal exercise.     No Known  Allergies     Current Outpatient Medications   Medication Sig     albuterol (PROAIR HFA/PROVENTIL HFA/VENTOLIN HFA) 108 (90 Base) MCG/ACT inhaler Inhale 2 puffs into the lungs every 6 hours as needed for shortness of breath / dyspnea or wheezing     blood glucose (NO BRAND SPECIFIED) test strip Use to test blood sugar 1 times daily or as directed. To accompany: Blood Glucose Monitor Brands: per insurance.     blood glucose calibration (NO BRAND SPECIFIED) solution To accompany: Blood Glucose Monitor Brands: per insurance.     blood glucose monitoring (NO BRAND SPECIFIED) meter device kit Use to test blood sugar 1 times daily or as directed. : per insurance.     diltiazem ER COATED BEADS (CARDIZEM CD) 360 MG 24 hr capsule Take 1 capsule (360 mg) by mouth daily     Fluticasone-Umeclidin-Vilant (TRELEGY ELLIPTA) 100-62.5-25 MCG/ACT oral inhaler Inhale 1 puff into the lungs daily     glipiZIDE (GLUCOTROL XL) 10 MG 24 hr tablet TAKE 1 TABLET BY MOUTH EVERY DAY     lisinopril (ZESTRIL) 40 MG tablet Take 1 tablet (40 mg) by mouth daily     metFORMIN (GLUCOPHAGE XR) 500 MG 24 hr tablet Take 2 tablets (1,000 mg) by mouth daily (with dinner)     metoprolol succinate ER (TOPROL XL) 100 MG 24 hr tablet Take 1 tablet (100 mg) by mouth 2 times daily     rivaroxaban ANTICOAGULANT (XARELTO ANTICOAGULANT) 20 MG TABS tablet Take 1 tablet (20 mg) by mouth daily (with dinner)     rosuvastatin (CRESTOR) 40 MG tablet Take 1 tablet (40 mg) by mouth At Bedtime     thin (NO BRAND SPECIFIED) lancets Use with lanceting device. To accompany: Blood Glucose Monitor Brands: per insurance.     alcohol swab prep pads Use to swab area of injection/fariha as directed.     Immunization History   Administered Date(s) Administered     COVID-19 Vaccine 18+ (Moderna) 04/22/2021, 05/26/2021     COVID-19,PF,Moderna Booster 02/09/2022     Influenza Vaccine 50-64 or 18-64 w/egg allergy (Flublok) 10/17/2018, 10/30/2019, 10/05/2020, 10/07/2021     Influenza  "Vaccine >6 months (Eugenio Pyle) 09/30/2016     Pneumococcal 20 valent Conjugate (Prevnar 20) 08/22/2022     Pneumococcal 23 valent 05/09/2016     TDAP Vaccine (Adacel) 08/07/2012     PREVENTATIVE HEALTH                                                      BMI: obese  Blood pressure: well-controlled on current regimen   Prostate CA Screening: DUE  Colon CA screening: up to date   Lung CA screening: patient does not meet screening criteria  AAA screening: DUE  Screening cholesterol: n/a - already being treated for this condition  Screening diabetes: n/a - already being treated for this condition  STD testing: no risk factors present  Alcohol misuse screening: alcohol use reviewed - no intervention indicated at this time  Immunizations: reviewed; COVID-19 booster, flu shot, tetanus booster, and Shingrix series DUE    OBJECTIVE                                                      /66   Pulse 112   Temp 97.4  F (36.3  C) (Tympanic)   Ht 1.778 m (5' 10\")   Wt 117.2 kg (258 lb 4.8 oz)   LMP  (LMP Unknown)   SpO2 95%   Breastfeeding Unknown   BMI 37.06 kg/m    Constitutional: well-appearing  Psych: normal judgment and insight; normal mood and affect; recent and remote memory intact    ---    (Note was completed, in part, with [employer] voice-recognition software. Documentation was reviewed, but some grammatical, spelling, and word errors may remain.)    "

## 2022-12-09 NOTE — PATIENT INSTRUCTIONS
"  Scheduling an appointment can be hard sometimes.  Here are some tips:    For routine visits, try to schedule at least 3-6 months ahead of time.    For urgent issues:    There are \"next day\" and \"same day\" slots available in my schedule that open up at midnight each day. These spots are first come, first serve.    You may also call our clinic and ask the  to send me a direct message requesting \"to be worked in.\"    "

## 2022-12-23 ENCOUNTER — OFFICE VISIT (OUTPATIENT)
Dept: PULMONOLOGY | Facility: CLINIC | Age: 67
End: 2022-12-23
Payer: COMMERCIAL

## 2022-12-23 VITALS
SYSTOLIC BLOOD PRESSURE: 154 MMHG | WEIGHT: 258 LBS | DIASTOLIC BLOOD PRESSURE: 76 MMHG | OXYGEN SATURATION: 90 % | HEART RATE: 87 BPM | BODY MASS INDEX: 37.02 KG/M2

## 2022-12-23 DIAGNOSIS — R91.8 ABNORMAL CT SCAN OF LUNG: Primary | ICD-10-CM

## 2022-12-23 PROCEDURE — 99214 OFFICE O/P EST MOD 30 MIN: CPT | Performed by: STUDENT IN AN ORGANIZED HEALTH CARE EDUCATION/TRAINING PROGRAM

## 2022-12-23 NOTE — LETTER
12/23/2022         RE: Booker Brown  [address]  [place] [facility] [address]        Dear Colleague,    Thank you for referring your patient, Booker Brown, to the [facility] [place]. Please see a copy of my visit note below.    Pulmonary Clinic Note    Date of Service: 12/23/2022    Chief Complaint   Patient presents with     RECHECK     Abnormal CT scan of lung       A/P:  67M former smoker, CAD being seen for dyspnea on exertion. 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 We have not ruled out other underlying systemic disease that may be causing this fibrosis, but it may all be related to asbestos exposure. As mentioned previously, his dyspnea on exertion is multifactorial; asthma/COPD, fibrosis, obesity and cardiac disease. We need to complete our work-up for underlying disease to see if we need alternative treatments. If his disease is progressive, he may be a candidate for anti-fibrotic medication. There was reported hypoxemia today during his visit, although I did not personally witness this.     - reordered ILD panel  - repeat CT chest ordered  - ordered 6 MWT to evaluate O2 needs  - pulmonary rehab referral  - continue [id number] (Trelegy)  - continue prn albuterol   - OK to substitute medications based on formulary     History:  67M HTN, HLD, DM2, Afib (on rivaroxaban) being seen for f/u dyspnea on exertion. He was last seen 7/2022. This was felt to be multifactorial; asthma/COPD, fibrotic lung disease, obesity, and cardiac disease. He was started on ICS-LABA (Advair) and prn albuterol. ILD panel was ordered, but pt did not get labs drawn. Has not had repeat CT which is ordered. PCP changed Advair to [id number] (Trelegy).     He sees Dr. Marquez of cardiology for . Further work-up and planning in process for best course of action.    Trelegy has not been particularly helpful. He does use albuterol,  which is minimally helpful, uses a few times per week. RODRIGUEZ w/ minimal activity. No SOB at rest. No cough. Sleeps in a recliner, which is not new, reports sleeping well. No PND. Stable bipedal edema. During the rooming process, reportedly O2 sats dropped to low-80s with talking.     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    BP (!) 154/76 (BP Location: Left arm, Patient Position: Sitting, Cuff Size: Adult Regular)   Pulse 87   Wt 117 kg (258 lb)   LMP  (LMP Unknown)   SpO2 90%   BMI 37.02 kg/m    Gen: well-appearing  HEENT: anicteric  Card: RRR  Pulm: clear bilaterally, diminished   Abd: soft  MSK: 2+ b/l LE edema, no acute joint abnormality   Skin: no obvious rash  Psych: normal affect  Neuro: alert and oriented     Labs:  Personally reviewed    Imaging/Studies: Personally reviewed  PFTs (7/2022) - severe obstruction and restriction, e/o air-trapping, moderate diffusion defect  CTPE (3/2022) - no PE, asbestos related pleural dz w/ minimal adjacent fibrosis, mild GGOs b/l     TTE (2/2022) - EF 50-55%, mild LVH, moderate biatrial enlargement    Past Medical History:   Diagnosis Date     Benign essential hypertension      CAD (coronary artery disease)      Chronic kidney disease, stage III (moderate) (H)      Obesity (BMI 30-39.9)      Persistent atrial fibrillation (H)      Pure hypercholesterolemia      Type 2 diabetes mellitus (H)      Past Surgical History:   Procedure Laterality Date     COLONOSCOPY N/A 8/24/2018    Procedure: COMBINED COLONOSCOPY, SINGLE OR MULTIPLE BIOPSY/POLYPECTOMY  BY BIOPSY;;  Surgeon: Lawrence Mata MD;  Location:  GI     CV CORONARY ANGIOGRAM N/A 2022    Procedure: Coronary Angiogram;  Surgeon: Mason Lucas MD;  Location:  HEART CARDIAC CATH LAB     CV INSTANTANEOUS WAVE-FREE RATIO N/A 2022    Procedure: Instantaneous Wave-Free Ratio;  Surgeon: Mason Lucas MD;  Location:  HEART CARDIAC CATH LAB     CV LEFT HEART CATH N/A 2022    Procedure: Left Heart Catheterization;  Surgeon: Mason Lucas MD;  Location:  HEART CARDIAC CATH LAB     Family History   Problem Relation Age of Onset     Diabetes Type 2  Mother      Cerebral aneurysm Sister      Bladder Cancer Brother      Myocardial Infarction Brother         in his 50s     Cerebrovascular Disease No family hx of      Coronary Artery Disease Early Onset No family hx of      Prostate Cancer No family hx of      Colon Cancer No family hx of      Clotting Disorder No family hx of      Bleeding Disorder No family hx of      Anesthesia Reaction No family hx of      Social History     Socioeconomic History     Marital status:      Spouse name: Not on file     Number of children: Not on file     Years of education: Not on file     Highest education level: Not on file   Occupational History     Occupation: Shipping & Receiving   Tobacco Use     Smoking status: Former     Packs/day: 2.00     Years: 16.00     Pack years: 32.00     Types: Cigarettes     Quit date: 1987     Years since quittin.2     Smokeless tobacco: Never   Vaping Use     Vaping Use: Never used   Substance and Sexual Activity     Alcohol use: No     Drug use: No     Sexual activity: Yes     Partners: Female   Other Topics Concern     Parent/sibling w/ CABG, MI or angioplasty before 65F 55M? Yes      Service No     Blood Transfusions No     Caffeine Concern No     Occupational Exposure No     Hobby Hazards No     Sleep Concern No     Stress Concern No     Weight Concern Yes     Special Diet No      Back Care No     Exercise Yes     Bike Helmet No     Comment: n/a     Seat Belt Yes     Self-Exams No   Social History Narrative    .    Two adult children.    Four grandchildren.    No formal exercise.     Social Determinants of Health     Financial Resource Strain: Not on file   Food Insecurity: Not on file   Transportation Needs: Not on file   Physical Activity: Not on file   Stress: Not on file   Social Connections: Not on file   Intimate Partner Violence: Not on file   Housing Stability: Not on file       35 minutes spent reviewing chart, reviewing test results, talking with and examining patient, formulating plan, and documentation on the day of the encounter.          Again, thank you for allowing me to participate in the care of your patient.      Sincerely,    Booker Hopkins MD

## 2022-12-23 NOTE — NURSING NOTE
Chief Complaint   Patient presents with     RECHECK     Abnormal CT scan of lung       Vitals:    12/23/22 0850   BP: (!) 154/76   BP Location: Left arm   Patient Position: Sitting   Cuff Size: Adult Regular   Pulse: 87   SpO2: 90%   Weight: 117 kg (258 lb)       Body mass index is 37.02 kg/m .    Jordyn Cazares, PATI

## 2022-12-23 NOTE — PATIENT INSTRUCTIONS
I would like to get some labs to look for underlying disease which may be contributing to your lung disease.    I would like to get a repeat CT scan.     I would like you to perform a 6 minute walk test to see if you need oxygen with activity.    I have referred you to pulmonary rehab.

## 2022-12-27 ENCOUNTER — HOSPITAL ENCOUNTER (OUTPATIENT)
Dept: CARDIOLOGY | Facility: CLINIC | Age: 67
Discharge: HOME | End: 2022-12-27
Attending: INTERNAL MEDICINE | Admitting: INTERNAL MEDICINE
Payer: COMMERCIAL

## 2022-12-27 DIAGNOSIS — I25.118 ATHEROSCLEROSIS OF NATIVE CORONARY ARTERY OF NATIVE HEART WITH STABLE ANGINA PECTORIS (H): ICD-10-CM

## 2022-12-27 LAB — LVEF ECHO: NORMAL

## 2022-12-27 PROCEDURE — 255N000002 HC RX 255 OP 636: Performed by: INTERNAL MEDICINE

## 2022-12-27 PROCEDURE — 93306 TTE W/DOPPLER COMPLETE: CPT | Mod: 26 | Performed by: INTERNAL MEDICINE

## 2022-12-27 PROCEDURE — 999N000208 ECHOCARDIOGRAM COMPLETE

## 2022-12-27 RX ADMIN — HUMAN ALBUMIN MICROSPHERES AND PERFLUTREN 9 ML: 10; .22 INJECTION, SOLUTION INTRAVENOUS at 14:05

## 2022-12-30 ENCOUNTER — ANCILLARY PROCEDURE (OUTPATIENT)
Dept: CT IMAGING | Facility: CLINIC | Age: 67
End: 2022-12-30
Attending: STUDENT IN AN ORGANIZED HEALTH CARE EDUCATION/TRAINING PROGRAM
Payer: COMMERCIAL

## 2022-12-30 ENCOUNTER — LAB (OUTPATIENT)
Dept: LAB | Facility: CLINIC | Age: 67
End: 2022-12-30
Payer: COMMERCIAL

## 2022-12-30 DIAGNOSIS — R06.09 DYSPNEA ON EXERTION: ICD-10-CM

## 2022-12-30 DIAGNOSIS — R91.8 ABNORMAL CT SCAN OF LUNG: ICD-10-CM

## 2022-12-30 DIAGNOSIS — I10 HYPERTENSION GOAL BP (BLOOD PRESSURE) < 140/90: ICD-10-CM

## 2022-12-30 LAB
ANION GAP SERPL CALCULATED.3IONS-SCNC: 14 MMOL/L (ref 7–15)
BUN SERPL-MCNC: 26.7 MG/DL (ref 8–23)
CALCIUM SERPL-MCNC: 9.5 MG/DL (ref 8.8–10.2)
CHLORIDE SERPL-SCNC: 102 MMOL/L (ref 98–107)
CK SERPL-CCNC: 44 U/L (ref 26–308)
CREAT SERPL-MCNC: 1.52 MG/DL (ref 0.51–1.17)
CRP SERPL-MCNC: 35.8 MG/L
DEPRECATED HCO3 PLAS-SCNC: 26 MMOL/L (ref 22–29)
ERYTHROCYTE [SEDIMENTATION RATE] IN BLOOD BY WESTERGREN METHOD: 23 MM/HR (ref 0–30)
GFR SERPL CREATININE-BSD FRML MDRD: 51 ML/MIN/1.73M2
GLUCOSE SERPL-MCNC: 167 MG/DL (ref 70–99)
POTASSIUM SERPL-SCNC: 4.4 MMOL/L (ref 3.4–5.3)
SODIUM SERPL-SCNC: 142 MMOL/L (ref 136–145)

## 2022-12-30 PROCEDURE — 86606 ASPERGILLUS ANTIBODY: CPT | Mod: 90

## 2022-12-30 PROCEDURE — 86431 RHEUMATOID FACTOR QUANT: CPT

## 2022-12-30 PROCEDURE — 99000 SPECIMEN HANDLING OFFICE-LAB: CPT

## 2022-12-30 PROCEDURE — 86200 CCP ANTIBODY: CPT

## 2022-12-30 PROCEDURE — 86140 C-REACTIVE PROTEIN: CPT

## 2022-12-30 PROCEDURE — 86235 NUCLEAR ANTIGEN ANTIBODY: CPT

## 2022-12-30 PROCEDURE — 86331 IMMUNODIFFUSION OUCHTERLONY: CPT | Mod: 90

## 2022-12-30 PROCEDURE — 86235 NUCLEAR ANTIGEN ANTIBODY: CPT | Mod: 59

## 2022-12-30 PROCEDURE — 82787 IGG 1 2 3 OR 4 EACH: CPT

## 2022-12-30 PROCEDURE — 86256 FLUORESCENT ANTIBODY TITER: CPT

## 2022-12-30 PROCEDURE — 86038 ANTINUCLEAR ANTIBODIES: CPT

## 2022-12-30 PROCEDURE — 82550 ASSAY OF CK (CPK): CPT

## 2022-12-30 PROCEDURE — 86039 ANTINUCLEAR ANTIBODIES (ANA): CPT

## 2022-12-30 PROCEDURE — 86036 ANCA SCREEN EACH ANTIBODY: CPT

## 2022-12-30 PROCEDURE — 85652 RBC SED RATE AUTOMATED: CPT

## 2022-12-30 PROCEDURE — 36415 COLL VENOUS BLD VENIPUNCTURE: CPT

## 2022-12-30 PROCEDURE — 82085 ASSAY OF ALDOLASE: CPT | Mod: 90

## 2022-12-30 PROCEDURE — 71250 CT THORAX DX C-: CPT

## 2022-12-30 PROCEDURE — 82784 ASSAY IGA/IGD/IGG/IGM EACH: CPT

## 2022-12-30 PROCEDURE — 80048 BASIC METABOLIC PNL TOTAL CA: CPT

## 2022-12-31 LAB — ALDOLASE SERPL-CCNC: 4.1 U/L

## 2023-01-01 ENCOUNTER — OFFICE VISIT (OUTPATIENT)
Dept: PULMONOLOGY | Facility: CLINIC | Age: 68
End: 2023-01-01
Payer: COMMERCIAL

## 2023-01-01 ENCOUNTER — MYC REFILL (OUTPATIENT)
Dept: PULMONOLOGY | Facility: CLINIC | Age: 68
End: 2023-01-01
Payer: COMMERCIAL

## 2023-01-01 ENCOUNTER — MYC MEDICAL ADVICE (OUTPATIENT)
Dept: INTERNAL MEDICINE | Facility: CLINIC | Age: 68
End: 2023-01-01
Payer: COMMERCIAL

## 2023-01-01 ENCOUNTER — TELEPHONE (OUTPATIENT)
Dept: CARDIOLOGY | Facility: CLINIC | Age: 68
End: 2023-01-01
Payer: COMMERCIAL

## 2023-01-01 ENCOUNTER — HEALTH MAINTENANCE LETTER (OUTPATIENT)
Age: 68
End: 2023-01-01

## 2023-01-01 VITALS
WEIGHT: 226.7 LBS | HEART RATE: 101 BPM | SYSTOLIC BLOOD PRESSURE: 128 MMHG | OXYGEN SATURATION: 92 % | DIASTOLIC BLOOD PRESSURE: 78 MMHG | BODY MASS INDEX: 32.53 KG/M2

## 2023-01-01 DIAGNOSIS — I50.20 SYSTOLIC CONGESTIVE HEART FAILURE, UNSPECIFIED HF CHRONICITY (H): ICD-10-CM

## 2023-01-01 DIAGNOSIS — J44.9 CHRONIC OBSTRUCTIVE PULMONARY DISEASE, UNSPECIFIED COPD TYPE (H): ICD-10-CM

## 2023-01-01 DIAGNOSIS — R91.8 ABNORMAL CT SCAN OF LUNG: ICD-10-CM

## 2023-01-01 DIAGNOSIS — K92.1 MELENA: ICD-10-CM

## 2023-01-01 DIAGNOSIS — I25.10 CORONARY ARTERY CALCIFICATION OF NATIVE ARTERY: ICD-10-CM

## 2023-01-01 DIAGNOSIS — I25.84 CORONARY ARTERY CALCIFICATION OF NATIVE ARTERY: ICD-10-CM

## 2023-01-01 DIAGNOSIS — N52.1 TYPE 2 DIABETES WITH CIRCULATORY DISORDER CAUSING ERECTILE DYSFUNCTION (H): ICD-10-CM

## 2023-01-01 DIAGNOSIS — E11.59 TYPE 2 DIABETES WITH CIRCULATORY DISORDER CAUSING ERECTILE DYSFUNCTION (H): ICD-10-CM

## 2023-01-01 DIAGNOSIS — J44.9 CHRONIC OBSTRUCTIVE PULMONARY DISEASE, UNSPECIFIED COPD TYPE (H): Primary | ICD-10-CM

## 2023-01-01 DIAGNOSIS — I48.0 PAROXYSMAL ATRIAL FIBRILLATION (H): ICD-10-CM

## 2023-01-01 LAB
6 MIN WALK (FT): 660 FT
6 MIN WALK (M): 201 M
FIO2-PRE: 0.24 %

## 2023-01-01 PROCEDURE — 99214 OFFICE O/P EST MOD 30 MIN: CPT | Performed by: STUDENT IN AN ORGANIZED HEALTH CARE EDUCATION/TRAINING PROGRAM

## 2023-01-01 PROCEDURE — 94618 PULMONARY STRESS TESTING: CPT | Performed by: INTERNAL MEDICINE

## 2023-01-01 RX ORDER — PANTOPRAZOLE SODIUM 40 MG/1
40 TABLET, DELAYED RELEASE ORAL 2 TIMES DAILY
Qty: 180 TABLET | Refills: 0 | OUTPATIENT
Start: 2023-01-01

## 2023-01-01 RX ORDER — FLUTICASONE FUROATE, UMECLIDINIUM BROMIDE AND VILANTEROL TRIFENATATE 200; 62.5; 25 UG/1; UG/1; UG/1
1 POWDER RESPIRATORY (INHALATION) DAILY
Qty: 1 EACH | Refills: 5 | Status: SHIPPED | OUTPATIENT
Start: 2023-01-01 | End: 2024-01-01

## 2023-01-01 RX ORDER — ALBUTEROL SULFATE 90 UG/1
2 AEROSOL, METERED RESPIRATORY (INHALATION) EVERY 6 HOURS PRN
Qty: 18 G | Refills: 5 | Status: SHIPPED | OUTPATIENT
Start: 2023-01-01

## 2023-01-01 RX ORDER — TORSEMIDE 20 MG/1
20 TABLET ORAL DAILY
Qty: 30 TABLET | Refills: 0 | Status: CANCELLED | OUTPATIENT
Start: 2023-01-01

## 2023-01-01 RX ORDER — TORSEMIDE 20 MG/1
20 TABLET ORAL DAILY
Qty: 30 TABLET | Refills: 0 | Status: SHIPPED | OUTPATIENT
Start: 2023-01-01 | End: 2023-01-01

## 2023-01-01 RX ORDER — GLIPIZIDE 5 MG/1
5 TABLET, FILM COATED, EXTENDED RELEASE ORAL DAILY
Qty: 90 TABLET | Refills: 1 | Status: SHIPPED | OUTPATIENT
Start: 2023-01-01 | End: 2024-01-01

## 2023-01-01 RX ORDER — PANTOPRAZOLE SODIUM 40 MG/1
40 TABLET, DELAYED RELEASE ORAL 2 TIMES DAILY
Qty: 180 TABLET | Refills: 0 | Status: SHIPPED | OUTPATIENT
Start: 2023-01-01 | End: 2024-01-01

## 2023-01-01 RX ORDER — FUROSEMIDE 20 MG
20 TABLET ORAL DAILY
Qty: 90 TABLET | Refills: 0 | Status: SHIPPED | OUTPATIENT
Start: 2023-01-01 | End: 2023-01-01

## 2023-01-01 RX ORDER — TORSEMIDE 20 MG/1
20 TABLET ORAL DAILY
Qty: 90 TABLET | Refills: 3 | Status: SHIPPED | OUTPATIENT
Start: 2023-01-01 | End: 2024-01-01

## 2023-01-01 RX ORDER — ROSUVASTATIN CALCIUM 40 MG/1
40 TABLET, COATED ORAL AT BEDTIME
Qty: 90 TABLET | Refills: 3 | Status: SHIPPED | OUTPATIENT
Start: 2023-01-01 | End: 2024-01-01

## 2023-01-02 LAB
ANA PAT SER IF-IMP: ABNORMAL
ANA SER QL IF: POSITIVE
ANA TITR SER IF: ABNORMAL {TITER}
ANCA AB PATTERN SER IF-IMP: ABNORMAL
C-ANCA TITR SER IF: ABNORMAL {TITER}
IGG SERPL-MCNC: 1326 MG/DL (ref 610–1616)
IGG1 SER-MCNC: 727 MG/DL (ref 382–929)
IGG2 SER-MCNC: 229 MG/DL (ref 242–700)
IGG3 SER-MCNC: 89 MG/DL (ref 22–176)
IGG4 SER-MCNC: 76 MG/DL (ref 4–86)
RHEUMATOID FACT SER NEPH-ACNC: <6 IU/ML
SUBCLASSES, PERCENT: 85 %

## 2023-01-04 LAB
CCP AB SER IA-ACNC: 1.5 U/ML
ENA JO1 AB SER IA-ACNC: 0.5 U/ML
ENA JO1 IGG SER-ACNC: NEGATIVE
ENA SCL70 IGG SER IA-ACNC: 0.8 U/ML
ENA SCL70 IGG SER IA-ACNC: NEGATIVE
ENA SS-A AB SER IA-ACNC: 0.5 U/ML
ENA SS-A AB SER IA-ACNC: NEGATIVE
ENA SS-B IGG SER IA-ACNC: <0.6 U/ML
ENA SS-B IGG SER IA-ACNC: NEGATIVE

## 2023-01-05 LAB
A FLAVUS AB SER QL ID: NORMAL
A FUMIGATUS1 AB SER QL ID: NORMAL
A FUMIGATUS2 AB SER QL ID: NORMAL
A FUMIGATUS3 AB SER QL ID: NORMAL
A FUMIGATUS6 AB SER QL ID: NORMAL
A PULLULANS AB SER QL ID: NORMAL
PIGEON SERUM AB QL ID: NORMAL
S RECTIVIRGULA AB SER QL ID: NORMAL
S VIRIDIS AB SER QL ID: NORMAL
T CANDIDUS AB SER QL: NORMAL
T VULGARIS1 AB SER QL ID: NORMAL

## 2023-01-06 ENCOUNTER — OFFICE VISIT (OUTPATIENT)
Dept: PULMONOLOGY | Facility: CLINIC | Age: 68
End: 2023-01-06
Payer: COMMERCIAL

## 2023-01-06 DIAGNOSIS — R91.8 ABNORMAL CT SCAN OF LUNG: ICD-10-CM

## 2023-01-06 LAB
6 MIN WALK (FT): 340 FT
6 MIN WALK (M): 104 M

## 2023-01-06 PROCEDURE — 94618 PULMONARY STRESS TESTING: CPT | Performed by: INTERNAL MEDICINE

## 2023-01-06 NOTE — PROGRESS NOTES
Booker VIVIANA Brown comes into clinic today at the request of Dr. Hopkins, Ordering Provider for 6 minute walk    This service provided today was under the supervising provider of the day Dr. Hopkins, who was available if needed.    Aleksander Chavez, RT

## 2023-01-09 LAB — FIO2-PRE: 44 %

## 2023-01-11 ENCOUNTER — MYC MEDICAL ADVICE (OUTPATIENT)
Dept: PULMONOLOGY | Facility: CLINIC | Age: 68
End: 2023-01-11

## 2023-01-12 DIAGNOSIS — R91.8 ABNORMAL CT SCAN OF LUNG: Primary | ICD-10-CM

## 2023-01-12 NOTE — TELEPHONE ENCOUNTER
Called patient and discussed bellow in detail. He will be contacting [facility] to get his O2 set up right away and will be awaiting a call from rheumatology. He said he does own a pulse ox and will start to monitor his sats as he begins his supplemental O2.     Patient verbalized understanding and agreed to plan.    KUNAL Plunkett, Booker Rincon MD  You 17 minutes ago (11:08 AM)     MT  Can you call this deven and discuss a couple things?     1. He appears to need oxygen at rest and with activity. I will order 2L at rest and 6L w/ activity. We should check his nighttime oxygen and I will order that as well.     2. His ILD panel has a few positive markers; CRP, LISA, and ANCA. These aren't super positive, but I would like him to be seen by rheumatology and I placed that referral.     3. Let him know I am going to discuss his case at ILD conference at the soonest opportunity, I'm not sure yet when the next meeting is.

## 2023-01-17 ENCOUNTER — HOSPITAL ENCOUNTER (OUTPATIENT)
Dept: CARDIOLOGY | Facility: CLINIC | Age: 68
Discharge: HOME | End: 2023-01-17
Attending: INTERNAL MEDICINE | Admitting: INTERNAL MEDICINE
Payer: COMMERCIAL

## 2023-01-17 DIAGNOSIS — I10 BENIGN ESSENTIAL HYPERTENSION: Primary | ICD-10-CM

## 2023-01-17 DIAGNOSIS — I25.10 CAD (CORONARY ARTERY DISEASE): ICD-10-CM

## 2023-01-17 DIAGNOSIS — I25.118 ATHEROSCLEROSIS OF NATIVE CORONARY ARTERY OF NATIVE HEART WITH STABLE ANGINA PECTORIS (H): ICD-10-CM

## 2023-01-17 PROCEDURE — 75561 CARDIAC MRI FOR MORPH W/DYE: CPT

## 2023-01-17 PROCEDURE — A9585 GADOBUTROL INJECTION: HCPCS | Performed by: INTERNAL MEDICINE

## 2023-01-17 PROCEDURE — 75561 CARDIAC MRI FOR MORPH W/DYE: CPT | Mod: 26 | Performed by: INTERNAL MEDICINE

## 2023-01-17 PROCEDURE — 255N000002 HC RX 255 OP 636: Performed by: INTERNAL MEDICINE

## 2023-01-17 RX ORDER — CAFFEINE CITRATE 20 MG/ML
60 SOLUTION INTRAVENOUS
Status: DISCONTINUED | OUTPATIENT
Start: 2023-01-17 | End: 2023-01-18 | Stop reason: HOSPADM

## 2023-01-17 RX ORDER — METHYLPREDNISOLONE SODIUM SUCCINATE 125 MG/2ML
125 INJECTION, POWDER, LYOPHILIZED, FOR SOLUTION INTRAMUSCULAR; INTRAVENOUS
Status: DISCONTINUED | OUTPATIENT
Start: 2023-01-17 | End: 2023-01-18 | Stop reason: HOSPADM

## 2023-01-17 RX ORDER — REGADENOSON 0.08 MG/ML
0.4 INJECTION, SOLUTION INTRAVENOUS ONCE
Status: DISCONTINUED | OUTPATIENT
Start: 2023-01-17 | End: 2023-01-18 | Stop reason: HOSPADM

## 2023-01-17 RX ORDER — ALBUTEROL SULFATE 90 UG/1
2 AEROSOL, METERED RESPIRATORY (INHALATION) EVERY 5 MIN PRN
Status: DISCONTINUED | OUTPATIENT
Start: 2023-01-17 | End: 2023-01-18 | Stop reason: HOSPADM

## 2023-01-17 RX ORDER — GADOBUTROL 604.72 MG/ML
15 INJECTION INTRAVENOUS ONCE
Status: COMPLETED | OUTPATIENT
Start: 2023-01-17 | End: 2023-01-17

## 2023-01-17 RX ORDER — DIAZEPAM 5 MG
5 TABLET ORAL EVERY 30 MIN PRN
Status: DISCONTINUED | OUTPATIENT
Start: 2023-01-17 | End: 2023-01-18 | Stop reason: HOSPADM

## 2023-01-17 RX ORDER — DIPHENHYDRAMINE HYDROCHLORIDE 50 MG/ML
25-50 INJECTION INTRAMUSCULAR; INTRAVENOUS
Status: DISCONTINUED | OUTPATIENT
Start: 2023-01-17 | End: 2023-01-18 | Stop reason: HOSPADM

## 2023-01-17 RX ORDER — ONDANSETRON 2 MG/ML
4 INJECTION INTRAMUSCULAR; INTRAVENOUS
Status: DISCONTINUED | OUTPATIENT
Start: 2023-01-17 | End: 2023-01-18 | Stop reason: HOSPADM

## 2023-01-17 RX ORDER — AMINOPHYLLINE 25 MG/ML
100 INJECTION, SOLUTION INTRAVENOUS ONCE
Status: DISCONTINUED | OUTPATIENT
Start: 2023-01-17 | End: 2023-01-18 | Stop reason: HOSPADM

## 2023-01-17 RX ORDER — ACYCLOVIR 200 MG/1
0-1 CAPSULE ORAL
Status: DISCONTINUED | OUTPATIENT
Start: 2023-01-17 | End: 2023-01-18 | Stop reason: HOSPADM

## 2023-01-17 RX ORDER — DIPHENHYDRAMINE HCL 25 MG
25 CAPSULE ORAL
Status: DISCONTINUED | OUTPATIENT
Start: 2023-01-17 | End: 2023-01-18 | Stop reason: HOSPADM

## 2023-01-17 RX ADMIN — GADOBUTROL 15 ML: 604.72 INJECTION INTRAVENOUS at 15:14

## 2023-01-18 ENCOUNTER — MEDICAL CORRESPONDENCE (OUTPATIENT)
Dept: HEALTH INFORMATION MANAGEMENT | Facility: CLINIC | Age: 68
End: 2023-01-18

## 2023-01-19 ENCOUNTER — TELEPHONE (OUTPATIENT)
Dept: INTERNAL MEDICINE | Facility: CLINIC | Age: 68
End: 2023-01-19
Payer: COMMERCIAL

## 2023-01-19 NOTE — TELEPHONE ENCOUNTER
Short term disability form completed and faxed to [employer] at [phone]. Form sent to be scanned into chart,copy made and mailed to patients home address. Patient advised.

## 2023-01-23 ENCOUNTER — MYC REFILL (OUTPATIENT)
Dept: INTERNAL MEDICINE | Facility: CLINIC | Age: 68
End: 2023-01-23
Payer: COMMERCIAL

## 2023-01-23 DIAGNOSIS — N52.1 TYPE 2 DIABETES WITH CIRCULATORY DISORDER CAUSING ERECTILE DYSFUNCTION (H): ICD-10-CM

## 2023-01-23 DIAGNOSIS — E11.59 TYPE 2 DIABETES WITH CIRCULATORY DISORDER CAUSING ERECTILE DYSFUNCTION (H): ICD-10-CM

## 2023-01-25 ENCOUNTER — HOSPITAL ENCOUNTER (OUTPATIENT)
Dept: CARDIAC REHAB | Facility: CLINIC | Age: 68
Setting detail: THERAPIES SERIES
Discharge: HOME | End: 2023-01-25
Attending: STUDENT IN AN ORGANIZED HEALTH CARE EDUCATION/TRAINING PROGRAM
Payer: COMMERCIAL

## 2023-01-25 DIAGNOSIS — R91.8 ABNORMAL CT SCAN OF LUNG: ICD-10-CM

## 2023-01-25 PROCEDURE — G0238 OTH RESP PROC, INDIV: HCPCS

## 2023-01-27 ENCOUNTER — MYC MEDICAL ADVICE (OUTPATIENT)
Dept: PULMONOLOGY | Facility: CLINIC | Age: 68
End: 2023-01-27
Payer: COMMERCIAL

## 2023-02-10 ENCOUNTER — OFFICE VISIT (OUTPATIENT)
Dept: CARDIOLOGY | Facility: CLINIC | Age: 68
End: 2023-02-10
Attending: INTERNAL MEDICINE
Payer: COMMERCIAL

## 2023-02-10 VITALS
HEART RATE: 100 BPM | SYSTOLIC BLOOD PRESSURE: 130 MMHG | HEIGHT: 70 IN | DIASTOLIC BLOOD PRESSURE: 72 MMHG | BODY MASS INDEX: 37.37 KG/M2 | WEIGHT: 261 LBS

## 2023-02-10 DIAGNOSIS — I48.21 PERMANENT ATRIAL FIBRILLATION (H): Primary | ICD-10-CM

## 2023-02-10 DIAGNOSIS — I25.118 ATHEROSCLEROSIS OF NATIVE CORONARY ARTERY OF NATIVE HEART WITH STABLE ANGINA PECTORIS (H): ICD-10-CM

## 2023-02-10 PROCEDURE — 99215 OFFICE O/P EST HI 40 MIN: CPT | Performed by: INTERNAL MEDICINE

## 2023-02-10 NOTE — PROGRESS NOTES
HPI and Plan:   See dictation    Orders Placed This Encounter   Procedures     Follow-Up with Cardiology EP     No orders of the defined types were placed in this encounter.    There are no discontinued medications.      Encounter Diagnoses   Name Primary?     Atherosclerosis of native coronary artery of native heart with stable angina pectoris (H)      Permanent atrial fibrillation (H) Yes       CURRENT MEDICATIONS:  Current Outpatient Medications   Medication Sig Dispense Refill     albuterol (PROAIR HFA/PROVENTIL HFA/VENTOLIN HFA) 108 (90 Base) MCG/ACT inhaler Inhale 2 puffs into the lungs every 6 hours as needed for shortness of breath / dyspnea or wheezing 18 g 7     alcohol swab prep pads Use to swab area of injection/fariha as directed. 100 each 3     blood glucose (NO BRAND SPECIFIED) test strip Use to test blood sugar 1 times daily or as directed. To accompany: Blood Glucose Monitor Brands: per insurance. 100 strip 6     blood glucose calibration (NO BRAND SPECIFIED) solution To accompany: Blood Glucose Monitor Brands: per insurance. 1 Bottle 3     blood glucose monitoring (NO BRAND SPECIFIED) meter device kit Use to test blood sugar 1 times daily or as directed. : per insurance. 1 kit 0     diltiazem ER COATED BEADS (CARDIZEM CD) 360 MG 24 hr capsule Take 1 capsule (360 mg) by mouth daily 90 capsule 3     Fluticasone-Umeclidin-Vilant (TRELEGY ELLIPTA) 100-62.5-25 MCG/ACT oral inhaler Inhale 1 puff into the lungs daily 84 each 3     glipiZIDE (GLUCOTROL XL) 10 MG 24 hr tablet TAKE 1 TABLET BY MOUTH EVERY DAY 90 tablet 0     lisinopril (ZESTRIL) 40 MG tablet Take 1 tablet (40 mg) by mouth daily 90 tablet 3     metFORMIN (GLUCOPHAGE XR) 500 MG 24 hr tablet Take 2 tablets (1,000 mg) by mouth daily (with dinner) 180 tablet 3     metoprolol succinate ER (TOPROL XL) 100 MG 24 hr tablet Take 1 tablet (100 mg) by mouth 2 times daily 180 tablet 3     rivaroxaban ANTICOAGULANT (XARELTO ANTICOAGULANT) 20 MG TABS tablet  Take 1 tablet (20 mg) by mouth daily (with dinner) 90 tablet 3     rosuvastatin (CRESTOR) 40 MG tablet Take 1 tablet (40 mg) by mouth At Bedtime 90 tablet 3     thin (NO BRAND SPECIFIED) lancets Use with lanceting device. To accompany: Blood Glucose Monitor Brands: per insurance. 1 each 6       ALLERGIES   No Known Allergies    PAST MEDICAL HISTORY:  Past Medical History:   Diagnosis Date     Benign essential hypertension      CAD (coronary artery disease)      Chronic kidney disease, stage III (moderate) (H)      Obesity (BMI 30-39.9)      Persistent atrial fibrillation (H)      Pure hypercholesterolemia      Type 2 diabetes mellitus (H)        PAST SURGICAL HISTORY:  Past Surgical History:   Procedure Laterality Date     COLONOSCOPY N/A 8/24/2018    Procedure: COMBINED COLONOSCOPY, SINGLE OR MULTIPLE BIOPSY/POLYPECTOMY BY BIOPSY;;  Surgeon: Lawrence Mata MD;  Location:  GI     CV CORONARY ANGIOGRAM N/A 11/18/2022    Procedure: Coronary Angiogram;  Surgeon: Mason Lucas MD;  Location:  HEART CARDIAC CATH LAB     CV INSTANTANEOUS WAVE-FREE RATIO N/A 11/18/2022    Procedure: Instantaneous Wave-Free Ratio;  Surgeon: Mason Lucas MD;  Location:  HEART CARDIAC CATH LAB     CV LEFT HEART CATH N/A 11/18/2022    Procedure: Left Heart Catheterization;  Surgeon: Mason Lucas MD;  Location:  HEART CARDIAC CATH LAB       FAMILY HISTORY:  Family History   Problem Relation Age of Onset     Diabetes Type 2  Mother      Cerebral aneurysm Sister      Bladder Cancer Brother      Myocardial Infarction Brother         in his 50s     Cerebrovascular Disease No family hx of      Coronary Artery Disease Early Onset No family hx of      Prostate Cancer No family hx of      Colon Cancer No family hx of      Clotting Disorder No family hx of      Bleeding Disorder No family hx of      Anesthesia Reaction No family hx of        SOCIAL HISTORY:  Social History     Socioeconomic History     Marital status:  "     Spouse name: None     Number of children: None     Years of education: None     Highest education level: None   Occupational History     Occupation: Shipping & Receiving   Tobacco Use     Smoking status: Former     Packs/day: 2.00     Years: 16.00     Pack years: 32.00     Types: Cigarettes     Quit date: 1987     Years since quittin.4     Smokeless tobacco: Never   Vaping Use     Vaping Use: Never used   Substance and Sexual Activity     Alcohol use: No     Drug use: No     Sexual activity: Yes     Partners: Female   Other Topics Concern     Parent/sibling w/ CABG, MI or angioplasty before 65F 55M? Yes      Service No     Blood Transfusions No     Caffeine Concern No     Occupational Exposure No     Hobby Hazards No     Sleep Concern No     Stress Concern No     Weight Concern Yes     Special Diet No     Back Care No     Exercise Yes     Bike Helmet No     Comment: n/a     Seat Belt Yes     Self-Exams No   Social History Narrative    .    Two adult children.    Four grandchildren.    No formal exercise.       Review of Systems:  Skin:        Eyes:       ENT:       Respiratory:  Positive for shortness of breath;dyspnea on exertion  Cardiovascular:    edema;Positive for  Gastroenterology:      Genitourinary:       Musculoskeletal:       Neurologic:       Psychiatric:       Heme/Lymph/Imm:       Endocrine:         Physical Exam:  Vitals: /72   Pulse 100   Ht 1.778 m (5' 10\")   Wt 118.4 kg (261 lb)   BMI 37.45 kg/m      Constitutional:           Skin:             Head:           Eyes:           Lymph:      ENT:           Neck:           Respiratory:            Cardiac:                                                           GI:           Extremities and Muscular Skeletal:                 Neurological:           Psych:         Recent Lab Results:  LIPID RESULTS:  Lab Results   Component Value Date    CHOL 103 2022    CHOL 124 2021    HDL 32 (L) 2022    " HDL 31 (L) 05/12/2021    LDL 61 05/03/2022    LDL 70 05/12/2021    TRIG 50 05/03/2022    TRIG 116 05/12/2021    CHOLHDLRATIO 4.5 09/28/2015       LIVER ENZYME RESULTS:  Lab Results   Component Value Date    AST 24 11/18/2022    AST 20 05/12/2021    ALT 27 11/18/2022    ALT 28 05/12/2021       CBC RESULTS:  Lab Results   Component Value Date    WBC 12.8 (H) 11/18/2022    WBC 11.0 05/12/2021    RBC 5.52 11/18/2022    RBC 5.15 05/12/2021    HGB 14.2 11/18/2022    HGB 14.1 05/12/2021    HCT 46.0 11/18/2022    HCT 44.2 05/12/2021    MCV 83 11/18/2022    MCV 86 05/12/2021    MCH 25.7 (L) 11/18/2022    MCH 27.4 05/12/2021    MCHC 30.9 (L) 11/18/2022    MCHC 31.9 05/12/2021    RDW 15.9 (H) 11/18/2022    RDW 14.7 05/12/2021     11/18/2022     05/12/2021       BMP RESULTS:  Lab Results   Component Value Date     12/30/2022     05/12/2021    POTASSIUM 4.4 12/30/2022    POTASSIUM 3.9 11/18/2022    POTASSIUM 3.9 05/14/2021    CHLORIDE 102 12/30/2022    CHLORIDE 101 11/18/2022    CHLORIDE 104 05/12/2021    CO2 26 12/30/2022    CO2 32 11/18/2022    CO2 29 05/12/2021    ANIONGAP 14 12/30/2022    ANIONGAP 5 11/18/2022    ANIONGAP 4 05/12/2021     (H) 12/30/2022     (H) 11/18/2022     (H) 05/12/2021    BUN 26.7 (H) 12/30/2022    BUN 42 (H) 11/18/2022    BUN 36 (H) 05/12/2021    CR 1.52 (H) 12/30/2022    CR 1.87 (H) 05/12/2021    GFRESTIMATED 51 (L) 12/30/2022    GFRESTIMATED 37 (L) 05/12/2021    GFRESTBLACK 43 (L) 05/12/2021    KURT 9.5 12/30/2022    KURT 9.2 05/12/2021        A1C RESULTS:  Lab Results   Component Value Date    A1C 7.3 (H) 10/12/2022    A1C 8.6 (H) 05/12/2021       INR RESULTS:  Lab Results   Component Value Date    INR 1.51 (H) 11/18/2022           CC  Nate Marquez MD  [id number]  [place] [id number]

## 2023-02-10 NOTE — PROGRESS NOTES
HPI and Plan:   See dictation    Orders Placed This Encounter   Procedures     Follow-Up with Cardiology EP     No orders of the defined types were placed in this encounter.    There are no discontinued medications.      Encounter Diagnoses   Name Primary?     Atherosclerosis of native coronary artery of native heart with stable angina pectoris (H)      Permanent atrial fibrillation (H) Yes       CURRENT MEDICATIONS:  Current Outpatient Medications   Medication Sig Dispense Refill     albuterol (PROAIR HFA/PROVENTIL HFA/VENTOLIN HFA) 108 (90 Base) MCG/ACT inhaler Inhale 2 puffs into the lungs every 6 hours as needed for shortness of breath / dyspnea or wheezing 18 g 7     alcohol swab prep pads Use to swab area of injection/fariha as directed. 100 each 3     blood glucose (NO BRAND SPECIFIED) test strip Use to test blood sugar 1 times daily or as directed. To accompany: Blood Glucose Monitor Brands: per insurance. 100 strip 6     blood glucose calibration (NO BRAND SPECIFIED) solution To accompany: Blood Glucose Monitor Brands: per insurance. 1 Bottle 3     blood glucose monitoring (NO BRAND SPECIFIED) meter device kit Use to test blood sugar 1 times daily or as directed. : per insurance. 1 kit 0     diltiazem ER COATED BEADS (CARDIZEM CD) 360 MG 24 hr capsule Take 1 capsule (360 mg) by mouth daily 90 capsule 3     Fluticasone-Umeclidin-Vilant (TRELEGY ELLIPTA) 100-62.5-25 MCG/ACT oral inhaler Inhale 1 puff into the lungs daily 84 each 3     glipiZIDE (GLUCOTROL XL) 10 MG 24 hr tablet TAKE 1 TABLET BY MOUTH EVERY DAY 90 tablet 0     lisinopril (ZESTRIL) 40 MG tablet Take 1 tablet (40 mg) by mouth daily 90 tablet 3     metFORMIN (GLUCOPHAGE XR) 500 MG 24 hr tablet Take 2 tablets (1,000 mg) by mouth daily (with dinner) 180 tablet 3     metoprolol succinate ER (TOPROL XL) 100 MG 24 hr tablet Take 1 tablet (100 mg) by mouth 2 times daily 180 tablet 3     rivaroxaban ANTICOAGULANT (XARELTO ANTICOAGULANT) 20 MG TABS tablet  Take 1 tablet (20 mg) by mouth daily (with dinner) 90 tablet 3     rosuvastatin (CRESTOR) 40 MG tablet Take 1 tablet (40 mg) by mouth At Bedtime 90 tablet 3     thin (NO BRAND SPECIFIED) lancets Use with lanceting device. To accompany: Blood Glucose Monitor Brands: per insurance. 1 each 6       ALLERGIES   No Known Allergies    PAST MEDICAL HISTORY:  Past Medical History:   Diagnosis Date     Benign essential hypertension      CAD (coronary artery disease)      Chronic kidney disease, stage III (moderate) (H)      Obesity (BMI 30-39.9)      Persistent atrial fibrillation (H)      Pure hypercholesterolemia      Type 2 diabetes mellitus (H)        PAST SURGICAL HISTORY:  Past Surgical History:   Procedure Laterality Date     COLONOSCOPY N/A 8/24/2018    Procedure: COMBINED COLONOSCOPY, SINGLE OR MULTIPLE BIOPSY/POLYPECTOMY BY BIOPSY;;  Surgeon: Lawrence Mata MD;  Location:  GI     CV CORONARY ANGIOGRAM N/A 11/18/2022    Procedure: Coronary Angiogram;  Surgeon: Mason Lucas MD;  Location:  HEART CARDI[facility] CATH LAB     CV INSTANTANEOUS WAVE-FREE RATIO N/A 11/18/2022    Procedure: Instantaneous Wave-Free Ratio;  Surgeon: Mason Lucas MD;  Location:  HE[facility]ARD[facility] CATH LAB     CV LEFT HEART CATH N/A 11/18/2022    Procedure: Left Heart Catheterization;  Surgeon: Mason Lucas MD;  Location:  HEART CARD[facility] CATH LAB       FAMILY HISTORY:  Family History   Problem Relation Age of Onset     Diabetes Type 2  Mother      Cerebral aneurysm Sister      Bladder Cancer Brother      Myocardial Infarction Brother         in his 50s     Cerebrovascular Disease No family hx of      Coronary Artery Disease Early Onset No family hx of      Prostate Cancer No family hx of      Colon Cancer No family hx of      Clotting Disorder No family hx of      Bleeding Disorder No family hx of      Anesthesia Reaction No family hx of        SOCIAL HISTORY:  Social History     Socioeconomic History     Marital status:  "     Spouse name: None     Number of children: None     Years of education: None     Highest education level: None   Occupational History     Occupation: Shipping & Receiving   Tobacco Use     Smoking status: Former     Packs/day: 2.00     Years: 16.00     Pack years: 32.00     Types: Cigarettes     Quit date: 1987     Years since quittin.4     Smokeless tobacco: Never   Vaping Use     Vaping Use: Never used   Substance and Sexual Activity     Alcohol use: No     Drug use: No     Sexual activity: Yes     Partners: Female   Other Topics Concern     Parent/sibling w/ CABG, MI or angioplasty before 65F 55M? Yes      Service No     Blood Transfusions No     Caffeine Concern No     Occupational Exposure No     Hobby Hazards No     Sleep Concern No     Stress Concern No     Weight Concern Yes     Special Diet No     Back Care No     Exercise Yes     Bike Helmet No     Comment: n/a     Seat Belt Yes     Self-Exams No   Social History Narrative    .    Two adult children.    Four grandchildren.    No formal exercise.       Review of Systems:  Skin:        Eyes:       ENT:       Respiratory:  Positive for shortness of breath;dyspnea on exertion  Cardiovascular:    edema;Positive for  Gastroenterology:      Genitourinary:       Musculoskeletal:       Neurologic:       Psychiatric:       Heme/Lymph/Imm:       Endocrine:         Physical Exam:  Vitals: /72   Pulse 100   Ht 1.778 m (5' 10\")   Wt 118.4 kg (261 lb)   BMI 37.45 kg/m      Constitutional:           Skin:             Head:           Eyes:           Lymph:      ENT:           Neck:           Respiratory:            Cardiac:                                                           GI:           Extremities and Muscular Skeletal:                 Neurological:           Psych:         Recent Lab Results:  LIPID RESULTS:  Lab Results   Component Value Date    CHOL 103 2022    CHOL 124 2021    HDL 32 (L) 2022    " HDL 31 (L) 05/12/2021    LDL 61 05/03/2022    LDL 70 05/12/2021    TRIG 50 05/03/2022    TRIG 116 05/12/2021    CHOLHDLRATIO 4.5 09/28/2015       LIVER ENZYME RESULTS:  Lab Results   Component Value Date    AST 24 11/18/2022    AST 20 05/12/2021    ALT 27 11/18/2022    ALT 28 05/12/2021       CBC RESULTS:  Lab Results   Component Value Date    WBC 12.8 (H) 11/18/2022    WBC 11.0 05/12/2021    RBC 5.52 11/18/2022    RBC 5.15 05/12/2021    HGB 14.2 11/18/2022    HGB 14.1 05/12/2021    HCT 46.0 11/18/2022    HCT 44.2 05/12/2021    MCV 83 11/18/2022    MCV 86 05/12/2021    MCH 25.7 (L) 11/18/2022    MCH 27.4 05/12/2021    MCHC 30.9 (L) 11/18/2022    MCHC 31.9 05/12/2021    RDW 15.9 (H) 11/18/2022    RDW 14.7 05/12/2021     11/18/2022     05/12/2021       BMP RESULTS:  Lab Results   Component Value Date     12/30/2022     05/12/2021    POTASSIUM 4.4 12/30/2022    POTASSIUM 3.9 11/18/2022    POTASSIUM 3.9 05/14/2021    CHLORIDE 102 12/30/2022    CHLORIDE 101 11/18/2022    CHLORIDE 104 05/12/2021    CO2 26 12/30/2022    CO2 32 11/18/2022    CO2 29 05/12/2021    ANIONGAP 14 12/30/2022    ANIONGAP 5 11/18/2022    ANIONGAP 4 05/12/2021     (H) 12/30/2022     (H) 11/18/2022     (H) 05/12/2021    BUN 26.7 (H) 12/30/2022    BUN 42 (H) 11/18/2022    BUN 36 (H) 05/12/2021    CR 1.52 (H) 12/30/2022    CR 1.87 (H) 05/12/2021    GFRESTIMATED 51 (L) 12/30/2022    GFRESTIMATED 37 (L) 05/12/2021    GFRESTBLACK 43 (L) 05/12/2021    KURT 9.5 12/30/2022    KURT 9.2 05/12/2021        A1C RESULTS:  Lab Results   Component Value Date    A1C 7.3 (H) 10/12/2022    A1C 8.6 (H) 05/12/2021       INR RESULTS:  Lab Results   Component Value Date    INR 1.51 (H) 11/18/2022           CC  Nate Marquez MD  [address]  [place] [id number]

## 2023-02-10 NOTE — PROGRESS NOTES
Service Date: 02/10/2023    Booker Brown returns for followup.  He is accompanied by his wife.  I was fortunate enough to have Dr. Lucas here to talk to him also during my visit.  Please see my previous full consult note dated 12/07/2022.  Briefly, this patient has lung disease.  He is in the middle of a workup with Dr. Hopkins, so we are going to await Dr. Hopkins's input, but pulmonary function tests I was told showed reduced volume restriction and obstruction and reduced DLCO.  I actually thought I saw some pleural plaques on his heart catheterization and Dr. Hopkins's note also mentioned asbestos, but the patient states he was not exposed that he knows of to that but he was exposed to silica.  He also has chronic atrial fibrillation and he sees Dr. Mejias.  At some point, we might want him to see Dr. Mejias again to talk about possible AV node ablation and pacemaker VVI if the heart rate is not controlled.  However, I think that is a secondary issue.  An angiogram was performed, which we again reviewed with Dr. Lucas who did it.  The right coronary artery is totally occluded near the proximal portion.  Left circumflex was a moderate sized system and was totally occluded in the midportion.  Dr. Lucas reported the LAD as 50%-60% narrowing, but there is one area in particular that is hypolucent, and I suspect it is calcified and severe and indeed Dr. Lucas did do iFR flow reserve and it was a very abnormal at 0.68. Interestingly, the echo shows EF of 50%-55%.  The MRI, however, shows that the inferior wall is thin, akinetic and nonviable.  The remainder of the heart is viable. At the time of the heart catheterization, LVDP was 15, which is completely normal, so we are not going to be able to easily explain shortness of breath at rest with a normal LVEDP, normal heart rate and normal vital signs. As I mentioned, the patient is on both metoprolol and diltiazem for rate control for his atrial fibrillation, and  again, we may ask the patient to see Dr. Mejias in followup to determine if perhaps we might do an AV node ablation and VVI pacing so we can decrease some of those medicines on the off chance some of them are decreasing his heart function or causing any other effects.  I had the discussion with Dr. Lucas and the patient's family today.  I think the problem here is the patient has absolutely no angina, so we cannot use angina as a guide and I do not think his shortness of breath is an anginal equivalent or, if it is, it is only part of the story. Shortness of breath could easily be due to significant lung disease, cardiac ischemia and rapid AFib.  I think fixing the LAD does make sense.  It is his last surviving vessel.  It is, however, calcified and so whether we do Rotablator or CSI or lithotripsy we can make that decision, but I am a little concerned all those devices will partially occlude the LAD at some point and it is his last remaining vessel, so we might have to do the angioplasty under Impella support, which does raise the risk.  In addition, the patient we discussed will be on an additional blood thinner for the stent.  He is already on Xarelto because of atrial fibrillation. We would be adding something like Plavix to that, and of course, there is always a chance of restenosis or thrombosis of the last remaining artery, so we have to be cautious in that interpretation.  Again, I think we have enough evidence that this is a last remaining vessel, it is ischemic by iFR, even if the patient does not feel chest pain, and therefore, he does not have a good early warning system to notify us if we need to move forward quicker.  I am going to have the patient come back in about a month.  By then, he will have seen Dr. Hopkins and any tests that need to be done before we talk about possible stenting.    Consult time today was 48 minutes.    Nate Marquez MD    cc:  Mason Lucas MD  [facility]  [address]  [place]  [address]    Brenden Felton MD  [facility]  [address]  [place]  [address]    Arline White MD  [facility]  [address] [place]  [address]    Booker Hopkins MD  [facility]  [address]  [place]  [address]    Kj Mejias MD  [facility]  [address]  [place]  [address]      Nate Marquez MD        D: 02/10/2023   T: 02/10/2023   MT: [email or web address]    Name:     BOOKER WILLIS  MRN:      [id number]        Account:      [id number]   :      1955           Service Date: 02/10/2023       Document: [id number]

## 2023-02-10 NOTE — LETTER
2/10/2023    Arline White MD  [address] [facility] [address]    RE: Booker Brown       Dear Colleague,     I had the pleasure of seeing Booker Brown in the [facility].  HPI and Plan:   See dictation    Orders Placed This Encounter   Procedures     Follow-Up with Cardiology EP     No orders of the defined types were placed in this encounter.    There are no discontinued medications.      Encounter Diagnoses   Name Primary?     Atherosclerosis of native coronary artery of native heart with stable angina pectoris (H)      Permanent atrial fibrillation (H) Yes       CURRENT MEDICATIONS:  Current Outpatient Medications   Medication Sig Dispense Refill     albuterol (PROAIR HFA/PROVENTIL HFA/VENTOLIN HFA) 108 (90 Base) MCG/ACT inhaler Inhale 2 puffs into the lungs every 6 hours as needed for shortness of breath / dyspnea or wheezing 18 g 7     alcohol swab prep pads Use to swab area of injection/fariha as directed. 100 each 3     blood glucose (NO BRAND SPECIFIED) test strip Use to test blood sugar 1 times daily or as directed. To accompany: Blood Glucose Monitor Brands: per insurance. 100 strip 6     blood glucose calibration (NO BRAND SPECIFIED) solution To accompany: Blood Glucose Monitor Brands: per insurance. 1 Bottle 3     blood glucose monitoring (NO BRAND SPECIFIED) meter device kit Use to test blood sugar 1 times daily or as directed. : per insurance. 1 kit 0     diltiazem ER COATED BEADS (CARDIZEM CD) 360 MG 24 hr capsule Take 1 capsule (360 mg) by mouth daily 90 capsule 3     Fluticasone-Umeclidin-Vilant (TRELEGY ELLIPTA) 100-62.5-25 MCG/ACT oral inhaler Inhale 1 puff into the lungs daily 84 each 3     glipiZIDE (GLUCOTROL XL) 10 MG 24 hr tablet TAKE 1 TABLET BY MOUTH EVERY DAY 90 tablet 0     lisinopril (ZESTRIL) 40 MG tablet Take 1 tablet (40 mg) by mouth daily 90 tablet 3     metFORMIN (GLUCOPHAGE XR) 500 MG 24 hr tablet Take 2 tablets (1,000 mg) by mouth daily (with  dinner) 180 tablet 3     metoprolol succinate ER (TOPROL XL) 100 MG 24 hr tablet Take 1 tablet (100 mg) by mouth 2 times daily 180 tablet 3     rivaroxaban ANTICOAGULANT (XARELTO ANTICOAGULANT) 20 MG TABS tablet Take 1 tablet (20 mg) by mouth daily (with dinner) 90 tablet 3     rosuvastatin (CRESTOR) 40 MG tablet Take 1 tablet (40 mg) by mouth At Bedtime 90 tablet 3     thin (NO BRAND SPECIFIED) lancets Use with lanceting device. To accompany: Blood Glucose Monitor Brands: per insurance. 1 each 6       ALLERGIES   No Known Allergies    PAST MEDICAL HISTORY:  Past Medical History:   Diagnosis Date     Benign essential hypertension      CAD (coronary artery disease)      Chronic kidney disease, stage III (moderate) (H)      Obesity (BMI 30-39.9)      Persistent atrial fibrillation (H)      Pure hypercholesterolemia      Type 2 diabetes mellitus (H)        PAST SURGICAL HISTORY:  Past Surgical History:   Procedure Laterality Date     COLONOSCOPY N/A 8/24/2018    Procedure: COMBINED COLONOSCOPY, SINGLE OR MULTIPLE BIOPSY/POLYPECTOMY BY BIOPSY;;  Surgeon: Lawrence Mata MD;  Location:  GI     CV CORONARY ANGIOGRAM N/A 11/18/2022    Procedure: Coronary Angiogram;  Surgeon: Mason Lucas MD;  Location:  HEART CARDIAC CATH LAB     CV INSTANTANEOUS WAVE-FREE RATIO N/A 11/18/2022    Procedure: Instantaneous Wave-Free Ratio;  Surgeon: Mason Lucas MD;  Location:  HEART CARDIAC CATH LAB     CV LEFT HEART CATH N/A 11/18/2022    Procedure: Left Heart Catheterization;  Surgeon: Mason Lucas MD;  Location:  HEART CARDIAC CATH LAB       FAMILY HISTORY:  Family History   Problem Relation Age of Onset     Diabetes Type 2  Mother      Cerebral aneurysm Sister      Bladder Cancer Brother      Myocardial Infarction Brother         in his 50s     Cerebrovascular Disease No family hx of      Coronary Artery Disease Early Onset No family hx of      Prostate Cancer No family hx of      Colon Cancer No family  "hx of      Clotting Disorder No family hx of      Bleeding Disorder No family hx of      Anesthesia Reaction No family hx of        SOCIAL HISTORY:  Social History     Socioeconomic History     Marital status:      Spouse name: None     Number of children: None     Years of education: None     Highest education level: None   Occupational History     Occupation: Shipping & Receiving   Tobacco Use     Smoking status: Former     Packs/day: 2.00     Years: 16.00     Pack years: 32.00     Types: Cigarettes     Quit date: 1987     Years since quittin.4     Smokeless tobacco: Never   Vaping Use     Vaping Use: Never used   Substance and Sexual Activity     Alcohol use: No     Drug use: No     Sexual activity: Yes     Partners: Female   Other Topics Concern     Parent/sibling w/ CABG, MI or angioplasty before 65F 55M? Yes      Service No     Blood Transfusions No     Caffeine Concern No     Occupational Exposure No     Hobby Hazards No     Sleep Concern No     Stress Concern No     Weight Concern Yes     Special Diet No     Back Care No     Exercise Yes     Bike Helmet No     Comment: n/a     Seat Belt Yes     Self-Exams No   Social History Narrative    .    Two adult children.    Four grandchildren.    No formal exercise.       Review of Systems:  Skin:        Eyes:       ENT:       Respiratory:  Positive for shortness of breath;dyspnea on exertion  Cardiovascular:    edema;Positive for  Gastroenterology:      Genitourinary:       Musculoskeletal:       Neurologic:       Psychiatric:       Heme/Lymph/Imm:       Endocrine:         Physical Exam:  Vitals: /72   Pulse 100   Ht 1.778 m (5' 10\")   Wt 118.4 kg (261 lb)   BMI 37.45 kg/m      Constitutional:           Skin:             Head:           Eyes:           Lymph:      ENT:           Neck:           Respiratory:            Cardiac:                                                           GI:           Extremities and Muscular " Skeletal:                 Neurological:           Psych:         Recent Lab Results:  LIPID RESULTS:  Lab Results   Component Value Date    CHOL 103 05/03/2022    CHOL 124 05/12/2021    HDL 32 (L) 05/03/2022    HDL 31 (L) 05/12/2021    LDL 61 05/03/2022    LDL 70 05/12/2021    TRIG 50 05/03/2022    TRIG 116 05/12/2021    CHOLHDLRATIO 4.5 09/28/2015       LIVER ENZYME RESULTS:  Lab Results   Component Value Date    AST 24 11/18/2022    AST 20 05/12/2021    ALT 27 11/18/2022    ALT 28 05/12/2021       CBC RESULTS:  Lab Results   Component Value Date    WBC 12.8 (H) 11/18/2022    WBC 11.0 05/12/2021    RBC 5.52 11/18/2022    RBC 5.15 05/12/2021    HGB 14.2 11/18/2022    HGB 14.1 05/12/2021    HCT 46.0 11/18/2022    HCT 44.2 05/12/2021    MCV 83 11/18/2022    MCV 86 05/12/2021    MCH 25.7 (L) 11/18/2022    MCH 27.4 05/12/2021    MCHC 30.9 (L) 11/18/2022    MCHC 31.9 05/12/2021    RDW 15.9 (H) 11/18/2022    RDW 14.7 05/12/2021     11/18/2022     05/12/2021       BMP RESULTS:  Lab Results   Component Value Date     12/30/2022     05/12/2021    POTASSIUM 4.4 12/30/2022    POTASSIUM 3.9 11/18/2022    POTASSIUM 3.9 05/14/2021    CHLORIDE 102 12/30/2022    CHLORIDE 101 11/18/2022    CHLORIDE 104 05/12/2021    CO2 26 12/30/2022    CO2 32 11/18/2022    CO2 29 05/12/2021    ANIONGAP 14 12/30/2022    ANIONGAP 5 11/18/2022    ANIONGAP 4 05/12/2021     (H) 12/30/2022     (H) 11/18/2022     (H) 05/12/2021    BUN 26.7 (H) 12/30/2022    BUN 42 (H) 11/18/2022    BUN 36 (H) 05/12/2021    CR 1.52 (H) 12/30/2022    CR 1.87 (H) 05/12/2021    GFRESTIMATED 51 (L) 12/30/2022    GFRESTIMATED 37 (L) 05/12/2021    GFRESTBLACK 43 (L) 05/12/2021    KURT 9.5 12/30/2022    KURT 9.2 05/12/2021        A1C RESULTS:  Lab Results   Component Value Date    A1C 7.3 (H) 10/12/2022    A1C 8.6 (H) 05/12/2021       INR RESULTS:  Lab Results   Component Value Date    INR 1.51 (H) 11/18/2022           RAH ACUNA  MD Jimmy  [address]  [place] [id number]    HPI and Plan:   See dictation    Orders Placed This Encounter   Procedures     Follow-Up with Cardiology EP     No orders of the defined types were placed in this encounter.    There are no discontinued medications.      Encounter Diagnoses   Name Primary?     Atherosclerosis of native coronary artery of native heart with stable angina pectoris (H)      Permanent atrial fibrillation (H) Yes       CURRENT MEDICATIONS:  Current Outpatient Medications   Medication Sig Dispense Refill     albuterol (PROAIR HFA/PROVENTIL HFA/VENTOLIN HFA) 108 (90 Base) MCG/ACT inhaler Inhale 2 puffs into the lungs every 6 hours as needed for shortness of breath / dyspnea or wheezing 18 g 7     alcohol swab prep pads Use to swab area of injection/fariha as directed. 100 each 3     blood glucose (NO BRAND SPECIFIED) test strip Use to test blood sugar 1 times daily or as directed. To accompany: Blood Glucose Monitor Brands: per insurance. 100 strip 6     blood glucose calibration (NO BRAND SPECIFIED) solution To accompany: Blood Glucose Monitor Brands: per insurance. 1 Bottle 3     blood glucose monitoring (NO BRAND SPECIFIED) meter device kit Use to test blood sugar 1 times daily or as directed. : per insurance. 1 kit 0     diltiazem ER COATED BEADS (CARDIZEM CD) 360 MG 24 hr capsule Take 1 capsule (360 mg) by mouth daily 90 capsule 3     Fluticasone-Umeclidin-Vilant (TRELEGY ELLIPTA) 100-62.5-25 MCG/ACT oral inhaler Inhale 1 puff into the lungs daily 84 each 3     glipiZIDE (GLUCOTROL XL) 10 MG 24 hr tablet TAKE 1 TABLET BY MOUTH EVERY DAY 90 tablet 0     lisinopril (ZESTRIL) 40 MG tablet Take 1 tablet (40 mg) by mouth daily 90 tablet 3     metFORMIN (GLUCOPHAGE XR) 500 MG 24 hr tablet Take 2 tablets (1,000 mg) by mouth daily (with dinner) 180 tablet 3     metoprolol succinate ER (TOPROL XL) 100 MG 24 hr tablet Take 1 tablet (100 mg) by mouth 2 times daily 180 tablet 3      rivaroxaban ANTICOAGULANT (XARELTO ANTICOAGULANT) 20 MG TABS tablet Take 1 tablet (20 mg) by mouth daily (with dinner) 90 tablet 3     rosuvastatin (CRESTOR) 40 MG tablet Take 1 tablet (40 mg) by mouth At Bedtime 90 tablet 3     thin (NO BRAND SPECIFIED) lancets Use with lanceting device. To accompany: Blood Glucose Monitor Brands: per insurance. 1 each 6       ALLERGIES   No Known Allergies    PAST MEDICAL HISTORY:  Past Medical History:   Diagnosis Date     Benign essential hypertension      CAD (coronary artery disease)      Chronic kidney disease, stage III (moderate) (H)      Obesity (BMI 30-39.9)      Persistent atrial fibrillation (H)      Pure hypercholesterolemia      Type 2 diabetes mellitus (H)        PAST SURGICAL HISTORY:  Past Surgical History:   Procedure Laterality Date     COLONOSCOPY N/A 8/24/2018    Procedure: COMBINED COLONOSCOPY, SINGLE OR MULTIPLE BIOPSY/POLYPECTOMY BY BIOPSY;;  Surgeon: Lawrence Mata MD;  Location:  GI     CV CORONARY ANGIOGRAM N/A 11/18/2022    Procedure: Coronary Angiogram;  Surgeon: Mason Lucas MD;  Location:  HEART CARDIAC CATH LAB     CV INSTANTANEOUS WAVE-FREE RATIO N/A 11/18/2022    Procedure: Instantaneous Wave-Free Ratio;  Surgeon: Mason Lucas MD;  Location:  HEART CARDIAC CATH LAB     CV LEFT HEART CATH N/A 11/18/2022    Procedure: Left Heart Catheterization;  Surgeon: Mason Lucas MD;  Location:  HEART CARDIAC CATH LAB       FAMILY HISTORY:  Family History   Problem Relation Age of Onset     Diabetes Type 2  Mother      Cerebral aneurysm Sister      Bladder Cancer Brother      Myocardial Infarction Brother         in his 50s     Cerebrovascular Disease No family hx of      Coronary Artery Disease Early Onset No family hx of      Prostate Cancer No family hx of      Colon Cancer No family hx of      Clotting Disorder No family hx of      Bleeding Disorder No family hx of      Anesthesia Reaction No family hx of        SOCIAL  "HISTORY:  Social History     Socioeconomic History     Marital status:      Spouse name: None     Number of children: None     Years of education: None     Highest education level: None   Occupational History     Occupation: Shipping & Receiving   Tobacco Use     Smoking status: Former     Packs/day: 2.00     Years: 16.00     Pack years: 32.00     Types: Cigarettes     Quit date: 1987     Years since quittin.4     Smokeless tobacco: Never   Vaping Use     Vaping Use: Never used   Substance and Sexual Activity     Alcohol use: No     Drug use: No     Sexual activity: Yes     Partners: Female   Other Topics Concern     Parent/sibling w/ CABG, MI or angioplasty before 65F 55M? Yes      Service No     Blood Transfusions No     Caffeine Concern No     Occupational Exposure No     Hobby Hazards No     Sleep Concern No     Stress Concern No     Weight Concern Yes     Special Diet No     Back Care No     Exercise Yes     Bike Helmet No     Comment: n/a     Seat Belt Yes     Self-Exams No   Social History Narrative    .    Two adult children.    Four grandchildren.    No formal exercise.       Review of Systems:  Skin:        Eyes:       ENT:       Respiratory:  Positive for shortness of breath;dyspnea on exertion  Cardiovascular:    edema;Positive for  Gastroenterology:      Genitourinary:       Musculoskeletal:       Neurologic:       Psychiatric:       Heme/Lymph/Imm:       Endocrine:         Physical Exam:  Vitals: /72   Pulse 100   Ht 1.778 m (5' 10\")   Wt 118.4 kg (261 lb)   BMI 37.45 kg/m      Constitutional:           Skin:             Head:           Eyes:           Lymph:      ENT:           Neck:           Respiratory:            Cardiac:                                                           GI:           Extremities and Muscular Skeletal:                 Neurological:           Psych:         Recent Lab Results:  LIPID RESULTS:  Lab Results   Component Value Date    " CHOL 103 05/03/2022    CHOL 124 05/12/2021    HDL 32 (L) 05/03/2022    HDL 31 (L) 05/12/2021    LDL 61 05/03/2022    LDL 70 05/12/2021    TRIG 50 05/03/2022    TRIG 116 05/12/2021    CHOLHDLRATIO 4.5 09/28/2015       LIVER ENZYME RESULTS:  Lab Results   Component Value Date    AST 24 11/18/2022    AST 20 05/12/2021    ALT 27 11/18/2022    ALT 28 05/12/2021       CBC RESULTS:  Lab Results   Component Value Date    WBC 12.8 (H) 11/18/2022    WBC 11.0 05/12/2021    RBC 5.52 11/18/2022    RBC 5.15 05/12/2021    HGB 14.2 11/18/2022    HGB 14.1 05/12/2021    HCT 46.0 11/18/2022    HCT 44.2 05/12/2021    MCV 83 11/18/2022    MCV 86 05/12/2021    MCH 25.7 (L) 11/18/2022    MCH 27.4 05/12/2021    MCHC 30.9 (L) 11/18/2022    MCHC 31.9 05/12/2021    RDW 15.9 (H) 11/18/2022    RDW 14.7 05/12/2021     11/18/2022     05/12/2021       BMP RESULTS:  Lab Results   Component Value Date     12/30/2022     05/12/2021    POTASSIUM 4.4 12/30/2022    POTASSIUM 3.9 11/18/2022    POTASSIUM 3.9 05/14/2021    CHLORIDE 102 12/30/2022    CHLORIDE 101 11/18/2022    CHLORIDE 104 05/12/2021    CO2 26 12/30/2022    CO2 32 11/18/2022    CO2 29 05/12/2021    ANIONGAP 14 12/30/2022    ANIONGAP 5 11/18/2022    ANIONGAP 4 05/12/2021     (H) 12/30/2022     (H) 11/18/2022     (H) 05/12/2021    BUN 26.7 (H) 12/30/2022    BUN 42 (H) 11/18/2022    BUN 36 (H) 05/12/2021    CR 1.52 (H) 12/30/2022    CR 1.87 (H) 05/12/2021    GFRESTIMATED 51 (L) 12/30/2022    GFRESTIMATED 37 (L) 05/12/2021    GFRESTBLACK 43 (L) 05/12/2021    KURT 9.5 12/30/2022    KURT 9.2 05/12/2021        A1C RESULTS:  Lab Results   Component Value Date    A1C 7.3 (H) 10/12/2022    A1C 8.6 (H) 05/12/2021       INR RESULTS:  Lab Results   Component Value Date    INR 1.51 (H) 11/18/2022           CC  Nate Marquez MD  [address]  [place] [id number]    Service Date: 02/10/2023    Booker Brown returns for followup.  He is  accompanied by his wife.  I was fortunate enough to have Dr. Lucas here to talk to him also during my visit.  Please see my previous full consult note dated 12/07/2022.  Briefly, this patient has lung disease.  He is in the middle of a workup with Dr. Hopkins, so we are going to await Dr. Hopkins's input, but pulmonary function tests I was told showed reduced volume restriction and obstruction and reduced DLCO.  I actually thought I saw some pleural plaques on his heart catheterization and Dr. Hopkins's note also mentioned asbestos, but the patient states he was not exposed that he knows of to that but he was exposed to silica.  He also has chronic atrial fibrillation and he sees Dr. Mejias.  At some point, we might want him to see Dr. Mejias again to talk about possible AV node ablation and pacemaker VVI if the heart rate is not controlled.  However, I think that is a secondary issue.  An angiogram was performed, which we again reviewed with Dr. Lucas who did it.  The right coronary artery is totally occluded near the proximal portion.  Left circumflex was a moderate sized system and was totally occluded in the midportion.  Dr. Lucas reported the LAD as 50%-60% narrowing, but there is one area in particular that is hypolucent, and I suspect it is calcified and severe and indeed Dr. Lucas did do iFR flow reserve and it was a very abnormal at 0.68. Interestingly, the echo shows EF of 50%-55%.  The MRI, however, shows that the inferior wall is thin, akinetic and nonviable.  The remainder of the heart is viable. At the time of the heart catheterization, LVDP was 15, which is completely normal, so we are not going to be able to easily explain shortness of breath at rest with a normal LVEDP, normal heart rate and normal vital signs. As I mentioned, the patient is on both metoprolol and diltiazem for rate control for his atrial fibrillation, and again, we may ask the patient to see Dr. Mejias in followup to determine  if perhaps we might do an AV node ablation and VVI pacing so we can decrease some of those medicines on the off chance some of them are decreasing his heart function or causing any other effects.  I had the discussion with Dr. Lucas and the patient's family today.  I think the problem here is the patient has absolutely no angina, so we cannot use angina as a guide and I do not think his shortness of breath is an anginal equivalent or, if it is, it is only part of the story. Shortness of breath could easily be due to significant lung disease, cardiac ischemia and rapid AFib.  I think fixing the LAD does make sense.  It is his last surviving vessel.  It is, however, calcified and so whether we do Rotablator or CSI or lithotripsy we can make that decision, but I am a little concerned all those devices will partially occlude the LAD at some point and it is his last remaining vessel, so we might have to do the angioplasty under Impella support, which does raise the risk.  In addition, the patient we discussed will be on an additional blood thinner for the stent.  He is already on Xarelto because of atrial fibrillation. We would be adding something like Plavix to that, and of course, there is always a chance of restenosis or thrombosis of the last remaining artery, so we have to be cautious in that interpretation.  Again, I think we have enough evidence that this is a last remaining vessel, it is ischemic by iFR, even if the patient does not feel chest pain, and therefore, he does not have a good early warning system to notify us if we need to move forward quicker.  I am going to have the patient come back in about a month.  By then, he will have seen Dr. Hopkins and any tests that need to be done before we talk about possible stenting.    Consult time today was 48 minutes.    Nate Marquez MD    cc:  Mason Lucas MD  [facility]  [address], [facility] [address]  [place]   [address]    Brenden Felton MD  [facility]  [address], [address]  [place]  [address]    Arline White MD  [facility]  [address] [place]  [address]    Booker Hopkins MD  [facility]  [address]  [place]  [address]    Kj Mejias MD  [facility]  [address]  [place]  [address]      Nate Marquez MD        D: 02/10/2023   T: 02/10/2023   MT: [email or web address]    Name:     BOOKER WILLIS  MRN:      [id number]        Account:      [id number]   :      1955           Service Date: 02/10/2023       Document: [id number]      Thank you for allowing me to participate in the care of your patient.      Sincerely,     Nate Marquez MD     [facility]  cc:   Nate Marquez MD  [address]  [place]  [place] [address]

## 2023-02-17 ENCOUNTER — OFFICE VISIT (OUTPATIENT)
Dept: PULMONOLOGY | Facility: CLINIC | Age: 68
End: 2023-02-17
Payer: COMMERCIAL

## 2023-02-17 VITALS
BODY MASS INDEX: 38.45 KG/M2 | HEART RATE: 106 BPM | DIASTOLIC BLOOD PRESSURE: 83 MMHG | SYSTOLIC BLOOD PRESSURE: 138 MMHG | OXYGEN SATURATION: 91 % | WEIGHT: 268 LBS

## 2023-02-17 DIAGNOSIS — J44.9 CHRONIC OBSTRUCTIVE PULMONARY DISEASE, UNSPECIFIED COPD TYPE (H): Primary | ICD-10-CM

## 2023-02-17 PROCEDURE — 99214 OFFICE O/P EST MOD 30 MIN: CPT | Performed by: STUDENT IN AN ORGANIZED HEALTH CARE EDUCATION/TRAINING PROGRAM

## 2023-02-17 RX ORDER — FLUTICASONE FUROATE, UMECLIDINIUM BROMIDE AND VILANTEROL TRIFENATATE 200; 62.5; 25 UG/1; UG/1; UG/1
1 POWDER RESPIRATORY (INHALATION) DAILY
Qty: 1 EACH | Refills: 4 | Status: SHIPPED | OUTPATIENT
Start: 2023-02-17 | End: 2023-01-01

## 2023-02-17 NOTE — PATIENT INSTRUCTIONS
I have increased your Trelegy dose. Continue as needed albuterol    Continue pulmonary rehab.     Reach out to rheumatology.

## 2023-02-17 NOTE — LETTER
2/17/2023         RE: Booker Brown  [address]  [facility] [address]        Dear Colleague,    Thank you for referring your patient, Booker Brown, to the [facility] [place]. Please see a copy of my visit note below.    Pulmonary Clinic Note    Date of Service: 2/17/2023    Chief Complaint   Patient presents with     RECHECK     Abnormal CT scan of lung       A/P:  67M former smoker, CAD, Afib, COPD, pulmonary fibrosis being seen for dyspnea on exertion. This is certainly multifactorial; CAD, Afib, obesity, and lung disease. He follows w/ Dr. Marquez of cardiology. He just started pulmonary rehab. He has not yet seen rheumatology and I would like them to evaluate him for underlying causes of pulmonary fibrosis given some lab abnormalities noted on ILD panel. Pulmonary fibrosis may also be 2/2 asbestos exposure or idiopathic. If no underlying systemic disease is found he may be a candidate for anti-fibrotic medication although I would like him optimized prior to starting this and see his progression w/ pulmonary rehab and further cardiac management (if indicated).     - increase [id number] (Trelegy) to high dose, rinse mouth out after  - continue prn albuterol  - continue pulmonary rehab  - given contact number for rheumatology dept  - OK to substitute medications based on formulary     RTC 3mo w/ repeat PFTs    History:  67M HTN, HLD, DM2, Afib (on rivaroxaban) being seen for f/u dyspnea on exertion. Last seen 12/2022. RODRIGUEZ felt to be multifactorial; asthma/COPD, fibrosis, obesity, and cardiac disease. He is prescribed [id number] (Trelegy) and prn albuterol. He feels he is worsening. Checks SpO2 at home, always mid-high 90s. Has attended one pulmonary rehab session. No SOB at rest. RODRIGUEZ w/ minimal activity, improved now that he is on oxygen. Rare cough. No PND. He has slept in a recliner for some time now. Stable LE edema. Using Trelegy.     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    /83 (BP Location: Left arm, Patient Position: Sitting, Cuff Size: Adult Large)   Pulse 106   Wt 121.6 kg (268 lb)   SpO2 91%   BMI 38.45 kg/m    Gen: well-appearing  HEENT: anicteric   Card: RRR  Pulm: diminished bilaterally   Abd: soft  MSK: 2+ edema, no acute joint abnormality   Skin: no obvious rash  Psych: normal affect  Neuro: alert and oriented     Labs:  Personally reviewed  ANCA (12/2022) - 1:10  CRP (12/2022) - 35.8  LISA (12/2022) - positive, 1:160 speckled     Imaging/Studies: Personally reviewed  CT Chest (12/2022) - small b/l effusions, mild diffuse GGOs, calcified pleural plaques b/l, mild fibrotic changes similar to previous, cardiac enlargement, severe coronary artery calcification  PFTs (7/2022) - severe obstruction and restriction, e/o air-trapping, moderate diffusion defect  CTPE (3/2022) - no PE, asbestos related pleural dz w/ minimal adjacent fibrosis, mild GGOs b/l     TTE (2/2022) - EF 50-55%, mild LVH, moderate biatrial enlargement    Past Medical History:   Diagnosis Date     Benign essential hypertension      CAD (coronary artery disease)      Chronic kidney disease, stage III (moderate) (H)      Obesity (BMI 30-39.9)      Persistent atrial fibrillation (H)      Pure hypercholesterolemia      Type 2 diabetes mellitus (H)      Past Surgical History:   Procedure Laterality Date     COLONOSCOPY N/A 8/24/2018    Procedure: COMBINED COLONOSCOPY, SINGLE OR MULTIPLE BIOPSY/POLYPECTOMY BY BIOPSY;;  Surgeon: Lawrence Mata MD;  Location: [facility]     CV  CORONARY ANGIOGRAM N/A 2022    Procedure: Coronary Angiogram;  Surgeon: Mason Lucas MD;  Location:  HEART CARDIAC CATH LAB     CV INSTANTANEOUS WAVE-FREE RATIO N/A 2022    Procedure: Instantaneous Wave-Free Ratio;  Surgeon: Mason Lucas MD;  Location:  HEART CARDIAC CATH LAB     CV LEFT HEART CATH N/A 2022    Procedure: Left Heart Catheterization;  Surgeon: Mason Lucas MD;  Location:  HEART CARDIAC CATH LAB     Family History   Problem Relation Age of Onset     Diabetes Type 2  Mother      Cerebral aneurysm Sister      Bladder Cancer Brother      Myocardial Infarction Brother         in his 50s     Cerebrovascular Disease No family hx of      Coronary Artery Disease Early Onset No family hx of      Prostate Cancer No family hx of      Colon Cancer No family hx of      Clotting Disorder No family hx of      Bleeding Disorder No family hx of      Anesthesia Reaction No family hx of      Social History     Socioeconomic History     Marital status:      Spouse name: Not on file     Number of children: Not on file     Years of education: Not on file     Highest education level: Not on file   Occupational History     Occupation: Shipping & Receiving   Tobacco Use     Smoking status: Former     Packs/day: 2.00     Years: 16.00     Pack years: 32.00     Types: Cigarettes     Quit date: 1987     Years since quittin.4     Smokeless tobacco: Never   Vaping Use     Vaping Use: Never used   Substance and Sexual Activity     Alcohol use: No     Drug use: No     Sexual activity: Yes     Partners: Female   Other Topics Concern     Parent/sibling w/ CABG, MI or angioplasty before 65F 55M? Yes      Service No     Blood Transfusions No     Caffeine Concern No     Occupational Exposure No     Hobby Hazards No     Sleep Concern No     Stress Concern No     Weight Concern Yes     Special Diet No     Back Care No     Exercise Yes     Bike Helmet No     Comment: n/a      Seat Belt Yes     Self-Exams No   Social History Narrative    .    Two adult children.    Four grandchildren.    No formal exercise.     Social Determinants of Health     Financial Resource Strain: Not on file   Food Insecurity: Not on file   Transportation Needs: Not on file   Physical Activity: Not on file   Stress: Not on file   Social Connections: Not on file   Intimate Partner Violence: Not on file   Housing Stability: Not on file       35 minutes spent reviewing chart, reviewing test results, talking with and examining patient, formulating plan, and documentation on the day of the encounter.    Booker Hopkins MD  Pulmonary and Critical Care Medicine  [facility]

## 2023-02-21 ENCOUNTER — OFFICE VISIT (OUTPATIENT)
Dept: INTERNAL MEDICINE | Facility: CLINIC | Age: 68
End: 2023-02-21
Payer: COMMERCIAL

## 2023-02-21 ENCOUNTER — TELEPHONE (OUTPATIENT)
Dept: PULMONOLOGY | Facility: CLINIC | Age: 68
End: 2023-02-21

## 2023-02-21 VITALS
DIASTOLIC BLOOD PRESSURE: 82 MMHG | TEMPERATURE: 98.1 F | SYSTOLIC BLOOD PRESSURE: 132 MMHG | HEART RATE: 110 BPM | WEIGHT: 268.8 LBS | BODY MASS INDEX: 38.57 KG/M2 | OXYGEN SATURATION: 88 %

## 2023-02-21 DIAGNOSIS — Z23 NEED FOR VACCINATION: ICD-10-CM

## 2023-02-21 DIAGNOSIS — N18.31 TYPE 2 DIABETES MELLITUS WITH STAGE 3A CHRONIC KIDNEY DISEASE, WITHOUT LONG-TERM CURRENT USE OF INSULIN (H): ICD-10-CM

## 2023-02-21 DIAGNOSIS — E78.00 PURE HYPERCHOLESTEROLEMIA: ICD-10-CM

## 2023-02-21 DIAGNOSIS — E11.22 TYPE 2 DIABETES MELLITUS WITH STAGE 3A CHRONIC KIDNEY DISEASE, WITHOUT LONG-TERM CURRENT USE OF INSULIN (H): ICD-10-CM

## 2023-02-21 DIAGNOSIS — N18.31 STAGE 3A CHRONIC KIDNEY DISEASE (H): ICD-10-CM

## 2023-02-21 DIAGNOSIS — E66.01 MORBID OBESITY (H): ICD-10-CM

## 2023-02-21 DIAGNOSIS — I25.10 CORONARY ARTERY DISEASE INVOLVING NATIVE CORONARY ARTERY OF NATIVE HEART WITHOUT ANGINA PECTORIS: ICD-10-CM

## 2023-02-21 DIAGNOSIS — R20.0 NUMBNESS IN FEET: Primary | ICD-10-CM

## 2023-02-21 DIAGNOSIS — I10 BENIGN ESSENTIAL HYPERTENSION: ICD-10-CM

## 2023-02-21 PROBLEM — E11.9 TYPE 2 DIABETES MELLITUS (H): Status: ACTIVE | Noted: 2023-02-21

## 2023-02-21 PROBLEM — E11.9 TYPE 2 DIABETES MELLITUS (H): Status: RESOLVED | Noted: 2022-12-08 | Resolved: 2023-02-21

## 2023-02-21 LAB
ALBUMIN SERPL BCG-MCNC: 3.8 G/DL (ref 3.5–5.2)
ALP SERPL-CCNC: 92 U/L (ref 35–129)
ALT SERPL W P-5'-P-CCNC: 16 U/L (ref 10–50)
ANION GAP SERPL CALCULATED.3IONS-SCNC: 16 MMOL/L (ref 7–15)
AST SERPL W P-5'-P-CCNC: 26 U/L (ref 10–50)
BILIRUB SERPL-MCNC: 0.7 MG/DL
BUN SERPL-MCNC: 22.2 MG/DL (ref 8–23)
CALCIUM SERPL-MCNC: 9.7 MG/DL (ref 8.8–10.2)
CHLORIDE SERPL-SCNC: 98 MMOL/L (ref 98–107)
CREAT SERPL-MCNC: 1.35 MG/DL (ref 0.51–1.17)
DEPRECATED HCO3 PLAS-SCNC: 30 MMOL/L (ref 22–29)
ERYTHROCYTE [DISTWIDTH] IN BLOOD BY AUTOMATED COUNT: 18 % (ref 10–15)
FOLATE SERPL-MCNC: 15.1 NG/ML (ref 4.6–34.8)
GFR SERPL CREATININE-BSD FRML MDRD: 58 ML/MIN/1.73M2
GLUCOSE SERPL-MCNC: 96 MG/DL (ref 70–99)
HCT VFR BLD AUTO: 40.8 % (ref 35–53)
HGB BLD-MCNC: 11.8 G/DL (ref 11.7–17.7)
MCH RBC QN AUTO: 25.4 PG (ref 26.5–33)
MCHC RBC AUTO-ENTMCNC: 28.9 G/DL (ref 31.5–36.5)
MCV RBC AUTO: 88 FL (ref 78–100)
PLATELET # BLD AUTO: 220 10E3/UL (ref 150–450)
POTASSIUM SERPL-SCNC: 4.5 MMOL/L (ref 3.4–5.3)
PROT SERPL-MCNC: 7.1 G/DL (ref 6.4–8.3)
RBC # BLD AUTO: 4.65 10E6/UL (ref 3.8–5.9)
SODIUM SERPL-SCNC: 144 MMOL/L (ref 136–145)
TSH SERPL DL<=0.005 MIU/L-ACNC: 0.51 UIU/ML (ref 0.3–4.2)
VIT B12 SERPL-MCNC: 676 PG/ML (ref 232–1245)
WBC # BLD AUTO: 10.2 10E3/UL (ref 4–11)

## 2023-02-21 PROCEDURE — 99214 OFFICE O/P EST MOD 30 MIN: CPT | Mod: 25 | Performed by: INTERNAL MEDICINE

## 2023-02-21 PROCEDURE — 99207 PR FOOT EXAM NO CHARGE: CPT | Mod: 25 | Performed by: INTERNAL MEDICINE

## 2023-02-21 PROCEDURE — 80053 COMPREHEN METABOLIC PANEL: CPT | Performed by: INTERNAL MEDICINE

## 2023-02-21 PROCEDURE — 82746 ASSAY OF FOLIC ACID SERUM: CPT | Performed by: INTERNAL MEDICINE

## 2023-02-21 PROCEDURE — 90714 TD VACC NO PRESV 7 YRS+ IM: CPT | Performed by: INTERNAL MEDICINE

## 2023-02-21 PROCEDURE — 85027 COMPLETE CBC AUTOMATED: CPT | Performed by: INTERNAL MEDICINE

## 2023-02-21 PROCEDURE — 36415 COLL VENOUS BLD VENIPUNCTURE: CPT | Performed by: INTERNAL MEDICINE

## 2023-02-21 PROCEDURE — 82306 VITAMIN D 25 HYDROXY: CPT | Performed by: INTERNAL MEDICINE

## 2023-02-21 PROCEDURE — 82607 VITAMIN B-12: CPT | Performed by: INTERNAL MEDICINE

## 2023-02-21 PROCEDURE — 84443 ASSAY THYROID STIM HORMONE: CPT | Performed by: INTERNAL MEDICINE

## 2023-02-21 PROCEDURE — 90471 IMMUNIZATION ADMIN: CPT | Performed by: INTERNAL MEDICINE

## 2023-02-21 PROCEDURE — 90750 HZV VACC RECOMBINANT IM: CPT | Performed by: INTERNAL MEDICINE

## 2023-02-21 PROCEDURE — 90472 IMMUNIZATION ADMIN EACH ADD: CPT | Performed by: INTERNAL MEDICINE

## 2023-02-21 NOTE — TELEPHONE ENCOUNTER
[facility] Call Center    Phone Message    May a detailed message be left on voicemail: yes     Reason for Call: Patient is asking for an urgency to be pinned to the referral to Rheumatology as he will not be able to be seen until October.      Action Taken: Other: pulm    Travel Screening: Not Applicable

## 2023-02-21 NOTE — PATIENT INSTRUCTIONS
Tetanus booster and Shingrix #1 today.    Shingrix #2 in 2-6 months (any pharmacy).    ---    Labs today.    ---

## 2023-02-21 NOTE — PROGRESS NOTES
ASSESSMENT/PLAN                                                      (R20.0) Numbness in feet  (primary encounter diagnosis)  Comment: Likely diabetic neuropathy.  Plan: nonfasting labs today to evaluate for potential organic causes of/contributors to symptoms.    (E11.22,  N18.31) Type 2 diabetes mellitus with stage 3a chronic kidney disease, without long-term current use of insulin (H)  Comment: reasonably controlled on current regimen;   Plan: continue present management for now; diabetes follow-up this spring.    (N18.31) Stage 3a chronic kidney disease (H)  Plan: BMP today.    (I25.10) Coronary artery disease involving native coronary artery of native heart without angina pectoris  Comment: medically managed; followed by cardiology.    (Z23) Need for vaccination  Plan: Tetanus booster and Shingrix No. 1 given today; Shingrix No. 2 and 2-6 months.    (I10) Benign essential hypertension  Comment: well-controlled on current regimen.      (E78.00) Pure hypercholesterolemia  Comment: well-controlled on current regimen.      (E66.01) Morbid obesity (H)  Comment: obesity + comorbidities (DM2, HTN, and HLD).    Arline White MD   [address] [address]  T: [phone], F: [phone]    SUBJECTIVE                                                      Booker Brown is a very pleasant 68 year old adult who presents with numbness:    Ongoing for the last 6 months and getting worse over time. Involves both feet up to the ankles. Both feet are equally involved. No associated tingling or pain. Numbness does not keep him up at night. It does make it more difficult for him to walk around - he uses a cane for stability. No hand or finger involvement.    PMH significant for reasonably controlled NIDDM, first diagnosed in 2012.    Unrelated to above, patient is due for his tetanus booster and Shingrix series.    OBJECTIVE                                                      BP  132/82 (Cuff Size: Adult Large)   Pulse 110   Temp 98.1  F (36.7  C) (Tympanic)   Wt 121.9 kg (268 lb 12.8 oz)   SpO2 (!) 88%   BMI 38.57 kg/m    Constitutional: well-appearing  Foot exam: feet intact - no lesions or ulcers; sensation diminished to monofilament bilaterally  Musculoskeletal: walks with cane  Psych: normal judgment and insight; normal mood and affect; recent and remote memory intact    ---    (Note documentation was completed, in part, with [email or web address] voice-recognition software. Documentation was reviewed, but some grammatical, spelling, and word errors may remain.)

## 2023-02-22 DIAGNOSIS — E55.9 VITAMIN D DEFICIENCY: Primary | ICD-10-CM

## 2023-02-22 LAB — DEPRECATED CALCIDIOL+CALCIFEROL SERPL-MC: 18 UG/L (ref 20–75)

## 2023-02-22 RX ORDER — CHOLECALCIFEROL (VITAMIN D3) 50 MCG
1 TABLET ORAL DAILY
Qty: 90 TABLET | Refills: 3 | Status: SHIPPED | OUTPATIENT
Start: 2023-02-22 | End: 2024-01-01

## 2023-02-22 NOTE — TELEPHONE ENCOUNTER
"Writer called patient to gather more information related to patient call. Patient needed [facility] pulmonary rehab number to reschedule appointment. Patient also was wondering if Dr. Hopkins could change the rheumatology referral to \"urgent\" so he can get a sooner appointment. As it is, patient cannot get appointment until November.  "

## 2023-03-07 ENCOUNTER — HOSPITAL ENCOUNTER (OUTPATIENT)
Dept: CARDIAC REHAB | Facility: CLINIC | Age: 68
Discharge: HOME | End: 2023-03-07
Attending: STUDENT IN AN ORGANIZED HEALTH CARE EDUCATION/TRAINING PROGRAM
Payer: COMMERCIAL

## 2023-03-07 PROCEDURE — G0238 OTH RESP PROC, INDIV: HCPCS

## 2023-03-14 ENCOUNTER — HOSPITAL ENCOUNTER (OUTPATIENT)
Dept: CARDIAC REHAB | Facility: CLINIC | Age: 68
Discharge: HOME | End: 2023-03-14
Attending: STUDENT IN AN ORGANIZED HEALTH CARE EDUCATION/TRAINING PROGRAM
Payer: COMMERCIAL

## 2023-03-14 PROCEDURE — 93798 PHYS/QHP OP CAR RHAB W/ECG: CPT | Performed by: REHABILITATION PRACTITIONER

## 2023-03-14 PROCEDURE — G0239 OTH RESP PROC, GROUP: HCPCS

## 2023-03-14 PROCEDURE — 93797 PHYS/QHP OP CAR RHAB WO ECG: CPT | Performed by: REHABILITATION PRACTITIONER

## 2023-03-15 PROBLEM — J44.9 COPD (CHRONIC OBSTRUCTIVE PULMONARY DISEASE) (H): Status: ACTIVE | Noted: 2023-03-15

## 2023-03-15 PROBLEM — J84.9 ILD (INTERSTITIAL LUNG DISEASE) (H): Status: ACTIVE | Noted: 2023-03-15

## 2023-03-21 ENCOUNTER — HOSPITAL ENCOUNTER (OUTPATIENT)
Dept: CARDIAC REHAB | Facility: CLINIC | Age: 68
Discharge: HOME | End: 2023-03-21
Attending: STUDENT IN AN ORGANIZED HEALTH CARE EDUCATION/TRAINING PROGRAM
Payer: COMMERCIAL

## 2023-03-21 PROCEDURE — G0239 OTH RESP PROC, GROUP: HCPCS

## 2023-03-23 ENCOUNTER — OFFICE VISIT (OUTPATIENT)
Dept: OPHTHALMOLOGY | Facility: CLINIC | Age: 68
End: 2023-03-23
Payer: COMMERCIAL

## 2023-03-23 DIAGNOSIS — H25.13 NUCLEAR AGE-RELATED CATARACT, BOTH EYES: Primary | ICD-10-CM

## 2023-03-23 DIAGNOSIS — H52.4 HYPEROPIA WITH ASTIGMATISM AND PRESBYOPIA, BILATERAL: ICD-10-CM

## 2023-03-23 DIAGNOSIS — H52.03 HYPEROPIA WITH ASTIGMATISM AND PRESBYOPIA, BILATERAL: ICD-10-CM

## 2023-03-23 DIAGNOSIS — H52.203 HYPEROPIA WITH ASTIGMATISM AND PRESBYOPIA, BILATERAL: ICD-10-CM

## 2023-03-23 DIAGNOSIS — E11.9 TYPE 2 DIABETES MELLITUS WITHOUT COMPLICATION, WITHOUT LONG-TERM CURRENT USE OF INSULIN (H): ICD-10-CM

## 2023-03-23 PROCEDURE — 92015 DETERMINE REFRACTIVE STATE: CPT | Performed by: OPTOMETRIST

## 2023-03-23 PROCEDURE — 92004 COMPRE OPH EXAM NEW PT 1/>: CPT | Performed by: OPTOMETRIST

## 2023-03-23 ASSESSMENT — VISUAL ACUITY
OD_CC: 20/25
OD_CC+: -3
CORRECTION_TYPE: GLASSES
METHOD: SNELLEN - LINEAR
OS_CC: 20/20
OS_CC+: -1

## 2023-03-23 ASSESSMENT — CUP TO DISC RATIO
OD_RATIO: 0.25
OS_RATIO: 0.25

## 2023-03-23 ASSESSMENT — CONF VISUAL FIELD
OS_SUPERIOR_TEMPORAL_RESTRICTION: 0
OD_INFERIOR_NASAL_RESTRICTION: 0
OD_INFERIOR_TEMPORAL_RESTRICTION: 0
OS_INFERIOR_TEMPORAL_RESTRICTION: 0
OS_INFERIOR_NASAL_RESTRICTION: 0
OD_SUPERIOR_TEMPORAL_RESTRICTION: 0
OD_NORMAL: 1
OS_SUPERIOR_NASAL_RESTRICTION: 0
OS_NORMAL: 1
OD_SUPERIOR_NASAL_RESTRICTION: 0
METHOD: COUNTING FINGERS

## 2023-03-23 ASSESSMENT — REFRACTION_WEARINGRX
OD_AXIS: 003
OS_CYLINDER: +0.75
OD_CYLINDER: +0.50
OS_ADD: +1.50
OS_SPHERE: +1.25
OS_AXIS: 180
OD_ADD: +1.50
OD_SPHERE: +1.25

## 2023-03-23 ASSESSMENT — REFRACTION_MANIFEST
OS_ADD: +2.00
OD_AXIS: 005
OD_CYLINDER: +1.25
OS_CYLINDER: +0.75
OD_ADD: +2.00
OD_SPHERE: +1.75
OS_SPHERE: +1.75
OS_AXIS: 165

## 2023-03-23 ASSESSMENT — TONOMETRY
IOP_METHOD: ICARE
OD_IOP_MMHG: 19
OS_IOP_MMHG: 19

## 2023-03-23 ASSESSMENT — SLIT LAMP EXAM - LIDS
COMMENTS: NORMAL
COMMENTS: NORMAL

## 2023-03-23 NOTE — NURSING NOTE
Chief Complaints and History of Present Illnesses   Patient presents with     Diabetic Eye Exam     Pt here for type 2 diabetic eye exam, referred by Dr Arline White.      Chief Complaint(s) and History of Present Illness(es)     Diabetic Eye Exam            Diabetes Type: Type 2 and taking oral medications    Comments: Pt here for type 2 diabetic eye exam, referred by Dr Arline White.           Comments    Pt had an eye exam with [facility] about 2 years ago. Vision feels intermittently blurry- did start using oxygen about 6 weeks ago.   Lab Results       Component                Value               Date                       A1C                      7.3                 10/12/2022                 A1C                      7.0                 03/29/2022                 A1C                      7.2                 03/17/2022                 A1C                      8.6                 05/12/2021                 A1C                      7.7                 02/28/2020                 A1C                      8.8                 10/30/2019                 A1C                      7.0                 06/13/2018                 A1C                      7.1                 08/02/2017           Agatha Dougherty, COA on 3/23/2023 at 1:49 PM

## 2023-03-23 NOTE — PROGRESS NOTES
A/P  1.) Type 2 DM without ophthalmic manifestation OU  -Last A1c 7.3, no known h/o diabetic retinopathy  -Reviewed effects of DM on the eyes and importance of good blood sugar control  -Monitor annually with dilation    2.) Cataracts OU  -Not visually significant  -Continue to monitor    3.) Hyperopia/Astig/Presbyopia OU  -Corrects well with updated Rx    Monitor 1 year diabetic eye exam    I have confirmed the patient's CC, HPI and reviewed Past Medical History, Past Surgical History, Social History, Family History, Problem List, Medication List and agree with Tech note.     Johanna Torres, OD FAAO FSLS

## 2023-03-25 DIAGNOSIS — I10 ESSENTIAL HYPERTENSION: ICD-10-CM

## 2023-03-25 DIAGNOSIS — E11.65 TYPE 2 DIABETES MELLITUS WITH HYPERGLYCEMIA, WITHOUT LONG-TERM CURRENT USE OF INSULIN (H): ICD-10-CM

## 2023-03-27 RX ORDER — GLIPIZIDE 10 MG/1
TABLET, FILM COATED, EXTENDED RELEASE ORAL
Qty: 90 TABLET | Refills: 0 | Status: SHIPPED | OUTPATIENT
Start: 2023-03-27 | End: 2023-06-10

## 2023-03-27 RX ORDER — LISINOPRIL 40 MG/1
TABLET ORAL
Qty: 90 TABLET | Refills: 3 | Status: ON HOLD | OUTPATIENT
Start: 2023-03-27 | End: 2023-07-09

## 2023-03-28 ENCOUNTER — HOSPITAL ENCOUNTER (OUTPATIENT)
Dept: CARDIAC REHAB | Facility: CLINIC | Age: 68
Discharge: HOME | End: 2023-03-28
Attending: STUDENT IN AN ORGANIZED HEALTH CARE EDUCATION/TRAINING PROGRAM
Payer: COMMERCIAL

## 2023-03-28 PROCEDURE — G0239 OTH RESP PROC, GROUP: HCPCS

## 2023-03-30 ENCOUNTER — OFFICE VISIT (OUTPATIENT)
Dept: RHEUMATOLOGY | Facility: CLINIC | Age: 68
End: 2023-03-30
Payer: COMMERCIAL

## 2023-03-30 VITALS
HEART RATE: 94 BPM | WEIGHT: 260 LBS | BODY MASS INDEX: 37.22 KG/M2 | DIASTOLIC BLOOD PRESSURE: 83 MMHG | HEIGHT: 70 IN | OXYGEN SATURATION: 92 % | SYSTOLIC BLOOD PRESSURE: 134 MMHG

## 2023-03-30 DIAGNOSIS — J44.9 CHRONIC OBSTRUCTIVE PULMONARY DISEASE, UNSPECIFIED COPD TYPE (H): ICD-10-CM

## 2023-03-30 DIAGNOSIS — R91.8 ABNORMAL CT SCAN OF LUNG: ICD-10-CM

## 2023-03-30 PROCEDURE — 99205 OFFICE O/P NEW HI 60 MIN: CPT | Performed by: STUDENT IN AN ORGANIZED HEALTH CARE EDUCATION/TRAINING PROGRAM

## 2023-03-30 ASSESSMENT — PAIN SCALES - GENERAL: PAINLEVEL: NO PAIN (0)

## 2023-03-30 NOTE — PROGRESS NOTES
Rheumatology Clinic Visit     Booker Brown MRN# [id number]   YOB: 1955 Age: 68 year old     Date of Visit: Mar 30, 2023   Primary care provider: Arline White          Assessment and Plan:     Assessment     Idiopathic Pulmonary Fibrosis  Mild GGO - related to edema vs fibrosis  B/L pleural effusions  Calcified pleural plaques - suggestive of Asbestos exposure  Severe three-vessel coronary disease, Permament Afib  Hypertension, hyperlipidemia, type 2 diabetes  + LISA-1: 160 speckled, Neg SSA/SSB, Scl-70, Rosemary-1, RF, anti CCP  + p-ANCA - 1:10, Elevated CRP - 35.8    Mr. Brown is 68 year old male seen in clinic for evaluation of worsening SOB    Dyspnea : Patient reports having dyspnea for the last couple years, progressively getting worse.  In 2021 he was incidentally found to be in A-fib when he was admitted in the ER for kidney stone.  Subsequent cardiac work-up has shown severe three-vessel coronary artery disease. In 2022 after COVID pneumonia he was found to have obstructive lung disease, reduced DLCO and CT evidence of mild pulmonary fibrosis, mild GGO, bilateral pleural plaques and pleural effusions.  His shortness of breath is getting worse.  He is on 6 L oxygen via nasal cannula for the last 2 months.  Other than shortness of breath he denies having joint pains, muscle weakness, skin rashes, chest pains, fever, weight loss, recurrent oral sores.  He has CKD but no evidence of worsening kidney function, hematuria or proteinuria present.    No specific cause of worsening shortness of breath has been found.  It could be multifactorial related to severe three-vessel CAD, Idiopathic Pulmonary Fibrosis, asbestos exposure, COPD, and A-fib etc. Her blood work has shown low positive LISA 1: 160 speckled, elevated CRP and low positive P ANCA which raised concern about underlying LISA associated autoimmune connective tissue disease.  His physical exam is benign other than lung exam.  No raynaud's,  sclerodactyly, synovitis, muscle weakness, oral sores, skin tightening noted. His proximal muscle strength is 5/5. He has bilateral lower extremity edema and venous stasis.     To complete autoimmune work-up I have ordered centromere antibody, RNP antibody which can be seen in speckled pattern LISA.  I will repeat inflammatory markers and I have ordered MPO/NH-3.  Most likely ANCA and LISA are falsely elevated.     Based on his autoimmune work-up will follow back in clinic. Conner is in pulmonary rehab and will follow back with Dr. Hopkins and cardiology.      Plan    Your physical exam is not suggestive of Autoimmune connective tissue disease.     Blood tests ordered to complete work up     Follow up based on the results      -- Orders placed this encounter  Orders Placed This Encounter   Procedures     Centromere Antibody IgG     RNP Antibody IgG     Myeloperoxidase and Proteinase 3 Panel     CRP inflammation     Erythrocyte sedimentation rate auto       TT 60 min was spent on date of the encounter doing chart review, history and exam, documentation and further activities as noted above. Any prior notes, outside records, laboratory results, and imaging studies were reviewed if relevant.    Patient verbalized agreement with and understanding of the rationale for the diagnosis and treatment plan.  All questions were answered to best of my ability and the patient's satisfaction.      Chart documentation done in part with Dragon Voice recognition Software. Although reviewed after completion, some word and grammatical error may remain.              Active Problem List:     Patient Active Problem List    Diagnosis Date Noted     COPD (chronic obstructive pulmonary disease) (H) 03/15/2023     Priority: Medium     ILD (interstitial lung disease) (H) 03/15/2023     Priority: Medium     Type 2 diabetes mellitus (H) 02/21/2023     Priority: Medium     Morbid obesity (H) 12/09/2022     Priority: Medium     Benign essential  hypertension 12/08/2022     Priority: Medium     Pure hypercholesterolemia 12/08/2022     Priority: Medium     Persistent atrial fibrillation (H) 12/08/2022     Priority: Medium     CAD (coronary artery disease) 12/08/2022     Priority: Medium     Obesity (BMI 30-39.9) 12/08/2022     Priority: Medium     Chronic kidney disease, stage III (moderate) (H) 12/08/2022     Priority: Medium            History of Present Illness:   Booker Brown is a 68 year old adult with PMH of severe CAD, Afib, COPD, pulmonary fibrosis, former smoker, HTN, HLD, Type 2 DM, CKD seen in the clinic in consultation at request of Dr. Hopkins for evaluation of SOB.     He has shortness of breath since 2021.  In 3/2021 he was admitted in ER with abdominal pain and was found to have kidney stones.  In the ED, he was found to be in A-fib with RVR. He has been following up with cardiology since then.  He had DCCV but went back in A-fib.  At present he is on diltiazem, metoprolol, Xarelto.  His further cardiac work-up also revealed severe three-vessel coronary artery disease.  He has 100% stenosis of proximal RCA, mid circumflex to distal circumflex and 55% stenosis of proximal LAD to mid LAD and proximal circumflex.     In 2/2022 he was admitted with COVID-19 infection related pneumonia.  His chest x-ray and pulmonary evaluation revealed presence of pleural plaques, mild pulmonary fibrosis, groundglass opacities, bilateral pleural effusions.  He has obstructive lung disease and reduced DLCO.  Question about asbestosis related lung injury was raised based on presence of pleural plaques.  His shortness of breath has been gradually getting worse over time.  In the last 2 months he started using 6 L oxygen via nasal cannula.  His blood work done by Dr. Hopkins has revealed positive LISA-1: 160 speckled, elevated CRP-35.8, mildly elevated P ANCA-1:10 but otherwise has negative SSA/SSB, SCL 70, Rosemary 1, RF, anti-CCP, normal ESR, CMP, hypersensitivity  pneumonitis panel.      He is on inhalers and has not noticed any benefit.  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  He was also heavy smoker and smoked 2 pack/day for 14 years. He has quit almost 26 years ago. His father was a heavy smoker as well    Denies any raynauds, malar rash, photosensitivity, recurrent mouth/genital ulcers, sicca symptoms, pleuritic chest pains, recurrent sinusitis/rhinitis, swallowing difficulty, hearing or visual changes recently. No h/o arterial/venous thrombosis in the past.           Review of Systems:     Review Of Systems  Constitutional: denies fever, chills, night sweats and weight loss.  Skin: No skin rash.  Eyes: No dryness or irritation in eyes. No episode of eye inflammation or redness.   Ears/Nose/Throat: no recurrent sinus infections.  Respiratory: + shortness of breath, dyspnea on exertion, cough, or hemoptysis  Cardiovascular: no chest pain or palpitations.  Gastrointestinal: no nausea, vomiting, abdominal pain.  Normal bowel movements.  Genitourinary: no dysuria, frequency  or hematuria.  Musculoskeletal: as in HPI  Neurologic: no numbness, tingling.  Psychiatric: no mood disorders.  Hematologic/Lymphatic/Immunologic: no history of easy bruising, petechia or purpura.  No abnormal bleeding.   Endocrine: no h/o thyroid disease or + Diabetes.                  Past Medical History:     Past Medical History:   Diagnosis Date     Benign essential hypertension      CAD (coronary artery disease)      Chronic kidney disease, stage III (moderate) (H)      COPD (chronic obstructive pulmonary disease) (H)      ILD (interstitial lung disease) (H)      Obesity (BMI 30-39.9)      Persistent atrial fibrillation (H)      Pure hypercholesterolemia      Type 2 diabetes mellitus (H)      Past Surgical History:   Procedure Laterality Date     COLONOSCOPY N/A 8/24/2018     Procedure: COMBINED COLONOSCOPY, SINGLE OR MULTIPLE BIOPSY/POLYPECTOMY BY BIOPSY;;  Surgeon: Lawrence Mata MD;  Location:  GI     CV CORONARY ANGIOGRAM N/A 2022    Procedure: Coronary Angiogram;  Surgeon: Mason Lucas MD;  Location:  [facility]ATH [facility]     CV INSTANTANEOUS WAVE-FREE RATIO N/A 2022    Procedure: Instantaneous Wave-Free Ratio;  Surgeon: Mason Lucas MD;  Location:  [facility]ATH [facility]     CV LEFT HEART CATH N/A 2022    Procedure: Left Heart Catheterization;  Surgeon: Mason Lucas MD;  Location:  [facility]ATH LAB            Social History:     Social History     Occupational History     Occupation: Shipping & Receiving   Tobacco Use     Smoking status: Former     Packs/day: 2.00     Years: 16.00     Pack years: 32.00     Types: Cigarettes     Quit date: 1987     Years since quittin.5     Smokeless tobacco: Never   Vaping Use     Vaping Use: Never used   Substance and Sexual Activity     Alcohol use: No     Drug use: No     Sexual activity: Yes     Partners: Female            Family History:     Family History   Problem Relation Age of Onset     Diabetes Type 2  Mother      Cerebral aneurysm Sister      Bladder Cancer Brother      Myocardial Infarction Brother         in his 50s     Cerebrovascular Disease No family hx of      Coronary Artery Disease Early Onset No family hx of      Prostate Cancer No family hx of      Colon Cancer No family hx of      Clotting Disorder No family hx of      Bleeding Disorder No family hx of      Anesthesia Reaction No family hx of             Allergies:   No Known Allergies         Medications:     Current Outpatient Medications   Medication Sig Dispense Refill     albuterol (PROAIR HFA/PROVENTIL HFA/VENTOLIN HFA) 108 (90 Base) MCG/ACT inhaler Inhale 2 puffs into the lungs every 6 hours as needed for shortness of breath / dyspnea or wheezing 18 g 7     alcohol swab prep pads Use to swab area of  "injection/fariha as directed. 100 each 3     ASPIRIN NOT PRESCRIBED (INTENTIONAL) Please choose reason not prescribed from choices below.       blood glucose (NO BRAND SPECIFIED) test strip Use to test blood sugar 1 times daily or as directed. To accompany: Blood Glucose Monitor Brands: per insurance. 100 strip 6     blood glucose calibration (NO BRAND SPECIFIED) solution To accompany: Blood Glucose Monitor Brands: per insurance. 1 Bottle 3     blood glucose monitoring (NO BRAND SPECIFIED) meter device kit Use to test blood sugar 1 times daily or as directed. : per insurance. 1 kit 0     diltiazem ER COATED BEADS (CARDIZEM CD) 360 MG 24 hr capsule Take 1 capsule (360 mg) by mouth daily 90 capsule 3     Fluticasone-Umeclidin-Vilanterol (TRELEGY ELLIPTA) 200-62.5-25 MCG/ACT oral inhaler Inhale 1 puff into the lungs daily 1 each 4     glipiZIDE (GLUCOTROL XL) 10 MG 24 hr tablet TAKE 1 TABLET BY MOUTH EVERY DAY 90 tablet 0     lisinopril (ZESTRIL) 40 MG tablet TAKE 1 TABLET BY MOUTH EVERY DAY 90 tablet 3     metFORMIN (GLUCOPHAGE XR) 500 MG 24 hr tablet Take 2 tablets (1,000 mg) by mouth daily (with dinner) 180 tablet 3     metoprolol succinate ER (TOPROL XL) 100 MG 24 hr tablet Take 1 tablet (100 mg) by mouth 2 times daily 180 tablet 3     rivaroxaban ANTICOAGULANT (XARELTO ANTICOAGULANT) 20 MG TABS tablet Take 1 tablet (20 mg) by mouth daily (with dinner) 90 tablet 3     rosuvastatin (CRESTOR) 40 MG tablet Take 1 tablet (40 mg) by mouth At Bedtime 90 tablet 3     thin (NO BRAND SPECIFIED) lancets Use with lanceting device. To accompany: Blood Glucose Monitor Brands: per insurance. 1 each 6     vitamin D3 (CHOLECALCIFEROL) 50 mcg (2000 units) tablet Take 1 tablet (50 mcg) by mouth daily 90 tablet 3            Physical Exam:   Blood pressure 134/83, pulse 94, height 1.778 m (5' 10\"), weight 117.9 kg (260 lb), SpO2 92 %, unknown if currently breastfeeding.  Wt Readings from Last 4 Encounters:   03/30/23 117.9 kg (260 lb) "   02/21/23 121.9 kg (268 lb 12.8 oz)   02/17/23 121.6 kg (268 lb)   02/10/23 118.4 kg (261 lb)       Constitutional: Morbidly obese, appearing stated age; cooperative, 6 L oxygen on via NC  Eyes: nl EOM, PERRLA, vision, conjunctiva, sclera  ENT: nl external ears, nose, hearing, lips, teeth, gums, throat  No mucous membrane lesions, normal saliva pool  Neck: no mass or thyroid enlargement  Resp: lungs clear to auscultation, nl to palpation  CV: RRR, no murmurs, rubs or gallops, no edema  GI: no ABD mass or tenderness, no HSM  : not tested  Lymph: no cervical, supraclavicular, inguinal or epitrochlear nodes    MS: All TMJ, neck, shoulder, elbow, wrist, MCP/PIP/DIP, spine, hip, knee, ankle, and foot MTP/IP joints were examined.   -- No active synovitis or deformity. Full ROM.  Normal  strength.   -- No dactylitis,  tenosynovitis, enthesopathy.    Skin: no nail pitting, alopecia, nodules or lesions. B/L lower extremity edema, venous stasis, skin hyperpigmentation, flaking.   Neuro: nl cranial nerves, strength, sensation, DTRs.   Psych: nl judgement, orientation, memory, affect.         Data:     No results found for any visits on 03/30/23.    Recent Labs   Lab Test 02/21/23  1400 12/30/22  0928 11/21/22  1637 11/18/22  0714 10/12/22  1524 05/03/22  0950 03/29/22  1401   WBC 10.2  --   --  12.8* 12.0*   < > 11.9*   RBC 4.65  --   --  5.52 5.37   < > 4.89   HGB 11.8  --   --  14.2 14.0   < > 13.2   HCT 40.8  --   --  46.0 44.7   < > 43.4   MCV 88  --   --  83 83   < > 89   RDW 18.0*  --   --  15.9* 15.9*   < > 15.4*     --   --  330 341   < > 278   ALBUMIN 3.8  --   --  3.0*  --   --  3.1*   BUN 22.2 26.7* 31.2* 42* 29   < > 24    < > = values in this interval not displayed.      Recent Labs   Lab Test 02/21/23  1400 05/03/22  0950 02/20/22  0615 03/30/21  0952 06/13/18  1039   TSH 0.51 0.40 0.34*   < > 0.28*   T4  --   --  1.59  --  1.16    < > = values in this interval not displayed.     Hemoglobin   Date  Value Ref Range Status   02/21/2023 11.8 11.7 - 17.7 g/dL Final     Comment:     Sex Specific Reference Ranges:    Female  11.7-15.7  g/dL  Male    13.3-17.7   g/dL     11/18/2022 14.2 11.7 - 17.7 g/dL Final     Comment:     Sex Specific Reference Ranges:    Female  11.7-15.7  g/dL  Male    13.3-17.7   g/dL     10/12/2022 14.0 11.7 - 17.7 g/dL Final     Comment:     Reference Range:                                                     Female 11.7-15.7 g/dL                                      Male 13.3-17.7 g/dL   05/12/2021 14.1 13.3 - 17.7 g/dL Final   03/30/2021 14.9 13.3 - 17.7 g/dL Final     Urea Nitrogen   Date Value Ref Range Status   02/21/2023 22.2 8.0 - 23.0 mg/dL Final   12/30/2022 26.7 (H) 8.0 - 23.0 mg/dL Final   11/21/2022 31.2 (H) 8.0 - 23.0 mg/dL Final   11/18/2022 42 (H) 7 - 30 mg/dL Final     Comment:     Female  0 to 15 days           3-23  15 days to 1 year      3-17  1 to 10 years          9-22  10 to 19 years         7-19  19 years and older     7-30    Male  0 to 15 days           3-23  15 days to 1 year      3-17  1 to 10 years          9-22  10 to 19 years         7-21  19 years and older     7-30   10/12/2022 29 7 - 30 mg/dL Final     Comment:     Female  0 to 15 days           3-23  15 days to 1 year      3-17  1 to 10 years          9-22  10 to 19 years         7-19  19 years and older     7-30    Male  0 to 15 days           3-23  15 days to 1 year      3-17  1 to 10 years          9-22  10 to 19 years         7-21  19 years and older     7-30   05/25/2022 33 (H) 7 - 30 mg/dL Final     Comment:     Female  0 to 15 days           3-23  15 days to 1 year      3-17  1 to 10 years          9-22  10 to 19 years         7-19  19 years and older     7-30    Male  0 to 15 days           3-23  15 days to 1 year      3-17  1 to 10 years          9-22  10 to 19 years         7-21  19 years and older     7-30   05/12/2021 36 (H) 7 - 30 mg/dL Final   03/30/2021 39 (H) 7 - 30 mg/dL Final   10/30/2019  41 (H) 7 - 30 mg/dL Final     Erythrocyte Sedimentation Rate   Date Value Ref Range Status   12/30/2022 23 0 - 30 mm/hr Final     Comment:     Sex Specific Reference Ranges:     Female  0-30  mm/hr   Male      0-20   mm/hr     02/19/2022 27 0 - 30 mm/hr Final     Comment:     Sex Specific Reference Ranges:     Female  0-30  mm/hr   Male      0-20   mm/hr       AST   Date Value Ref Range Status   02/21/2023 26 10 - 50 U/L Final     Comment:     Female   All ages 10-35 U/L     Male   All ages 10-50 U/L       11/18/2022 24 0 - 45 U/L Final   03/29/2022 11 0 - 45 U/L Final   05/12/2021 20 0 - 45 U/L Final   03/30/2021 14 0 - 45 U/L Final   10/30/2019 20 0 - 45 U/L Final     Albumin   Date Value Ref Range Status   02/21/2023 3.8 3.5 - 5.2 g/dL Final   11/18/2022 3.0 (L) 3.4 - 5.0 g/dL Final   03/29/2022 3.1 (L) 3.4 - 5.0 g/dL Final   02/19/2022 3.2 (L) 3.4 - 5.0 g/dL Final   05/12/2021 3.9 3.4 - 5.0 g/dL Final   03/30/2021 3.9 3.4 - 5.0 g/dL Final   10/30/2019 4.3 3.4 - 5.0 g/dL Final     Alkaline Phosphatase   Date Value Ref Range Status   02/21/2023 92 35 - 129 U/L Final     Comment:     Female:   0-15 days     U/L  15d-1 year   122-469 U/L  1-10 years   142-335 U/L  10-13 years  129-417 U/L  13-15 years   U/L  15-17 years   U/L  17-19 years  45-87 U/L  19 years and older   U/L      Male:  0-15 days     U/L  15d-1 year   122-469 U/L  1-10 years   142-335 U/L  10-13 years  129-417 U/L  13-15 years  116-468 U/L  15-17 years   U/L  17-19 years   U/L  19 years and older   U/L     11/18/2022 97 40 - 150 U/L Final     Comment:     Female:   0-3 years  110-320 U/L  4-9 years  150-420 U/L  10-11 years  130-560 U/L  12-13 years  105-420 U/L  14-15 years   U/L  16 years and older   U/L      Male:  0-3 years  110-320 U/L  4-9 years  150-420 U/L  10-15 years  130-530 U/L  16-19 years   U/L  20 years and older   U/L     03/29/2022 71 40 - 150 U/L Final      Comment:     Female:   0-3 years  110-320 U/L  4-9 years  150-420 U/L  10-11 years  130-560 U/L  12-13 years  105-420 U/L  14-15 years   U/L  16 years and older   U/L      Male:  0-3 years  110-320 U/L  4-9 years  150-420 U/L  10-15 years  130-530 U/L  16-19 years   U/L  20 years and older   U/L     05/12/2021 61 40 - 150 U/L Final   03/30/2021 68 40 - 150 U/L Final   10/30/2019 59 40 - 150 U/L Final     ALT   Date Value Ref Range Status   02/21/2023 16 10 - 50 U/L Final     Comment:     Female   All ages 10-35 U/L     Male   All ages 10-50 U/L       11/18/2022 27 0 - 70 U/L Final     Comment:     Female   All ages0-50 U/L    Male   0 - 19 years      0-50 U/L  20 years and older0-70 U/L          03/29/2022 20 0 - 70 U/L Final     Comment:     Female   All ages0-50 U/L    Male   0 - 19 years      0-50 U/L  20 years and older0-70 U/L          05/12/2021 28 0 - 70 U/L Final   03/30/2021 29 0 - 70 U/L Final   10/30/2019 36 0 - 70 U/L Final     Rheumatoid Factor   Date Value Ref Range Status   12/30/2022 <6 <12 IU/mL Final     Recent Labs   Lab Test 02/21/23  1400 12/30/22  0928 11/21/22  1637 11/18/22  0714 10/12/22  1524 05/13/22  1323 05/03/22  0950 03/29/22  1401 02/27/22  1611 02/20/22  0615   WBC 10.2  --   --  12.8* 12.0*   < >  --  11.9*   < > 13.8*   HGB 11.8  --   --  14.2 14.0   < >  --  13.2   < > 13.3   HCT 40.8  --   --  46.0 44.7   < >  --  43.4   < > 42.8   MCV 88  --   --  83 83   < >  --  89   < > 87     --   --  330 341   < >  --  278   < > 296   BUN 22.2 26.7* 31.2* 42* 29   < > 33* 24   < > 27   TSH 0.51  --   --   --   --   --  0.40  --   --  0.34*   AST 26  --   --  24  --   --   --  11  --   --    ALT 16  --   --  27  --   --   --  20  --   --    ALKPHOS 92  --   --  97  --   --   --  71  --   --     < > = values in this interval not displayed.       Reviewed Rheumatology lab flowsheet    José Miguel Arias MD  [facility]  Department of  Rheumatology & Autoimmune Disorders  [facility]: [phone]   Pager - [phone]

## 2023-03-30 NOTE — NURSING NOTE
"Chief Complaint   Patient presents with     New Patient     Abnormal CT scan of lung +1 more       Vitals:    03/30/23 0945   BP: 134/83   BP Location: Left arm   Patient Position: Sitting   Cuff Size: Adult Large   Pulse: 94   SpO2: 92%   Weight: 117.9 kg (260 lb)   Height: 1.778 m (5' 10\")       Body mass index is 37.31 kg/m .    Simran Rocha, KARRIEF  "

## 2023-03-30 NOTE — PATIENT INSTRUCTIONS
Your physical exam is not suggestive of Autoimmune connective tissue disease.     Blood tests ordered to complete work up     Follow up based on the results

## 2023-04-11 DIAGNOSIS — E11.59 TYPE 2 DIABETES WITH CIRCULATORY DISORDER CAUSING ERECTILE DYSFUNCTION (H): ICD-10-CM

## 2023-04-11 DIAGNOSIS — N52.1 TYPE 2 DIABETES WITH CIRCULATORY DISORDER CAUSING ERECTILE DYSFUNCTION (H): ICD-10-CM

## 2023-04-19 ENCOUNTER — HOSPITAL ENCOUNTER (OUTPATIENT)
Dept: CARDIAC REHAB | Facility: CLINIC | Age: 68
Discharge: HOME | End: 2023-04-19
Attending: STUDENT IN AN ORGANIZED HEALTH CARE EDUCATION/TRAINING PROGRAM
Payer: COMMERCIAL

## 2023-04-19 DIAGNOSIS — J44.9 CHRONIC OBSTRUCTIVE PULMONARY DISEASE, UNSPECIFIED COPD TYPE (H): ICD-10-CM

## 2023-04-19 PROCEDURE — 94729 DIFFUSING CAPACITY: CPT

## 2023-04-19 PROCEDURE — 94375 RESPIRATORY FLOW VOLUME LOOP: CPT

## 2023-04-19 PROCEDURE — 94726 PLETHYSMOGRAPHY LUNG VOLUMES: CPT

## 2023-04-20 ENCOUNTER — TELEPHONE (OUTPATIENT)
Dept: PULMONOLOGY | Facility: CLINIC | Age: 68
End: 2023-04-20
Payer: COMMERCIAL

## 2023-04-20 LAB
DLCOUNC-%PRED-PRE: 33 %
DLCOUNC-PRE: 8.78 ML/MIN/MMHG
DLCOUNC-PRED: 26.59 ML/MIN/MMHG
ERV-%PRED-PRE: 94 %
ERV-PRE: 0.6 L
ERV-PRED: 0.64 L
EXPTIME-PRE: 6.49 SEC
FEF2575-%PRED-PRE: 41 %
FEF2575-PRE: 1.11 L/SEC
FEF2575-PRED: 2.65 L/SEC
FEFMAX-%PRED-PRE: 47 %
FEFMAX-PRE: 4.19 L/SEC
FEFMAX-PRED: 8.74 L/SEC
FEV1-%PRED-PRE: 36 %
FEV1-PRE: 1.24 L
FEV1FEV6-PRE: 85 %
FEV1FEV6-PRED: 78 %
FEV1FVC-PRE: 86 %
FEV1FVC-PRED: 77 %
FEV1SVC-PRE: 86 %
FEV1SVC-PRED: 69 %
FIFMAX-PRE: 2.95 L/SEC
FRCPLETH-%PRED-PRE: 74 %
FRCPLETH-PRE: 2.74 L
FRCPLETH-PRED: 3.66 L
FVC-%PRED-PRE: 32 %
FVC-PRE: 1.45 L
FVC-PRED: 4.41 L
IC-%PRED-PRE: 19 %
IC-PRE: 0.82 L
IC-PRED: 4.22 L
RVPLETH-%PRED-PRE: 82 %
RVPLETH-PRE: 2.11 L
RVPLETH-PRED: 2.55 L
TLCPLETH-%PRED-PRE: 50 %
TLCPLETH-PRE: 3.57 L
TLCPLETH-PRED: 7.13 L
VA-%PRED-PRE: 47 %
VA-PRE: 3.09 L
VC-%PRED-PRE: 29 %
VC-PRE: 1.45 L
VC-PRED: 4.85 L

## 2023-04-25 ENCOUNTER — TELEPHONE (OUTPATIENT)
Dept: PULMONOLOGY | Facility: CLINIC | Age: 68
End: 2023-04-25
Payer: COMMERCIAL

## 2023-04-25 DIAGNOSIS — J84.9 ILD (INTERSTITIAL LUNG DISEASE) (H): Primary | ICD-10-CM

## 2023-04-25 NOTE — TELEPHONE ENCOUNTER
Brief Pulmonology Update/Telephone visit    Patient was scheduled to see Dr. Hopkins first available on 6/30/2023 but I was told he needs to be seen sooner and was asked to see him in clinic if able.  PFTs from 4/19/2023 demonstrate a rather significant decline in his DLCO and TLC since July 2022 (DLCO 14.14 ? 8.78, TLC 4.84 ? 3.57L). Chart reviewed, he is being seen by Rheumatology and further lab workup is pending. The patient is currently in Pulmonary rehab. His last CT Chest was completed 12/30/2022, at that time it appeared stable from 3/2022.     I reviewed his case with Dr. Hopkins who agrees that he should be seen by one our of [facility] colleagues, Dr. Perlman. He also needs a CT chest hi-res prior to his visit, and per request of the [facility] clinic coordinator he needs a walk test (however I'm not sure how far he will be able to walk as detailed below). Orders for CT and walk test placed. Our schedulers are working on arranging these appts for 5/16/23, will cancel appt with Dr. Hopkins.     I called the patient over the phone and reviewed our concerns that his lung function seems to be declining. He reports feeling very short of breath every day. Requires 6L at rest and with activity, though he is unable to do much activity. He spends most of his day in a chair because of severe dyspnea, and did order a new wheelchair. He does feel fairly stable over the past month and thinks he can wait a few more weeks to see Dr. Perlman, though if he worsens I suggest he call our clinic and he may need to be evaluated in the ED depending on symptoms at that time. I suggested he try to have his labs drawn that Rheumatology ordered prior to seeing Dr. Perlman as well. Questions answered.     Caro Livingston PA-C  General Pulmonology

## 2023-04-27 ENCOUNTER — LAB (OUTPATIENT)
Dept: LAB | Facility: CLINIC | Age: 68
End: 2023-04-27
Payer: COMMERCIAL

## 2023-04-27 DIAGNOSIS — R91.8 ABNORMAL CT SCAN OF LUNG: ICD-10-CM

## 2023-04-27 DIAGNOSIS — J44.9 CHRONIC OBSTRUCTIVE PULMONARY DISEASE, UNSPECIFIED COPD TYPE (H): ICD-10-CM

## 2023-04-27 LAB — ERYTHROCYTE [SEDIMENTATION RATE] IN BLOOD BY WESTERGREN METHOD: 43 MM/HR (ref 0–30)

## 2023-04-27 PROCEDURE — 83876 ASSAY MYELOPEROXIDASE: CPT

## 2023-04-27 PROCEDURE — 83516 IMMUNOASSAY NONANTIBODY: CPT

## 2023-04-27 PROCEDURE — 36415 COLL VENOUS BLD VENIPUNCTURE: CPT

## 2023-04-27 PROCEDURE — 86235 NUCLEAR ANTIGEN ANTIBODY: CPT | Mod: 59

## 2023-04-27 PROCEDURE — 86140 C-REACTIVE PROTEIN: CPT

## 2023-04-27 PROCEDURE — 86235 NUCLEAR ANTIGEN ANTIBODY: CPT

## 2023-04-27 PROCEDURE — 85652 RBC SED RATE AUTOMATED: CPT

## 2023-04-28 LAB
CENTROMERE B AB SER-ACNC: <0.7 U/ML
CENTROMERE B AB SER-ACNC: NEGATIVE
CRP SERPL-MCNC: 32.4 MG/L
MYELOPEROXIDASE AB SER IA-ACNC: <0.3 U/ML
MYELOPEROXIDASE AB SER IA-ACNC: NEGATIVE
PROTEINASE3 AB SER IA-ACNC: <1 U/ML
PROTEINASE3 AB SER IA-ACNC: NEGATIVE
U1 SNRNP IGG SER IA-ACNC: 2.3 U/ML
U1 SNRNP IGG SER IA-ACNC: NEGATIVE

## 2023-05-09 DIAGNOSIS — I48.19 PERSISTENT ATRIAL FIBRILLATION (H): ICD-10-CM

## 2023-05-09 RX ORDER — DILTIAZEM HYDROCHLORIDE 360 MG/1
360 CAPSULE, EXTENDED RELEASE ORAL DAILY
Qty: 90 CAPSULE | Refills: 1 | Status: SHIPPED | OUTPATIENT
Start: 2023-05-09 | End: 2024-01-01

## 2023-05-12 ENCOUNTER — ANCILLARY PROCEDURE (OUTPATIENT)
Dept: CT IMAGING | Facility: CLINIC | Age: 68
End: 2023-05-12
Attending: PHYSICIAN ASSISTANT
Payer: COMMERCIAL

## 2023-05-12 DIAGNOSIS — J84.9 ILD (INTERSTITIAL LUNG DISEASE) (H): ICD-10-CM

## 2023-05-12 PROCEDURE — 71250 CT THORAX DX C-: CPT

## 2023-05-15 ENCOUNTER — OFFICE VISIT (OUTPATIENT)
Dept: PULMONOLOGY | Facility: CLINIC | Age: 68
End: 2023-05-15
Payer: COMMERCIAL

## 2023-05-15 DIAGNOSIS — J84.9 ILD (INTERSTITIAL LUNG DISEASE) (H): ICD-10-CM

## 2023-05-15 LAB
6 MIN WALK (FT): 300 FT
6 MIN WALK (M): 91 M

## 2023-05-15 PROCEDURE — 94618 PULMONARY STRESS TESTING: CPT | Performed by: INTERNAL MEDICINE

## 2023-05-15 NOTE — PROGRESS NOTES
Booker Brown was seen in clinic today for a six minute walk ordered by Dr. Perlman. No complications  Noted.

## 2023-05-16 ENCOUNTER — OFFICE VISIT (OUTPATIENT)
Dept: PULMONOLOGY | Facility: CLINIC | Age: 68
End: 2023-05-16
Payer: COMMERCIAL

## 2023-05-16 ENCOUNTER — PATIENT OUTREACH (OUTPATIENT)
Dept: PULMONOLOGY | Facility: CLINIC | Age: 68
End: 2023-05-16

## 2023-05-16 VITALS — HEART RATE: 92 BPM | DIASTOLIC BLOOD PRESSURE: 75 MMHG | SYSTOLIC BLOOD PRESSURE: 127 MMHG | OXYGEN SATURATION: 94 %

## 2023-05-16 DIAGNOSIS — J84.9 ILD (INTERSTITIAL LUNG DISEASE) (H): Primary | ICD-10-CM

## 2023-05-16 DIAGNOSIS — I31.39 PERICARDIAL EFFUSION: ICD-10-CM

## 2023-05-16 PROCEDURE — 99215 OFFICE O/P EST HI 40 MIN: CPT | Mod: 25 | Performed by: INTERNAL MEDICINE

## 2023-05-16 NOTE — PROGRESS NOTES
Met with patient in clinic after provider visit to provide contact information sheet and introduce role as [facility] RN Care Coordinator. Patient appreciative of conversation and direct contact information.

## 2023-05-16 NOTE — LETTER
5/16/2023         RE: Booker Brown  [id number]  [facility] [address]        Dear Colleague,    Thank you for referring your patient, Booker Brown, to the [facility] [place]. Please see a copy of my visit note below.    Reason for Visit  Booker Brown is a 68 year old year old adult who is being seen for Follow Up (ILD (interstitial lung disease) //)    ILD HPI    Booker Brown is a 68-year-old male who is seen today for possible interstitial lung disease.  The patient is previously followed with Dr. Booker Hopkins in the general pulmonary clinic.  He has a history of COPD with CT scan showing upper lobe emphysematous changes and also significant coronary artery disease.  He also has a CT scan showing extensive pleural calcifications consistent with asbestos exposure.  He has significant exertional hypoxemia and he is on 8 to 10 L of oxygen at home.  This has been pretty stable for the past several months.  He recently did increase his oxygen as a result of his walks at pulmonary rehab and he has felt better on this.  He did have an episode of COVID in February 2022.  He also had evaluation from Dr. Hopkins that included a positive LISA he was seen by rheumatology who did not feel there was an underlying autoimmune disease.      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  He was a previous smoker quit 35 years ago.  He did smoke 2 packs a day.      Current Outpatient Medications   Medication    albuterol (PROAIR HFA/PROVENTIL HFA/VENTOLIN HFA) 108 (90 Base) MCG/ACT inhaler    blood glucose (NO BRAND SPECIFIED) test strip    diltiazem ER COATED BEADS (CARDIZEM CD) 360 MG 24 hr capsule    Fluticasone-Umeclidin-Vilanterol (TRELEGY ELLIPTA) 200-62.5-25 MCG/ACT oral inhaler    glipiZIDE (GLUCOTROL XL) 10 MG 24 hr tablet    lisinopril (ZESTRIL) 40 MG tablet    metFORMIN (GLUCOPHAGE  XR) 500 MG 24 hr tablet    metoprolol succinate ER (TOPROL XL) 100 MG 24 hr tablet    rivaroxaban ANTICOAGULANT (XARELTO ANTICOAGULANT) 20 MG TABS tablet    rosuvastatin (CRESTOR) 40 MG tablet    vitamin D3 (CHOLECALCIFEROL) 50 mcg (2000 units) tablet    alcohol swab prep pads    ASPIRIN NOT PRESCRIBED (INTENTIONAL)    blood glucose calibration (NO BRAND SPECIFIED) solution    blood glucose monitoring (NO BRAND SPECIFIED) meter device kit    thin (NO BRAND SPECIFIED) lancets     No current facility-administered medications for this visit.     No Known Allergies  Past Medical History:   Diagnosis Date    Benign essential hypertension     CAD (coronary artery disease)     Chronic kidney disease, stage III (moderate) (H)     COPD (chronic obstructive pulmonary disease) (H)     ILD (interstitial lung disease) (H)     Obesity (BMI 30-39.9)     Persistent atrial fibrillation (H)     Pure hypercholesterolemia     Type 2 diabetes mellitus (H)        Past Surgical History:   Procedure Laterality Date    COLONOSCOPY N/A 8/24/2018    Procedure: COMBINED COLONOSCOPY, SINGLE OR MULTIPLE BIOPSY/POLYPECTOMY BY BIOPSY;;  Surgeon: Lawrence Mata MD;  Location:  GI    CV CORONARY ANGIOGRAM N/A 11/18/2022    Procedure: Coronary Angiogram;  Surgeon: Mason Lucas MD;  Location:  HEART CARDIAC CATH LAB    CV INSTANTANEOUS WAVE-FREE RATIO N/A 11/18/2022    Procedure: Instantaneous Wave-Free Ratio;  Surgeon: Mason Lucas MD;  Location:  HEART CARDIAC CATH LAB    CV LEFT HEART CATH N/A 11/18/2022    Procedure: Left Heart Catheterization;  Surgeon: Mason Lucas MD;  Location:  HEART CARDIAC CATH LAB       Social History     Socioeconomic History    Marital status:      Spouse name: Not on file    Number of children: Not on file    Years of education: Not on file    Highest education level: Not on file   Occupational History    Occupation: Shipping & Receiving   Tobacco Use    Smoking status: Former      Packs/day: 2.00     Years: 16.00     Pack years: 32.00     Types: Cigarettes     Quit date: 1987     Years since quittin.6    Smokeless tobacco: Never   Vaping Use    Vaping status: Never Used   Substance and Sexual Activity    Alcohol use: No    Drug use: No    Sexual activity: Yes     Partners: Female   Other Topics Concern    Parent/sibling w/ CABG, MI or angioplasty before 65F 55M? Yes     Service No    Blood Transfusions No    Caffeine Concern No    Occupational Exposure No    Hobby Hazards No    Sleep Concern No    Stress Concern No    Weight Concern Yes    Special Diet No    Back Care No    Exercise Yes    Bike Helmet No     Comment: n/a    Seat Belt Yes    Self-Exams No   Social History Narrative    .    Two adult children.    Four grandchildren.    No formal exercise.     Social Determinants of Health     Financial Resource Strain: Not on file   Food Insecurity: Not on file   Transportation Needs: Not on file   Physical Activity: Not on file   Stress: Not on file   Social Connections: Not on file   Intimate Partner Violence: Not on file   Housing Stability: Not on file       Family History   Problem Relation Age of Onset    Diabetes Type 2  Mother     Cerebral aneurysm Sister     Bladder Cancer Brother     Myocardial Infarction Brother         in his 50s    Cerebrovascular Disease No family hx of     Coronary Artery Disease Early Onset No family hx of     Prostate Cancer No family hx of     Colon Cancer No family hx of     Clotting Disorder No family hx of     Bleeding Disorder No family hx of     Anesthesia Reaction No family hx of        ROS Pulmonary    A complete ROS was otherwise negative except as noted in the HPI.  Vitals:    23 1346   BP: 127/75   BP Location: Left arm   Patient Position: Sitting   Cuff Size: Adult Large   Pulse: 92   SpO2: 94%     Exam:   GENERAL APPEARANCE: Well developed, well nourished, alert, and in no apparent distress.  NECK: supple, no masses,  no thyromegaly.  LYMPHATICS: No significant axillary, cervical, or supraclavicular nodes.  RESP: good air flow throughout, - mild bibasilar crackles, R>L  CV: Normal S1, S2, regular rhythm, normal rate, no rub, no murmur,  no gallop, no LE edema.   ABDOMEN:  Bowel sounds normal, soft, nontender, no HSM or masses.   MS: extremities normal- no clubbing, no cyanosis.  PSYCH: mentation appears normal. and affect normal/bright  Results: I have reviewed all imaging, PFTs and other relavent tests, please see below for details, PFT and imaging results were reviewed with the patient.  PFTs: Severe restriction with severe reduction in diffusing capacity.  There has been a drop in the patient's FVC and DLCO compared to July 2022.  CT scan of the chest (reviewed by me) demonstrates upper lobe emphysema with possibly some patchy groundglass opacities.  There is extensive pleural thickening and calcified pleural plaques bilaterally and on the diaphragm.  There is not significant parenchymal fibrosis.    Assessment and plan:    68-year-old male with presumed pleural disease related to asbestos exposure coronary artery disease and emphysema.  This is a somewhat complicated patient and I think the etiology of his symptoms are little bit hard to sort out.  Certainly he has significant oxygen delivery impairment in the lungs despite the fact that I do not see a lot of obvious of parenchymal fibrosis.  We will review his CT scan in our multidisciplinary conference however in the absence of an underlying autoimmune disease and without significant parenchymal fibrosis from an ILD standpoint I am not sure that we have anything significant to offer him.  We will review his case and I will follow-up with the patient.  I also noted on the CT scan there was a pericardial effusion that was new.  I am going to order an echocardiogram the patient has a follow-up with his cardiologist in about 3 weeks.  I will send a note to his cardiologist with  my findings and I will also discussed the case with Dr. Wynn.  We will plan to have the patient follow-up here in 3 months with Dr. Wynn as I will not be in the diet clinic at that time and then we can decide on a further follow-up plan once we have reviewed his case in our MDD.    I spent 45 minutes on the date of the encounter doing chart review, history and exam, interpretation of any new PFTs and imaging, documentation and further activities as noted above.        CBC   Recent Labs   Lab Test 02/21/23  1400 11/18/22  0714   RBC 4.65 5.52   HGB 11.8 14.2   HCT 40.8 46.0    330       Basic Metabolic Panel  Recent Labs   Lab Test 02/21/23  1400 12/30/22  0928    142   POTASSIUM 4.5 4.4   CHLORIDE 98 102   CO2 30* 26   BUN 22.2 26.7*   GLC 96 167*   KURT 9.7 9.5       INR  Recent Labs   Lab Test 11/18/22  0714   INR 1.51*       PFT      Latest Ref Rng & Units 4/19/2023     1:27 PM   PFT   FVC L 1.45     FEV1 L 1.24     FVC% % 32     FEV1% % 36             CC:      Sincerely,        David Morris Perlman, MD

## 2023-05-16 NOTE — NURSING NOTE
Chief Complaint   Patient presents with     Follow Up     ILD (interstitial lung disease)            Vitals:    05/16/23 1346   BP: 127/75   BP Location: Left arm   Patient Position: Sitting   Cuff Size: Adult Large   Pulse: 92   SpO2: 94%       There is no height or weight on file to calculate BMI.

## 2023-05-16 NOTE — PROGRESS NOTES
Reason for Visit  Booker Brown is a 68 year old year old adult who is being seen for Follow Up (ILD (interstitial lung disease) //)    ILD HPI    Booker Brown is a 68-year-old male who is seen today for possible interstitial lung disease.  The patient is previously followed with Dr. Booker Hopkins in the general pulmonary clinic.  He has a history of COPD with CT scan showing upper lobe emphysematous changes and also significant coronary artery disease.  He also has a CT scan showing extensive pleural calcifications consistent with asbestos exposure.  He has significant exertional hypoxemia and he is on 8 to 10 L of oxygen at home.  This has been pretty stable for the past several months.  He recently did increase his oxygen as a result of his walks at pulmonary rehab and he has felt better on this.  He did have an episode of COVID in February 2022.  He also had evaluation from Dr. Hopkins that included a positive LISA he was seen by rheumatology who did not feel there was an underlying autoimmune disease.      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  He was a previous smoker quit 35 years ago.  He did smoke 2 packs a day.      Current Outpatient Medications   Medication     albuterol (PROAIR HFA/PROVENTIL HFA/VENTOLIN HFA) 108 (90 Base) MCG/ACT inhaler     blood glucose (NO BRAND SPECIFIED) test strip     diltiazem ER COATED BEADS (CARDIZEM CD) 360 MG 24 hr capsule     Fluticasone-Umeclidin-Vilanterol (TRELEGY ELLIPTA) 200-62.5-25 MCG/ACT oral inhaler     glipiZIDE (GLUCOTROL XL) 10 MG 24 hr tablet     lisinopril (ZESTRIL) 40 MG tablet     metFORMIN (GLUCOPHAGE XR) 500 MG 24 hr tablet     metoprolol succinate ER (TOPROL XL) 100 MG 24 hr tablet     rivaroxaban ANTICOAGULANT (XARELTO ANTICOAGULANT) 20 MG TABS tablet     rosuvastatin (CRESTOR) 40 MG tablet     vitamin D3 (CHOLECALCIFEROL) 50 mcg (2000 units) tablet      alcohol swab prep pads     ASPIRIN NOT PRESCRIBED (INTENTIONAL)     blood glucose calibration (NO BRAND SPECIFIED) solution     blood glucose monitoring (NO BRAND SPECIFIED) meter device kit     thin (NO BRAND SPECIFIED) lancets     No current facility-administered medications for this visit.     No Known Allergies  Past Medical History:   Diagnosis Date     Benign essential hypertension      CAD (coronary artery disease)      Chronic kidney disease, stage III (moderate) (H)      COPD (chronic obstructive pulmonary disease) (H)      ILD (interstitial lung disease) (H)      Obesity (BMI 30-39.9)      Persistent atrial fibrillation (H)      Pure hypercholesterolemia      Type 2 diabetes mellitus (H)        Past Surgical History:   Procedure Laterality Date     COLONOSCOPY N/A 2018    Procedure: COMBINED COLONOSCOPY, SINGLE OR MULTIPLE BIOPSY/POLYPECTOMY BY BIOPSY;;  Surgeon: Lawrence Mata MD;  Location:  GI     CV CORONARY ANGIOGRAM N/A 2022    Procedure: Coronary Angiogram;  Surgeon: Mason uLcas MD;  Location:  HEART CARDIAC CATH LAB     CV INSTANTANEOUS WAVE-FREE RATIO N/A 2022    Procedure: Instantaneous Wave-Free Ratio;  Surgeon: Mason Lucas MD;  Location:  HEART CARDIAC CATH LAB     CV LEFT HEART CATH N/A 2022    Procedure: Left Heart Catheterization;  Surgeon: Mason Lucas MD;  Location:  HEART CARDIAC CATH LAB       Social History     Socioeconomic History     Marital status:      Spouse name: Not on file     Number of children: Not on file     Years of education: Not on file     Highest education level: Not on file   Occupational History     Occupation: Shipping & Receiving   Tobacco Use     Smoking status: Former     Packs/day: 2.00     Years: 16.00     Pack years: 32.00     Types: Cigarettes     Quit date: 1987     Years since quittin.6     Smokeless tobacco: Never   Vaping Use     Vaping status: Never Used   Substance and  Sexual Activity     Alcohol use: No     Drug use: No     Sexual activity: Yes     Partners: Female   Other Topics Concern     Parent/sibling w/ CABG, MI or angioplasty before 65F 55M? Yes      Service No     Blood Transfusions No     Caffeine Concern No     Occupational Exposure No     Hobby Hazards No     Sleep Concern No     Stress Concern No     Weight Concern Yes     Special Diet No     Back Care No     Exercise Yes     Bike Helmet No     Comment: n/a     Seat Belt Yes     Self-Exams No   Social History Narrative    .    Two adult children.    Four grandchildren.    No formal exercise.     Social Determinants of Health     Financial Resource Strain: Not on file   Food Insecurity: Not on file   Transportation Needs: Not on file   Physical Activity: Not on file   Stress: Not on file   Social Connections: Not on file   Intimate Partner Violence: Not on file   Housing Stability: Not on file       Family History   Problem Relation Age of Onset     Diabetes Type 2  Mother      Cerebral aneurysm Sister      Bladder Cancer Brother      Myocardial Infarction Brother         in his 50s     Cerebrovascular Disease No family hx of      Coronary Artery Disease Early Onset No family hx of      Prostate Cancer No family hx of      Colon Cancer No family hx of      Clotting Disorder No family hx of      Bleeding Disorder No family hx of      Anesthesia Reaction No family hx of        ROS Pulmonary    A complete ROS was otherwise negative except as noted in the HPI.  Vitals:    05/16/23 1346   BP: 127/75   BP Location: Left arm   Patient Position: Sitting   Cuff Size: Adult Large   Pulse: 92   SpO2: 94%     Exam:   GENERAL APPEARANCE: Well developed, well nourished, alert, and in no apparent distress.  NECK: supple, no masses, no thyromegaly.  LYMPHATICS: No significant axillary, cervical, or supraclavicular nodes.  RESP: good air flow throughout, - mild bibasilar crackles, R>L  CV: Normal S1, S2, regular rhythm,  normal rate, no rub, no murmur,  no gallop, no LE edema.   ABDOMEN:  Bowel sounds normal, soft, nontender, no HSM or masses.   MS: extremities normal- no clubbing, no cyanosis.  PSYCH: mentation appears normal. and affect normal/bright  Results: I have reviewed all imaging, PFTs and other relavent tests, please see below for details, PFT and imaging results were reviewed with the patient.  PFTs: Severe restriction with severe reduction in diffusing capacity.  There has been a drop in the patient's FVC and DLCO compared to July 2022.  CT scan of the chest (reviewed by me) demonstrates upper lobe emphysema with possibly some patchy groundglass opacities.  There is extensive pleural thickening and calcified pleural plaques bilaterally and on the diaphragm.  There is not significant parenchymal fibrosis.    Assessment and plan:    68-year-old male with presumed pleural disease related to asbestos exposure coronary artery disease and emphysema.  This is a somewhat complicated patient and I think the etiology of his symptoms are little bit hard to sort out.  Certainly he has significant oxygen delivery impairment in the lungs despite the fact that I do not see a lot of obvious of parenchymal fibrosis.  We will review his CT scan in our multidisciplinary conference however in the absence of an underlying autoimmune disease and without significant parenchymal fibrosis from an ILD standpoint I am not sure that we have anything significant to offer him.  We will review his case and I will follow-up with the patient.  I also noted on the CT scan there was a pericardial effusion that was new.  I am going to order an echocardiogram the patient has a follow-up with his cardiologist in about 3 weeks.  I will send a note to his cardiologist with my findings and I will also discussed the case with Dr. Wynn.  We will plan to have the patient follow-up here in 3 months with Dr. Wynn as I will not be in the diet clinic at that time  and then we can decide on a further follow-up plan once we have reviewed his case in our MDD.    I spent 45 minutes on the date of the encounter doing chart review, history and exam, interpretation of any new PFTs and imaging, documentation and further activities as noted above.        CBC   Recent Labs   Lab Test 02/21/23  1400 11/18/22  0714   RBC 4.65 5.52   HGB 11.8 14.2   HCT 40.8 46.0    330       Basic Metabolic Panel  Recent Labs   Lab Test 02/21/23  1400 12/30/22  0928    142   POTASSIUM 4.5 4.4   CHLORIDE 98 102   CO2 30* 26   BUN 22.2 26.7*   GLC 96 167*   KURT 9.7 9.5       INR  Recent Labs   Lab Test 11/18/22  0714   INR 1.51*       PFT      Latest Ref Rng & Units 4/19/2023     1:27 PM   PFT   FVC L 1.45     FEV1 L 1.24     FVC% % 32     FEV1% % 36             CC:

## 2023-05-17 LAB — FIO2-PRE: 44 %

## 2023-05-23 ENCOUNTER — HOSPITAL ENCOUNTER (OUTPATIENT)
Dept: CARDIOLOGY | Facility: CLINIC | Age: 68
Discharge: HOME | End: 2023-05-23
Attending: INTERNAL MEDICINE | Admitting: INTERNAL MEDICINE
Payer: COMMERCIAL

## 2023-05-23 DIAGNOSIS — I31.39 PERICARDIAL EFFUSION: ICD-10-CM

## 2023-05-23 LAB — LVEF ECHO: NORMAL

## 2023-05-23 PROCEDURE — 93306 TTE W/DOPPLER COMPLETE: CPT

## 2023-05-23 PROCEDURE — 93306 TTE W/DOPPLER COMPLETE: CPT | Mod: 26 | Performed by: INTERNAL MEDICINE

## 2023-05-27 ENCOUNTER — HEALTH MAINTENANCE LETTER (OUTPATIENT)
Age: 68
End: 2023-05-27

## 2023-06-02 ENCOUNTER — NURSE TRIAGE (OUTPATIENT)
Dept: INTERNAL MEDICINE | Facility: CLINIC | Age: 68
End: 2023-06-02
Payer: COMMERCIAL

## 2023-06-02 DIAGNOSIS — R60.0 BILATERAL LOWER EXTREMITY EDEMA: Primary | ICD-10-CM

## 2023-06-02 DIAGNOSIS — I48.91 ATRIAL FIBRILLATION WITH RVR (H): ICD-10-CM

## 2023-06-02 RX ORDER — FUROSEMIDE 40 MG
40 TABLET ORAL DAILY
Qty: 90 TABLET | Refills: 3 | Status: SHIPPED | OUTPATIENT
Start: 2023-06-02 | End: 2023-06-10

## 2023-06-02 NOTE — TELEPHONE ENCOUNTER
Called and spoke to the patient. Relayed message from the provider. Assisted in getting the patient scheduled for a lab only appointment.     Appointments in Next Year        Jun 09, 2023  3:15 PM  [facility]  [facility] ([facility] ) [phone]     Patient expressed understanding and had no additional questions at this time.     Nelly Ross RN

## 2023-06-02 NOTE — TELEPHONE ENCOUNTER
As long as swelling is bilateral and symmetric, RESTART Lasix 40mg daily.    We will need to monitor kidney function with restarting. BMP later next week please (non-fasting lab only visit).

## 2023-06-02 NOTE — TELEPHONE ENCOUNTER
"Nurse Triage SBAR    Is this a 2nd Level Triage? YES, LICENSED PRACTITIONER REVIEW IS REQUIRED    Situation:     Patient calling reporting worsening leg edema     Background:     Hx of lower leg edema - uses compression wraps at home to BLE     Reports diuretic being stopped in Nov of 2022 and swelling slowly worsening over time     Has f/u appt with cardiology scheduled on 6/6/23:    6/6/2023 9:30 AM (Arrive by 9:25 AM) Nate Marquez MD [facility]     Assessment:     ONSET: slowly increasing in the last few weeks - has had lower leg edema for several years   LOCATION: foot to mid thigh bilateral   SEVERITY: severe - denies weeping fluid - states he is still able to walk and move around at baseline   REDNESS: denies   PAIN: denies   FEVER: denies   CAUSE: \"I am not on my water pill anymore\" - cardiologist discontinued diuretic several months ago due to kidney function - patient states it was discontinued in Nov of 2022 after angiogram   MEDICAL HISTORY: has had swelling in lower legs for several years -wraps legs at home   RECURRENT SYMPTOM: yes   OTHER SYMPTOMS: denies chest pain or difficulty breathing     Reports baseline SOB - sees pulmonology for COPD - denies any SOB worse than baseline     Patient does not weigh self at home      Protocol Recommended Disposition:   Go To ED/UCC Now (Or To Office With PCP Approval)    Recommendation:     Triaged per [facility] protocol, patient to go to ED/UCC now (or to office with PCP approval) Writer advised patient be seen in ED for severe swelling - pt requesting for message to be sent to PCP. Routing to Dr. White to please review and advise on appropriate recommendation for pt?     Routed to provider    Does the patient meet one of the following criteria for ADS visit consideration? 16+ years old, with an [facility] PCP     TIP  Providers, please consider if this condition is appropriate for management at one of our Acute and " Diagnostic Services sites.     If patient is a good candidate, please use dotphrase <dot>triageresponse and select Refer to ADS to document.    Callback [phone] - ok to leave detailed VM     Writer advised if patient were to experience worsening SOB, chest pain, discoloration, severe pain to go to ED now. Patient expressed verbal understanding and is agreeable.    Shellie Rubin RN  [facility]    Reason for Disposition    SEVERE swelling (e.g., swelling extends above knee, entire leg is swollen, weeping fluid)    Additional Information    Negative: Sounds like a life-threatening emergency to the triager    Negative: Chest pain    Negative: Small area of swelling and followed an insect bite to the area    Negative: Followed a knee injury    Negative: Ankle or foot injury    Negative: Pregnant with leg swelling or edema    Negative: Difficulty breathing at rest    Negative: Entire foot is cool or blue in comparison to other side    Protocols used: LEG SWELLING AND EDEMA-A-OH

## 2023-06-06 ENCOUNTER — OFFICE VISIT (OUTPATIENT)
Dept: CARDIOLOGY | Facility: CLINIC | Age: 68
End: 2023-06-06
Attending: INTERNAL MEDICINE
Payer: COMMERCIAL

## 2023-06-06 VITALS
BODY MASS INDEX: 36.94 KG/M2 | HEIGHT: 70 IN | WEIGHT: 258 LBS | SYSTOLIC BLOOD PRESSURE: 108 MMHG | HEART RATE: 86 BPM | OXYGEN SATURATION: 93 % | DIASTOLIC BLOOD PRESSURE: 62 MMHG

## 2023-06-06 DIAGNOSIS — I25.118 ATHEROSCLEROSIS OF NATIVE CORONARY ARTERY OF NATIVE HEART WITH STABLE ANGINA PECTORIS (H): ICD-10-CM

## 2023-06-06 DIAGNOSIS — I48.21 PERMANENT ATRIAL FIBRILLATION (H): ICD-10-CM

## 2023-06-06 PROCEDURE — 99213 OFFICE O/P EST LOW 20 MIN: CPT | Performed by: INTERNAL MEDICINE

## 2023-06-06 NOTE — LETTER
6/6/2023    Arline White MD  [address] [facility] [address]    RE: Booker Brown       Dear Colleague,     I had the pleasure of seeing Booker Brown in the [facility].  HPI and Plan:   See dictation    Orders Placed This Encounter   Procedures    Follow-Up with Cardiology    Echocardiogram Complete     No orders of the defined types were placed in this encounter.    There are no discontinued medications.      Encounter Diagnoses   Name Primary?    Atherosclerosis of native coronary artery of native heart with stable angina pectoris (H)     Permanent atrial fibrillation (H)        CURRENT MEDICATIONS:  Current Outpatient Medications   Medication Sig Dispense Refill    albuterol (PROAIR HFA/PROVENTIL HFA/VENTOLIN HFA) 108 (90 Base) MCG/ACT inhaler Inhale 2 puffs into the lungs every 6 hours as needed for shortness of breath / dyspnea or wheezing 18 g 7    alcohol swab prep pads Use to swab area of injection/fariha as directed. 100 each 3    blood glucose (NO BRAND SPECIFIED) test strip Use to test blood sugar 1 times daily or as directed. To accompany: Blood Glucose Monitor Brands: per insurance. 100 strip 6    blood glucose monitoring (NO BRAND SPECIFIED) meter device kit Use to test blood sugar 1 times daily or as directed. : per insurance. 1 kit 0    diltiazem ER COATED BEADS (CARDIZEM CD) 360 MG 24 hr capsule Take 1 capsule (360 mg) by mouth daily 90 capsule 1    Fluticasone-Umeclidin-Vilanterol (TRELEGY ELLIPTA) 200-62.5-25 MCG/ACT oral inhaler Inhale 1 puff into the lungs daily 1 each 4    furosemide (LASIX) 40 MG tablet Take 1 tablet (40 mg) by mouth daily 90 tablet 3    glipiZIDE (GLUCOTROL XL) 10 MG 24 hr tablet TAKE 1 TABLET BY MOUTH EVERY DAY 90 tablet 0    lisinopril (ZESTRIL) 40 MG tablet TAKE 1 TABLET BY MOUTH EVERY DAY 90 tablet 3    metFORMIN (GLUCOPHAGE XR) 500 MG 24 hr tablet Take 2 tablets (1,000 mg) by mouth daily (with dinner) 180 tablet 3    metoprolol  succinate ER (TOPROL XL) 100 MG 24 hr tablet Take 1 tablet (100 mg) by mouth 2 times daily 180 tablet 3    rivaroxaban ANTICOAGULANT (XARELTO ANTICOAGULANT) 20 MG TABS tablet Take 1 tablet (20 mg) by mouth daily (with dinner) 90 tablet 3    rosuvastatin (CRESTOR) 40 MG tablet Take 1 tablet (40 mg) by mouth At Bedtime 90 tablet 3    thin (NO BRAND SPECIFIED) lancets Use with lanceting device. To accompany: Blood Glucose Monitor Brands: per insurance. 1 each 6    vitamin D3 (CHOLECALCIFEROL) 50 mcg (2000 units) tablet Take 1 tablet (50 mcg) by mouth daily 90 tablet 3    ASPIRIN NOT PRESCRIBED (INTENTIONAL) Please choose reason not prescribed from choices below. (Patient not taking: Reported on 5/16/2023)         ALLERGIES   No Known Allergies    PAST MEDICAL HISTORY:  Past Medical History:   Diagnosis Date    Benign essential hypertension     CAD (coronary artery disease)     Chronic kidney disease, stage III (moderate) (H)     COPD (chronic obstructive pulmonary disease) (H)     ILD (interstitial lung disease) (H)     Obesity (BMI 30-39.9)     Persistent atrial fibrillation (H)     Pure hypercholesterolemia     Type 2 diabetes mellitus (H)        PAST SURGICAL HISTORY:  Past Surgical History:   Procedure Laterality Date    COLONOSCOPY N/A 8/24/2018    Procedure: COMBINED COLONOSCOPY, SINGLE OR MULTIPLE BIOPSY/POLYPECTOMY BY BIOPSY;;  Surgeon: Lawrence Mata MD;  Location:  GI    CV CORONARY ANGIOGRAM N/A 11/18/2022    Procedure: Coronary Angiogram;  Surgeon: Mason Lucas MD;  Location:  HEART CARDIAC CATH LAB    CV INSTANTANEOUS WAVE-FREE RATIO N/A 11/18/2022    Procedure: Instantaneous Wave-Free Ratio;  Surgeon: Mason Lucas MD;  Location:  HEART CARDIAC CATH LAB    CV LEFT HEART CATH N/A 11/18/2022    Procedure: Left Heart Catheterization;  Surgeon: Mason Lucas MD;  Location:  HEART CARDIAC CATH LAB       FAMILY HISTORY:  Family History   Problem Relation Age of Onset    Diabetes  Type 2  Mother     Cerebral aneurysm Sister     Bladder Cancer Brother     Myocardial Infarction Brother         in his 50s    Cerebrovascular Disease No family hx of     Coronary Artery Disease Early Onset No family hx of     Prostate Cancer No family hx of     Colon Cancer No family hx of     Clotting Disorder No family hx of     Bleeding Disorder No family hx of     Anesthesia Reaction No family hx of        SOCIAL HISTORY:  Social History     Socioeconomic History    Marital status:      Spouse name: None    Number of children: None    Years of education: None    Highest education level: None   Occupational History    Occupation: Shipping & Receiving   Tobacco Use    Smoking status: Former     Packs/day: 2.00     Years: 16.00     Pack years: 32.00     Types: Cigarettes     Quit date: 1987     Years since quittin.7    Smokeless tobacco: Never   Vaping Use    Vaping status: Never Used   Substance and Sexual Activity    Alcohol use: No    Drug use: No    Sexual activity: Yes     Partners: Female   Other Topics Concern    Parent/sibling w/ CABG, MI or angioplasty before 65F 55M? Yes     Service No    Blood Transfusions No    Caffeine Concern No    Occupational Exposure No    Hobby Hazards No    Sleep Concern No    Stress Concern No    Weight Concern Yes    Special Diet No    Back Care No    Exercise Yes    Bike Helmet No     Comment: n/a    Seat Belt Yes    Self-Exams No   Social History Narrative    .    Two adult children.    Four grandchildren.    No formal exercise.       Review of Systems:  Skin:  not assessed     Eyes:  Positive for glasses  ENT:  not assessed    Respiratory:  Positive for shortness of breath;dyspnea on exertion  Cardiovascular:    edema;Positive for  Gastroenterology: not assessed    Genitourinary:  not assessed    Musculoskeletal:  not assessed    Neurologic:  not assessed    Psychiatric:  not assessed    Heme/Lymph/Imm:  not assessed    Endocrine:  not  "assessed      Physical Exam:  Vitals: /62 (BP Location: Right arm, Patient Position: Sitting, Cuff Size: Adult Large)   Pulse 86   Ht 1.778 m (5' 10\")   Wt 117 kg (258 lb)   SpO2 93%   BMI 37.02 kg/m      Constitutional:           Skin:             Head:           Eyes:           Lymph:      ENT:           Neck:           Respiratory:            Cardiac:                                                           GI:           Extremities and Muscular Skeletal:                 Neurological:           Psych:         Recent Lab Results:  LIPID RESULTS:  Lab Results   Component Value Date    CHOL 103 05/03/2022    CHOL 124 05/12/2021    HDL 32 (L) 05/03/2022    HDL 31 (L) 05/12/2021    LDL 61 05/03/2022    LDL 70 05/12/2021    TRIG 50 05/03/2022    TRIG 116 05/12/2021    CHOLHDLRATIO 4.5 09/28/2015       LIVER ENZYME RESULTS:  Lab Results   Component Value Date    AST 26 02/21/2023    AST 20 05/12/2021    ALT 16 02/21/2023    ALT 28 05/12/2021       CBC RESULTS:  Lab Results   Component Value Date    WBC 10.2 02/21/2023    WBC 11.0 05/12/2021    RBC 4.65 02/21/2023    RBC 5.15 05/12/2021    HGB 11.8 02/21/2023    HGB 14.1 05/12/2021    HCT 40.8 02/21/2023    HCT 44.2 05/12/2021    MCV 88 02/21/2023    MCV 86 05/12/2021    MCH 25.4 (L) 02/21/2023    MCH 27.4 05/12/2021    MCHC 28.9 (L) 02/21/2023    MCHC 31.9 05/12/2021    RDW 18.0 (H) 02/21/2023    RDW 14.7 05/12/2021     02/21/2023     05/12/2021       BMP RESULTS:  Lab Results   Component Value Date     02/21/2023     05/12/2021    POTASSIUM 4.5 02/21/2023    POTASSIUM 3.9 11/18/2022    POTASSIUM 3.9 05/14/2021    CHLORIDE 98 02/21/2023    CHLORIDE 101 11/18/2022    CHLORIDE 104 05/12/2021    CO2 30 (H) 02/21/2023    CO2 32 11/18/2022    CO2 29 05/12/2021    ANIONGAP 16 (H) 02/21/2023    ANIONGAP 5 11/18/2022    ANIONGAP 4 05/12/2021    GLC 96 02/21/2023     (H) 11/18/2022     (H) 05/12/2021    BUN 22.2 02/21/2023    " "BUN 42 (H) 11/18/2022    BUN 36 (H) 05/12/2021    CR 1.35 (H) 02/21/2023    CR 1.87 (H) 05/12/2021    GFRESTIMATED 58 (L) 02/21/2023    GFRESTIMATED 37 (L) 05/12/2021    GFRESTBLACK 43 (L) 05/12/2021    KURT 9.7 02/21/2023    KURT 9.2 05/12/2021        A1C RESULTS:  Lab Results   Component Value Date    A1C 7.3 (H) 10/12/2022    A1C 8.6 (H) 05/12/2021       INR RESULTS:  Lab Results   Component Value Date    INR 1.51 (H) 11/18/2022           CC  Nate Marquez MD  [address]  [place] [id number]    Service Date: 06/06/2023    CARDIOLOGY OFFICE NOTE    OFFICE NOTE:  Please see my detailed note from 02/10/2023.  The patient has since followed up with Dr. Hopkins and his group.  They all acknowledged that he has some form of lung disease.  I understand that they had a multidisciplinary meeting, and I understand based on the patient they do not think there is much more they have to offer.  He is on high-flow oxygen.  He is comfortable at rest and with minimal activity.  He is in a wheelchair, so he cannot do very much.  He has exertional shortness of breath.  His story, briefly, is his right coronary is totally occluded in the proximal portion.  The left circumflex is totally occluded in the mid portion.  His LAD is open, but there is a flow-limiting lesion heavily calcified in the proximal and mid LAD.  He was seen by coronary artery bypass surgeons, and they felt that they had little to offer because his symptoms were possibly lung, and he was high risk.  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  It was akinetic, EF at 40%-45% and mild pulmonary hypertension.  Right ventricle function was normal.      The issue I have is twofold.  One is he does not have an \"early warning system,\" in that he has closed off 2 arteries with seemingly no anginal symptoms.  The second is he is on high-flow oxygen, oxygen dependent, and his only symptom is shortness of breath.  It is hard to know if " this is an anginal equivalent or from his lung.  I get the sense that this is extensive lung disease, and I am concerned that doing a very, very high-risk procedure, which would be Rotablator of his last remaining normal artery to his LAD in a patient who has a reduced ejection fraction, would be high risk. We would possibly have to do it under Impella support, and I am not totally convinced that his shortness of breath is coming from that.  As we left it, he and his wife and I agree at this point we will continue to watch, and any change in symptoms, we would move forward.  I am going to have him come back in a year.  We will get an echocardiogram, and if we start to see any drop in the anterior wall motion, that would be our clue that we would reevaluate and possibly move ahead with trying to get the LAD open, but to do a high-risk procedure on a patient's last remaining normal artery, whose symptoms may all be lung disease with no angina, is probably too high risk for the family to move forward with now.    Today's visit was 25 minutes to review this and to review Dr. Hopkins and his group's evaluations.  We again reviewed the patient's coronary angiogram today. The blood work is the same as what I saw in February when I met him.    Nate Marquez MD     cc:  Arline White MD   [place] [phone]    Booker Hopkins MD  [facility]  [address] [place] [address]    Nate Marquez MD        D: 2023   T: 2023   MT: sima    Name:     BOOKER WILLIS  MRN:      [id number]        Account:      [id number]   :      1955           Service Date: 2023       Document: [id number]     Thank you for allowing me to participate in the care of your patient.      Sincerely,     MD KALPANA Holbrook [facility]  cc:   Nate Marquez MD  [address]  [place] [id number]

## 2023-06-06 NOTE — PROGRESS NOTES
HPI and Plan:   See dictation    Orders Placed This Encounter   Procedures     Follow-Up with Cardiology     Echocardiogram Complete     No orders of the defined types were placed in this encounter.    There are no discontinued medications.      Encounter Diagnoses   Name Primary?     Atherosclerosis of native coronary artery of native heart with stable angina pectoris (H)      Permanent atrial fibrillation (H)        CURRENT MEDICATIONS:  Current Outpatient Medications   Medication Sig Dispense Refill     albuterol (PROAIR HFA/PROVENTIL HFA/VENTOLIN HFA) 108 (90 Base) MCG/ACT inhaler Inhale 2 puffs into the lungs every 6 hours as needed for shortness of breath / dyspnea or wheezing 18 g 7     alcohol swab prep pads Use to swab area of injection/fariha as directed. 100 each 3     blood glucose (NO BRAND SPECIFIED) test strip Use to test blood sugar 1 times daily or as directed. To accompany: Blood Glucose Monitor Brands: per insurance. 100 strip 6     blood glucose monitoring (NO BRAND SPECIFIED) meter device kit Use to test blood sugar 1 times daily or as directed. : per insurance. 1 kit 0     diltiazem ER COATED BEADS (CARDIZEM CD) 360 MG 24 hr capsule Take 1 capsule (360 mg) by mouth daily 90 capsule 1     Fluticasone-Umeclidin-Vilanterol (TRELEGY ELLIPTA) 200-62.5-25 MCG/ACT oral inhaler Inhale 1 puff into the lungs daily 1 each 4     furosemide (LASIX) 40 MG tablet Take 1 tablet (40 mg) by mouth daily 90 tablet 3     glipiZIDE (GLUCOTROL XL) 10 MG 24 hr tablet TAKE 1 TABLET BY MOUTH EVERY DAY 90 tablet 0     lisinopril (ZESTRIL) 40 MG tablet TAKE 1 TABLET BY MOUTH EVERY DAY 90 tablet 3     metFORMIN (GLUCOPHAGE XR) 500 MG 24 hr tablet Take 2 tablets (1,000 mg) by mouth daily (with dinner) 180 tablet 3     metoprolol succinate ER (TOPROL XL) 100 MG 24 hr tablet Take 1 tablet (100 mg) by mouth 2 times daily 180 tablet 3     rivaroxaban ANTICOAGULANT (XARELTO ANTICOAGULANT) 20 MG TABS tablet Take 1 tablet (20 mg)  by mouth daily (with dinner) 90 tablet 3     rosuvastatin (CRESTOR) 40 MG tablet Take 1 tablet (40 mg) by mouth At Bedtime 90 tablet 3     thin (NO BRAND SPECIFIED) lancets Use with lanceting device. To accompany: Blood Glucose Monitor Brands: per insurance. 1 each 6     vitamin D3 (CHOLECALCIFEROL) 50 mcg (2000 units) tablet Take 1 tablet (50 mcg) by mouth daily 90 tablet 3     ASPIRIN NOT PRESCRIBED (INTENTIONAL) Please choose reason not prescribed from choices below. (Patient not taking: Reported on 5/16/2023)         ALLERGIES   No Known Allergies    PAST MEDICAL HISTORY:  Past Medical History:   Diagnosis Date     Benign essential hypertension      CAD (coronary artery disease)      Chronic kidney disease, stage III (moderate) (H)      COPD (chronic obstructive pulmonary disease) (H)      ILD (interstitial lung disease) (H)      Obesity (BMI 30-39.9)      Persistent atrial fibrillation (H)      Pure hypercholesterolemia      Type 2 diabetes mellitus (H)        PAST SURGICAL HISTORY:  Past Surgical History:   Procedure Laterality Date     COLONOSCOPY N/A 8/24/2018    Procedure: COMBINED COLONOSCOPY, SINGLE OR MULTIPLE BIOPSY/POLYPECTOMY BY BIOPSY;;  Surgeon: Lawrence Mata MD;  Location:  GI     CV CORONARY ANGIOGRAM N/A 11/18/2022    Procedure: Coronary Angiogram;  Surgeon: Mason Lucas MD;  Location:  HEART CARDIAC CATH LAB     CV INSTANTANEOUS WAVE-FREE RATIO N/A 11/18/2022    Procedure: Instantaneous Wave-Free Ratio;  Surgeon: Mason Lucas MD;  Location:  HEART CARDIAC CATH LAB     CV LEFT HEART CATH N/A 11/18/2022    Procedure: Left Heart Catheterization;  Surgeon: Mason Lucas MD;  Location:  HEART CARDIAC CATH LAB       FAMILY HISTORY:  Family History   Problem Relation Age of Onset     Diabetes Type 2  Mother      Cerebral aneurysm Sister      Bladder Cancer Brother      Myocardial Infarction Brother         in his 50s     Cerebrovascular Disease No family hx of       Coronary Artery Disease Early Onset No family hx of      Prostate Cancer No family hx of      Colon Cancer No family hx of      Clotting Disorder No family hx of      Bleeding Disorder No family hx of      Anesthesia Reaction No family hx of        SOCIAL HISTORY:  Social History     Socioeconomic History     Marital status:      Spouse name: None     Number of children: None     Years of education: None     Highest education level: None   Occupational History     Occupation: Shipping & Receiving   Tobacco Use     Smoking status: Former     Packs/day: 2.00     Years: 16.00     Pack years: 32.00     Types: Cigarettes     Quit date: 1987     Years since quittin.7     Smokeless tobacco: Never   Vaping Use     Vaping status: Never Used   Substance and Sexual Activity     Alcohol use: No     Drug use: No     Sexual activity: Yes     Partners: Female   Other Topics Concern     Parent/sibling w/ CABG, MI or angioplasty before 65F 55M? Yes      Service No     Blood Transfusions No     Caffeine Concern No     Occupational Exposure No     Hobby Hazards No     Sleep Concern No     Stress Concern No     Weight Concern Yes     Special Diet No     Back Care No     Exercise Yes     Bike Helmet No     Comment: n/a     Seat Belt Yes     Self-Exams No   Social History Narrative    .    Two adult children.    Four grandchildren.    No formal exercise.       Review of Systems:  Skin:  not assessed     Eyes:  Positive for glasses  ENT:  not assessed    Respiratory:  Positive for shortness of breath;dyspnea on exertion  Cardiovascular:    edema;Positive for  Gastroenterology: not assessed    Genitourinary:  not assessed    Musculoskeletal:  not assessed    Neurologic:  not assessed    Psychiatric:  not assessed    Heme/Lymph/Imm:  not assessed    Endocrine:  not assessed      Physical Exam:  Vitals: /62 (BP Location: Right arm, Patient Position: Sitting, Cuff Size: Adult Large)   Pulse 86   Ht  "1.778 m (5' 10\")   Wt 117 kg (258 lb)   SpO2 93%   BMI 37.02 kg/m      Constitutional:           Skin:             Head:           Eyes:           Lymph:      ENT:           Neck:           Respiratory:            Cardiac:                                                           GI:           Extremities and Muscular Skeletal:                 Neurological:           Psych:         Recent Lab Results:  LIPID RESULTS:  Lab Results   Component Value Date    CHOL 103 05/03/2022    CHOL 124 05/12/2021    HDL 32 (L) 05/03/2022    HDL 31 (L) 05/12/2021    LDL 61 05/03/2022    LDL 70 05/12/2021    TRIG 50 05/03/2022    TRIG 116 05/12/2021    CHOLHDLRATIO 4.5 09/28/2015       LIVER ENZYME RESULTS:  Lab Results   Component Value Date    AST 26 02/21/2023    AST 20 05/12/2021    ALT 16 02/21/2023    ALT 28 05/12/2021       CBC RESULTS:  Lab Results   Component Value Date    WBC 10.2 02/21/2023    WBC 11.0 05/12/2021    RBC 4.65 02/21/2023    RBC 5.15 05/12/2021    HGB 11.8 02/21/2023    HGB 14.1 05/12/2021    HCT 40.8 02/21/2023    HCT 44.2 05/12/2021    MCV 88 02/21/2023    MCV 86 05/12/2021    MCH 25.4 (L) 02/21/2023    MCH 27.4 05/12/2021    MCHC 28.9 (L) 02/21/2023    MCHC 31.9 05/12/2021    RDW 18.0 (H) 02/21/2023    RDW 14.7 05/12/2021     02/21/2023     05/12/2021       BMP RESULTS:  Lab Results   Component Value Date     02/21/2023     05/12/2021    POTASSIUM 4.5 02/21/2023    POTASSIUM 3.9 11/18/2022    POTASSIUM 3.9 05/14/2021    CHLORIDE 98 02/21/2023    CHLORIDE 101 11/18/2022    CHLORIDE 104 05/12/2021    CO2 30 (H) 02/21/2023    CO2 32 11/18/2022    CO2 29 05/12/2021    ANIONGAP 16 (H) 02/21/2023    ANIONGAP 5 11/18/2022    ANIONGAP 4 05/12/2021    GLC 96 02/21/2023     (H) 11/18/2022     (H) 05/12/2021    BUN 22.2 02/21/2023    BUN 42 (H) 11/18/2022    BUN 36 (H) 05/12/2021    CR 1.35 (H) 02/21/2023    CR 1.87 (H) 05/12/2021    GFRESTIMATED 58 (L) 02/21/2023    " GFRESTIMATED 37 (L) 05/12/2021    GFRESTBLACK 43 (L) 05/12/2021    KURT 9.7 02/21/2023    KURT 9.2 05/12/2021        A1C RESULTS:  Lab Results   Component Value Date    A1C 7.3 (H) 10/12/2022    A1C 8.6 (H) 05/12/2021       INR RESULTS:  Lab Results   Component Value Date    INR 1.51 (H) 11/18/2022           CC  Nate Marquez MD  [address]  [place] [id number]

## 2023-06-06 NOTE — PROGRESS NOTES
"Service Date: 06/06/2023    CARDIOLOGY OFFICE NOTE    OFFICE NOTE:  Please see my detailed note from 02/10/2023.  The patient has since followed up with Dr. Hopkins and his group.  They all acknowledged that he has some form of lung disease.  I understand that they had a multidisciplinary meeting, and I understand based on the patient they do not think there is much more they have to offer.  He is on high-flow oxygen.  He is comfortable at rest and with minimal activity.  He is in a wheelchair, so he cannot do very much.  He has exertional shortness of breath.  His story, briefly, is his right coronary is totally occluded in the proximal portion.  The left circumflex is totally occluded in the mid portion.  His LAD is open, but there is a flow-limiting lesion heavily calcified in the proximal and mid LAD.  He was seen by coronary artery bypass surgeons, and they felt that they had little to offer because his symptoms were possibly lung, and he was high risk.  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  It was akinetic, EF at 40%-45% and mild pulmonary hypertension.  Right ventricle function was normal.      The issue I have is twofold.  One is he does not have an \"early warning system,\" in that he has closed off 2 arteries with seemingly no anginal symptoms.  The second is he is on high-flow oxygen, oxygen dependent, and his only symptom is shortness of breath.  It is hard to know if this is an anginal equivalent or from his lung.  I get the sense that this is extensive lung disease, and I am concerned that doing a very, very high-risk procedure, which would be Rotablator of his last remaining normal artery to his LAD in a patient who has a reduced ejection fraction, would be high risk. We would possibly have to do it under Impella support, and I am not totally convinced that his shortness of breath is coming from that.  As we left it, he and his wife and I agree at this point we will continue " to watch, and any change in symptoms, we would move forward.  I am going to have him come back in a year.  We will get an echocardiogram, and if we start to see any drop in the anterior wall motion, that would be our clue that we would reevaluate and possibly move ahead with trying to get the LAD open, but to do a high-risk procedure on a patient's last remaining normal artery, whose symptoms may all be lung disease with no angina, is probably too high risk for the family to move forward with now.    Today's visit was 25 minutes to review this and to review Dr. Hopkins and his group's evaluations.  We again reviewed the patient's coronary angiogram today. The blood work is the same as what I saw in February when I met him.    Nate Marquez MD     cc:  Arline White MD   [facility]  [address] [address]    Booker Hopkins MD  [facility]  [address] [place] [address]    Nate Marquez MD        D: 2023   T: 2023   MT: sima    Name:     BOOKER WILLIS  MRN:      [id number]        Account:      [id number]   :      1955           Service Date: 2023       Document: [id number]

## 2023-06-09 ENCOUNTER — LAB (OUTPATIENT)
Dept: LAB | Facility: CLINIC | Age: 68
End: 2023-06-09
Payer: COMMERCIAL

## 2023-06-09 DIAGNOSIS — E11.9 TYPE 2 DIABETES MELLITUS (H): ICD-10-CM

## 2023-06-09 DIAGNOSIS — R60.0 BILATERAL LOWER EXTREMITY EDEMA: ICD-10-CM

## 2023-06-09 DIAGNOSIS — I25.10 CAD (CORONARY ARTERY DISEASE): ICD-10-CM

## 2023-06-09 DIAGNOSIS — N18.30 CHRONIC KIDNEY DISEASE, STAGE III (MODERATE) (H): ICD-10-CM

## 2023-06-09 LAB
ANION GAP SERPL CALCULATED.3IONS-SCNC: 13 MMOL/L (ref 7–15)
BUN SERPL-MCNC: 33.1 MG/DL (ref 8–23)
CALCIUM SERPL-MCNC: 9.1 MG/DL (ref 8.8–10.2)
CHLORIDE SERPL-SCNC: 96 MMOL/L (ref 98–107)
CHOLEST SERPL-MCNC: 89 MG/DL
CREAT SERPL-MCNC: 1.64 MG/DL (ref 0.51–1.17)
DEPRECATED HCO3 PLAS-SCNC: 31 MMOL/L (ref 22–29)
GFR SERPL CREATININE-BSD FRML MDRD: 46 ML/MIN/1.73M2
GLUCOSE SERPL-MCNC: 114 MG/DL (ref 70–99)
HBA1C MFR BLD: 5.6 % (ref 0–5.6)
HDLC SERPL-MCNC: 33 MG/DL
LDLC SERPL CALC-MCNC: 42 MG/DL
NONHDLC SERPL-MCNC: 56 MG/DL
POTASSIUM SERPL-SCNC: 3.7 MMOL/L (ref 3.4–5.3)
SODIUM SERPL-SCNC: 140 MMOL/L (ref 136–145)
TRIGL SERPL-MCNC: 68 MG/DL

## 2023-06-09 PROCEDURE — 36415 COLL VENOUS BLD VENIPUNCTURE: CPT

## 2023-06-09 PROCEDURE — 80048 BASIC METABOLIC PNL TOTAL CA: CPT

## 2023-06-09 PROCEDURE — 80061 LIPID PANEL: CPT

## 2023-06-09 PROCEDURE — 83036 HEMOGLOBIN GLYCOSYLATED A1C: CPT

## 2023-06-10 DIAGNOSIS — E11.59 TYPE 2 DIABETES WITH CIRCULATORY DISORDER CAUSING ERECTILE DYSFUNCTION (H): Primary | ICD-10-CM

## 2023-06-10 DIAGNOSIS — R60.0 BILATERAL LOWER EXTREMITY EDEMA: ICD-10-CM

## 2023-06-10 DIAGNOSIS — N52.1 TYPE 2 DIABETES WITH CIRCULATORY DISORDER CAUSING ERECTILE DYSFUNCTION (H): Primary | ICD-10-CM

## 2023-06-10 RX ORDER — GLIPIZIDE 5 MG/1
5 TABLET, FILM COATED, EXTENDED RELEASE ORAL DAILY
Qty: 90 TABLET | Refills: 1 | Status: SHIPPED | OUTPATIENT
Start: 2023-06-10 | End: 2023-01-01

## 2023-06-10 RX ORDER — FUROSEMIDE 40 MG
40 TABLET ORAL DAILY PRN
Qty: 90 TABLET | Refills: 3 | Status: ON HOLD | COMMUNITY
Start: 2023-06-10 | End: 2023-07-09

## 2023-06-19 DIAGNOSIS — N52.1 TYPE 2 DIABETES WITH CIRCULATORY DISORDER CAUSING ERECTILE DYSFUNCTION (H): ICD-10-CM

## 2023-06-19 DIAGNOSIS — E11.59 TYPE 2 DIABETES WITH CIRCULATORY DISORDER CAUSING ERECTILE DYSFUNCTION (H): ICD-10-CM

## 2023-06-19 RX ORDER — BLOOD SUGAR DIAGNOSTIC
STRIP MISCELLANEOUS
Qty: 100 STRIP | Refills: 0 | Status: SHIPPED | OUTPATIENT
Start: 2023-06-19 | End: 2023-09-14

## 2023-06-28 ENCOUNTER — VIRTUAL VISIT (OUTPATIENT)
Dept: INTERNAL MEDICINE | Facility: CLINIC | Age: 68
End: 2023-06-28
Payer: COMMERCIAL

## 2023-06-28 DIAGNOSIS — J94.9 PLEURAL DISEASE: ICD-10-CM

## 2023-06-28 DIAGNOSIS — R06.09 DYSPNEA ON EXERTION: Primary | ICD-10-CM

## 2023-06-28 DIAGNOSIS — I25.10 CORONARY ARTERY DISEASE INVOLVING NATIVE CORONARY ARTERY OF NATIVE HEART WITHOUT ANGINA PECTORIS: ICD-10-CM

## 2023-06-28 DIAGNOSIS — Z99.81 SUPPLEMENTAL OXYGEN DEPENDENT: ICD-10-CM

## 2023-06-28 DIAGNOSIS — J44.9 CHRONIC OBSTRUCTIVE PULMONARY DISEASE, UNSPECIFIED COPD TYPE (H): ICD-10-CM

## 2023-06-28 PROCEDURE — 99214 OFFICE O/P EST MOD 30 MIN: CPT | Mod: 93 | Performed by: INTERNAL MEDICINE

## 2023-06-28 NOTE — PROGRESS NOTES
TELEPHONE VISIT                                                      ASSESSMENT/PLAN                                                      (R06.09) Dyspnea on exertion  (primary encounter diagnosis)  (J94.9) Pleural disease  (J44.9) Chronic obstructive pulmonary disease, unspecified COPD type (H)  (Z99.81) Supplemental oxygen dependent  (I25.10) Coronary artery disease involving native coronary artery of native heart without angina pectoris  Comment: challenging case; patient has significant lung and cardiac disease that are difficult to manage.  Plan: agree with regular pulmonary and cardiology follow-up (with earlier follow-up as needed for acute on chronic symptoms); continue current medications; home care referral placed to assist patient with complex disease management and assessment for home DME needs.    Total time of call between patient and provider was 8 minutes. Provider location: office. Patient location: home.    Arline White MD   [address] [address]  T: [phone], F: [phone]    SUBJECTIVE                                                      Booker Brown is a very pleasant 68 year old adult who requested a telephone visit for follow-up:    Patient suffers from significant exertional hypoxemia, requires high flow/8-10 L of oxygen continuously.     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 Imaging studies are not suggestive of interstitial lung disease.    Patient also has severe CAD. His right coronary artery is totally occluded in the proximal portion.  His left circumflex is totally occluded in the midportion. His LAD is open but there is a flow-limiting lesion, heavily calcified, and the proximal and mid LAD.  Echocardiogram demonstrates a completed inferior infarct with akinesis and EF of 40-45%.    Unfortunately, patient has no anginal symptoms. His severe dyspnea on exertion may be related to his lung or cardiac disease are both. The procedure to open up his LAD is very risky and may or may not improve his symptoms so is being deferred for now.    For now, the plan is for patient to follow-up with Dr. Hopkins later this summer and cardiology in 1 year (both earlier as needed).    Patient has been seen and evaluated by rheumatology for a positive LISA screen. They did not feel there was an underlying autoimmune disease.    Would like to be set up with DME equipment at home to help him better utilize his space.  Because of his significant exertional hypoxemia he has difficulty getting around and sleeping in bed.    ---    (Note was completed, in part, with [employer] voice-recognition software. Documentation reviewed, but some grammatical, spelling, and word errors may remain.)

## 2023-07-05 ENCOUNTER — TELEPHONE (OUTPATIENT)
Dept: INTERNAL MEDICINE | Facility: CLINIC | Age: 68
End: 2023-07-05
Payer: COMMERCIAL

## 2023-07-05 NOTE — TELEPHONE ENCOUNTER
Home Care is calling regarding an established patient with [facility].       Requesting orders from: Arline White  Provider is following patient: Yes  Is this a 60-day recertification request?  No    Orders Requested    Occupational Therapy  Request for initial certification (first set of orders)   Frequency: 2x/wk for 1 wks  then 1x/wk for 3 wks      Confirmed ok to leave a detailed message with call back.  Contact information confirmed and updated as needed.    Yashira Hughes RN

## 2023-07-06 ENCOUNTER — NURSE TRIAGE (OUTPATIENT)
Dept: INTERNAL MEDICINE | Facility: CLINIC | Age: 68
End: 2023-07-06
Payer: COMMERCIAL

## 2023-07-06 ENCOUNTER — HOSPITAL ENCOUNTER (INPATIENT)
Facility: CLINIC | Age: 68
LOS: 3 days | Discharge: HOME HEALTH | DRG: 377 | End: 2023-07-09
Attending: EMERGENCY MEDICINE | Admitting: INTERNAL MEDICINE
Payer: COMMERCIAL

## 2023-07-06 ENCOUNTER — APPOINTMENT (OUTPATIENT)
Dept: CT IMAGING | Facility: CLINIC | Age: 68
DRG: 377 | End: 2023-07-06
Attending: EMERGENCY MEDICINE
Payer: COMMERCIAL

## 2023-07-06 DIAGNOSIS — K92.2 UPPER GI BLEED: ICD-10-CM

## 2023-07-06 DIAGNOSIS — D62 ANEMIA DUE TO BLOOD LOSS, ACUTE: ICD-10-CM

## 2023-07-06 DIAGNOSIS — K59.09 OTHER CONSTIPATION: ICD-10-CM

## 2023-07-06 DIAGNOSIS — E87.70 HYPERVOLEMIA, UNSPECIFIED HYPERVOLEMIA TYPE: ICD-10-CM

## 2023-07-06 DIAGNOSIS — D50.8 OTHER IRON DEFICIENCY ANEMIA: ICD-10-CM

## 2023-07-06 DIAGNOSIS — I50.31 ACUTE DIASTOLIC CONGESTIVE HEART FAILURE (H): ICD-10-CM

## 2023-07-06 DIAGNOSIS — R18.8 OTHER ASCITES: ICD-10-CM

## 2023-07-06 DIAGNOSIS — K92.1 MELENA: Primary | ICD-10-CM

## 2023-07-06 DIAGNOSIS — I48.0 PAROXYSMAL ATRIAL FIBRILLATION (H): ICD-10-CM

## 2023-07-06 DIAGNOSIS — J81.0 ACUTE PULMONARY EDEMA (H): ICD-10-CM

## 2023-07-06 DIAGNOSIS — I50.20 SYSTOLIC CONGESTIVE HEART FAILURE, UNSPECIFIED HF CHRONICITY (H): ICD-10-CM

## 2023-07-06 DIAGNOSIS — R09.02 HYPOXIA: ICD-10-CM

## 2023-07-06 PROBLEM — I10 BENIGN ESSENTIAL HYPERTENSION: Status: ACTIVE | Noted: 2022-12-08

## 2023-07-06 PROBLEM — E78.00 PURE HYPERCHOLESTEROLEMIA: Status: ACTIVE | Noted: 2022-12-08

## 2023-07-06 PROBLEM — I48.19 PERSISTENT ATRIAL FIBRILLATION (H): Status: ACTIVE | Noted: 2022-12-08

## 2023-07-06 PROBLEM — I25.10 CAD (CORONARY ARTERY DISEASE): Status: ACTIVE | Noted: 2022-12-08

## 2023-07-06 PROBLEM — N18.30 CHRONIC KIDNEY DISEASE, STAGE III (MODERATE) (H): Status: ACTIVE | Noted: 2022-12-08

## 2023-07-06 LAB
ABO/RH(D): NORMAL
ALBUMIN SERPL BCG-MCNC: 3.3 G/DL (ref 3.5–5.2)
ALP SERPL-CCNC: 100 U/L (ref 35–129)
ALT SERPL W P-5'-P-CCNC: 20 U/L (ref 0–70)
ANION GAP SERPL CALCULATED.3IONS-SCNC: 13 MMOL/L (ref 7–15)
ANTIBODY SCREEN: NEGATIVE
AST SERPL W P-5'-P-CCNC: 22 U/L (ref 0–45)
ATRIAL RATE - MUSE: NORMAL BPM
BASOPHILS # BLD AUTO: 0.1 10E3/UL (ref 0–0.2)
BASOPHILS NFR BLD AUTO: 1 %
BILIRUB SERPL-MCNC: 0.3 MG/DL
BLD PROD TYP BPU: NORMAL
BLOOD COMPONENT TYPE: NORMAL
BUN SERPL-MCNC: 42.8 MG/DL (ref 8–23)
CALCIUM SERPL-MCNC: 9.2 MG/DL (ref 8.8–10.2)
CHLORIDE SERPL-SCNC: 103 MMOL/L (ref 98–107)
CODING SYSTEM: NORMAL
CREAT SERPL-MCNC: 1.65 MG/DL (ref 0.51–1.17)
CROSSMATCH: NORMAL
DEPRECATED HCO3 PLAS-SCNC: 28 MMOL/L (ref 22–29)
DIASTOLIC BLOOD PRESSURE - MUSE: NORMAL MMHG
EOSINOPHIL # BLD AUTO: 0 10E3/UL (ref 0–0.7)
EOSINOPHIL NFR BLD AUTO: 0 %
ERYTHROCYTE [DISTWIDTH] IN BLOOD BY AUTOMATED COUNT: 20.3 % (ref 10–15)
GFR SERPL CREATININE-BSD FRML MDRD: 46 ML/MIN/1.73M2
GLUCOSE BLDC GLUCOMTR-MCNC: 114 MG/DL (ref 70–99)
GLUCOSE BLDC GLUCOMTR-MCNC: 116 MG/DL (ref 70–99)
GLUCOSE SERPL-MCNC: 128 MG/DL (ref 70–99)
HCT VFR BLD AUTO: 28 % (ref 35–53)
HGB BLD-MCNC: 7.7 G/DL (ref 11.7–17.7)
HOLD SPECIMEN: NORMAL
IMM GRANULOCYTES # BLD: 0 10E3/UL
IMM GRANULOCYTES NFR BLD: 0 %
INTERPRETATION ECG - MUSE: NORMAL
IRON BINDING CAPACITY (ROCHE): 306 UG/DL (ref 240–430)
IRON SATN MFR SERPL: 9 % (ref 15–46)
IRON SERPL-MCNC: 27 UG/DL (ref 37–157)
ISSUE DATE AND TIME: NORMAL
LYMPHOCYTES # BLD AUTO: 0.6 10E3/UL (ref 0.8–5.3)
LYMPHOCYTES NFR BLD AUTO: 6 %
MAGNESIUM SERPL-MCNC: 2.2 MG/DL (ref 1.7–2.3)
MCH RBC QN AUTO: 24.3 PG (ref 26.5–33)
MCHC RBC AUTO-ENTMCNC: 27.5 G/DL (ref 31.5–36.5)
MCV RBC AUTO: 88 FL (ref 78–100)
MONOCYTES # BLD AUTO: 0.7 10E3/UL (ref 0–1.3)
MONOCYTES NFR BLD AUTO: 7 %
NEUTROPHILS # BLD AUTO: 8.9 10E3/UL (ref 1.6–8.3)
NEUTROPHILS NFR BLD AUTO: 86 %
NRBC # BLD AUTO: 0 10E3/UL
NRBC BLD AUTO-RTO: 0 /100
NT-PROBNP SERPL-MCNC: 7325 PG/ML (ref 0–900)
P AXIS - MUSE: NORMAL DEGREES
PLATELET # BLD AUTO: 257 10E3/UL (ref 150–450)
POTASSIUM SERPL-SCNC: 4.8 MMOL/L (ref 3.4–5.3)
PR INTERVAL - MUSE: NORMAL MS
PROCALCITONIN SERPL IA-MCNC: 0.21 NG/ML
PROT SERPL-MCNC: 6.6 G/DL (ref 6.4–8.3)
QRS DURATION - MUSE: 106 MS
QT - MUSE: 358 MS
QTC - MUSE: 459 MS
R AXIS - MUSE: 52 DEGREES
RBC # BLD AUTO: 3.17 10E6/UL (ref 3.8–5.9)
SODIUM SERPL-SCNC: 144 MMOL/L (ref 136–145)
SPECIMEN EXPIRATION DATE: NORMAL
SYSTOLIC BLOOD PRESSURE - MUSE: NORMAL MMHG
T AXIS - MUSE: -57 DEGREES
TROPONIN T SERPL HS-MCNC: 27 NG/L
TSH SERPL DL<=0.005 MIU/L-ACNC: 1.02 UIU/ML (ref 0.3–4.2)
UNIT ABO/RH: NORMAL
UNIT NUMBER: NORMAL
UNIT STATUS: NORMAL
UNIT TYPE ISBT: 6200
VENTRICULAR RATE- MUSE: 99 BPM
WBC # BLD AUTO: 10.3 10E3/UL (ref 4–11)

## 2023-07-06 PROCEDURE — 93005 ELECTROCARDIOGRAM TRACING: CPT

## 2023-07-06 PROCEDURE — 99285 EMERGENCY DEPT VISIT HI MDM: CPT | Mod: 25

## 2023-07-06 PROCEDURE — 96375 TX/PRO/DX INJ NEW DRUG ADDON: CPT

## 2023-07-06 PROCEDURE — 84443 ASSAY THYROID STIM HORMONE: CPT | Performed by: EMERGENCY MEDICINE

## 2023-07-06 PROCEDURE — 80053 COMPREHEN METABOLIC PANEL: CPT | Performed by: EMERGENCY MEDICINE

## 2023-07-06 PROCEDURE — 250N000013 HC RX MED GY IP 250 OP 250 PS 637: Performed by: INTERNAL MEDICINE

## 2023-07-06 PROCEDURE — 83550 IRON BINDING TEST: CPT | Performed by: INTERNAL MEDICINE

## 2023-07-06 PROCEDURE — 99223 1ST HOSP IP/OBS HIGH 75: CPT | Performed by: INTERNAL MEDICINE

## 2023-07-06 PROCEDURE — 85014 HEMATOCRIT: CPT | Performed by: EMERGENCY MEDICINE

## 2023-07-06 PROCEDURE — 83880 ASSAY OF NATRIURETIC PEPTIDE: CPT | Performed by: EMERGENCY MEDICINE

## 2023-07-06 PROCEDURE — 86901 BLOOD TYPING SEROLOGIC RH(D): CPT | Performed by: EMERGENCY MEDICINE

## 2023-07-06 PROCEDURE — 84145 PROCALCITONIN (PCT): CPT | Performed by: EMERGENCY MEDICINE

## 2023-07-06 PROCEDURE — C9113 INJ PANTOPRAZOLE SODIUM, VIA: HCPCS | Mod: JZ | Performed by: EMERGENCY MEDICINE

## 2023-07-06 PROCEDURE — 84484 ASSAY OF TROPONIN QUANT: CPT | Performed by: EMERGENCY MEDICINE

## 2023-07-06 PROCEDURE — 250N000011 HC RX IP 250 OP 636: Mod: JZ | Performed by: EMERGENCY MEDICINE

## 2023-07-06 PROCEDURE — 210N000002 HC R&B HEART CARE

## 2023-07-06 PROCEDURE — 96374 THER/PROPH/DIAG INJ IV PUSH: CPT

## 2023-07-06 PROCEDURE — 82962 GLUCOSE BLOOD TEST: CPT

## 2023-07-06 PROCEDURE — P9016 RBC LEUKOCYTES REDUCED: HCPCS | Performed by: INTERNAL MEDICINE

## 2023-07-06 PROCEDURE — 71250 CT THORAX DX C-: CPT

## 2023-07-06 PROCEDURE — 86923 COMPATIBILITY TEST ELECTRIC: CPT | Performed by: INTERNAL MEDICINE

## 2023-07-06 PROCEDURE — 86850 RBC ANTIBODY SCREEN: CPT | Performed by: EMERGENCY MEDICINE

## 2023-07-06 PROCEDURE — 83735 ASSAY OF MAGNESIUM: CPT | Performed by: EMERGENCY MEDICINE

## 2023-07-06 PROCEDURE — 36415 COLL VENOUS BLD VENIPUNCTURE: CPT | Performed by: EMERGENCY MEDICINE

## 2023-07-06 RX ORDER — DILTIAZEM HYDROCHLORIDE 120 MG/1
360 CAPSULE, COATED, EXTENDED RELEASE ORAL
Status: DISCONTINUED | OUTPATIENT
Start: 2023-07-06 | End: 2023-07-09 | Stop reason: HOSPADM

## 2023-07-06 RX ORDER — FUROSEMIDE 10 MG/ML
40 INJECTION INTRAMUSCULAR; INTRAVENOUS ONCE
Status: COMPLETED | OUTPATIENT
Start: 2023-07-06 | End: 2023-07-06

## 2023-07-06 RX ORDER — LIDOCAINE 40 MG/G
CREAM TOPICAL
Status: DISCONTINUED | OUTPATIENT
Start: 2023-07-06 | End: 2023-07-09 | Stop reason: HOSPADM

## 2023-07-06 RX ORDER — METOPROLOL SUCCINATE 100 MG/1
100 TABLET, EXTENDED RELEASE ORAL 2 TIMES DAILY
Status: DISCONTINUED | OUTPATIENT
Start: 2023-07-06 | End: 2023-07-09 | Stop reason: HOSPADM

## 2023-07-06 RX ORDER — ROSUVASTATIN CALCIUM 20 MG/1
40 TABLET, COATED ORAL AT BEDTIME
Status: DISCONTINUED | OUTPATIENT
Start: 2023-07-06 | End: 2023-07-09 | Stop reason: HOSPADM

## 2023-07-06 RX ORDER — DEXTROSE MONOHYDRATE 25 G/50ML
25-50 INJECTION, SOLUTION INTRAVENOUS
Status: DISCONTINUED | OUTPATIENT
Start: 2023-07-06 | End: 2023-07-09 | Stop reason: HOSPADM

## 2023-07-06 RX ORDER — ONDANSETRON 2 MG/ML
4 INJECTION INTRAMUSCULAR; INTRAVENOUS EVERY 6 HOURS PRN
Status: DISCONTINUED | OUTPATIENT
Start: 2023-07-06 | End: 2023-07-09 | Stop reason: HOSPADM

## 2023-07-06 RX ORDER — NICOTINE POLACRILEX 4 MG
15-30 LOZENGE BUCCAL
Status: DISCONTINUED | OUTPATIENT
Start: 2023-07-06 | End: 2023-07-09 | Stop reason: HOSPADM

## 2023-07-06 RX ORDER — ALBUTEROL SULFATE 90 UG/1
2 AEROSOL, METERED RESPIRATORY (INHALATION) EVERY 6 HOURS PRN
Status: DISCONTINUED | OUTPATIENT
Start: 2023-07-06 | End: 2023-07-09 | Stop reason: HOSPADM

## 2023-07-06 RX ORDER — FLUTICASONE FUROATE AND VILANTEROL 200; 25 UG/1; UG/1
1 POWDER RESPIRATORY (INHALATION)
Status: DISCONTINUED | OUTPATIENT
Start: 2023-07-06 | End: 2023-07-09 | Stop reason: HOSPADM

## 2023-07-06 RX ORDER — ONDANSETRON 4 MG/1
4 TABLET, ORALLY DISINTEGRATING ORAL EVERY 6 HOURS PRN
Status: DISCONTINUED | OUTPATIENT
Start: 2023-07-06 | End: 2023-07-09 | Stop reason: HOSPADM

## 2023-07-06 RX ADMIN — METOPROLOL SUCCINATE 100 MG: 100 TABLET, EXTENDED RELEASE ORAL at 22:01

## 2023-07-06 RX ADMIN — FLUTICASONE FUROATE AND VILANTEROL TRIFENATATE 1 PUFF: 200; 25 POWDER RESPIRATORY (INHALATION) at 22:03

## 2023-07-06 RX ADMIN — UMECLIDINIUM 1 PUFF: 62.5 AEROSOL, POWDER ORAL at 21:55

## 2023-07-06 RX ADMIN — DILTIAZEM HYDROCHLORIDE 360 MG: 120 CAPSULE, COATED, EXTENDED RELEASE ORAL at 22:01

## 2023-07-06 RX ADMIN — ROSUVASTATIN CALCIUM 40 MG: 20 TABLET, FILM COATED ORAL at 21:56

## 2023-07-06 RX ADMIN — FUROSEMIDE 40 MG: 10 INJECTION, SOLUTION INTRAMUSCULAR; INTRAVENOUS at 19:22

## 2023-07-06 RX ADMIN — PANTOPRAZOLE SODIUM 80 MG: 40 INJECTION, POWDER, FOR SOLUTION INTRAVENOUS at 19:28

## 2023-07-06 ASSESSMENT — ACTIVITIES OF DAILY LIVING (ADL)
ADLS_ACUITY_SCORE: 35

## 2023-07-06 NOTE — TELEPHONE ENCOUNTER
Radha RN with [employer]  calling with concerns of patient's weight gain and abdominal distension starting 1 month ago and has gradually increased. Patient has gained a total of 20 pounds over the last 1 month.     Pt has noticed abdomen has become more firm over the last week and thought it was water retention at first, until he developed constipation. He has tried multiple different stool softeners and has been able to pass 2 dry stools yesterday and 1 dry stool today. Patient is passing larger amounts of gas.   Patient has never had an issue with constipation. Only recent med change is decreased dose of glipizide.   Patient was off lasix for a little bit and was placed back on lasix 1 month ago. Increased abdominal girth is causing increased SOB that patient has at baseline.   Patient otherwise feels well.     Patient has tried dulcolax that did not help, magnesium citrate yesterday AM and has been able to produce 3 normal sized/dry BMs so far. Patient briefly feels better after passing BM.     Dispo: See in Office Today  Triage huddled with PCP for recommendations. PCP advises patient to be seen in ER for workup as symptoms could be bowel impaction related or liver related ascites among other possibilities as well. Pt agrees to go to ER and states he will leave now.       Reason for Disposition    Abdomen is more swollen than usual    Additional Information    Negative: Abdomen pain is main symptom and male    Negative: Abdomen pain is main symptom and female    Negative: Rectal bleeding or blood in stool is main symptom    Negative: Vomiting bile (green color)    Negative: Patient sounds very sick or weak to the triager    Negative: Constant abdominal pain lasting > 2 hours    Negative: Vomiting and abdomen looks much more swollen than usual    Negative: Rectal pain or fullness from fecal impaction (rectum full of stool) and NOT better after SITZ bath, suppository or enema    Protocols used:  CONSTIPATION-A-OH

## 2023-07-06 NOTE — ED NOTES
PIT/Triage Evaluation    Patient on Xarelto with a history of afib and COPD presents with increasing abdominal distension with firmness for the past month. He had an at home assessment today and called his primary who told him to come in to the ER. He also endorses constipation and dizziness, neither of which are new. He took Dulcolax and magnesium citrate for constipation which did help.  He also reports shortness of breath but has this normally, however he thinks it has been exacerbated due to the poor air quality. He also reports he has gained weight recently, 16 pounds within a month. He also reports he recently increased his dosage of Lasix but hasn't taken it the past week due to having multiple medical appointments. He had a colonoscopy less than 5 years ago with no abnormal findings. He denies abdominal pain, nausea, vomiting, or chest pain.     Exam is notable for:  General:  Alert, interactive  Cardiovascular:  Well perfused  Lungs:  No respiratory distress, no accessory muscle use, however hypoxic  Abdomen: Somewhat distended but no tenderness to palpation  Neuro:  Moving all 4 extremities  Skin:  Warm, dry  Psych:  Normal affect    Appropriate interventions for symptom management were initiated if applicable.  Appropriate diagnostic tests were initiated if indicated.    Important information for subsequent clinician:  EKG, blood work, and CT of the chest, abdomen, and pelvis ordered.  Oxygen requirement had to be increased due to hypoxia.    I briefly evaluated the patient and developed an initial plan of care. I discussed this plan and explained that this brief interaction does not constitute a full evaluation. Patient/family understands that they should wait to be fully evaluated and discuss any test results with another clinician prior to leaving the hospital.       Nilay Santoro MD  07/06/23 2010

## 2023-07-06 NOTE — ED TRIAGE NOTES
Pt reports having increasing abdominal distension and firmness for the past month    This is associated with weight gain and increased sob     Pt is on 8L nc at baseline     Pt reports he took lasix and medication for constipation without relief

## 2023-07-06 NOTE — TELEPHONE ENCOUNTER
Patient Contact    Attempt # 1    Was call answered?  No.  Left detailed message on voicemail with provider message and information to call me back.

## 2023-07-07 ENCOUNTER — ANESTHESIA (OUTPATIENT)
Dept: GASTROENTEROLOGY | Facility: CLINIC | Age: 68
DRG: 377 | End: 2023-07-07
Payer: COMMERCIAL

## 2023-07-07 ENCOUNTER — ANESTHESIA EVENT (OUTPATIENT)
Dept: GASTROENTEROLOGY | Facility: CLINIC | Age: 68
DRG: 377 | End: 2023-07-07
Payer: COMMERCIAL

## 2023-07-07 ENCOUNTER — APPOINTMENT (OUTPATIENT)
Dept: ULTRASOUND IMAGING | Facility: CLINIC | Age: 68
DRG: 377 | End: 2023-07-07
Attending: NURSE PRACTITIONER
Payer: COMMERCIAL

## 2023-07-07 PROBLEM — E87.70 HYPERVOLEMIA, UNSPECIFIED HYPERVOLEMIA TYPE: Status: ACTIVE | Noted: 2023-07-07

## 2023-07-07 PROBLEM — R09.02 HYPOXIA: Status: ACTIVE | Noted: 2023-07-07

## 2023-07-07 PROBLEM — J81.0 ACUTE PULMONARY EDEMA (H): Status: ACTIVE | Noted: 2023-07-07

## 2023-07-07 PROBLEM — K92.2 UPPER GI BLEED: Status: ACTIVE | Noted: 2023-07-07

## 2023-07-07 LAB
ALBUMIN BODY FLUID SOURCE: NORMAL
ALBUMIN FLD-MCNC: 2.3 G/DL
ANION GAP SERPL CALCULATED.3IONS-SCNC: 8 MMOL/L (ref 7–15)
BUN SERPL-MCNC: 39 MG/DL (ref 8–23)
CALCIUM SERPL-MCNC: 9.2 MG/DL (ref 8.8–10.2)
CHLORIDE SERPL-SCNC: 101 MMOL/L (ref 98–107)
CREAT SERPL-MCNC: 1.67 MG/DL (ref 0.51–1.17)
DEPRECATED HCO3 PLAS-SCNC: 35 MMOL/L (ref 22–29)
GFR SERPL CREATININE-BSD FRML MDRD: 45 ML/MIN/1.73M2
GLUCOSE BLDC GLUCOMTR-MCNC: 129 MG/DL (ref 70–99)
GLUCOSE BLDC GLUCOMTR-MCNC: 149 MG/DL (ref 70–99)
GLUCOSE SERPL-MCNC: 120 MG/DL (ref 70–99)
HGB BLD-MCNC: 8.3 G/DL (ref 11.7–17.7)
HGB BLD-MCNC: 8.5 G/DL (ref 11.7–17.7)
HGB BLD-MCNC: 8.5 G/DL (ref 11.7–17.7)
HGB BLD-MCNC: 8.7 G/DL (ref 11.7–17.7)
LACTATE SERPL-SCNC: 1.4 MMOL/L (ref 0.7–2)
POTASSIUM SERPL-SCNC: 4.7 MMOL/L (ref 3.4–5.3)
PROT FLD-MCNC: 3.9 G/DL
PROTEIN BODY FLUID SOURCE: NORMAL
SODIUM SERPL-SCNC: 144 MMOL/L (ref 136–145)
UPPER GI ENDOSCOPY: NORMAL

## 2023-07-07 PROCEDURE — 82042 OTHER SOURCE ALBUMIN QUAN EA: CPT | Performed by: NURSE PRACTITIONER

## 2023-07-07 PROCEDURE — 0W3P8ZZ CONTROL BLEEDING IN GASTROINTESTINAL TRACT, VIA NATURAL OR ARTIFICIAL OPENING ENDOSCOPIC: ICD-10-PCS | Performed by: INTERNAL MEDICINE

## 2023-07-07 PROCEDURE — C9113 INJ PANTOPRAZOLE SODIUM, VIA: HCPCS | Mod: JZ | Performed by: NURSE PRACTITIONER

## 2023-07-07 PROCEDURE — 272N000706 US PARACENTESIS WITHOUT ALBUMIN

## 2023-07-07 PROCEDURE — 85018 HEMOGLOBIN: CPT | Performed by: INTERNAL MEDICINE

## 2023-07-07 PROCEDURE — 83605 ASSAY OF LACTIC ACID: CPT | Performed by: HOSPITALIST

## 2023-07-07 PROCEDURE — 36415 COLL VENOUS BLD VENIPUNCTURE: CPT | Performed by: HOSPITALIST

## 2023-07-07 PROCEDURE — 36415 COLL VENOUS BLD VENIPUNCTURE: CPT | Performed by: INTERNAL MEDICINE

## 2023-07-07 PROCEDURE — 87070 CULTURE OTHR SPECIMN AEROBIC: CPT | Performed by: NURSE PRACTITIONER

## 2023-07-07 PROCEDURE — 250N000009 HC RX 250: Performed by: NURSE ANESTHETIST, CERTIFIED REGISTERED

## 2023-07-07 PROCEDURE — 250N000011 HC RX IP 250 OP 636: Mod: JZ | Performed by: INTERNAL MEDICINE

## 2023-07-07 PROCEDURE — 89050 BODY FLUID CELL COUNT: CPT | Performed by: NURSE PRACTITIONER

## 2023-07-07 PROCEDURE — 250N000013 HC RX MED GY IP 250 OP 250 PS 637: Performed by: INTERNAL MEDICINE

## 2023-07-07 PROCEDURE — 99222 1ST HOSP IP/OBS MODERATE 55: CPT | Mod: FS | Performed by: PHYSICIAN ASSISTANT

## 2023-07-07 PROCEDURE — 99232 SBSQ HOSP IP/OBS MODERATE 35: CPT | Performed by: HOSPITALIST

## 2023-07-07 PROCEDURE — 210N000002 HC R&B HEART CARE

## 2023-07-07 PROCEDURE — C9113 INJ PANTOPRAZOLE SODIUM, VIA: HCPCS | Mod: JZ | Performed by: INTERNAL MEDICINE

## 2023-07-07 PROCEDURE — 89051 BODY FLUID CELL COUNT: CPT | Performed by: NURSE PRACTITIONER

## 2023-07-07 PROCEDURE — 250N000009 HC RX 250: Performed by: EMERGENCY MEDICINE

## 2023-07-07 PROCEDURE — 250N000011 HC RX IP 250 OP 636: Mod: JZ | Performed by: NURSE ANESTHETIST, CERTIFIED REGISTERED

## 2023-07-07 PROCEDURE — 999N000010 HC STATISTIC ANES STAT CODE-CRNA PER MINUTE: Performed by: INTERNAL MEDICINE

## 2023-07-07 PROCEDURE — 370N000017 HC ANESTHESIA TECHNICAL FEE, PER MIN: Performed by: INTERNAL MEDICINE

## 2023-07-07 PROCEDURE — 250N000011 HC RX IP 250 OP 636: Mod: JZ | Performed by: PHYSICIAN ASSISTANT

## 2023-07-07 PROCEDURE — 250N000011 HC RX IP 250 OP 636: Mod: JZ | Performed by: NURSE PRACTITIONER

## 2023-07-07 PROCEDURE — 84157 ASSAY OF PROTEIN OTHER: CPT | Performed by: NURSE PRACTITIONER

## 2023-07-07 PROCEDURE — 258N000003 HC RX IP 258 OP 636: Performed by: NURSE ANESTHETIST, CERTIFIED REGISTERED

## 2023-07-07 PROCEDURE — 43235 EGD DIAGNOSTIC BRUSH WASH: CPT | Performed by: INTERNAL MEDICINE

## 2023-07-07 PROCEDURE — 82947 ASSAY GLUCOSE BLOOD QUANT: CPT | Performed by: INTERNAL MEDICINE

## 2023-07-07 PROCEDURE — 0W9G3ZZ DRAINAGE OF PERITONEAL CAVITY, PERCUTANEOUS APPROACH: ICD-10-PCS | Performed by: RADIOLOGY

## 2023-07-07 RX ORDER — ALBUMIN (HUMAN) 12.5 G/50ML
25-50 SOLUTION INTRAVENOUS ONCE
Status: COMPLETED | OUTPATIENT
Start: 2023-07-07 | End: 2023-07-07

## 2023-07-07 RX ORDER — LIDOCAINE HYDROCHLORIDE 10 MG/ML
10 INJECTION, SOLUTION EPIDURAL; INFILTRATION; INTRACAUDAL; PERINEURAL ONCE
Status: COMPLETED | OUTPATIENT
Start: 2023-07-07 | End: 2023-07-07

## 2023-07-07 RX ORDER — SODIUM CHLORIDE, SODIUM LACTATE, POTASSIUM CHLORIDE, CALCIUM CHLORIDE 600; 310; 30; 20 MG/100ML; MG/100ML; MG/100ML; MG/100ML
INJECTION, SOLUTION INTRAVENOUS CONTINUOUS PRN
Status: DISCONTINUED | OUTPATIENT
Start: 2023-07-07 | End: 2023-07-07

## 2023-07-07 RX ORDER — PROPOFOL 10 MG/ML
INJECTION, EMULSION INTRAVENOUS PRN
Status: DISCONTINUED | OUTPATIENT
Start: 2023-07-07 | End: 2023-07-07

## 2023-07-07 RX ORDER — ONDANSETRON 2 MG/ML
INJECTION INTRAMUSCULAR; INTRAVENOUS PRN
Status: DISCONTINUED | OUTPATIENT
Start: 2023-07-07 | End: 2023-07-07

## 2023-07-07 RX ORDER — PROPOFOL 10 MG/ML
INJECTION, EMULSION INTRAVENOUS CONTINUOUS PRN
Status: DISCONTINUED | OUTPATIENT
Start: 2023-07-07 | End: 2023-07-07

## 2023-07-07 RX ORDER — LIDOCAINE 40 MG/G
CREAM TOPICAL
Status: CANCELLED | OUTPATIENT
Start: 2023-07-07

## 2023-07-07 RX ORDER — FUROSEMIDE 10 MG/ML
40 INJECTION INTRAMUSCULAR; INTRAVENOUS EVERY 12 HOURS
Status: DISCONTINUED | OUTPATIENT
Start: 2023-07-07 | End: 2023-07-08

## 2023-07-07 RX ADMIN — PANTOPRAZOLE SODIUM 40 MG: 40 INJECTION, POWDER, FOR SOLUTION INTRAVENOUS at 20:12

## 2023-07-07 RX ADMIN — METOPROLOL SUCCINATE 100 MG: 100 TABLET, EXTENDED RELEASE ORAL at 08:15

## 2023-07-07 RX ADMIN — PHENYLEPHRINE HYDROCHLORIDE 100 MCG: 10 INJECTION INTRAVENOUS at 12:16

## 2023-07-07 RX ADMIN — PROPOFOL 20 MG: 10 INJECTION, EMULSION INTRAVENOUS at 12:04

## 2023-07-07 RX ADMIN — FUROSEMIDE 40 MG: 10 INJECTION, SOLUTION INTRAMUSCULAR; INTRAVENOUS at 15:11

## 2023-07-07 RX ADMIN — PANTOPRAZOLE SODIUM 40 MG: 40 INJECTION, POWDER, FOR SOLUTION INTRAVENOUS at 08:17

## 2023-07-07 RX ADMIN — ONDANSETRON 4 MG: 2 INJECTION INTRAMUSCULAR; INTRAVENOUS at 12:17

## 2023-07-07 RX ADMIN — ROSUVASTATIN CALCIUM 40 MG: 20 TABLET, FILM COATED ORAL at 22:11

## 2023-07-07 RX ADMIN — PHENYLEPHRINE HYDROCHLORIDE 100 MCG: 10 INJECTION INTRAVENOUS at 12:22

## 2023-07-07 RX ADMIN — LIDOCAINE HYDROCHLORIDE 10 ML: 10 INJECTION, SOLUTION EPIDURAL; INFILTRATION; INTRACAUDAL; PERINEURAL at 10:43

## 2023-07-07 RX ADMIN — PROPOFOL 200 MCG/KG/MIN: 10 INJECTION, EMULSION INTRAVENOUS at 12:04

## 2023-07-07 RX ADMIN — PROPOFOL 10 MG: 10 INJECTION, EMULSION INTRAVENOUS at 12:18

## 2023-07-07 RX ADMIN — FLUTICASONE FUROATE AND VILANTEROL TRIFENATATE 1 PUFF: 200; 25 POWDER RESPIRATORY (INHALATION) at 20:23

## 2023-07-07 RX ADMIN — PROPOFOL 10 MG: 10 INJECTION, EMULSION INTRAVENOUS at 12:16

## 2023-07-07 RX ADMIN — PHENYLEPHRINE HYDROCHLORIDE 100 MCG: 10 INJECTION INTRAVENOUS at 12:12

## 2023-07-07 RX ADMIN — TOPICAL ANESTHETIC 1 EACH: 200 SPRAY DENTAL; PERIODONTAL at 12:02

## 2023-07-07 RX ADMIN — SODIUM CHLORIDE, POTASSIUM CHLORIDE, SODIUM LACTATE AND CALCIUM CHLORIDE: 600; 310; 30; 20 INJECTION, SOLUTION INTRAVENOUS at 12:01

## 2023-07-07 RX ADMIN — METOPROLOL SUCCINATE 100 MG: 100 TABLET, EXTENDED RELEASE ORAL at 20:12

## 2023-07-07 RX ADMIN — UMECLIDINIUM 1 PUFF: 62.5 AEROSOL, POWDER ORAL at 20:23

## 2023-07-07 RX ADMIN — DILTIAZEM HYDROCHLORIDE 360 MG: 120 CAPSULE, COATED, EXTENDED RELEASE ORAL at 17:47

## 2023-07-07 ASSESSMENT — ACTIVITIES OF DAILY LIVING (ADL)
ADLS_ACUITY_SCORE: 35

## 2023-07-07 ASSESSMENT — COPD QUESTIONNAIRES: COPD: 1

## 2023-07-07 ASSESSMENT — ENCOUNTER SYMPTOMS: DYSRHYTHMIAS: 1

## 2023-07-07 NOTE — CONSULTS
GASTROENTEROLOGY CONSULTATION    Booker Brown  [address] [facility] [address]  68 year old adult    Admission Date/Time: 7/6/2023  Primary Care Provider: Arline White    We were asked to see the patient in consultation by Dr. Hayes for evaluation of melena.     HPI: Booker Brown is a 68 year old adult with PMH of chronic hypoxic respiratory failure due to COPD on 8L O2 NC, atrial fibrillation on Xarelto, advanced CAD, hypertension, stage III chronic kidney disease who presents emergency department with worsening lower extremity edema and weight gain.  He was found to have anemia and melena as well as moderate to large abdominal ascites with possible early stage cirrhosis on imaging.     Hemoglobin was 11.8 in February and it was 7.7 on admission. MCV normal at 88. Iron low at 27, iron sat 9%. Creat 1.65. BUN 42. Total bili 0.3, alk phos 100, ALT 20, AST 22.     He reports 16 pound weight gain this month. He has noticed increased swelling in his legs and new swelling in his abdomen. He has had dark stools for a few days. Rare NSAID use. No abdominal pain/n/v. He has SOB on excretion at baseline. Denies SOB at rest. Last dose of Xarelto was two days ago in the afternoon. He was given 40mg Lasix in ER and that has started to help with his breathing and swelling.     CT scan of the abdomen pelvis showed new, patchy, multifocal groundglass opacities, probably due to fluid overload.  It also showed moderate to large volume ascites with dysmorphic liver suggestive of early cirrhosis and manifestations of third spacing.    Colonoscopy 2018 11 polyps - 9 tubular adenomas, two hyperplastic polyps.     ROS: A comprehensive ten point review of systems was negative aside from those in mentioned in the HPI.      MEDICATIONS: No current facility-administered medications on file prior to encounter.  albuterol (PROAIR HFA/PROVENTIL HFA/VENTOLIN HFA) 108 (90 Base) MCG/ACT inhaler, Inhale 2 puffs into the  lungs every 6 hours as needed for shortness of breath / dyspnea or wheezing  diltiazem ER COATED BEADS (CARDIZEM CD) 360 MG 24 hr capsule, Take 1 capsule (360 mg) by mouth daily  Fluticasone-Umeclidin-Vilanterol (TRELEGY ELLIPTA) 200-62.5-25 MCG/ACT oral inhaler, Inhale 1 puff into the lungs daily  furosemide (LASIX) 40 MG tablet, Take 1 tablet (40 mg) by mouth daily as needed (leg swelling)  glipiZIDE (GLUCOTROL XL) 5 MG 24 hr tablet, Take 1 tablet (5 mg) by mouth daily  lisinopril (ZESTRIL) 40 MG tablet, TAKE 1 TABLET BY MOUTH EVERY DAY  metFORMIN (GLUCOPHAGE XR) 500 MG 24 hr tablet, Take 2 tablets (1,000 mg) by mouth daily (with dinner)  metoprolol succinate ER (TOPROL XL) 100 MG 24 hr tablet, Take 1 tablet (100 mg) by mouth 2 times daily  rivaroxaban ANTICOAGULANT (XARELTO ANTICOAGULANT) 20 MG TABS tablet, Take 1 tablet (20 mg) by mouth daily (with dinner)  rosuvastatin (CRESTOR) 40 MG tablet, Take 1 tablet (40 mg) by mouth At Bedtime  vitamin D3 (CHOLECALCIFEROL) 50 mcg (2000 units) tablet, Take 1 tablet (50 mcg) by mouth daily  ACCU-CHEK GUIDE test strip, USE TO TEST BLOOD SUGAR 1 TIMES DAILY OR AS DIRECTED.  alcohol swab prep pads, Use to swab area of injection/fariha as directed.  ASPIRIN NOT PRESCRIBED (INTENTIONAL), Please choose reason not prescribed from choices below.  blood glucose monitoring (NO BRAND SPECIFIED) meter device kit, Use to test blood sugar 1 times daily or as directed. : per insurance.  thin (NO BRAND SPECIFIED) lancets, Use with lanceting device. To accompany: Blood Glucose Monitor Brands: per insurance.        ALLERGIES: No Known Allergies    Past Medical History:   Diagnosis Date     Benign essential hypertension      CAD (coronary artery disease)      Chronic kidney disease, stage III (moderate) (H)      COPD (chronic obstructive pulmonary disease) (H)      ILD (interstitial lung disease) (H)      Obesity (BMI 30-39.9)      Persistent atrial fibrillation (H)      Pure  "hypercholesterolemia      Type 2 diabetes mellitus (H)        Past Surgical History:   Procedure Laterality Date     COLONOSCOPY N/A 2018    Procedure: COMBINED COLONOSCOPY, SINGLE OR MULTIPLE BIOPSY/POLYPECTOMY BY BIOPSY;;  Surgeon: Lawrence Mata MD;  Location:  GI     CV CORONARY ANGIOGRAM N/A 2022    Procedure: Coronary Angiogram;  Surgeon: Mason Lucas MD;  Location:  HEART CARDIAC CATH LAB     CV INSTANTANEOUS WAVE-FREE RATIO N/A 2022    Procedure: Instantaneous Wave-Free Ratio;  Surgeon: Mason Lucas MD;  Location:  HEART CARDIAC CATH LAB     CV LEFT HEART CATH N/A 2022    Procedure: Left Heart Catheterization;  Surgeon: Mason Lucas MD;  Location:  HEART CARDIAC CATH LAB       SOCIAL HISTORY:  Social History     Tobacco Use     Smoking status: Former     Packs/day: 2.00     Years: 16.00     Pack years: 32.00     Types: Cigarettes     Quit date: 1987     Years since quittin.8     Smokeless tobacco: Never   Vaping Use     Vaping Use: Never used   Substance Use Topics     Alcohol use: No     Drug use: No       FAMILY HISTORY:  Family History   Problem Relation Age of Onset     Diabetes Type 2  Mother      Cerebral aneurysm Sister      Bladder Cancer Brother      Myocardial Infarction Brother         in his 50s     Cerebrovascular Disease No family hx of      Coronary Artery Disease Early Onset No family hx of      Prostate Cancer No family hx of      Colon Cancer No family hx of      Clotting Disorder No family hx of      Bleeding Disorder No family hx of      Anesthesia Reaction No family hx of        PHYSICAL EXAM:   /66   Pulse 89   Temp 97.8  F (36.6  C)   Resp 16   Ht 1.778 m (5' 10\")   Wt 124.3 kg (274 lb)   SpO2 100%   BMI 39.31 kg/m     Constitutional: lert, no acute distress  Cardiovascular: regular rate and rhythm  Respiratory: diminished   Psychiatric: normal pleasant affect  ENT: no scleral icterus   Abdomen: soft, " distended, normally active bowel sounds. No rebound tenderness or guarding  Neuro: Neurologically intact grossly  Skin: warm, dry, no rashes are noted      LABORATORY WORK  Recent Labs   Lab Test 07/07/23  0519 07/07/23  0140 07/06/23  1624 02/21/23  1400 11/18/22  0714   WBC  --   --  10.3 10.2 12.8*   HGB 8.5* 8.3* 7.7* 11.8 14.2   MCV  --   --  88 88 83   PLT  --   --  257 220 330   INR  --   --   --   --  1.51*     Recent Labs   Lab Test 07/07/23  0519 07/06/23  1624 06/09/23  1510    144 140   POTASSIUM 4.7 4.8 3.7   CHLORIDE 101 103 96*   CO2 35* 28 31*   BUN 39.0* 42.8* 33.1*   CR 1.67* 1.65* 1.64*   ANIONGAP 8 13 13   KURT 9.2 9.2 9.1     Recent Labs   Lab Test 07/06/23  1624 02/21/23  1400 11/18/22  1201 11/18/22  0714 02/19/22  1113 05/12/21  0843 05/12/21  0842 03/30/21  0952   ALBUMIN 3.3* 3.8  --  3.0*   < >  --    < > 3.9   BILITOTAL 0.3 0.7  --  0.6   < >  --    < > 0.7   DBIL  --   --   --  0.2  --   --   --   --    ALT 20 16  --  27   < >  --    < > 29   AST 22 26  --  24   < >  --    < > 14   ALKPHOS 100 92  --  97   < >  --    < > 68   PROTEIN  --   --  50*  --   --  100*  --  100*   LIPASE  --   --   --   --   --   --   --  351    < > = values in this interval not displayed.       IMAGING   Narrative & Impression   EXAM: CT CHEST ABDOMEN PELVIS W/O CONTRAST  LOCATION: [facility]  DATE: 7/6/2023     IMPRESSION:  Chest:  1.  New patchy multifocal somewhat nodular groundglass pulmonary opacities suggestive of infectious/inflammatory etiology. Follow-up is recommended to ensure resolution.  2.  Mild to moderate paraseptal emphysema.  3.  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  4.  Nearly resolved pericardial effusion.  5.  Persistent but marginally increased mediastinal adenopathy.  6.  Cardiomegaly. Severe coronary atherosclerosis.     Abdomen and pelvis:  1.  Moderate to large volume ascites.  2.  The liver is mildly dysmorphic suggestive of fibrosis and/or early  cirrhosis.  3.  Manifestations of third spacing.  4.  Diverticulosis.  CONSULTATION ASSESSMENT AND PLAN  68 year old adult with PMH of chronic hypoxic respiratory failure due to COPD on 8L O2 NC, atrial fibrillation on Xarelto, advanced CAD, hypertension, stage III chronic kidney disease who presents emergency department with worsening lower extremity edema and weight gain.  He was found to have anemia and melena in the ED as well as moderate to large abdominal ascites with possible early stage cirrhosis on imaging.     Given melena and anemia (11-->7.7), will pursue EGD with MAC to evaluate for PUD, AVM, gastritis/duoenitis, upper GI lesion, etc. Last dose of Xarelto would be >24 hours by the time of the EGD. He is hemodynamically stable.     He also has new abdominal ascites. This could be related to his CHF/fluid overload, however with imaging showing possible early stage cirrhosis, recommend diagnostic paracentesis after EGD today for further evaluation. LFTs normal.     Plan  -NPO  -Hold Xarelto  -Monitor hgb ans transfuse to keep hgb >8  -EGD today   -PPI 40mg BID IV  -Diagnostic paracentesis after EGD today  -Cardiology will see. Echo planned per H&P notes. Diuretics per cardiology     I will discuss the patient plan with Dr. Louie. Thank you for asking us to participate in the care of this patient.    Total time: 35 minutes    Alcira Lees CNP  [facility]  Cell: [phone] until 12PM. Call Dr. Louie with any concerns 12-4PM  Office: [phone]

## 2023-07-07 NOTE — ED PROVIDER NOTES
History     Chief Complaint:  Bloated, Constipation, and Breathing Problem     The history is provided by the patient.      Booker Brown is a 68 year old adult on Xarelto with a history of afib, CKD, CAD, type 2 diabetes and COPD who presents with increasing abdominal distension with firmness for the past month. He had an at home assessment today and called his primary who told him to come in to the ER. He also endorses constipation and dizziness, neither of which are new. He took Dulcolax and magnesium citrate for constipation which did help. He reports he has been having black, dry stools for the past week as well. He also reports shortness of breath but has this normally, however he thinks it has been exacerbated due to the poor air quality. He also reports he has gained weight recently, 16 pounds within a month. He also reports he recently increased his dosage of Lasix but hasn't taken it the past week due to having multiple medical appointments. He had a colonoscopy less than 5 years ago with no abnormal findings. He denies abdominal pain, nausea, vomiting, chest pain,  fever or cough.    Independent Historian:   None - Patient Only    Review of External Notes:   Cardiology clinic note from 6/6/2023    Medications:    Cardizem CD  Trelegy Ellipta   Lasix  Glucotrol XL   Toprol XL   Xarelto   Crestor   Tenoretic  Lipitor   Zantac  Prinivil   Glucophate     Past Medical History:    Hypertension   CAD   CKD stage 3   COPD   ILD   Obesity   Persistent Afib   Hypercholesterolemia   Type 2 diabetes     Past Surgical History:    Colonoscopy   CV coronary angiogram   Instantaneous Wave-free ratio   CV left heart Cath      Physical Exam     Patient Vitals for the past 24 hrs:   BP Temp Pulse Resp SpO2   07/06/23 1616 134/73 98.1  F (36.7  C) 102 16 94 %        Physical Exam  Nursing note and vitals reviewed.  Constitutional:  Oriented to person, place, and time. Cooperative.  Oxygen by nasal cannula in  place.  HENT:   Nose:    Nose normal.   Mouth/Throat:   Mucous membranes are normal.   Eyes:    Conjunctivae normal and EOM are normal.      Pupils are equal, round, and reactive to light.   Neck:    Trachea normal.   Cardiovascular:  Tachycardic rate, irregularly irregular rhythm, normal heart sounds and normal pulses. No murmur heard.  Pulmonary/Chest:  Normal effort but diminished breath sounds throughout.   Abdominal:   Soft.  Distended but otherwise normal appearance and bowel sounds are normal.      There is no tenderness to palpation.      There is no rebound and no CVA tenderness.   Rectal:   Melena present which is guaiac positive.  Musculoskeletal:  Bilateral lower extremity edema present.  Extremities atraumatic x 4.   Lymphadenopathy:  No cervical adenopathy.   Neurological:   Alert and oriented to person, place, and time. Normal strength.      No cranial nerve deficit or sensory deficit. GCS eye subscore is 4. GCS verbal subscore is 5. GCS motor subscore is 6.   Skin:    Skin is intact. No rash noted.   Psychiatric:   Normal mood and affect.    Emergency Department Course   ECG    ECG results from 07/06/23   EKG 12-lead, tracing only     Value    Systolic Blood Pressure     Diastolic Blood Pressure     Ventricular Rate 99    Atrial Rate     MO Interval     QRS Duration 106        QTc 459    P Axis     R AXIS 52    T Axis -57    Interpretation ECG      Atrial fibrillation  Low voltage QRS  Incomplete right bundle branch block  Possible Inferior infarct , age undetermined  Cannot rule out Anterior infarct , age undetermined  Abnormal ECG  When compared with ECG of 18-NOV-2022 07:04,  Borderline criteria for Inferior infarct are now Present  Nonspecific T wave abnormality, worse in Inferior leads  Nonspecific T wave abnormality now evident in Lateral leads  Confirmed by GENERATED REPORT, COMPUTER ([id number]),  Fe Staton ([id number]) on 7/6/2023 6:35:37 PM         Imaging:  CT Chest Abdomen Pelvis w/o  Contrast   Final Result   IMPRESSION:   Chest:   1.  New patchy multifocal somewhat nodular groundglass pulmonary opacities suggestive of infectious/inflammatory etiology. Follow-up is recommended to ensure resolution.   2.  Mild to moderate paraseptal emphysema.   3.  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   4.  Nearly resolved pericardial effusion.   5.  Persistent but marginally increased mediastinal adenopathy.   6.  Cardiomegaly. Severe coronary atherosclerosis.      Abdomen and pelvis:   1.  Moderate to large volume ascites.   2.  The liver is mildly dysmorphic suggestive of fibrosis and/or early cirrhosis.   3.  Manifestations of third spacing.   4.  Diverticulosis.         Report per radiology    Laboratory:  Labs Ordered and Resulted from Time of ED Arrival to Time of ED Departure   COMPREHENSIVE METABOLIC PANEL - Abnormal       Result Value    Sodium 144      Potassium 4.8      Chloride 103      Carbon Dioxide (CO2) 28      Anion Gap 13      Urea Nitrogen 42.8 (*)     Creatinine 1.65 (*)     Calcium 9.2      Glucose 128 (*)     Alkaline Phosphatase 100      AST 22      ALT 20      Protein Total 6.6      Albumin 3.3 (*)     Bilirubin Total 0.3      GFR Estimate 46 (*)    TROPONIN T, HIGH SENSITIVITY - Abnormal    Troponin T, High Sensitivity 27 (*)    NT PROBNP INPATIENT - Abnormal    N terminal Pro BNP Inpatient 7,325 (*)    CBC WITH PLATELETS AND DIFFERENTIAL - Abnormal    WBC Count 10.3      RBC Count 3.17 (*)     Hemoglobin 7.7 (*)     Hematocrit 28.0 (*)     MCV 88      MCH 24.3 (*)     MCHC 27.5 (*)     RDW 20.3 (*)     Platelet Count 257      % Neutrophils 86      % Lymphocytes 6      % Monocytes 7      % Eosinophils 0      % Basophils 1      % Immature Granulocytes 0      NRBCs per 100 WBC 0      Absolute Neutrophils 8.9 (*)     Absolute Lymphocytes 0.6 (*)     Absolute Monocytes 0.7      Absolute Eosinophils 0.0      Absolute Basophils 0.1      Absolute Immature Granulocytes 0.0      Absolute NRBCs  0.0     PROCALCITONIN - Abnormal    Procalcitonin 0.21 (*)    MAGNESIUM - Normal    Magnesium 2.2     TSH WITH FREE T4 REFLEX - Normal    TSH 1.02     TYPE AND SCREEN, ADULT   ABO/RH TYPE AND SCREEN      Emergency Department Course & Assessments:     Interventions:  Medications   furosemide (LASIX) injection 40 mg (40 mg Intravenous $Given 7/6/23 1922)   pantoprazole (PROTONIX) IV push injection 80 mg (80 mg Intravenous $Given 7/6/23 1928)      Independent Interpretation (X-rays, CTs, rhythm strip):  None    Assessments/Consultations/Discussion of Management or Tests:   ED Course as of 07/06/23 2155   Thu Jul 06, 2023 1900 I obtained history and examined the patient as noted above.    1916 I rechecked the patient and explained findings.    1937 I spoke with Dr. Hayes, hospitalist, who agreed to admit the patient.    2108 I spoke with on call doc from GI, regarding the patient.      Social Determinants of Health affecting care:   None    Disposition:  The patient was admitted to the hospital under the care of Dr. Hayes.     Impression & Plan    Medical Decision Making:  This is a 68-year-old male who was instructed to come here by his clinic due to increasing abdominal distention.  He also was hypoxic upon arrival here, although he did not look to be in severe respiratory distress.  We had to increase his oxygen delivery quite a bit to bring his oxygen saturation levels into the low 90s.  I proceeded with the above work-up here to see if we might find a cause for his distention, but I was suspicious that this would all be from fluid overload from CHF.  However I also considered other causes such as cancer, liver disease, or kidney disease.  His work-up is concerning for a GI bleed with a significant drop in his hemoglobin from earlier this month.  I do not feel that he requires an emergent transfusion, however that might become necessary.  He has melena on exam, which would also be consistent with a GI bleed.   Therefore he was provided an IV dose of Protonix.  He also does have ascites and likely fluid overload in his lungs as well.  Therefore he was provided IV Lasix.  Clearly needs to be admitted to the hospital for further evaluation and management.  I subsequently spoke with Dr. Hayes, who will be admitting him.    Diagnosis:    ICD-10-CM    1. Upper GI bleed  K92.2       2. Anemia due to blood loss, acute  D62       3. Other ascites  R18.8       4. Acute pulmonary edema (H)  J81.0       5. Hypoxia  R09.02       6. Hypervolemia, unspecified hypervolemia type  E87.70          Scribe Disclosure:  I, Shirley Ramon, am serving as a scribe at 7:42 PM on 7/6/2023 to document services personally performed by Nilay Santoro MD based on my observations and the provider's statements to me.     7/6/2023   Nilay Santoro MD Lashkowitz, Seth H, MD  07/06/23 2157

## 2023-07-07 NOTE — CONSULTS
[facility] Cardiology Consultation     Date of Admission:  7/6/2023  Date of Consult: 07/07/23  Requesting Physician: Dr. Hayes  Primary care physician: Arline White  Primary cardiologist: Dr. Marquez  Staff Cardiologist:  Dr. Rico     Reason for consult: CHF    Assessment:   Booker Brown is a 68 year old adult with PMH of chronic hypoxic respiratory failure due to COPD on 8L O2 NC, atrial fibrillation on Xarelto, advanced CAD, ischemic CMP, hypertension, stage III chronic kidney disease who presents emergency department with worsening lower extremity edema and weight gain.  He was found to have anemia and melena in the ED as well as moderate to large abdominal ascites with possible early stage cirrhosis on imaging.   Xarelto has been held, GI was consulted consulted, who performed an EGD and diagnostic paracentesis today.  EGD showed two gastric ulcers, one with recent bleeding. IV PPI initiated. He had 4.4L out with para.    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 His sxs are significantly improved after paracentesis, but he continues to have pitting edema to the thighs on exam. He uses no diuretic therapy as an outpatient.     Plan:  1. Start IV Lasix 40 mg BID. Monitor renal fn. Plan for ultimate transition to oral torsemide 20 mg daily. Pharmacy liaison for Entresto -consider changing his PTA lisinopril to this after 36-hour washout if affordable. Will defer SGLT2i therapy and spironolactone for now given his chronic kidney disease.   1. Addendum: Entresto is covered $47 for 30 days supply. Consider starting Entresto tomorrow if BP allows.   2. He has an elevated [id number] score.  Discussed TAVR -at this time, he is not interested in the procedure, it is not currently within his goals of care (DNR/DNI), and additionally I do not think he would do well with elective intubation.  We can discuss this again as  "needed in the outpatient setting.  For now, I would recommend reinitiation of Xarelto as felt safe per GI.  3. Regarding his \"nearly resolved pericardial effusion\" on chest CT, I do not think a repeat echocardiogram is necessary at this time.    Thank you very much for this consultation. We will follow.    Geraldine Arroyo PA-C  [facility]  Pager: [phone]  Text Page  (7:30am - 4pm M-F)   ________________________________________________________________________  History of Present Illness   Booker Brown is a 68 year old adult with PMH of chronic hypoxic respiratory failure due to COPD on 8L O2 NC, atrial fibrillation on Xarelto, advanced CAD, ischemic CMP, hypertension, stage III chronic kidney disease who presents emergency department with progressive lower extremity edema and weight gain.  He was found to have anemia and melena in the ED as well as moderate to large abdominal ascites with possible early stage cirrhosis on imaging. Xarelto has been held, GI was consulted consulted, who performed an EGD and diagnostic paracentesis today.  EGD showed two gastric ulcers, one with recent bleeding. IV PPI initiated. 4.4L removed during para.    Cardiology was consulted to weigh in on congestive heart failure.    Conner has complex CAD with an occluded RCA and LCX. His LAD is open, but there is a flow-limiting lesion heavily calcified in the proximal and mid LAD.  He has been turned down for CABG d/t his lung disease. He recently had an echocardiogram completed in May, showing a new decline in EF of 40-45%, with known mid to basal inferior wall akinesis. He saw Dr. Marquez in the cardiology clinic on June 6th, at which time they reviewed his complex coronary anatomy. He felt that intervention of his known severe proximal to mid LAD lesion would be incredibly high risk, and given his lack of convincing symptoms, conservative management was recommended. Please see that note for details.  Since " "that time, Conner tells me he experienced progressive exertional dyspnea, abdominal bloating, and leg swelling.  He contacted his PCP for this a few days prior to admission, who started him on furosemide (previously on no diuretics, he states d/t his CKD).  He is retired, and lives at home with his wife, who helps to care for him as well as his adult children in the area.  Prior to admission, he was having difficulty walking from 1 room to another without having to stop for shortness of breath.  At baseline, he is on 6-8 L of oxygen chronically. Here, with prolonged speaking, his O2 sat dips down <90%. He has pitting edema to the thighs. Chest CT shows New patchy multifocal somewhat nodular groundglass pulmonary opacities suggestive of infectious/inflammatory etiology, as well as a \"nearly resolved pericardial effusion.\" Pulmonology consultation is pending.  ___________________________________________  Code Status    No CPR- Do NOT Intubate    Past Medical History   I have reviewed this patient's medical history and updated it with pertinent information if needed.   Past Medical History:   Diagnosis Date     Benign essential hypertension      CAD (coronary artery disease)      Chronic kidney disease, stage III (moderate) (H)      COPD (chronic obstructive pulmonary disease) (H)      ILD (interstitial lung disease) (H)      Obesity (BMI 30-39.9)      Persistent atrial fibrillation (H)      Pure hypercholesterolemia      Type 2 diabetes mellitus (H)        Past Surgical History   I have reviewed this patient's surgical history and updated it with pertinent information if needed.  Past Surgical History:   Procedure Laterality Date     COLONOSCOPY N/A 8/24/2018    Procedure: COMBINED COLONOSCOPY, SINGLE OR MULTIPLE BIOPSY/POLYPECTOMY BY BIOPSY;;  Surgeon: Lawrence Mata MD;  Location:  GI     CV CORONARY ANGIOGRAM N/A 11/18/2022    Procedure: Coronary Angiogram;  Surgeon: Mason Lucas MD;  Location:  HEART " CARDIAC CATH LAB     CV INSTANTANEOUS WAVE-FREE RATIO N/A 11/18/2022    Procedure: Instantaneous Wave-Free Ratio;  Surgeon: Mason Lucas MD;  Location:  HEART CARDIAC CATH LAB     CV LEFT HEART CATH N/A 11/18/2022    Procedure: Left Heart Catheterization;  Surgeon: Mason Lucas MD;  Location:  HEART CARDIAC CATH LAB     ESOPHAGOSCOPY, GASTROSCOPY, DUODENOSCOPY (EGD), COMBINED N/A 7/7/2023    Procedure: Esophagoscopy, gastroscopy, duodenoscopy (EGD), combined;  Surgeon: Nestor Louie MD;  Location:  GI       Prior to Admission Medications   Prior to Admission Medications   Prescriptions Last Dose Informant Patient Reported? Taking?   ACCU-CHEK GUIDE test strip   No No   Sig: USE TO TEST BLOOD SUGAR 1 TIMES DAILY OR AS DIRECTED.   ASPIRIN NOT PRESCRIBED (INTENTIONAL)   Yes No   Sig: Please choose reason not prescribed from choices below.   Fluticasone-Umeclidin-Vilanterol (TRELEGY ELLIPTA) 200-62.5-25 MCG/ACT oral inhaler 7/5/2023  No Yes   Sig: Inhale 1 puff into the lungs daily   albuterol (PROAIR HFA/PROVENTIL HFA/VENTOLIN HFA) 108 (90 Base) MCG/ACT inhaler  at prn  No Yes   Sig: Inhale 2 puffs into the lungs every 6 hours as needed for shortness of breath / dyspnea or wheezing   alcohol swab prep pads   No No   Sig: Use to swab area of injection/fariha as directed.   blood glucose monitoring (NO BRAND SPECIFIED) meter device kit   No No   Sig: Use to test blood sugar 1 times daily or as directed. : per insurance.   diltiazem ER COATED BEADS (CARDIZEM CD) 360 MG 24 hr capsule 7/5/2023  No Yes   Sig: Take 1 capsule (360 mg) by mouth daily   furosemide (LASIX) 40 MG tablet 7/5/2023  Yes Yes   Sig: Take 1 tablet (40 mg) by mouth daily as needed (leg swelling)   glipiZIDE (GLUCOTROL XL) 5 MG 24 hr tablet 7/5/2023  No Yes   Sig: Take 1 tablet (5 mg) by mouth daily   lisinopril (ZESTRIL) 40 MG tablet 7/5/2023  No Yes   Sig: TAKE 1 TABLET BY MOUTH EVERY DAY   metFORMIN (GLUCOPHAGE XR) 500 MG 24 hr  tablet 7/5/2023  No Yes   Sig: Take 2 tablets (1,000 mg) by mouth daily (with dinner)   metoprolol succinate ER (TOPROL XL) 100 MG 24 hr tablet 7/5/2023  No Yes   Sig: Take 1 tablet (100 mg) by mouth 2 times daily   rivaroxaban ANTICOAGULANT (XARELTO ANTICOAGULANT) 20 MG TABS tablet 7/5/2023  No Yes   Sig: Take 1 tablet (20 mg) by mouth daily (with dinner)   rosuvastatin (CRESTOR) 40 MG tablet 7/5/2023  No Yes   Sig: Take 1 tablet (40 mg) by mouth At Bedtime   thin (NO BRAND SPECIFIED) lancets   No No   Sig: Use with lanceting device. To accompany: Blood Glucose Monitor Brands: per insurance.   vitamin D3 (CHOLECALCIFEROL) 50 mcg (2000 units) tablet 7/5/2023  No Yes   Sig: Take 1 tablet (50 mcg) by mouth daily      Facility-Administered Medications: None     Allergies   No Known Allergies    Social History   I have reviewed this patient's social history and updated it with pertinent information if needed. Booker Brown  reports that he quit smoking about 35 years ago. His smoking use included cigarettes. He has a 32.00 pack-year smoking history. He has never used smokeless tobacco. He reports that he does not drink alcohol and does not use drugs.    Family History   I have reviewed this patient's family history and updated it with pertinent information if needed.   Family History   Problem Relation Age of Onset     Diabetes Type 2  Mother      Cerebral aneurysm Sister      Bladder Cancer Brother      Myocardial Infarction Brother         in his 50s     Cerebrovascular Disease No family hx of      Coronary Artery Disease Early Onset No family hx of      Prostate Cancer No family hx of      Colon Cancer No family hx of      Clotting Disorder No family hx of      Bleeding Disorder No family hx of      Anesthesia Reaction No family hx of        Review of Systems   A comprehensive review of systems is negative, except for as noted in the HPI.    Physical Exam   Temp: 97.8  F (36.6  C) Temp src: Oral BP: 127/77 Pulse:  88   Resp: 16 SpO2: 100 % O2 Device: Nasal cannula Oxygen Delivery: 8 LPM (baseline for pt)  Vital Signs with Ranges  Temp:  [97.8  F (36.6  C)-99.2  F (37.3  C)] 97.8  F (36.6  C)  Pulse:  [] 88  Resp:  [16-48] 16  BP: (103-159)/(66-99) 127/77  SpO2:  [93 %-100 %] 100 %  274 lbs 0 oz    General - Alert and oriented to time place and person in no acute distress  Eyes - No scleral icterus  HEENT - Neck supple, moist mucous membranes  Cardiovascular - Irregularly irregular rhythm, variable S1.   Extremities - There is pitting edema to the thighs bilaterally.  Respiratory - Diffusely reduced lung sounds.  Skin - No pallor or cyanosis  Gastrointestinal - Non tender and non distended without rebound or guarding  Psych - Appropriate affect   Neurological - No gross motor neurological focal deficits    Data   Most Recent 3 CBC's:Recent Labs   Lab Test 07/07/23  1123 07/07/23  0519 07/07/23  0140 07/06/23  1624 02/21/23  1400 11/18/22  0714   WBC  --   --   --  10.3 10.2 12.8*   HGB 8.5* 8.5* 8.3* 7.7* 11.8 14.2   MCV  --   --   --  88 88 83   PLT  --   --   --  257 220 330     Most Recent 3 BMP's:Recent Labs   Lab Test 07/07/23  0519 07/06/23  2316 07/06/23  2107 07/06/23  1624 06/09/23  1510     --   --  144 140   POTASSIUM 4.7  --   --  4.8 3.7   CHLORIDE 101  --   --  103 96*   CO2 35*  --   --  28 31*   BUN 39.0*  --   --  42.8* 33.1*   CR 1.67*  --   --  1.65* 1.64*   ANIONGAP 8  --   --  13 13   KURT 9.2  --   --  9.2 9.1   * 114* 116* 128* 114*     Most Recent 2 LFT's:Recent Labs   Lab Test 07/06/23  1624 02/21/23  1400   AST 22 26   ALT 20 16   ALKPHOS 100 92   BILITOTAL 0.3 0.7     Most Recent 3 BNP's:Recent Labs   Lab Test 07/06/23  1624 10/12/22  1524 05/25/22  1656 05/03/22  0950 02/27/22  1611 02/19/22  1347   NTBNPI 7,325*  --   --   --  7,959* 3,427*   NTBNP  --  1,679* 2,095* 3,153*  --   --        Clinically Significant Risk Factors Present on Admission              # Hypoalbuminemia:  "Lowest albumin = 3.3 g/dL at 7/6/2023  4:24 PM, will monitor as appropriate  # Drug Induced Coagulation Defect: home medication list includes an anticoagulant medication    # Hypertension: Noted on problem list      # Obesity: Estimated body mass index is 39.31 kg/m  as calculated from the following:    Height as of this encounter: 1.778 m (5' 10\").    Weight as of this encounter: 124.3 kg (274 lb).           Fluid overload, unspecified    Ascites: Other ascites  Acute kidney failure, unspecified  CKD POA List: Stage 3a (GFR 45-59)    COPD  Other Pulmonary Conditions: Acute interstitial pneumonitis    Chronic Fatigue and Other Debilities: Limitation of activities due to disability      "

## 2023-07-07 NOTE — ANESTHESIA PREPROCEDURE EVALUATION
Anesthesia Pre-Procedure Evaluation    Patient: Booker Brown   MRN: [id number] : 1955        Procedure : Procedure(s):  Esophagoscopy, gastroscopy, duodenoscopy (EGD), combined          Past Medical History:   Diagnosis Date     Benign essential hypertension      CAD (coronary artery disease)      Chronic kidney disease, stage III (moderate) (H)      COPD (chronic obstructive pulmonary disease) (H)      ILD (interstitial lung disease) (H)      Obesity (BMI 30-39.9)      Persistent atrial fibrillation (H)      Pure hypercholesterolemia      Type 2 diabetes mellitus (H)       Past Surgical History:   Procedure Laterality Date     COLONOSCOPY N/A 2018    Procedure: COMBINED COLONOSCOPY, SINGLE OR MULTIPLE BIOPSY/POLYPECTOMY BY BIOPSY;;  Surgeon: Lawrence Mata MD;  Location:  GI     CV CORONARY ANGIOGRAM N/A 2022    Procedure: Coronary Angiogram;  Surgeon: Mason Lucas MD;  Location:  HEART CARDIAC CATH LAB     CV INSTANTANEOUS WAVE-FREE RATIO N/A 2022    Procedure: Instantaneous Wave-Free Ratio;  Surgeon: Mason Lucas MD;  Location:  HEART CARDIAC CATH LAB     CV LEFT HEART CATH N/A 2022    Procedure: Left Heart Catheterization;  Surgeon: Mason Lucas MD;  Location:  HEART CARDIAC CATH LAB      No Known Allergies   Social History     Tobacco Use     Smoking status: Former     Packs/day: 2.00     Years: 16.00     Pack years: 32.00     Types: Cigarettes     Quit date: 1987     Years since quittin.8     Smokeless tobacco: Never   Substance Use Topics     Alcohol use: No      Wt Readings from Last 1 Encounters:   23 124.3 kg (274 lb)        Anesthesia Evaluation   Pt has had prior anesthetic.     No history of anesthetic complications       ROS/MED HX  ENT/Pulmonary: Comment: ILD    (+) COPD, O2 dependent, during Both, 8 liters/min,     Neurologic:       Cardiovascular:     (+) Dyslipidemia hypertension--CAD ---Taking blood thinners CHF  Last EF: 40-45% dysrhythmias, a-fib,     METS/Exercise Tolerance:     Hematologic:     (+) anemia,     Musculoskeletal:       GI/Hepatic: Comment: GIB      Renal/Genitourinary:     (+) renal disease, type: CRI,     Endo:     (+) type II DM, Not using insulin, Obesity,     Psychiatric/Substance Use:       Infectious Disease:       Malignancy:       Other:            Physical Exam    Airway        Mallampati: II   TM distance: > 3 FB   Neck ROM: full   Mouth opening: > 3 cm    Respiratory Devices and Support     High Flow Nasal Canula 8  l/min.     Dental       (+) Minor Abnormalities - some fillings, tiny chips      Cardiovascular   cardiovascular exam normal          Pulmonary           (+) decreased breath sounds           OUTSIDE LABS:  CBC:   Lab Results   Component Value Date    WBC 10.3 07/06/2023    WBC 10.2 02/21/2023    HGB 8.5 (L) 07/07/2023    HGB 8.3 (L) 07/07/2023    HCT 28.0 (L) 07/06/2023    HCT 40.8 02/21/2023     07/06/2023     02/21/2023     BMP:   Lab Results   Component Value Date     07/07/2023     07/06/2023    POTASSIUM 4.7 07/07/2023    POTASSIUM 4.8 07/06/2023    CHLORIDE 101 07/07/2023    CHLORIDE 103 07/06/2023    CO2 35 (H) 07/07/2023    CO2 28 07/06/2023    BUN 39.0 (H) 07/07/2023    BUN 42.8 (H) 07/06/2023    CR 1.67 (H) 07/07/2023    CR 1.65 (H) 07/06/2023     (H) 07/07/2023     (H) 07/06/2023     COAGS:   Lab Results   Component Value Date    PTT 31 11/18/2022    INR 1.51 (H) 11/18/2022     POC:   Lab Results   Component Value Date     (H) 08/24/2018     HEPATIC:   Lab Results   Component Value Date    ALBUMIN 3.3 (L) 07/06/2023    PROTTOTAL 6.6 07/06/2023    ALT 20 07/06/2023    AST 22 07/06/2023    ALKPHOS 100 07/06/2023    BILITOTAL 0.3 07/06/2023     OTHER:   Lab Results   Component Value Date    LACT 1.0 11/18/2022    A1C 5.6 06/09/2023    KURT 9.2 07/07/2023    MAG 2.2 07/06/2023    LIPASE 351 03/30/2021    TSH 1.02 07/06/2023    T4  1.59 02/20/2022    SED 43 (H) 04/27/2023       Anesthesia Plan    ASA Status:  4   NPO Status:  NPO Appropriate    Anesthesia Type: MAC.     - Reason for MAC: straight local not clinically adequate              Consents    Anesthesia Plan(s) and associated risks, benefits, and realistic alternatives discussed. Questions answered and patient/representative(s) expressed understanding.    - Discussed:     - Discussed with:  Patient      - Extended Intubation/Ventilatory Support Discussed: Yes (refuses intubation ).      - Patient is DNR/DNI Status: Yes             Suspend during perioperative period? Yes.             Agree to: chemical resuscitation, chest compression/defibrillation.        Postoperative Care    Pain management: Multi-modal analgesia.   PONV prophylaxis: Ondansetron (or other 5HT-3)     Comments:                Richie Marks MD

## 2023-07-07 NOTE — CONSULTS
Brief pulmonary note:    This patient follows with [facility], please contact their service for consult. Should they be unavailable, please re-consult [facility] and we would be happy to see the patient.    Johnnie Whitney  Interventional Pulmonology  [facility]

## 2023-07-07 NOTE — ANESTHESIA POSTPROCEDURE EVALUATION
Patient: Booker Brown    Procedure: Procedure(s):  Esophagoscopy, gastroscopy, duodenoscopy (EGD), combined       Anesthesia Type:  MAC    Note:     Postop Pain Control: Uneventful            Sign Out: Well controlled pain   PONV: No   Neuro/Psych: Uneventful            Sign Out: Acceptable/Baseline neuro status   Airway/Respiratory: Uneventful            Sign Out: Acceptable/Baseline resp. status   CV/Hemodynamics: Uneventful            Sign Out: Acceptable CV status; No obvious hypovolemia; No obvious fluid overload   Other NRE: NONE   DID A NON-ROUTINE EVENT OCCUR? No           Last vitals:  Vitals Value Taken Time   /65 07/07/23 1312   Temp     Pulse 100 07/07/23 1312   Resp 9 07/07/23 1307   SpO2 90 % 07/07/23 1320   Vitals shown include unvalidated device data.    Electronically Signed By: Richie Marks MD  July 7, 2023  1:21 PM

## 2023-07-07 NOTE — PROGRESS NOTES
"[facility]    Medicine Progress Note - Hospitalist Service    Date of Admission:  7/6/2023    Assessment & Plan      Booker Brown is a 68 year old a man with numerous medical problems including chronic hypoxic respiratory failure due to COPD, ILD, restriction from obesity, also persistent atrial fibrillation, advanced CAD, hypertension, stage III chronic kidney disease who presents emergency department with worsening lower extremity edema and weight gain.  He is incidentally noted to have melena.    Principal Problem:    Melena  Hemoglobin dropped from from 11 to 7.7 mmHg.  DL  -Continue with serial hemoglobin checks  -Gastroenterology consult service consulted, plan included n.p.o. and upper endoscopy later today  -Further care-plans per Gastroenterology      Active Problems:    Anemia due to blood loss, acute    Most recent hemoglobin was 11.8 g/dL on 2/21/2023.  He reports dark stools but no abdominal pain.    Check iron studies, he may benefit from parenteral iron    Follow serial hemoglobin    Transfuse for hemoglobin less than 8 g/dL    Benign essential hypertension    Patient has advanced CAD, please see Dr. Esposito note from 6/6/2023.  Based on this, continue beta-blocker    Hold ACE inhibitor    Persistent atrial fibrillation (H)    Continue beta-blocker for ventricular rate control    Hold Xarelto due to GI bleed    CAD (coronary artery disease)   Acute on chronic systolic congestive heart failure    Other ascites    Please see detailed notes from Dr. Esposito, most recently on 6/6/2023.  Patient has advanced CAD, \" I am concerned that doing a very, very high-risk procedure, which would be Rotablator of his last remaining normal artery to his LAD in a patient who has a reduced ejection fraction, would be high risk.......to do a high-risk procedure on a patient's last remaining normal artery, whose symptoms may all be lung disease with no angina, is probably too high risk for " "the family to move forward with now.\"    He recommended echocardiogram    Cardiology consult requested    Chronic kidney disease, stage III (moderate) (H)    Daily BMP    Type 2 diabetes mellitus (H)    *Hemoglobin A1c was 5.6 on 6/9/2023    Hold metformin    Begin corrective dose insulin, low correction scale    COPD (chronic obstructive pulmonary disease) (H)    ILD (interstitial lung disease) (H)  * Sees Dr. Hopkins  * On 8 L oxygen per [place] at baseline   *Chest CT shows extensive pleural calcifications consistent with prior asbestos exposure  *Had positive LISA, was seen by rheumatology, they did not feel there was underlying autoimmune disease    Also per Dr. Esposito, \"patient has since followed up with Dr. Hopkins and his group.  They all acknowledged that he has some form of lung disease.  I understand that they had a multidisciplinary meeting, and I understand based on the patient they do not think there is much more they have to offer.  He is on high-flow oxygen.\"    Continued metered-dose inhalers    Continue high flow oxygen could add noninvasive ventilation if needed, but he clearly states he desires DNR/DNI  Pure hypercholesterolemia    Continue statin    Diet:  Clear liquids, then n.p.o. at midnight  DVT Prophylaxis: Pneumatic Compression Devices  Sofia Catheter: Not present  Lines: None     Cardiac Monitoring: None  Code Status:  DNR/DNI, discussed with patient         Diet: NPO per Anesthesia Guidelines for Procedure/Surgery Except for: Meds    DVT Prophylaxis: Mechanical, chemical DVT prophylaxis deferred due to upcoming procedure.  Sofia Catheter: Not present  Lines: None     Cardiac Monitoring: ACTIVE order. Indication: Acute decompensated heart failure (48 hours)  Code Status: No CPR- Do NOT Intubate      Clinically Significant Risk Factors Present on Admission              # Hypoalbuminemia: Lowest albumin = 3.3 g/dL at 7/6/2023  4:24 PM, will monitor as appropriate  # Drug Induced Coagulation Defect: " "home medication list includes an anticoagulant medication    # Hypertension: Noted on problem list      # Obesity: Estimated body mass index is 39.31 kg/m  as calculated from the following:    Height as of this encounter: 1.778 m (5' 10\").    Weight as of this encounter: 124.3 kg (274 lb).            Disposition Plan      Expected Discharge Date: 07/08/2023                  Charan Yan MD  Hospitalist Service  [facility]  Securely message with [employer] (more info)  Text page via [employer] Paging/Directory   ______________________________________________________________________    Interval History   Feels same no new complaints    Physical Exam   Vital Signs: Temp: 97.8  F (36.6  C) Temp src: Oral BP: 105/64 Pulse: 89   Resp: 16 SpO2: 100 % O2 Device: Nasal cannula with humidification Oxygen Delivery: 8 LPM  Weight: 274 lbs 0 oz        Medical Decision Making             Data     "

## 2023-07-07 NOTE — H&P
"[facility]    History and Physical - Hospitalist Service       Date of Admission:  7/6/2023    Assessment & Plan      Booker Brown is a 68 year old a man with numerous medical problems including chronic hypoxic respiratory failure due to COPD, ILD, restriction from obesity, also persistent atrial fibrillation, advanced CAD, hypertension, stage III chronic kidney disease who presents emergency department with worsening lower extremity edema and weight gain.  He is incidentally noted to have melena.    Principal Problem:    Melena    Type and screen    Transfuse for hemoglobin less than 8 g/dL, consent obtained in ED    PPI    Follow serial hemoglobin    GI consult requested  Active Problems:    Anemia due to blood loss, acute    Most recent hemoglobin was 11.8 g/dL on 2/21/2023.  He reports dark stools but no abdominal pain.    Check iron studies, he may benefit from parenteral iron    Follow serial hemoglobin    Transfuse for hemoglobin less than 8 g/dL    Benign essential hypertension    Patient has advanced CAD, please see Dr. Esposito note from 6/6/2023.  Based on this, continue beta-blocker    Hold ACE inhibitor    Persistent atrial fibrillation (H)    Continue beta-blocker for ventricular rate control    Hold Xarelto due to GI bleed    CAD (coronary artery disease)   Acute on chronic systolic congestive heart failure    Other ascites    Please see detailed notes from Dr. Esposito, most recently on 6/6/2023.  Patient has advanced CAD, \" I am concerned that doing a very, very high-risk procedure, which would be Rotablator of his last remaining normal artery to his LAD in a patient who has a reduced ejection fraction, would be high risk.......to do a high-risk procedure on a patient's last remaining normal artery, whose symptoms may all be lung disease with no angina, is probably too high risk for the family to move forward with now.\"    He recommended echocardiogram    Cardiology " "consult requested    Chronic kidney disease, stage III (moderate) (H)    Daily BMP    Type 2 diabetes mellitus (H)    *Hemoglobin A1c was 5.6 on 6/9/2023    Hold metformin    Begin corrective dose insulin, low correction scale    COPD (chronic obstructive pulmonary disease) (H)    ILD (interstitial lung disease) (H)  * Sees Dr. Hopkins  * On 8 L oxygen per [place] at baseline   *Chest CT shows extensive pleural calcifications consistent with prior asbestos exposure  *Had positive LISA, was seen by rheumatology, they did not feel there was underlying autoimmune disease    Also per Dr. Esposito, \"patient has since followed up with Dr. Hopkins and his group.  They all acknowledged that he has some form of lung disease.  I understand that they had a multidisciplinary meeting, and I understand based on the patient they do not think there is much more they have to offer.  He is on high-flow oxygen.\"    Continued metered-dose inhalers    Continue high flow oxygen could add noninvasive ventilation if needed, but he clearly states he desires DNR/DNI  Pure hypercholesterolemia    Continue statin    Diet:  Clear liquids, then n.p.o. at midnight  DVT Prophylaxis: Pneumatic Compression Devices  Sofia Catheter: Not present  Lines: None     Cardiac Monitoring: None  Code Status:  DNR/DNI, discussed with patient    Clinically Significant Risk Factors Present on Admission              # Hypoalbuminemia: Lowest albumin = 3.3 g/dL at 7/6/2023  4:24 PM, will monitor as appropriate  # Drug Induced Coagulation Defect: home medication list includes an anticoagulant medication    # Hypertension: Noted on problem list      # Obesity: Estimated body mass index is 39.31 kg/m  as calculated from the following:    Height as of this encounter: 1.778 m (5' 10\").    Weight as of this encounter: 124.3 kg (274 lb).            Disposition Plan      Expected Discharge Date: 07/08/2023                  Darshana Hayes MD  Hospitalist Service  [facility] " "[facility]  Securely message with [employer] (more info)  Text page via [facility] Paging/Directory     ______________________________________________________________________    Chief Complaint   \"They called Dr. White and she said to come to the emergency room.\"      History of Present Illness   Booker Brown is a 68 year old adult with numerous medical problems including chronic hypoxic respiratory failure due to COPD, ILD, restriction from obesity, also persistent atrial fibrillation, advanced CAD, hypertension, stage III chronic kidney disease who presents emergency department with worsening lower extremity edema and weight gain.  He is incidentally noted to have melena.    Patient says a nurse came out to his house today because he has qualified to have a hospital bed.  He mentioned to the nurse that he has noticed that his abdomen is becoming more distended and his legs are more swollen.  Notes report a 20 pound weight gain in the past month, patient told me that he has gained 14 pounds in the past 2 weeks.  He has been on and off Lasix.  He thought his abdominal distention was possibly due to constipation.    The nurse visiting him today called his primary care physician, Dr. White, she recommended he come to the emergency department for evaluation.  Here, he reported having black stools and labs show hemoglobin of 7.7 g/dL, which is down 4 g from the most recent measurement in 2/2023.  CT scan of the abdomen pelvis showed new, patchy, multifocal groundglass opacities, probably due to fluid overload.  It also showed moderate to large volume ascites with dysmorphic liver suggestive of early cirrhosis and manifestations of third spacing.  He is admitted for further evaluation and treatment.    Past Medical History    Past Medical History:   Diagnosis Date     Benign essential hypertension      CAD (coronary artery disease)      Chronic kidney disease, stage III (moderate) (H)      COPD (chronic obstructive " pulmonary disease) (H)      ILD (interstitial lung disease) (H)      Obesity (BMI 30-39.9)      Persistent atrial fibrillation (H)      Pure hypercholesterolemia      Type 2 diabetes mellitus (H)        Past Surgical History   Past Surgical History:   Procedure Laterality Date     COLONOSCOPY N/A 8/24/2018    Procedure: COMBINED COLONOSCOPY, SINGLE OR MULTIPLE BIOPSY/POLYPECTOMY BY BIOPSY;;  Surgeon: Lawrence Mata MD;  Location:  GI     CV CORONARY ANGIOGRAM N/A 11/18/2022    Procedure: Coronary Angiogram;  Surgeon: Mason Lucas MD;  Location:  HEART CARDIAC CATH LAB     CV INSTANTANEOUS WAVE-FREE RATIO N/A 11/18/2022    Procedure: Instantaneous Wave-Free Ratio;  Surgeon: Mason Lucas MD;  Location:  HEART CARDIAC CATH LAB     CV LEFT HEART CATH N/A 11/18/2022    Procedure: Left Heart Catheterization;  Surgeon: Mason Lucas MD;  Location:  HEART CARDIAC CATH LAB       Prior to Admission Medications   Prior to Admission Medications   Prescriptions Last Dose Informant Patient Reported? Taking?   ACCU-CHEK GUIDE test strip   No No   Sig: USE TO TEST BLOOD SUGAR 1 TIMES DAILY OR AS DIRECTED.   ASPIRIN NOT PRESCRIBED (INTENTIONAL)   Yes No   Sig: Please choose reason not prescribed from choices below.   Fluticasone-Umeclidin-Vilanterol (TRELEGY ELLIPTA) 200-62.5-25 MCG/ACT oral inhaler 7/5/2023  No Yes   Sig: Inhale 1 puff into the lungs daily   albuterol (PROAIR HFA/PROVENTIL HFA/VENTOLIN HFA) 108 (90 Base) MCG/ACT inhaler  at prn  No Yes   Sig: Inhale 2 puffs into the lungs every 6 hours as needed for shortness of breath / dyspnea or wheezing   alcohol swab prep pads   No No   Sig: Use to swab area of injection/fariha as directed.   blood glucose monitoring (NO BRAND SPECIFIED) meter device kit   No No   Sig: Use to test blood sugar 1 times daily or as directed. : per insurance.   diltiazem ER COATED BEADS (CARDIZEM CD) 360 MG 24 hr capsule 7/5/2023  No Yes   Sig: Take 1 capsule (360  mg) by mouth daily   furosemide (LASIX) 40 MG tablet 7/5/2023  Yes Yes   Sig: Take 1 tablet (40 mg) by mouth daily as needed (leg swelling)   glipiZIDE (GLUCOTROL XL) 5 MG 24 hr tablet 7/5/2023  No Yes   Sig: Take 1 tablet (5 mg) by mouth daily   lisinopril (ZESTRIL) 40 MG tablet 7/5/2023  No Yes   Sig: TAKE 1 TABLET BY MOUTH EVERY DAY   metFORMIN (GLUCOPHAGE XR) 500 MG 24 hr tablet 7/5/2023  No Yes   Sig: Take 2 tablets (1,000 mg) by mouth daily (with dinner)   metoprolol succinate ER (TOPROL XL) 100 MG 24 hr tablet 7/5/2023  No Yes   Sig: Take 1 tablet (100 mg) by mouth 2 times daily   rivaroxaban ANTICOAGULANT (XARELTO ANTICOAGULANT) 20 MG TABS tablet 7/5/2023  No Yes   Sig: Take 1 tablet (20 mg) by mouth daily (with dinner)   rosuvastatin (CRESTOR) 40 MG tablet 7/5/2023  No Yes   Sig: Take 1 tablet (40 mg) by mouth At Bedtime   thin (NO BRAND SPECIFIED) lancets   No No   Sig: Use with lanceting device. To accompany: Blood Glucose Monitor Brands: per insurance.   vitamin D3 (CHOLECALCIFEROL) 50 mcg (2000 units) tablet 7/5/2023  No Yes   Sig: Take 1 tablet (50 mcg) by mouth daily      Facility-Administered Medications: None          Physical Exam   Vital Signs: Temp: 98.1  F (36.7  C)   BP: 133/70 Pulse: 104   Resp: 16 SpO2: 95 % O2 Device: Nasal cannula with humidification Oxygen Delivery: 8 LPM  Weight: 274 lbs 0 oz    Constitutional: Awake, alert, cooperative, no apparent distress  Respiratory: Decreased breath sounds throughout  Cardiovascular: Tachycardic, irregular  GI: Obese, firm, nontender throughout  Skin/Integumen: He is wearing Ace wraps in the legs.  He has taut, bilateral lower extremity edema  Other: Mood is very calm and pleasant      Medical Decision Making       80 MINUTES SPENT BY ME on the date of service doing chart review, history, exam, documentation & further activities per the note.      Data     I have personally reviewed the following data over the past 24 hrs:    10.3  \   7.7 (L)   /  257     144 103 42.8 (H) /  116 (H)   4.8 28 1.65 (H) \       ALT: 20 AST: 22 AP: 100 TBILI: 0.3   ALB: 3.3 (L) TOT PROTEIN: 6.6 LIPASE: N/A       Trop: 27 (H) BNP: 7,325 (H)       TSH: 1.02 T4: N/A A1C: N/A       Procal: 0.21 (H) CRP: N/A Lactic Acid: N/A         Imaging results reviewed over the past 24 hrs:   Recent Results (from the past 24 hour(s))   CT Chest Abdomen Pelvis w/o Contrast    Narrative    EXAM: CT CHEST ABDOMEN PELVIS W/O CONTRAST  LOCATION: [facility]  DATE: 7/6/2023    INDICATION: abd bloating, shortness of breath, hypoxia, ckd  COMPARISON: Chest CT from 05/12/2023  TECHNIQUE: CT scan of the chest, abdomen, and pelvis was performed without IV contrast. Multiplanar reformats were obtained. Dose reduction techniques were used.   CONTRAST: None.    FINDINGS:   LUNGS AND PLEURA: Mild to moderate paraseptal predominant emphysema. New patchy multifocal groundglass somewhat nodular pulmonary opacities. Mild bronchial wall thickening. Small pleural effusions and associated atelectasis. Scattered calcified pleural   plaques compatible with remote asbestos exposure. There is similar subpleural atelectasis and scarring. Mild bronchial wall thickening. Trace airway secretions.    MEDIASTINUM/AXILLAE: Cardiomegaly. Low blood pool relative to the myocardium indicative of anemia. Nearly resolved pericardial effusion. Persistent borderline to mildly enlarged mediastinal lymph nodes. The largest is in the subcarinal chain measuring   1.3 cm in short axis, similar to prior exam (series 3/68). A few of the mediastinal lymph nodes are marginally increased compared to prior exam.    CORONARY ARTERY CALCIFICATION: Severe.    HEPATOBILIARY: The liver is mildly dysmorphic. No calcified gallstones are biliary ductal dilation.     PANCREAS: Normal.    SPLEEN: Normal.    ADRENAL GLANDS: Nodular thickening of the left adrenal gland.    KIDNEYS/BLADDER: No significant mass, stone, or  hydronephrosis. Moderate atrophy of the left kidney.    BOWEL: Diverticulosis of the colon. No acute inflammatory change. No obstruction. Moderate to large volume ascites.    LYMPH NODES: Normal.    VASCULATURE: Extensive atherosclerosis.    PELVIC ORGANS: Normal.    MUSCULOSKELETAL: Degenerative changes of the spine. Osteopenia. Diffuse anasarca.      Impression    IMPRESSION:  Chest:  1.  New patchy multifocal somewhat nodular groundglass pulmonary opacities suggestive of infectious/inflammatory etiology. Follow-up is recommended to ensure resolution.  2.  Mild to moderate paraseptal emphysema.  3.  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  4.  Nearly resolved pericardial effusion.  5.  Persistent but marginally increased mediastinal adenopathy.  6.  Cardiomegaly. Severe coronary atherosclerosis.    Abdomen and pelvis:  1.  Moderate to large volume ascites.  2.  The liver is mildly dysmorphic suggestive of fibrosis and/or early cirrhosis.  3.  Manifestations of third spacing.  4.  Diverticulosis.

## 2023-07-07 NOTE — ANESTHESIA CARE TRANSFER NOTE
Patient: Booker Brown    Procedure: Procedure(s):  Esophagoscopy, gastroscopy, duodenoscopy (EGD), combined       Diagnosis: Melena [K92.1]  Diagnosis Additional Information: No value filed.    Anesthesia Type:   MAC     Note:    Oropharynx: spontaneously breathing  Level of Consciousness: awake  Oxygen Supplementation: face mask  Level of Supplemental Oxygen (L/min / FiO2): 8  Independent Airway: airway patency satisfactory and stable  Dentition: dentition unchanged  Vital Signs Stable: post-procedure vital signs reviewed and stable  Report to RN Given: handoff report given  Patient transferred to: PACU  Comments: After procedure in [facility] [place] under monitored anesthesia care (MAC), patient exhibited spontaneous respirations, oxygen via facemask with oxygen saturation maintained greater than 95%, patient brought to [facility] recovery bay for postprocedure recovery, SpO2, NiBP, and EKG monitors and alarms on and functioning, Nanette Hugger warmer connected to patient gown, report on patient's clinical status given to recovery-trained endoscopy RN, RN questions answered, oxygen tubing connected to wall O2 in recovery room.    Handoff Report: Identifed the Patient, Identified the Reponsible Provider, Reviewed the pertinent medical history, Discussed the surgical course, Reviewed Intra-OP anesthesia mangement and issues during anesthesia, Set expectations for post-procedure period and Allowed opportunity for questions and acknowledgement of understanding      Vitals:  Vitals Value Taken Time   BP     Temp     Pulse 89 07/07/23 1231   Resp 25 07/07/23 1231   SpO2 79 % 07/07/23 1231   Vitals shown include unvalidated device data.    Electronically Signed By: TERESA Alcantara CRNA  July 7, 2023  12:32 PM

## 2023-07-07 NOTE — PHARMACY-ADMISSION MEDICATION HISTORY
Pharmacist Admission Medication History    Admission medication history is complete. The information provided in this note is only as accurate as the sources available at the time of the update.    Medication reconciliation/reorder completed by provider prior to medication history? No    Information Source(s): Patient, Clinic records and CareEverywhere/SureScripts via in-person    Pertinent Information: None    Changes made to PTA medication list:    Added: None    Deleted: None    Changed: None    Allergies reviewed with patient and updates made in EHR: yes    Medication History Completed By: Arline Wan RPH 7/6/2023 8:11 PM    Prior to Admission medications    Medication Sig Last Dose Taking? Auth Provider Long Term End Date   albuterol (PROAIR HFA/PROVENTIL HFA/VENTOLIN HFA) 108 (90 Base) MCG/ACT inhaler Inhale 2 puffs into the lungs every 6 hours as needed for shortness of breath / dyspnea or wheezing  at prn Yes Booker Hopkins MD Yes    diltiazem ER COATED BEADS (CARDIZEM CD) 360 MG 24 hr capsule Take 1 capsule (360 mg) by mouth daily 7/5/2023 Yes Nate Marquez MD Yes    Fluticasone-Umeclidin-Vilanterol (TRELEGY ELLIPTA) 200-62.5-25 MCG/ACT oral inhaler Inhale 1 puff into the lungs daily 7/5/2023 Yes Booker Hopkins MD     furosemide (LASIX) 40 MG tablet Take 1 tablet (40 mg) by mouth daily as needed (leg swelling) 7/5/2023 Yes Arline White MD Yes    glipiZIDE (GLUCOTROL XL) 5 MG 24 hr tablet Take 1 tablet (5 mg) by mouth daily 7/5/2023 Yes Arline White MD Yes    lisinopril (ZESTRIL) 40 MG tablet TAKE 1 TABLET BY MOUTH EVERY DAY 7/5/2023 Yes Arline White MD Yes    metFORMIN (GLUCOPHAGE XR) 500 MG 24 hr tablet Take 2 tablets (1,000 mg) by mouth daily (with dinner) 7/5/2023 Yes Arline White MD Yes    metoprolol succinate ER (TOPROL XL) 100 MG 24 hr tablet Take 1 tablet (100 mg) by mouth 2 times daily 7/5/2023 Yes Ping Fallon PA-C Yes     rivaroxaban ANTICOAGULANT (XARELTO ANTICOAGULANT) 20 MG TABS tablet Take 1 tablet (20 mg) by mouth daily (with dinner) 7/5/2023 Yes Ping Fallon PA-C Yes    rosuvastatin (CRESTOR) 40 MG tablet Take 1 tablet (40 mg) by mouth At Bedtime 7/5/2023 Yes Brenden Felton MD Yes    vitamin D3 (CHOLECALCIFEROL) 50 mcg (2000 units) tablet Take 1 tablet (50 mcg) by mouth daily 7/5/2023 Yes Arline White MD

## 2023-07-07 NOTE — PROGRESS NOTES
See EGD report/recs.   Discussed with pt/family.    Nestor Louie MD  [facility]  Phone [phone]

## 2023-07-08 LAB
GLUCOSE BLDC GLUCOMTR-MCNC: 131 MG/DL (ref 70–99)
GLUCOSE BLDC GLUCOMTR-MCNC: 145 MG/DL (ref 70–99)
GLUCOSE BLDC GLUCOMTR-MCNC: 183 MG/DL (ref 70–99)
GLUCOSE BLDC GLUCOMTR-MCNC: 202 MG/DL (ref 70–99)
LACTATE SERPL-SCNC: 1.2 MMOL/L (ref 0.7–2)
LACTATE SERPL-SCNC: 1.4 MMOL/L (ref 0.7–2)
POTASSIUM SERPL-SCNC: 4.2 MMOL/L (ref 3.4–5.3)

## 2023-07-08 PROCEDURE — 250N000013 HC RX MED GY IP 250 OP 250 PS 637: Performed by: INTERNAL MEDICINE

## 2023-07-08 PROCEDURE — 250N000011 HC RX IP 250 OP 636: Mod: JZ | Performed by: INTERNAL MEDICINE

## 2023-07-08 PROCEDURE — 99232 SBSQ HOSP IP/OBS MODERATE 35: CPT | Performed by: INTERNAL MEDICINE

## 2023-07-08 PROCEDURE — 210N000002 HC R&B HEART CARE

## 2023-07-08 PROCEDURE — C9113 INJ PANTOPRAZOLE SODIUM, VIA: HCPCS | Mod: JZ | Performed by: NURSE PRACTITIONER

## 2023-07-08 PROCEDURE — 250N000011 HC RX IP 250 OP 636: Mod: JZ | Performed by: PHYSICIAN ASSISTANT

## 2023-07-08 PROCEDURE — 99239 HOSP IP/OBS DSCHRG MGMT >30: CPT | Performed by: HOSPITALIST

## 2023-07-08 PROCEDURE — 83605 ASSAY OF LACTIC ACID: CPT | Performed by: HOSPITALIST

## 2023-07-08 PROCEDURE — 36415 COLL VENOUS BLD VENIPUNCTURE: CPT | Performed by: HOSPITALIST

## 2023-07-08 PROCEDURE — 84132 ASSAY OF SERUM POTASSIUM: CPT | Performed by: HOSPITALIST

## 2023-07-08 PROCEDURE — 250N000011 HC RX IP 250 OP 636: Mod: JZ | Performed by: NURSE PRACTITIONER

## 2023-07-08 RX ORDER — FUROSEMIDE 10 MG/ML
40 INJECTION INTRAMUSCULAR; INTRAVENOUS EVERY 12 HOURS
Status: COMPLETED | OUTPATIENT
Start: 2023-07-08 | End: 2023-07-08

## 2023-07-08 RX ORDER — PANTOPRAZOLE SODIUM 40 MG/1
40 TABLET, DELAYED RELEASE ORAL 2 TIMES DAILY
Qty: 60 TABLET | Refills: 0 | Status: SHIPPED | OUTPATIENT
Start: 2023-07-08 | End: 2023-07-25

## 2023-07-08 RX ORDER — TORSEMIDE 20 MG/1
20 TABLET ORAL DAILY
Status: DISCONTINUED | OUTPATIENT
Start: 2023-07-09 | End: 2023-07-09 | Stop reason: HOSPADM

## 2023-07-08 RX ADMIN — FUROSEMIDE 40 MG: 10 INJECTION, SOLUTION INTRAMUSCULAR; INTRAVENOUS at 16:51

## 2023-07-08 RX ADMIN — PANTOPRAZOLE SODIUM 40 MG: 40 INJECTION, POWDER, FOR SOLUTION INTRAVENOUS at 12:01

## 2023-07-08 RX ADMIN — DILTIAZEM HYDROCHLORIDE 360 MG: 120 CAPSULE, COATED, EXTENDED RELEASE ORAL at 16:51

## 2023-07-08 RX ADMIN — FUROSEMIDE 40 MG: 10 INJECTION, SOLUTION INTRAMUSCULAR; INTRAVENOUS at 04:08

## 2023-07-08 RX ADMIN — PANTOPRAZOLE SODIUM 40 MG: 40 INJECTION, POWDER, FOR SOLUTION INTRAVENOUS at 20:13

## 2023-07-08 RX ADMIN — SACUBITRIL AND VALSARTAN 1 TABLET: 24; 26 TABLET, FILM COATED ORAL at 20:13

## 2023-07-08 RX ADMIN — METOPROLOL SUCCINATE 100 MG: 100 TABLET, EXTENDED RELEASE ORAL at 20:13

## 2023-07-08 RX ADMIN — FLUTICASONE FUROATE AND VILANTEROL TRIFENATATE 1 PUFF: 200; 25 POWDER RESPIRATORY (INHALATION) at 17:09

## 2023-07-08 RX ADMIN — METOPROLOL SUCCINATE 100 MG: 100 TABLET, EXTENDED RELEASE ORAL at 12:03

## 2023-07-08 RX ADMIN — ROSUVASTATIN CALCIUM 40 MG: 20 TABLET, FILM COATED ORAL at 21:47

## 2023-07-08 RX ADMIN — RIVAROXABAN 20 MG: 20 TABLET, FILM COATED ORAL at 16:51

## 2023-07-08 RX ADMIN — UMECLIDINIUM 1 PUFF: 62.5 AEROSOL, POWDER ORAL at 17:08

## 2023-07-08 ASSESSMENT — ACTIVITIES OF DAILY LIVING (ADL)
ADLS_ACUITY_SCORE: 35

## 2023-07-08 NOTE — PROGRESS NOTES
GI chart review:    Hb stable.  See EGD report for findings.     - ADAT  - Continue IV protonix q12h.  - H pylori stool antigen  - Recommend resuming xarelto this evening- monitor overnight. If no evidence of bleeding, Hb stable on xarelto by tomorrow, ok to plan discharge tomorrow.    I also reviewed paracentesis analysis results-    Low SAAG (<1.1), high protein (>3 g) ascites: this is suggestive of cardiac etiology rather than primary liver etiology.  - no indication for prophylactic antibiotics  - I dont see diff count on fluid results, doubt SBP.    Reviewed and appreciate cardiology team recommendations.    GI team will follow.    Nestor Louie MD  [facility]  Phone [phone]

## 2023-07-08 NOTE — PROGRESS NOTES
[facility]    Medicine Progress Note - Hospitalist Service    Date of Admission:  7/6/2023    Assessment & Plan      Booker Brown is a 68 year old a man with numerous medical problems including chronic hypoxic respiratory failure due to COPD, ILD, restriction from obesity, also persistent atrial fibrillation, advanced CAD, hypertension, stage III chronic kidney disease who presents emergency department with worsening lower extremity edema and weight gain.  He is incidentally noted to have melena.        Melena    Anemia due to blood loss, acute  Hemoglobin stable at 8.7  -Continue with serial hemoglobin checks  -S/P EGD on 7/7/23 >> findings include;Two gastric ulcers, one with stigmata of recent bleeding.H.    -  pylori stool test     -Hold xarelto 24-48 hrs pending course.     - Monitor hb, clinically for any rebleeding.     - No indication for octreotide. If ascites labs are     -  supporting cirrhosis, then prophylactic antibioticneeded for 5-7 days      Benign essential hypertension  Patient has advanced CAD, please see Dr. Esposito note from 6/6/2023.  Based on this, continue beta-blocker  Hold ACE inhibitor      Persistent atrial fibrillation (H)  Continue beta-blocker for ventricular rate control  Hold Xarelto due to GI bleed until Monday 7/9      CAD (coronary artery disease)   Acute on chronic systolic congestive heart failure   Cardiology is following  Currently on  lasix 40 BID per cards. tmg daily >> to be converted to oral regimen sat discharge  Continue metoprolol and Statis,   start Etresto  Per cardiology, pending Insurance coverage>. Pharmacy to look into it.         Chronic kidney disease, stage III (moderate) (H)   Stable,  Daily BMP      Type 2 diabetes mellitus (H)    *Hemoglobin A1c was 5.6 on 6/9/2023  Hold metformin  Sliding scale insulin, low correction scale      COPD (chronic obstructive pulmonary disease) (H)    ILD (interstitial lung disease) (H)   Sees   "Sandeep  On 8 L oxygen per NC at baseline   Chest CT shows extensive pleural calcifications consistent with prior asbestos exposure  Had positive LISA, was seen by rheumatology, they did not feel there was underlying autoimmune disease    Also per Dr. Esposito, \"patient has since followed up with Dr. Hopkins and his group.  They all acknowledged that he has some form of lung disease.  I understand that they had a multidisciplinary meeting, and I understand based on the patient they do not think there is much more they have to offer.  He is on high-flow oxygen.\"    Continued metered-dose inhalers    Continue high flow oxygen could add noninvasive ventilation if needed, but he clearly states he desires DNR/DNI      Pure hypercholesterolemia    Continue statin    Diet:  Clear liquids, then advance as tolerated and per GI input  DVT Prophylaxis: Pneumatic Compression Devices  Sofia Catheter: Not present  Lines: None     Cardiac Monitoring: None  Code Status:  DNR/DNI, discussed with patient         Diet: Regular Diet Adult    DVT Prophylaxis: Mechanical, chemical DVT prophylaxis deferred due to upcoming procedure.  Sofia Catheter: Not present  Lines: None     Cardiac Monitoring: ACTIVE order. Indication: Acute decompensated heart failure (48 hours)  Code Status: No CPR- Do NOT Intubate      Clinically Significant Risk Factors              # Hypoalbuminemia: Lowest albumin = 3.3 g/dL at 7/6/2023  4:24 PM, will monitor as appropriate     # Hypertension: Noted on problem list        # Obesity: Estimated body mass index is 36.16 kg/m  as calculated from the following:    Height as of this encounter: 1.778 m (5' 10\").    Weight as of this encounter: 114.3 kg (252 lb)., PRESENT ON ADMISSION          Disposition Plan      Expected Discharge Date: 07/09/2023                  Charan Yan MD  Hospitalist Service  [facility]  Securely message with [employer] (more info)  Text page via [employer] " Shantiing/Viviane   ______________________________________________________________________    Interval History   Feels same no new complaints    Physical Exam   Vital Signs: Temp: 97.7  F (36.5  C) Temp src: Oral BP: 111/75 Pulse: 85   Resp: 16 SpO2: 100 % O2 Device: Nasal cannula Oxygen Delivery: 8 LPM  Weight: 252 lbs 0 oz        Medical Decision Making             Data

## 2023-07-08 NOTE — PROGRESS NOTES
[facility]    Cardiology Consultation - Progress Note     Date of Admission:  7/6/2023  Date of Service: 7/8/2023    Reason for Consult   Reason for consult: heart failure    History of Present Illness   Booker Brown is a 68 year old adult who was admitted on 7/6/2023 for worsening lower extremity edema and weight gain as well as melena. Medical comorbidities include chronic hypoxic respiratory failure due to COPD on 8L O2, atrial fibrillation on Xarelto, advanced coronary artery disease, ischemic cardiomyopathy, hypertension, and chronic kidney disease stage 3.    Due to concern for GI bleed his Xarelto was held. EGD showed two gastric ulcers, one with recent bleeding, these were treated with bipolar cautery.     He was also found to have large abdominal ascites with possible early stage cirrhosis on imaging, paracentesis removed 4.4L of ascitic fluid.    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 His symptoms are significantly improved after paracentesis, but he continues to have pitting edema to the thighs on exam. He uses no diuretic therapy as an outpatient.     Assessment & Plan   #1 Heart failure with mildly reduced ejection fraction, 40-45%  #2 Complex coronary artery disease with an occluded RCA and LCX. His LAD is open, but there is a flow-limiting lesion heavily calcified in the proximal and mid LAD.  He has been turned down for CABG d/t his lung disease. He saw Dr. Marquez in the cardiology clinic on June 6th, at which time they reviewed his complex coronary anatomy. He felt that intervention of his known severe proximal to mid LAD lesion would be incredibly high risk, and given his lack of convincing symptoms, conservative management was recommended.   #3 COPD with chronic respiratory failure on 8L supplemental O2 at baseline  #4 Atrial fibrillation anticoagulated with Xarelto  #5 Hypertension  #6 CKD stage 3  #7  GI bleed from two gastric ulcers treated with bipolar cautery on 7/7/2023  #8 Ascites with probable cirrhosis    Plan:  1. IV Lasix 40 mg BID, transition to oral torsemide 20 mg daily tomorrow  2. Entresto is covered $47 for 30 days supply, can start low dose this evening or tomorrow morning  3. Will defer SGLT2i therapy and spironolactone for now given his chronic kidney disease  4. He has an elevated [id number] score, restart Xarelto this evening at dinner  5. From a cardiology perspective it is reasonable to consider discharge tomorrow if no rebleeding after starting Xarelto    Deniz Amador MD    Primary Care Physician   Arline White    Patient Active Problem List   Diagnosis     Benign essential hypertension     Pure hypercholesterolemia     Persistent atrial fibrillation (H)     CAD (coronary artery disease)     Obesity (BMI 30-39.9)     Chronic kidney disease, stage III (moderate) (H)     Morbid obesity (H)     Type 2 diabetes mellitus (H)     COPD (chronic obstructive pulmonary disease) (H)     ILD (interstitial lung disease) (H)     Other ascites     Anemia due to blood loss, acute     Melena     Acute pulmonary edema (H)     Upper GI bleed     Hypoxia     Hypervolemia, unspecified hypervolemia type       Past Medical History   I have reviewed this patient's medical history and updated it with pertinent information if needed.   Past Medical History:   Diagnosis Date     Benign essential hypertension      CAD (coronary artery disease)      Chronic kidney disease, stage III (moderate) (H)      COPD (chronic obstructive pulmonary disease) (H)      ILD (interstitial lung disease) (H)      Obesity (BMI 30-39.9)      Persistent atrial fibrillation (H)      Pure hypercholesterolemia      Type 2 diabetes mellitus (H)        Past Surgical History   I have reviewed this patient's surgical history and updated it with pertinent information if needed.  Past Surgical History:   Procedure Laterality Date      COLONOSCOPY N/A 8/24/2018    Procedure: COMBINED COLONOSCOPY, SINGLE OR MULTIPLE BIOPSY/POLYPECTOMY BY BIOPSY;;  Surgeon: Lawrence Mata MD;  Location:  GI     CV CORONARY ANGIOGRAM N/A 11/18/2022    Procedure: Coronary Angiogram;  Surgeon: Mason Lucas MD;  Location:  HEART CARDIAC CATH LAB     CV INSTANTANEOUS WAVE-FREE RATIO N/A 11/18/2022    Procedure: Instantaneous Wave-Free Ratio;  Surgeon: Mason Lucas MD;  Location:  HEART CARDIAC CATH LAB     CV LEFT HEART CATH N/A 11/18/2022    Procedure: Left Heart Catheterization;  Surgeon: Mason Lucas MD;  Location:  HEART CARDIAC CATH LAB     ESOPHAGOSCOPY, GASTROSCOPY, DUODENOSCOPY (EGD), COMBINED N/A 7/7/2023    Procedure: Esophagoscopy, gastroscopy, duodenoscopy (EGD), combined;  Surgeon: Nestor Louie MD;  Location:  GI       Prior to Admission Medications   Prior to Admission Medications   Prescriptions Last Dose Informant Patient Reported? Taking?   ACCU-CHEK GUIDE test strip   No No   Sig: USE TO TEST BLOOD SUGAR 1 TIMES DAILY OR AS DIRECTED.   ASPIRIN NOT PRESCRIBED (INTENTIONAL)   Yes No   Sig: Please choose reason not prescribed from choices below.   Fluticasone-Umeclidin-Vilanterol (TRELEGY ELLIPTA) 200-62.5-25 MCG/ACT oral inhaler 7/5/2023  No Yes   Sig: Inhale 1 puff into the lungs daily   albuterol (PROAIR HFA/PROVENTIL HFA/VENTOLIN HFA) 108 (90 Base) MCG/ACT inhaler  at prn  No Yes   Sig: Inhale 2 puffs into the lungs every 6 hours as needed for shortness of breath / dyspnea or wheezing   alcohol swab prep pads   No No   Sig: Use to swab area of injection/fariha as directed.   blood glucose monitoring (NO BRAND SPECIFIED) meter device kit   No No   Sig: Use to test blood sugar 1 times daily or as directed. : per insurance.   diltiazem ER COATED BEADS (CARDIZEM CD) 360 MG 24 hr capsule 7/5/2023  No Yes   Sig: Take 1 capsule (360 mg) by mouth daily   furosemide (LASIX) 40 MG tablet 7/5/2023  Yes Yes   Sig: Take 1  tablet (40 mg) by mouth daily as needed (leg swelling)   glipiZIDE (GLUCOTROL XL) 5 MG 24 hr tablet 7/5/2023  No Yes   Sig: Take 1 tablet (5 mg) by mouth daily   lisinopril (ZESTRIL) 40 MG tablet 7/5/2023  No Yes   Sig: TAKE 1 TABLET BY MOUTH EVERY DAY   metFORMIN (GLUCOPHAGE XR) 500 MG 24 hr tablet 7/5/2023  No Yes   Sig: Take 2 tablets (1,000 mg) by mouth daily (with dinner)   metoprolol succinate ER (TOPROL XL) 100 MG 24 hr tablet 7/5/2023  No Yes   Sig: Take 1 tablet (100 mg) by mouth 2 times daily   rivaroxaban ANTICOAGULANT (XARELTO ANTICOAGULANT) 20 MG TABS tablet 7/5/2023  No No   Sig: Take 1 tablet (20 mg) by mouth daily (with dinner)   rosuvastatin (CRESTOR) 40 MG tablet 7/5/2023  No Yes   Sig: Take 1 tablet (40 mg) by mouth At Bedtime   thin (NO BRAND SPECIFIED) lancets   No No   Sig: Use with lanceting device. To accompany: Blood Glucose Monitor Brands: per insurance.   vitamin D3 (CHOLECALCIFEROL) 50 mcg (2000 units) tablet 7/5/2023  No Yes   Sig: Take 1 tablet (50 mcg) by mouth daily      Facility-Administered Medications: None     Current Facility-Administered Medications   Medication Dose Route Frequency     diltiazem ER COATED BEADS  360 mg Oral Daily with supper     fluticasone-vilanterol  1 puff Inhalation Daily with supper    And     umeclidinium  1 puff Inhalation Daily with supper     furosemide  40 mg Intravenous Q12H     insulin aspart  1-3 Units Subcutaneous TID AC     insulin aspart  1-3 Units Subcutaneous At Bedtime     metoprolol succinate ER  100 mg Oral BID     pantoprazole  40 mg Intravenous BID     rosuvastatin  40 mg Oral At Bedtime     sodium chloride (PF)  3 mL Intracatheter Q8H     Current Facility-Administered Medications   Medication Last Rate     Allergies   No Known Allergies    Social History    reports that he quit smoking about 35 years ago. His smoking use included cigarettes. He has a 32.00 pack-year smoking history. He has never used smokeless tobacco. He reports that he  "does not drink alcohol and does not use drugs.    Family History   Family History   Problem Relation Age of Onset     Diabetes Type 2  Mother      Cerebral aneurysm Sister      Bladder Cancer Brother      Myocardial Infarction Brother         in his 50s     Cerebrovascular Disease No family hx of      Coronary Artery Disease Early Onset No family hx of      Prostate Cancer No family hx of      Colon Cancer No family hx of      Clotting Disorder No family hx of      Bleeding Disorder No family hx of      Anesthesia Reaction No family hx of        Review of Systems   The comprehensive Review of Systems is negative other than noted in the HPI or here.     Physical Exam   Vital Signs with Ranges  Temp:  [97.7  F (36.5  C)] 97.7  F (36.5  C)  Pulse:  [] 85  Resp:  [14-52] 16  BP: (104-125)/(65-85) 111/75  SpO2:  [84 %-100 %] 100 %  Wt Readings from Last 4 Encounters:   07/08/23 114.3 kg (252 lb)   06/06/23 117 kg (258 lb)   03/30/23 117.9 kg (260 lb)   02/21/23 121.9 kg (268 lb 12.8 oz)     I/O last 3 completed shifts:  In: 303 [I.V.:303]  Out: 2725 [Urine:2725]      Vitals: /75 (BP Location: Right arm)   Pulse 85   Temp 97.7  F (36.5  C) (Oral)   Resp 16   Ht 1.778 m (5' 10\")   Wt 114.3 kg (252 lb)   SpO2 100%   BMI 36.16 kg/m      General: Alert, oriented, in no acute distress  HEENT: PERRLA, mucous membranes moist  Neck: Normal JVP  CV: Regular rate and rhythm without murmur  Respiratory: Clear to auscultation bilaterally  GI: Normoactive bowel sounds; soft, non-tender abdomen  Neuro: No focal deficits appreciated  Extremities: Wrapped, trace to 1+ edema in thighs    Recent Labs   Lab 07/08/23  0808 07/07/23  2112 07/07/23  1837 07/07/23  1752 07/07/23  1123 07/07/23  0519 07/06/23  2107 07/06/23  1624   WBC  --   --   --   --   --   --   --  10.3   HGB  --   --  8.7*  --  8.5* 8.5*   < > 7.7*   MCV  --   --   --   --   --   --   --  88   PLT  --   --   --   --   --   --   --  257   NA  --   --   -- "   --   --  144  --  144   POTASSIUM  --   --   --   --   --  4.7  --  4.8   CHLORIDE  --   --   --   --   --  101  --  103   CO2  --   --   --   --   --  35*  --  28   BUN  --   --   --   --   --  39.0*  --  42.8*   CR  --   --   --   --   --  1.67*  --  1.65*   GFRESTIMATED  --   --   --   --   --  45*  --  46*   ANIONGAP  --   --   --   --   --  8  --  13   KURT  --   --   --   --   --  9.2  --  9.2   * 149*  --  129*  --  120*   < > 128*   ALBUMIN  --   --   --   --   --   --   --  3.3*   PROTTOTAL  --   --   --   --   --   --   --  6.6   BILITOTAL  --   --   --   --   --   --   --  0.3   ALKPHOS  --   --   --   --   --   --   --  100   ALT  --   --   --   --   --   --   --  20   AST  --   --   --   --   --   --   --  22    < > = values in this interval not displayed.     Recent Labs   Lab Test 06/09/23  1510 05/03/22  0950 05/09/16  0925 09/28/15  1523   CHOL 89 103   < > 143   HDL 33* 32*   < > 32*   LDL 42 61   < > 67   TRIG 68 50   < > 221*   CHOLHDLRATIO  --   --   --  4.5    < > = values in this interval not displayed.     Recent Labs   Lab 07/07/23  1837 07/07/23  1123 07/07/23  0519 07/07/23  0140 07/06/23  1624   WBC  --   --   --   --  10.3   HGB 8.7* 8.5* 8.5*   < > 7.7*   HCT  --   --   --   --  28.0*   MCV  --   --   --   --  88   PLT  --   --   --   --  257   IRON  --   --   --   --  27*   IRONSAT  --   --   --   --  9*   FEB  --   --   --   --  306    < > = values in this interval not displayed.     No results for input(s): PH, PHV, PO2, PO2V, SAT, PCO2, PCO2V, HCO3, HCO3V in the last 168 hours.  Recent Labs   Lab 07/06/23  1624   NTBNPI 7,325*     No results for input(s): DD in the last 168 hours.  No results for input(s): SED, CRP in the last 168 hours.  Recent Labs   Lab 07/06/23  1624        Recent Labs   Lab 07/06/23  1624   TSH 1.02     No results for input(s): COLOR, APPEARANCE, URINEGLC, URINEBILI, URINEKETONE, SG, UBLD, URINEPH, PROTEIN, UROBILINOGEN, NITRITE, LEUKEST, RBCU,  WBCU in the last 168 hours.    Imaging:  Recent Results (from the past 48 hour(s))   CT Chest Abdomen Pelvis w/o Contrast    Narrative    EXAM: CT CHEST ABDOMEN PELVIS W/O CONTRAST  LOCATION: [facility]  DATE: 7/6/2023    INDICATION: abd bloating, shortness of breath, hypoxia, ckd  COMPARISON: Chest CT from 05/12/2023  TECHNIQUE: CT scan of the chest, abdomen, and pelvis was performed without IV contrast. Multiplanar reformats were obtained. Dose reduction techniques were used.   CONTRAST: None.    FINDINGS:   LUNGS AND PLEURA: Mild to moderate paraseptal predominant emphysema. New patchy multifocal groundglass somewhat nodular pulmonary opacities. Mild bronchial wall thickening. Small pleural effusions and associated atelectasis. Scattered calcified pleural   plaques compatible with remote asbestos exposure. There is similar subpleural atelectasis and scarring. Mild bronchial wall thickening. Trace airway secretions.    MEDIASTINUM/AXILLAE: Cardiomegaly. Low blood pool relative to the myocardium indicative of anemia. Nearly resolved pericardial effusion. Persistent borderline to mildly enlarged mediastinal lymph nodes. The largest is in the subcarinal chain measuring   1.3 cm in short axis, similar to prior exam (series 3/68). A few of the mediastinal lymph nodes are marginally increased compared to prior exam.    CORONARY ARTERY CALCIFICATION: Severe.    HEPATOBILIARY: The liver is mildly dysmorphic. No calcified gallstones are biliary ductal dilation.     PANCREAS: Normal.    SPLEEN: Normal.    ADRENAL GLANDS: Nodular thickening of the left adrenal gland.    KIDNEYS/BLADDER: No significant mass, stone, or hydronephrosis. Moderate atrophy of the left kidney.    BOWEL: Diverticulosis of the colon. No acute inflammatory change. No obstruction. Moderate to large volume ascites.    LYMPH NODES: Normal.    VASCULATURE: Extensive atherosclerosis.    PELVIC ORGANS: Normal.    MUSCULOSKELETAL:  Degenerative changes of the spine. Osteopenia. Diffuse anasarca.      Impression    IMPRESSION:  Chest:  1.  New patchy multifocal somewhat nodular groundglass pulmonary opacities suggestive of infectious/inflammatory etiology. Follow-up is recommended to ensure resolution.  2.  Mild to moderate paraseptal emphysema.  3.  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  4.  Nearly resolved pericardial effusion.  5.  Persistent but marginally increased mediastinal adenopathy.  6.  Cardiomegaly. Severe coronary atherosclerosis.    Abdomen and pelvis:  1.  Moderate to large volume ascites.  2.  The liver is mildly dysmorphic suggestive of fibrosis and/or early cirrhosis.  3.  Manifestations of third spacing.  4.  Diverticulosis.   US Paracentesis without Albumin    Narrative    US PARACENTESIS WITHOUT ALBUMIN 7/7/2023 11:08 AM     HISTORY: History of congestive heart failure. New abdominal ascites.  Possible cirrhosis on imaging.    FINDINGS: Ultrasound was used to evaluate for the presence and best  approach for paracentesis. Written and oral informed consent was  obtained. A pause for the cause procedure to verify the correct  patient and correct procedure. The skin overlying the right lower  quadrant was prepped and draped in the usual sterile fashion. The  subcutaneous tissues were anesthetized with 10 mL 1% lidocaine. A  catheter was advanced into the peritoneal space and 4.4 L of  straw-colored fluid was drained. There were no immediate  complications. Ultrasound images were permanently stored.  Patient  left the ultrasound suite in satisfactory condition.      Impression    IMPRESSION: Technically successful paracentesis without immediate  complications.    SHERRI TELLEZ MD         SYSTEM ID:  [id number]         Medical Decision Making       35 MINUTES SPENT BY ME on the date of service doing chart review, history, exam, documentation & further activities per the note.

## 2023-07-08 NOTE — PLAN OF CARE
Goal Outcome Evaluation:    [id number]      Pt is A&OX4. VSS on 8L NC. Tele: Afib CVR. Up SBA. Voiding with good urine output.  Discharge pending.

## 2023-07-08 NOTE — DISCHARGE SUMMARY
"[facility]  Hospitalist Discharge Summary      Date of Admission:  7/6/2023  Date of Discharge:  7/8/2023  Discharging Provider: Charan Yan MD  Discharge Service: Hospitalist Service    Discharge Diagnoses   Patient Active Problem List   Diagnosis     Benign essential hypertension     Pure hypercholesterolemia     Persistent atrial fibrillation (H)     CAD (coronary artery disease)     Obesity (BMI 30-39.9)     Chronic kidney disease, stage III (moderate) (H)     Morbid obesity (H)     Type 2 diabetes mellitus (H)     COPD (chronic obstructive pulmonary disease) (H)     ILD (interstitial lung disease) (H)     Other ascites     Anemia due to blood loss, acute     Melena     Acute pulmonary edema (H)     Upper GI bleed     Hypoxia     Hypervolemia, unspecified hypervolemia type         Clinically Significant Risk Factors     # Obesity: Estimated body mass index is 36.16 kg/m  as calculated from the following:    Height as of this encounter: 1.778 m (5' 10\").    Weight as of this encounter: 114.3 kg (252 lb).       Follow-ups Needed After Discharge   Follow-up Appointments     Follow-up and recommended labs and tests       Follow up with primary care provider, Arline White, within 7 days   for hospital follow- up.  No follow up labs or test are needed.        {Additional follow-up instructions/to-do's for PCP  for routine cares.    Unresulted Labs Ordered in the Past 30 Days of this Admission     Date and Time Order Name Status Description    7/7/2023 11:09 AM Differential Body Fluid In process     7/7/2023  9:38 AM Ascites Fluid Aerobic Bacterial Culture Routine Preliminary       These results will be followed up by PCP    Discharge Disposition   Discharged to home  Condition at discharge: Stable    Hospital Course   Booker Brown is a 68 year old a man with numerous medical problems including chronic hypoxic respiratory failure due to COPD, ILD, restriction from " "obesity, also persistent atrial fibrillation, advanced CAD, hypertension, stage III chronic kidney disease who presents emergency department with worsening lower extremity edema and weight gain.  He is incidentally noted to have melena.    Principal Problem:    Melena  Patient is a status post upper endoscopy which revealed 2 gastric ulcers, with recent stigmata of bleed.  Plan is to continue PPI twice daily, hold Xarelto for 1 week, continue baby aspirin and close follow-up with primary care provider      Active Problems:    Anemia due to blood loss, acute    Most recent hemoglobin was 11.8 g/dL on 2/21/2023.  He reports dark stools but no abdominal pain.    Check iron studies, he may benefit from parenteral iron    Follow serial hemoglobin    Transfuse for hemoglobin less than 8 g/dL    Benign essential hypertension    Patient has advanced CAD, please see Dr. Esposito note from 6/6/2023.  Based on this, continue beta-blocker    Hold ACE inhibitor    Persistent atrial fibrillation (H)    Continue beta-blocker for ventricular rate control    Hold Xarelto due to GI bleed    CAD (coronary artery disease)   Acute on chronic systolic congestive heart failure    Other ascites    Please see detailed notes from Dr. Esposito, most recently on 6/6/2023.  Patient has advanced CAD, \" I am concerned that doing a very, very high-risk procedure, which would be Rotablator of his last remaining normal artery to his LAD in a patient who has a reduced ejection fraction, would be high risk.......to do a high-risk procedure on a patient's last remaining normal artery, whose symptoms may all be lung disease with no angina, is probably too high risk for the family to move forward with now.\"    He recommended echocardiogram    Cardiology consult requested    Chronic kidney disease, stage III (moderate) (H)    Daily BMP    Type 2 diabetes mellitus (H)    *Hemoglobin A1c was 5.6 on 6/9/2023    Hold metformin    Begin corrective dose insulin, " "low correction scale    COPD (chronic obstructive pulmonary disease) (H)    ILD (interstitial lung disease) (H)  * Sees Dr. Hopkins  * On 8 L oxygen per NC at baseline   *Chest CT shows extensive pleural calcifications consistent with prior asbestos exposure  *Had positive LISA, was seen by rheumatology, they did not feel there was underlying autoimmune disease    Also per Dr. Esposito, \"patient has since followed up with Dr. Hopkins and his group.  They all acknowledged that he has some form of lung disease.  I understand that they had a multidisciplinary meeting, and I understand based on the patient they do not think there is much more they have to offer.  He is on high-flow oxygen.\"    Continued metered-dose inhalers    Continue high flow oxygen could add noninvasive ventilation if needed, but he clearly states he desires DNR/DNI  Pure hypercholesterolemia    Continue statin    Diet:  Clear liquids, then n.p.o. at midnight  DVT Prophylaxis: Pneumatic Compression Devices  Sofia Catheter: Not present  Lines: None     Cardiac Monitoring: None  Code Status:  DNR/DNI, discussed with patient        Consultations This Hospital Stay   CARDIOLOGY IP CONSULT  PULMONARY IP CONSULT  GASTROENTEROLOGY IP CONSULT  PHARMACY LIAISON FOR MEDICATION COVERAGE CONSULT    Code Status   No CPR- Do NOT Intubate    Time Spent on this Encounter   I, Charan Yan MD, personally saw the patient today and spent greater than 30 minutes discharging this patient.       Charan Yan MD  [facility]  [address] [place] [address], [address]  [facility] [address]  Phone: [phone]  ______________________________________________________________________    Physical Exam   Vital Signs: Temp: 97.7  F (36.5  C) Temp src: Oral BP: 111/75 Pulse: 85   Resp: 16 SpO2: 100 % O2 Device: Nasal cannula Oxygen Delivery: 8 LPM  Weight: 252 lbs 0 oz  General Appearance: Alert in no distress sitting up in a chair on 7 L " of oxygen which is baseline  Respiratory: Decreased breath sounds, no wheezing or crackles  Cardiovascular: Regular rate and rhythm no murmurs or gallops  GI: Abdomen is soft no distention, no guarding positive bowel sounds         Primary Care Physician   Arline White    Discharge Orders      Reason for your hospital stay    Lower extremity edema and weight gain, also incidentally found to have Melena     Follow-up and recommended labs and tests     Follow up with primary care provider, Arline White, within 7 days for hospital follow- up.  No follow up labs or test are needed.     Activity    Your activity upon discharge: activity as tolerated     Diet    Follow this diet upon discharge: Orders Placed This Encounter      Regular Diet Adult       Significant Results and Procedures   Most Recent 3 CBC's:Recent Labs   Lab Test 07/09/23  1130 07/07/23  1837 07/07/23  1123 07/07/23  0140 07/06/23  1624 02/21/23  1400 11/18/22  0714   WBC  --   --   --   --  10.3 10.2 12.8*   HGB 8.4* 8.7* 8.5*   < > 7.7* 11.8 14.2   MCV  --   --   --   --  88 88 83   PLT  --   --   --   --  257 220 330    < > = values in this interval not displayed.       Discharge Medications   Current Discharge Medication List      CONTINUE these medications which have CHANGED    Details   rivaroxaban ANTICOAGULANT (XARELTO ANTICOAGULANT) 20 MG TABS tablet Take 1 tablet (20 mg) by mouth daily (with dinner)  Qty: 90 tablet, Refills: 3    Associated Diagnoses: Paroxysmal atrial fibrillation (H)         CONTINUE these medications which have NOT CHANGED    Details   albuterol (PROAIR HFA/PROVENTIL HFA/VENTOLIN HFA) 108 (90 Base) MCG/ACT inhaler Inhale 2 puffs into the lungs every 6 hours as needed for shortness of breath / dyspnea or wheezing  Qty: 18 g, Refills: 7    Comments: Pharmacy may dispense brand covered by insurance (Proair, or proventil or ventolin or generic albuterol inhaler)  Associated Diagnoses: Abnormal CT scan of lung       diltiazem ER COATED BEADS (CARDIZEM CD) 360 MG 24 hr capsule Take 1 capsule (360 mg) by mouth daily  Qty: 90 capsule, Refills: 1    Associated Diagnoses: Persistent atrial fibrillation (H)      Fluticasone-Umeclidin-Vilanterol (TRELEGY ELLIPTA) 200-62.5-25 MCG/ACT oral inhaler Inhale 1 puff into the lungs daily  Qty: 1 each, Refills: 4    Associated Diagnoses: Chronic obstructive pulmonary disease, unspecified COPD type (H)      furosemide (LASIX) 40 MG tablet Take 1 tablet (40 mg) by mouth daily as needed (leg swelling)  Qty: 90 tablet, Refills: 3    Associated Diagnoses: Bilateral lower extremity edema      glipiZIDE (GLUCOTROL XL) 5 MG 24 hr tablet Take 1 tablet (5 mg) by mouth daily  Qty: 90 tablet, Refills: 1    Associated Diagnoses: Type 2 diabetes with circulatory disorder causing erectile dysfunction (H)      lisinopril (ZESTRIL) 40 MG tablet TAKE 1 TABLET BY MOUTH EVERY DAY  Qty: 90 tablet, Refills: 3    Associated Diagnoses: Essential hypertension      metFORMIN (GLUCOPHAGE XR) 500 MG 24 hr tablet Take 2 tablets (1,000 mg) by mouth daily (with dinner)  Qty: 180 tablet, Refills: 3      metoprolol succinate ER (TOPROL XL) 100 MG 24 hr tablet Take 1 tablet (100 mg) by mouth 2 times daily  Qty: 180 tablet, Refills: 3    Associated Diagnoses: Persistent atrial fibrillation (H)      rosuvastatin (CRESTOR) 40 MG tablet Take 1 tablet (40 mg) by mouth At Bedtime  Qty: 90 tablet, Refills: 3    Associated Diagnoses: Coronary artery calcification of native artery      vitamin D3 (CHOLECALCIFEROL) 50 mcg (2000 units) tablet Take 1 tablet (50 mcg) by mouth daily  Qty: 90 tablet, Refills: 3    Associated Diagnoses: Vitamin D deficiency      ACCU-CHEK GUIDE test strip USE TO TEST BLOOD SUGAR 1 TIMES DAILY OR AS DIRECTED.  Qty: 100 strip, Refills: 0    Associated Diagnoses: Type 2 diabetes with circulatory disorder causing erectile dysfunction (H)      alcohol swab prep pads Use to swab area of injection/fariha as  directed.  Qty: 100 each, Refills: 3    Associated Diagnoses: Type 2 diabetes with circulatory disorder causing erectile dysfunction (H)      ASPIRIN NOT PRESCRIBED (INTENTIONAL) Please choose reason not prescribed from choices below.    Associated Diagnoses: Type 2 diabetes mellitus with stage 3a chronic kidney disease, without long-term current use of insulin (H); Coronary artery disease involving native coronary artery of native heart without angina pectoris      blood glucose monitoring (NO BRAND SPECIFIED) meter device kit Use to test blood sugar 1 times daily or as directed. : per insurance.  Qty: 1 kit, Refills: 0    Associated Diagnoses: Type 2 diabetes with circulatory disorder causing erectile dysfunction (H)      thin (NO BRAND SPECIFIED) lancets Use with lanceting device. To accompany: Blood Glucose Monitor Brands: per insurance.  Qty: 1 each, Refills: 6    Associated Diagnoses: Type 2 diabetes with circulatory disorder causing erectile dysfunction (H)           Allergies   No Known Allergies

## 2023-07-09 ENCOUNTER — APPOINTMENT (OUTPATIENT)
Dept: OCCUPATIONAL THERAPY | Facility: CLINIC | Age: 68
DRG: 377 | End: 2023-07-09
Attending: INTERNAL MEDICINE
Payer: COMMERCIAL

## 2023-07-09 VITALS
BODY MASS INDEX: 36.08 KG/M2 | TEMPERATURE: 96.5 F | DIASTOLIC BLOOD PRESSURE: 51 MMHG | WEIGHT: 252 LBS | HEIGHT: 70 IN | SYSTOLIC BLOOD PRESSURE: 96 MMHG | RESPIRATION RATE: 18 BRPM | HEART RATE: 87 BPM | OXYGEN SATURATION: 100 %

## 2023-07-09 LAB
GLUCOSE BLDC GLUCOMTR-MCNC: 136 MG/DL (ref 70–99)
GLUCOSE BLDC GLUCOMTR-MCNC: 216 MG/DL (ref 70–99)
HGB BLD-MCNC: 8.4 G/DL (ref 11.7–17.7)
POTASSIUM SERPL-SCNC: 4.2 MMOL/L (ref 3.4–5.3)

## 2023-07-09 PROCEDURE — 97530 THERAPEUTIC ACTIVITIES: CPT | Mod: GO

## 2023-07-09 PROCEDURE — 97165 OT EVAL LOW COMPLEX 30 MIN: CPT | Mod: GO

## 2023-07-09 PROCEDURE — 250N000013 HC RX MED GY IP 250 OP 250 PS 637: Performed by: INTERNAL MEDICINE

## 2023-07-09 PROCEDURE — 99232 SBSQ HOSP IP/OBS MODERATE 35: CPT | Performed by: INTERNAL MEDICINE

## 2023-07-09 PROCEDURE — 36415 COLL VENOUS BLD VENIPUNCTURE: CPT | Performed by: INTERNAL MEDICINE

## 2023-07-09 PROCEDURE — C9113 INJ PANTOPRAZOLE SODIUM, VIA: HCPCS | Mod: JZ | Performed by: NURSE PRACTITIONER

## 2023-07-09 PROCEDURE — 250N000011 HC RX IP 250 OP 636: Mod: JZ | Performed by: NURSE PRACTITIONER

## 2023-07-09 PROCEDURE — 85018 HEMOGLOBIN: CPT | Performed by: INTERNAL MEDICINE

## 2023-07-09 PROCEDURE — 250N000012 HC RX MED GY IP 250 OP 636 PS 637: Performed by: INTERNAL MEDICINE

## 2023-07-09 PROCEDURE — 84132 ASSAY OF SERUM POTASSIUM: CPT | Performed by: INTERNAL MEDICINE

## 2023-07-09 RX ORDER — FERROUS SULFATE 325(65) MG
325 TABLET ORAL 2 TIMES DAILY WITH MEALS
Status: DISCONTINUED | OUTPATIENT
Start: 2023-07-09 | End: 2023-07-09 | Stop reason: HOSPADM

## 2023-07-09 RX ORDER — FERROUS SULFATE 325(65) MG
325 TABLET ORAL 2 TIMES DAILY WITH MEALS
Status: DISCONTINUED | OUTPATIENT
Start: 2023-07-09 | End: 2023-07-09

## 2023-07-09 RX ORDER — FERROUS SULFATE 325(65) MG
325 TABLET ORAL 2 TIMES DAILY WITH MEALS
Qty: 60 TABLET | Refills: 1 | Status: SHIPPED | OUTPATIENT
Start: 2023-07-09 | End: 2023-09-19

## 2023-07-09 RX ORDER — TORSEMIDE 20 MG/1
20 TABLET ORAL DAILY
Qty: 30 TABLET | Refills: 0 | Status: SHIPPED | OUTPATIENT
Start: 2023-07-10 | End: 2023-01-01

## 2023-07-09 RX ORDER — AMOXICILLIN 250 MG
1 CAPSULE ORAL 2 TIMES DAILY
Status: DISCONTINUED | OUTPATIENT
Start: 2023-07-09 | End: 2023-07-09 | Stop reason: HOSPADM

## 2023-07-09 RX ORDER — AMOXICILLIN 250 MG
1 CAPSULE ORAL 2 TIMES DAILY
Qty: 14 TABLET | Refills: 1 | Status: SHIPPED | OUTPATIENT
Start: 2023-07-09 | End: 2023-07-27

## 2023-07-09 RX ADMIN — METOPROLOL SUCCINATE 100 MG: 100 TABLET, EXTENDED RELEASE ORAL at 10:59

## 2023-07-09 RX ADMIN — PANTOPRAZOLE SODIUM 40 MG: 40 INJECTION, POWDER, FOR SOLUTION INTRAVENOUS at 10:56

## 2023-07-09 RX ADMIN — TORSEMIDE 20 MG: 20 TABLET ORAL at 11:00

## 2023-07-09 RX ADMIN — SACUBITRIL AND VALSARTAN 1 TABLET: 24; 26 TABLET, FILM COATED ORAL at 10:59

## 2023-07-09 RX ADMIN — SENNOSIDES AND DOCUSATE SODIUM 1 TABLET: 50; 8.6 TABLET ORAL at 14:32

## 2023-07-09 RX ADMIN — INSULIN ASPART 1 UNITS: 100 INJECTION, SOLUTION INTRAVENOUS; SUBCUTANEOUS at 15:20

## 2023-07-09 RX ADMIN — FERROUS SULFATE TAB 325 MG (65 MG ELEMENTAL FE) 325 MG: 325 (65 FE) TAB at 14:32

## 2023-07-09 ASSESSMENT — ACTIVITIES OF DAILY LIVING (ADL)
ADLS_ACUITY_SCORE: 35

## 2023-07-09 NOTE — PLAN OF CARE
Pt discharged to home on 7L O2 NC w/home O2. Discharge instructions given to pt, questions answered. Pt belonging sent with pt. IV removed without without incident. Pt escorted to door 6 via pts wheelchair/home O2 and hospital staff.

## 2023-07-09 NOTE — PLAN OF CARE
Patient is A&O x 4. VSS. LS diminish, 8L - 9 LO2 Oximyzer NC. Tele: Afib CVC. Up SBA to bathroom, voiding adequately. Patient spent most of the night in recliner, slept on and off during the night. Possible discharge today 7/9. Will continue with plan of care.

## 2023-07-09 NOTE — PLAN OF CARE
A&O x 4. VSS. 8L O2 Oximyzer NC. Tele: Afib CVC. Up SBA to bathroom. Diuresing well, good UOP. Sepsis protocol triggered, LA of 1.4. Possible discharge tomorrow. Will continue w/plan of care.

## 2023-07-09 NOTE — DISCHARGE INSTRUCTIONS
[facility]  [address]  [address]  [place] [address]  [phone]    call to schedule GI follow up.

## 2023-07-09 NOTE — PROGRESS NOTES
[facility]    Cardiology Consultation - Progress Note     Date of Admission:  7/6/2023  Date of Service: 7/9/2023    Reason for Consult   Reason for consult: heart failure    History of Present Illness   Booker Brown is a 68 year old adult who was admitted on 7/6/2023 for worsening lower extremity edema and weight gain as well as melena. Medical comorbidities include chronic hypoxic respiratory failure due to COPD on 8L O2, atrial fibrillation on Xarelto, advanced coronary artery disease, ischemic cardiomyopathy, hypertension, and chronic kidney disease stage 3.    Due to concern for GI bleed his Xarelto was held and he underwent EGD which showed two gastric ulcers, one with recent bleeding, these were treated with bipolar cautery. Xarelto was restarted on 7/8 and he is tolerating this.     He was also found to have large abdominal ascites with possible early stage cirrhosis on imaging, paracentesis removed 4.4L of ascitic fluid.    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 His symptoms are significantly improved after paracentesis, and he his lower extremity edema improved with diuresis. He only used Lasix as needed previously. We will start scheduled torsemide. We have transitioned his lisinopril to Entresto. This should be further uptitrated as an outpatient.     Assessment & Plan   #1 Heart failure with mildly reduced ejection fraction, 40-45%  #2 Complex coronary artery disease with an occluded RCA and LCX. His LAD is open, but there is a flow-limiting lesion heavily calcified in the proximal and mid LAD.  He has been turned down for CABG d/t his lung disease. He saw Dr. Marquez in the cardiology clinic on June 6th, at which time they reviewed his complex coronary anatomy. He felt that intervention of his known severe proximal to mid LAD lesion would be incredibly high risk, and given his lack of convincing symptoms,  conservative management was recommended.   #3 COPD with chronic respiratory failure on 8L supplemental O2 at baseline  #4 Atrial fibrillation anticoagulated with Xarelto  #5 Hypertension  #6 CKD stage 3  #7 GI bleed from two gastric ulcers treated with bipolar cautery on 7/7/2023  #8 Ascites with probable cirrhosis    Plan:  1. Starting oral torsemide 20 mg daily, PTA PRN Lasix can be discontinued  2. Entresto has been initiated, this can be further uptitrated as an outpatient. His PTA lisinopril should be discontinued.  3. Continue metoprolol succinate 100 mg BID for both rate control and heart failure, reasonable to continue diltiazem for rate control  4. Will defer SGLT2i therapy and spironolactone for now given his chronic kidney disease  5. Continue rosuvastatin 40 mg daily  6. Xarelto was restarted on 7/8 and he is tolerating this, if he continues to do so I think he could discharge later today from a cardiology perspective. We will sign-off at this time. We will arrange follow-up.    Deniz Amador MD    Primary Care Physician   Arline White    Patient Active Problem List   Diagnosis     Benign essential hypertension     Pure hypercholesterolemia     Persistent atrial fibrillation (H)     CAD (coronary artery disease)     Obesity (BMI 30-39.9)     Chronic kidney disease, stage III (moderate) (H)     Morbid obesity (H)     Type 2 diabetes mellitus (H)     COPD (chronic obstructive pulmonary disease) (H)     ILD (interstitial lung disease) (H)     Other ascites     Anemia due to blood loss, acute     Melena     Acute pulmonary edema (H)     Upper GI bleed     Hypoxia     Hypervolemia, unspecified hypervolemia type       Past Medical History   I have reviewed this patient's medical history and updated it with pertinent information if needed.   Past Medical History:   Diagnosis Date     Benign essential hypertension      CAD (coronary artery disease)      Chronic kidney disease, stage III  (moderate) (H)      COPD (chronic obstructive pulmonary disease) (H)      ILD (interstitial lung disease) (H)      Obesity (BMI 30-39.9)      Persistent atrial fibrillation (H)      Pure hypercholesterolemia      Type 2 diabetes mellitus (H)        Past Surgical History   I have reviewed this patient's surgical history and updated it with pertinent information if needed.  Past Surgical History:   Procedure Laterality Date     COLONOSCOPY N/A 8/24/2018    Procedure: COMBINED COLONOSCOPY, SINGLE OR MULTIPLE BIOPSY/POLYPECTOMY BY BIOPSY;;  Surgeon: Lawrence Mata MD;  Location:  GI     CV CORONARY ANGIOGRAM N/A 11/18/2022    Procedure: Coronary Angiogram;  Surgeon: Mason Lucas MD;  Location:  HEART CARDIAC CATH LAB     CV INSTANTANEOUS WAVE-FREE RATIO N/A 11/18/2022    Procedure: Instantaneous Wave-Free Ratio;  Surgeon: Mason Lucas MD;  Location:  HEART CARDIAC CATH LAB     CV LEFT HEART CATH N/A 11/18/2022    Procedure: Left Heart Catheterization;  Surgeon: Mason Lucas MD;  Location:  HEART CARDIAC CATH LAB     ESOPHAGOSCOPY, GASTROSCOPY, DUODENOSCOPY (EGD), COMBINED N/A 7/7/2023    Procedure: Esophagoscopy, gastroscopy, duodenoscopy (EGD), combined;  Surgeon: Nestor Louie MD;  Location:  GI       Prior to Admission Medications   Prior to Admission Medications   Prescriptions Last Dose Informant Patient Reported? Taking?   ACCU-CHEK GUIDE test strip   No No   Sig: USE TO TEST BLOOD SUGAR 1 TIMES DAILY OR AS DIRECTED.   ASPIRIN NOT PRESCRIBED (INTENTIONAL)   Yes No   Sig: Please choose reason not prescribed from choices below.   Fluticasone-Umeclidin-Vilanterol (TRELEGY ELLIPTA) 200-62.5-25 MCG/ACT oral inhaler 7/5/2023  No Yes   Sig: Inhale 1 puff into the lungs daily   albuterol (PROAIR HFA/PROVENTIL HFA/VENTOLIN HFA) 108 (90 Base) MCG/ACT inhaler  at prn  No Yes   Sig: Inhale 2 puffs into the lungs every 6 hours as needed for shortness of breath / dyspnea or wheezing    alcohol swab prep pads   No No   Sig: Use to swab area of injection/fariha as directed.   blood glucose monitoring (NO BRAND SPECIFIED) meter device kit   No No   Sig: Use to test blood sugar 1 times daily or as directed. : per insurance.   diltiazem ER COATED BEADS (CARDIZEM CD) 360 MG 24 hr capsule 7/5/2023  No Yes   Sig: Take 1 capsule (360 mg) by mouth daily   furosemide (LASIX) 40 MG tablet 7/5/2023  Yes Yes   Sig: Take 1 tablet (40 mg) by mouth daily as needed (leg swelling)   glipiZIDE (GLUCOTROL XL) 5 MG 24 hr tablet 7/5/2023  No Yes   Sig: Take 1 tablet (5 mg) by mouth daily   lisinopril (ZESTRIL) 40 MG tablet 7/5/2023  No Yes   Sig: TAKE 1 TABLET BY MOUTH EVERY DAY   metFORMIN (GLUCOPHAGE XR) 500 MG 24 hr tablet 7/5/2023  No Yes   Sig: Take 2 tablets (1,000 mg) by mouth daily (with dinner)   metoprolol succinate ER (TOPROL XL) 100 MG 24 hr tablet 7/5/2023  No Yes   Sig: Take 1 tablet (100 mg) by mouth 2 times daily   rivaroxaban ANTICOAGULANT (XARELTO ANTICOAGULANT) 20 MG TABS tablet 7/5/2023  No No   Sig: Take 1 tablet (20 mg) by mouth daily (with dinner)   rosuvastatin (CRESTOR) 40 MG tablet 7/5/2023  No Yes   Sig: Take 1 tablet (40 mg) by mouth At Bedtime   thin (NO BRAND SPECIFIED) lancets   No No   Sig: Use with lanceting device. To accompany: Blood Glucose Monitor Brands: per insurance.   vitamin D3 (CHOLECALCIFEROL) 50 mcg (2000 units) tablet 7/5/2023  No Yes   Sig: Take 1 tablet (50 mcg) by mouth daily      Facility-Administered Medications: None     Current Facility-Administered Medications   Medication Dose Route Frequency     diltiazem ER COATED BEADS  360 mg Oral Daily with supper     fluticasone-vilanterol  1 puff Inhalation Daily with supper    And     umeclidinium  1 puff Inhalation Daily with supper     insulin aspart  1-3 Units Subcutaneous TID AC     insulin aspart  1-3 Units Subcutaneous At Bedtime     metoprolol succinate ER  100 mg Oral BID     pantoprazole  40 mg Intravenous BID      "rivaroxaban ANTICOAGULANT  20 mg Oral Daily with supper     rosuvastatin  40 mg Oral At Bedtime     sacubitril-valsartan  1 tablet Oral BID     sodium chloride (PF)  3 mL Intracatheter Q8H     torsemide  20 mg Oral Daily     Current Facility-Administered Medications   Medication Last Rate     Allergies   No Known Allergies    Social History    reports that he quit smoking about 35 years ago. His smoking use included cigarettes. He has a 32.00 pack-year smoking history. He has never used smokeless tobacco. He reports that he does not drink alcohol and does not use drugs.    Family History   Family History   Problem Relation Age of Onset     Diabetes Type 2  Mother      Cerebral aneurysm Sister      Bladder Cancer Brother      Myocardial Infarction Brother         in his 50s     Cerebrovascular Disease No family hx of      Coronary Artery Disease Early Onset No family hx of      Prostate Cancer No family hx of      Colon Cancer No family hx of      Clotting Disorder No family hx of      Bleeding Disorder No family hx of      Anesthesia Reaction No family hx of        Review of Systems   The comprehensive Review of Systems is negative other than noted in the HPI or here.     Physical Exam   Vital Signs with Ranges  Temp:  [97.6  F (36.4  C)-98.3  F (36.8  C)] 97.8  F (36.6  C)  Pulse:  [82-92] 82  Resp:  [16] 16  BP: ()/(56-75) 100/62  SpO2:  [92 %-100 %] 92 %  Wt Readings from Last 4 Encounters:   07/09/23 (P) 114.5 kg (252 lb 8 oz)   06/06/23 117 kg (258 lb)   03/30/23 117.9 kg (260 lb)   02/21/23 121.9 kg (268 lb 12.8 oz)     I/O last 3 completed shifts:  In: 400 [P.O.:400]  Out: 2675 [Urine:2675]      Vitals: /62   Pulse 82   Temp 97.8  F (36.6  C) (Axillary)   Resp 16   Ht 1.778 m (5' 10\")   Wt (P) 114.5 kg (252 lb 8 oz)   SpO2 92%   BMI (P) 36.23 kg/m      General: Alert, oriented, in no acute distress  HEENT: PERRLA, mucous membranes moist  Neck: Normal JVP  CV: Regular rate and rhythm without " murmur  Respiratory: Clear to auscultation bilaterally  GI: Normoactive bowel sounds; soft, non-tender abdomen  Neuro: No focal deficits appreciated  Extremities: Wrapped, trace to 1+ edema in thighs    Recent Labs   Lab 07/08/23  2100 07/08/23  1802 07/08/23  1644 07/08/23  1305 07/07/23  2112 07/07/23  1837 07/07/23  1752 07/07/23  1123 07/07/23  0519 07/06/23  2107 07/06/23  1624   WBC  --   --   --   --   --   --   --   --   --   --  10.3   HGB  --   --   --   --   --  8.7*  --  8.5* 8.5*   < > 7.7*   MCV  --   --   --   --   --   --   --   --   --   --  88   PLT  --   --   --   --   --   --   --   --   --   --  257   NA  --   --   --   --   --   --   --   --  144  --  144   POTASSIUM  --   --  4.2  --   --   --   --   --  4.7  --  4.8   CHLORIDE  --   --   --   --   --   --   --   --  101  --  103   CO2  --   --   --   --   --   --   --   --  35*  --  28   BUN  --   --   --   --   --   --   --   --  39.0*  --  42.8*   CR  --   --   --   --   --   --   --   --  1.67*  --  1.65*   GFRESTIMATED  --   --   --   --   --   --   --   --  45*  --  46*   ANIONGAP  --   --   --   --   --   --   --   --  8  --  13   KURT  --   --   --   --   --   --   --   --  9.2  --  9.2   * 131*  --  202*   < >  --    < >  --  120*   < > 128*   ALBUMIN  --   --   --   --   --   --   --   --   --   --  3.3*   PROTTOTAL  --   --   --   --   --   --   --   --   --   --  6.6   BILITOTAL  --   --   --   --   --   --   --   --   --   --  0.3   ALKPHOS  --   --   --   --   --   --   --   --   --   --  100   ALT  --   --   --   --   --   --   --   --   --   --  20   AST  --   --   --   --   --   --   --   --   --   --  22    < > = values in this interval not displayed.     Recent Labs   Lab Test 06/09/23  1510 05/03/22  0950 05/09/16  0925 09/28/15  1523   CHOL 89 103   < > 143   HDL 33* 32*   < > 32*   LDL 42 61   < > 67   TRIG 68 50   < > 221*   CHOLHDLRATIO  --   --   --  4.5    < > = values in this interval not displayed.     Recent  Labs   Lab 07/07/23  1837 07/07/23  1123 07/07/23  0519 07/07/23  0140 07/06/23  1624   WBC  --   --   --   --  10.3   HGB 8.7* 8.5* 8.5*   < > 7.7*   HCT  --   --   --   --  28.0*   MCV  --   --   --   --  88   PLT  --   --   --   --  257   IRON  --   --   --   --  27*   IRONSAT  --   --   --   --  9*   FEB  --   --   --   --  306    < > = values in this interval not displayed.     No results for input(s): PH, PHV, PO2, PO2V, SAT, PCO2, PCO2V, HCO3, HCO3V in the last 168 hours.  Recent Labs   Lab 07/06/23  1624   NTBNPI 7,325*     No results for input(s): DD in the last 168 hours.  No results for input(s): SED, CRP in the last 168 hours.  Recent Labs   Lab 07/06/23  1624        Recent Labs   Lab 07/06/23  1624   TSH 1.02     No results for input(s): COLOR, APPEARANCE, URINEGLC, URINEBILI, URINEKETONE, SG, UBLD, URINEPH, PROTEIN, UROBILINOGEN, NITRITE, LEUKEST, RBCU, WBCU in the last 168 hours.    Imaging:  Recent Results (from the past 48 hour(s))   CT Chest Abdomen Pelvis w/o Contrast    Narrative    EXAM: CT CHEST ABDOMEN PELVIS W/O CONTRAST  LOCATION: [facility]  DATE: 7/6/2023    INDICATION: abd bloating, shortness of breath, hypoxia, ckd  COMPARISON: Chest CT from 05/12/2023  TECHNIQUE: CT scan of the chest, abdomen, and pelvis was performed without IV contrast. Multiplanar reformats were obtained. Dose reduction techniques were used.   CONTRAST: None.    FINDINGS:   LUNGS AND PLEURA: Mild to moderate paraseptal predominant emphysema. New patchy multifocal groundglass somewhat nodular pulmonary opacities. Mild bronchial wall thickening. Small pleural effusions and associated atelectasis. Scattered calcified pleural   plaques compatible with remote asbestos exposure. There is similar subpleural atelectasis and scarring. Mild bronchial wall thickening. Trace airway secretions.    MEDIASTINUM/AXILLAE: Cardiomegaly. Low blood pool relative to the myocardium indicative of anemia.  Nearly resolved pericardial effusion. Persistent borderline to mildly enlarged mediastinal lymph nodes. The largest is in the subcarinal chain measuring   1.3 cm in short axis, similar to prior exam (series 3/68). A few of the mediastinal lymph nodes are marginally increased compared to prior exam.    CORONARY ARTERY CALCIFICATION: Severe.    HEPATOBILIARY: The liver is mildly dysmorphic. No calcified gallstones are biliary ductal dilation.     PANCREAS: Normal.    SPLEEN: Normal.    ADRENAL GLANDS: Nodular thickening of the left adrenal gland.    KIDNEYS/BLADDER: No significant mass, stone, or hydronephrosis. Moderate atrophy of the left kidney.    BOWEL: Diverticulosis of the colon. No acute inflammatory change. No obstruction. Moderate to large volume ascites.    LYMPH NODES: Normal.    VASCULATURE: Extensive atherosclerosis.    PELVIC ORGANS: Normal.    MUSCULOSKELETAL: Degenerative changes of the spine. Osteopenia. Diffuse anasarca.      Impression    IMPRESSION:  Chest:  1.  New patchy multifocal somewhat nodular groundglass pulmonary opacities suggestive of infectious/inflammatory etiology. Follow-up is recommended to ensure resolution.  2.  Mild to moderate paraseptal emphysema.  3.  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  4.  Nearly resolved pericardial effusion.  5.  Persistent but marginally increased mediastinal adenopathy.  6.  Cardiomegaly. Severe coronary atherosclerosis.    Abdomen and pelvis:  1.  Moderate to large volume ascites.  2.  The liver is mildly dysmorphic suggestive of fibrosis and/or early cirrhosis.  3.  Manifestations of third spacing.  4.  Diverticulosis.   US Paracentesis without Albumin    Narrative    US PARACENTESIS WITHOUT ALBUMIN 7/7/2023 11:08 AM     HISTORY: History of congestive heart failure. New abdominal ascites.  Possible cirrhosis on imaging.    FINDINGS: Ultrasound was used to evaluate for the presence and best  approach for paracentesis. Written and oral informed  consent was  obtained. A pause for the cause procedure to verify the correct  patient and correct procedure. The skin overlying the right lower  quadrant was prepped and draped in the usual sterile fashion. The  subcutaneous tissues were anesthetized with 10 mL 1% lidocaine. A  catheter was advanced into the peritoneal space and 4.4 L of  straw-colored fluid was drained. There were no immediate  complications. Ultrasound images were permanently stored.  Patient  left the ultrasound suite in satisfactory condition.      Impression    IMPRESSION: Technically successful paracentesis without immediate  complications.    SHERRI TELLEZ MD         SYSTEM ID:  [id number]         Medical Decision Making       35 MINUTES SPENT BY ME on the date of service doing chart review, history, exam, documentation & further activities per the note.

## 2023-07-09 NOTE — PROGRESS NOTES
Care Coordination:  Pt open to [facility] for home car RN/PT/OT/SW and MD put resumption of these disciplines on discharge per [facility] request.  Updated [facility] intake and [facility] has Epic access and will get orders via Epic.    Sunita Hayward RN, BS  Care Coordinator  [email or web address]

## 2023-07-09 NOTE — PROGRESS NOTES
07/09/23 1400   Appointment Info   Signing Clinician's Name / Credentials (OT) Radha Maresne, OTR/L   Quick Adds   Quick Adds Edema   Edema General Information   Onset of Edema 07/06/23   Affected Body Part(s) Right LE;Left LE   Edema Etiology Unknown   Etiology Comments Pt reports he has been dealing with edema for about 6 months   Edema Precautions Cardiac Edema/CHF   General Comments/Previous Edema Treatment/Edema Equipment pt seen and assessed with RN, MD. pt has ACE wraps that he has been using at home. doffed at time of eval.   Edema Examination/Assessment   Skin Condition Non-pitting;Dryness   Skin Condition Comments cyanotic skin from feet to distal knee bilterally   Scar No   Ulcerations No   Stemmer Sign Negative   Skin Integrity dry, non pitting, cyanotic   Pitting Assessment non   Edema Assessment Comments pt requires skilled education as he has not been wrapping his legs appropiately or safely. was suppose to see home lymph services but is now in hospital. reports he wears his lymph wraps for 48 hours and takes then off for 24   Clinical Impression   Criteria for Skilled Therapeutic Interventions Met (OT) Yes, treatment indicated   Edema: Patient Presentation Edema   OT Problem List-Impairments impacting ADL problems related to;flexibility  (edema, ROM in hips)   ADL comments/analysis edema garmet/LB dressing wear/care deficits   Intervention Comments education and training   Clinical Decision Making Complexity (OT) low complexity   Risk & Benefits of therapy have been explained evaluation/treatment results reviewed;care plan/treatment goals reviewed;risks/benefits reviewed;current/potential barriers reviewed;participants voiced agreement with care plan;participants included;patient   OT Total Evaluation Time   OT Eval, Low Complexity Minutes (27254) 10   OT Goals   Therapy Frequency (OT) One time eval and treatment   OT Predicted Duration/Target Date for Goal Attainment 07/09/23   OT Goals Edema    OT: Edema education to increase ability to manage edema after discharge from the hospital Patient;Verbalize;wear schedule;signs/symptoms of intolerance;garrnet/bandage care   OT: Management of edema bandages Patient;Verbalize;Craig   OT: Functional edema exercise program to reduce limb volume, increase activity tolerance and improve independence with ADL Patient;Demonstrates;HEP   Interventions   Interventions Quick Adds Therapeutic Activity   Therapeutic Activities   Therapeutic Activity Minutes (90051) 15   Symptoms noted during/after treatment none   Treatment Detail/Skilled Intervention pt seen and ACE wraps off. education provided: wear/care schedule, cleaning, on/off scheudle,HH lymph services, skin safety and hygiene,gradiant compression, ACE vs SS or edema sleeve. verbalized understanding and all needs in reach end of session   OT Discharge Planning   OT Plan intervention/education and discharge   OT Discharge Recommendation (DC Rec) home  (home health lymph services)   OT Rationale for DC Rec pt wearing ACE wraps for edema compression inappropiately. requires education and training while IP. at time of eval his ACE wraps are off .recommend pt keep ACE wraps off for at least 24 hours, perform skin hygiene with mild soap and lotion. educated on how to wrap appropiately and ok for pt to re don ACE wraps correctly on 7/11/23 OR would also be appropiate for pt to keep wraps off and perform conservative measures (exrcise, elevation, etc) until HH lymph services initiate. all education provided and pt verbalized understanding of plan   Total Session Time   Timed Code Treatment Minutes 15   Total Session Time (sum of timed and untimed services) 25

## 2023-07-09 NOTE — PROGRESS NOTES
GI chart review:    - restarted xarelto last evening. No clinical evidence of rebleeding.   - recommend rechecking Hb (last 7/7 pm@8.7); if stable, ok to discharge.   - PO pantoprazole 40 mg twice daily for 8 weeks, then once daily while on blood thinners.   - H pylori stool testing, to treat if +ve  - For liver imaging findings, we will arrange [facility] liver clinic follow up (ordered).   - Note low SAAG (<1.1), high protein (>3 g) ascites per ascitic fluid analysis: this is usually suggestive of cardiac etiology for ascites rather than primary liver etiology.  - Appreciate cardiology input.  - ADAT.    GI team will sign off. Please call back if any ? Or change in status.    Nestor Louie MD  [facility]  Phone [phone]

## 2023-07-09 NOTE — DISCHARGE SUMMARY
[facility]    Discharge Summary  Hospitalist    Date of Admission:  7/6/2023  Date of Discharge:  7/9/2023  Discharging Provider:  PAULA [facility]     Date of Service (when I saw the patient): 07/09/23    Discharge Diagnoses     Melena  Anemia due to blood loss, acute  Ascites with possible cirrhosis  Heart failure with mildly reduced ejection fraction, 40-45%  Acute on chronic systolic congestive heart failure  Atrial fibrillation  Complex coronary artery disease  Lymphedema  Essential hypertension  Chronic kidney disease, stage III  Type 2 diabetes mellitus  COPD  ILD  Mycotic changes of toenails/feet   Dyslipidemia      History of Present Illness   Per 7/6 H & P:  Booker Brown is a pleasant 68 year old gentleman with chronic hypoxic respiratory failure due to COPD, ILD, obesity, persistent atrial fibrillation, advanced CAD, hypertension and stage III chronic kidney disease who presented to the emergency department with worsening lower extremity edema and weight gain.  He was incidentally noted to have melena.     Patient says a nurse came out to his house today because he has qualified to have a hospital bed.  He mentioned to the nurse that he has noticed that his abdomen is becoming more distended and his legs are more swollen.  Notes reported a 20 pound weight gain in the past month; patient told me that he has gained 14 pounds in the past 2 weeks.  He has been on and off Lasix.  He thought his abdominal distention was possibly due to constipation.     The nurse visiting him today called his primary care physician, Dr. White, and she recommended he come to the emergency department for evaluation.  Here he reported having black stools and labs show hemoglobin of 7.7 g/dL, down 4 g from the most recent measurement in 2/2023. CT scan of the abdomen pelvis showed new, patchy, multifocal groundglass opacities, probably due to fluid overload.  It also showed moderate to large  volume ascites with dysmorphic liver suggestive of early cirrhosis and manifestations of third spacing.  He was admitted for further evaluation and treatment.      Hospital Course   Booker Brown was admitted on 7/6/2023.  The following problems were addressed during his hospitalization:     Melena  Anemia due to blood loss, acute  Ascites with possible cirrhosis  Hemoglobin stable at 8.7  - S/P EGD on 7/7/23: findings included two gastric ulcers, one with stigmata of recent bleeding.  - treated with bipolar cautery.  - GI recommendations included:  - PO pantoprazole 40 mg twice daily for 8 weeks, then once daily while on blood thinners.   - H. pylori stool testing; treat if positive -> to be facilitated by his PCP and/or [facility] staff  - for liver imaging findings, [facility] liver clinic team will arrange follow up  - note low SAAG (<1.1), high protein (>3 g) ascites per ascitic fluid analysis: this is usually suggestive of cardiac etiology for ascites rather than primary liver etiology.    Heart failure with mildly reduced ejection fraction, 40-45%  Acute on chronic systolic congestive heart failure  Atrial fibrillation anticoagulated with Xarelto  Complex coronary artery disease with an occluded RCA and LCX. His LAD is open, but there is a flow-limiting lesion heavily calcified in the proximal and mid LAD.  He has been turned down for CABG d/t his lung disease. He saw Dr. Marquez in the cardiology clinic on June 6th, at which time they reviewed his complex coronary anatomy. He felt that intervention of his known severe proximal to mid LAD lesion would be incredibly high risk, and given his lack of convincing symptoms, conservative management was recommended.   - earlier treated with lasix 40 BID  Cardiology recommendations included:  1. Starting oral torsemide 20 mg daily, PTA PRN Lasix can be discontinued  2. Entresto has been initiated, this can be further uptitrated as an outpatient. His PTA lisinopril should be  "discontinued.  3. Continue metoprolol succinate 100 mg BID for both rate control and heart failure, reasonable to continue diltiazem for rate control  4. Will defer SGLT2i therapy and spironolactone for now given his chronic kidney disease  5. Continue rosuvastatin 40 mg daily  6. Xarelto was restarted on 7/8  7.  We will arrange follow-up.     Lymphedema  - on day of discharge, Conner's leg wraps were removed (he has been doing his own lymphedema wraps at home - without regular monitoring); multiple deep indentations in skin were present bilaterally;  - Lymphedema PT was consulted; see their note  - needs regular follow up with Lymphedema clinic after discharge    Essential hypertension  - medication changes as above     Chronic kidney disease, stage III  - stable this admission    Type 2 diabetes mellitus  - Hemoglobin A1c = 5.6% on 6/9/2023  - held metformin initially, then restarted  - follow up with PCP 1 week    COPD; stable this admission.  ILD  - on 8 L oxygen per NC at baseline  Chest CT showed extensive pleural calcifications consistent with prior asbestos exposure  Had positive LISA; PTA was seen by Rheumatology -> they did not feel there was underlying autoimmune disease  - per Dr. Esposito, \"patient has since followed up with Dr. Hopkins and his group.  They all acknowledged that he has some form of lung disease.  I understand that they had a multidisciplinary meeting, and I understand based on the patient they do not think there is much more they have to offer.\"  - continued Trelegy Ellipta Q day and PRN albuterol MDI    Mycotic changes of toenails/feet   - needs to see Podiatry ASAP after discharge for full evaluation and ongoing maintenance    Dyslipidemia  - continued rosuvastatin 40 mg daily        PAULA Conway MD, FACP     [facility]    Significant Results and Procedures   See below and in EHR.    Pending Results    See below:    Code Status   DNR / DNI       Primary Care Physician "   Arline White    Physical Exam     Vitals:    07/06/23 2012 07/08/23 0533 07/09/23 0500   Weight: 124.3 kg (274 lb) 114.3 kg (252 lb) (P) 114.5 kg (252 lb 8 oz)     07/09/23 1541 96.5  F (35.8  C) Abnormal  87 -- -- 96/51 18 100 % -- Oxymizer cannula     Constitutional: awake, no apparent distress; sitting up in chair  HEENT: sclerae clear; MM's moist; high-flow n/c in place  Respiratory: fair a/e bilaterally; decreased at bases; no wheezing or rhonchi  Cardiovascular: regular rate and rhythm, S1, S2 noted; no m/r/g  GI: abdomen obese; + bowel sounds; soft, non-tender, non-distended  Skin/Integumen: lower leg wraps (Conner has been doing his own lymphedema wraps) were removed; multiple deep indentations in skin bilaterally; toenails are in poor condition; scabbed wound on side of Right distal 4th toe; no rashes, no cyanosis, no jaundice  Musculoskeletal: 1+ pitting edema bilateral feet  Neurologic: follows directions well; no focal deficits     Discharge Disposition   Discharged to home  Condition at discharge: Stable    Consultations This Hospital Stay   CARDIOLOGY IP CONSULT  PULMONARY IP CONSULT  GASTROENTEROLOGY IP CONSULT  PHARMACY LIAISON FOR MEDICATION COVERAGE CONSULT  LYMPHEDEMA THERAPY IP CONSULT    Time Spent on this Encounter   IJuliano MD, personally saw the patient today and spent greater than 30 minutes discharging this patient.    Discharge Orders      Follow-Up with Cardiology GABRIELA Heart Failure Discharge      Home Care Referral      Reason for your hospital stay    Lower extremity edema and weight gain, also incidentally found to have Melena     Follow-up and recommended labs and tests     Follow up with primary care provider, Arline White, within 7 days for hospital follow- up.  No follow up labs or test are needed.     Activity    Your activity upon discharge: activity as tolerated     Reason for your hospital stay    You were admitted for evaluation of multiple complex medical  issues, and have made good progress.     Follow-up and recommended labs and tests     1. Follow up with primary care provider, Arline White, within 7 days to evaluate medication change, to evaluate treatment change, for hospital follow- up, regarding new diagnosis, and to follow up on results.  The following labs/tests are recommended, within 3-5 days:  BMP, CBC with platelets; ** Needs to do H. Pylori stool testing  2. Follow up with Cardiology in next 2 weeks  3. Follow up with [facility] Provider next 2 weeks  4. Referral to Podiatry for multiple foot/toenail concerns  5. Follow up with Lymphedema provider in next 1-2 weeks     Activity    Your activity upon discharge: activity as tolerated     Resume Home Care Services     Diet    Follow this diet upon discharge: Orders Placed This Encounter      Regular Diet Adult     Discharge Medications   Discharge Medication List as of 7/9/2023  4:50 PM      START taking these medications    Details   ferrous sulfate (FEROSUL) 325 (65 Fe) MG tablet Take 1 tablet (325 mg) by mouth 2 times daily (with meals), Disp-60 tablet, R-1, E-Prescribe      pantoprazole (PROTONIX) 40 MG EC tablet Take 1 tablet (40 mg) by mouth 2 times daily, Disp-60 tablet, R-0, E-Prescribe      sacubitril-valsartan (ENTRESTO) 24-26 MG per tablet Take 1 tablet by mouth 2 times daily, Disp-60 tablet, R-0, E-Prescribe      senna-docusate (SENOKOT-S/PERICOLACE) 8.6-50 MG tablet Take 1 tablet by mouth 2 times daily, Disp-14 tablet, R-1, E-Prescribe      torsemide (DEMADEX) 20 MG tablet Take 1 tablet (20 mg) by mouth daily, Disp-30 tablet, R-0, E-Prescribe         CONTINUE these medications which have CHANGED    Details   rivaroxaban ANTICOAGULANT (XARELTO ANTICOAGULANT) 20 MG TABS tablet Take 1 tablet (20 mg) by mouth daily (with dinner), Disp-90 tablet, R-3, E-Prescribe         CONTINUE these medications which have NOT CHANGED    Details   ACCU-CHEK GUIDE test strip USE TO TEST BLOOD SUGAR 1 TIMES  DAILY OR AS DIRECTED., Disp-100 strip, R-0, E-Prescribe      albuterol (PROAIR HFA/PROVENTIL HFA/VENTOLIN HFA) 108 (90 Base) MCG/ACT inhaler Inhale 2 puffs into the lungs every 6 hours as needed for shortness of breath / dyspnea or wheezing, Disp-18 g, R-7, E-PrescribePharmacy may dispense brand covered by insurance (Proair, or proventil or ventolin or generic albuterol inhaler)      alcohol swab prep pads Use to swab area of injection/fariha as directed.Disp-100 each, [id number]      ASPIRIN NOT PRESCRIBED (INTENTIONAL) Historical      blood glucose monitoring (NO BRAND SPECIFIED) meter device kit Use to test blood sugar 1 times daily or as directed. : per insurance.Disp-1 kit, [id number]      diltiazem ER COATED BEADS (CARDIZEM CD) 360 MG 24 hr capsule Take 1 capsule (360 mg) by mouth daily, Disp-90 capsule, R-1, E-Prescribe      Fluticasone-Umeclidin-Vilanterol (TRELEGY ELLIPTA) 200-62.5-25 MCG/ACT oral inhaler Inhale 1 puff into the lungs daily, Disp-1 each, R-4, E-Prescribe      glipiZIDE (GLUCOTROL XL) 5 MG 24 hr tablet Take 1 tablet (5 mg) by mouth daily, Disp-90 tablet, R-1, E-Prescribe      metFORMIN (GLUCOPHAGE XR) 500 MG 24 hr tablet Take 2 tablets (1,000 mg) by mouth daily (with dinner), Disp-180 tablet, R-3, E-Prescribe      metoprolol succinate ER (TOPROL XL) 100 MG 24 hr tablet Take 1 tablet (100 mg) by mouth 2 times daily, Disp-180 tablet, R-3, E-Prescribe      rosuvastatin (CRESTOR) 40 MG tablet Take 1 tablet (40 mg) by mouth At Bedtime, Disp-90 tablet, R-3, E-Prescribe      thin (NO BRAND SPECIFIED) lancets Use with lanceting device. To accompany: Blood Glucose Monitor Brands: per insurance., Disp-1 each, R-6, E-Prescribe      vitamin D3 (CHOLECALCIFEROL) 50 mcg (2000 units) tablet Take 1 tablet (50 mcg) by mouth daily, Disp-90 tablet, R-3, E-Prescribe         STOP taking these medications       furosemide (LASIX) 40 MG tablet Comments:   Reason for Stopping:         lisinopril (ZESTRIL) 40  MG tablet Comments:   Reason for Stopping:             Allergies   No Known Allergies  Data   Most Recent 3 CBC's:  Recent Labs   Lab Test 07/19/23  1320 07/09/23  1130 07/07/23  1837 07/07/23  0140 07/06/23  1624 02/21/23  1400 11/18/22  0714   WBC  --   --   --   --  10.3 10.2 12.8*   HGB 8.9* 8.4* 8.7*   < > 7.7* 11.8 14.2   MCV  --   --   --   --  88 88 83   PLT  --   --   --   --  257 220 330    < > = values in this interval not displayed.      Most Recent 3 BMP's:  Recent Labs   Lab Test 07/19/23  1320 07/09/23  1326 07/09/23  1130 07/09/23  0808 07/08/23  1802 07/08/23  1644 07/07/23  1752 07/07/23  0519 07/06/23  2107 07/06/23  1624     --   --   --   --   --   --  144  --  144   POTASSIUM 4.1  --  4.2  --   --  4.2  --  4.7  --  4.8   CHLORIDE 97*  --   --   --   --   --   --  101  --  103   CO2 35*  --   --   --   --   --   --  35*  --  28   BUN 30.4*  --   --   --   --   --   --  39.0*  --  42.8*   CR 1.51*  --   --   --   --   --   --  1.67*  --  1.65*   ANIONGAP 9  --   --   --   --   --   --  8  --  13   KURT 9.1  --   --   --   --   --   --  9.2  --  9.2   * 216*  --  136*   < >  --    < > 120*   < > 128*    < > = values in this interval not displayed.     Most Recent 2 LFT's:  Recent Labs   Lab Test 07/06/23  1624 02/21/23  1400   AST 22 26   ALT 20 16   ALKPHOS 100 92   BILITOTAL 0.3 0.7       Most Recent Cholesterol Panel:  Recent Labs   Lab Test 06/09/23  1510   CHOL 89   LDL 42   HDL 33*   TRIG 68     Most Recent 6 Bacteria Isolates From Any Culture (See EPIC Reports for Culture Details):No lab results found.  Most Recent TSH, T4 and A1c Labs:  Recent Labs   Lab Test 07/06/23  1624 06/09/23  1510 03/17/22  1129 02/20/22  0615   TSH 1.02  --    < > 0.34*   T4  --   --   --  1.59   A1C  --  5.6   < >  --     < > = values in this interval not displayed.     Results for orders placed or performed during the hospital encounter of 07/06/23   CT Chest Abdomen Pelvis w/o Contrast    Narrative     EXAM: CT CHEST ABDOMEN PELVIS W/O CONTRAST  LOCATION: [facility]  DATE: 7/6/2023    INDICATION: abd bloating, shortness of breath, hypoxia, ckd  COMPARISON: Chest CT from 05/12/2023  TECHNIQUE: CT scan of the chest, abdomen, and pelvis was performed without IV contrast. Multiplanar reformats were obtained. Dose reduction techniques were used.   CONTRAST: None.    FINDINGS:   LUNGS AND PLEURA: Mild to moderate paraseptal predominant emphysema. New patchy multifocal groundglass somewhat nodular pulmonary opacities. Mild bronchial wall thickening. Small pleural effusions and associated atelectasis. Scattered calcified pleural   plaques compatible with remote asbestos exposure. There is similar subpleural atelectasis and scarring. Mild bronchial wall thickening. Trace airway secretions.    MEDIASTINUM/AXILLAE: Cardiomegaly. Low blood pool relative to the myocardium indicative of anemia. Nearly resolved pericardial effusion. Persistent borderline to mildly enlarged mediastinal lymph nodes. The largest is in the subcarinal chain measuring   1.3 cm in short axis, similar to prior exam (series 3/68). A few of the mediastinal lymph nodes are marginally increased compared to prior exam.    CORONARY ARTERY CALCIFICATION: Severe.    HEPATOBILIARY: The liver is mildly dysmorphic. No calcified gallstones are biliary ductal dilation.     PANCREAS: Normal.    SPLEEN: Normal.    ADRENAL GLANDS: Nodular thickening of the left adrenal gland.    KIDNEYS/BLADDER: No significant mass, stone, or hydronephrosis. Moderate atrophy of the left kidney.    BOWEL: Diverticulosis of the colon. No acute inflammatory change. No obstruction. Moderate to large volume ascites.    LYMPH NODES: Normal.    VASCULATURE: Extensive atherosclerosis.    PELVIC ORGANS: Normal.    MUSCULOSKELETAL: Degenerative changes of the spine. Osteopenia. Diffuse anasarca.      Impression    IMPRESSION:  Chest:  1.  New patchy multifocal  somewhat nodular groundglass pulmonary opacities suggestive of infectious/inflammatory etiology. Follow-up is recommended to ensure resolution.  2.  Mild to moderate paraseptal emphysema.  3.  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  4.  Nearly resolved pericardial effusion.  5.  Persistent but marginally increased mediastinal adenopathy.  6.  Cardiomegaly. Severe coronary atherosclerosis.    Abdomen and pelvis:  1.  Moderate to large volume ascites.  2.  The liver is mildly dysmorphic suggestive of fibrosis and/or early cirrhosis.  3.  Manifestations of third spacing.  4.  Diverticulosis.   US Paracentesis without Albumin    Narrative    US PARACENTESIS WITHOUT ALBUMIN 7/7/2023 11:08 AM     HISTORY: History of congestive heart failure. New abdominal ascites.  Possible cirrhosis on imaging.    FINDINGS: Ultrasound was used to evaluate for the presence and best  approach for paracentesis. Written and oral informed consent was  obtained. A pause for the cause procedure to verify the correct  patient and correct procedure. The skin overlying the right lower  quadrant was prepped and draped in the usual sterile fashion. The  subcutaneous tissues were anesthetized with 10 mL 1% lidocaine. A  catheter was advanced into the peritoneal space and 4.4 L of  straw-colored fluid was drained. There were no immediate  complications. Ultrasound images were permanently stored.  Patient  left the ultrasound suite in satisfactory condition.      Impression    IMPRESSION: Technically successful paracentesis without immediate  complications.    SHERRI TELLEZ MD         SYSTEM ID:  [id number]     EGD done 7/7/2023 (see report for further details):    Impression:                 - Esophagogastric landmarks identified.   - Normal esophagus.  - Two gastric ulcers, one with stigmata of recent bleeding (flat pigmented spot (Pardeep Class IIc)).   - treated with bipolar cautery.   - Erosive gastropathy with no bleeding and no stigmata of recent  bleeding.   - Trace hematin (altered blood/coffee-ground-like material) in the gastric fundus.   - Normal examined duodenum.   - No specimens collected.     Recommendation:            - Return patient to hospital [facility] for ongoing care.   - Proceed with diagnostic paracentesis.   - Clear liquid diet.   - IV protonix q12h while inpt, continue po for 8 weeks, then once daily.   - H pylori stool test.   - Hold xarelto 24-48 hrs pending course.   - Monitor hb, clinically for any rebleeding.   - No indication for octreotide. If ascites labs are supporting cirrhosis, then prophylactic antibiotic needed for 5-7 days

## 2023-07-10 ENCOUNTER — PATIENT OUTREACH (OUTPATIENT)
Dept: CARE COORDINATION | Facility: CLINIC | Age: 68
End: 2023-07-10
Payer: COMMERCIAL

## 2023-07-10 DIAGNOSIS — Z53.9 DIAGNOSIS NOT YET DEFINED: Primary | ICD-10-CM

## 2023-07-10 LAB
% LINING CELLS, BODY FLUID: 39 %
ABSOLUTE NEUTROPHILS, BODY FLUID: 42.8 /UL
APPEARANCE FLD: ABNORMAL
BASOPHILS NFR FLD MANUAL: 0 %
CELL COUNT BODY FLUID SOURCE: ABNORMAL
COLOR FLD: YELLOW
EOSINOPHIL NFR FLD MANUAL: 0 %
LYMPHOCYTES NFR FLD MANUAL: 25 %
MONOS+MACROS NFR FLD MANUAL: 19 %
NEUTS BAND NFR FLD MANUAL: 12 %
OTHER CELLS FLD MANUAL: 5 %
WBC # FLD AUTO: 357 /UL

## 2023-07-10 PROCEDURE — G0180 MD CERTIFICATION HHA PATIENT: HCPCS | Performed by: INTERNAL MEDICINE

## 2023-07-10 NOTE — PROGRESS NOTES
Clinic Care Coordination Contact  [facility]: Post-Discharge Note  SITUATION                                                      Admission:    Admission Date: 07/06/23   Reason for Admission: Lower extremity edema and weight gain, also incidentally  found to have Melena  Discharge:   Discharge Date: 07/09/23  Discharge Diagnosis: Lower extremity edema and weight gain, also incidentally  found to have Melena    BACKGROUND                                                      Per hospital discharge summary and inpatient provider notes:    Booker Brown is a 68 year old adult with numerous medical problems including chronic hypoxic respiratory failure due to COPD, ILD, restriction from obesity, also persistent atrial fibrillation, advanced CAD, hypertension, stage III chronic kidney disease who presents emergency department with worsening lower extremity edema and weight gain.  He is incidentally noted to have melena.     Patient says a nurse came out to his house today because he has qualified to have a hospital bed.  He mentioned to the nurse that he has noticed that his abdomen is becoming more distended and his legs are more swollen.  Notes report a 20 pound weight gain in the past month, patient told me that he has gained 14 pounds in the past 2 weeks.  He has been on and off Lasix.  He thought his abdominal distention was possibly due to constipation.     The nurse visiting him today called his primary care physician, Dr. White, she recommended he come to the emergency department for evaluation.  Here, he reported having black stools and labs show hemoglobin of 7.7 g/dL, which is down 4 g from the most recent measurement in 2/2023.  CT scan of the abdomen pelvis showed new, patchy, multifocal groundglass opacities, probably due to fluid overload.  It also showed moderate to large volume ascites with dysmorphic liver suggestive of early cirrhosis and manifestations of third spacing.  He is admitted for  "further evaluation and treatment.    ASSESSMENT           Discharge Assessment  How are you doing now that you are home?: \"I'm doing good, just going through my discharge paperwork right now and I have no questions or concerns\"  How are your symptoms? (Red Flag symptoms escalate to triage hotline per guidelines): Improved  Do you feel your condition is stable enough to be safe at home until your provider visit?: Yes  Does the patient have their discharge instructions? : Yes  Does the patient have questions regarding their discharge instructions? : No  Were you started on any new medications or were there changes to any of your previous medications? : Yes  Does the patient have all of their medications?: Yes  Do you have questions regarding any of your medications? : No  Do you have all of your needed medical supplies or equipment (DME)?  (i.e. oxygen tank, CPAP, cane, etc.): Yes  Discharge follow-up appointment scheduled within 14 calendar days? : No  Is patient agreeable to assistance with scheduling? : No                  PLAN                                                      Outpatient Plan:  Follow up with primary care provider, Arline White, within 7 days for hospital follow- up. No follow up labs or test  are needed.  Follow-up and recommended labs and tests  1. Follow up with primary care provider, Arline White, within 7 days to evaluate medication change, to evaluate  treatment change, for hospital follow- up, regarding new diagnosis, and to follow up on results. The following labs/  tests are recommended, within 3-5 days: BMP, CBC with platelets; ** Needs to do H. Pylori stool testing  2. Follow up with Cardiology in next 2 weeks  3. Follow up with [facility] Provider next 2 weeks  4. Referral to Podiatry for multiple foot/toenail concerns  5. Follow up with Lymphedema provider in next 1-2 weeks    Future Appointments   Date Time Provider Department Center   8/2/2023 12:00 PM Susan Kingston " MICAH Hodges EA         For any urgent concerns, please contact our 24 hour nurse triage line: [phone] ([phone])         Ofelia Mckenzie  Community Health Worker  [facility]  [phone]

## 2023-07-12 ENCOUNTER — TELEPHONE (OUTPATIENT)
Dept: CARDIOLOGY | Facility: CLINIC | Age: 68
End: 2023-07-12
Payer: COMMERCIAL

## 2023-07-12 ENCOUNTER — TELEPHONE (OUTPATIENT)
Dept: INTERNAL MEDICINE | Facility: CLINIC | Age: 68
End: 2023-07-12
Payer: COMMERCIAL

## 2023-07-12 LAB — BACTERIA FLD CULT: NO GROWTH

## 2023-07-12 NOTE — TELEPHONE ENCOUNTER
"Jayashree nurse from [facility] with several questions/concerns:    1. Requesting verbal orders for skilled nursinx per week for 1 week - initial set of orders after evaluation     2. Also needing clarification on oxygen order - patient is currently on 7-8L NC. Per ED discharge notes on 23: \"Continue high flow oxygen could add noninvasive ventilation if needed, but he clearly states he desires DNR/DNI\"    Jayashree states that patient's oxygen saturations were % recently, wondering if patient may be receiving too much oxygen or if parameters should be given to maintain saturations above a percentage? Also wondering if orders should be adjusted for humidified oxygen due to high flow?     3. Home care needs to report medication interaction noted in their system between diltiazem and xarelto    Patient is also scheduled for an upcoming hosp f/u appt with PCP on 23    Routing to PCP to please review and advise on above - thank you!     Callback to Jayashree - [phone] - confidential     Shellie Rubin RN  [facility]  "

## 2023-07-12 NOTE — TELEPHONE ENCOUNTER
Patient was admitted to [facility] on 7/6/23 who presented to emergency department with worsening lower extremity edema and weight gain. He was found to have anemia and melena in the ED as well as moderate to large abdominal ascites with possible early stage cirrhosis on imaging. Cardiology and GI consulted.    PMH: chronic hypoxic respiratory failure due to COPD on 8 L O2 NC, atrial fibrillation on Xarelto, advanced CAD, ischemic CMP, hypertension, stage III chronic kidney disease.     5/2023: Echo showed EF of 40-45%. The mid to basal inferior wall is akinetic.    7/7/23: GI was consulted consulted, who performed an EGD and diagnostic paracentesis. EGD showed two gastric ulcers, one with recent bleeding. IV PPI initiated. He had 4.4 L out with paracentesis. His sx's were significantly improved after paracentesis, but he continues to have pitting edema to the thighs on exam. No diuretic therapy PTA.    Pt was started on Torsemide, Entresto, Protonix. PTA Metoprolol and Xarelto were continued at time of discharge.    Called patient to discuss any post hospital d/c questions he may have, review medication changes, and confirm f/u appts. Patient denied any questions regarding new medications or changes to PTA medications.     Patient denied any SOB, chest pain, any increased edema or light headedness. States he is feeling great.    RN confirmed with patient that he needs to be needs to be scheduled for a cardiology FAVIOLA OV. Initially an urgent [facility] follow up appt was ordered. C[facility] messaged and following with following response:      Brooks Reed,     Looks like Conner qualifies for general follow-up. I would send a message to scheduling to arrange.      Vanessa     Phone call transferred to scheduling.    Pt was discharged to home with [facility] services.    Patient advised to call clinic with any cardiac related questions or concerns prior to this faviola't. Patient verbalized understanding and agreed with plan. PAULA Junior  KUNAL.

## 2023-07-13 ENCOUNTER — TELEPHONE (OUTPATIENT)
Dept: INTERNAL MEDICINE | Facility: CLINIC | Age: 68
End: 2023-07-13
Payer: COMMERCIAL

## 2023-07-13 NOTE — TELEPHONE ENCOUNTER
Home Care is calling regarding an established patient with [facility].       Requesting orders from: Arline White  Provider is following patient: Yes  Is this a 60-day recertification request?  No    Orders Requested    Occupational Therapy  Request for initial certification (first set of orders)   Frequency: 2x/wk for 1 wks  then 1x/wk for 3 wks  Reason: Recent hospitalization due to LE edema    Information was gathered and will be sent to provider for review.  RN will contact Home Care with information after provider review.  Confirmed ok to leave a detailed message with call back.  Contact information confirmed and updated as needed.    Nelly Ross RN

## 2023-07-13 NOTE — TELEPHONE ENCOUNTER
Pt updated on PCP message below. Verbalized understandingNo further questions at this time.    Debbie MATHIAS RN  [facility] Triage Team

## 2023-07-13 NOTE — TELEPHONE ENCOUNTER
Patient Contact    Attempt # 1    Was call answered?  No.  Left detailed message on voicemail with information to call me back.    Called and reviewed provider message with patient as well. Pt verbalizes understanding and agrees to plan of care.

## 2023-07-13 NOTE — TELEPHONE ENCOUNTER
Pt had to move his appointment to 7/28. He wants to make sure the provider is okay with this. He is feeling better since hosp discharge. Cardiology appointment on the 19th and will call provider if his symptoms worsen or new.

## 2023-07-13 NOTE — TELEPHONE ENCOUNTER
Attempted to contact pt to relay providers message from below. No answer. Left VM to call us back.     On callback- please relay providers message from below.       Patient Contact    Attempt # 1    Was call answered?  No.  Left message on voicemail with information to call me back.

## 2023-07-13 NOTE — TELEPHONE ENCOUNTER
Yes, that is okay, especially since he will be seeing a provider 7/19/2023.    If any concerning symptoms arise or worsen in the interim, patient to let me know.  Will work in earlier any time if needed.

## 2023-07-13 NOTE — TELEPHONE ENCOUNTER
Okay to approve skilled nursing orders.    Goal is to keep oxygen above 92%. May wean oxygen accordingly (may need less at rest and more with activity). No adjustment for humidified oxygen.    Interaction noted. No changes recommended.

## 2023-07-13 NOTE — TELEPHONE ENCOUNTER
Called and spoke to Verenice OT. Relayed message from the provider.     During the phone call, Verenice states she forgot to ask for an order for a hospital bed to be sent to [facility]. Verenice states with the patient's diagnosis of COPD and Fibrosis, Verenice is hopeful insurance would cover a hospital bed with elevating foot and head. Triage RN advised Verenice that normally an extensive assessment/paperwork needs to be completed before insurance will cover a hospital bed.     Verenice is wondering if Dr. White can send the order and then she can connect with [facility] as to what is needed from her perspective?    Will route to PCP for review.     Nelly Ross, RN

## 2023-07-14 ENCOUNTER — MEDICAL CORRESPONDENCE (OUTPATIENT)
Dept: HEALTH INFORMATION MANAGEMENT | Facility: CLINIC | Age: 68
End: 2023-07-14

## 2023-07-14 NOTE — TELEPHONE ENCOUNTER
Called and left message for Verenice with the provider's response. Advised Verenice to reach back out to the clinic with any additional questions and/or concerns.     Nelly Ross RN

## 2023-07-17 NOTE — PROGRESS NOTES
Cardiology Clinic Progress Note  Booker Brown MRN# [id number]   YOB: 1955 Age: 68 year old   Primary Cardiologist: Dr. Marquez Reason for visit: Post hospital follow up             Assessment and Plan:       1.  HFrEF, recent exacerbation       Cardiomyopathy, etiology unclear, likely ischemic  -1/2023 cardiac MRI EF 55%, late gadolinium enhancement involving the mid to basal inferior and inferolateral walls consistent with previous infarct in the RCA distribution, severe hypokinesis of the mid to basal inferior and inferolateral walls, 13% scar burden  -5/2023 transthoracic echocardiogram with ejection fraction 40 to 45% mid to basal inferior akinesis new compared to prior study  -Symptom improvement following paracentesis and removal of 4.4 L of fluid as well as switch to Entresto and resuming torsemide  -We will defer starting SGLT2 or spironolactone given CKD stage III  -Appears euvolemic today    2.  Acute blood loss anemia, secondary to gastric ulcer  -7/2023 EGD showed 2 gastric ulcers, 1 with recent GI bleeding  -Initiated on PPI patient and Xarelto resumed prior to discharge    3. Paroxysmal atrial fibrillation, [id number] 3  -Furl to structural Watchman device discussed while inpatient, however patient declined  -Rate controlled with diltiazem and metoprolol 100mg   -Continue Xarelto 20 mg daily indefinitely    4. CKD stage III  -Baseline creatinine  ~1.5-1.7    5. Coronary artery disease with known occlusion of RCA and LCx  -Not a candidate for CABG given his lung disease  -6/2023 LDL 42  -Continue rosuvastatin 40mg daily   -No current angina, none historically    6. Type II diabetes, controlled  -6/2023 Hemoglobin A1c 0.6%  -Given his underlying CKD, PCP could consider alternative to metformin    7.  Lymphedema  -Working with in-home lymphedema therapist, continue wraps and torsemide    8.  COPD, ILD, follows with Dr. Wynn  -Oxygen dependent    Patient is feeling significantly  better since his hospitalization, diuresis, paracentesis, and switch to Entresto with addition of torsemide.  His blood pressure is borderline low today 105/68 and he has positional dizziness, we will continue his current dose of Entresto at this point, however could consider in the future reducing his metoprolol or diltiazem to allow room for up titration of Entresto.  He has not had any further bleeding issues since resuming his Xarelto.    We will recheck his BMP today given his CKD stage III as well as BNP and hemoglobin to monitor the trends.  The cause of his newly reduced EF is unclear, however could be ischemic given his known coronary disease with we will plan to repeat his echocardiogram in 3 months to reassess EF, if it remains around 40% we will consider a referral to our core clinic, I will see him back in the clinic in 2 months.     Changes today: Repeat BMP, BNP, and HGB today, no medication changes made today    Follow up plan: Follow-up with me in 2 months        History of Presenting Illness:    Booker Brown is a very pleasant 68 year old adult with a history of persistent atrial fibrillation, CKD stage III, lymphedema, type 2 diabetes, hypertension, coronary artery disease, hyperlipidemia, COPD, ILD.     In 2022 patient was hospitalized with atrial fibrillation with RVR and started on combination of metoprolol and diltiazem.  He was readmitted later that month with COVID and a CT chest was obtained in March which ruled out PE but most suggestive of asbestos related lung disease.    Conner also has complex coronary disease with a known occluded RCA and LCx.  His LAD is open but there is flow-limiting lesion heavily calcified in the proximal and mid LAD.  He had been turned down for CABG due to his lung disease.    He had an echocardiogram in May 2023 which showed new reduced ejection fraction 40 to 45% with mild reduction in systolic function, mid to basal inferior wall akinesis, mild aortic root  dilation mild ascending aortic dilation, trace aortic regurgitation.  He saw Dr. Marquez on 6/6/2023 and reviewed his complex coronary anatomy.  He felt that intervention of his known severe proximal to mid LAD lesion being critically high risk, given his lack of concerning symptoms, conservative management was recommended.  Please see that note for additional details.    Conner had been experiencing progressive exertional dyspnea, abdominal bloating, leg swelling in early July and checked his primary care provider who resumed as oral furosemide, however his symptoms progressed and he was admitted on 7/6/2023 with chronic hypoxic respiratory failure, anemia due to acute blood loss.  His stool was occult positive and Xarelto held.  He was given IV furosemide in the emergency room which helped with his breathing and edema.  CT abdomen pelvis showed new, patchy, multifocal groundglass opacities probably due to fluid overload.  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  He underwent a paracentesis due to new ascites and 4.4 L of fluid was removed.  Fluid was sent for cytology and felt to be consistent with heart failure exacerbation versus liver disease.  Her underwent an EGD which showed 2 gastric ulcers, 1 with recent bleeding.  He was started on IV PPI.  His NT proBNP was 7325, high-sensitivity troponin 27, normal sodium, potassium, CO2 35, BUN 39, creatinine 1.67, and GFR 45.  His hemoglobin was 7.7 on admission which was 8.4 at discharge after a unit of PRBCs.    Patient is here today for follow-up from his hospitalization.  His wife is present with him today.    Patient reports feeling good. Says his breathing is 10 times better since undergoing the paracentesis and being hospitalized. Monitoring weights daily a home. They have been steady at 254-256 lbs. He has dizziness and lightheadedness with position changes or sudden  movements, unchanged since prior to his hospitalization.     Patient denies chest pain or chest tightness. Denies other presyncopal symptoms. Denies tachycardia or palpitations.     Blood pressure 105/68 and HR 92 in clinic today.    He is monitoring his salt at home.         Recent Hospitalizations   2023           Social History      Social History     Socioeconomic History     Marital status:      Spouse name: Not on file     Number of children: Not on file     Years of education: Not on file     Highest education level: Not on file   Occupational History     Occupation: Shipping & Receiving   Tobacco Use     Smoking status: Former     Packs/day: 2.00     Years: 16.00     Pack years: 32.00     Types: Cigarettes     Quit date: 1987     Years since quittin.8     Smokeless tobacco: Never   Vaping Use     Vaping Use: Never used   Substance and Sexual Activity     Alcohol use: No     Drug use: No     Sexual activity: Yes     Partners: Female   Other Topics Concern     Parent/sibling w/ CABG, MI or angioplasty before 65F 55M? Yes      Service No     Blood Transfusions No     Caffeine Concern No     Occupational Exposure No     Hobby Hazards No     Sleep Concern No     Stress Concern No     Weight Concern Yes     Special Diet No     Back Care No     Exercise Yes     Bike Helmet No     Comment: n/a     Seat Belt Yes     Self-Exams No   Social History Narrative    .    Two adult children.    Four grandchildren.    No formal exercise.     Social Determinants of Health     Financial Resource Strain: Not on file   Food Insecurity: Not on file   Transportation Needs: Not on file   Physical Activity: Not on file   Stress: Not on file   Social Connections: Not on file   Intimate Partner Violence: Not on file   Housing Stability: Not on file            Review of Systems:   Skin:        Eyes:       ENT:       Respiratory:  Positive for shortness of breath  Cardiovascular:  Negative  "for;palpitations;chest pain;syncope or near-syncope fatigue;Positive for;edema;lightheadedness  Gastroenterology:      Genitourinary:       Musculoskeletal:       Neurologic:       Psychiatric:       Heme/Lymph/Imm:       Endocrine:  Positive for diabetes         Physical Exam:   Vitals: /68   Pulse 92   Ht 1.778 m (5' 10\")   Wt 115.2 kg (254 lb)   SpO2 92%   BMI 36.45 kg/m     Wt Readings from Last 4 Encounters:   07/19/23 115.2 kg (254 lb)   07/09/23 (P) 114.5 kg (252 lb 8 oz)   06/06/23 117 kg (258 lb)   03/30/23 117.9 kg (260 lb)     GEN: well nourished, in no acute distress. Sitting in wheelchair  HEENT:  Pupils equal, round. Sclerae nonicteric.   NECK: Supple, no masses appreciated. NoJVD with patient supine.  C/V:  Irregularly irregular rate and rhythm, no murmur, rub or gallop.    RESP: Respirations are unlabored. Clear to auscultation bilaterally without wheezing, rales, or rhonchi.  GI: Abdomen soft, nontender.  EXTREM: unable to assess LE edema, bilateral lymphedema wraps in poalce  NEURO: Alert and oriented, cooperative.  SKIN: Warm and dry.        Data:       LIPID RESULTS:  Lab Results   Component Value Date    CHOL 89 06/09/2023    CHOL 124 05/12/2021    HDL 33 (L) 06/09/2023    HDL 31 (L) 05/12/2021    LDL 42 06/09/2023    LDL 70 05/12/2021    TRIG 68 06/09/2023    TRIG 116 05/12/2021    CHOLHDLRATIO 4.5 09/28/2015     LIVER ENZYME RESULTS:  Lab Results   Component Value Date    AST 22 07/06/2023    AST 20 05/12/2021    ALT 20 07/06/2023    ALT 28 05/12/2021     CBC RESULTS:  Lab Results   Component Value Date    WBC 10.3 07/06/2023    WBC 11.0 05/12/2021    RBC 3.17 (L) 07/06/2023    RBC 5.15 05/12/2021    HGB 8.4 (L) 07/09/2023    HGB 14.1 05/12/2021    HCT 28.0 (L) 07/06/2023    HCT 44.2 05/12/2021    MCV 88 07/06/2023    MCV 86 05/12/2021    MCH 24.3 (L) 07/06/2023    MCH 27.4 05/12/2021    MCHC 27.5 (L) 07/06/2023    MCHC 31.9 05/12/2021    RDW 20.3 (H) 07/06/2023    RDW 14.7 " 05/12/2021     07/06/2023     05/12/2021     BMP RESULTS:  Lab Results   Component Value Date     07/07/2023     05/12/2021    POTASSIUM 4.2 07/09/2023    POTASSIUM 3.9 11/18/2022    POTASSIUM 3.9 05/14/2021    CHLORIDE 101 07/07/2023    CHLORIDE 101 11/18/2022    CHLORIDE 104 05/12/2021    CO2 35 (H) 07/07/2023    CO2 32 11/18/2022    CO2 29 05/12/2021    ANIONGAP 8 07/07/2023    ANIONGAP 5 11/18/2022    ANIONGAP 4 05/12/2021     (H) 07/09/2023     (H) 11/18/2022     (H) 05/12/2021    BUN 39.0 (H) 07/07/2023    BUN 42 (H) 11/18/2022    BUN 36 (H) 05/12/2021    CR 1.67 (H) 07/07/2023    CR 1.87 (H) 05/12/2021    GFRESTIMATED 45 (L) 07/07/2023    GFRESTIMATED 37 (L) 05/12/2021    GFRESTBLACK 43 (L) 05/12/2021    KURT 9.2 07/07/2023    KURT 9.2 05/12/2021      A1C RESULTS:  Lab Results   Component Value Date    A1C 5.6 06/09/2023    A1C 8.6 (H) 05/12/2021     INR RESULTS:  Lab Results   Component Value Date    INR 1.51 (H) 11/18/2022            Medications     Current Outpatient Medications   Medication Sig Dispense Refill     ACCU-CHEK GUIDE test strip USE TO TEST BLOOD SUGAR 1 TIMES DAILY OR AS DIRECTED. 100 strip 0     albuterol (PROAIR HFA/PROVENTIL HFA/VENTOLIN HFA) 108 (90 Base) MCG/ACT inhaler Inhale 2 puffs into the lungs every 6 hours as needed for shortness of breath / dyspnea or wheezing 18 g 7     alcohol swab prep pads Use to swab area of injection/fariha as directed. 100 each 3     blood glucose monitoring (NO BRAND SPECIFIED) meter device kit Use to test blood sugar 1 times daily or as directed. : per insurance. 1 kit 0     diltiazem ER COATED BEADS (CARDIZEM CD) 360 MG 24 hr capsule Take 1 capsule (360 mg) by mouth daily 90 capsule 1     ferrous sulfate (FEROSUL) 325 (65 Fe) MG tablet Take 1 tablet (325 mg) by mouth 2 times daily (with meals) 60 tablet 1     Fluticasone-Umeclidin-Vilanterol (TRELEGY ELLIPTA) 200-62.5-25 MCG/ACT oral inhaler Inhale 1 puff into  the lungs daily 1 each 4     glipiZIDE (GLUCOTROL XL) 5 MG 24 hr tablet Take 1 tablet (5 mg) by mouth daily 90 tablet 1     metFORMIN (GLUCOPHAGE XR) 500 MG 24 hr tablet Take 2 tablets (1,000 mg) by mouth daily (with dinner) 180 tablet 3     metoprolol succinate ER (TOPROL XL) 100 MG 24 hr tablet Take 1 tablet (100 mg) by mouth 2 times daily 180 tablet 3     pantoprazole (PROTONIX) 40 MG EC tablet Take 1 tablet (40 mg) by mouth 2 times daily 60 tablet 0     rivaroxaban ANTICOAGULANT (XARELTO ANTICOAGULANT) 20 MG TABS tablet Take 1 tablet (20 mg) by mouth daily (with dinner) 90 tablet 3     rosuvastatin (CRESTOR) 40 MG tablet Take 1 tablet (40 mg) by mouth At Bedtime 90 tablet 3     sacubitril-valsartan (ENTRESTO) 24-26 MG per tablet Take 1 tablet by mouth 2 times daily 60 tablet 0     thin (NO BRAND SPECIFIED) lancets Use with lanceting device. To accompany: Blood Glucose Monitor Brands: per insurance. 1 each 6     torsemide (DEMADEX) 20 MG tablet Take 1 tablet (20 mg) by mouth daily 30 tablet 0     vitamin D3 (CHOLECALCIFEROL) 50 mcg (2000 units) tablet Take 1 tablet (50 mcg) by mouth daily 90 tablet 3     ASPIRIN NOT PRESCRIBED (INTENTIONAL) Please choose reason not prescribed from choices below. (Patient not taking: Reported on 7/19/2023)       senna-docusate (SENOKOT-S/PERICOLACE) 8.6-50 MG tablet Take 1 tablet by mouth 2 times daily (Patient not taking: Reported on 7/19/2023) 14 tablet 1          Past Medical History     Past Medical History:   Diagnosis Date     Benign essential hypertension      CAD (coronary artery disease)      Chronic kidney disease, stage III (moderate) (H)      COPD (chronic obstructive pulmonary disease) (H)      ILD (interstitial lung disease) (H)      Obesity (BMI 30-39.9)      Persistent atrial fibrillation (H)      Pure hypercholesterolemia      Type 2 diabetes mellitus (H)      Past Surgical History:   Procedure Laterality Date     COLONOSCOPY N/A 8/24/2018    Procedure: COMBINED  COLONOSCOPY, SINGLE OR MULTIPLE BIOPSY/POLYPECTOMY BY BIOPSY;;  Surgeon: Lawrence Mata MD;  Location:  GI     CV CORONARY ANGIOGRAM N/A 11/18/2022    Procedure: Coronary Angiogram;  Surgeon: Mason Lucas MD;  Location:  HEART CARDIAC CATH LAB     CV INSTANTANEOUS WAVE-FREE RATIO N/A 11/18/2022    Procedure: Instantaneous Wave-Free Ratio;  Surgeon: Mason Lucas MD;  Location:  HEART CARDIAC CATH LAB     CV LEFT HEART CATH N/A 11/18/2022    Procedure: Left Heart Catheterization;  Surgeon: Mason Lucas MD;  Location:  HEART CARDIAC CATH LAB     ESOPHAGOSCOPY, GASTROSCOPY, DUODENOSCOPY (EGD), COMBINED N/A 7/7/2023    Procedure: Esophagoscopy, gastroscopy, duodenoscopy (EGD), combined;  Surgeon: Nestor Louie MD;  Location:  GI     Family History   Problem Relation Age of Onset     Diabetes Type 2  Mother      Cerebral aneurysm Sister      Bladder Cancer Brother      Myocardial Infarction Brother         in his 50s     Cerebrovascular Disease No family hx of      Coronary Artery Disease Early Onset No family hx of      Prostate Cancer No family hx of      Colon Cancer No family hx of      Clotting Disorder No family hx of      Bleeding Disorder No family hx of      Anesthesia Reaction No family hx of             Allergies   Patient has no known allergies.        Lorena Hart NP  [facility]  Pager: [phone]

## 2023-07-18 ENCOUNTER — TELEPHONE (OUTPATIENT)
Dept: INTERNAL MEDICINE | Facility: CLINIC | Age: 68
End: 2023-07-18
Payer: COMMERCIAL

## 2023-07-18 ENCOUNTER — TELEPHONE (OUTPATIENT)
Dept: CARDIOLOGY | Facility: CLINIC | Age: 68
End: 2023-07-18
Payer: COMMERCIAL

## 2023-07-18 DIAGNOSIS — J84.9 ILD (INTERSTITIAL LUNG DISEASE) (H): ICD-10-CM

## 2023-07-18 DIAGNOSIS — J44.9 CHRONIC OBSTRUCTIVE PULMONARY DISEASE, UNSPECIFIED COPD TYPE (H): Primary | ICD-10-CM

## 2023-07-18 DIAGNOSIS — Z99.81 SUPPLEMENTAL OXYGEN DEPENDENT: ICD-10-CM

## 2023-07-18 DIAGNOSIS — R60.0 BILATERAL LOWER EXTREMITY EDEMA: ICD-10-CM

## 2023-07-18 NOTE — TELEPHONE ENCOUNTER
DME order will be faxed.    If additional information is needed for hospital bed to be covered, OT will be contacted to provide that required information.

## 2023-07-18 NOTE — TELEPHONE ENCOUNTER
Received message from Yoly COTA unable to understand phone number to return call. Asking for orders for compression wraps for legs for home care. MIGUELANGEL Patel RN

## 2023-07-18 NOTE — TELEPHONE ENCOUNTER
Verenice, OT [facility] [phone] - detailed voicemail okcathy    Verenice called on behalf of the patient requesting DME order for a hospital bed. Pt would like a hospital bed due to difficulty sleeping. Pt has been sleeping in a chair due to chronic health condition.     Pt's health hx/dx: Lymphedema bilat lower extremities, COPD, CHF, pulmonary fibrosis, 8L 02 highflow.     Pt unable to tolerate supine position due to chronic conditions; COPD, CHF, Pulm Fibrosis. Supplemental 02 dependent at home: 8L highflow oxygen needed.     Verenice spoke to [facility] medical earlier today and the above information was reviewed that would qualify the patient for a hospital bed. Asked Verenice to fax form, Verenice has stated the [facility] did not mention a form so Verenice does not think a form needs to be completed. Printing out the information in note above and faxing it to [facility] should be enough information to get the hospital bed for the patient according to Verenice's understanding.     Routing to provider, please review. If request is approved, please route to care team to fax above information to [facility].     Please fax to [facility]: [phone]

## 2023-07-19 ENCOUNTER — LAB (OUTPATIENT)
Dept: LAB | Facility: CLINIC | Age: 68
End: 2023-07-19
Payer: COMMERCIAL

## 2023-07-19 ENCOUNTER — OFFICE VISIT (OUTPATIENT)
Dept: CARDIOLOGY | Facility: CLINIC | Age: 68
End: 2023-07-19
Payer: COMMERCIAL

## 2023-07-19 VITALS
HEIGHT: 70 IN | WEIGHT: 254 LBS | DIASTOLIC BLOOD PRESSURE: 68 MMHG | BODY MASS INDEX: 36.36 KG/M2 | HEART RATE: 92 BPM | SYSTOLIC BLOOD PRESSURE: 105 MMHG | OXYGEN SATURATION: 92 %

## 2023-07-19 DIAGNOSIS — D50.8 OTHER IRON DEFICIENCY ANEMIA: ICD-10-CM

## 2023-07-19 DIAGNOSIS — I50.20 SYSTOLIC CONGESTIVE HEART FAILURE, UNSPECIFIED HF CHRONICITY (H): ICD-10-CM

## 2023-07-19 DIAGNOSIS — I48.0 PAROXYSMAL ATRIAL FIBRILLATION (H): ICD-10-CM

## 2023-07-19 DIAGNOSIS — K92.2 UPPER GI BLEED: ICD-10-CM

## 2023-07-19 DIAGNOSIS — K92.2 UPPER GI BLEED: Primary | ICD-10-CM

## 2023-07-19 LAB
ANION GAP SERPL CALCULATED.3IONS-SCNC: 9 MMOL/L (ref 7–15)
BUN SERPL-MCNC: 30.4 MG/DL (ref 8–23)
CALCIUM SERPL-MCNC: 9.1 MG/DL (ref 8.8–10.2)
CHLORIDE SERPL-SCNC: 97 MMOL/L (ref 98–107)
CREAT SERPL-MCNC: 1.51 MG/DL (ref 0.51–1.17)
DEPRECATED HCO3 PLAS-SCNC: 35 MMOL/L (ref 22–29)
GFR SERPL CREATININE-BSD FRML MDRD: 51 ML/MIN/1.73M2
GLUCOSE SERPL-MCNC: 105 MG/DL (ref 70–99)
HGB BLD-MCNC: 8.9 G/DL (ref 11.7–17.7)
NT-PROBNP SERPL-MCNC: 4050 PG/ML (ref 0–900)
POTASSIUM SERPL-SCNC: 4.1 MMOL/L (ref 3.4–5.3)
SODIUM SERPL-SCNC: 141 MMOL/L (ref 136–145)

## 2023-07-19 PROCEDURE — 99214 OFFICE O/P EST MOD 30 MIN: CPT | Performed by: NURSE PRACTITIONER

## 2023-07-19 PROCEDURE — 83880 ASSAY OF NATRIURETIC PEPTIDE: CPT | Performed by: NURSE PRACTITIONER

## 2023-07-19 PROCEDURE — 85018 HEMOGLOBIN: CPT | Performed by: NURSE PRACTITIONER

## 2023-07-19 PROCEDURE — 36415 COLL VENOUS BLD VENIPUNCTURE: CPT | Performed by: NURSE PRACTITIONER

## 2023-07-19 PROCEDURE — 80048 BASIC METABOLIC PNL TOTAL CA: CPT | Performed by: NURSE PRACTITIONER

## 2023-07-19 NOTE — PATIENT INSTRUCTIONS
Today's Recommendations    I would like to recheck your labs today   I will review your hospital stay with Dr. Marquez  Keep weighing yourself each day, contact us if your weight goes up 2 lbs overnigth or 5 lbs in a week  Continue all medications without changes.  Please follow up with me in 2 months, we will repeat your heart ultrasound in 3 months.    Please send a dooyoo message or call [phone] to the RN team with questions or concerns.     Scheduling number [phone]  TERESA Parr, CNP

## 2023-07-19 NOTE — LETTER
7/19/2023    Arline White MD  [address] [facility] [address]    RE: Booker Brown       Dear Colleague,     I had the pleasure of seeing Booker Brown in the [facility].  Cardiology Clinic Progress Note  Booker Brown MRN# [id number]   YOB: 1955 Age: 68 year old   Primary Cardiologist: Dr. Marquez Reason for visit: Post hospital follow up             Assessment and Plan:       1.  HFrEF, recent exacerbation       Cardiomyopathy, etiology unclear, likely ischemic  -1/2023 cardiac MRI EF 55%, late gadolinium enhancement involving the mid to basal inferior and inferolateral walls consistent with previous infarct in the RCA distribution, severe hypokinesis of the mid to basal inferior and inferolateral walls, 13% scar burden  -5/2023 transthoracic echocardiogram with ejection fraction 40 to 45% mid to basal inferior akinesis new compared to prior study  -Symptom improvement following paracentesis and removal of 4.4 L of fluid as well as switch to Entresto and resuming torsemide  -We will defer starting SGLT2 or spironolactone given CKD stage III  -Appears euvolemic today    2.  Acute blood loss anemia, secondary to gastric ulcer  -7/2023 EGD showed 2 gastric ulcers, 1 with recent GI bleeding  -Initiated on PPI patient and Xarelto resumed prior to discharge    3. Paroxysmal atrial fibrillation, [id number] 3  -Furl to structural Watchman device discussed while inpatient, however patient declined  -Rate controlled with diltiazem and metoprolol 100mg   -Continue Xarelto 20 mg daily indefinitely    4. CKD stage III  -Baseline creatinine  ~1.5-1.7    5. Coronary artery disease with known occlusion of RCA and LCx  -Not a candidate for CABG given his lung disease  -6/2023 LDL 42  -Continue rosuvastatin 40mg daily   -No current angina, none historically    6. Type II diabetes, controlled  -6/2023 Hemoglobin A1c 0.6%  -Given his underlying CKD, PCP could consider alternative  to metformin    7.  Lymphedema  -Working with in-home lymphedema therapist, continue wraps and torsemide    8.  COPD, ILD, follows with Dr. Wynn  -Oxygen dependent    Patient is feeling significantly better since his hospitalization, diuresis, paracentesis, and switch to Entresto with addition of torsemide.  His blood pressure is borderline low today 105/68 and he has positional dizziness, we will continue his current dose of Entresto at this point, however could consider in the future reducing his metoprolol or diltiazem to allow room for up titration of Entresto.  He has not had any further bleeding issues since resuming his Xarelto.    We will recheck his BMP today given his CKD stage III as well as BNP and hemoglobin to monitor the trends.  The cause of his newly reduced EF is unclear, however could be ischemic given his known coronary disease with we will plan to repeat his echocardiogram in 3 months to reassess EF, if it remains around 40% we will consider a referral to our core clinic, I will see him back in the clinic in 2 months.     Changes today: Repeat BMP, BNP, and HGB today, no medication changes made today    Follow up plan: Follow-up with me in 2 months        History of Presenting Illness:    Booker Brown is a very pleasant 68 year old adult with a history of persistent atrial fibrillation, CKD stage III, lymphedema, type 2 diabetes, hypertension, coronary artery disease, hyperlipidemia, COPD, ILD.     In 2022 patient was hospitalized with atrial fibrillation with RVR and started on combination of metoprolol and diltiazem.  He was readmitted later that month with COVID and a CT chest was obtained in March which ruled out PE but most suggestive of asbestos related lung disease.    Conner also has complex coronary disease with a known occluded RCA and LCx.  His LAD is open but there is flow-limiting lesion heavily calcified in the proximal and mid LAD.  He had been turned down for CABG due to his  lung disease.    He had an echocardiogram in May 2023 which showed new reduced ejection fraction 40 to 45% with mild reduction in systolic function, mid to basal inferior wall akinesis, mild aortic root dilation mild ascending aortic dilation, trace aortic regurgitation.  He saw Dr. Marquez on 6/6/2023 and reviewed his complex coronary anatomy.  He felt that intervention of his known severe proximal to mid LAD lesion being critically high risk, given his lack of concerning symptoms, conservative management was recommended.  Please see that note for additional details.    Conner had been experiencing progressive exertional dyspnea, abdominal bloating, leg swelling in early July and checked his primary care provider who resumed as oral furosemide, however his symptoms progressed and he was admitted on 7/6/2023 with chronic hypoxic respiratory failure, anemia due to acute blood loss.  His stool was occult positive and Xarelto held.  He was given IV furosemide in the emergency room which helped with his breathing and edema.  CT abdomen pelvis showed new, patchy, multifocal groundglass opacities probably due to fluid overload.  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  He underwent a paracentesis due to new ascites and 4.4 L of fluid was removed.  Fluid was sent for cytology and felt to be consistent with heart failure exacerbation versus liver disease.  Her underwent an EGD which showed 2 gastric ulcers, 1 with recent bleeding.  He was started on IV PPI.  His NT proBNP was 7325, high-sensitivity troponin 27, normal sodium, potassium, CO2 35, BUN 39, creatinine 1.67, and GFR 45.  His hemoglobin was 7.7 on admission which was 8.4 at discharge after a unit of PRBCs.    Patient is here today for follow-up from his hospitalization.  His wife is present with him today.    Patient reports feeling good. Says his breathing is 10 times  better since undergoing the paracentesis and being hospitalized. Monitoring weights daily a home. They have been steady at 254-256 lbs. He has dizziness and lightheadedness with position changes or sudden movements, unchanged since prior to his hospitalization.     Patient denies chest pain or chest tightness. Denies other presyncopal symptoms. Denies tachycardia or palpitations.     Blood pressure 105/68 and HR 92 in clinic today.    He is monitoring his salt at home.         Recent Hospitalizations   2023           Social History      Social History     Socioeconomic History    Marital status:      Spouse name: Not on file    Number of children: Not on file    Years of education: Not on file    Highest education level: Not on file   Occupational History    Occupation: Shipping & Receiving   Tobacco Use    Smoking status: Former     Packs/day: 2.00     Years: 16.00     Pack years: 32.00     Types: Cigarettes     Quit date: 1987     Years since quittin.8    Smokeless tobacco: Never   Vaping Use    Vaping Use: Never used   Substance and Sexual Activity    Alcohol use: No    Drug use: No    Sexual activity: Yes     Partners: Female   Other Topics Concern    Parent/sibling w/ CABG, MI or angioplasty before 65F 55M? Yes     Service No    Blood Transfusions No    Caffeine Concern No    Occupational Exposure No    Hobby Hazards No    Sleep Concern No    Stress Concern No    Weight Concern Yes    Special Diet No    Back Care No    Exercise Yes    Bike Helmet No     Comment: n/a    Seat Belt Yes    Self-Exams No   Social History Narrative    .    Two adult children.    Four grandchildren.    No formal exercise.     Social Determinants of Health     Financial Resource Strain: Not on file   Food Insecurity: Not on file   Transportation Needs: Not on file   Physical Activity: Not on file   Stress: Not on file   Social Connections: Not on file   Intimate Partner Violence: Not on file  "  Housing Stability: Not on file            Review of Systems:   Skin:        Eyes:       ENT:       Respiratory:  Positive for shortness of breath  Cardiovascular:  Negative for;palpitations;chest pain;syncope or near-syncope fatigue;Positive for;edema;lightheadedness  Gastroenterology:      Genitourinary:       Musculoskeletal:       Neurologic:       Psychiatric:       Heme/Lymph/Imm:       Endocrine:  Positive for diabetes         Physical Exam:   Vitals: /68   Pulse 92   Ht 1.778 m (5' 10\")   Wt 115.2 kg (254 lb)   SpO2 92%   BMI 36.45 kg/m     Wt Readings from Last 4 Encounters:   07/19/23 115.2 kg (254 lb)   07/09/23 (P) 114.5 kg (252 lb 8 oz)   06/06/23 117 kg (258 lb)   03/30/23 117.9 kg (260 lb)     GEN: well nourished, in no acute distress. Sitting in wheelchair  HEENT:  Pupils equal, round. Sclerae nonicteric.   NECK: Supple, no masses appreciated. NoJVD with patient supine.  C/V:  Irregularly irregular rate and rhythm, no murmur, rub or gallop.    RESP: Respirations are unlabored. Clear to auscultation bilaterally without wheezing, rales, or rhonchi.  GI: Abdomen soft, nontender.  EXTREM: unable to assess LE edema, bilateral lymphedema wraps in poalce  NEURO: Alert and oriented, cooperative.  SKIN: Warm and dry.        Data:       LIPID RESULTS:  Lab Results   Component Value Date    CHOL 89 06/09/2023    CHOL 124 05/12/2021    HDL 33 (L) 06/09/2023    HDL 31 (L) 05/12/2021    LDL 42 06/09/2023    LDL 70 05/12/2021    TRIG 68 06/09/2023    TRIG 116 05/12/2021    CHOLHDLRATIO 4.5 09/28/2015     LIVER ENZYME RESULTS:  Lab Results   Component Value Date    AST 22 07/06/2023    AST 20 05/12/2021    ALT 20 07/06/2023    ALT 28 05/12/2021     CBC RESULTS:  Lab Results   Component Value Date    WBC 10.3 07/06/2023    WBC 11.0 05/12/2021    RBC 3.17 (L) 07/06/2023    RBC 5.15 05/12/2021    HGB 8.4 (L) 07/09/2023    HGB 14.1 05/12/2021    HCT 28.0 (L) 07/06/2023    HCT 44.2 05/12/2021    MCV 88 " 07/06/2023    MCV 86 05/12/2021    MCH 24.3 (L) 07/06/2023    MCH 27.4 05/12/2021    MCHC 27.5 (L) 07/06/2023    MCHC 31.9 05/12/2021    RDW 20.3 (H) 07/06/2023    RDW 14.7 05/12/2021     07/06/2023     05/12/2021     BMP RESULTS:  Lab Results   Component Value Date     07/07/2023     05/12/2021    POTASSIUM 4.2 07/09/2023    POTASSIUM 3.9 11/18/2022    POTASSIUM 3.9 05/14/2021    CHLORIDE 101 07/07/2023    CHLORIDE 101 11/18/2022    CHLORIDE 104 05/12/2021    CO2 35 (H) 07/07/2023    CO2 32 11/18/2022    CO2 29 05/12/2021    ANIONGAP 8 07/07/2023    ANIONGAP 5 11/18/2022    ANIONGAP 4 05/12/2021     (H) 07/09/2023     (H) 11/18/2022     (H) 05/12/2021    BUN 39.0 (H) 07/07/2023    BUN 42 (H) 11/18/2022    BUN 36 (H) 05/12/2021    CR 1.67 (H) 07/07/2023    CR 1.87 (H) 05/12/2021    GFRESTIMATED 45 (L) 07/07/2023    GFRESTIMATED 37 (L) 05/12/2021    GFRESTBLACK 43 (L) 05/12/2021    KURT 9.2 07/07/2023    KURT 9.2 05/12/2021      A1C RESULTS:  Lab Results   Component Value Date    A1C 5.6 06/09/2023    A1C 8.6 (H) 05/12/2021     INR RESULTS:  Lab Results   Component Value Date    INR 1.51 (H) 11/18/2022            Medications     Current Outpatient Medications   Medication Sig Dispense Refill    ACCU-CHEK GUIDE test strip USE TO TEST BLOOD SUGAR 1 TIMES DAILY OR AS DIRECTED. 100 strip 0    albuterol (PROAIR HFA/PROVENTIL HFA/VENTOLIN HFA) 108 (90 Base) MCG/ACT inhaler Inhale 2 puffs into the lungs every 6 hours as needed for shortness of breath / dyspnea or wheezing 18 g 7    alcohol swab prep pads Use to swab area of injection/fariha as directed. 100 each 3    blood glucose monitoring (NO BRAND SPECIFIED) meter device kit Use to test blood sugar 1 times daily or as directed. : per insurance. 1 kit 0    diltiazem ER COATED BEADS (CARDIZEM CD) 360 MG 24 hr capsule Take 1 capsule (360 mg) by mouth daily 90 capsule 1    ferrous sulfate (FEROSUL) 325 (65 Fe) MG tablet Take 1  tablet (325 mg) by mouth 2 times daily (with meals) 60 tablet 1    Fluticasone-Umeclidin-Vilanterol (TRELEGY ELLIPTA) 200-62.5-25 MCG/ACT oral inhaler Inhale 1 puff into the lungs daily 1 each 4    glipiZIDE (GLUCOTROL XL) 5 MG 24 hr tablet Take 1 tablet (5 mg) by mouth daily 90 tablet 1    metFORMIN (GLUCOPHAGE XR) 500 MG 24 hr tablet Take 2 tablets (1,000 mg) by mouth daily (with dinner) 180 tablet 3    metoprolol succinate ER (TOPROL XL) 100 MG 24 hr tablet Take 1 tablet (100 mg) by mouth 2 times daily 180 tablet 3    pantoprazole (PROTONIX) 40 MG EC tablet Take 1 tablet (40 mg) by mouth 2 times daily 60 tablet 0    rivaroxaban ANTICOAGULANT (XARELTO ANTICOAGULANT) 20 MG TABS tablet Take 1 tablet (20 mg) by mouth daily (with dinner) 90 tablet 3    rosuvastatin (CRESTOR) 40 MG tablet Take 1 tablet (40 mg) by mouth At Bedtime 90 tablet 3    sacubitril-valsartan (ENTRESTO) 24-26 MG per tablet Take 1 tablet by mouth 2 times daily 60 tablet 0    thin (NO BRAND SPECIFIED) lancets Use with lanceting device. To accompany: Blood Glucose Monitor Brands: per insurance. 1 each 6    torsemide (DEMADEX) 20 MG tablet Take 1 tablet (20 mg) by mouth daily 30 tablet 0    vitamin D3 (CHOLECALCIFEROL) 50 mcg (2000 units) tablet Take 1 tablet (50 mcg) by mouth daily 90 tablet 3    ASPIRIN NOT PRESCRIBED (INTENTIONAL) Please choose reason not prescribed from choices below. (Patient not taking: Reported on 7/19/2023)      senna-docusate (SENOKOT-S/PERICOLACE) 8.6-50 MG tablet Take 1 tablet by mouth 2 times daily (Patient not taking: Reported on 7/19/2023) 14 tablet 1          Past Medical History     Past Medical History:   Diagnosis Date    Benign essential hypertension     CAD (coronary artery disease)     Chronic kidney disease, stage III (moderate) (H)     COPD (chronic obstructive pulmonary disease) (H)     ILD (interstitial lung disease) (H)     Obesity (BMI 30-39.9)     Persistent atrial fibrillation (H)     Pure  hypercholesterolemia     Type 2 diabetes mellitus (H)      Past Surgical History:   Procedure Laterality Date    COLONOSCOPY N/A 8/24/2018    Procedure: COMBINED COLONOSCOPY, SINGLE OR MULTIPLE BIOPSY/POLYPECTOMY BY BIOPSY;;  Surgeon: Lawrence Mata MD;  Location:  GI    CV CORONARY ANGIOGRAM N/A 11/18/2022    Procedure: Coronary Angiogram;  Surgeon: Mason Lucas MD;  Location:  HEART CARDIAC CATH LAB    CV INSTANTANEOUS WAVE-FREE RATIO N/A 11/18/2022    Procedure: Instantaneous Wave-Free Ratio;  Surgeon: Mason Lucas MD;  Location:  HEART CARDIAC CATH LAB    CV LEFT HEART CATH N/A 11/18/2022    Procedure: Left Heart Catheterization;  Surgeon: Mason Lucas MD;  Location:  HEART CARDIAC CATH LAB    ESOPHAGOSCOPY, GASTROSCOPY, DUODENOSCOPY (EGD), COMBINED N/A 7/7/2023    Procedure: Esophagoscopy, gastroscopy, duodenoscopy (EGD), combined;  Surgeon: Nestor Louie MD;  Location:  GI     Family History   Problem Relation Age of Onset    Diabetes Type 2  Mother     Cerebral aneurysm Sister     Bladder Cancer Brother     Myocardial Infarction Brother         in his 50s    Cerebrovascular Disease No family hx of     Coronary Artery Disease Early Onset No family hx of     Prostate Cancer No family hx of     Colon Cancer No family hx of     Clotting Disorder No family hx of     Bleeding Disorder No family hx of     Anesthesia Reaction No family hx of             Allergies   Patient has no known allergies.        Lorena Hart NP  [facility]  Pager: [phone]        Thank you for allowing me to participate in the care of your patient.      Sincerely,     Lorena Hart NP     [facility]  cc:   Juliano Conway MD  [address] [place] [address]

## 2023-07-20 NOTE — TELEPHONE ENCOUNTER
Left message for Yoly COTA.    , NP  You 8 minutes ago (12:12 PM)     EL  When I met with Conner yesterday, he seemed to be feeling much better and was wearing the wraps. He is still on torsemide for diuresis. If he has remained stable from an oxygen standpoint while wearing them, he can continue to have OT complete lymphedema therapy     This message left for OT personel, and return phone number for any questions. MIGUELANGEL Patel RN

## 2023-07-20 NOTE — TELEPHONE ENCOUNTER
Message from Yoly COTA ([phone]) asking about leg wraps. Pt is on 8 litters 02, and she is asking if leg wraps are ok , and if there is any recommendations on number of wraps. Concern for CHF, and fluid content being pushed to lungs. MIGUELANGEL Patel RN

## 2023-07-23 ENCOUNTER — MYC MEDICAL ADVICE (OUTPATIENT)
Dept: INTERNAL MEDICINE | Facility: CLINIC | Age: 68
End: 2023-07-23
Payer: COMMERCIAL

## 2023-07-23 DIAGNOSIS — K92.1 MELENA: ICD-10-CM

## 2023-07-25 ENCOUNTER — TELEPHONE (OUTPATIENT)
Dept: INTERNAL MEDICINE | Facility: CLINIC | Age: 68
End: 2023-07-25
Payer: COMMERCIAL

## 2023-07-25 RX ORDER — PANTOPRAZOLE SODIUM 40 MG/1
40 TABLET, DELAYED RELEASE ORAL 2 TIMES DAILY
Qty: 60 TABLET | Refills: 0 | Status: SHIPPED | OUTPATIENT
Start: 2023-07-25 | End: 2023-08-14

## 2023-07-25 NOTE — TELEPHONE ENCOUNTER
General Call      Reason for Call:  DME     What are your questions or concerns:  calling to get a blubbler for oxygen, trying to get an update on this.    Date of last appointment with provider: 7/14/23    Could we send this information to you in [employer] or would you prefer to receive a phone call?:   Patient would prefer a phone call   Okay to leave a detailed message?: Yes at Other phone number:  Belkys Deleon, [phone]

## 2023-07-26 ENCOUNTER — TELEPHONE (OUTPATIENT)
Dept: PULMONOLOGY | Facility: CLINIC | Age: 68
End: 2023-07-26

## 2023-07-26 DIAGNOSIS — J44.9 CHRONIC OBSTRUCTIVE PULMONARY DISEASE, UNSPECIFIED COPD TYPE (H): Primary | ICD-10-CM

## 2023-07-26 NOTE — TELEPHONE ENCOUNTER
Writer called [facility] to inquire if any additional information is required for bubbler other than DME order. Per , only DME order is required.    Pended DME order for bubbler and routed to Dr Hopkins to sign.    Arian Robertson RN

## 2023-07-26 NOTE — TELEPHONE ENCOUNTER
"DME order signed by Dr Hopkins. Writer faxed order to  DME. Writer updated  \"Ivelisse\" via VM.    Arian Robertson RN    "

## 2023-07-26 NOTE — TELEPHONE ENCOUNTER
[facility] Call Center    Phone Message    May a detailed message be left on voicemail: yes     Reason for Call: Order(s): Other:     Reason for requested: Per Ivelisse states patient is wanting an order for a Bubbler for Oxygen, humidifier sent to [facility].  Ivelisse states If the clinic has any questions to reach out to her. Please advise.     Date needed: asap    Provider name: Sandeep    Action Taken: Message routed to:  [facility] Lung    Travel Screening: Not Applicable

## 2023-07-27 ENCOUNTER — TELEPHONE (OUTPATIENT)
Dept: INTERNAL MEDICINE | Facility: CLINIC | Age: 68
End: 2023-07-27
Payer: COMMERCIAL

## 2023-07-27 PROBLEM — K92.2 UPPER GI BLEED: Status: RESOLVED | Noted: 2023-07-07 | Resolved: 2023-07-27

## 2023-07-27 PROBLEM — E87.70 HYPERVOLEMIA, UNSPECIFIED HYPERVOLEMIA TYPE: Status: RESOLVED | Noted: 2023-07-07 | Resolved: 2023-07-27

## 2023-07-27 PROBLEM — D62 ANEMIA DUE TO BLOOD LOSS, ACUTE: Status: RESOLVED | Noted: 2023-07-06 | Resolved: 2023-07-27

## 2023-07-27 PROBLEM — J81.0 ACUTE PULMONARY EDEMA (H): Status: RESOLVED | Noted: 2023-07-07 | Resolved: 2023-07-27

## 2023-07-27 PROBLEM — R18.8 OTHER ASCITES: Status: RESOLVED | Noted: 2023-07-06 | Resolved: 2023-07-27

## 2023-07-27 PROBLEM — R09.02 HYPOXIA: Status: RESOLVED | Noted: 2023-07-07 | Resolved: 2023-07-27

## 2023-07-27 PROBLEM — K92.1 MELENA: Status: RESOLVED | Noted: 2023-07-06 | Resolved: 2023-07-27

## 2023-07-27 NOTE — TELEPHONE ENCOUNTER
"Received a call from Loli,  with [employer], stating she contacted [facility] about the patient's hospital bed and [facility] stated they are needing \"clinical support for medical necessity\".    [facility] Phone Number: [phone]    Per telephone encounter from 7/18/23...      Triage RN attempted to contact [facility] to see what additional information is needed. Triage RN was on hold waiting for a representative for 15+ minutes.     Will reach out to CIPRIANO Frederick, to have her reach out to [facility] to see what additional information is needed. Yoly OT Phone Number: [phone]. Left VM for Yoly explaining [facility] is needing additional information for the Hospital Bed. Advised Yoly she can either call the clinic back if she has additional questions or call [facility] (phone number provided).     Nelly Ross RN  "

## 2023-07-28 ENCOUNTER — OFFICE VISIT (OUTPATIENT)
Dept: INTERNAL MEDICINE | Facility: CLINIC | Age: 68
End: 2023-07-28
Payer: COMMERCIAL

## 2023-07-28 VITALS
DIASTOLIC BLOOD PRESSURE: 70 MMHG | TEMPERATURE: 97.2 F | OXYGEN SATURATION: 98 % | WEIGHT: 254 LBS | SYSTOLIC BLOOD PRESSURE: 110 MMHG | HEART RATE: 86 BPM | BODY MASS INDEX: 36.45 KG/M2

## 2023-07-28 DIAGNOSIS — K92.1 MELENA: ICD-10-CM

## 2023-07-28 DIAGNOSIS — I89.0 LYMPHEDEMA OF BOTH LOWER EXTREMITIES: ICD-10-CM

## 2023-07-28 DIAGNOSIS — Z09 HOSPITAL DISCHARGE FOLLOW-UP: Primary | ICD-10-CM

## 2023-07-28 DIAGNOSIS — D62 ANEMIA DUE TO BLOOD LOSS, ACUTE: ICD-10-CM

## 2023-07-28 DIAGNOSIS — J84.9 ILD (INTERSTITIAL LUNG DISEASE) (H): ICD-10-CM

## 2023-07-28 DIAGNOSIS — R18.8 OTHER ASCITES: ICD-10-CM

## 2023-07-28 DIAGNOSIS — I50.23 ACUTE ON CHRONIC SYSTOLIC CONGESTIVE HEART FAILURE (H): ICD-10-CM

## 2023-07-28 DIAGNOSIS — J44.9 CHRONIC OBSTRUCTIVE PULMONARY DISEASE, UNSPECIFIED COPD TYPE (H): ICD-10-CM

## 2023-07-28 PROCEDURE — 99214 OFFICE O/P EST MOD 30 MIN: CPT | Performed by: INTERNAL MEDICINE

## 2023-07-28 NOTE — PROGRESS NOTES
Hospital Bed Addendum - 8/1/23:    Patient is being assessed for semi-electric hospital bed.    Patient has a medical condition (bilateral lower extremity lymphedema) which requires positioning of his legs in ways not feasible with an ordinary bed.    Patient requires positioning of the body in ways not feasible with an ordinary bed in order to alleviate pain associated with his bilateral lower extremity lymphedema.    Patient requires the head of the bed to be elevated more than 30 degrees most of the time due to chronic pulmonary disease (COPD and interstitial lung disease).    Patient requires frequent changes in body position due to bilateral lower extremity lymphedema.    Patient's prognosis is poor (no significant improvement in his bilateral lower extremity lymphedema anticipated with optimal medical management).    (I89.0) Lymphedema of both lower extremities  (J44.9) Chronic obstructive pulmonary disease, unspecified COPD type (H)  (J84.9) ILD (interstitial lung disease) (H)  Comment: patient would significantly benefit from the use of a semielectric hospital bed at home.  Plan: Hospital Bed Order for DME placed.    Arline White MD   [address] [address]  T: [phone], F: [phone]    ASSESSMENT/PLAN      (Z09) Hospital discharge follow-up  (primary encounter diagnosis)  (K92.1) Melena  (D62) Anemia due to blood loss, acute  (R18.8) Other ascites  (I50.23) Acute on chronic systolic congestive heart failure (H)  (I89.0) Lymphedema of both lower extremities  Comment: CHF currently compensated (appears euvolemic); weight and lymphedema stable since discharge; no recurrent melena; clinically doing well.  Plan: continue present management; no primary care intervention indicated at this time.     Arline White MD   [facility]  [address] [address]  T: [phone], F: [phone]    CARINE MICHELLE  "Kevin is a very pleasant 68 year old adult who presents for hospital follow-up:    Hospital: [facility]  Date of Admission: 7/6/2023  Date of Discharge: 7/9/2023  Reason(s) for Admission: melena and acute anemia due to blood loss    Diagnostic tests, treatments/interventions, and discharge summary reviewed.    Summary of hospitalization:  - presented with worsening lower extremity edema and weight gain   - incidentally noted to have melena  - labs significant for hemoglobin of 7.7, down 4g from prior  - CT abd/pelv demonstrated new patchy, multifocal groundglass opacities likely fluid overload  - CT also demonstrated moderate to large volume ascites   - EGD 7/7/2023: 2 gastric ulcers, 1 with stigmata of recent bleeding; treated with bipolar cautery  - 4.4L ascitic fluid removed; analysis consistent with cardiac etiology rather than liver etiology  - switched from Lasix to torsemide for acute on chronic systolic CHF and Entresto initiated    Medication changes since discharge: none  Adherent to discharge medications: yes  Problems taking discharge medications: no    Follow-up: patient followed up with cardiology 7/19/2023 - no changes made; labs reviewed - stable to improved since hospitalization    Update since discharge:  - feels \"great\"  - \"breathing like a normal person\"  - lymphedema and weight have been stable since discharge  - no recurrent episodes of melena  - no BRBPR    OBJECTIVE      /70   Pulse 86   Temp 97.2  F (36.2  C)   Wt 115.2 kg (254 lb)   SpO2 98%   BMI 36.45 kg/m    Constitutional: well-appearing  Respiratory: normal respiratory effort; clear to auscultation bilaterally  Cardiovascular: regular rate and rhythm; moderate bilateral lower extremity edema  Gastrointestinal: soft, non-tender, and non-distended; no organomegaly or masses   Musculoskeletal: walks with walker  Psych: normal judgment and insight; normal mood and affect; recent and remote memory intact  ---  (Note was completed, in " part, with Dragon voice-recognition software. Documentation was reviewed, but some grammatical, spelling, and word errors may remain.)

## 2023-07-31 ENCOUNTER — TELEPHONE (OUTPATIENT)
Dept: INTERNAL MEDICINE | Facility: CLINIC | Age: 68
End: 2023-07-31
Payer: COMMERCIAL

## 2023-07-31 NOTE — TELEPHONE ENCOUNTER
"TO PCP    Verenice, OT calling, \"I want to leave a message regarding the hospital bed we are trying to get for him. I called [facility] and they need detailed chart notes stating the need for the bed from PCP. [facility] will fax over the verbage requirement and what needs to be in those notes. If there are any other questions, please call me directly.\"    Adeline Castelan RN on 7/31/2023 at 10:47 AM    "

## 2023-08-11 DIAGNOSIS — K92.1 MELENA: ICD-10-CM

## 2023-08-14 RX ORDER — PANTOPRAZOLE SODIUM 40 MG/1
40 TABLET, DELAYED RELEASE ORAL 2 TIMES DAILY
Qty: 180 TABLET | Refills: 0 | Status: SHIPPED | OUTPATIENT
Start: 2023-08-14 | End: 2023-01-01

## 2023-08-17 NOTE — TELEPHONE ENCOUNTER
FYI - Status Update    Who is Calling: patient    Update: Patient is calling regarding a request for a Hospital Bed that was requested on 07/31/23.  Patient states he is still waiting for a note from Dr. White sent to [facility] regarding this order.  Please call patient with questions    Does caller want a call/response back: Yes     Could we send this information to you in [employer] or would you prefer to receive a phone call?:   Patient would prefer a phone call   Okay to leave a detailed message?: Yes at Cell number on file:    Telephone Information:   Mobile [phone]

## 2023-08-23 ENCOUNTER — TELEPHONE (OUTPATIENT)
Dept: INTERNAL MEDICINE | Facility: CLINIC | Age: 68
End: 2023-08-23

## 2023-08-23 DIAGNOSIS — J44.9 CHRONIC OBSTRUCTIVE PULMONARY DISEASE, UNSPECIFIED COPD TYPE (H): ICD-10-CM

## 2023-08-23 DIAGNOSIS — I89.0 LYMPHEDEMA OF BOTH LOWER EXTREMITIES: Primary | ICD-10-CM

## 2023-08-23 DIAGNOSIS — J84.9 ILD (INTERSTITIAL LUNG DISEASE) (H): ICD-10-CM

## 2023-08-23 NOTE — TELEPHONE ENCOUNTER
Please fax my note from 7/28/2023 to [facility].    Please then call [facility] to follow-up to make sure they received the note and to see if they need anything else.

## 2023-08-23 NOTE — TELEPHONE ENCOUNTER
Loli with [employer] called requesting new DME order for a hospital bed. [facility] will not  accept hospital; bed order before [facility] visit 07/28. New DME order pended. Please sign if agree with order and route to TC/ patient care team to fax to [facility].

## 2023-08-24 DIAGNOSIS — I48.19 PERSISTENT ATRIAL FIBRILLATION (H): ICD-10-CM

## 2023-08-24 RX ORDER — METOPROLOL SUCCINATE 100 MG/1
100 TABLET, EXTENDED RELEASE ORAL 2 TIMES DAILY
Qty: 180 TABLET | Refills: 0 | Status: SHIPPED | OUTPATIENT
Start: 2023-08-24 | End: 2024-01-01

## 2023-09-14 DIAGNOSIS — N52.1 TYPE 2 DIABETES WITH CIRCULATORY DISORDER CAUSING ERECTILE DYSFUNCTION (H): ICD-10-CM

## 2023-09-14 DIAGNOSIS — E11.59 TYPE 2 DIABETES WITH CIRCULATORY DISORDER CAUSING ERECTILE DYSFUNCTION (H): ICD-10-CM

## 2023-09-14 RX ORDER — BLOOD SUGAR DIAGNOSTIC
STRIP MISCELLANEOUS
Qty: 100 STRIP | Refills: 1 | Status: SHIPPED | OUTPATIENT
Start: 2023-09-14 | End: 2024-01-01

## 2023-09-29 NOTE — TELEPHONE ENCOUNTER
"Received update from Dr Hopkins that pt had not scheduled return visit. Pt had PFT completed on 4/19/23 and per Dr Hopkins \"his PFTs look quite a bit worse.\" Return visit scheduled and pt requested to be placed on wait list. Pt was placed on wait list.    Arian Robertson RN      " details…

## 2023-10-31 NOTE — TELEPHONE ENCOUNTER
"Requested Prescriptions   Pending Prescriptions Disp Refills    albuterol (PROAIR HFA/PROVENTIL HFA/VENTOLIN HFA) 108 (90 Base) MCG/ACT inhaler 18 g 7     Sig: Inhale 2 puffs into the lungs every 6 hours as needed for shortness of breath or wheezing       Asthma Maintenance Inhalers - Anticholinergics Passed - 10/30/2023  5:54 PM        Passed - Patient is age 12 years or older        Passed - Recent (12 mo) or future (30 days) visit within the authorizing provider's specialty     Patient has had an office visit with the authorizing provider or a provider within the authorizing providers department within the previous 12 mos or has a future within next 30 days. See \"Patient Info\" tab in inbasket, or \"Choose Columns\" in Meds & Orders section of the refill encounter.              Passed - Medication is active on med list       Short-Acting Beta Agonist Inhalers Protocol  Passed - 10/30/2023  5:54 PM        Passed - Patient is age 12 or older        Passed - Recent (12 mo) or future (30 days) visit within the authorizing provider's specialty     Patient has had an office visit with the authorizing provider or a provider within the authorizing providers department within the previous 12 mos or has a future within next 30 days. See \"Patient Info\" tab in inbasket, or \"Choose Columns\" in Meds & Orders section of the refill encounter.              Passed - Medication is active on med list           Last Written Prescription Date:  11/23/22  Last Fill Quantity: 18g,  # refills: 7   Last office visit: 2/17/23 ; last virtual visit: Visit date not found with prescribing provider:  Dr Hopkins   Future Office Visit:  2/17/23    Refill sent  Arian Robertson RN            "

## 2023-12-14 NOTE — PROGRESS NOTES
Pulmonary Clinic Note    Date of Service: 12/15/2023     Chief Complaint   Patient presents with    Shortness of Breath     Portable battery operated oxygen tank        A/P:  67M HTN, HLD, DM2, Afib (on rivaroxaban) being seen for f/u COPD. He is doing remarkably well. He has seen a big improvement since his hospitalization and he has lost ~40lbs since my last visit. Today, we will do an oxygen titration to determine how much O2 he needs.     - 6MWT  - continue [id number] (Trelegy), rinse mouth out after use  - continue prn albuterol   - OK to substitute medications based on formulary     Addendum:  6MWT shows need for 2L O2 w/ activity. Order placed.     History:  67M HTN, HLD, DM2, Afib (on rivaroxaban) being seen for f/u COPD. I last saw him 2/2023. He has seen rheumatology who did not feel he had underlying rheumatologic disorder. He was seen by my colleague Dr. Perlman in [facility] clinic 5/2023. He is prescribed [id number] (Trelegy) and prn albuterol. Checks SpO2 at home, typically 99% at rest, will go to 89% w/ activity, but requires recovers quickly, these are off O2. He is using 4L O2 at home w/ more strenuous activity. Does not use O2 w/ rest or often during sleep. Breathing feels good. RODRIGUEZ w/ strenuous activity. No SOB at rest. No cough. No wheezing. No chest pain or tightness. Has felt much better since hospitalization for GIB and paracentesis. Using Trelegy. Using albuterol prior to activity.     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    /78   Pulse 101   Wt  102.8 kg (226 lb 11.2 oz)   SpO2 92%   BMI 32.53 kg/m    Gen: well-appearing  HEENT: Mallampati IV  Card: RRR  Pulm: clear bilaterally   Abd: soft  MSK: no edema, no acute joint abnormality   Skin: no obvious rash  Psych: normal affect  Neuro: alert and oriented     Labs:  Personally reviewed  ANCA (12/2022) - 1:10  CRP (12/2022) - 35.8  LISA (12/2022) - positive, 1:160 speckled     Imaging/Studies: Personally reviewed  CT C/A/P (7/2023) - mild to moderate paraseptal emphysema, new patchy multifocal groundglass and nodular opacities, mild bronchial wall thickening, scattered calcified pleural plaques  HRCT (5/2023) - new pericardial effusion, stable diffuse pleural plaque, upper lobe predominant emphysema   6MWT (5/2023) - reduced distance (only walked 3min), significant desaturation w/o hypoxemia on 10L  PFTs (4/2023) - severe restriction, severe diffusion defect  6MWT (1/2023) - hypoxemia w/ ambulation on RA, reduced distance, desaturation w/o hypoxemia on 6L  CT Chest (12/2022) - small b/l effusions, mild diffuse GGOs, calcified pleural plaques b/l, mild fibrotic changes similar to previous, cardiac enlargement, severe coronary artery calcification  PFTs (7/2022) - severe obstruction and restriction, e/o air-trapping, moderate diffusion defect  CTPE (3/2022) - no PE, asbestos related pleural dz w/ minimal adjacent fibrosis, mild GGOs b/l     TTE (5/2023) - EF 40-45%  TTE (2/2022) - EF 50-55%, mild LVH, moderate biatrial enlargement    Past Medical History:   Diagnosis Date    Benign essential hypertension     CAD (coronary artery disease)     Chronic kidney disease, stage III (moderate) (H)     COPD (chronic obstructive pulmonary disease) (H)     ILD (interstitial lung disease) (H)     Obesity (BMI 30-39.9)     Persistent atrial fibrillation (H)     Pure hypercholesterolemia     Type 2 diabetes mellitus (H)      Past Surgical History:   Procedure Laterality Date    COLONOSCOPY N/A 8/24/2018    Procedure:  COMBINED COLONOSCOPY, SINGLE OR MULTIPLE BIOPSY/POLYPECTOMY BY BIOPSY;;  Surgeon: Lawrence Mata MD;  Location:  GI    CV CORONARY ANGIOGRAM N/A 2022    Procedure: Coronary Angiogram;  Surgeon: Mason Lucas MD;  Location:  HEART CARDIAC CATH LAB    CV INSTANTANEOUS WAVE-FREE RATIO N/A 2022    Procedure: Instantaneous Wave-Free Ratio;  Surgeon: Mason Lucas MD;  Location:  HEART CARDIAC CATH LAB    CV LEFT HEART CATH N/A 2022    Procedure: Left Heart Catheterization;  Surgeon: Mason Lucas MD;  Location:  HEART CARDIAC CATH LAB    ESOPHAGOSCOPY, GASTROSCOPY, DUODENOSCOPY (EGD), COMBINED N/A 2023    Procedure: Esophagoscopy, gastroscopy, duodenoscopy (EGD), combined;  Surgeon: Nestor Louie MD;  Location:  GI     Family History   Problem Relation Age of Onset    Diabetes Type 2  Mother     Cerebral aneurysm Sister     Bladder Cancer Brother     Myocardial Infarction Brother         in his 50s    Cerebrovascular Disease No family hx of     Coronary Artery Disease Early Onset No family hx of     Prostate Cancer No family hx of     Colon Cancer No family hx of     Clotting Disorder No family hx of     Bleeding Disorder No family hx of     Anesthesia Reaction No family hx of      Social History     Socioeconomic History    Marital status:      Spouse name: Not on file    Number of children: Not on file    Years of education: Not on file    Highest education level: Not on file   Occupational History    Occupation: Shipping & Receiving   Tobacco Use    Smoking status: Former     Packs/day: 2.00     Years: 16.00     Additional pack years: 0.00     Total pack years: 32.00     Types: Cigarettes     Quit date: 1987     Years since quittin.2    Smokeless tobacco: Never   Vaping Use    Vaping Use: Never used   Substance and Sexual Activity    Alcohol use: No    Drug use: No    Sexual activity: Yes     Partners: Female   Other Topics Concern     Parent/sibling w/ CABG, MI or angioplasty before 65F 55M? Yes     Service No    Blood Transfusions No    Caffeine Concern No    Occupational Exposure No    Hobby Hazards No    Sleep Concern No    Stress Concern No    Weight Concern Yes    Special Diet No    Back Care No    Exercise Yes    Bike Helmet No     Comment: n/a    Seat Belt Yes    Self-Exams No   Social History Narrative    .    Two adult children.    Four grandchildren.    No formal exercise.     Social Determinants of Health     Financial Resource Strain: Not on file   Food Insecurity: Not on file   Transportation Needs: Not on file   Physical Activity: Not on file   Stress: Not on file   Social Connections: Not on file   Interpersonal Safety: Not on file   Housing Stability: Not on file       35 minutes spent reviewing chart, reviewing test results, talking with and examining patient, formulating plan, and documentation on the day of the encounter.    Booker Hopkins MD  Pulmonary and Critical Care Medicine  [facility]     I certify that this patient, Booker Brown has been under my care (or a nurse practitioner or physican's assistant working with me). This is the face-to-face encounter for oxygen medical necessity.      At the time of this encounter supplemental oxygen is reasonable and necessary and is expected to improve the patient's condition in a home setting.       Patient has continued oxygen desaturation due to COPD J44.9.    If portability is ordered, is the patient mobile within the home? yes

## 2023-12-15 NOTE — LETTER
12/15/2023         RE: Booker Brown  [id number]  [place] [address]        Dear Colleague,    Thank you for referring your patient, Booker Brown, to the [facility] [place]. Please see a copy of my visit note below.    Pulmonary Clinic Note    Date of Service: 12/15/2023     Chief Complaint   Patient presents with    Shortness of Breath     Portable battery operated oxygen tank        A/P:  67M HTN, HLD, DM2, Afib (on rivaroxaban) being seen for f/u COPD. He is doing remarkably well. He has seen a big improvement since his hospitalization and he has lost ~40lbs since my last visit. Today, we will do an oxygen titration to determine how much O2 he needs.     - 6MWT  - continue [id number] (Trelegy), rinse mouth out after use  - continue prn albuterol   - OK to substitute medications based on formulary     Addendum:  6MWT shows need for 2L O2 w/ activity. Order placed.     History:  67M HTN, HLD, DM2, Afib (on rivaroxaban) being seen for f/u COPD. I last saw him 2/2023. He has seen rheumatology who did not feel he had underlying rheumatologic disorder. He was seen by my colleague Dr. Perlman in [facility] clinic 5/2023. He is prescribed [id number] (Trelegy) and prn albuterol. Checks SpO2 at home, typically 99% at rest, will go to 89% w/ activity, but requires recovers quickly, these are off O2. He is using 4L O2 at home w/ more strenuous activity. Does not use O2 w/ rest or often during sleep. Breathing feels good. RODRIGUEZ w/ strenuous activity. No SOB at rest. No cough. No wheezing. No chest pain or tightness. Has felt much better since hospitalization for GIB and paracentesis. Using Trelegy. Using albuterol prior to activity.     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    /78   Pulse 101   Wt 102.8 kg (226 lb 11.2 oz)   SpO2 92%   BMI 32.53 kg/m    Gen: well-appearing  HEENT: Mallampati IV  Card: RRR  Pulm: clear bilaterally   Abd: soft  MSK: no edema, no acute joint abnormality   Skin: no obvious rash  Psych: normal affect  Neuro: alert and oriented     Labs:  Personally reviewed  ANCA (12/2022) - 1:10  CRP (12/2022) - 35.8  LISA (12/2022) - positive, 1:160 speckled     Imaging/Studies: Personally reviewed  CT C/A/P (7/2023) - mild to moderate paraseptal emphysema, new patchy multifocal groundglass and nodular opacities, mild bronchial wall thickening, scattered calcified pleural plaques  HRCT (5/2023) - new pericardial effusion, stable diffuse pleural plaque, upper lobe predominant emphysema   6MWT (5/2023) - reduced distance (only walked 3min), significant desaturation w/o hypoxemia on 10L  PFTs (4/2023) - severe restriction, severe diffusion defect  6MWT (1/2023) - hypoxemia w/ ambulation on RA, reduced distance, desaturation w/o hypoxemia on 6L  CT Chest (12/2022) - small b/l effusions, mild diffuse GGOs, calcified pleural plaques b/l, mild fibrotic changes similar to previous, cardiac enlargement, severe coronary artery calcification  PFTs (7/2022) - severe obstruction and restriction, e/o air-trapping, moderate diffusion defect  CTPE (3/2022) - no PE, asbestos related pleural dz w/ minimal adjacent fibrosis, mild GGOs b/l     TTE (5/2023) - EF 40-45%  TTE (2/2022) - EF 50-55%, mild LVH, moderate biatrial enlargement    Past Medical History:   Diagnosis Date    Benign essential hypertension     CAD (coronary artery disease)     Chronic kidney disease, stage III (moderate) (H)     COPD (chronic obstructive pulmonary disease) (H)     ILD  (interstitial lung disease) (H)     Obesity (BMI 30-39.9)     Persistent atrial fibrillation (H)     Pure hypercholesterolemia     Type 2 diabetes mellitus (H)      Past Surgical History:   Procedure Laterality Date    COLONOSCOPY N/A 8/24/2018    Procedure: COMBINED COLONOSCOPY, SINGLE OR MULTIPLE BIOPSY/POLYPECTOMY BY BIOPSY;;  Surgeon: Lawrence Mata MD;  Location:  GI    CV CORONARY ANGIOGRAM N/A 11/18/2022    Procedure: Coronary Angiogram;  Surgeon: Mason Lucas MD;  Location:  HEART CARDIAC CATH LAB    CV INSTANTANEOUS WAVE-FREE RATIO N/A 11/18/2022    Procedure: Instantaneous Wave-Free Ratio;  Surgeon: Mason Lucas MD;  Location:  HEART CARDIAC CATH LAB    CV LEFT HEART CATH N/A 11/18/2022    Procedure: Left Heart Catheterization;  Surgeon: Mason Lucas MD;  Location:  HEART CARDIAC CATH LAB    ESOPHAGOSCOPY, GASTROSCOPY, DUODENOSCOPY (EGD), COMBINED N/A 7/7/2023    Procedure: Esophagoscopy, gastroscopy, duodenoscopy (EGD), combined;  Surgeon: Nestor Louie MD;  Location:  GI     Family History   Problem Relation Age of Onset    Diabetes Type 2  Mother     Cerebral aneurysm Sister     Bladder Cancer Brother     Myocardial Infarction Brother         in his 50s    Cerebrovascular Disease No family hx of     Coronary Artery Disease Early Onset No family hx of     Prostate Cancer No family hx of     Colon Cancer No family hx of     Clotting Disorder No family hx of     Bleeding Disorder No family hx of     Anesthesia Reaction No family hx of      Social History     Socioeconomic History    Marital status:      Spouse name: Not on file    Number of children: Not on file    Years of education: Not on file    Highest education level: Not on file   Occupational History    Occupation: Shipping & Receiving   Tobacco Use    Smoking status: Former     Packs/day: 2.00     Years: 16.00     Additional pack years: 0.00     Total pack years: 32.00     Types: Cigarettes     Quit  date: 1987     Years since quittin.2    Smokeless tobacco: Never   Vaping Use    Vaping Use: Never used   Substance and Sexual Activity    Alcohol use: No    Drug use: No    Sexual activity: Yes     Partners: Female   Other Topics Concern    Parent/sibling w/ CABG, MI or angioplasty before 65F 55M? Yes     Service No    Blood Transfusions No    Caffeine Concern No    Occupational Exposure No    Hobby Hazards No    Sleep Concern No    Stress Concern No    Weight Concern Yes    Special Diet No    Back Care No    Exercise Yes    Bike Helmet No     Comment: n/a    Seat Belt Yes    Self-Exams No   Social History Narrative    .    Two adult children.    Four grandchildren.    No formal exercise.     Social Determinants of Health     Financial Resource Strain: Not on file   Food Insecurity: Not on file   Transportation Needs: Not on file   Physical Activity: Not on file   Stress: Not on file   Social Connections: Not on file   Interpersonal Safety: Not on file   Housing Stability: Not on file       35 minutes spent reviewing chart, reviewing test results, talking with and examining patient, formulating plan, and documentation on the day of the encounter.    Booker Hopkins MD  Pulmonary and Critical Care Medicine  [facility]     I certify that this patient, Booker Brown has been under my care (or a nurse practitioner or physican's assistant working with me). This is the face-to-face encounter for oxygen medical necessity.      At the time of this encounter supplemental oxygen is reasonable and necessary and is expected to improve the patient's condition in a home setting.       Patient has continued oxygen desaturation due to COPD J44.9.    If portability is ordered, is the patient mobile within the home? yes          Again, thank you for allowing me to participate in the care of your patient.        Sincerely,        Booker Hopkins MD

## 2023-12-15 NOTE — PATIENT INSTRUCTIONS
Continue Trelegy daily, rinse your mouth out after use. Continue albuterol as needed for worsening shortness of breath or prior to activity. We will do an oxygen titration study to check how much oxygen you need.

## 2023-12-21 NOTE — TELEPHONE ENCOUNTER
Please note I am unclear on medication refill request.  The current active medication list lists furosemide not torsemide.  The 11/7/2023 refill approved torsemide but the 11/24/2023 refilled discontinued torsemide and placed a prescription for furosemide.  Will need verification

## 2023-12-21 NOTE — TELEPHONE ENCOUNTER
Called and spoke with pt to relay Provider note below.   He verbalized understanding and has no further questions at this time.     Thank you,  Corrie Peña RN

## 2023-12-21 NOTE — TELEPHONE ENCOUNTER
Called pt; pt verified that he has been taking torsemide, not lasix.     Lasix was discontinued due to kidney function per cardio MD. Pt reporting Torsemide was started during his last hospital visit.     Pt reporting confusion at the pharmacy the last time he went to  his meds. Pt was instructed to contact his prescriber to clarify which med to take, Lasix vs torsemide. Pt reports that he followed the instruction from pharmacy and called the clinic and is still waiting to hear back from his doctor.      Pt was notified that PCP will be out of office until next week. Pt to follow up with clinic for questions, concerns, symptoms.     Routing as high priority, pt is out of requested med. Pending refill removed until provider determines which med to order.

## 2023-12-21 NOTE — TELEPHONE ENCOUNTER
Routing refill request to provider for review/approval because:  Drug not active on patient's medication list    Please see mychart.     Pt requesting Torsemide.     Pt has Lasix on active med list. Please clarify.

## 2023-12-28 NOTE — TELEPHONE ENCOUNTER
Requested Prescriptions   Pending Prescriptions Disp Refills    Fluticasone-Umeclidin-Vilanterol (TRELEGY ELLIPTA) 200-62.5-25 MCG/ACT oral inhaler 1 each 4     Sig: Inhale 1 puff into the lungs daily       There is no refill protocol information for this order        Last Written Prescription Date:  2/17/23  Last Fill Quantity: 1 each,  # refills: 4   Last office visit: 12/15/2023 ; last virtual visit: Visit date not found with prescribing provider:  Dr Diaz   Future Office Visit:  6/5/24    Refill sent  Arian Robertson RN

## 2024-01-01 ENCOUNTER — MYC REFILL (OUTPATIENT)
Dept: PULMONOLOGY | Facility: CLINIC | Age: 69
End: 2024-01-01
Payer: COMMERCIAL

## 2024-01-01 ENCOUNTER — LAB (OUTPATIENT)
Dept: LAB | Facility: CLINIC | Age: 69
End: 2024-01-01
Payer: COMMERCIAL

## 2024-01-01 ENCOUNTER — OFFICE VISIT (OUTPATIENT)
Dept: CARDIOLOGY | Facility: CLINIC | Age: 69
End: 2024-01-01
Payer: COMMERCIAL

## 2024-01-01 ENCOUNTER — APPOINTMENT (OUTPATIENT)
Dept: GENERAL RADIOLOGY | Facility: CLINIC | Age: 69
DRG: 296 | End: 2024-01-01
Attending: EMERGENCY MEDICINE
Payer: COMMERCIAL

## 2024-01-01 ENCOUNTER — TELEPHONE (OUTPATIENT)
Dept: INTERNAL MEDICINE | Facility: CLINIC | Age: 69
End: 2024-01-01
Payer: COMMERCIAL

## 2024-01-01 ENCOUNTER — HOSPITAL ENCOUNTER (OUTPATIENT)
Dept: CT IMAGING | Facility: CLINIC | Age: 69
Discharge: HOME | End: 2024-08-30
Attending: NURSE PRACTITIONER | Admitting: NURSE PRACTITIONER
Payer: COMMERCIAL

## 2024-01-01 ENCOUNTER — APPOINTMENT (OUTPATIENT)
Dept: GENERAL RADIOLOGY | Facility: CLINIC | Age: 69
DRG: 296 | End: 2024-01-01
Attending: INTERNAL MEDICINE
Payer: COMMERCIAL

## 2024-01-01 ENCOUNTER — HOSPITAL ENCOUNTER (OUTPATIENT)
Dept: ULTRASOUND IMAGING | Facility: CLINIC | Age: 69
Discharge: HOME | End: 2024-09-18
Attending: NURSE PRACTITIONER | Admitting: NURSE PRACTITIONER
Payer: COMMERCIAL

## 2024-01-01 ENCOUNTER — OFFICE VISIT (OUTPATIENT)
Dept: INTERNAL MEDICINE | Facility: CLINIC | Age: 69
End: 2024-01-01
Payer: COMMERCIAL

## 2024-01-01 ENCOUNTER — OFFICE VISIT (OUTPATIENT)
Dept: PULMONOLOGY | Facility: CLINIC | Age: 69
End: 2024-01-01
Attending: STUDENT IN AN ORGANIZED HEALTH CARE EDUCATION/TRAINING PROGRAM
Payer: COMMERCIAL

## 2024-01-01 ENCOUNTER — HEALTH MAINTENANCE LETTER (OUTPATIENT)
Age: 69
End: 2024-01-01

## 2024-01-01 ENCOUNTER — HOSPITAL ENCOUNTER (INPATIENT)
Dept: NEUROLOGY | Facility: CLINIC | Age: 69
Discharge: HOME | DRG: 296 | End: 2024-09-22
Attending: INTERNAL MEDICINE
Payer: COMMERCIAL

## 2024-01-01 ENCOUNTER — TELEPHONE (OUTPATIENT)
Dept: ANTICOAGULATION | Facility: CLINIC | Age: 69
End: 2024-01-01
Payer: COMMERCIAL

## 2024-01-01 ENCOUNTER — HOSPITAL ENCOUNTER (INPATIENT)
Dept: NEUROLOGY | Facility: CLINIC | Age: 69
Discharge: HOME | DRG: 296 | End: 2024-09-23
Attending: INTERNAL MEDICINE
Payer: COMMERCIAL

## 2024-01-01 ENCOUNTER — OFFICE VISIT (OUTPATIENT)
Dept: URGENT CARE | Facility: URGENT CARE | Age: 69
End: 2024-01-01
Payer: COMMERCIAL

## 2024-01-01 ENCOUNTER — APPOINTMENT (OUTPATIENT)
Dept: ULTRASOUND IMAGING | Facility: CLINIC | Age: 69
DRG: 296 | End: 2024-01-01
Attending: INTERNAL MEDICINE
Payer: COMMERCIAL

## 2024-01-01 ENCOUNTER — ANCILLARY PROCEDURE (OUTPATIENT)
Dept: GENERAL RADIOLOGY | Facility: CLINIC | Age: 69
End: 2024-01-01
Attending: NURSE PRACTITIONER
Payer: COMMERCIAL

## 2024-01-01 ENCOUNTER — APPOINTMENT (OUTPATIENT)
Dept: CARDIOLOGY | Facility: CLINIC | Age: 69
DRG: 296 | End: 2024-01-01
Attending: INTERNAL MEDICINE
Payer: COMMERCIAL

## 2024-01-01 ENCOUNTER — APPOINTMENT (OUTPATIENT)
Dept: CT IMAGING | Facility: CLINIC | Age: 69
DRG: 296 | End: 2024-01-01
Attending: EMERGENCY MEDICINE
Payer: COMMERCIAL

## 2024-01-01 ENCOUNTER — TELEPHONE (OUTPATIENT)
Dept: CARDIOLOGY | Facility: CLINIC | Age: 69
End: 2024-01-01

## 2024-01-01 ENCOUNTER — CARE COORDINATION (OUTPATIENT)
Dept: CARDIOLOGY | Facility: CLINIC | Age: 69
End: 2024-01-01

## 2024-01-01 ENCOUNTER — RESULTS ONLY (OUTPATIENT)
Dept: ADMINISTRATIVE | Facility: CLINIC | Age: 69
End: 2024-01-01
Payer: COMMERCIAL

## 2024-01-01 ENCOUNTER — HOSPITAL ENCOUNTER (OUTPATIENT)
Dept: CARDIOLOGY | Facility: CLINIC | Age: 69
Discharge: HOME | End: 2024-08-09
Attending: INTERNAL MEDICINE | Admitting: INTERNAL MEDICINE
Payer: COMMERCIAL

## 2024-01-01 ENCOUNTER — OFFICE VISIT (OUTPATIENT)
Dept: PULMONOLOGY | Facility: CLINIC | Age: 69
End: 2024-01-01
Payer: COMMERCIAL

## 2024-01-01 ENCOUNTER — MYC REFILL (OUTPATIENT)
Dept: CARDIOLOGY | Facility: CLINIC | Age: 69
End: 2024-01-01
Payer: COMMERCIAL

## 2024-01-01 ENCOUNTER — ANCILLARY PROCEDURE (OUTPATIENT)
Dept: CT IMAGING | Facility: CLINIC | Age: 69
End: 2024-01-01
Attending: STUDENT IN AN ORGANIZED HEALTH CARE EDUCATION/TRAINING PROGRAM
Payer: COMMERCIAL

## 2024-01-01 ENCOUNTER — TELEPHONE (OUTPATIENT)
Dept: NEPHROLOGY | Facility: CLINIC | Age: 69
End: 2024-01-01

## 2024-01-01 ENCOUNTER — HOSPITAL ENCOUNTER (INPATIENT)
Facility: CLINIC | Age: 69
LOS: 2 days | DRG: 296 | End: 2024-09-23
Attending: EMERGENCY MEDICINE | Admitting: INTERNAL MEDICINE
Payer: COMMERCIAL

## 2024-01-01 ENCOUNTER — NURSE TRIAGE (OUTPATIENT)
Dept: INTERNAL MEDICINE | Facility: CLINIC | Age: 69
End: 2024-01-01
Payer: COMMERCIAL

## 2024-01-01 ENCOUNTER — MYC REFILL (OUTPATIENT)
Dept: INTERNAL MEDICINE | Facility: CLINIC | Age: 69
End: 2024-01-01
Payer: COMMERCIAL

## 2024-01-01 ENCOUNTER — MYC REFILL (OUTPATIENT)
Dept: INTERNAL MEDICINE | Facility: CLINIC | Age: 69
End: 2024-01-01

## 2024-01-01 ENCOUNTER — OFFICE VISIT (OUTPATIENT)
Dept: NEPHROLOGY | Facility: CLINIC | Age: 69
End: 2024-01-01
Payer: COMMERCIAL

## 2024-01-01 ENCOUNTER — TELEPHONE (OUTPATIENT)
Dept: CARDIOLOGY | Facility: CLINIC | Age: 69
End: 2024-01-01
Payer: COMMERCIAL

## 2024-01-01 VITALS
HEART RATE: 80 BPM | BODY MASS INDEX: 32.64 KG/M2 | WEIGHT: 227.5 LBS | DIASTOLIC BLOOD PRESSURE: 80 MMHG | OXYGEN SATURATION: 93 % | TEMPERATURE: 96.6 F | SYSTOLIC BLOOD PRESSURE: 120 MMHG

## 2024-01-01 VITALS
DIASTOLIC BLOOD PRESSURE: 64 MMHG | SYSTOLIC BLOOD PRESSURE: 98 MMHG | WEIGHT: 243 LBS | BODY MASS INDEX: 35.37 KG/M2 | HEART RATE: 83 BPM

## 2024-01-01 VITALS
OXYGEN SATURATION: 96 % | WEIGHT: 232 LBS | DIASTOLIC BLOOD PRESSURE: 76 MMHG | BODY MASS INDEX: 33.29 KG/M2 | SYSTOLIC BLOOD PRESSURE: 113 MMHG | HEART RATE: 80 BPM | TEMPERATURE: 96.8 F

## 2024-01-01 VITALS
BODY MASS INDEX: 32.21 KG/M2 | HEIGHT: 70 IN | HEART RATE: 74 BPM | SYSTOLIC BLOOD PRESSURE: 108 MMHG | WEIGHT: 225 LBS | DIASTOLIC BLOOD PRESSURE: 62 MMHG

## 2024-01-01 VITALS
HEIGHT: 70 IN | HEART RATE: 75 BPM | DIASTOLIC BLOOD PRESSURE: 63 MMHG | BODY MASS INDEX: 35.75 KG/M2 | OXYGEN SATURATION: 91 % | WEIGHT: 249.7 LBS | SYSTOLIC BLOOD PRESSURE: 111 MMHG

## 2024-01-01 VITALS
WEIGHT: 246.25 LBS | RESPIRATION RATE: 10 BRPM | OXYGEN SATURATION: 100 % | TEMPERATURE: 99.9 F | DIASTOLIC BLOOD PRESSURE: 58 MMHG | SYSTOLIC BLOOD PRESSURE: 81 MMHG | BODY MASS INDEX: 35.84 KG/M2

## 2024-01-01 VITALS
DIASTOLIC BLOOD PRESSURE: 70 MMHG | HEART RATE: 80 BPM | SYSTOLIC BLOOD PRESSURE: 107 MMHG | OXYGEN SATURATION: 97 % | WEIGHT: 225 LBS | BODY MASS INDEX: 32.21 KG/M2 | HEIGHT: 70 IN

## 2024-01-01 VITALS — OXYGEN SATURATION: 94 % | SYSTOLIC BLOOD PRESSURE: 105 MMHG | HEART RATE: 95 BPM | DIASTOLIC BLOOD PRESSURE: 58 MMHG

## 2024-01-01 VITALS
RESPIRATION RATE: 20 BRPM | BODY MASS INDEX: 32.28 KG/M2 | WEIGHT: 225 LBS | HEART RATE: 102 BPM | OXYGEN SATURATION: 90 % | SYSTOLIC BLOOD PRESSURE: 98 MMHG | DIASTOLIC BLOOD PRESSURE: 63 MMHG

## 2024-01-01 VITALS
OXYGEN SATURATION: 90 % | BODY MASS INDEX: 32.36 KG/M2 | HEART RATE: 81 BPM | SYSTOLIC BLOOD PRESSURE: 92 MMHG | DIASTOLIC BLOOD PRESSURE: 62 MMHG | WEIGHT: 225.5 LBS

## 2024-01-01 DIAGNOSIS — J44.9 CHRONIC OBSTRUCTIVE PULMONARY DISEASE, UNSPECIFIED COPD TYPE (H): Primary | ICD-10-CM

## 2024-01-01 DIAGNOSIS — I48.19 PERSISTENT ATRIAL FIBRILLATION (H): ICD-10-CM

## 2024-01-01 DIAGNOSIS — N18.32 TYPE 2 DIABETES MELLITUS WITH STAGE 3B CHRONIC KIDNEY DISEASE, WITHOUT LONG-TERM CURRENT USE OF INSULIN (H): ICD-10-CM

## 2024-01-01 DIAGNOSIS — R06.02 SHORTNESS OF BREATH: ICD-10-CM

## 2024-01-01 DIAGNOSIS — N18.32 ACUTE RENAL FAILURE SUPERIMPOSED ON STAGE 3B CHRONIC KIDNEY DISEASE, UNSPECIFIED ACUTE RENAL FAILURE TYPE (H): ICD-10-CM

## 2024-01-01 DIAGNOSIS — I25.10 CORONARY ARTERY DISEASE INVOLVING NATIVE CORONARY ARTERY OF NATIVE HEART WITHOUT ANGINA PECTORIS: Primary | ICD-10-CM

## 2024-01-01 DIAGNOSIS — E55.9 VITAMIN D DEFICIENCY: ICD-10-CM

## 2024-01-01 DIAGNOSIS — I50.20 SYSTOLIC CONGESTIVE HEART FAILURE, UNSPECIFIED HF CHRONICITY (H): ICD-10-CM

## 2024-01-01 DIAGNOSIS — R06.02 SOB (SHORTNESS OF BREATH): Primary | ICD-10-CM

## 2024-01-01 DIAGNOSIS — K92.1 MELENA: ICD-10-CM

## 2024-01-01 DIAGNOSIS — N28.9 RENAL INSUFFICIENCY: ICD-10-CM

## 2024-01-01 DIAGNOSIS — R18.8 OTHER ASCITES: ICD-10-CM

## 2024-01-01 DIAGNOSIS — N18.32 STAGE 3B CHRONIC KIDNEY DISEASE (H): Primary | ICD-10-CM

## 2024-01-01 DIAGNOSIS — I25.118 ATHEROSCLEROSIS OF NATIVE CORONARY ARTERY OF NATIVE HEART WITH STABLE ANGINA PECTORIS (H): ICD-10-CM

## 2024-01-01 DIAGNOSIS — E11.59 TYPE 2 DIABETES WITH CIRCULATORY DISORDER CAUSING ERECTILE DYSFUNCTION (H): ICD-10-CM

## 2024-01-01 DIAGNOSIS — Z23 HIGH PRIORITY FOR 2019-NCOV VACCINE: ICD-10-CM

## 2024-01-01 DIAGNOSIS — E11.22 TYPE 2 DIABETES MELLITUS WITH STAGE 3B CHRONIC KIDNEY DISEASE, WITHOUT LONG-TERM CURRENT USE OF INSULIN (H): ICD-10-CM

## 2024-01-01 DIAGNOSIS — N17.9 ACUTE RENAL FAILURE SUPERIMPOSED ON STAGE 3B CHRONIC KIDNEY DISEASE, UNSPECIFIED ACUTE RENAL FAILURE TYPE (H): ICD-10-CM

## 2024-01-01 DIAGNOSIS — I50.31 ACUTE DIASTOLIC CONGESTIVE HEART FAILURE (H): ICD-10-CM

## 2024-01-01 DIAGNOSIS — R60.0 BILATERAL LOWER EXTREMITY EDEMA: ICD-10-CM

## 2024-01-01 DIAGNOSIS — N52.1 TYPE 2 DIABETES WITH CIRCULATORY DISORDER CAUSING ERECTILE DYSFUNCTION (H): ICD-10-CM

## 2024-01-01 DIAGNOSIS — E66.812 CLASS 2 SEVERE OBESITY DUE TO EXCESS CALORIES WITH SERIOUS COMORBIDITY AND BODY MASS INDEX (BMI) OF 36.0 TO 36.9 IN ADULT (H): ICD-10-CM

## 2024-01-01 DIAGNOSIS — E11.65 TYPE 2 DIABETES MELLITUS WITH HYPERGLYCEMIA (H): ICD-10-CM

## 2024-01-01 DIAGNOSIS — I48.0 PAROXYSMAL ATRIAL FIBRILLATION (H): ICD-10-CM

## 2024-01-01 DIAGNOSIS — Z23 NEED FOR VACCINATION: ICD-10-CM

## 2024-01-01 DIAGNOSIS — J44.9 COPD (CHRONIC OBSTRUCTIVE PULMONARY DISEASE) (H): ICD-10-CM

## 2024-01-01 DIAGNOSIS — I25.10 CORONARY ARTERY CALCIFICATION OF NATIVE ARTERY: ICD-10-CM

## 2024-01-01 DIAGNOSIS — I10 HYPERTENSION GOAL BP (BLOOD PRESSURE) < 140/90: ICD-10-CM

## 2024-01-01 DIAGNOSIS — Z12.11 SPECIAL SCREENING FOR MALIGNANT NEOPLASMS, COLON: ICD-10-CM

## 2024-01-01 DIAGNOSIS — Z00.00 ROUTINE HISTORY AND PHYSICAL EXAMINATION OF ADULT: Primary | ICD-10-CM

## 2024-01-01 DIAGNOSIS — Z12.5 SCREENING FOR PROSTATE CANCER: ICD-10-CM

## 2024-01-01 DIAGNOSIS — R06.02 SOB (SHORTNESS OF BREATH): ICD-10-CM

## 2024-01-01 DIAGNOSIS — E66.01 CLASS 2 SEVERE OBESITY DUE TO EXCESS CALORIES WITH SERIOUS COMORBIDITY AND BODY MASS INDEX (BMI) OF 36.0 TO 36.9 IN ADULT (H): ICD-10-CM

## 2024-01-01 DIAGNOSIS — I46.9 CARDIAC ARREST (H): ICD-10-CM

## 2024-01-01 DIAGNOSIS — I50.23 ACUTE ON CHRONIC SYSTOLIC CONGESTIVE HEART FAILURE (H): Primary | ICD-10-CM

## 2024-01-01 DIAGNOSIS — J44.9 CHRONIC OBSTRUCTIVE PULMONARY DISEASE, UNSPECIFIED COPD TYPE (H): ICD-10-CM

## 2024-01-01 DIAGNOSIS — I48.21 PERMANENT ATRIAL FIBRILLATION (H): ICD-10-CM

## 2024-01-01 DIAGNOSIS — H57.11 EYE PAIN, RIGHT: ICD-10-CM

## 2024-01-01 DIAGNOSIS — I25.84 CORONARY ARTERY CALCIFICATION OF NATIVE ARTERY: ICD-10-CM

## 2024-01-01 DIAGNOSIS — N18.32 STAGE 3B CHRONIC KIDNEY DISEASE (H): ICD-10-CM

## 2024-01-01 DIAGNOSIS — I50.20 SYSTOLIC CONGESTIVE HEART FAILURE, UNSPECIFIED HF CHRONICITY (H): Primary | ICD-10-CM

## 2024-01-01 DIAGNOSIS — H18.9 CORNEAL ABNORMALITY: Primary | ICD-10-CM

## 2024-01-01 DIAGNOSIS — I50.23 ACUTE ON CHRONIC SYSTOLIC CONGESTIVE HEART FAILURE (H): ICD-10-CM

## 2024-01-01 DIAGNOSIS — I25.10 CORONARY ARTERY DISEASE INVOLVING NATIVE CORONARY ARTERY OF NATIVE HEART WITHOUT ANGINA PECTORIS: ICD-10-CM

## 2024-01-01 DIAGNOSIS — D63.1 ANEMIA IN STAGE 3B CHRONIC KIDNEY DISEASE (H): Primary | ICD-10-CM

## 2024-01-01 DIAGNOSIS — N18.32 ANEMIA IN STAGE 3B CHRONIC KIDNEY DISEASE (H): Primary | ICD-10-CM

## 2024-01-01 LAB
ABO/RH(D): NORMAL
ABSOLUTE NEUTROPHILS, BODY FLUID: 20.8 /UL
ALBUMIN BODY FLUID SOURCE: NORMAL
ALBUMIN FLD-MCNC: 2.7 G/DL
ALBUMIN SERPL BCG-MCNC: 3.1 G/DL (ref 3.5–5.2)
ALBUMIN SERPL BCG-MCNC: 3.1 G/DL (ref 3.5–5.2)
ALBUMIN SERPL BCG-MCNC: 3.3 G/DL (ref 3.5–5.2)
ALBUMIN SERPL BCG-MCNC: 3.7 G/DL (ref 3.5–5.2)
ALBUMIN SERPL BCG-MCNC: 3.9 G/DL (ref 3.5–5.2)
ALBUMIN SERPL BCG-MCNC: 4 G/DL (ref 3.5–5.2)
ALBUMIN SERPL BCG-MCNC: 4.3 G/DL (ref 3.5–5.2)
ALLEN'S TEST: NO
ALLEN'S TEST: NO
ALP SERPL-CCNC: 57 U/L (ref 40–150)
ALP SERPL-CCNC: 64 U/L (ref 40–150)
ALP SERPL-CCNC: 66 U/L (ref 40–150)
ALP SERPL-CCNC: 67 U/L (ref 40–150)
ALP SERPL-CCNC: 74 U/L (ref 40–150)
ALP SERPL-CCNC: 78 U/L (ref 40–150)
ALT SERPL W P-5'-P-CCNC: 10 U/L (ref 0–70)
ALT SERPL W P-5'-P-CCNC: 107 U/L (ref 0–70)
ALT SERPL W P-5'-P-CCNC: 11 U/L (ref 0–70)
ALT SERPL W P-5'-P-CCNC: 154 U/L (ref 0–70)
ALT SERPL W P-5'-P-CCNC: 62 U/L (ref 0–70)
ALT SERPL W P-5'-P-CCNC: 93 U/L (ref 0–70)
ANION GAP SERPL CALCULATED.3IONS-SCNC: 11 MMOL/L (ref 7–15)
ANION GAP SERPL CALCULATED.3IONS-SCNC: 12 MMOL/L (ref 7–15)
ANION GAP SERPL CALCULATED.3IONS-SCNC: 14 MMOL/L (ref 7–15)
ANION GAP SERPL CALCULATED.3IONS-SCNC: 15 MMOL/L (ref 7–15)
ANION GAP SERPL CALCULATED.3IONS-SCNC: 16 MMOL/L (ref 7–15)
ANION GAP SERPL CALCULATED.3IONS-SCNC: 17 MMOL/L (ref 7–15)
ANION GAP SERPL CALCULATED.3IONS-SCNC: 17 MMOL/L (ref 7–15)
ANION GAP SERPL CALCULATED.3IONS-SCNC: 22 MMOL/L (ref 7–15)
ANION GAP SERPL CALCULATED.3IONS-SCNC: 8 MMOL/L (ref 7–15)
ANION GAP SERPL CALCULATED.3IONS-SCNC: 9 MMOL/L (ref 3–14)
ANTIBODY SCREEN: NEGATIVE
APPEARANCE FLD: ABNORMAL
APTT PPP: 116 SECONDS (ref 22–38)
APTT PPP: 36 SECONDS (ref 22–38)
APTT PPP: 49 SECONDS (ref 22–38)
AST SERPL W P-5'-P-CCNC: 119 U/L (ref 0–45)
AST SERPL W P-5'-P-CCNC: 15 U/L (ref 0–45)
AST SERPL W P-5'-P-CCNC: 15 U/L (ref 0–45)
AST SERPL W P-5'-P-CCNC: 243 U/L (ref 0–45)
AST SERPL W P-5'-P-CCNC: 91 U/L (ref 0–45)
AST SERPL W P-5'-P-CCNC: ABNORMAL U/L
ATRIAL RATE - MUSE: 80 BPM
ATRIAL RATE - MUSE: 84 BPM
BACTERIA FLD CULT: NO GROWTH
BASE EXCESS BLDA CALC-SCNC: -10.2 MMOL/L (ref -3–3)
BASE EXCESS BLDA CALC-SCNC: -8.4 MMOL/L (ref -3–3)
BASE EXCESS BLDV CALC-SCNC: -14 MMOL/L (ref -3–3)
BASE EXCESS BLDV CALC-SCNC: -14.8 MMOL/L (ref -3–3)
BASE EXCESS BLDV CALC-SCNC: -2.6 MMOL/L (ref -3–3)
BASE EXCESS BLDV CALC-SCNC: -2.8 MMOL/L (ref -3–3)
BASE EXCESS BLDV CALC-SCNC: -5.8 MMOL/L (ref -3–3)
BASOPHILS # BLD AUTO: 0 10E3/UL (ref 0–0.2)
BASOPHILS # BLD AUTO: 0 10E3/UL (ref 0–0.2)
BASOPHILS NFR BLD AUTO: 0 %
BASOPHILS NFR BLD AUTO: 0 %
BILIRUB DIRECT SERPL-MCNC: 0.24 MG/DL (ref 0–0.3)
BILIRUB SERPL-MCNC: 0.3 MG/DL
BILIRUB SERPL-MCNC: 0.4 MG/DL
BILIRUB SERPL-MCNC: 0.5 MG/DL
BILIRUB SERPL-MCNC: 0.5 MG/DL
BLD PROD TYP BPU: NORMAL
BLD PROD TYP BPU: NORMAL
BLOOD COMPONENT TYPE: NORMAL
BLOOD COMPONENT TYPE: NORMAL
BUN SERPL-MCNC: 26.6 MG/DL (ref 8–23)
BUN SERPL-MCNC: 35.9 MG/DL (ref 8–23)
BUN SERPL-MCNC: 36.5 MG/DL (ref 8–23)
BUN SERPL-MCNC: 36.7 MG/DL (ref 8–23)
BUN SERPL-MCNC: 37 MG/DL (ref 7–30)
BUN SERPL-MCNC: 37 MG/DL (ref 8–23)
BUN SERPL-MCNC: 37.1 MG/DL (ref 8–23)
BUN SERPL-MCNC: 40.7 MG/DL (ref 8–23)
BUN SERPL-MCNC: 42.5 MG/DL (ref 8–23)
BUN SERPL-MCNC: 47 MG/DL (ref 8–23)
BUN SERPL-MCNC: 52.2 MG/DL (ref 8–23)
BUN SERPL-MCNC: 57.1 MG/DL (ref 8–23)
BUN SERPL-MCNC: 57.9 MG/DL (ref 8–23)
CALCIUM SERPL-MCNC: 8 MG/DL (ref 8.8–10.4)
CALCIUM SERPL-MCNC: 8.1 MG/DL (ref 8.8–10.4)
CALCIUM SERPL-MCNC: 8.4 MG/DL (ref 8.8–10.4)
CALCIUM SERPL-MCNC: 8.6 MG/DL (ref 8.8–10.4)
CALCIUM SERPL-MCNC: 8.8 MG/DL (ref 8.8–10.4)
CALCIUM SERPL-MCNC: 8.9 MG/DL (ref 8.8–10.4)
CALCIUM SERPL-MCNC: 9 MG/DL (ref 8.8–10.4)
CALCIUM SERPL-MCNC: 9 MG/DL (ref 8.8–10.4)
CALCIUM SERPL-MCNC: 9.2 MG/DL (ref 8.8–10.4)
CALCIUM SERPL-MCNC: 9.3 MG/DL (ref 8.5–10.1)
CALCIUM SERPL-MCNC: 9.3 MG/DL (ref 8.8–10.2)
CALCIUM SERPL-MCNC: 9.6 MG/DL (ref 8.8–10.2)
CALCIUM SERPL-MCNC: 9.9 MG/DL (ref 8.8–10.4)
CELL COUNT BODY FLUID SOURCE: ABNORMAL
CHLORIDE BLD-SCNC: 107 MMOL/L (ref 94–109)
CHLORIDE SERPL-SCNC: 100 MMOL/L (ref 98–107)
CHLORIDE SERPL-SCNC: 100 MMOL/L (ref 98–107)
CHLORIDE SERPL-SCNC: 102 MMOL/L (ref 98–107)
CHLORIDE SERPL-SCNC: 105 MMOL/L (ref 98–107)
CHLORIDE SERPL-SCNC: 106 MMOL/L (ref 98–107)
CHLORIDE SERPL-SCNC: 96 MMOL/L (ref 98–107)
CHLORIDE SERPL-SCNC: 98 MMOL/L (ref 98–107)
CHLORIDE SERPL-SCNC: 99 MMOL/L (ref 98–107)
CHOLEST SERPL-MCNC: 107 MG/DL
CO2 SERPL-SCNC: 31 MMOL/L (ref 20–32)
CODING SYSTEM: NORMAL
CODING SYSTEM: NORMAL
COHGB MFR BLD: >100 % (ref 95–96)
COHGB MFR BLD: >100 % (ref 95–96)
COLOR FLD: YELLOW
CREAT SERPL-MCNC: 1.62 MG/DL (ref 0.51–1.17)
CREAT SERPL-MCNC: 1.9 MG/DL (ref 0.51–1.17)
CREAT SERPL-MCNC: 2.3 MG/DL (ref 0.51–1.17)
CREAT SERPL-MCNC: 2.3 MG/DL (ref 0.52–1.25)
CREAT SERPL-MCNC: 2.35 MG/DL (ref 0.51–1.17)
CREAT SERPL-MCNC: 2.36 MG/DL (ref 0.51–1.17)
CREAT SERPL-MCNC: 2.45 MG/DL (ref 0.51–1.17)
CREAT SERPL-MCNC: 2.46 MG/DL (ref 0.51–1.17)
CREAT SERPL-MCNC: 2.54 MG/DL (ref 0.51–1.17)
CREAT SERPL-MCNC: 2.6 MG/DL (ref 0.51–1.17)
CREAT SERPL-MCNC: 2.86 MG/DL (ref 0.51–1.17)
CREAT SERPL-MCNC: 3.17 MG/DL (ref 0.51–1.17)
CREAT SERPL-MCNC: 3.3 MG/DL (ref 0.51–1.17)
CROSSMATCH: NORMAL
CROSSMATCH: NORMAL
DEPRECATED HCO3 PLAS-SCNC: 25 MMOL/L (ref 22–29)
DEPRECATED HCO3 PLAS-SCNC: 25 MMOL/L (ref 22–29)
DIASTOLIC BLOOD PRESSURE - MUSE: NORMAL MMHG
DIASTOLIC BLOOD PRESSURE - MUSE: NORMAL MMHG
DLCOCOR-%PRED-PRE: 47 %
DLCOCOR-PRE: 12.47 ML/MIN/MMHG
DLCOUNC-%PRED-PRE: 39 %
DLCOUNC-PRE: 10.32 ML/MIN/MMHG
DLCOUNC-PRED: 26.32 ML/MIN/MMHG
EGFRCR SERPLBLD CKD-EPI 2021: 20 ML/MIN/1.73M2
EGFRCR SERPLBLD CKD-EPI 2021: 21 ML/MIN/1.73M2
EGFRCR SERPLBLD CKD-EPI 2021: 23 ML/MIN/1.73M2
EGFRCR SERPLBLD CKD-EPI 2021: 26 ML/MIN/1.73M2
EGFRCR SERPLBLD CKD-EPI 2021: 27 ML/MIN/1.73M2
EGFRCR SERPLBLD CKD-EPI 2021: 28 ML/MIN/1.73M2
EGFRCR SERPLBLD CKD-EPI 2021: 28 ML/MIN/1.73M2
EGFRCR SERPLBLD CKD-EPI 2021: 29 ML/MIN/1.73M2
EGFRCR SERPLBLD CKD-EPI 2021: 30 ML/MIN/1.73M2
EGFRCR SERPLBLD CKD-EPI 2021: 38 ML/MIN/1.73M2
EGFRCR SERPLBLD CKD-EPI 2021: 46 ML/MIN/1.73M2
EOSINOPHIL # BLD AUTO: 0 10E3/UL (ref 0–0.7)
EOSINOPHIL # BLD AUTO: 0.1 10E3/UL (ref 0–0.7)
EOSINOPHIL NFR BLD AUTO: 0 %
EOSINOPHIL NFR BLD AUTO: 1 %
ERV-%PRED-PRE: 55 %
ERV-PRE: 0.82 L
ERV-PRED: 1.49 L
ERYTHROCYTE [DISTWIDTH] IN BLOOD BY AUTOMATED COUNT: 20.2 % (ref 10–15)
ERYTHROCYTE [DISTWIDTH] IN BLOOD BY AUTOMATED COUNT: 21.2 % (ref 10–15)
ERYTHROCYTE [DISTWIDTH] IN BLOOD BY AUTOMATED COUNT: 21.2 % (ref 10–15)
ERYTHROCYTE [DISTWIDTH] IN BLOOD BY AUTOMATED COUNT: 21.6 % (ref 10–15)
ERYTHROCYTE [DISTWIDTH] IN BLOOD BY AUTOMATED COUNT: 22.8 % (ref 10–15)
EXPTIME-PRE: 4.94 SEC
FASTING STATUS PATIENT QL REPORTED: YES
FASTING STATUS PATIENT QL REPORTED: YES
FEF2575-%PRED-PRE: 87 %
FEF2575-PRE: 2.15 L/SEC
FEF2575-PRED: 2.45 L/SEC
FEFMAX-%PRED-PRE: 78 %
FEFMAX-PRE: 6.74 L/SEC
FEFMAX-PRED: 8.61 L/SEC
FERRITIN SERPL-MCNC: 30 NG/ML (ref 11–409)
FEV1-%PRED-PRE: 58 %
FEV1-PRE: 1.81 L
FEV1FEV6-PRE: 91 %
FEV1FEV6-PRED: 78 %
FEV1FVC-PRE: 91 %
FEV1FVC-PRED: 77 %
FEV1SVC-PRE: 97 %
FEV1SVC-PRED: 72 %
FIFMAX-PRE: 3.43 L/SEC
FLUAV RNA SPEC QL NAA+PROBE: NEGATIVE
FLUBV RNA RESP QL NAA+PROBE: NEGATIVE
FRCPLETH-%PRED-PRE: 94 %
FRCPLETH-PRE: 3.46 L
FRCPLETH-PRED: 3.68 L
FVC-%PRED-PRE: 49 %
FVC-PRE: 1.99 L
FVC-PRED: 4.01 L
GLUCOSE BLD-MCNC: 138 MG/DL (ref 70–99)
GLUCOSE BLDC GLUCOMTR-MCNC: 138 MG/DL (ref 70–99)
GLUCOSE BLDC GLUCOMTR-MCNC: 141 MG/DL (ref 70–99)
GLUCOSE BLDC GLUCOMTR-MCNC: 160 MG/DL (ref 70–99)
GLUCOSE BLDC GLUCOMTR-MCNC: 163 MG/DL (ref 70–99)
GLUCOSE BLDC GLUCOMTR-MCNC: 163 MG/DL (ref 70–99)
GLUCOSE BLDC GLUCOMTR-MCNC: 168 MG/DL (ref 70–99)
GLUCOSE BLDC GLUCOMTR-MCNC: 174 MG/DL (ref 70–99)
GLUCOSE BLDC GLUCOMTR-MCNC: 180 MG/DL (ref 70–99)
GLUCOSE BLDC GLUCOMTR-MCNC: 200 MG/DL (ref 70–99)
GLUCOSE BLDC GLUCOMTR-MCNC: 85 MG/DL (ref 70–99)
GLUCOSE SERPL-MCNC: 109 MG/DL (ref 70–99)
GLUCOSE SERPL-MCNC: 110 MG/DL (ref 70–99)
GLUCOSE SERPL-MCNC: 129 MG/DL (ref 70–99)
GLUCOSE SERPL-MCNC: 145 MG/DL (ref 70–99)
GLUCOSE SERPL-MCNC: 154 MG/DL (ref 70–99)
GLUCOSE SERPL-MCNC: 158 MG/DL (ref 70–99)
GLUCOSE SERPL-MCNC: 195 MG/DL (ref 70–99)
GLUCOSE SERPL-MCNC: 212 MG/DL (ref 70–99)
GLUCOSE SERPL-MCNC: 224 MG/DL (ref 70–99)
GLUCOSE SERPL-MCNC: 91 MG/DL (ref 70–99)
GLUCOSE SERPL-MCNC: 94 MG/DL (ref 70–99)
GLUCOSE SERPL-MCNC: 98 MG/DL (ref 70–99)
GRAM STAIN RESULT: NORMAL
GRAM STAIN RESULT: NORMAL
HBA1C MFR BLD: 5.6 % (ref 0–5.6)
HCO3 BLD-SCNC: 17 MMOL/L (ref 21–28)
HCO3 BLD-SCNC: 18 MMOL/L (ref 21–28)
HCO3 BLDV-SCNC: 16 MMOL/L (ref 21–28)
HCO3 BLDV-SCNC: 17 MMOL/L (ref 21–28)
HCO3 BLDV-SCNC: 19 MMOL/L (ref 21–28)
HCO3 BLDV-SCNC: 21 MMOL/L (ref 21–28)
HCO3 BLDV-SCNC: 22 MMOL/L (ref 21–28)
HCO3 SERPL-SCNC: 17 MMOL/L (ref 22–29)
HCO3 SERPL-SCNC: 18 MMOL/L (ref 22–29)
HCO3 SERPL-SCNC: 20 MMOL/L (ref 22–29)
HCO3 SERPL-SCNC: 25 MMOL/L (ref 22–29)
HCO3 SERPL-SCNC: 26 MMOL/L (ref 22–29)
HCO3 SERPL-SCNC: 29 MMOL/L (ref 22–29)
HCO3 SERPL-SCNC: 30 MMOL/L (ref 22–29)
HCO3 SERPL-SCNC: 30 MMOL/L (ref 22–29)
HCT VFR BLD AUTO: 24.2 % (ref 35–53)
HCT VFR BLD AUTO: 29.9 % (ref 35–53)
HCT VFR BLD AUTO: 31.4 % (ref 35–53)
HCT VFR BLD AUTO: 35.1 % (ref 35–53)
HCT VFR BLD AUTO: 35.6 % (ref 35–53)
HDLC SERPL-MCNC: 41 MG/DL
HGB BLD-MCNC: 6.8 G/DL (ref 11.7–17.7)
HGB BLD-MCNC: 8.1 G/DL (ref 11.7–17.7)
HGB BLD-MCNC: 8.7 G/DL (ref 11.7–17.7)
HGB BLD-MCNC: 8.9 G/DL (ref 11.7–17.7)
HGB BLD-MCNC: 9.7 G/DL (ref 11.7–17.7)
HOLD SPECIMEN: NORMAL
HOLD SPECIMEN: NORMAL
IC-%PRED-PRE: 35 %
IC-PRE: 1.06 L
IC-PRED: 2.98 L
IMM GRANULOCYTES # BLD: 0.1 10E3/UL
IMM GRANULOCYTES # BLD: 0.3 10E3/UL
IMM GRANULOCYTES NFR BLD: 1 %
IMM GRANULOCYTES NFR BLD: 2 %
INTERPRETATION ECG - MUSE: NORMAL
INTERPRETATION ECG - MUSE: NORMAL
IRON BINDING CAPACITY (ROCHE): 378 UG/DL (ref 240–430)
IRON SATN MFR SERPL: 5 % (ref 15–46)
IRON SERPL-MCNC: 18 UG/DL (ref 37–157)
ISSUE DATE AND TIME: NORMAL
ISSUE DATE AND TIME: NORMAL
LACTATE BLD-SCNC: 11.5 MMOL/L
LACTATE SERPL-SCNC: 1.6 MMOL/L (ref 0.7–2)
LACTATE SERPL-SCNC: 10.7 MMOL/L (ref 0.7–2)
LACTATE SERPL-SCNC: 2.9 MMOL/L (ref 0.7–2)
LACTATE SERPL-SCNC: 4.5 MMOL/L (ref 0.7–2)
LACTATE SERPL-SCNC: 5.1 MMOL/L (ref 0.7–2)
LDLC SERPL CALC-MCNC: 53 MG/DL
LVEF ECHO: NORMAL
LVEF ECHO: NORMAL
LYMPHOCYTES # BLD AUTO: 0.4 10E3/UL (ref 0.8–5.3)
LYMPHOCYTES # BLD AUTO: 1.9 10E3/UL (ref 0.8–5.3)
LYMPHOCYTES NFR BLD AUTO: 16 %
LYMPHOCYTES NFR BLD AUTO: 2 %
LYMPHOCYTES NFR FLD MANUAL: 85 %
MAGNESIUM SERPL-MCNC: 2.3 MG/DL (ref 1.7–2.3)
MAGNESIUM SERPL-MCNC: 2.3 MG/DL (ref 1.7–2.3)
MAGNESIUM SERPL-MCNC: 2.4 MG/DL (ref 1.7–2.3)
MAGNESIUM SERPL-MCNC: 2.4 MG/DL (ref 1.7–2.3)
MCH RBC QN AUTO: 18.8 PG (ref 26.5–33)
MCH RBC QN AUTO: 19.2 PG (ref 26.5–33)
MCH RBC QN AUTO: 19.6 PG (ref 26.5–33)
MCH RBC QN AUTO: 19.9 PG (ref 26.5–33)
MCH RBC QN AUTO: 21.4 PG (ref 26.5–33)
MCHC RBC AUTO-ENTMCNC: 24.4 G/DL (ref 31.5–36.5)
MCHC RBC AUTO-ENTMCNC: 27.6 G/DL (ref 31.5–36.5)
MCHC RBC AUTO-ENTMCNC: 27.7 G/DL (ref 31.5–36.5)
MCHC RBC AUTO-ENTMCNC: 28.1 G/DL (ref 31.5–36.5)
MCHC RBC AUTO-ENTMCNC: 29.8 G/DL (ref 31.5–36.5)
MCV RBC AUTO: 68 FL (ref 78–100)
MCV RBC AUTO: 71 FL (ref 78–100)
MCV RBC AUTO: 72 FL (ref 78–100)
MCV RBC AUTO: 72 FL (ref 78–100)
MCV RBC AUTO: 77 FL (ref 78–100)
MONOCYTES # BLD AUTO: 0.8 10E3/UL (ref 0–1.3)
MONOCYTES # BLD AUTO: 1.3 10E3/UL (ref 0–1.3)
MONOCYTES NFR BLD AUTO: 6 %
MONOCYTES NFR BLD AUTO: 6 %
MONOS+MACROS NFR FLD MANUAL: 9 %
NEUTROPHILS # BLD AUTO: 21.3 10E3/UL (ref 1.6–8.3)
NEUTROPHILS # BLD AUTO: 9.2 10E3/UL (ref 1.6–8.3)
NEUTROPHILS NFR BLD AUTO: 75 %
NEUTROPHILS NFR BLD AUTO: 92 %
NEUTS BAND NFR FLD MANUAL: 6 %
NONHDLC SERPL-MCNC: 66 MG/DL
NRBC # BLD AUTO: 0 10E3/UL
NRBC # BLD AUTO: 0.1 10E3/UL
NRBC BLD AUTO-RTO: 0 /100
NRBC BLD AUTO-RTO: 1 /100
NT-PROBNP SERPL-MCNC: 7467 PG/ML (ref 0–900)
NT-PROBNP SERPL-MCNC: 7726 PG/ML (ref 0–900)
NT-PROBNP SERPL-MCNC: 8645 PG/ML (ref 0–900)
NT-PROBNP SERPL-MCNC: ABNORMAL PG/ML (ref 0–900)
O2/TOTAL GAS SETTING VFR VENT: 30 %
O2/TOTAL GAS SETTING VFR VENT: 40 %
O2/TOTAL GAS SETTING VFR VENT: 50 %
OXYHGB MFR BLDV: 71 % (ref 70–75)
OXYHGB MFR BLDV: 75 % (ref 70–75)
OXYHGB MFR BLDV: 84 % (ref 70–75)
OXYHGB MFR BLDV: 84 % (ref 70–75)
P AXIS - MUSE: 77 DEGREES
P AXIS - MUSE: NORMAL DEGREES
PATH REPORT.COMMENTS IMP SPEC: NORMAL
PATH REPORT.FINAL DX SPEC: NORMAL
PATH REPORT.GROSS SPEC: NORMAL
PATH REPORT.MICROSCOPIC SPEC OTHER STN: NORMAL
PATH REPORT.RELEVANT HX SPEC: NORMAL
PCO2 BLD: 33 MM HG (ref 35–45)
PCO2 BLD: 48 MM HG (ref 35–45)
PCO2 BLDV: 33 MM HG (ref 40–50)
PCO2 BLDV: 35 MM HG (ref 40–50)
PCO2 BLDV: 36 MM HG (ref 40–50)
PCO2 BLDV: 68 MM HG (ref 40–50)
PCO2 BLDV: 70 MM HG (ref 40–50)
PEEP: 5 CM H2O
PEEP: 8 CM H2O
PH BLD: 7.18 [PH] (ref 7.35–7.45)
PH BLD: 7.32 [PH] (ref 7.35–7.45)
PH BLDV: 6.99 [PH] (ref 7.32–7.43)
PH BLDV: 7 [PH] (ref 7.32–7.43)
PH BLDV: 7.35 [PH] (ref 7.32–7.43)
PH BLDV: 7.39 [PH] (ref 7.32–7.43)
PH BLDV: 7.42 [PH] (ref 7.32–7.43)
PHOSPHATE SERPL-MCNC: 3 MG/DL (ref 2.5–4.5)
PHOSPHATE SERPL-MCNC: 3.5 MG/DL (ref 2.5–4.5)
PHOSPHATE SERPL-MCNC: 4.7 MG/DL (ref 2.5–4.5)
PHOSPHATE SERPL-MCNC: 4.9 MG/DL (ref 2.5–4.5)
PHOSPHATE SERPL-MCNC: 5.9 MG/DL (ref 2.5–4.5)
PLATELET # BLD AUTO: 178 10E3/UL (ref 150–450)
PLATELET # BLD AUTO: 189 10E3/UL (ref 150–450)
PLATELET # BLD AUTO: 258 10E3/UL (ref 150–450)
PLATELET # BLD AUTO: 261 10E3/UL (ref 150–450)
PLATELET # BLD AUTO: 287 10E3/UL (ref 150–450)
PO2 BLD: 185 MM HG (ref 80–105)
PO2 BLD: 202 MM HG (ref 80–105)
PO2 BLDV: 47 MM HG (ref 25–47)
PO2 BLDV: 47 MM HG (ref 25–47)
PO2 BLDV: 52 MM HG (ref 25–47)
PO2 BLDV: 55 MM HG (ref 25–47)
PO2 BLDV: 63 MM HG (ref 25–47)
POTASSIUM BLD-SCNC: 4.7 MMOL/L (ref 3.4–5.3)
POTASSIUM SERPL-SCNC: 3.6 MMOL/L (ref 3.4–5.3)
POTASSIUM SERPL-SCNC: 3.8 MMOL/L (ref 3.4–5.3)
POTASSIUM SERPL-SCNC: 3.9 MMOL/L (ref 3.4–5.3)
POTASSIUM SERPL-SCNC: 4.1 MMOL/L (ref 3.4–5.3)
POTASSIUM SERPL-SCNC: 4.2 MMOL/L (ref 3.4–5.3)
POTASSIUM SERPL-SCNC: 4.4 MMOL/L (ref 3.4–5.3)
POTASSIUM SERPL-SCNC: 4.4 MMOL/L (ref 3.4–5.3)
POTASSIUM SERPL-SCNC: 4.5 MMOL/L (ref 3.4–5.3)
POTASSIUM SERPL-SCNC: 4.7 MMOL/L (ref 3.4–5.3)
POTASSIUM SERPL-SCNC: 5.2 MMOL/L (ref 3.4–5.3)
POTASSIUM SERPL-SCNC: 5.6 MMOL/L (ref 3.4–5.3)
POTASSIUM SERPL-SCNC: 6.1 MMOL/L (ref 3.4–5.3)
PR INTERVAL - MUSE: 160 MS
PR INTERVAL - MUSE: 174 MS
PROT FLD-MCNC: 4.1 G/DL
PROT SERPL-MCNC: 5.2 G/DL (ref 6.4–8.3)
PROT SERPL-MCNC: 5.5 G/DL (ref 6.4–8.3)
PROT SERPL-MCNC: 5.7 G/DL (ref 6.4–8.3)
PROT SERPL-MCNC: 6.5 G/DL (ref 6.4–8.3)
PROT SERPL-MCNC: 6.6 G/DL (ref 6.4–8.3)
PROT SERPL-MCNC: 7.3 G/DL (ref 6.4–8.3)
PROTEIN BODY FLUID SOURCE: NORMAL
PSA SERPL DL<=0.01 NG/ML-MCNC: 0.65 NG/ML (ref 0–4.5)
PTH-INTACT SERPL-MCNC: 36 PG/ML (ref 15–65)
QRS DURATION - MUSE: 106 MS
QRS DURATION - MUSE: 112 MS
QT - MUSE: 416 MS
QT - MUSE: 498 MS
QTC - MUSE: 479 MS
QTC - MUSE: 588 MS
R AXIS - MUSE: 108 DEGREES
R AXIS - MUSE: 77 DEGREES
RBC # BLD AUTO: 3.54 10E6/UL (ref 3.8–5.9)
RBC # BLD AUTO: 4.15 10E6/UL (ref 3.8–5.9)
RBC # BLD AUTO: 4.43 10E6/UL (ref 3.8–5.9)
RBC # BLD AUTO: 4.63 10E6/UL (ref 3.8–5.9)
RBC # BLD AUTO: 4.88 10E6/UL (ref 3.8–5.9)
RSV RNA SPEC NAA+PROBE: NEGATIVE
RVPLETH-%PRED-PRE: 102 %
RVPLETH-PRE: 2.64 L
RVPLETH-PRED: 2.58 L
SAO2 % BLDA: 98 % (ref 92–100)
SAO2 % BLDA: 98 % (ref 92–100)
SAO2 % BLDV: 59 % (ref 70–75)
SAO2 % BLDV: 73.3 % (ref 70–75)
SAO2 % BLDV: 77.1 % (ref 70–75)
SAO2 % BLDV: 85.8 % (ref 70–75)
SAO2 % BLDV: 86.2 % (ref 70–75)
SARS-COV-2 RNA RESP QL NAA+PROBE: NEGATIVE
SODIUM SERPL-SCNC: 136 MMOL/L (ref 135–145)
SODIUM SERPL-SCNC: 137 MMOL/L (ref 135–145)
SODIUM SERPL-SCNC: 137 MMOL/L (ref 135–145)
SODIUM SERPL-SCNC: 138 MMOL/L (ref 135–145)
SODIUM SERPL-SCNC: 139 MMOL/L (ref 135–145)
SODIUM SERPL-SCNC: 140 MMOL/L (ref 135–145)
SODIUM SERPL-SCNC: 142 MMOL/L (ref 135–145)
SODIUM SERPL-SCNC: 147 MMOL/L (ref 135–145)
SPECIMEN EXPIRATION DATE: NORMAL
SYSTOLIC BLOOD PRESSURE - MUSE: NORMAL MMHG
SYSTOLIC BLOOD PRESSURE - MUSE: NORMAL MMHG
T AXIS - MUSE: -79 DEGREES
T AXIS - MUSE: 64 DEGREES
TLCPLETH-%PRED-PRE: 63 %
TLCPLETH-PRE: 4.52 L
TLCPLETH-PRED: 7.13 L
TRIGL SERPL-MCNC: 67 MG/DL
TROPONIN T SERPL HS-MCNC: 1054 NG/L
TROPONIN T SERPL HS-MCNC: 1226 NG/L
TROPONIN T SERPL HS-MCNC: 1411 NG/L
TROPONIN T SERPL HS-MCNC: 1438 NG/L
TROPONIN T SERPL HS-MCNC: 1537 NG/L
TROPONIN T SERPL HS-MCNC: 1632 NG/L
TROPONIN T SERPL HS-MCNC: 414 NG/L
TSH SERPL DL<=0.005 MIU/L-ACNC: 1.15 UIU/ML (ref 0.3–4.2)
UNIT ABO/RH: NORMAL
UNIT ABO/RH: NORMAL
UNIT NUMBER: NORMAL
UNIT NUMBER: NORMAL
UNIT STATUS: NORMAL
UNIT STATUS: NORMAL
UNIT TYPE ISBT: 6200
UNIT TYPE ISBT: 6200
VA-%PRED-PRE: 48 %
VA-PRE: 3.11 L
VC-%PRED-PRE: 43 %
VC-PRE: 1.88 L
VC-PRED: 4.27 L
VENTRICULAR RATE- MUSE: 80 BPM
VENTRICULAR RATE- MUSE: 84 BPM
WBC # BLD AUTO: 12.3 10E3/UL (ref 4–11)
WBC # BLD AUTO: 15.1 10E3/UL (ref 4–11)
WBC # BLD AUTO: 16.1 10E3/UL (ref 4–11)
WBC # BLD AUTO: 23.2 10E3/UL (ref 4–11)
WBC # BLD AUTO: 9.7 10E3/UL (ref 4–11)
WBC # FLD AUTO: 347 /UL

## 2024-01-01 PROCEDURE — 95718 EEG PHYS/QHP 2-12 HR W/VEEG: CPT | Performed by: PSYCHIATRY & NEUROLOGY

## 2024-01-01 PROCEDURE — 83605 ASSAY OF LACTIC ACID: CPT | Performed by: EMERGENCY MEDICINE

## 2024-01-01 PROCEDURE — 258N000003 HC RX IP 258 OP 636: Performed by: EMERGENCY MEDICINE

## 2024-01-01 PROCEDURE — 84100 ASSAY OF PHOSPHORUS: CPT | Performed by: INTERNAL MEDICINE

## 2024-01-01 PROCEDURE — 82728 ASSAY OF FERRITIN: CPT | Performed by: STUDENT IN AN ORGANIZED HEALTH CARE EDUCATION/TRAINING PROGRAM

## 2024-01-01 PROCEDURE — 82803 BLOOD GASES ANY COMBINATION: CPT

## 2024-01-01 PROCEDURE — G0103 PSA SCREENING: HCPCS | Performed by: INTERNAL MEDICINE

## 2024-01-01 PROCEDURE — 96365 THER/PROPH/DIAG IV INF INIT: CPT | Mod: 59

## 2024-01-01 PROCEDURE — 85027 COMPLETE CBC AUTOMATED: CPT | Performed by: INTERNAL MEDICINE

## 2024-01-01 PROCEDURE — 82805 BLOOD GASES W/O2 SATURATION: CPT | Performed by: INTERNAL MEDICINE

## 2024-01-01 PROCEDURE — 51702 INSERT TEMP BLADDER CATH: CPT

## 2024-01-01 PROCEDURE — 84155 ASSAY OF PROTEIN SERUM: CPT | Performed by: INTERNAL MEDICINE

## 2024-01-01 PROCEDURE — 36600 WITHDRAWAL OF ARTERIAL BLOOD: CPT

## 2024-01-01 PROCEDURE — 83880 ASSAY OF NATRIURETIC PEPTIDE: CPT

## 2024-01-01 PROCEDURE — 999N000055 HC STATISTIC END TITIAL CO2 MONITORING

## 2024-01-01 PROCEDURE — 36415 COLL VENOUS BLD VENIPUNCTURE: CPT | Performed by: EMERGENCY MEDICINE

## 2024-01-01 PROCEDURE — 999N000157 HC STATISTIC RCP TIME EA 10 MIN

## 2024-01-01 PROCEDURE — 99417 PROLNG OP E/M EACH 15 MIN: CPT | Performed by: INTERNAL MEDICINE

## 2024-01-01 PROCEDURE — P9016 RBC LEUKOCYTES REDUCED: HCPCS | Performed by: INTERNAL MEDICINE

## 2024-01-01 PROCEDURE — 94003 VENT MGMT INPAT SUBQ DAY: CPT

## 2024-01-01 PROCEDURE — 83540 ASSAY OF IRON: CPT | Performed by: STUDENT IN AN ORGANIZED HEALTH CARE EDUCATION/TRAINING PROGRAM

## 2024-01-01 PROCEDURE — 93926 LOWER EXTREMITY STUDY: CPT | Mod: RT

## 2024-01-01 PROCEDURE — 36556 INSERT NON-TUNNEL CV CATH: CPT

## 2024-01-01 PROCEDURE — 250N000011 HC RX IP 250 OP 636: Performed by: INTERNAL MEDICINE

## 2024-01-01 PROCEDURE — 92950 HEART/LUNG RESUSCITATION CPR: CPT

## 2024-01-01 PROCEDURE — 70450 CT HEAD/BRAIN W/O DYE: CPT

## 2024-01-01 PROCEDURE — 93321 DOPPLER ECHO F-UP/LMTD STD: CPT | Mod: 26 | Performed by: INTERNAL MEDICINE

## 2024-01-01 PROCEDURE — 80061 LIPID PANEL: CPT | Performed by: INTERNAL MEDICINE

## 2024-01-01 PROCEDURE — 255N000002 HC RX 255 OP 636: Performed by: INTERNAL MEDICINE

## 2024-01-01 PROCEDURE — 71250 CT THORAX DX C-: CPT

## 2024-01-01 PROCEDURE — 71046 X-RAY EXAM CHEST 2 VIEWS: CPT | Mod: TC | Performed by: STUDENT IN AN ORGANIZED HEALTH CARE EDUCATION/TRAINING PROGRAM

## 2024-01-01 PROCEDURE — 36415 COLL VENOUS BLD VENIPUNCTURE: CPT | Performed by: INTERNAL MEDICINE

## 2024-01-01 PROCEDURE — 80048 BASIC METABOLIC PNL TOTAL CA: CPT

## 2024-01-01 PROCEDURE — 84484 ASSAY OF TROPONIN QUANT: CPT | Performed by: INTERNAL MEDICINE

## 2024-01-01 PROCEDURE — 999N000208 ECHOCARDIOGRAM COMPLETE

## 2024-01-01 PROCEDURE — 250N000011 HC RX IP 250 OP 636: Performed by: NURSE PRACTITIONER

## 2024-01-01 PROCEDURE — 83880 ASSAY OF NATRIURETIC PEPTIDE: CPT | Performed by: EMERGENCY MEDICINE

## 2024-01-01 PROCEDURE — 85730 THROMBOPLASTIN TIME PARTIAL: CPT | Performed by: INTERNAL MEDICINE

## 2024-01-01 PROCEDURE — 82248 BILIRUBIN DIRECT: CPT | Performed by: NURSE PRACTITIONER

## 2024-01-01 PROCEDURE — 93005 ELECTROCARDIOGRAM TRACING: CPT

## 2024-01-01 PROCEDURE — 250N000013 HC RX MED GY IP 250 OP 250 PS 637: Performed by: INTERNAL MEDICINE

## 2024-01-01 PROCEDURE — 200N000001 HC R&B ICU

## 2024-01-01 PROCEDURE — 80053 COMPREHEN METABOLIC PANEL: CPT | Performed by: INTERNAL MEDICINE

## 2024-01-01 PROCEDURE — 999N000065 XR CHEST PORT 1 VIEW

## 2024-01-01 PROCEDURE — 49083 ABD PARACENTESIS W/IMAGING: CPT

## 2024-01-01 PROCEDURE — 71045 X-RAY EXAM CHEST 1 VIEW: CPT | Mod: 77

## 2024-01-01 PROCEDURE — 99214 OFFICE O/P EST MOD 30 MIN: CPT | Performed by: FAMILY MEDICINE

## 2024-01-01 PROCEDURE — 3E043XZ INTRODUCTION OF VASOPRESSOR INTO CENTRAL VEIN, PERCUTANEOUS APPROACH: ICD-10-PCS | Performed by: INTERNAL MEDICINE

## 2024-01-01 PROCEDURE — 36415 COLL VENOUS BLD VENIPUNCTURE: CPT

## 2024-01-01 PROCEDURE — 250N000011 HC RX IP 250 OP 636: Performed by: EMERGENCY MEDICINE

## 2024-01-01 PROCEDURE — 999N000259 HC STATISTIC EXTUBATION

## 2024-01-01 PROCEDURE — 94002 VENT MGMT INPAT INIT DAY: CPT

## 2024-01-01 PROCEDURE — 93306 TTE W/DOPPLER COMPLETE: CPT | Mod: 26 | Performed by: INTERNAL MEDICINE

## 2024-01-01 PROCEDURE — 999N000009 HC STATISTIC AIRWAY CARE

## 2024-01-01 PROCEDURE — 99291 CRITICAL CARE FIRST HOUR: CPT | Performed by: INTERNAL MEDICINE

## 2024-01-01 PROCEDURE — 84484 ASSAY OF TROPONIN QUANT: CPT | Performed by: EMERGENCY MEDICINE

## 2024-01-01 PROCEDURE — 99291 CRITICAL CARE FIRST HOUR: CPT | Performed by: NURSE PRACTITIONER

## 2024-01-01 PROCEDURE — 93308 TTE F-UP OR LMTD: CPT | Mod: 26 | Performed by: INTERNAL MEDICINE

## 2024-01-01 PROCEDURE — 82040 ASSAY OF SERUM ALBUMIN: CPT | Performed by: INTERNAL MEDICINE

## 2024-01-01 PROCEDURE — 99205 OFFICE O/P NEW HI 60 MIN: CPT | Performed by: STUDENT IN AN ORGANIZED HEALTH CARE EDUCATION/TRAINING PROGRAM

## 2024-01-01 PROCEDURE — 5A1945Z RESPIRATORY VENTILATION, 24-96 CONSECUTIVE HOURS: ICD-10-PCS | Performed by: INTERNAL MEDICINE

## 2024-01-01 PROCEDURE — 88305 TISSUE EXAM BY PATHOLOGIST: CPT | Mod: TC | Performed by: NURSE PRACTITIONER

## 2024-01-01 PROCEDURE — 96360 HYDRATION IV INFUSION INIT: CPT | Mod: 59

## 2024-01-01 PROCEDURE — 83605 ASSAY OF LACTIC ACID: CPT | Performed by: INTERNAL MEDICINE

## 2024-01-01 PROCEDURE — 5A2204Z RESTORATION OF CARDIAC RHYTHM, SINGLE: ICD-10-PCS | Performed by: EMERGENCY MEDICINE

## 2024-01-01 PROCEDURE — 86901 BLOOD TYPING SEROLOGIC RH(D): CPT | Performed by: INTERNAL MEDICINE

## 2024-01-01 PROCEDURE — 93325 DOPPLER ECHO COLOR FLOW MAPG: CPT

## 2024-01-01 PROCEDURE — 85018 HEMOGLOBIN: CPT | Performed by: STUDENT IN AN ORGANIZED HEALTH CARE EDUCATION/TRAINING PROGRAM

## 2024-01-01 PROCEDURE — 250N000009 HC RX 250: Performed by: INTERNAL MEDICINE

## 2024-01-01 PROCEDURE — 83550 IRON BINDING TEST: CPT | Performed by: STUDENT IN AN ORGANIZED HEALTH CARE EDUCATION/TRAINING PROGRAM

## 2024-01-01 PROCEDURE — 96375 TX/PRO/DX INJ NEW DRUG ADDON: CPT

## 2024-01-01 PROCEDURE — 90750 HZV VACC RECOMBINANT IM: CPT | Performed by: INTERNAL MEDICINE

## 2024-01-01 PROCEDURE — 95711 VEEG 2-12 HR UNMONITORED: CPT

## 2024-01-01 PROCEDURE — 95720 EEG PHY/QHP EA INCR W/VEEG: CPT | Performed by: PSYCHIATRY & NEUROLOGY

## 2024-01-01 PROCEDURE — 99291 CRITICAL CARE FIRST HOUR: CPT | Performed by: PHYSICIAN ASSISTANT

## 2024-01-01 PROCEDURE — 83970 ASSAY OF PARATHORMONE: CPT | Performed by: INTERNAL MEDICINE

## 2024-01-01 PROCEDURE — 94729 DIFFUSING CAPACITY: CPT | Performed by: INTERNAL MEDICINE

## 2024-01-01 PROCEDURE — 99214 OFFICE O/P EST MOD 30 MIN: CPT | Performed by: STUDENT IN AN ORGANIZED HEALTH CARE EDUCATION/TRAINING PROGRAM

## 2024-01-01 PROCEDURE — 93010 ELECTROCARDIOGRAM REPORT: CPT | Performed by: INTERNAL MEDICINE

## 2024-01-01 PROCEDURE — 87205 SMEAR GRAM STAIN: CPT | Performed by: NURSE PRACTITIONER

## 2024-01-01 PROCEDURE — 99214 OFFICE O/P EST MOD 30 MIN: CPT | Performed by: NURSE PRACTITIONER

## 2024-01-01 PROCEDURE — 83036 HEMOGLOBIN GLYCOSYLATED A1C: CPT | Performed by: INTERNAL MEDICINE

## 2024-01-01 PROCEDURE — 99291 CRITICAL CARE FIRST HOUR: CPT | Mod: FS | Performed by: NURSE PRACTITIONER

## 2024-01-01 PROCEDURE — G2211 COMPLEX E/M VISIT ADD ON: HCPCS | Performed by: STUDENT IN AN ORGANIZED HEALTH CARE EDUCATION/TRAINING PROGRAM

## 2024-01-01 PROCEDURE — 80069 RENAL FUNCTION PANEL: CPT | Performed by: STUDENT IN AN ORGANIZED HEALTH CARE EDUCATION/TRAINING PROGRAM

## 2024-01-01 PROCEDURE — 99285 EMERGENCY DEPT VISIT HI MDM: CPT | Mod: 25

## 2024-01-01 PROCEDURE — 272N000017 HC KIT MONITOR CVP

## 2024-01-01 PROCEDURE — 999N000026 HC STATISTIC CARDIOPULM RESUSCITATION

## 2024-01-01 PROCEDURE — 250N000009 HC RX 250: Performed by: RADIOLOGY

## 2024-01-01 PROCEDURE — 83880 ASSAY OF NATRIURETIC PEPTIDE: CPT | Performed by: NURSE PRACTITIONER

## 2024-01-01 PROCEDURE — 258N000003 HC RX IP 258 OP 636: Performed by: NURSE PRACTITIONER

## 2024-01-01 PROCEDURE — 85025 COMPLETE CBC W/AUTO DIFF WBC: CPT | Performed by: EMERGENCY MEDICINE

## 2024-01-01 PROCEDURE — 71045 X-RAY EXAM CHEST 1 VIEW: CPT

## 2024-01-01 PROCEDURE — 91320 SARSCV2 VAC 30MCG TRS-SUC IM: CPT | Performed by: INTERNAL MEDICINE

## 2024-01-01 PROCEDURE — 93321 DOPPLER ECHO F-UP/LMTD STD: CPT

## 2024-01-01 PROCEDURE — 87040 BLOOD CULTURE FOR BACTERIA: CPT | Performed by: EMERGENCY MEDICINE

## 2024-01-01 PROCEDURE — 85018 HEMOGLOBIN: CPT

## 2024-01-01 PROCEDURE — 95714 VEEG EA 12-26 HR UNMNTR: CPT

## 2024-01-01 PROCEDURE — 250N000012 HC RX MED GY IP 250 OP 636 PS 637: Performed by: STUDENT IN AN ORGANIZED HEALTH CARE EDUCATION/TRAINING PROGRAM

## 2024-01-01 PROCEDURE — 89050 BODY FLUID CELL COUNT: CPT | Performed by: NURSE PRACTITIONER

## 2024-01-01 PROCEDURE — 80048 BASIC METABOLIC PNL TOTAL CA: CPT | Performed by: INTERNAL MEDICINE

## 2024-01-01 PROCEDURE — 99207 PR NO BILLABLE SERVICE THIS VISIT: CPT | Performed by: STUDENT IN AN ORGANIZED HEALTH CARE EDUCATION/TRAINING PROGRAM

## 2024-01-01 PROCEDURE — 85025 COMPLETE CBC W/AUTO DIFF WBC: CPT | Performed by: INTERNAL MEDICINE

## 2024-01-01 PROCEDURE — 999N000254 HC STATISTIC VENTILATOR TRANSFER

## 2024-01-01 PROCEDURE — 99397 PER PM REEVAL EST PAT 65+ YR: CPT | Mod: 25 | Performed by: INTERNAL MEDICINE

## 2024-01-01 PROCEDURE — 99222 1ST HOSP IP/OBS MODERATE 55: CPT | Mod: 25 | Performed by: INTERNAL MEDICINE

## 2024-01-01 PROCEDURE — 80053 COMPREHEN METABOLIC PANEL: CPT | Performed by: NURSE PRACTITIONER

## 2024-01-01 PROCEDURE — 94375 RESPIRATORY FLOW VOLUME LOOP: CPT | Performed by: INTERNAL MEDICINE

## 2024-01-01 PROCEDURE — 36415 COLL VENOUS BLD VENIPUNCTURE: CPT | Performed by: STUDENT IN AN ORGANIZED HEALTH CARE EDUCATION/TRAINING PROGRAM

## 2024-01-01 PROCEDURE — 82805 BLOOD GASES W/O2 SATURATION: CPT | Performed by: EMERGENCY MEDICINE

## 2024-01-01 PROCEDURE — 82042 OTHER SOURCE ALBUMIN QUAN EA: CPT | Performed by: NURSE PRACTITIONER

## 2024-01-01 PROCEDURE — 90480 ADMN SARSCOV2 VAC 1/ONLY CMP: CPT | Performed by: INTERNAL MEDICINE

## 2024-01-01 PROCEDURE — 84155 ASSAY OF PROTEIN SERUM: CPT | Performed by: EMERGENCY MEDICINE

## 2024-01-01 PROCEDURE — 250N000011 HC RX IP 250 OP 636

## 2024-01-01 PROCEDURE — 99215 OFFICE O/P EST HI 40 MIN: CPT | Performed by: INTERNAL MEDICINE

## 2024-01-01 PROCEDURE — 999N000065 XR ABDOMEN PORT 1 VIEW

## 2024-01-01 PROCEDURE — 90471 IMMUNIZATION ADMIN: CPT | Performed by: INTERNAL MEDICINE

## 2024-01-01 PROCEDURE — 86923 COMPATIBILITY TEST ELECTRIC: CPT | Performed by: INTERNAL MEDICINE

## 2024-01-01 PROCEDURE — 83735 ASSAY OF MAGNESIUM: CPT | Performed by: INTERNAL MEDICINE

## 2024-01-01 PROCEDURE — 99207 PR NO BILLABLE SERVICE THIS VISIT: CPT | Performed by: NURSE PRACTITIONER

## 2024-01-01 PROCEDURE — 99207 PR FOOT EXAM NO CHARGE: CPT | Mod: 25 | Performed by: INTERNAL MEDICINE

## 2024-01-01 PROCEDURE — 84157 ASSAY OF PROTEIN OTHER: CPT | Performed by: NURSE PRACTITIONER

## 2024-01-01 PROCEDURE — 250N000011 HC RX IP 250 OP 636: Performed by: PHYSICIAN ASSISTANT

## 2024-01-01 PROCEDURE — 88112 CYTOPATH CELL ENHANCE TECH: CPT | Mod: 26 | Performed by: PATHOLOGY

## 2024-01-01 PROCEDURE — 250N000009 HC RX 250: Performed by: EMERGENCY MEDICINE

## 2024-01-01 PROCEDURE — 99214 OFFICE O/P EST MOD 30 MIN: CPT | Mod: 25 | Performed by: INTERNAL MEDICINE

## 2024-01-01 PROCEDURE — 94726 PLETHYSMOGRAPHY LUNG VOLUMES: CPT | Performed by: INTERNAL MEDICINE

## 2024-01-01 PROCEDURE — 88305 TISSUE EXAM BY PATHOLOGIST: CPT | Mod: 26 | Performed by: PATHOLOGY

## 2024-01-01 PROCEDURE — 84443 ASSAY THYROID STIM HORMONE: CPT | Performed by: NURSE PRACTITIONER

## 2024-01-01 PROCEDURE — 87637 SARSCOV2&INF A&B&RSV AMP PRB: CPT | Performed by: EMERGENCY MEDICINE

## 2024-01-01 PROCEDURE — 76937 US GUIDE VASCULAR ACCESS: CPT

## 2024-01-01 PROCEDURE — 93325 DOPPLER ECHO COLOR FLOW MAPG: CPT | Mod: 26 | Performed by: INTERNAL MEDICINE

## 2024-01-01 PROCEDURE — 83605 ASSAY OF LACTIC ACID: CPT

## 2024-01-01 PROCEDURE — 71275 CT ANGIOGRAPHY CHEST: CPT

## 2024-01-01 PROCEDURE — 86900 BLOOD TYPING SEROLOGIC ABO: CPT | Performed by: INTERNAL MEDICINE

## 2024-01-01 PROCEDURE — 36415 COLL VENOUS BLD VENIPUNCTURE: CPT | Performed by: NURSE PRACTITIONER

## 2024-01-01 PROCEDURE — 83735 ASSAY OF MAGNESIUM: CPT

## 2024-01-01 PROCEDURE — 96368 THER/DIAG CONCURRENT INF: CPT

## 2024-01-01 PROCEDURE — 85014 HEMATOCRIT: CPT | Performed by: INTERNAL MEDICINE

## 2024-01-01 PROCEDURE — 99215 OFFICE O/P EST HI 40 MIN: CPT | Performed by: NURSE PRACTITIONER

## 2024-01-01 RX ORDER — GLYCOPYRROLATE 0.2 MG/ML
0.1 INJECTION, SOLUTION INTRAMUSCULAR; INTRAVENOUS EVERY 4 HOURS PRN
Status: DISCONTINUED | OUTPATIENT
Start: 2024-01-01 | End: 2024-01-01 | Stop reason: HOSPADM

## 2024-01-01 RX ORDER — DILTIAZEM HYDROCHLORIDE 240 MG/1
240 CAPSULE, EXTENDED RELEASE ORAL DAILY
Qty: 30 CAPSULE | Refills: 3 | Status: SHIPPED | OUTPATIENT
Start: 2024-01-01

## 2024-01-01 RX ORDER — METOPROLOL SUCCINATE 100 MG/1
100 TABLET, EXTENDED RELEASE ORAL 2 TIMES DAILY
Qty: 180 TABLET | Refills: 0 | Status: SHIPPED | OUTPATIENT
Start: 2024-01-01 | End: 2024-01-01

## 2024-01-01 RX ORDER — METOPROLOL SUCCINATE 100 MG/1
100 TABLET, EXTENDED RELEASE ORAL 2 TIMES DAILY
Qty: 60 TABLET | Refills: 0 | Status: SHIPPED | OUTPATIENT
Start: 2024-01-01 | End: 2024-01-01

## 2024-01-01 RX ORDER — HEPARIN SODIUM 10000 [USP'U]/100ML
0-5000 INJECTION, SOLUTION INTRAVENOUS CONTINUOUS
Status: DISCONTINUED | OUTPATIENT
Start: 2024-01-01 | End: 2024-01-01

## 2024-01-01 RX ORDER — LANCETS
EACH MISCELLANEOUS
Qty: 102 EACH | Refills: 2 | Status: SHIPPED | OUTPATIENT
Start: 2024-01-01

## 2024-01-01 RX ORDER — FLUTICASONE FUROATE, UMECLIDINIUM BROMIDE AND VILANTEROL TRIFENATATE 200; 62.5; 25 UG/1; UG/1; UG/1
1 POWDER RESPIRATORY (INHALATION) DAILY
Qty: 60 EACH | Refills: 11 | Status: SHIPPED | OUTPATIENT
Start: 2024-01-01

## 2024-01-01 RX ORDER — GLIPIZIDE 5 MG/1
5 TABLET, FILM COATED, EXTENDED RELEASE ORAL DAILY
Qty: 90 TABLET | Refills: 1 | Status: SHIPPED | OUTPATIENT
Start: 2024-01-01

## 2024-01-01 RX ORDER — CHOLECALCIFEROL (VITAMIN D3) 50 MCG
1 TABLET ORAL DAILY
Qty: 90 TABLET | Refills: 0 | Status: SHIPPED | OUTPATIENT
Start: 2024-01-01 | End: 2024-01-01

## 2024-01-01 RX ORDER — AMOXICILLIN 250 MG
1 CAPSULE ORAL 2 TIMES DAILY PRN
Status: DISCONTINUED | OUTPATIENT
Start: 2024-01-01 | End: 2024-01-01

## 2024-01-01 RX ORDER — NALOXONE HYDROCHLORIDE 0.4 MG/ML
0.2 INJECTION, SOLUTION INTRAMUSCULAR; INTRAVENOUS; SUBCUTANEOUS
Status: DISCONTINUED | OUTPATIENT
Start: 2024-01-01 | End: 2024-01-01 | Stop reason: HOSPADM

## 2024-01-01 RX ORDER — DILTIAZEM HYDROCHLORIDE 360 MG/1
360 CAPSULE, EXTENDED RELEASE ORAL DAILY
Status: ON HOLD | COMMUNITY
End: 2024-01-01

## 2024-01-01 RX ORDER — AMOXICILLIN 250 MG
2 CAPSULE ORAL 2 TIMES DAILY PRN
Status: DISCONTINUED | OUTPATIENT
Start: 2024-01-01 | End: 2024-01-01

## 2024-01-01 RX ORDER — CHLORHEXIDINE GLUCONATE ORAL RINSE 1.2 MG/ML
15 SOLUTION DENTAL EVERY 12 HOURS
Status: DISCONTINUED | OUTPATIENT
Start: 2024-01-01 | End: 2024-01-01

## 2024-01-01 RX ORDER — AMINO ACIDS/PROTEIN HYDROLYS 11G-40/45
1 LIQUID IN PACKET (ML) ORAL 3 TIMES DAILY
Status: DISCONTINUED | OUTPATIENT
Start: 2024-01-01 | End: 2024-01-01

## 2024-01-01 RX ORDER — FLUTICASONE FUROATE, UMECLIDINIUM BROMIDE AND VILANTEROL TRIFENATATE 100; 62.5; 25 UG/1; UG/1; UG/1
1 POWDER RESPIRATORY (INHALATION) DAILY
Qty: 60 EACH | Refills: 3 | Status: SHIPPED | OUTPATIENT
Start: 2024-01-01 | End: 2024-01-01

## 2024-01-01 RX ORDER — FENTANYL CITRATE 50 UG/ML
50-100 INJECTION, SOLUTION INTRAMUSCULAR; INTRAVENOUS EVERY 30 MIN PRN
Status: DISCONTINUED | OUTPATIENT
Start: 2024-01-01 | End: 2024-01-01

## 2024-01-01 RX ORDER — PANTOPRAZOLE SODIUM 40 MG/1
40 TABLET, DELAYED RELEASE ORAL 2 TIMES DAILY
Qty: 180 TABLET | Refills: 0 | Status: SHIPPED | OUTPATIENT
Start: 2024-01-01 | End: 2024-01-01

## 2024-01-01 RX ORDER — FENTANYL CITRATE 50 UG/ML
50 INJECTION, SOLUTION INTRAMUSCULAR; INTRAVENOUS ONCE
Status: COMPLETED | OUTPATIENT
Start: 2024-01-01 | End: 2024-01-01

## 2024-01-01 RX ORDER — LIDOCAINE 40 MG/G
CREAM TOPICAL
Status: DISCONTINUED | OUTPATIENT
Start: 2024-01-01 | End: 2024-01-01

## 2024-01-01 RX ORDER — DEXTROSE MONOHYDRATE 100 MG/ML
INJECTION, SOLUTION INTRAVENOUS CONTINUOUS PRN
Status: DISCONTINUED | OUTPATIENT
Start: 2024-01-01 | End: 2024-01-01

## 2024-01-01 RX ORDER — PANTOPRAZOLE SODIUM 40 MG/1
40 TABLET, DELAYED RELEASE ORAL 2 TIMES DAILY
Qty: 180 TABLET | Refills: 0 | Status: SHIPPED | OUTPATIENT
Start: 2024-01-01

## 2024-01-01 RX ORDER — CALCIUM CARBONATE 500 MG/1
1000 TABLET, CHEWABLE ORAL 4 TIMES DAILY PRN
Status: DISCONTINUED | OUTPATIENT
Start: 2024-01-01 | End: 2024-01-01

## 2024-01-01 RX ORDER — POTASSIUM CHLORIDE 1500 MG/1
20 TABLET, EXTENDED RELEASE ORAL DAILY
Qty: 30 TABLET | Refills: 3 | Status: SHIPPED | OUTPATIENT
Start: 2024-01-01

## 2024-01-01 RX ORDER — CARBOXYMETHYLCELLULOSE SODIUM 5 MG/ML
1 SOLUTION/ DROPS OPHTHALMIC EVERY 8 HOURS PRN
Status: DISCONTINUED | OUTPATIENT
Start: 2024-01-01 | End: 2024-01-01 | Stop reason: HOSPADM

## 2024-01-01 RX ORDER — LANCETS
EACH MISCELLANEOUS
Qty: 1 EACH | Refills: 6 | Status: SHIPPED | OUTPATIENT
Start: 2024-01-01 | End: 2024-01-01

## 2024-01-01 RX ORDER — PROPOFOL 10 MG/ML
5-75 INJECTION, EMULSION INTRAVENOUS CONTINUOUS
Status: DISCONTINUED | OUTPATIENT
Start: 2024-01-01 | End: 2024-01-01 | Stop reason: HOSPADM

## 2024-01-01 RX ORDER — DILTIAZEM HYDROCHLORIDE 360 MG/1
360 CAPSULE, EXTENDED RELEASE ORAL DAILY
Qty: 90 CAPSULE | Refills: 3 | Status: CANCELLED | OUTPATIENT
Start: 2024-01-01

## 2024-01-01 RX ORDER — DILTIAZEM HYDROCHLORIDE 360 MG/1
360 CAPSULE, EXTENDED RELEASE ORAL DAILY
Qty: 90 CAPSULE | Refills: 2 | Status: ON HOLD | OUTPATIENT
Start: 2024-01-01 | End: 2024-01-01

## 2024-01-01 RX ORDER — PIPERACILLIN SODIUM, TAZOBACTAM SODIUM 4; .5 G/20ML; G/20ML
4.5 INJECTION, POWDER, LYOPHILIZED, FOR SOLUTION INTRAVENOUS ONCE
Status: COMPLETED | OUTPATIENT
Start: 2024-01-01 | End: 2024-01-01

## 2024-01-01 RX ORDER — NOREPINEPHRINE BITARTRATE 0.02 MG/ML
.01-.6 INJECTION, SOLUTION INTRAVENOUS CONTINUOUS
Status: DISCONTINUED | OUTPATIENT
Start: 2024-01-01 | End: 2024-01-01

## 2024-01-01 RX ORDER — ATROPINE SULFATE 0.1 MG/ML
1 INJECTION INTRAVENOUS ONCE
Status: COMPLETED | OUTPATIENT
Start: 2024-01-01 | End: 2024-01-01

## 2024-01-01 RX ORDER — FERROUS SULFATE 325(65) MG
325 TABLET ORAL EVERY OTHER DAY
Qty: 45 TABLET | Refills: 3 | Status: SHIPPED | OUTPATIENT
Start: 2024-01-01 | End: 2025-09-15

## 2024-01-01 RX ORDER — PROPOFOL 10 MG/ML
5-75 INJECTION, EMULSION INTRAVENOUS CONTINUOUS
Status: DISCONTINUED | OUTPATIENT
Start: 2024-01-01 | End: 2024-01-01

## 2024-01-01 RX ORDER — BLOOD SUGAR DIAGNOSTIC
STRIP MISCELLANEOUS
Qty: 100 STRIP | Refills: 1 | Status: SHIPPED | OUTPATIENT
Start: 2024-01-01 | End: 2024-01-01

## 2024-01-01 RX ORDER — FENTANYL CITRATE 50 UG/ML
50-100 INJECTION, SOLUTION INTRAMUSCULAR; INTRAVENOUS ONCE
Status: COMPLETED | OUTPATIENT
Start: 2024-01-01 | End: 2024-01-01

## 2024-01-01 RX ORDER — NICOTINE POLACRILEX 4 MG
15-30 LOZENGE BUCCAL
Status: DISCONTINUED | OUTPATIENT
Start: 2024-01-01 | End: 2024-01-01

## 2024-01-01 RX ORDER — DEXTROSE MONOHYDRATE 25 G/50ML
25-50 INJECTION, SOLUTION INTRAVENOUS
Status: DISCONTINUED | OUTPATIENT
Start: 2024-01-01 | End: 2024-01-01

## 2024-01-01 RX ORDER — NALOXONE HYDROCHLORIDE 0.4 MG/ML
0.4 INJECTION, SOLUTION INTRAMUSCULAR; INTRAVENOUS; SUBCUTANEOUS
Status: DISCONTINUED | OUTPATIENT
Start: 2024-01-01 | End: 2024-01-01 | Stop reason: HOSPADM

## 2024-01-01 RX ORDER — EPINEPHRINE 0.1 MG/ML
1 INJECTION INTRAVENOUS ONCE
Status: COMPLETED | OUTPATIENT
Start: 2024-01-01 | End: 2024-01-01

## 2024-01-01 RX ORDER — EPINEPHRINE IN 0.9 % SOD CHLOR 5 MG/250ML
.01-.3 PLASTIC BAG, INJECTION (ML) INTRAVENOUS CONTINUOUS
Status: DISCONTINUED | OUTPATIENT
Start: 2024-01-01 | End: 2024-01-01

## 2024-01-01 RX ORDER — PIPERACILLIN SODIUM, TAZOBACTAM SODIUM 3; .375 G/15ML; G/15ML
3.38 INJECTION, POWDER, LYOPHILIZED, FOR SOLUTION INTRAVENOUS EVERY 6 HOURS
Status: DISCONTINUED | OUTPATIENT
Start: 2024-01-01 | End: 2024-01-01

## 2024-01-01 RX ORDER — METOPROLOL SUCCINATE 100 MG/1
100 TABLET, EXTENDED RELEASE ORAL 2 TIMES DAILY
Qty: 180 TABLET | Refills: 3 | Status: SHIPPED | OUTPATIENT
Start: 2024-01-01

## 2024-01-01 RX ORDER — FENTANYL CITRATE 50 UG/ML
50-200 INJECTION, SOLUTION INTRAMUSCULAR; INTRAVENOUS EVERY 10 MIN PRN
Status: DISCONTINUED | OUTPATIENT
Start: 2024-01-01 | End: 2024-01-01 | Stop reason: HOSPADM

## 2024-01-01 RX ORDER — FENTANYL CITRATE 50 UG/ML
25-50 INJECTION, SOLUTION INTRAMUSCULAR; INTRAVENOUS
Status: DISCONTINUED | OUTPATIENT
Start: 2024-01-01 | End: 2024-01-01

## 2024-01-01 RX ORDER — METFORMIN HCL 500 MG
1000 TABLET, EXTENDED RELEASE 24 HR ORAL
Qty: 180 TABLET | Refills: 3 | Status: SHIPPED | OUTPATIENT
Start: 2024-01-01

## 2024-01-01 RX ORDER — TORSEMIDE 20 MG/1
20 TABLET ORAL DAILY
COMMUNITY
Start: 2024-01-01 | End: 2024-01-01

## 2024-01-01 RX ORDER — PROPOFOL 10 MG/ML
40 INJECTION, EMULSION INTRAVENOUS ONCE
Status: COMPLETED | OUTPATIENT
Start: 2024-01-01 | End: 2024-01-01

## 2024-01-01 RX ORDER — IOPAMIDOL 755 MG/ML
79 INJECTION, SOLUTION INTRAVASCULAR ONCE
Status: COMPLETED | OUTPATIENT
Start: 2024-01-01 | End: 2024-01-01

## 2024-01-01 RX ORDER — ATROPINE SULFATE 0.1 MG/ML
0.5 INJECTION INTRAVENOUS ONCE
Status: COMPLETED | OUTPATIENT
Start: 2024-01-01 | End: 2024-01-01

## 2024-01-01 RX ORDER — FENTANYL CITRATE 50 UG/ML
INJECTION, SOLUTION INTRAMUSCULAR; INTRAVENOUS
Status: COMPLETED
Start: 2024-01-01 | End: 2024-01-01

## 2024-01-01 RX ORDER — HEPARIN SODIUM 5000 [USP'U]/.5ML
5000 INJECTION, SOLUTION INTRAVENOUS; SUBCUTANEOUS EVERY 8 HOURS
Status: DISCONTINUED | OUTPATIENT
Start: 2024-01-01 | End: 2024-01-01

## 2024-01-01 RX ORDER — GLYCOPYRROLATE 0.2 MG/ML
0.2 INJECTION, SOLUTION INTRAMUSCULAR; INTRAVENOUS ONCE
Status: COMPLETED | OUTPATIENT
Start: 2024-01-01 | End: 2024-01-01

## 2024-01-01 RX ORDER — LANCETS
EACH MISCELLANEOUS
Qty: 1 EACH | Refills: 2 | Status: SHIPPED | OUTPATIENT
Start: 2024-01-01 | End: 2024-01-01

## 2024-01-01 RX ORDER — BLOOD SUGAR DIAGNOSTIC
STRIP MISCELLANEOUS
Qty: 100 STRIP | Refills: 0 | Status: SHIPPED | OUTPATIENT
Start: 2024-01-01

## 2024-01-01 RX ORDER — FENTANYL CITRATE-0.9 % NACL/PF 10 MCG/ML
PLASTIC BAG, INJECTION (ML) INTRAVENOUS
Status: DISCONTINUED
Start: 2024-01-01 | End: 2024-01-01 | Stop reason: WASHOUT

## 2024-01-01 RX ORDER — LEVETIRACETAM 10 MG/ML
1000 INJECTION INTRAVASCULAR EVERY 12 HOURS
Status: DISCONTINUED | OUTPATIENT
Start: 2024-01-01 | End: 2024-01-01 | Stop reason: HOSPADM

## 2024-01-01 RX ORDER — ROSUVASTATIN CALCIUM 40 MG/1
40 TABLET, COATED ORAL AT BEDTIME
Qty: 90 TABLET | Refills: 3 | Status: SHIPPED | OUTPATIENT
Start: 2024-01-01

## 2024-01-01 RX ORDER — FENTANYL CITRATE 50 UG/ML
100-200 INJECTION, SOLUTION INTRAMUSCULAR; INTRAVENOUS EVERY 5 MIN PRN
Status: DISCONTINUED | OUTPATIENT
Start: 2024-01-01 | End: 2024-01-01 | Stop reason: HOSPADM

## 2024-01-01 RX ADMIN — ATROPINE SULFATE 0.5 MG: 0.1 INJECTION INTRAVENOUS at 17:05

## 2024-01-01 RX ADMIN — SODIUM CHLORIDE 1000 ML: 9 INJECTION, SOLUTION INTRAVENOUS at 16:49

## 2024-01-01 RX ADMIN — EPINEPHRINE 0.05 MCG/KG/MIN: 1 INJECTION INTRAMUSCULAR; INTRAVENOUS; SUBCUTANEOUS at 06:11

## 2024-01-01 RX ADMIN — NOREPINEPHRINE BITARTRATE 0.12 MCG/KG/MIN: 0.02 INJECTION, SOLUTION INTRAVENOUS at 06:08

## 2024-01-01 RX ADMIN — FENTANYL CITRATE 50 MCG: 50 INJECTION, SOLUTION INTRAMUSCULAR; INTRAVENOUS at 16:43

## 2024-01-01 RX ADMIN — EPINEPHRINE 0.05 MCG/KG/MIN: 1 INJECTION INTRAMUSCULAR; INTRAVENOUS; SUBCUTANEOUS at 17:24

## 2024-01-01 RX ADMIN — NOREPINEPHRINE BITARTRATE 0.1 MCG/KG/MIN: 0.02 INJECTION, SOLUTION INTRAVENOUS at 17:07

## 2024-01-01 RX ADMIN — EPINEPHRINE 0.05 MCG/KG/MIN: 1 INJECTION INTRAMUSCULAR; INTRAVENOUS; SUBCUTANEOUS at 20:21

## 2024-01-01 RX ADMIN — INSULIN ASPART 1 UNITS: 100 INJECTION, SOLUTION INTRAVENOUS; SUBCUTANEOUS at 08:08

## 2024-01-01 RX ADMIN — PROPOFOL 20 MCG/KG/MIN: 10 INJECTION, EMULSION INTRAVENOUS at 20:44

## 2024-01-01 RX ADMIN — HUMAN ALBUMIN MICROSPHERES AND PERFLUTREN 3 ML: 10; .22 INJECTION, SOLUTION INTRAVENOUS at 09:35

## 2024-01-01 RX ADMIN — LEVETIRACETAM 2000 MG: 100 INJECTION, SOLUTION INTRAVENOUS at 14:46

## 2024-01-01 RX ADMIN — PROPOFOL 40 MCG/KG/MIN: 10 INJECTION, EMULSION INTRAVENOUS at 00:20

## 2024-01-01 RX ADMIN — PANTOPRAZOLE SODIUM 40 MG: 40 INJECTION, POWDER, FOR SOLUTION INTRAVENOUS at 06:31

## 2024-01-01 RX ADMIN — PIPERACILLIN AND TAZOBACTAM 3.38 G: 3; .375 INJECTION, POWDER, FOR SOLUTION INTRAVENOUS at 11:26

## 2024-01-01 RX ADMIN — PROPOFOL 40 MCG/KG/MIN: 10 INJECTION, EMULSION INTRAVENOUS at 16:29

## 2024-01-01 RX ADMIN — NOREPINEPHRINE BITARTRATE 0.3 MCG/KG/MIN: 0.02 INJECTION, SOLUTION INTRAVENOUS at 22:32

## 2024-01-01 RX ADMIN — SODIUM CHLORIDE 100 ML: 9 INJECTION, SOLUTION INTRAVENOUS at 17:43

## 2024-01-01 RX ADMIN — LEVETIRACETAM 1000 MG: 10 INJECTION INTRAVENOUS at 08:01

## 2024-01-01 RX ADMIN — Medication 60 ML: at 08:14

## 2024-01-01 RX ADMIN — IOPAMIDOL 79 ML: 755 INJECTION, SOLUTION INTRAVENOUS at 17:43

## 2024-01-01 RX ADMIN — Medication 60 ML: at 15:54

## 2024-01-01 RX ADMIN — NOREPINEPHRINE BITARTRATE 0.29 MCG/KG/MIN: 0.02 INJECTION, SOLUTION INTRAVENOUS at 00:44

## 2024-01-01 RX ADMIN — INSULIN ASPART 1 UNITS: 100 INJECTION, SOLUTION INTRAVENOUS; SUBCUTANEOUS at 16:08

## 2024-01-01 RX ADMIN — PROPOFOL 50 MCG/KG/MIN: 10 INJECTION, EMULSION INTRAVENOUS at 07:25

## 2024-01-01 RX ADMIN — INSULIN ASPART 1 UNITS: 100 INJECTION, SOLUTION INTRAVENOUS; SUBCUTANEOUS at 00:05

## 2024-01-01 RX ADMIN — PIPERACILLIN AND TAZOBACTAM 3.38 G: 3; .375 INJECTION, POWDER, FOR SOLUTION INTRAVENOUS at 05:57

## 2024-01-01 RX ADMIN — PROPOFOL 55 MCG/KG/MIN: 10 INJECTION, EMULSION INTRAVENOUS at 06:20

## 2024-01-01 RX ADMIN — MIDAZOLAM 2 MG: 1 INJECTION INTRAMUSCULAR; INTRAVENOUS at 12:23

## 2024-01-01 RX ADMIN — NOREPINEPHRINE BITARTRATE 0.25 MCG/KG/MIN: 0.02 INJECTION, SOLUTION INTRAVENOUS at 02:53

## 2024-01-01 RX ADMIN — FENTANYL CITRATE 50 MCG: 50 INJECTION, SOLUTION INTRAMUSCULAR; INTRAVENOUS at 05:09

## 2024-01-01 RX ADMIN — PROPOFOL 40 MCG/KG/MIN: 10 INJECTION, EMULSION INTRAVENOUS at 00:12

## 2024-01-01 RX ADMIN — CHLORHEXIDINE GLUCONATE 0.12% ORAL RINSE 15 ML: 1.2 LIQUID ORAL at 20:33

## 2024-01-01 RX ADMIN — PROPOFOL 55 MCG/KG/MIN: 10 INJECTION, EMULSION INTRAVENOUS at 03:39

## 2024-01-01 RX ADMIN — PIPERACILLIN AND TAZOBACTAM 3.38 G: 3; .375 INJECTION, POWDER, FOR SOLUTION INTRAVENOUS at 04:19

## 2024-01-01 RX ADMIN — PROPOFOL 40 MG: 10 INJECTION, EMULSION INTRAVENOUS at 16:48

## 2024-01-01 RX ADMIN — PROPOFOL 40 MCG/KG/MIN: 10 INJECTION, EMULSION INTRAVENOUS at 20:34

## 2024-01-01 RX ADMIN — ATROPINE SULFATE 1 MG: 0.1 INJECTION INTRAVENOUS at 17:04

## 2024-01-01 RX ADMIN — LIDOCAINE HYDROCHLORIDE 10 ML: 10 INJECTION, SOLUTION EPIDURAL; INFILTRATION; INTRACAUDAL; PERINEURAL at 09:58

## 2024-01-01 RX ADMIN — CHLORHEXIDINE GLUCONATE 0.12% ORAL RINSE 15 ML: 1.2 LIQUID ORAL at 07:45

## 2024-01-01 RX ADMIN — NOREPINEPHRINE BITARTRATE 0.3 MCG/KG/MIN: 0.02 INJECTION, SOLUTION INTRAVENOUS at 18:26

## 2024-01-01 RX ADMIN — INSULIN ASPART 1 UNITS: 100 INJECTION, SOLUTION INTRAVENOUS; SUBCUTANEOUS at 04:04

## 2024-01-01 RX ADMIN — PIPERACILLIN AND TAZOBACTAM 4.5 G: 4; .5 INJECTION, POWDER, FOR SOLUTION INTRAVENOUS at 17:35

## 2024-01-01 RX ADMIN — LEVETIRACETAM 1000 MG: 10 INJECTION INTRAVENOUS at 20:35

## 2024-01-01 RX ADMIN — PIPERACILLIN AND TAZOBACTAM 3.38 G: 3; .375 INJECTION, POWDER, FOR SOLUTION INTRAVENOUS at 17:09

## 2024-01-01 RX ADMIN — GLYCOPYRROLATE 0.2 MG: 0.2 INJECTION, SOLUTION INTRAMUSCULAR; INTRAVENOUS at 12:23

## 2024-01-01 RX ADMIN — INSULIN ASPART 1 UNITS: 100 INJECTION, SOLUTION INTRAVENOUS; SUBCUTANEOUS at 11:35

## 2024-01-01 RX ADMIN — CHLORHEXIDINE GLUCONATE 0.12% ORAL RINSE 15 ML: 1.2 LIQUID ORAL at 21:47

## 2024-01-01 RX ADMIN — PROPOFOL 15 MCG/KG/MIN: 10 INJECTION, EMULSION INTRAVENOUS at 11:31

## 2024-01-01 RX ADMIN — PROPOFOL 40 MCG/KG/MIN: 10 INJECTION, EMULSION INTRAVENOUS at 04:00

## 2024-01-01 RX ADMIN — NOREPINEPHRINE BITARTRATE 0.3 MCG/KG/MIN: 0.02 INJECTION, SOLUTION INTRAVENOUS at 20:36

## 2024-01-01 RX ADMIN — HEPARIN SODIUM 5000 UNITS: 5000 INJECTION, SOLUTION INTRAVENOUS; SUBCUTANEOUS at 06:10

## 2024-01-01 RX ADMIN — EPINEPHRINE 0.04 MCG/KG/MIN: 1 INJECTION INTRAMUSCULAR; INTRAVENOUS; SUBCUTANEOUS at 00:13

## 2024-01-01 RX ADMIN — INSULIN ASPART 1 UNITS: 100 INJECTION, SOLUTION INTRAVENOUS; SUBCUTANEOUS at 20:34

## 2024-01-01 RX ADMIN — HUMAN ALBUMIN MICROSPHERES AND PERFLUTREN 9 ML: 10; .22 INJECTION, SOLUTION INTRAVENOUS at 14:13

## 2024-01-01 RX ADMIN — HEPARIN SODIUM 5000 UNITS: 5000 INJECTION, SOLUTION INTRAVENOUS; SUBCUTANEOUS at 21:47

## 2024-01-01 RX ADMIN — NOREPINEPHRINE BITARTRATE 0.02 MCG/KG/MIN: 0.02 INJECTION, SOLUTION INTRAVENOUS at 20:34

## 2024-01-01 RX ADMIN — PIPERACILLIN AND TAZOBACTAM 3.38 G: 3; .375 INJECTION, POWDER, FOR SOLUTION INTRAVENOUS at 00:19

## 2024-01-01 RX ADMIN — Medication 60 ML: at 21:09

## 2024-01-01 RX ADMIN — PROPOFOL 50 MCG/KG/MIN: 10 INJECTION, EMULSION INTRAVENOUS at 07:47

## 2024-01-01 RX ADMIN — CHLORHEXIDINE GLUCONATE 0.12% ORAL RINSE 15 ML: 1.2 LIQUID ORAL at 08:01

## 2024-01-01 RX ADMIN — PIPERACILLIN AND TAZOBACTAM 3.38 G: 3; .375 INJECTION, POWDER, FOR SOLUTION INTRAVENOUS at 22:47

## 2024-01-01 RX ADMIN — PANTOPRAZOLE SODIUM 40 MG: 40 INJECTION, POWDER, FOR SOLUTION INTRAVENOUS at 08:01

## 2024-01-01 RX ADMIN — EPINEPHRINE 0.5 MG: 0.1 INJECTION INTRAVENOUS at 17:05

## 2024-01-01 RX ADMIN — PROPOFOL 50 MCG/KG/MIN: 10 INJECTION, EMULSION INTRAVENOUS at 10:34

## 2024-01-01 RX ADMIN — FENTANYL CITRATE 50 MCG: 50 INJECTION, SOLUTION INTRAMUSCULAR; INTRAVENOUS at 12:23

## 2024-01-01 RX ADMIN — PROPOFOL 10 MCG/KG/MIN: 10 INJECTION, EMULSION INTRAVENOUS at 16:52

## 2024-01-01 RX ADMIN — HEPARIN SODIUM 1200 UNITS/HR: 10000 INJECTION, SOLUTION INTRAVENOUS at 14:28

## 2024-01-01 ASSESSMENT — ACTIVITIES OF DAILY LIVING (ADL)
ADLS_ACUITY_SCORE: 57
ADLS_ACUITY_SCORE: 57
ADLS_ACUITY_SCORE: 47
ADLS_ACUITY_SCORE: 51
ADLS_ACUITY_SCORE: 57
ADLS_ACUITY_SCORE: 57
ADLS_ACUITY_SCORE: 38
ADLS_ACUITY_SCORE: 51
ADLS_ACUITY_SCORE: 38
ADLS_ACUITY_SCORE: 38
ADLS_ACUITY_SCORE: 55
ADLS_ACUITY_SCORE: 57
ADLS_ACUITY_SCORE: 55
ADLS_ACUITY_SCORE: 57
ADLS_ACUITY_SCORE: 57
ADLS_ACUITY_SCORE: 47
ADLS_ACUITY_SCORE: 55
ADLS_ACUITY_SCORE: 51
ADLS_ACUITY_SCORE: 57
ADLS_ACUITY_SCORE: 57
ADLS_ACUITY_SCORE: 55
ADLS_ACUITY_SCORE: 51
ADLS_ACUITY_SCORE: 51
ADLS_ACUITY_SCORE: 47
ADLS_ACUITY_SCORE: 57
ADLS_ACUITY_SCORE: 47
ADLS_ACUITY_SCORE: 47
ADLS_ACUITY_SCORE: 38
ADLS_ACUITY_SCORE: 51
ADLS_ACUITY_SCORE: 55
ADLS_ACUITY_SCORE: 57
ADLS_ACUITY_SCORE: 55
ADLS_ACUITY_SCORE: 51
ADLS_ACUITY_SCORE: 51
ADLS_ACUITY_SCORE: 38
ADLS_ACUITY_SCORE: 47
ADLS_ACUITY_SCORE: 55
ADLS_ACUITY_SCORE: 38
ADLS_ACUITY_SCORE: 57
ADLS_ACUITY_SCORE: 47
ADLS_ACUITY_SCORE: 38
ADLS_ACUITY_SCORE: 57
ADLS_ACUITY_SCORE: 51
ADLS_ACUITY_SCORE: 51

## 2024-01-01 ASSESSMENT — COLUMBIA-SUICIDE SEVERITY RATING SCALE - C-SSRS: IS THE PATIENT NOT ABLE TO COMPLETE C-SSRS: UNRESPONSIVE

## 2024-01-01 ASSESSMENT — PAIN SCALES - GENERAL: PAINLEVEL: NO PAIN (0)

## 2024-01-05 NOTE — PROGRESS NOTES
SUBJECTIVE:Chief Complaint:   Chief Complaint   Patient presents with    Eye Problem     Something in right eye for 3 days, painful when moving eye.       History of Present Illness: Booker Brown is a 68 year old adult who presents complaining of right eye pain for 3 days.  Onset/timing: sudden.   Associated Signs and Symptoms: none   Treatment measures tried include: none   Contact wearer : No    Past Medical History:   Diagnosis Date    Benign essential hypertension     CAD (coronary artery disease)     Chronic kidney disease, stage III (moderate) (H)     COPD (chronic obstructive pulmonary disease) (H)     ILD (interstitial lung disease) (H)     Obesity (BMI 30-39.9)     Persistent atrial fibrillation (H)     Pure hypercholesterolemia     Type 2 diabetes mellitus (H)      No Known Allergies  Social History     Tobacco Use    Smoking status: Former     Packs/day: 2.00     Years: 16.00     Additional pack years: 0.00     Total pack years: 32.00     Types: Cigarettes     Quit date: 1987     Years since quittin.3    Smokeless tobacco: Never   Substance Use Topics    Alcohol use: No       ROS:  negative for photophobia and vision change    OBJECTIVE:  /76   Pulse 80   Temp 96.8  F (36  C) (Tympanic)   Wt 105.2 kg (232 lb)   SpO2 96%   BMI 33.29 kg/m     General: no acute distress  Eye exam: left eye normal lid, conjunctiva, cornea, pupil and fundus, right eye abnormal findings: corneal abrasion noted rt, corneal foreign body noted rt.  ELIJAH, EOMI, fundi normal, corneas normal, no foreign bodies, flourescein staining is positive, visual acuity normal both eyes, no periorbital cellulitis      ICD-10-CM    1. Corneal abnormality  H18.9       2. Eye pain, right  H57.11         Eye specialty today

## 2024-02-14 NOTE — TELEPHONE ENCOUNTER
[facility] Cardiology Refill Guideline reviewed.  Medication does not meet criteria for refill due to overdue for Echo and follow up visit. Letter was sent. No appointment in place..  Messaged to providers team for further review.

## 2024-05-22 NOTE — TELEPHONE ENCOUNTER
[facility] Cardiology Refill Guideline reviewed.  Medication does not meet criteria for refill due to patient needing to schedule follow up appt.  Messaged to providers team for further review.

## 2024-05-23 NOTE — TELEPHONE ENCOUNTER
Attempted to call patient to discuss medication and follow-up, left message for patient to call back. Raphael SYED

## 2024-05-23 NOTE — TELEPHONE ENCOUNTER
Patient called back, states his Xarelto bottle is running low so he thought he should get a refill.  Patient advised that Sindhu Tanner wanted to see him back September of last year and due to see Dr. Esposito next month.  Patient states he feels like cardiology was not able to do much for him and states he is not inclined to reschedule follow-up with cardiology.  Patient advised to ask PCP to refill his Xarelto.  Patient verbalized understanding. Raphael SYED

## 2024-05-31 NOTE — TELEPHONE ENCOUNTER
Called and spoke with pt to relay provider note below.   He verbalized understanding.   Assisted with changing appt.     Jun 06, 2024 11:30 AM  (Arrive by 11:10 AM)  Provider Visit with Arline White MD  [facility] ([facility] ) [phone]     Thank you,  Corrie Peña RN

## 2024-05-31 NOTE — TELEPHONE ENCOUNTER
Pt has a missed call from scheduling. Pt has an appt scheduled 07/02 with doctor Bee and needs reschedule with doctor White.     Future appt was rescheduled with doctor White 06/06/2024.     Future Appointments 5/31/2024 - 11/27/2024        Date Visit Type Length Department Provider     6/5/2024  8:30 AM RETURN PULMONARY 30 min CS PULMONOLOGY Booker Hopkins MD    Location Instructions:     Your appointment is at [facility] located at [address], [place] [address]. Please check in at the  in [address].              6/6/2024 11:30 AM OFFICE VISIT 30 min  INTERNAL MEDICINE Arline White MD    Location Instructions:     [facility] is in the [facility] at [address] in [place]. This just east of the [address] exit off of [address]. Free parking is available; access the lot from [address] [place] [address] or [facility].               8/9/2024  8:45 AM ECHO COMPLETE 60 min SH CV CARDIAC SERVICES [id number]    Location Instructions:     [facility] is located at [address], [place] [address]. You can park your vehicle at the parking ramp west of [facility] across the street from [facility]. You will cross the skyway to access our clinic on 3rd floor. Please call the clinic if you have questions regarding directions at [phone].  This appointment is in a hospital-based location.&nbsp; Before your visit, you may want to check with your insurance company for coverage and referral options, including cost differences between services provided in different clinic settings.&nbsp; For more information visit this link on the [employer] Website:&nbsp; [email or web address]              8/12/2024  2:00 PM RETURN CARDIOLOGY 30 min [facility] Lorena Hart NP    Location Instructions:     Our clinic is located  at [address], [place] [address]. You can park your vehicle at the parking ramp west of [facility] across the street from [facility]. You will cross the skyway to access our clinic on the 2nd floor.                       Please advice if 06/06 VV can be changed to in person visit or should pt keep appt as virtual?    FYI-  Pt asked triage to let PCP he has worsening breathing problems for the last months. Pt has hx of pulmonary fibrosis. Pt denies chest pain cough or wheezing. Pt has appt with pulmonologist 06/05/2024. Triage advised pt to make pulmonologist aware of worsening breathing for advice on any earlier.    Triage advised pt to seek care at ER if severe chest pain , breathing problems or oxygen level drops below 90 %. Pt agreed to call pulmonologist today.     Reason for Disposition   MILD difficulty breathing (e.g., minimal/no SOB at rest, SOB with walking, pulse < 100) of new-onset or worse than normal    Additional Information   Negative: SEVERE difficulty breathing (e.g., struggling for each breath, speaks in single words, pulse > 120)   Negative: Breathing stopped and hasn't returned   Negative: Choking on something   Negative: Bluish (or gray) lips or face   Negative: Difficult to awaken or acting confused (e.g., disoriented, slurred speech)   Negative: Passed out (i.e., fainted, collapsed and was not responding)   Negative: Wheezing started suddenly after medicine, an allergic food, or bee sting   Negative: Stridor (harsh sound while breathing in)   Negative: Slow, shallow and weak breathing   Negative: Sounds like a life-threatening emergency to the triager   Negative: Chest pain   Negative: Wheezing (high pitched whistling sound) and previous asthma attacks or use of asthma medicines   Negative: Difficulty breathing and within 14 days of COVID-19 Exposure   Negative: Difficulty breathing and only present when coughing   Negative: Difficulty  "breathing and only from stuffy nose   Negative: Difficulty breathing and only from stuffy nose or runny nose from common cold   Negative: MODERATE difficulty breathing (e.g., speaks in phrases, SOB even at rest, pulse 100-120) of new-onset or worse than normal   Negative: Oxygen level (e.g., pulse oximetry) 90% or lower   Negative: Wheezing can be heard across the room   Negative: Drooling or spitting out saliva (because can't swallow)   Negative: Any history of prior \"blood clot\" in leg or lungs   Negative: Illness requiring prolonged bedrest in past month (e.g., immobilization, long hospital stay)   Negative: Hip or leg fracture (broken bone) in past month (or had cast on leg or ankle in past month)   Negative: Major surgery in the past month   Negative: Long-distance travel in past month (e.g., car, bus, train, plane; with trip lasting 6 or more hours)   Negative: Cancer treatment in past six months (or has cancer now)   Negative: Extra heartbeats, irregular heart beating, or heart is beating very fast (i.e., 'palpitations')   Negative: Fever > 103 F (39.4 C)   Negative: Fever > 101 F (38.3 C) and over 60 years of age   Negative: Fever > 100.0 F (37.8 C) and bedridden (e.g., nursing home patient, stroke, chronic illness, recovering from surgery)   Negative: Fever > 100.0 F (37.8 C) and diabetes mellitus or weak immune system (e.g., HIV positive, cancer chemo, splenectomy, organ transplant, chronic steroids)   Negative: Periods where breathing stops and then resumes normally and bedridden (e.g., nursing home patient, CVA)   Negative: Pregnant or postpartum (from 0 to 6 weeks after delivery)   Negative: Patient sounds very sick or weak to the triager    Protocols used: Breathing Difficulty-A-OH    "

## 2024-06-04 NOTE — PROGRESS NOTES
Pulmonary Clinic Note    Date of Service: 6/5/2024     Chief Complaint   Patient presents with    RECHECK     Chronic obstructive pulmonary disease, unspecified COPD type       A/P:  69M HTN, HLD, DM2, Afib (on rivaroxaban) being seen for f/u COPD. He has had worsening dyspnea on exertion lately. We discussed that this is likely multifactorial; cardiac disease, pulmonary disease, and deconditioning. We discussed that I feel he is on appropriate COPD management and medications such as roflumilast or azithromycin would likely not improve his symptoms given that he is thankfully not experiencing frequent exacerbation.     - continue FQC-AFBZ-MLRH (Trelegy) daily, rinse mouth out after use  - continue prn albuterol  - repeat chest CT  - repeat PFTs  - agree with repeat TTE and cardiology follow up   - OK to substitute medications based on formulary     History:  69M HTN, HLD, DM2, Afib (on rivaroxaban) being seen for f/u COPD. Last seen 12/2023. He is prescribed [id number] (Trelegy) and prn albuterol. He is on 2L O2, he only uses when he is SOB. The last few weeks has noticed worsening RODRIGUEZ. Associated light-headedness. No SOB at rest. Check SpO2 at home, lowest he has seen is 88-89%. He quickly recovers to mid-90s. No chest pain or tightness. No wheezing. No cough. No nocturnal cough. Unable to lay flat 2/2 breathing. No PND. Stable LE edema. No palpitations. Using Trelegy, unsure how much it helps. Albuterol is somewhat helpful, but not overly. He is exercises at home, 20 minutes of biking, and also weight exercises. He does well with exercising.     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    BP 92/62 (BP Location: Left arm, Patient Position: Sitting, Cuff Size: Adult Large)   Pulse 81   Wt 102.3 kg (225 lb 8 oz)   SpO2 90%   BMI 32.36 kg/m    Gen: well-appearing  HEENT: Mallampati IV  Card: RRR  Pulm: clear bilaterally   Abd: soft  MSK: 1+ b/l edema, no acute joint abnormality   Skin: no obvious rash  Psych: normal affect  Neuro: alert and oriented     Labs:  Personally reviewed  ANCA (12/2022) - 1:10  CRP (12/2022) - 35.8  LISA (12/2022) - positive, 1:160 speckled      Imaging/Studies: Personally reviewed  6MWT (12/2023) - reduced distance, significant desaturation w/o hypoxemia on 2L  CT C/A/P (7/2023) - mild to moderate paraseptal emphysema, new patchy multifocal groundglass and nodular opacities, mild bronchial wall thickening, scattered calcified pleural plaques  HRCT (5/2023) - new pericardial effusion, stable diffuse pleural plaque, upper lobe predominant emphysema   6MWT (5/2023) - reduced distance (only walked 3min), significant desaturation w/o hypoxemia on 10L  PFTs (4/2023) - severe restriction, severe diffusion defect  6MWT (1/2023) - hypoxemia w/ ambulation on RA, reduced distance, desaturation w/o hypoxemia on 6L  CT Chest (12/2022) - small b/l effusions, mild diffuse GGOs, calcified pleural plaques b/l, mild fibrotic changes similar to previous, cardiac enlargement, severe coronary artery calcification  PFTs (7/2022) - severe obstruction and restriction, e/o air-trapping, moderate diffusion defect  CTPE (3/2022) - no PE, asbestos related pleural dz w/ minimal adjacent fibrosis, mild GGOs b/l     TTE (5/2023) - EF 40-45%  TTE (2/2022) - EF 50-55%, mild LVH, moderate biatrial enlargement    Past Medical History:   Diagnosis Date    Benign essential hypertension     CAD (coronary artery disease)     Chronic kidney disease, stage III (moderate) (H)     COPD (chronic  obstructive pulmonary disease) (H)     ILD (interstitial lung disease) (H)     Obesity (BMI 30-39.9)     Persistent atrial fibrillation (H)     Pure hypercholesterolemia     Type 2 diabetes mellitus (H)      Past Surgical History:   Procedure Laterality Date    COLONOSCOPY N/A 8/24/2018    Procedure: COMBINED COLONOSCOPY, SINGLE OR MULTIPLE BIOPSY/POLYPECTOMY BY BIOPSY;;  Surgeon: Lawrence Mata MD;  Location:  GI    CV CORONARY ANGIOGRAM N/A 11/18/2022    Procedure: Coronary Angiogram;  Surgeon: Mason Lucas MD;  Location:  HEART CARDIAC CATH LAB    CV INSTANTANEOUS WAVE-FREE RATIO N/A 11/18/2022    Procedure: Instantaneous Wave-Free Ratio;  Surgeon: Mason Lucas MD;  Location:  HEART CARDIAC CATH LAB    CV LEFT HEART CATH N/A 11/18/2022    Procedure: Left Heart Catheterization;  Surgeon: Mason Lucas MD;  Location:  HEART CARDIAC CATH LAB    ESOPHAGOSCOPY, GASTROSCOPY, DUODENOSCOPY (EGD), COMBINED N/A 7/7/2023    Procedure: Esophagoscopy, gastroscopy, duodenoscopy (EGD), combined;  Surgeon: Nestor Louie MD;  Location:  GI     Family History   Problem Relation Age of Onset    Diabetes Type 2  Mother     Cerebral aneurysm Sister     Bladder Cancer Brother     Myocardial Infarction Brother         in his 50s    Cerebrovascular Disease No family hx of     Coronary Artery Disease Early Onset No family hx of     Prostate Cancer No family hx of     Colon Cancer No family hx of     Clotting Disorder No family hx of     Bleeding Disorder No family hx of     Anesthesia Reaction No family hx of      Social History     Socioeconomic History    Marital status:      Spouse name: Not on file    Number of children: Not on file    Years of education: Not on file    Highest education level: Not on file   Occupational History    Occupation: Shipping & Receiving   Tobacco Use    Smoking status: Former     Current packs/day: 0.00     Average packs/day: 2.0 packs/day for 16.0 years (32.0  ttl pk-yrs)     Types: Cigarettes     Start date: 1971     Quit date: 1987     Years since quittin.7    Smokeless tobacco: Never   Vaping Use    Vaping status: Never Used   Substance and Sexual Activity    Alcohol use: No    Drug use: No    Sexual activity: Yes     Partners: Female   Other Topics Concern    Parent/sibling w/ CABG, MI or angioplasty before 65F 55M? Yes     Service No    Blood Transfusions No    Caffeine Concern No    Occupational Exposure No    Hobby Hazards No    Sleep Concern No    Stress Concern No    Weight Concern Yes    Special Diet No    Back Care No    Exercise Yes    Bike Helmet No     Comment: n/a    Seat Belt Yes    Self-Exams No   Social History Narrative    .    Two adult children.    Four grandchildren.    No formal exercise.     Social Determinants of Health     Financial Resource Strain: Not on file   Food Insecurity: Not on file   Transportation Needs: Not on file   Physical Activity: Not on file   Stress: Not on file   Social Connections: Not on file   Interpersonal Safety: Not on file   Housing Stability: Not on file       35 minutes spent reviewing chart, reviewing test results, talking with and examining patient, formulating plan, and documentation on the day of the encounter.    Booker Hopkins MD  Pulmonary and Critical Care Medicine  [facility]

## 2024-06-05 NOTE — LETTER
6/5/2024      Booker Brown  [address]  [place] [facility] [facility] [address]      Dear Colleague,    Thank you for referring your patient, Booker Brown, to the [facility] [place]. Please see a copy of my visit note below.    Pulmonary Clinic Note    Date of Service: 6/5/2024     Chief Complaint   Patient presents with     RECHECK     Chronic obstructive pulmonary disease, unspecified COPD type       A/P:  69M HTN, HLD, DM2, Afib (on rivaroxaban) being seen for f/u COPD. He has had worsening dyspnea on exertion lately. We discussed that this is likely multifactorial; cardiac disease, pulmonary disease, and deconditioning. We discussed that I feel he is on appropriate COPD management and medications such as roflumilast or azithromycin would likely not improve his symptoms given that he is thankfully not experiencing frequent exacerbation.     - continue EEL-HLOZ-AJVY (Trelegy) daily, rinse mouth out after use  - continue prn albuterol  - repeat chest CT  - repeat PFTs  - agree with repeat TTE and cardiology follow up   - OK to substitute medications based on formulary     History:  69M HTN, HLD, DM2, Afib (on rivaroxaban) being seen for f/u COPD. Last seen 12/2023. He is prescribed KCE-VICQ-[id number] (Trelegy) and prn albuterol. He is on 2L O2, he only uses when he is SOB. The last few weeks has noticed worsening RODRIGUEZ. Associated light-headedness. No SOB at rest. Check SpO2 at home, lowest he has seen is 88-89%. He quickly recovers to mid-90s. No chest pain or tightness. No wheezing. No cough. No nocturnal cough. Unable to lay flat 2/2 breathing. No PND. Stable LE edema. No palpitations. Using Trelegy, unsure how much it helps. Albuterol is somewhat helpful, but not overly. He is exercises at home, 20 minutes of biking, and also weight exercises. He does well with exercising.     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    BP 92/62 (BP Location: Left arm, Patient Position: Sitting, Cuff Size: Adult Large)   Pulse 81   Wt 102.3 kg (225 lb 8 oz)   SpO2 90%   BMI 32.36 kg/m    Gen: well-appearing  HEENT: Mallampati IV  Card: RRR  Pulm: clear bilaterally   Abd: soft  MSK: 1+ b/l edema, no acute joint abnormality   Skin: no obvious rash  Psych: normal affect  Neuro: alert and oriented     Labs:  Personally reviewed  ANCA (12/2022) - 1:10  CRP (12/2022) - 35.8  LISA (12/2022) - positive, 1:160 speckled      Imaging/Studies: Personally reviewed  6MWT (12/2023) - reduced distance, significant desaturation w/o hypoxemia on 2L  CT C/A/P (7/2023) - mild to moderate paraseptal emphysema, new patchy multifocal groundglass and nodular opacities, mild bronchial wall thickening, scattered calcified pleural plaques  HRCT (5/2023) - new pericardial effusion, stable diffuse pleural plaque, upper lobe predominant emphysema   6MWT (5/2023) - reduced distance (only walked 3min), significant desaturation w/o hypoxemia on 10L  PFTs (4/2023) - severe restriction, severe diffusion defect  6MWT (1/2023) - hypoxemia w/ ambulation on RA, reduced distance, desaturation w/o hypoxemia on 6L  CT Chest (12/2022) - small b/l effusions, mild diffuse GGOs, calcified pleural plaques b/l, mild fibrotic changes similar to previous, cardiac enlargement, severe coronary artery calcification  PFTs (7/2022) - severe obstruction and restriction, e/o air-trapping, moderate diffusion defect  CTPE (3/2022) - no PE, asbestos related pleural dz w/ minimal adjacent fibrosis, mild GGOs b/l     TTE (5/2023) - EF 40-45%  TTE  (2/2022) - EF 50-55%, mild LVH, moderate biatrial enlargement    Past Medical History:   Diagnosis Date     Benign essential hypertension      CAD (coronary artery disease)      Chronic kidney disease, stage III (moderate) (H)      COPD (chronic obstructive pulmonary disease) (H)      ILD (interstitial lung disease) (H)      Obesity (BMI 30-39.9)      Persistent atrial fibrillation (H)      Pure hypercholesterolemia      Type 2 diabetes mellitus (H)      Past Surgical History:   Procedure Laterality Date     COLONOSCOPY N/A 8/24/2018    Procedure: COMBINED COLONOSCOPY, SINGLE OR MULTIPLE BIOPSY/POLYPECTOMY BY BIOPSY;;  Surgeon: Lawrence Mata MD;  Location:  GI     CV CORONARY ANGIOGRAM N/A 11/18/2022    Procedure: Coronary Angiogram;  Surgeon: Mason Lucsa MD;  Location:  HEART CARDIAC CATH LAB     CV INSTANTANEOUS WAVE-FREE RATIO N/A 11/18/2022    Procedure: Instantaneous Wave-Free Ratio;  Surgeon: Mason Lucas MD;  Location:  HEART CARDIAC CATH LAB     CV LEFT HEART CATH N/A 11/18/2022    Procedure: Left Heart Catheterization;  Surgeon: Mason Lucas MD;  Location:  HEART CARDIAC CATH LAB     ESOPHAGOSCOPY, GASTROSCOPY, DUODENOSCOPY (EGD), COMBINED N/A 7/7/2023    Procedure: Esophagoscopy, gastroscopy, duodenoscopy (EGD), combined;  Surgeon: Nestor Louie MD;  Location:  GI     Family History   Problem Relation Age of Onset     Diabetes Type 2  Mother      Cerebral aneurysm Sister      Bladder Cancer Brother      Myocardial Infarction Brother         in his 50s     Cerebrovascular Disease No family hx of      Coronary Artery Disease Early Onset No family hx of      Prostate Cancer No family hx of      Colon Cancer No family hx of      Clotting Disorder No family hx of      Bleeding Disorder No family hx of      Anesthesia Reaction No family hx of      Social History     Socioeconomic History     Marital status:      Spouse name: Not on file     Number of children: Not  on file     Years of education: Not on file     Highest education level: Not on file   Occupational History     Occupation: Shipping & Receiving   Tobacco Use     Smoking status: Former     Current packs/day: 0.00     Average packs/day: 2.0 packs/day for 16.0 years (32.0 ttl pk-yrs)     Types: Cigarettes     Start date: 1971     Quit date: 1987     Years since quittin.7     Smokeless tobacco: Never   Vaping Use     Vaping status: Never Used   Substance and Sexual Activity     Alcohol use: No     Drug use: No     Sexual activity: Yes     Partners: Female   Other Topics Concern     Parent/sibling w/ CABG, MI or angioplasty before 65F 55M? Yes      Service No     Blood Transfusions No     Caffeine Concern No     Occupational Exposure No     Hobby Hazards No     Sleep Concern No     Stress Concern No     Weight Concern Yes     Special Diet No     Back Care No     Exercise Yes     Bike Helmet No     Comment: n/a     Seat Belt Yes     Self-Exams No   Social History Narrative    .    Two adult children.    Four grandchildren.    No formal exercise.     Social Determinants of Health     Financial Resource Strain: Not on file   Food Insecurity: Not on file   Transportation Needs: Not on file   Physical Activity: Not on file   Stress: Not on file   Social Connections: Not on file   Interpersonal Safety: Not on file   Housing Stability: Not on file       35 minutes spent reviewing chart, reviewing test results, talking with and examining patient, formulating plan, and documentation on the day of the encounter.    Booker Hopkins MD  Pulmonary and Critical Care Medicine  [facility]       Again, thank you for allowing me to participate in the care of your patient.        Sincerely,        Booker Hopkins MD

## 2024-06-05 NOTE — NURSING NOTE
Chief Complaint   Patient presents with    RECHECK     Chronic obstructive pulmonary disease, unspecified COPD type       Vitals:    06/05/24 0815   BP: 92/62   BP Location: Left arm   Patient Position: Sitting   Cuff Size: Adult Large   Pulse: 81   SpO2: 90%   Weight: 102.3 kg (225 lb 8 oz)       Body mass index is 32.36 kg/m .      Wendi Luciano, [facility]

## 2024-06-05 NOTE — PATIENT INSTRUCTIONS
I would like you to get a repeat CT scan of your chest and pulmonary function tests. Continue Trelegy daily, rinse your mouth out after use. Continue as needed albuterol for worsening shortness of breath or 5-10 minutes prior to activity. Use oxygen to keep above 88%.

## 2024-06-06 PROBLEM — N18.32 STAGE 3B CHRONIC KIDNEY DISEASE (H): Status: ACTIVE | Noted: 2024-01-01

## 2024-06-06 PROBLEM — E66.01 MORBID OBESITY (H): Status: RESOLVED | Noted: 2022-12-09 | Resolved: 2024-01-01

## 2024-06-06 NOTE — PROGRESS NOTES
ASSESSMENT/PLAN                                                       (Z00.00) Routine history and physical examination of adult  (primary encounter diagnosis)  Comment: PMH, PSH, FH, SH, medications, allergies, immunizations, and preventative health measures reviewed and updated as appropriate.  Plan: see below for plans.      (E11.22,  N18.32) Type 2 diabetes mellitus with stage 3b chronic kidney disease, without long-term current use of insulin (H)  Plan: fasting diabetic labs today; recommendations to follow; referred for annual diabetic eye exam - patient will be contacted to schedule.      (Z12.11) Special screening for malignant neoplasms, colon  Plan: screening colonoscopy ordered - patient to schedule.     (Z12.5) Screening for prostate cancer  Plan: screening PSA today.    (Z23) Need for vaccination  Comment: Shingrix No. 2 given today.    (Z23) High priority for 2019-nCoV vaccine  Comment: COVID-19 booster given today.    (I25.10) Coronary artery disease involving native coronary artery of native heart without angina pectoris  Comment: on optimal medical management with Toprol XL, Crestor, and chronic AC; followed by cardiology.    (N18.32) Stage 3b chronic kidney disease (H)  Plan: PTH and phosphorus with above labs.    (I48.19) Persistent atrial fibrillation (H)  Comment: rate controlled on diltiazem ER and Toprol-XL; chronic AC with Xarelto; followed by cardiology.    Arline White MD   [address] [address]  T: [phone], F: [phone]    SUBJECTIVE                                                      Booker Brown is a very pleasant 69 year old adult who presents for a physical.    ROS:  Constitutional: no unintentional weight loss or gain reported; no fevers, chills, or sweats  reported    Cardiovascular: no chest pain, palpitations, or edema reported  Respiratory: no cough, wheezing, shortness of breath, or dyspnea on exertion  reported  Gastrointestinal: no nausea, vomiting, constipation, diarrhea, or abdominal pain reported  Genitourinary: no urinary frequency, urgency, dysuria, or hematuria reported  Integumentary: no rash or pruritus reported  Musculoskeletal: no back pain, muscle pain, joint pain, or joint swelling reported  Neurologic: no focal weakness, numbness, or tingling reported  Hematologic: no easy bruising or bleeding reported  Endocrine: no heat or cold intolerance reported; no polyuria or polydipsia reported  Psychiatric: no anxiety or depression reported    Past Medical History:   Diagnosis Date    Benign essential hypertension     CAD (coronary artery disease)     Chronic kidney disease, stage III (moderate) (H)     COPD (chronic obstructive pulmonary disease) (H)     ILD (interstitial lung disease) (H)     Obesity (BMI 30-39.9)     Persistent atrial fibrillation (H)     Pure hypercholesterolemia     Type 2 diabetes mellitus (H)      Past Surgical History:   Procedure Laterality Date    COLONOSCOPY N/A 8/24/2018    Procedure: COMBINED COLONOSCOPY, SINGLE OR MULTIPLE BIOPSY/POLYPECTOMY BY BIOPSY;;  Surgeon: Lawrence Mata MD;  Location:  GI    CV CORONARY ANGIOGRAM N/A 11/18/2022    Procedure: Coronary Angiogram;  Surgeon: Mason Lucas MD;  Location:  HEART CARDIAC CATH LAB    CV INSTANTANEOUS WAVE-FREE RATIO N/A 11/18/2022    Procedure: Instantaneous Wave-Free Ratio;  Surgeon: Mason Lucas MD;  Location:  HEART CARDIAC CATH LAB    CV LEFT HEART CATH N/A 11/18/2022    Procedure: Left Heart Catheterization;  Surgeon: Maosn Lucas MD;  Location:  HEART CARDIAC CATH LAB    ESOPHAGOSCOPY, GASTROSCOPY, DUODENOSCOPY (EGD), COMBINED N/A 7/7/2023    Procedure: Esophagoscopy, gastroscopy, duodenoscopy (EGD), combined;  Surgeon: Nestor Louie MD;  Location:  GI     Family History   Problem Relation Age of Onset    Diabetes Type 2  Mother     Cerebral aneurysm Sister     Bladder Cancer Brother      Myocardial Infarction Brother         in his 50s    Cerebrovascular Disease No family hx of     Coronary Artery Disease Early Onset No family hx of     Prostate Cancer No family hx of     Colon Cancer No family hx of     Clotting Disorder No family hx of     Bleeding Disorder No family hx of     Anesthesia Reaction No family hx of      Social History     Occupational History    Occupation: Shipping & Receiving   Tobacco Use    Smoking status: Former     Types: Cigarettes    Smokeless tobacco: Never    Tobacco comments:     Quit in 1987; smoked for 16 years; 2ppd at his most.    Vaping Use    Vaping status: Never Used   Substance and Sexual Activity    Alcohol use: No    Drug use: No    Sexual activity: Yes     Partners: Female   Social History Narrative    .    Two adult children.    Four grandchildren.    No formal exercise.     No Known Allergies    Current Outpatient Medications   Medication Sig    albuterol (PROAIR HFA/PROVENTIL HFA/VENTOLIN HFA) 108 (90 Base) MCG/ACT inhaler Inhale 2 puffs into the lungs every 6 hours as needed for shortness of breath or wheezing    diltiazem ER COATED BEADS (CARDIZEM CD) 360 MG 24 hr capsule Take 1 capsule (360 mg) by mouth daily    Fluticasone-Umeclidin-Vilanterol (TRELEGY ELLIPTA) 200-62.5-25 MCG/ACT oral inhaler Inhale 1 puff into the lungs daily    glipiZIDE (GLUCOTROL XL) 5 MG 24 hr tablet TAKE 1 TABLET BY MOUTH EVERY DAY    metFORMIN (GLUCOPHAGE XR) 500 MG 24 hr tablet TAKE 2 TABLETS BY MOUTH DAILY WITH DINNER    metoprolol succinate ER (TOPROL XL) 100 MG 24 hr tablet Take 1 tablet (100 mg) by mouth 2 times daily Appt needed for refills; please call [phone] to schedule    pantoprazole (PROTONIX) 40 MG EC tablet TAKE 1 TABLET BY MOUTH TWICE A DAY    rivaroxaban ANTICOAGULANT (XARELTO ANTICOAGULANT) 20 MG TABS tablet Take 1 tablet (20 mg) by mouth daily (with dinner)    rosuvastatin (CRESTOR) 40 MG tablet Take 1 tablet (40 mg) by mouth At Bedtime     sacubitril-valsartan (ENTRESTO) 24-26 MG per tablet Take 1 tablet by mouth 2 times daily    torsemide (DEMADEX) 20 MG tablet Take 1 tablet (20 mg) by mouth daily     Immunization History   Administered Date(s) Administered    COVID-19 Bivalent 18+ (Moderna) 12/09/2022    COVID-19 Monovalent 18+ (Moderna) 04/22/2021, 05/26/2021    COVID-19 Monovalent Booster 18+ (Moderna) 02/09/2022    Influenza Vaccine 18-64 (Flublok) 10/17/2018, 10/30/2019, 10/05/2020, 10/07/2021    Influenza Vaccine 65+ (Fluzone HD) 12/09/2022    Influenza Vaccine >6 months,quad, PF 09/30/2016    Pneumococcal 20 valent Conjugate (Prevnar 20) 08/22/2022    Pneumococcal 23 valent 05/09/2016    TD,PF 7+ (Tenivac) 02/21/2023    TDAP Vaccine (Adacel) 08/07/2012    Zoster recombinant adjuvanted (SHINGRIX) 02/21/2023     PREVENTATIVE HEALTH                                                      BMI: obese  Blood pressure: well-controlled on current regimen   Prostate CA Screening: DUE  Colon CA screening: DUE  Lung CA screening: patient does not meet screening criteria  AAA screening: DUE  Screening cholesterol: n/a - already being treated for this condition  Screening diabetes: n/a - already being treated for this condition  STD testing: no risk factors present  Alcohol misuse screening: alcohol use reviewed - no intervention indicated at this time  Immunizations: reviewed;  COVID-19 booster and Shingrix No. 2 DUE    OBJECTIVE                                                      /80   Pulse 80   Temp (!) 96.6  F (35.9  C) (Temporal)   Wt 103.2 kg (227 lb 8 oz)   SpO2 93%   BMI 32.64 kg/m    Constitutional: well-appearing  Head, Ears, and Eyes: normocephalic; normal external auditory canal and pinna; tympanic membranes visualized and normal; normal lids and conjunctivae  Neck: supple, symmetric, no thyromegaly or lymphadenopathy  Respiratory: normal respiratory effort; clear to auscultation bilaterally  Cardiovascular: regular rate and rhythm;  no edema  Gastrointestinal: soft, non-tender, and non-distended; no organomegaly or masses  Musculoskeletal: normal gait and station  Foot exam: feet intact - no lesions or ulcers; sensation intact to monofilament  Psych: normal judgment and insight; normal mood and affect; recent and remote memory intact    ---    (Note was completed, in part, with [employer] voice-recognition software. Documentation was reviewed, but some grammatical, spelling, and word errors may remain.)

## 2024-06-10 NOTE — TELEPHONE ENCOUNTER
Patient Contact    Attempt # 1    Was call answered?  No.  Left message on voicemail with information to call me back.    Upon call back, please relay provider note below to pt.     Thank you,  Corrie Peña, RN      Unfortunately, kidney function has declined since last check.     I think it best we bring in a kidney specialist for further evaluation and management.     I will go ahead and place that referral and you will be contacted to schedule an appointment.   Written by Arline White MD on 6/7/2024 10:24 AM CDT  
Pt called back, relayed below message    Per his request sent him a [employer] message with the referral information     Mckayla SPENCER, Triage RN  [facility]     
headache

## 2024-06-19 NOTE — PROGRESS NOTES
Booker Brown comes into clinic today at the request of Dr. Hopkins, Ordering Provider for PFT    This service provided today was under the supervising provider of the day Dr. Alberto, who was available if needed.    Aleksander Chavez, RT

## 2024-06-20 NOTE — TELEPHONE ENCOUNTER
[facility] called     Home Care is calling regarding an established patient with [facility].       Requesting orders from: Arline White  Provider is following patient: Yes  Is this a 60-day recertification request?  No    Orders Requested    Occupational Therapy  Request for continuation of care with increase in frequency - for adding lymphedema therapy   Frequency: starting this week 3x/4 weeks, 2x/2 weeks   Verbal order may be provided - verbal given on requested home care orders      Verbal orders given.  Home Care will send orders for provider to sign.  Confirmed ok to leave a detailed message with call back.  Contact information confirmed and updated as needed.    Mckayla Green RN on 7/18/2023 at 12:48 PM     [NI] : Normal [de-identified] : scar healing well- areas of hyperpigmentation at the abrasion site contour is good.   [de-identified] : 6 mm raised skin lesion nasal dorsum,  no surrounding erythema or purulence, no palpable adenopathy.

## 2024-06-21 NOTE — NURSING NOTE
Chief Complaint   Patient presents with    Follow Up       Vitals:    06/21/24 1442   BP: 98/63   BP Location: Left arm   Patient Position: Sitting   Cuff Size: Adult Large   Pulse: 102   Resp: 20   SpO2: 90%   Weight: 102.1 kg (225 lb)       Body mass index is 32.28 kg/m .

## 2024-06-21 NOTE — PATIENT INSTRUCTIONS
Call immediately to report any decreased vision or visual distortion. Continue Trelegy daily, rinse your mouth out after use. Continue as needed albuterol for worsening shortness of breath or 5-10 minutes prior to activity. Continue oxygen to keep your oxygen > 88%.

## 2024-06-21 NOTE — PROGRESS NOTES
Pulmonary Clinic Note    Date of Service: 6/21/2024     Chief Complaint   Patient presents with    Follow Up       A/P:  69M HTN, HLD, DM2, Afib (on rivaroxaban) being seen for f/u COPD. Pt returns today to discuss test results. CT imaging stable. PFTs are improved compared to last testing. No change in management today.     - continue [id number] (Trelegy)  - continue prn albuterol  - continue O2 for SpO2 < 88%  - OK to substitute medications based on formulary     History:  69M HTN, HLD, DM2, Afib (on rivaroxaban) being seen for f/u COPD. Last seen 2 weeks ago. He is prescribed [id number] (Trelegy) and prn albuterol. He is using 2L O2 when he is more SOB. He is curious on what his disease process looks like. RODRIGUEZ w/ minimal-moderate activity. No SOB at rest. Checks SpO2 at home, lowest ~88%, recovers quickly. No chest pain or tightness. No wheezing. No cough. No nocturnal cough. Stable orthopnea, sleeps at about 40 degress. No PND. Stable LE edema. Using Trelegy. Rare albuterol use, does use prior to activity.     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    BP 98/63 (BP Location: Left arm, Patient Position: Sitting, Cuff Size: Adult Large)   Pulse 102   Resp 20   Wt 102.1 kg (225 lb)   SpO2 90%   BMI 32.28 kg/m    Gen: well-appearing  HEENT: Mallampati IV  Card: RRR  Pulm: clear bilaterally   Abd: soft  MSK: no edema, no acute joint abnormality   Skin: no obvious rash  Psych: normal affect  Neuro: alert and oriented     Labs:  Personally reviewed  ANCA (12/2022) -  1:10  CRP (12/2022) - 35.8  LISA (12/2022) - positive, 1:160 speckled      Imaging/Studies: Personally reviewed  PFTs (6/2024) - moderate restriction, moderate diffusion decrease   CT Chest (6/2024) - stable emphysema and b/l calcified pleural plaques  6MWT (12/2023) - reduced distance, significant desaturation w/o hypoxemia on 2L  CT C/A/P (7/2023) - mild to moderate paraseptal emphysema, new patchy multifocal groundglass and nodular opacities, mild bronchial wall thickening, scattered calcified pleural plaques  HRCT (5/2023) - new pericardial effusion, stable diffuse pleural plaque, upper lobe predominant emphysema   6MWT (5/2023) - reduced distance (only walked 3min), significant desaturation w/o hypoxemia on 10L  PFTs (4/2023) - severe restriction, severe diffusion defect  6MWT (1/2023) - hypoxemia w/ ambulation on RA, reduced distance, desaturation w/o hypoxemia on 6L  CT Chest (12/2022) - small b/l effusions, mild diffuse GGOs, calcified pleural plaques b/l, mild fibrotic changes similar to previous, cardiac enlargement, severe coronary artery calcification  PFTs (7/2022) - severe obstruction and restriction, e/o air-trapping, moderate diffusion defect  CTPE (3/2022) - no PE, asbestos related pleural dz w/ minimal adjacent fibrosis, mild GGOs b/l     TTE (5/2023) - EF 40-45%  TTE (2/2022) - EF 50-55%, mild LVH, moderate biatrial enlargement    Past Medical History:   Diagnosis Date    Benign essential hypertension     CAD (coronary artery disease)     Chronic kidney disease, stage III (moderate) (H)     COPD (chronic obstructive pulmonary disease) (H)     ILD (interstitial lung disease) (H)     Obesity (BMI 30-39.9)     Persistent atrial fibrillation (H)     Pure hypercholesterolemia     Type 2 diabetes mellitus (H)      Past Surgical History:   Procedure Laterality Date    COLONOSCOPY N/A 8/24/2018    Procedure: COMBINED COLONOSCOPY, SINGLE OR MULTIPLE BIOPSY/POLYPECTOMY BY BIOPSY;;  Surgeon: Lawrence Mata,  MD;  Location:  GI    CV CORONARY ANGIOGRAM N/A 11/18/2022    Procedure: Coronary Angiogram;  Surgeon: Mason Lucas MD;  Location:  HEART CARDIAC CATH LAB    CV INSTANTANEOUS WAVE-FREE RATIO N/A 11/18/2022    Procedure: Instantaneous Wave-Free Ratio;  Surgeon: Mason Lucas MD;  Location:  HEART CARDIAC CATH LAB    CV LEFT HEART CATH N/A 11/18/2022    Procedure: Left Heart Catheterization;  Surgeon: Mason Lucas MD;  Location:  HEART CARDIAC CATH LAB    ESOPHAGOSCOPY, GASTROSCOPY, DUODENOSCOPY (EGD), COMBINED N/A 7/7/2023    Procedure: Esophagoscopy, gastroscopy, duodenoscopy (EGD), combined;  Surgeon: Nestor Louie MD;  Location:  GI     Family History   Problem Relation Age of Onset    Diabetes Type 2  Mother     Cerebral aneurysm Sister     Bladder Cancer Brother     Myocardial Infarction Brother         in his 50s    Cerebrovascular Disease No family hx of     Coronary Artery Disease Early Onset No family hx of     Prostate Cancer No family hx of     Colon Cancer No family hx of     Clotting Disorder No family hx of     Bleeding Disorder No family hx of     Anesthesia Reaction No family hx of      Social History     Socioeconomic History    Marital status:      Spouse name: Not on file    Number of children: Not on file    Years of education: Not on file    Highest education level: Not on file   Occupational History    Occupation: Shipping & Receiving   Tobacco Use    Smoking status: Former     Types: Cigarettes    Smokeless tobacco: Never    Tobacco comments:     Quit in 1987; smoked for 16 years; 2ppd at his most.    Vaping Use    Vaping status: Never Used   Substance and Sexual Activity    Alcohol use: No    Drug use: No    Sexual activity: Yes     Partners: Female   Other Topics Concern    Parent/sibling w/ CABG, MI or angioplasty before 65F 55M? Yes     Service No    Blood Transfusions No    Caffeine Concern No    Occupational Exposure No    Hobby Hazards  No    Sleep Concern No    Stress Concern No    Weight Concern Yes    Special Diet No    Back Care No    Exercise Yes    Bike Helmet No     Comment: n/a    Seat Belt Yes    Self-Exams No   Social History Narrative    .    Two adult children.    Four grandchildren.    No formal exercise.     Social Determinants of Health     Financial Resource Strain: Not on file   Food Insecurity: Not on file   Transportation Needs: Not on file   Physical Activity: Not on file   Stress: Not on file   Social Connections: Not on file   Interpersonal Safety: Not on file   Housing Stability: Not on file       35 minutes spent reviewing chart, reviewing test results, talking with and examining patient, formulating plan, and documentation on the day of the encounter.    Booker Hopkins MD  Pulmonary and Critical Care Medicine  [facility]

## 2024-06-21 NOTE — LETTER
6/21/2024      Booker Brown  [address]  [facility] [address]      Dear Colleague,    Thank you for referring your patient, Booker Brown, to the [facility] [place]. Please see a copy of my visit note below.    Pulmonary Clinic Note    Date of Service: 6/21/2024     Chief Complaint   Patient presents with    Follow Up       A/P:  69M HTN, HLD, DM2, Afib (on rivaroxaban) being seen for f/u COPD. Pt returns today to discuss test results. CT imaging stable. PFTs are improved compared to last testing. No change in management today.     - continue STA-VAXU-GJVD (Trelegy)  - continue prn albuterol  - continue O2 for SpO2 < 88%  - OK to substitute medications based on formulary     History:  69M HTN, HLD, DM2, Afib (on rivaroxaban) being seen for f/u COPD. Last seen 2 weeks ago. He is prescribed BYU-LLNC-ZHEX (Trelegy) and prn albuterol. He is using 2L O2 when he is more SOB. He is curious on what his disease process looks like. RODRIGUEZ w/ minimal-moderate activity. No SOB at rest. Checks SpO2 at home, lowest ~88%, recovers quickly. No chest pain or tightness. No wheezing. No cough. No nocturnal cough. Stable orthopnea, sleeps at about 40 degress. No PND. Stable LE edema. Using Trelegy. Rare albuterol use, does use prior to activity.     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    BP 98/63 (BP Location: Left arm, Patient Position: Sitting, Cuff Size: Adult Large)   Pulse 102   Resp 20   Wt 102.1 kg (225 lb)   SpO2 90%   BMI 32.28 kg/m    Gen:  well-appearing  HEENT: Mallampati IV  Card: RRR  Pulm: clear bilaterally   Abd: soft  MSK: no edema, no acute joint abnormality   Skin: no obvious rash  Psych: normal affect  Neuro: alert and oriented     Labs:  Personally reviewed  ANCA (12/2022) - 1:10  CRP (12/2022) - 35.8  LISA (12/2022) - positive, 1:160 speckled      Imaging/Studies: Personally reviewed  PFTs (6/2024) - moderate restriction, moderate diffusion decrease   CT Chest (6/2024) - stable emphysema and b/l calcified pleural plaques  6MWT (12/2023) - reduced distance, significant desaturation w/o hypoxemia on 2L  CT C/A/P (7/2023) - mild to moderate paraseptal emphysema, new patchy multifocal groundglass and nodular opacities, mild bronchial wall thickening, scattered calcified pleural plaques  HRCT (5/2023) - new pericardial effusion, stable diffuse pleural plaque, upper lobe predominant emphysema   6MWT (5/2023) - reduced distance (only walked 3min), significant desaturation w/o hypoxemia on 10L  PFTs (4/2023) - severe restriction, severe diffusion defect  6MWT (1/2023) - hypoxemia w/ ambulation on RA, reduced distance, desaturation w/o hypoxemia on 6L  CT Chest (12/2022) - small b/l effusions, mild diffuse GGOs, calcified pleural plaques b/l, mild fibrotic changes similar to previous, cardiac enlargement, severe coronary artery calcification  PFTs (7/2022) - severe obstruction and restriction, e/o air-trapping, moderate diffusion defect  CTPE (3/2022) - no PE, asbestos related pleural dz w/ minimal adjacent fibrosis, mild GGOs b/l     TTE (5/2023) - EF 40-45%  TTE (2/2022) - EF 50-55%, mild LVH, moderate biatrial enlargement    Past Medical History:   Diagnosis Date    Benign essential hypertension     CAD (coronary artery disease)     Chronic kidney disease, stage III (moderate) (H)     COPD (chronic obstructive pulmonary disease) (H)     ILD (interstitial lung disease) (H)     Obesity (BMI 30-39.9)     Persistent atrial fibrillation (H)     Pure  hypercholesterolemia     Type 2 diabetes mellitus (H)      Past Surgical History:   Procedure Laterality Date    COLONOSCOPY N/A 8/24/2018    Procedure: COMBINED COLONOSCOPY, SINGLE OR MULTIPLE BIOPSY/POLYPECTOMY BY BIOPSY;;  Surgeon: Lawrence Mata MD;  Location:  GI    CV CORONARY ANGIOGRAM N/A 11/18/2022    Procedure: Coronary Angiogram;  Surgeon: Mason Lucas MD;  Location:  HEART CARDIAC CATH LAB    CV INSTANTANEOUS WAVE-FREE RATIO N/A 11/18/2022    Procedure: Instantaneous Wave-Free Ratio;  Surgeon: Mason Lucas MD;  Location:  HEART CARDIAC CATH LAB    CV LEFT HEART CATH N/A 11/18/2022    Procedure: Left Heart Catheterization;  Surgeon: Mason Lucas MD;  Location:  HEART CARDIAC CATH LAB    ESOPHAGOSCOPY, GASTROSCOPY, DUODENOSCOPY (EGD), COMBINED N/A 7/7/2023    Procedure: Esophagoscopy, gastroscopy, duodenoscopy (EGD), combined;  Surgeon: Nestor Louie MD;  Location:  GI     Family History   Problem Relation Age of Onset    Diabetes Type 2  Mother     Cerebral aneurysm Sister     Bladder Cancer Brother     Myocardial Infarction Brother         in his 50s    Cerebrovascular Disease No family hx of     Coronary Artery Disease Early Onset No family hx of     Prostate Cancer No family hx of     Colon Cancer No family hx of     Clotting Disorder No family hx of     Bleeding Disorder No family hx of     Anesthesia Reaction No family hx of      Social History     Socioeconomic History    Marital status:      Spouse name: Not on file    Number of children: Not on file    Years of education: Not on file    Highest education level: Not on file   Occupational History    Occupation: Shipping & Receiving   Tobacco Use    Smoking status: Former     Types: Cigarettes    Smokeless tobacco: Never    Tobacco comments:     Quit in 1987; smoked for 16 years; 2ppd at his most.    Vaping Use    Vaping status: Never Used   Substance and Sexual Activity    Alcohol use: No    Drug use:  No    Sexual activity: Yes     Partners: Female   Other Topics Concern    Parent/sibling w/ CABG, MI or angioplasty before 65F 55M? Yes     Service No    Blood Transfusions No    Caffeine Concern No    Occupational Exposure No    Hobby Hazards No    Sleep Concern No    Stress Concern No    Weight Concern Yes    Special Diet No    Back Care No    Exercise Yes    Bike Helmet No     Comment: n/a    Seat Belt Yes    Self-Exams No   Social History Narrative    .    Two adult children.    Four grandchildren.    No formal exercise.     Social Determinants of Health     Financial Resource Strain: Not on file   Food Insecurity: Not on file   Transportation Needs: Not on file   Physical Activity: Not on file   Stress: Not on file   Social Connections: Not on file   Interpersonal Safety: Not on file   Housing Stability: Not on file       35 minutes spent reviewing chart, reviewing test results, talking with and examining patient, formulating plan, and documentation on the day of the encounter.    Booker Hopkins MD  Pulmonary and Critical Care Medicine  [facility]

## 2024-07-01 NOTE — TELEPHONE ENCOUNTER
Requested Prescriptions   Pending Prescriptions Disp Refills    Fluticasone-Umeclidin-Vilanterol (TRELEGY ELLIPTA) 200-62.5-25 MCG/ACT oral inhaler 1 each 5     Sig: Inhale 1 puff into the lungs daily       There is no refill protocol information for this order        Last Written Prescription Date:  12/28/23  Last Fill Quantity: 1 each,  # refills: 5   Last office visit: 6/21/2024 ; last virtual visit: Visit date not found with prescribing provider:  Dr Hopkins   Future Office Visit:  12/18/24    Refill sent  Arian Robertson RN

## 2024-08-09 NOTE — PROGRESS NOTES
Cardiology Clinic Progress Note  Booker Brown MRN# [id number]   YOB: 1955 Age: 69 year old   Primary Cardiologist: Dr. Marquez Reason for visit: Annual follow up             Assessment and Plan:     1.  HFrEF       Cardiomyopathy, etiology unclear, likely ischemic  -1/2023 cardiac MRI EF 55%, late gadolinium enhancement involving the mid to basal inferior and inferolateral walls consistent with previous infarct in the RCA distribution, severe hypokinesis of the mid to basal inferior and inferolateral walls, 13% scar burden  -5/2023 transthoracic echocardiogram with ejection fraction 40 to 45% mid to basal inferior akinesis new compared to prior study  - LVEF: 45-50%  - NYHA class III  - Etiology: likely ischemic  - Fluid status: euvolemic; suspected dry weight: unclear as patient has been losing weight   - Diuretic regimen: torsemide 20mg daily   - Ischemic evaluation: 11/2022 Coronary angiogram showing severe 3v CAD, not a surgical candidate due to lung disease   - Guideline directed medical therapy:  - Beta blocker: Metoprolol  mg twice daily  - ACEI/ARB/ARNI: Entresto 24-26 mg twice daily  - Aldactone antagonist: None secondary to #4  - SGLT2 inhibitor: None secondary #4  -Last hospitalization 6/2023 with removal of 4.4L fluid via paracentesis     2.  Acute blood loss anemia, secondary to gastric ulcer  -7/2023 EGD showed 2 gastric ulcers, 1 with recent GI bleeding  -Initiated on PPI in patient and Xarelto resumed prior to discharge    3. Paroxysmal atrial fibrillation, [id number] 3  -Referral structural Watchman device discussed while inpatient, however patient declined  -Rate controlled with diltiazem and metoprolol 100mg   -Continue Xarelto 20 mg daily indefinitely    4. CKD stage III  -Baseline creatinine ~1.5-1.7    5. Coronary artery disease with known occlusion of RCA and LCx  -Not a candidate for CABG given his lung disease  -6/2023 LDL 42  -Continue rosuvastatin 40mg daily    -No current angina, none historically    6. Type II diabetes, controlled  -6/2023 Hemoglobin A1c 0.6%  -Given his underlying CKD, PCP could consider alternative to metformin    7.  Lymphedema, improved     8.  COPD, ILD, follows with Dr. Hopkins  -Uses intermittent oxygen, last PFTs stable     Patient with continued progression in shortness of breath despite continued weight losses and improvement in lower extremity edema. Will check lab work today to reassess for anemia given hx of gastric ulcers as well as BMP/BMP which will help guide if diuretic titration is needed.     With his ongoing cardiomyopathy and adequately controlled rates, will reduce diltiazem.     Plan:  Continue torsemide 20mg daily, entresto, metoprolol, rosuvastatin and xarelto  Hgb, BMP, BNP today  Reduce diltiazem to 240mg daily, ideally would taper off this medication given cardiomyopathy, but historically needed higher doses for rate control     Follow up plan: Follow-up with me in 1 month for continued medication titration and symptom reassessment         History of Presenting Illness:    Booker Brown is a very pleasant 68 year old adult with a history of persistent atrial fibrillation, CKD stage III, lymphedema, type 2 diabetes, hypertension, coronary artery disease, hyperlipidemia, COPD, ILD.     In 2022 patient was hospitalized with atrial fibrillation with RVR and started on combination of metoprolol and diltiazem.  He was readmitted later that month with COVID and a CT chest was obtained in March which ruled out PE but most suggestive of asbestos related lung disease.    Conner also has complex coronary disease with a known occluded RCA and LCx.  His LAD is open but there is flow-limiting lesion heavily calcified in the proximal and mid LAD.  He had been turned down for CABG due to his lung disease.    He had an echocardiogram in May 2023 which showed new reduced ejection fraction 40 to 45% with mild reduction in systolic function,  mid to basal inferior wall akinesis, mild aortic root dilation mild ascending aortic dilation, trace aortic regurgitation.  He saw Dr. Marquez on 6/6/2023 and reviewed his complex coronary anatomy.  He felt that intervention of his known severe proximal to mid LAD lesion being critically high risk, given his lack of concerning symptoms, conservative management was recommended.  Please see that note for additional details.    Conner had been experiencing progressive exertional dyspnea, abdominal bloating, leg swelling in early July and checked his primary care provider who resumed as oral furosemide, however his symptoms progressed and he was admitted on 7/6/2023 with chronic hypoxic respiratory failure, anemia due to acute blood loss.  His stool was occult positive and Xarelto held.  He was given IV furosemide in the emergency room which helped with his breathing and edema.  CT abdomen pelvis showed new, patchy, multifocal groundglass opacities probably due to fluid overload.  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  He underwent a paracentesis due to new ascites and 4.4 L of fluid was removed.  Fluid was sent for cytology and felt to be consistent with heart failure exacerbation versus liver disease.  Her underwent an EGD which showed 2 gastric ulcers, 1 with recent bleeding.  He was started on IV PPI.  His NT proBNP was 7325, high-sensitivity troponin 27, normal sodium, potassium, CO2 35, BUN 39, creatinine 1.67, and GFR 45.  His hemoglobin was 7.7 on admission which was 8.4 at discharge after a unit of PRBCs.    Patient was last seen in our clinic in July 2023 following his hospitalization and was feeling well from a cardiovascular. Since then he has not further hospitalizations.    He saw pulmonology in June 2024 who noted his PFTs were improved since his previous testing as well as CT imaging was stable.     Repeat echo  prior to our visit showed EF 45-50% with basal-mid segments hypokinetic. Function best preserved at the apex. Mildly decreased RV systolic function and no significant valvular disease. Normal IVC.     Patient is here today for follow-up.  His wife is present with him today. He feels like since he saw pulmonology 2 months ago, he is actually more short of breath. Weights at home had been gradually trending down over the last several months, now stable recently at 225#. During our visit last year, was in the 250 range. Continues to have lower extremity edema, however  feels it has improved over the last months. Denies abdominal bloating.     Patient denies chest pain or chest tightness. Denies other presyncopal symptoms. Denies tachycardia or palpitations.     Blood pressure 107/70 and HR 80 in clinic today. He is compliant with his medications. Denies any issues with bleeding.     He is monitoring his salt at home.     Labs from 6/6/24 showing sodium 142, potassium 4.4,  BUN 36.5, creatinine 1.9, and GFR 38.         Recent Hospitalizations   July 2023 heart failure exacerbation with paracentesis and removal of 4.4 L fluid          Social History      Social History     Socioeconomic History    Marital status:      Spouse name: Not on file    Number of children: Not on file    Years of education: Not on file    Highest education level: Not on file   Occupational History    Occupation: Shipping & Receiving   Tobacco Use    Smoking status: Former     Types: Cigarettes    Smokeless tobacco: Never    Tobacco comments:     Quit in 1987; smoked for 16 years; 2ppd at his most.    Vaping Use    Vaping status: Never Used   Substance and Sexual Activity    Alcohol use: No    Drug use: No    Sexual activity: Yes     Partners: Female   Other Topics Concern    Parent/sibling w/ CABG, MI or angioplasty before 65F 55M? Yes     Service No    Blood Transfusions No    Caffeine Concern No    Occupational Exposure No     "Hobby Hazards No    Sleep Concern No    Stress Concern No    Weight Concern Yes    Special Diet No    Back Care No    Exercise Yes    Bike Helmet No     Comment: n/a    Seat Belt Yes    Self-Exams No   Social History Narrative    .    Two adult children.    Four grandchildren.    No formal exercise.     Social Determinants of Health     Financial Resource Strain: Not on file   Food Insecurity: Not on file   Transportation Needs: Not on file   Physical Activity: Not on file   Stress: Not on file   Social Connections: Not on file   Interpersonal Safety: Not on file   Housing Stability: Not on file            Review of Systems:   Skin:        Eyes:       ENT:       Respiratory:  Positive for shortness of breath;dyspnea on exertion  Cardiovascular:    lightheadedness;dizziness;fatigue;Positive for  Gastroenterology:      Genitourinary:       Musculoskeletal:       Neurologic:       Psychiatric:       Heme/Lymph/Imm:       Endocrine:  Positive for diabetes         Physical Exam:   Vitals: /70   Pulse 80   Ht 1.765 m (5' 9.5\")   Wt 102.1 kg (225 lb)   SpO2 97%   BMI 32.75 kg/m     Wt Readings from Last 4 Encounters:   08/12/24 102.1 kg (225 lb)   06/21/24 102.1 kg (225 lb)   06/06/24 103.2 kg (227 lb 8 oz)   06/05/24 102.3 kg (225 lb 8 oz)     GEN: well nourished, in no acute distress. Sitting in wheelchair  HEENT:  Pupils equal, round. Sclerae nonicteric.   NECK: Supple, no masses appreciated.   C/V:  Irregularly irregular rate and rhythm, no murmur, rub or gallop.    RESP: Respirations are unlabored. Clear to auscultation bilaterally without wheezing, rales, or rhonchi.  GI: Abdomen soft, nontender.  EXTREM: 1-2+ pitting bilateral lymphedema   NEURO: Alert and oriented, cooperative.  SKIN: Warm and dry.        Data:       LIPID RESULTS:  Lab Results   Component Value Date    CHOL 107 06/06/2024    CHOL 124 05/12/2021    HDL 41 06/06/2024    HDL 31 (L) 05/12/2021    LDL 53 06/06/2024    LDL 70 " 05/12/2021    TRIG 67 06/06/2024    TRIG 116 05/12/2021    CHOLHDLRATIO 4.5 09/28/2015     LIVER ENZYME RESULTS:  Lab Results   Component Value Date    AST 15 06/06/2024    AST 20 05/12/2021    ALT 11 06/06/2024    ALT 28 05/12/2021     CBC RESULTS:  Lab Results   Component Value Date    WBC 9.7 06/06/2024    WBC 11.0 05/12/2021    RBC 4.88 06/06/2024    RBC 5.15 05/12/2021    HGB 9.7 (L) 06/06/2024    HGB 14.1 05/12/2021    HCT 35.1 06/06/2024    HCT 44.2 05/12/2021    MCV 72 (L) 06/06/2024    MCV 86 05/12/2021    MCH 19.9 (L) 06/06/2024    MCH 27.4 05/12/2021    MCHC 27.6 (L) 06/06/2024    MCHC 31.9 05/12/2021    RDW 20.2 (H) 06/06/2024    RDW 14.7 05/12/2021     06/06/2024     05/12/2021     BMP RESULTS:  Lab Results   Component Value Date     06/06/2024     05/12/2021    POTASSIUM 4.4 06/06/2024    POTASSIUM 3.9 11/18/2022    POTASSIUM 3.9 05/14/2021    CHLORIDE 102 06/06/2024    CHLORIDE 101 11/18/2022    CHLORIDE 104 05/12/2021    CO2 25 06/06/2024    CO2 32 11/18/2022    CO2 29 05/12/2021    ANIONGAP 15 06/06/2024    ANIONGAP 5 11/18/2022    ANIONGAP 4 05/12/2021     (H) 06/06/2024     (H) 07/09/2023     (H) 11/18/2022     (H) 05/12/2021    BUN 36.5 (H) 06/06/2024    BUN 42 (H) 11/18/2022    BUN 36 (H) 05/12/2021    CR 1.90 (H) 06/06/2024    CR 1.87 (H) 05/12/2021    GFRESTIMATED 38 (L) 06/06/2024    GFRESTIMATED 37 (L) 05/12/2021    GFRESTBLACK 43 (L) 05/12/2021    KURT 9.6 06/06/2024    KURT 9.2 05/12/2021      A1C RESULTS:  Lab Results   Component Value Date    A1C 5.6 06/06/2024    A1C 8.6 (H) 05/12/2021     INR RESULTS:  Lab Results   Component Value Date    INR 1.51 (H) 11/18/2022            Medications     Current Outpatient Medications   Medication Sig Dispense Refill    ACCU-CHEK GUIDE test strip USE TO TEST BLOOD SUGAR 1 TIMES DAILY OR AS DIRECTED. 100 strip 1    albuterol (PROAIR HFA/PROVENTIL HFA/VENTOLIN HFA) 108 (90 Base) MCG/ACT inhaler Inhale  2 puffs into the lungs every 6 hours as needed for shortness of breath or wheezing 18 g 5    alcohol swab prep pads Use to swab area of injection/fariha as directed. 100 each 3    blood glucose monitoring (NO BRAND SPECIFIED) meter device kit Use to test blood sugar 1 times daily or as directed. : per insurance. 1 kit 0    diltiazem ER (TIAZAC) 240 MG 24 hr ER beaded capsule Take 1 capsule (240 mg) by mouth daily 30 capsule 3    diltiazem ER COATED BEADS (CARDIZEM CD) 360 MG 24 hr capsule Take 1 capsule (360 mg) by mouth daily 90 capsule 2    Fluticasone-Umeclidin-Vilanterol (TRELEGY ELLIPTA) 200-62.5-25 MCG/ACT oral inhaler Inhale 1 puff into the lungs daily 60 each 11    glipiZIDE (GLUCOTROL XL) 5 MG 24 hr tablet TAKE 1 TABLET BY MOUTH EVERY DAY 90 tablet 1    metFORMIN (GLUCOPHAGE XR) 500 MG 24 hr tablet TAKE 2 TABLETS BY MOUTH DAILY WITH DINNER 180 tablet 3    metoprolol succinate ER (TOPROL XL) 100 MG 24 hr tablet Take 1 tablet (100 mg) by mouth 2 times daily 180 tablet 3    pantoprazole (PROTONIX) 40 MG EC tablet TAKE 1 TABLET BY MOUTH TWICE A  tablet 0    rivaroxaban ANTICOAGULANT (XARELTO ANTICOAGULANT) 20 MG TABS tablet Take 1 tablet (20 mg) by mouth daily (with dinner) 90 tablet 3    rosuvastatin (CRESTOR) 40 MG tablet Take 1 tablet (40 mg) by mouth at bedtime 90 tablet 3    sacubitril-valsartan (ENTRESTO) 24-26 MG per tablet Take 1 tablet by mouth 2 times daily 180 tablet 3    thin (NO BRAND SPECIFIED) lancets Use with lanceting device. To accompany: Blood Glucose Monitor Brands: per insurance. 1 each 6    torsemide (DEMADEX) 20 MG tablet Take 1 tablet (20 mg) by mouth daily 90 tablet 3          Past Medical History     Past Medical History:   Diagnosis Date    Benign essential hypertension     CAD (coronary artery disease)     Chronic kidney disease, stage III (moderate) (H)     COPD (chronic obstructive pulmonary disease) (H)     ILD (interstitial lung disease) (H)     Obesity (BMI 30-39.9)      Persistent atrial fibrillation (H)     Pure hypercholesterolemia     Type 2 diabetes mellitus (H)      Past Surgical History:   Procedure Laterality Date    COLONOSCOPY N/A 8/24/2018    Procedure: COMBINED COLONOSCOPY, SINGLE OR MULTIPLE BIOPSY/POLYPECTOMY BY BIOPSY;;  Surgeon: Lawrence Mata MD;  Location:  GI    CV CORONARY ANGIOGRAM N/A 11/18/2022    Procedure: Coronary Angiogram;  Surgeon: Mason Lucas MD;  Location:  HEART CARDIAC CATH LAB    CV INSTANTANEOUS WAVE-FREE RATIO N/A 11/18/2022    Procedure: Instantaneous Wave-Free Ratio;  Surgeon: Mason Lucas MD;  Location:  HEART CARDIAC CATH LAB    CV LEFT HEART CATH N/A 11/18/2022    Procedure: Left Heart Catheterization;  Surgeon: Mason Lucas MD;  Location:  HEART CARDIAC CATH LAB    ESOPHAGOSCOPY, GASTROSCOPY, DUODENOSCOPY (EGD), COMBINED N/A 7/7/2023    Procedure: Esophagoscopy, gastroscopy, duodenoscopy (EGD), combined;  Surgeon: Nestor Louie MD;  Location:  GI     Family History   Problem Relation Age of Onset    Diabetes Type 2  Mother     Cerebral aneurysm Sister     Bladder Cancer Brother     Myocardial Infarction Brother         in his 50s    Cerebrovascular Disease No family hx of     Coronary Artery Disease Early Onset No family hx of     Prostate Cancer No family hx of     Colon Cancer No family hx of     Clotting Disorder No family hx of     Bleeding Disorder No family hx of     Anesthesia Reaction No family hx of             Allergies   Patient has no known allergies.        Lorena Hart NP  [facility]  Pager: [phone]

## 2024-08-12 NOTE — TELEPHONE ENCOUNTER
Reviewed lab results from today with Dr. White, patient's PCP who recommends holding his metformin and glipizide.     We should also have him get a chest x-ray this week? I want to make sure he doesn't have fluid build up in his lungs that is causing his shortness of breath. Thanks.

## 2024-08-12 NOTE — PATIENT INSTRUCTIONS
Today's Recommendations    I would like to reduce your diltiazem to 240mg daily  I would check your lab work today   I would like to recheck your heart ultrasound   Continue all other medications without changes.  Please follow up with me in 1 month.    Please send a [employer] message or call [phone] to the RN team with questions or concerns.     Scheduling number [phone]  TERESA Parr, CNP

## 2024-08-13 NOTE — CONFIDENTIAL NOTE
DIAGNOSIS: Stage 3b chronic kidney disease (H)    DATE RECEIVED:  10.09.2024   NOTES STATUS DETAILS   OFFICE NOTE from referring provider Internal 06.09.2024 Arline White MD    DISCHARGE SUMMARY from hospital Internal 11.18.2022 [facility]   MEDICATION LIST Internal    LABS     CBC Internal 06.06.2024   CMP Internal 06.06.2024   BMP Internal 08.12.2024   UA Internal 05.12.2021   URINE PROTEIN Internal 05.12.2021

## 2024-08-13 NOTE — TELEPHONE ENCOUNTER
RN called pt w/    Lorena Hart NP18 hours ago (5:10 PM)     Reviewed lab results from today with Dr. White, patient's PCP who recommends holding his metformin and glipizide.      We should also have him get a chest x-ray this week? I want to make sure he doesn't have fluid build up in his lungs that is causing his shortness of breath. Thanks.         Note

## 2024-08-14 NOTE — TELEPHONE ENCOUNTER
I left message for pt to call back to discuss lab results and recommendations from Sindhu Hart, GABRIELA and .  Pt to set up chest xray to make sure he has no fluid on his lungs  Pt to hold glipizide and metformin until after he sees renal in October or renal function returns to baseline    Will clarify with Sindhu Hart when she wants labs to be checked again. Josiah SYED August 14, 2024, 3:54 PM

## 2024-08-14 NOTE — TELEPHONE ENCOUNTER
Pt called back and I reviewed recommendations to hold metformin and glipizide. Pt will monitor home glucose levels and call  if they trend up significantly. Pt said his glucose has been running in the 80s which is low for him.     I will have scheduling call pt to set up a chest xray. I told pt I will clarify with Sindhu when he should have labs rechecked to see if renal function is improving. Pt also wanted to know when he should see his PCP next. . Josiah SYED August 14, 2024, 4:42 PM

## 2024-08-14 NOTE — TELEPHONE ENCOUNTER
Lorena Hart NP  [facility] Heart Team 7  FYI from Dr. White, please add to current telephone encounter          Previous Messages       ----- Message -----  From: Arline White MD  Sent: 8/12/2024   5:11 PM CDT  To: Lorena Hart NP    Let's hold at least until after he sees renal or his kidney function returns to baseline - whichever comes first.    Thank you,    Talib  ----- Message -----  From: Lorena Hart NP  Sent: 8/12/2024   5:06 PM CDT  To: Arline White MD    Do you want them held until October? Or do you want me to update you with the repeat labs?  ----- Message -----  From: Arline White MD  Sent: 8/12/2024   4:57 PM CDT  To: Lorena Hart NP    I would recommend holding BOTH the metformin and the glipizide. Will you let the patient know or should I?    Talib

## 2024-08-16 NOTE — TELEPHONE ENCOUNTER
Lorena Hart, JULIETH  [facility] Heart Team 716 hours ago (4:43 PM)       See telephone encounter from yesterday with Dr. Aparicio reply. I will adjust his diuretics based on CXR. Can we repeat him BMP 1 week after he hold his diabetic meds to see if there is an improvement?   Will have scheduling try calling pt again to set up chest xray. Pt also needs a bmp lab next week to be set up. Josiah SYED August 16, 2024, 8:54 AM

## 2024-08-16 NOTE — TELEPHONE ENCOUNTER
If his chest x-ray is today, I think he should get a BMP lab draw today to reassess his renal function while he's here. If worsening MP, he may require admission for further evaluation and additional treatment pending chest x-ray results.

## 2024-08-16 NOTE — TELEPHONE ENCOUNTER
"Sindhu Hart reviewed lab and chest xray results. She said, \"I think with the x-ray showing concern for infection vs edema and his continued MP he would benefit from hospital evaluation for possible treatment with antibiotics vs IV diuresis. He was quite symptomatic with exertion and a change from baseline capacity in clinic on Monday. \"    I called pt and reviewed update on chest xray and labs results. I reviewed recommendation from GABRIELA Edwards to be evaluated in the ED. Pt said he is still short of breath, and understands the recommendation. Will watch to see if he goes to the ED. Josiah SYED August 16, 2024, 3:51 PM      "

## 2024-08-16 NOTE — TELEPHONE ENCOUNTER
I left message for pt that Sindhu wants a bmp lab today instead of next week if possible. Pt is scheduled for chest xray at [facility]. I recommended he schedule lab for that location or call our scheduling team at [phone] to set up a lab at our clinic. Will place the lab order as STAT so it is back today. Josiah SYED August 16, 2024, 9:37 AM

## 2024-08-19 NOTE — TELEPHONE ENCOUNTER
Pt did not end up going to the ED as advised. I called pt for an update on his symptoms. Pt told me he feels like his breathing started to improve over the weekend. He still has shortness of breath with prolonged exertion, but is able to walk longer now before he gets winded. He does have some muscle aching after exertion, especially in his lower back. He has not had any chest pain. He said it's hard to tell if he has swelling in his feet, but he may have some. I will send an update to Sindhu Hart, GABRIELA. [place] so if he needs more labs he would prefer earlier in the week. Josiah SYED August 19, 2024, 2:59 PM

## 2024-08-19 NOTE — TELEPHONE ENCOUNTER
Lorena Hart, JULIETH  You41 minutes ago (3:18 PM)       Agree with repeat labs this week. Let's have him hold his torsemide x3 days and repeat labs Thursday morning if able. He should also reduce his entresto to 24-26mg BID.    I will message Dr. Hopkins with pulmonology as well to review his chest x-ray from Friday as on exam, he appeared to be euvolemic during our visit last week.

## 2024-08-19 NOTE — TELEPHONE ENCOUNTER
I called pt and reviewed Sindhu Hart's recommendations. Pt will call if he wakes up with any increased LE edema or worsening shortness of breath in the AM. Otherwise he will hold torsemide 8/20 and 8/21. Pt said he already takes entresto 24/26 mg BID so he will take 1/2 tablet BID. Pt said he is going [place] the [place] very early on [place] so he can't get labs checked. He will schedule them for Wednesday afternoon at [facility]. Pt said he is going to use [place] so he can avoid prolonged exertion. Josiah SYED August 19, 2024, 4:38 PM

## 2024-08-22 NOTE — TELEPHONE ENCOUNTER
I called pt for an update on his symptoms. Pt said he has been holding the torsemide as instructed and decreased the entresto to 1/2 tablet twice daily. He hasn't noticed any improvement in his symptoms with the med changes. He does have some intermittent episodes of dizziness and has noticed his glucose is averaging around 200 when he checks it at home. He is wondering how long he should stay off of the diabetic medications. Pt was supposed to have labs done after holding the torsemide, but pt had them done on Tuesday. He said he doesn't have any worsening symptoms. He went to the [facility] today and got around with a scooter. He still has some shortness of breath with activity. I will send an update to Sindhu Hart, GABRIELA. Josiah SYED August 22, 2024, 4:15 PM    Component      Latest Ref Rng 8/20/2024  2:27 PM   Sodium      135 - 145 mmol/L 140    Potassium      3.4 - 5.3 mmol/L 4.1    Chloride      98 - 107 mmol/L 99    Carbon Dioxide (CO2)      22 - 29 mmol/L 25    Anion Gap      7 - 15 mmol/L 16 (H)    Urea Nitrogen      8.0 - 23.0 mg/dL 37.0 (H)    Creatinine      0.51 - 1.17 mg/dL 2.30 (H)    GFR Estimate      >60 mL/min/1.73m2 30 (L)    Calcium      8.8 - 10.4 mg/dL 8.9    Glucose      70 - 99 mg/dL 129 (H)    N-Terminal Pro Bnp      0 - 900 pg/mL 7,726 (H)       Legend:  (H) High  (L) Low

## 2024-08-22 NOTE — TELEPHONE ENCOUNTER
Lorena Hart, NP  You9 minutes ago (4:47 PM)       I can see him in clinic tomorrow. Dr. White recommended staying off diabetic medications until he can be seen by nephrology-however he should reach out to her regarding his elevated glucose levels.    Can we see if he can get his non contrast chest CT done tomorrow?     I called pt and reviewed recommendations from Sindhu and . Pt said he is ok with seeing Sindhu in [place] tomorrow at 1030. I will have scheduling call him tomorrow to set up the chest CT. Josiah SYED August 22, 2024, 5:04 PM

## 2024-08-23 NOTE — LETTER
8/23/2024    Arline White MD  [address] [facility] [address]    RE: Booker Brown       Dear Colleague,     I had the pleasure of seeing Booker Brown in the [facility].  Cardiology Clinic Progress Note  Booker Brown MRN# [id number]   YOB: 1955 Age: 69 year old   Primary Cardiologist: Dr. Marquez Reason for visit: Follow up symptoms            Assessment and Plan:     1.  HFrEF       Cardiomyopathy, etiology unclear, likely ischemic  -1/2023 cardiac MRI EF 55%, late gadolinium enhancement involving the mid to basal inferior and inferolateral walls consistent with previous infarct in the RCA distribution, severe hypokinesis of the mid to basal inferior and inferolateral walls, 13% scar burden  -5/2023 transthoracic echocardiogram with ejection fraction 40 to 45% mid to basal inferior akinesis new compared to prior study  - LVEF: 45-50%  - NYHA class III  - Etiology: likely ischemic  - Fluid status: euvolemic; suspected dry weight: unclear as patient has been losing weight   - Diuretic regimen: torsemide 20mg daily   - Ischemic evaluation: 11/2022 Coronary angiogram showing severe 3v CAD, not a surgical candidate due to lung disease   - Guideline directed medical therapy:  - Beta blocker: Metoprolol  mg twice daily  - ACEI/ARB/ARNI: Entresto 24-26 mg twice daily  - Aldactone antagonist: None secondary to #4  - SGLT2 inhibitor: None secondary #4  -Last hospitalization 6/2023 with removal of 4.4L fluid via paracentesis   -Suspect elevation in NT proBNP secondary to decline in renal function as patient continues to not appear significantly volume overloaded and chest x-ray did not show any evidence of pulmonary edema, IVC was normal on echocardiogram completed 8/9/2024    2.  Acute blood loss anemia, secondary to gastric ulcer  -7/2023 EGD showed 2 gastric ulcers, 1 with recent GI bleeding  -Initiated on PPI in patient and Xarelto resumed prior to  discharge    3. Paroxysmal atrial fibrillation, [id number] 3  -Referral structural Watchman device discussed while inpatient, however patient declined  -Rate controlled with diltiazem and metoprolol 100mg   -Continue Xarelto 20 mg daily indefinitely    4. CKD stage III with progression to stage IV  -Baseline creatinine ~1.5-1.7  -Trending upward in the last 6 months, now stabilizing ~2.3, nephrology visit pending     5. Coronary artery disease with known occlusion of RCA and LCx  -Not a candidate for CABG given his lung disease  -6/2023 LDL 42  -Continue rosuvastatin 40mg daily     6. Type II diabetes, controlled  -6/2024 Hemoglobin A1c 5.6%    7.  Lymphedema, improved   -Continue compression stockings    8.  COPD, ILD, follows with Dr. Hopkins  -Uses intermittent oxygen, last PFTs stable     Plan:  Resume torsemide 20 mg daily  Start reduced dose of diltiazem 240 daily when available at his pharmacy  Chest CT scan, scheduled 8/30/2024  Will contact nephrology and see if patient can be seen for sooner visit than October  Continue reduced dose Entresto, metoprolol, Xarelto, and Crestor  6.  Continue to hold glipizide and metformin as recommended by patient's PCP    Follow up plan: Follow-up with me in 1 month for continued medication titration and symptom reassessment     Addendum @ 4:55pm: Reviewed patient's symptoms and current concerns via phone with Dr. Biggs who recommends having patient seen in the high risk PCI clinic with him for an urgent visit to discuss possible revascularization options as his symptoms are concerning for walk through angina        History of Presenting Illness:    Booker Brown is a very pleasant 68 year old adult with a history of persistent atrial fibrillation, CKD stage III, lymphedema, type 2 diabetes, hypertension, coronary artery disease, hyperlipidemia, COPD, ILD.     In 2022 patient was hospitalized with atrial fibrillation with RVR and started on combination of metoprolol  and diltiazem.  He was readmitted later that month with COVID and a CT chest was obtained in March which ruled out PE but most suggestive of asbestos related lung disease.    Conner also has complex coronary disease with a known occluded RCA and LCx.  His LAD is open but there is flow-limiting lesion heavily calcified in the proximal and mid LAD.  He had been turned down for CABG due to his lung disease.    He had an echocardiogram in May 2023 which showed new reduced ejection fraction 40 to 45% with mild reduction in systolic function, mid to basal inferior wall akinesis, mild aortic root dilation mild ascending aortic dilation, trace aortic regurgitation.  He saw Dr. Marquez on 6/6/2023 and reviewed his complex coronary anatomy.  He felt that intervention of his known severe proximal to mid LAD lesion being critically high risk, given his lack of concerning symptoms, conservative management was recommended.  Please see that note for additional details.    Conner had been experiencing progressive exertional dyspnea, abdominal bloating, leg swelling in early July and checked his primary care provider who resumed as oral furosemide, however his symptoms progressed and he was admitted on 7/6/2023 with chronic hypoxic respiratory failure, anemia due to acute blood loss.  His stool was occult positive and Xarelto held.  He was given IV furosemide in the emergency room which helped with his breathing and edema.  CT abdomen pelvis showed new, patchy, multifocal groundglass opacities probably due to fluid overload.  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  He underwent a paracentesis due to new ascites and 4.4 L of fluid was removed.  Fluid was sent for cytology and felt to be consistent with heart failure exacerbation versus liver disease.  Her underwent an EGD which showed 2 gastric ulcers, 1 with recent bleeding.  He was started  on IV PPI.  His NT proBNP was 7325, high-sensitivity troponin 27, normal sodium, potassium, CO2 35, BUN 39, creatinine 1.67, and GFR 45.  His hemoglobin was 7.7 on admission which was 8.4 at discharge after a unit of PRBCs.    Patient was last seen in our clinic in July 2023 following his hospitalization and was feeling well from a cardiovascular. Since then he has not further hospitalizations.    He saw pulmonology in June 2024 who noted his PFTs were improved since his previous testing as well as CT imaging was stable.     Repeat echo prior to our visit showed EF 45-50% with basal-mid segments hypokinetic. Function best preserved at the apex. Mildly decreased RV systolic function and no significant valvular disease. Normal IVC.     When I saw Conner in follow-up 8/9/24 he was reporting progressive shortness of breath for the previous 2 months.  His weights were trending down, approximately down 27 pounds over the course of a year and he did not clinically appear hypervolemic.  I reduced his diltiazem with his slight reduction in ejection fraction, borderline hypotension, and concern for chronotropic incompetence.  Following our visit, I repeated his labs which showed worsening renal function with a BUN of 37.1, and creatinine of 2.36.  His NT proBNP was elevated at 7467.  I contacted his primary care provider, Dr. White who recommended holding his metformin and glipizide until he could be seen by nephrology.  I also ordered a chest x-ray which revealed small right pleural effusion and bilateral pleural plaques with possible atypical infection or edema in the right lower lung.  Given these findings, worsening renal function limiting ability to escalate diuresis, and his progressive symptoms I recommended an evaluation in the emergency room, which patient declined.  We repeated his BMP a few days later which revealed hypernatremia with a sodium of 147, BUN of 37, stable creatinine of 2.3, and a GFR of 30.  Following  that I reduced his Entresto to 12-13 mg twice daily and held his torsemide.    I contacted his pulmonologist, Dr. Hopkins, regarding his recent symptoms and chest x-ray results which she reviewed and recommended a follow-up noncontrast CT scan.  I recommended repeat labs in a few days, however patient had plans yesterday and opted to repeat his labs on 8/20/2024 which again demonstrated a BUN of 37, creatinine 2.3, and GFR of 30.  Sodium normalized at 140.    Patient is here today for follow-up.  He went to the [facility] yesterday and was there for several hours, however used a scooter for transportation.  He feels the shortness of breath is stable since last week.  His weights at home have also remained stable at 225.  He denies abdominal bloating, however does feel that his lower extremity edema is worsening slightly since holding his torsemide.     In June he was able to walk to the end of his driveway and back, now he can walk a few feet and need to stop due to shortness of breath. Interestingly he feels less short of breath the more he walks.  He does not typically use his supplemental oxygen.     He has not yet started the reduced dose of diltiazem as it is not available from his pharmacy.  A few days ago he felt dizzy and weak when walking around his house letting his dogs out.  He did not pass out, however did feel presyncopal and needed to sit down.    Since holding his glipizide and metformin he noticed his evening blood sugars about 2 hours after eating are around 200.  Previously they were  range.    Patient denies chest pain or chest tightness. Denies other presyncopal symptoms. Denies tachycardia or palpitations.     Blood pressure 108/62 and HR 74 in clinic today. He is compliant with his medications. Denies any issues with bleeding. Weight at home yesterday morning was 225#.     He is monitoring his salt at home.         Recent Hospitalizations   July 2023 heart failure exacerbation with  paracentesis and removal of 4.4 L fluid          Social History      Social History     Socioeconomic History     Marital status:      Spouse name: Not on file     Number of children: Not on file     Years of education: Not on file     Highest education level: Not on file   Occupational History     Occupation: Shipping & Receiving   Tobacco Use     Smoking status: Former     Types: Cigarettes     Smokeless tobacco: Never     Tobacco comments:     Quit in 1987; smoked for 16 years; 2ppd at his most.    Vaping Use     Vaping status: Never Used   Substance and Sexual Activity     Alcohol use: No     Drug use: No     Sexual activity: Yes     Partners: Female   Other Topics Concern     Parent/sibling w/ CABG, MI or angioplasty before 65F 55M? Yes      Service No     Blood Transfusions No     Caffeine Concern No     Occupational Exposure No     Hobby Hazards No     Sleep Concern No     Stress Concern No     Weight Concern Yes     Special Diet No     Back Care No     Exercise Yes     Bike Helmet No     Comment: n/a     Seat Belt Yes     Self-Exams No   Social History Narrative    .    Two adult children.    Four grandchildren.    No formal exercise.     Social Determinants of Health     Financial Resource Strain: Not on file   Food Insecurity: Not on file   Transportation Needs: Not on file   Physical Activity: Not on file   Stress: Not on file   Social Connections: Not on file   Interpersonal Safety: Not on file   Housing Stability: Not on file            Review of Systems:   Skin:  not assessed     Eyes:  not assessed    ENT:  not assessed    Respiratory:  Positive for shortness of breath;cough  Cardiovascular:    Positive for;edema;fatigue;lightheadedness;dizziness  Gastroenterology: not assessed    Genitourinary:  not assessed    Musculoskeletal:  not assessed    Neurologic:  not assessed    Psychiatric:  not assessed    Heme/Lymph/Imm:  not assessed    Endocrine:  not assessed           Physical  "Exam:   Vitals: /62 (BP Location: Right arm, Patient Position: Sitting, Cuff Size: Adult Regular)   Pulse 74   Ht 1.765 m (5' 9.5\")   Wt 102.1 kg (225 lb)   BMI 32.75 kg/m     Wt Readings from Last 4 Encounters:   08/23/24 102.1 kg (225 lb)   08/12/24 102.1 kg (225 lb)   06/21/24 102.1 kg (225 lb)   06/06/24 103.2 kg (227 lb 8 oz)     GEN: well nourished, in no acute distress. Sitting in wheelchair  HEENT:  Pupils equal, round. Sclerae nonicteric.   NECK: Supple, no masses appreciated.   C/V:  Irregularly irregular rate and rhythm, no murmur, rub or gallop.    RESP: Respirations are unlabored. Clear to auscultation bilaterally without wheezing, rales, or rhonchi.  GI: Abdomen soft, nontender.  EXTREM: 1-2+ pitting bilateral lymphedema   NEURO: Alert and oriented, cooperative.  SKIN: Warm and dry.        Data:       LIPID RESULTS:  Lab Results   Component Value Date    CHOL 107 06/06/2024    CHOL 124 05/12/2021    HDL 41 06/06/2024    HDL 31 (L) 05/12/2021    LDL 53 06/06/2024    LDL 70 05/12/2021    TRIG 67 06/06/2024    TRIG 116 05/12/2021    CHOLHDLRATIO 4.5 09/28/2015     LIVER ENZYME RESULTS:  Lab Results   Component Value Date    AST 15 06/06/2024    AST 20 05/12/2021    ALT 11 06/06/2024    ALT 28 05/12/2021     CBC RESULTS:  Lab Results   Component Value Date    WBC 9.7 06/06/2024    WBC 11.0 05/12/2021    RBC 4.88 06/06/2024    RBC 5.15 05/12/2021    HGB 8.7 (L) 08/12/2024    HGB 14.1 05/12/2021    HCT 35.1 06/06/2024    HCT 44.2 05/12/2021    MCV 72 (L) 06/06/2024    MCV 86 05/12/2021    MCH 19.9 (L) 06/06/2024    MCH 27.4 05/12/2021    MCHC 27.6 (L) 06/06/2024    MCHC 31.9 05/12/2021    RDW 20.2 (H) 06/06/2024    RDW 14.7 05/12/2021     06/06/2024     05/12/2021     BMP RESULTS:  Lab Results   Component Value Date     08/20/2024     05/12/2021    POTASSIUM 4.1 08/20/2024    POTASSIUM 4.7 08/16/2024    POTASSIUM 3.9 05/14/2021    CHLORIDE 99 08/20/2024    CHLORIDE 107 " 08/16/2024    CHLORIDE 104 05/12/2021    CO2 25 08/20/2024    CO2 31 08/16/2024    CO2 29 05/12/2021    ANIONGAP 16 (H) 08/20/2024    ANIONGAP 9 08/16/2024    ANIONGAP 4 05/12/2021     (H) 08/20/2024     (H) 08/16/2024     (H) 05/12/2021    BUN 37.0 (H) 08/20/2024    BUN 37 (H) 08/16/2024    BUN 36 (H) 05/12/2021    CR 2.30 (H) 08/20/2024    CR 1.87 (H) 05/12/2021    GFRESTIMATED 30 (L) 08/20/2024    GFRESTIMATED 37 (L) 05/12/2021    GFRESTBLACK 43 (L) 05/12/2021    KURT 8.9 08/20/2024    KURT 9.2 05/12/2021      A1C RESULTS:  Lab Results   Component Value Date    A1C 5.6 06/06/2024    A1C 8.6 (H) 05/12/2021     INR RESULTS:  Lab Results   Component Value Date    INR 1.51 (H) 11/18/2022            Medications     Current Outpatient Medications   Medication Sig Dispense Refill     ACCU-CHEK GUIDE test strip USE TO TEST BLOOD SUGAR 1 TIMES DAILY OR AS DIRECTED. 100 strip 1     albuterol (PROAIR HFA/PROVENTIL HFA/VENTOLIN HFA) 108 (90 Base) MCG/ACT inhaler Inhale 2 puffs into the lungs every 6 hours as needed for shortness of breath or wheezing 18 g 5     alcohol swab prep pads Use to swab area of injection/fariha as directed. 100 each 3     blood glucose monitoring (ACCU-CHEK FASTCLIX) lancets USE WITH LANCETING DEVICE. TO ACCOMPANY: BLOOD GLUCOSE MONITOR BRANDS: PER INSURANCE. Pt tests once daily 102 each 2     blood glucose monitoring (NO BRAND SPECIFIED) meter device kit Use to test blood sugar 1 times daily or as directed. : per insurance. 1 kit 0     diltiazem ER (TIAZAC) 240 MG 24 hr ER beaded capsule Take 1 capsule (240 mg) by mouth daily 30 capsule 3     diltiazem ER COATED BEADS (CARDIZEM CD) 360 MG 24 hr capsule Take 1 capsule (360 mg) by mouth daily 90 capsule 2     Fluticasone-Umeclidin-Vilanterol (TRELEGY ELLIPTA) 200-62.5-25 MCG/ACT oral inhaler Inhale 1 puff into the lungs daily 60 each 11     metoprolol succinate ER (TOPROL XL) 100 MG 24 hr tablet Take 1 tablet (100 mg) by mouth 2  times daily 180 tablet 3     pantoprazole (PROTONIX) 40 MG EC tablet TAKE 1 TABLET BY MOUTH TWICE A  tablet 0     rivaroxaban ANTICOAGULANT (XARELTO ANTICOAGULANT) 20 MG TABS tablet Take 1 tablet (20 mg) by mouth daily (with dinner) 90 tablet 3     rosuvastatin (CRESTOR) 40 MG tablet Take 1 tablet (40 mg) by mouth at bedtime 90 tablet 3     sacubitril-valsartan (ENTRESTO) 24-26 MG per tablet Take 0.5 tablets by mouth 2 times daily.       torsemide (DEMADEX) 20 MG tablet Take 1 tablet (20 mg) by mouth daily.       glipiZIDE (GLUCOTROL XL) 5 MG 24 hr tablet TAKE 1 TABLET BY MOUTH EVERY DAY (Patient not taking: Reported on 8/23/2024) 90 tablet 1     metFORMIN (GLUCOPHAGE XR) 500 MG 24 hr tablet TAKE 2 TABLETS BY MOUTH DAILY WITH DINNER (Patient not taking: Reported on 8/23/2024) 180 tablet 3          Past Medical History     Past Medical History:   Diagnosis Date     Benign essential hypertension      CAD (coronary artery disease)      Chronic kidney disease, stage III (moderate) (H)      COPD (chronic obstructive pulmonary disease) (H)      ILD (interstitial lung disease) (H)      Obesity (BMI 30-39.9)      Persistent atrial fibrillation (H)      Pure hypercholesterolemia      Type 2 diabetes mellitus (H)      Past Surgical History:   Procedure Laterality Date     COLONOSCOPY N/A 8/24/2018    Procedure: COMBINED COLONOSCOPY, SINGLE OR MULTIPLE BIOPSY/POLYPECTOMY BY BIOPSY;;  Surgeon: Lawrence Mata MD;  Location:  GI     CV CORONARY ANGIOGRAM N/A 11/18/2022    Procedure: Coronary Angiogram;  Surgeon: Mason Lucas MD;  Location:  HEART CARDIAC CATH LAB     CV INSTANTANEOUS WAVE-FREE RATIO N/A 11/18/2022    Procedure: Instantaneous Wave-Free Ratio;  Surgeon: Mason Lucas MD;  Location:  HEART CARDIAC CATH LAB     CV LEFT HEART CATH N/A 11/18/2022    Procedure: Left Heart Catheterization;  Surgeon: Mason Lucas MD;  Location:  HEART CARDIAC CATH LAB     ESOPHAGOSCOPY, GASTROSCOPY,  DUODENOSCOPY (EGD), COMBINED N/A 7/7/2023    Procedure: Esophagoscopy, gastroscopy, duodenoscopy (EGD), combined;  Surgeon: Nestor Louie MD;  Location:  GI     Family History   Problem Relation Age of Onset     Diabetes Type 2  Mother      Cerebral aneurysm Sister      Bladder Cancer Brother      Myocardial Infarction Brother         in his 50s     Cerebrovascular Disease No family hx of      Coronary Artery Disease Early Onset No family hx of      Prostate Cancer No family hx of      Colon Cancer No family hx of      Clotting Disorder No family hx of      Bleeding Disorder No family hx of      Anesthesia Reaction No family hx of             Allergies   Patient has no known allergies.        Lorena Hart NP  [facility]  Pager: [phone]      Thank you for allowing me to participate in the care of your patient.      Sincerely,     Lorena Hart NP     [facility]  cc:   No referring provider defined for this encounter.

## 2024-08-23 NOTE — PROGRESS NOTES
Cardiology Clinic Progress Note  Booker Brown MRN# [id number]   YOB: 1955 Age: 69 year old   Primary Cardiologist: Dr. Marquez Reason for visit: Follow up symptoms            Assessment and Plan:     1.  HFrEF       Cardiomyopathy, etiology unclear, likely ischemic  -1/2023 cardiac MRI EF 55%, late gadolinium enhancement involving the mid to basal inferior and inferolateral walls consistent with previous infarct in the RCA distribution, severe hypokinesis of the mid to basal inferior and inferolateral walls, 13% scar burden  -5/2023 transthoracic echocardiogram with ejection fraction 40 to 45% mid to basal inferior akinesis new compared to prior study  - LVEF: 45-50%  - NYHA class III  - Etiology: likely ischemic  - Fluid status: euvolemic; suspected dry weight: unclear as patient has been losing weight   - Diuretic regimen: torsemide 20mg daily   - Ischemic evaluation: 11/2022 Coronary angiogram showing severe 3v CAD, not a surgical candidate due to lung disease   - Guideline directed medical therapy:  - Beta blocker: Metoprolol  mg twice daily  - ACEI/ARB/ARNI: Entresto 24-26 mg twice daily  - Aldactone antagonist: None secondary to #4  - SGLT2 inhibitor: None secondary #4  -Last hospitalization 6/2023 with removal of 4.4L fluid via paracentesis   -Suspect elevation in NT proBNP secondary to decline in renal function as patient continues to not appear significantly volume overloaded and chest x-ray did not show any evidence of pulmonary edema, IVC was normal on echocardiogram completed 8/9/2024    2.  Acute blood loss anemia, secondary to gastric ulcer  -7/2023 EGD showed 2 gastric ulcers, 1 with recent GI bleeding  -Initiated on PPI in patient and Xarelto resumed prior to discharge    3. Paroxysmal atrial fibrillation, [id number] 3  -Referral structural Watchman device discussed while inpatient, however patient declined  -Rate controlled with diltiazem and metoprolol 100mg   -Continue  Xarelto 20 mg daily indefinitely    4. CKD stage III with progression to stage IV  -Baseline creatinine ~1.5-1.7  -Trending upward in the last 6 months, now stabilizing ~2.3, nephrology visit pending     5. Coronary artery disease with known occlusion of RCA and LCx  -Not a candidate for CABG given his lung disease  -6/2023 LDL 42  -Continue rosuvastatin 40mg daily     6. Type II diabetes, controlled  -6/2024 Hemoglobin A1c 5.6%    7.  Lymphedema, improved   -Continue compression stockings    8.  COPD, ILD, follows with Dr. Hopkins  -Uses intermittent oxygen, last PFTs stable     Plan:  Resume torsemide 20 mg daily  Start reduced dose of diltiazem 240 daily when available at his pharmacy  Chest CT scan, scheduled 8/30/2024  Will contact nephrology and see if patient can be seen for sooner visit than October  Continue reduced dose Entresto, metoprolol, Xarelto, and Crestor  6.  Continue to hold glipizide and metformin as recommended by patient's PCP    Follow up plan: Follow-up with me in 1 month for continued medication titration and symptom reassessment     Addendum @ 4:55pm: Reviewed patient's symptoms and current concerns via phone with Dr. Biggs who recommends having patient seen in the high risk PCI clinic with him for an urgent visit to discuss possible revascularization options as his symptoms are concerning for walk through angina        History of Presenting Illness:    Booker Brown is a very pleasant 68 year old adult with a history of persistent atrial fibrillation, CKD stage III, lymphedema, type 2 diabetes, hypertension, coronary artery disease, hyperlipidemia, COPD, ILD.     In 2022 patient was hospitalized with atrial fibrillation with RVR and started on combination of metoprolol and diltiazem.  He was readmitted later that month with COVID and a CT chest was obtained in March which ruled out PE but most suggestive of asbestos related lung disease.    Conner also has complex coronary disease with a  known occluded RCA and LCx.  His LAD is open but there is flow-limiting lesion heavily calcified in the proximal and mid LAD.  He had been turned down for CABG due to his lung disease.    He had an echocardiogram in May 2023 which showed new reduced ejection fraction 40 to 45% with mild reduction in systolic function, mid to basal inferior wall akinesis, mild aortic root dilation mild ascending aortic dilation, trace aortic regurgitation.  He saw Dr. Marquez on 6/6/2023 and reviewed his complex coronary anatomy.  He felt that intervention of his known severe proximal to mid LAD lesion being critically high risk, given his lack of concerning symptoms, conservative management was recommended.  Please see that note for additional details.    Conner had been experiencing progressive exertional dyspnea, abdominal bloating, leg swelling in early July and checked his primary care provider who resumed as oral furosemide, however his symptoms progressed and he was admitted on 7/6/2023 with chronic hypoxic respiratory failure, anemia due to acute blood loss.  His stool was occult positive and Xarelto held.  He was given IV furosemide in the emergency room which helped with his breathing and edema.  CT abdomen pelvis showed new, patchy, multifocal groundglass opacities probably due to fluid overload.  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  He underwent a paracentesis due to new ascites and 4.4 L of fluid was removed.  Fluid was sent for cytology and felt to be consistent with heart failure exacerbation versus liver disease.  Her underwent an EGD which showed 2 gastric ulcers, 1 with recent bleeding.  He was started on IV PPI.  His NT proBNP was 7325, high-sensitivity troponin 27, normal sodium, potassium, CO2 35, BUN 39, creatinine 1.67, and GFR 45.  His hemoglobin was 7.7 on admission which was 8.4 at discharge after a unit of  PRBCs.    Patient was last seen in our clinic in July 2023 following his hospitalization and was feeling well from a cardiovascular. Since then he has not further hospitalizations.    He saw pulmonology in June 2024 who noted his PFTs were improved since his previous testing as well as CT imaging was stable.     Repeat echo prior to our visit showed EF 45-50% with basal-mid segments hypokinetic. Function best preserved at the apex. Mildly decreased RV systolic function and no significant valvular disease. Normal IVC.     When I saw Conner in follow-up 8/9/24 he was reporting progressive shortness of breath for the previous 2 months.  His weights were trending down, approximately down 27 pounds over the course of a year and he did not clinically appear hypervolemic.  I reduced his diltiazem with his slight reduction in ejection fraction, borderline hypotension, and concern for chronotropic incompetence.  Following our visit, I repeated his labs which showed worsening renal function with a BUN of 37.1, and creatinine of 2.36.  His NT proBNP was elevated at 7467.  I contacted his primary care provider, Dr. White who recommended holding his metformin and glipizide until he could be seen by nephrology.  I also ordered a chest x-ray which revealed small right pleural effusion and bilateral pleural plaques with possible atypical infection or edema in the right lower lung.  Given these findings, worsening renal function limiting ability to escalate diuresis, and his progressive symptoms I recommended an evaluation in the emergency room, which patient declined.  We repeated his BMP a few days later which revealed hypernatremia with a sodium of 147, BUN of 37, stable creatinine of 2.3, and a GFR of 30.  Following that I reduced his Entresto to 12-13 mg twice daily and held his torsemide.    I contacted his pulmonologist, Dr. Hopkins, regarding his recent symptoms and chest x-ray results which she reviewed and recommended a  follow-up noncontrast CT scan.  I recommended repeat labs in a few days, however patient had plans yesterday and opted to repeat his labs on 8/20/2024 which again demonstrated a BUN of 37, creatinine 2.3, and GFR of 30.  Sodium normalized at 140.    Patient is here today for follow-up.  He went to the [facility] yesterday and was there for several hours, however used a scooter for transportation.  He feels the shortness of breath is stable since last week.  His weights at home have also remained stable at 225.  He denies abdominal bloating, however does feel that his lower extremity edema is worsening slightly since holding his torsemide.     In June he was able to walk to the end of his driveway and back, now he can walk a few feet and need to stop due to shortness of breath. Interestingly he feels less short of breath the more he walks.  He does not typically use his supplemental oxygen.     He has not yet started the reduced dose of diltiazem as it is not available from his pharmacy.  A few days ago he felt dizzy and weak when walking around his house letting his dogs out.  He did not pass out, however did feel presyncopal and needed to sit down.    Since holding his glipizide and metformin he noticed his evening blood sugars about 2 hours after eating are around 200.  Previously they were  range.    Patient denies chest pain or chest tightness. Denies other presyncopal symptoms. Denies tachycardia or palpitations.     Blood pressure 108/62 and HR 74 in clinic today. He is compliant with his medications. Denies any issues with bleeding. Weight at home yesterday morning was 225#.     He is monitoring his salt at home.         Recent Hospitalizations   July 2023 heart failure exacerbation with paracentesis and removal of 4.4 L fluid          Social History      Social History     Socioeconomic History    Marital status:      Spouse name: Not on file    Number of children: Not on file    Years of  "education: Not on file    Highest education level: Not on file   Occupational History    Occupation: Shipping & Receiving   Tobacco Use    Smoking status: Former     Types: Cigarettes    Smokeless tobacco: Never    Tobacco comments:     Quit in 1987; smoked for 16 years; 2ppd at his most.    Vaping Use    Vaping status: Never Used   Substance and Sexual Activity    Alcohol use: No    Drug use: No    Sexual activity: Yes     Partners: Female   Other Topics Concern    Parent/sibling w/ CABG, MI or angioplasty before 65F 55M? Yes     Service No    Blood Transfusions No    Caffeine Concern No    Occupational Exposure No    Hobby Hazards No    Sleep Concern No    Stress Concern No    Weight Concern Yes    Special Diet No    Back Care No    Exercise Yes    Bike Helmet No     Comment: n/a    Seat Belt Yes    Self-Exams No   Social History Narrative    .    Two adult children.    Four grandchildren.    No formal exercise.     Social Determinants of Health     Financial Resource Strain: Not on file   Food Insecurity: Not on file   Transportation Needs: Not on file   Physical Activity: Not on file   Stress: Not on file   Social Connections: Not on file   Interpersonal Safety: Not on file   Housing Stability: Not on file            Review of Systems:   Skin:  not assessed     Eyes:  not assessed    ENT:  not assessed    Respiratory:  Positive for shortness of breath;cough  Cardiovascular:    Positive for;edema;fatigue;lightheadedness;dizziness  Gastroenterology: not assessed    Genitourinary:  not assessed    Musculoskeletal:  not assessed    Neurologic:  not assessed    Psychiatric:  not assessed    Heme/Lymph/Imm:  not assessed    Endocrine:  not assessed           Physical Exam:   Vitals: /62 (BP Location: Right arm, Patient Position: Sitting, Cuff Size: Adult Regular)   Pulse 74   Ht 1.765 m (5' 9.5\")   Wt 102.1 kg (225 lb)   BMI 32.75 kg/m     Wt Readings from Last 4 Encounters:   08/23/24 102.1 " kg (225 lb)   08/12/24 102.1 kg (225 lb)   06/21/24 102.1 kg (225 lb)   06/06/24 103.2 kg (227 lb 8 oz)     GEN: well nourished, in no acute distress. Sitting in wheelchair  HEENT:  Pupils equal, round. Sclerae nonicteric.   NECK: Supple, no masses appreciated.   C/V:  Irregularly irregular rate and rhythm, no murmur, rub or gallop.    RESP: Respirations are unlabored. Clear to auscultation bilaterally without wheezing, rales, or rhonchi.  GI: Abdomen soft, nontender.  EXTREM: 1-2+ pitting bilateral lymphedema   NEURO: Alert and oriented, cooperative.  SKIN: Warm and dry.        Data:       LIPID RESULTS:  Lab Results   Component Value Date    CHOL 107 06/06/2024    CHOL 124 05/12/2021    HDL 41 06/06/2024    HDL 31 (L) 05/12/2021    LDL 53 06/06/2024    LDL 70 05/12/2021    TRIG 67 06/06/2024    TRIG 116 05/12/2021    CHOLHDLRATIO 4.5 09/28/2015     LIVER ENZYME RESULTS:  Lab Results   Component Value Date    AST 15 06/06/2024    AST 20 05/12/2021    ALT 11 06/06/2024    ALT 28 05/12/2021     CBC RESULTS:  Lab Results   Component Value Date    WBC 9.7 06/06/2024    WBC 11.0 05/12/2021    RBC 4.88 06/06/2024    RBC 5.15 05/12/2021    HGB 8.7 (L) 08/12/2024    HGB 14.1 05/12/2021    HCT 35.1 06/06/2024    HCT 44.2 05/12/2021    MCV 72 (L) 06/06/2024    MCV 86 05/12/2021    MCH 19.9 (L) 06/06/2024    MCH 27.4 05/12/2021    MCHC 27.6 (L) 06/06/2024    MCHC 31.9 05/12/2021    RDW 20.2 (H) 06/06/2024    RDW 14.7 05/12/2021     06/06/2024     05/12/2021     BMP RESULTS:  Lab Results   Component Value Date     08/20/2024     05/12/2021    POTASSIUM 4.1 08/20/2024    POTASSIUM 4.7 08/16/2024    POTASSIUM 3.9 05/14/2021    CHLORIDE 99 08/20/2024    CHLORIDE 107 08/16/2024    CHLORIDE 104 05/12/2021    CO2 25 08/20/2024    CO2 31 08/16/2024    CO2 29 05/12/2021    ANIONGAP 16 (H) 08/20/2024    ANIONGAP 9 08/16/2024    ANIONGAP 4 05/12/2021     (H) 08/20/2024     (H) 08/16/2024    GLC  240 (H) 05/12/2021    BUN 37.0 (H) 08/20/2024    BUN 37 (H) 08/16/2024    BUN 36 (H) 05/12/2021    CR 2.30 (H) 08/20/2024    CR 1.87 (H) 05/12/2021    GFRESTIMATED 30 (L) 08/20/2024    GFRESTIMATED 37 (L) 05/12/2021    GFRESTBLACK 43 (L) 05/12/2021    KURT 8.9 08/20/2024    KURT 9.2 05/12/2021      A1C RESULTS:  Lab Results   Component Value Date    A1C 5.6 06/06/2024    A1C 8.6 (H) 05/12/2021     INR RESULTS:  Lab Results   Component Value Date    INR 1.51 (H) 11/18/2022            Medications     Current Outpatient Medications   Medication Sig Dispense Refill    ACCU-CHEK GUIDE test strip USE TO TEST BLOOD SUGAR 1 TIMES DAILY OR AS DIRECTED. 100 strip 1    albuterol (PROAIR HFA/PROVENTIL HFA/VENTOLIN HFA) 108 (90 Base) MCG/ACT inhaler Inhale 2 puffs into the lungs every 6 hours as needed for shortness of breath or wheezing 18 g 5    alcohol swab prep pads Use to swab area of injection/fariha as directed. 100 each 3    blood glucose monitoring (ACCU-CHEK FASTCLIX) lancets USE WITH LANCETING DEVICE. TO ACCOMPANY: BLOOD GLUCOSE MONITOR BRANDS: PER INSURANCE. Pt tests once daily 102 each 2    blood glucose monitoring (NO BRAND SPECIFIED) meter device kit Use to test blood sugar 1 times daily or as directed. : per insurance. 1 kit 0    diltiazem ER (TIAZAC) 240 MG 24 hr ER beaded capsule Take 1 capsule (240 mg) by mouth daily 30 capsule 3    diltiazem ER COATED BEADS (CARDIZEM CD) 360 MG 24 hr capsule Take 1 capsule (360 mg) by mouth daily 90 capsule 2    Fluticasone-Umeclidin-Vilanterol (TRELEGY ELLIPTA) 200-62.5-25 MCG/ACT oral inhaler Inhale 1 puff into the lungs daily 60 each 11    metoprolol succinate ER (TOPROL XL) 100 MG 24 hr tablet Take 1 tablet (100 mg) by mouth 2 times daily 180 tablet 3    pantoprazole (PROTONIX) 40 MG EC tablet TAKE 1 TABLET BY MOUTH TWICE A  tablet 0    rivaroxaban ANTICOAGULANT (XARELTO ANTICOAGULANT) 20 MG TABS tablet Take 1 tablet (20 mg) by mouth daily (with dinner) 90 tablet 3     rosuvastatin (CRESTOR) 40 MG tablet Take 1 tablet (40 mg) by mouth at bedtime 90 tablet 3    sacubitril-valsartan (ENTRESTO) 24-26 MG per tablet Take 0.5 tablets by mouth 2 times daily.      torsemide (DEMADEX) 20 MG tablet Take 1 tablet (20 mg) by mouth daily.      glipiZIDE (GLUCOTROL XL) 5 MG 24 hr tablet TAKE 1 TABLET BY MOUTH EVERY DAY (Patient not taking: Reported on 8/23/2024) 90 tablet 1    metFORMIN (GLUCOPHAGE XR) 500 MG 24 hr tablet TAKE 2 TABLETS BY MOUTH DAILY WITH DINNER (Patient not taking: Reported on 8/23/2024) 180 tablet 3          Past Medical History     Past Medical History:   Diagnosis Date    Benign essential hypertension     CAD (coronary artery disease)     Chronic kidney disease, stage III (moderate) (H)     COPD (chronic obstructive pulmonary disease) (H)     ILD (interstitial lung disease) (H)     Obesity (BMI 30-39.9)     Persistent atrial fibrillation (H)     Pure hypercholesterolemia     Type 2 diabetes mellitus (H)      Past Surgical History:   Procedure Laterality Date    COLONOSCOPY N/A 8/24/2018    Procedure: COMBINED COLONOSCOPY, SINGLE OR MULTIPLE BIOPSY/POLYPECTOMY BY BIOPSY;;  Surgeon: Lawrence Mata MD;  Location:  GI    CV CORONARY ANGIOGRAM N/A 11/18/2022    Procedure: Coronary Angiogram;  Surgeon: Mason Lucas MD;  Location:  HEART CARDIAC CATH LAB    CV INSTANTANEOUS WAVE-FREE RATIO N/A 11/18/2022    Procedure: Instantaneous Wave-Free Ratio;  Surgeon: Mason Lucas MD;  Location:  HEART CARDIAC CATH LAB    CV LEFT HEART CATH N/A 11/18/2022    Procedure: Left Heart Catheterization;  Surgeon: Mason Lucas MD;  Location:  HEART CARDIAC CATH LAB    ESOPHAGOSCOPY, GASTROSCOPY, DUODENOSCOPY (EGD), COMBINED N/A 7/7/2023    Procedure: Esophagoscopy, gastroscopy, duodenoscopy (EGD), combined;  Surgeon: Nestor Louie MD;  Location:  GI     Family History   Problem Relation Age of Onset    Diabetes Type 2  Mother     Cerebral aneurysm Sister      Bladder Cancer Brother     Myocardial Infarction Brother         in his 50s    Cerebrovascular Disease No family hx of     Coronary Artery Disease Early Onset No family hx of     Prostate Cancer No family hx of     Colon Cancer No family hx of     Clotting Disorder No family hx of     Bleeding Disorder No family hx of     Anesthesia Reaction No family hx of             Allergies   Patient has no known allergies.        Lorena Hart NP  [facility]  Pager: [phone]

## 2024-08-23 NOTE — PATIENT INSTRUCTIONS
Today's Recommendations      Resume taking your torsemide 20mg daily   I will message the nephrology team about a sooner appointment and Dr. White about your diabetic medications   Start the reduced dose of the diltiazem when its available   Continue all other medications without changes.  Please follow up with me in September as scheduled.    Please send a [employer] message or call [phone] to the RN team with questions or concerns.     Scheduling number [phone]  TERESA Parr, CNP

## 2024-08-23 NOTE — TELEPHONE ENCOUNTER
[facility] Call Center    Phone Message    May a detailed message be left on voicemail: no     Reason for Call: Other: Jenny from Cardiology called to see if the patient can get an appointment any sooner. Patients Kidney function is declining. Please call Cardiology back at [phone].     Action Taken: Message routed to:  [facility] ([facility]): Nephrology    Travel Screening: Not Applicable

## 2024-08-23 NOTE — TELEPHONE ENCOUNTER
New pt appt scheduled per referral for 9/17 at 2:30 pm in person at [place] location. Pt in agreement to get labs at [facility] a few days prior. Lab orders needed.  Jennifer Escobar,  Nephrology Clinic Navigator  [facility]  Direct: [phone].

## 2024-08-23 NOTE — PROGRESS NOTES
I received a call back from [facility] Nephrology with update that they have no earlier appts at [facility] than October. Pt can be seen in [place] on 9/17. Pt was ok with this location so appt is now 9/17. CT scan is 8/30. I will send an update to Bishop Rahman RN August 23, 2024, 3:08 PM

## 2024-08-23 NOTE — PROGRESS NOTES
Lorena Hart NP  [facility] Heart Team 7  Patient is currently scheduled for a new visit with nephrology in October, can we reach out to their team and see if there are any sooner appointments?  His renal function has stabilized, however declined overall in the last 6 months.  Thanks.

## 2024-08-23 NOTE — PROGRESS NOTES
I called [facility] Nephrology clinic and spoke to scheduling. I let them know our provider would like pt to be seen sooner if possible. His renal function has worsened since he was referred by PCP in June. I was told they will look into getting the pt in sooner and call with any questions. Josiah SYED August 23, 2024, 2:11 PM

## 2024-08-26 NOTE — PROGRESS NOTES
Lorena Hart, JULIETH  You3 days ago       Great, I think that appointment will be ok. I reviewed his case with Dr. Biggs in Dr. Marquez absence and Team 4 will be reaching out to the patient regarding follow up visit with Dr. Biggs.

## 2024-08-26 NOTE — TELEPHONE ENCOUNTER
1st attempt- Left voicemail for the patient to call back and schedule the following:    Appointment type:  Return Cardiology  Provider:  Sindhu Hart  Return date:  ASAP  Additional appointment(s) needed:    Additonal Notes: PLEASE SCHEDULE ASAP with Dr Biggs- any location- urgent slot to discuss his symptoms and possible options for intervention on his CAD.    Specialty phone number: [phone]

## 2024-08-30 NOTE — PROGRESS NOTES
CT chest results noted. Pt has 9/9 visit with . Will update Sindhu Hart, GABRIELA. Josiah SYED August 30, 2024, 3:01 PM    1.  No significant change in appearance of the chest. Moderate  pulmonary emphysema. Bilateral calcified pleural plaques in keeping  with prior asbestos exposure. There is similar mild scarring and  subpleural fibrotic changes which could represent sequela of  asbestosis. Unchanged stable trace bilateral pleural effusions.  2.  Stable mildly enlarged subcarinal lymph node. Stable right lower  lobe nodular opacity which is favored benign.  3.  Similar cardiomegaly with severe coronary artery calcifications.  There is dilatation of the pulmonary trunk which can be seen with  pulmonary hypertension.  4.  New upper abdominal ascites which appears moderate in volume.     BENNY MURILLO MD

## 2024-09-05 NOTE — TELEPHONE ENCOUNTER
ANTICOAGULATION DIRECT ORAL ANTICOAGULANT MONITORING    SUBJECTIVE     The [facility] is evaluating Booker Brown's Rivaroxaban (Xarelto) as part of its Anticoagulation Monitoring Program.    Indication:Atrial Fibrillation  Current dose per medication list: Rivaroxaban 20 mg Daily  Recent hospitalizations/ED/Office Visits for bleeding/clotting concerns: Yes: GI bleed 7/2023  Other bleeding or side effect concerns: No  Additional findings: CKD III. Pt has been holding torsemide, plans to restart and have labs checked again and follow up with cardiology in Sept    OBJECTIVE     Age: 69 year old    Wt Readings from Last 2 Encounters:   08/23/24 102.1 kg (225 lb)   08/12/24 102.1 kg (225 lb)      Lab Results   Component Value Date    CR 2.30 (H) 08/20/2024    CR 2.30 (H) 08/16/2024    CR 2.36 (H) 08/12/2024     Creatinine Clearance (using actual bodyweight, mL/min): 45    Lab Results   Component Value Date    HGB 8.7 08/12/2024    HGB 14.1 05/12/2021     06/06/2024     05/12/2021     ASSESSMENT/PLAN     A chart review for Direct Oral Anticoagulant (DOAC) Stewardship has been completed for:     Dosing:  recheck serum creatinine. If creatinine persistently elevated, recommend adjustment to Rivaroxaban 15 mg Daily for CrCl <= 50 mL/min (using actual bodyweight) (consistent with package insert dosing)    Plan made per [facility] anticoagulation protocol    Carlos Esquivel RN  Anticoagulation Clinic

## 2024-09-06 NOTE — PROGRESS NOTES
I called central scheduling imaging ([phone]). Next available opening for paracentesis is 9/18. Pt is scheduled for 9/18 @ 0915 at [facility], check in on 1st floor welcome desk. I was told there are no special prep instructions and pt doesn't need to hold his xarelto. Pt will have his labs on 9/9 @ 0915 in , and visit with  on 9/9 @ 10:30 AM. Josiah SYED September 6, 2024, 2:42 PM

## 2024-09-06 NOTE — PROGRESS NOTES
"Lorena Hart NP  [facility] Heart Team 7; Lucian Biggs MD17 hours ago (4:52 PM)       Reviewed CT findings and below recommendations with Dr. Marquez.    Lets have him get his LFTs and CMP redrawn.  I would also like to place a referral for hepatology.    We should have him get set up for an outpatient paracentesis.  He will need to hold his Xarelto prior to this procedure, , however interventional radiology will know how long this has to be held.    His symptoms improved following the paracentesis that may help us decide whether or not he needs to undergo any revascularization.      Nate Marquez MD Lewis, Elizabeth, NP3 days ago       Also if we fix his cad he would need LAD fixed first and then consider  fix of RCA  This abd ascites (if he hasn't had abd jermaine he needs that first) is not classic for LVEF 40-50 and only mild decreased RVEF as per last echo    If he has this much ascites to tap why does xray only mention mild pleural effusion???   Kidney etc may be issue and maybe needs a CT of abdomen (except the dye will be a problem for kidney) to make sure not some other problem not picked up on labs from last paracentesis?    Will await CMP (not BMP) and serum albumin/protein (should be on CMP) and urine 24 hr for protein  TSH T4--see my prior note--thanks!!      Nate Marquez MD Lewis, Elizabeth, JULIETH3 days ago       First needs nephro consult to see if needs dialysis/fluid  Needs alt ast total protein, serum albumin  Needs UA and urine 24hr protein  Then if nothing offered we will need right heart cath and hepatic vein wedge pressure  Then if nothing more we can try for impella and pci of lad but this is not a classic chf picture with EF in the 40s  He would need a complete \"tune up\" based on RHC before any impella/pci  He needs his PMD as part of this decision making      Lorena Hart NP Heifetz, Steven M, MD7 days ago       Patient had a paracentesis last year in July during a " hospitalization with removal of 4 L of fluid, given this new finding of moderate ascites do we want to set him up for a repeat paracentesis versus getting him in for an abdominal ultrasound?  I have been seeing him in clinic as he has been dealing with issues with worsening shortness of breath, I suspect this is contributing.

## 2024-09-06 NOTE — PROGRESS NOTES
Interventional radiology rep called back and said to call ultrasound as that is where the paracentesis will be done if no drain is needing to be placed ([phone]).     I called US dept and was told pt will not need to hold xarelto, but the scheduling number is [phone].     Pt called back and I reviewed update on CT results. Pt said he did have a paracentesis 7/2023 and felt improvement in his symptoms after that. Pt said he is agreeable to having another paracentesis. Pt will also need labs (CMP, LFT, and thyroid panel). Pt can discuss urine testing at his nephrology visit as they may also want to order a 24 hour urine.     Pt said he continues to have shortness of breath with exertion, and has some LE edema. He has been using his compression socks and elevating his legs. Pt said the swelling seems to improve for a couple of days and then seems to come back. Pt will keep his 9/9 visit with  to discuss symptoms further. Pt said he is flexible with when he can do his paracentesis next week and said ok to just call him with a date and  time. Josiah SYED September 6, 2024, 1:51 PM    I clarified paracentesis order with Sindhu Hart, GABRIELA. Pt had US paracentesis without albumin last year. She said ok to do US paracentesis without albumin, but need to make sure he gets the fluid sent to pathology and  the usual other labs they run on it. Josiah SYED September 6, 2024, 2:18 PM

## 2024-09-06 NOTE — PROGRESS NOTES
I left message for pt to call back to discuss CT chest results. I also reminded pt to keep 9/9 urgent MD visit with  at 10:30 AM in .     Pt also has a new nephrology visit on 9/17/24.     I left a message on the interventional radiology scheduling line to find out availability for paracentesis and how long pt is required to hold xarelto prior to procedure. Will wait for a call back from pt and interventional radiology. Josiah SYED September 6, 2024, 10:00 AM

## 2024-09-09 PROBLEM — E66.812 CLASS 2 SEVERE OBESITY DUE TO EXCESS CALORIES WITH SERIOUS COMORBIDITY IN ADULT (H): Status: ACTIVE | Noted: 2024-01-01

## 2024-09-09 PROBLEM — E66.01 CLASS 2 SEVERE OBESITY DUE TO EXCESS CALORIES WITH SERIOUS COMORBIDITY IN ADULT (H): Status: ACTIVE | Noted: 2024-01-01

## 2024-09-09 NOTE — LETTER
9/9/2024    Arline White MD  [address] [facility] [address]    RE: Booker Brown       Dear Colleague,     I had the pleasure of seeing Booker Brown in the [facility].  CARDIOLOGY CLINIC FOLLOW-UP NOTE      REASON FOR VISIT:   Decompensated HF, ascites    PRIMARY CARE PHYSICIAN:  Arline White        History of Present Illness  Booker Brown is an extremely pleasant 69 year old adult who follows with my colleague, Dr. Marquez, here as an urgent add-on patient.  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    He was seen by Sindhu Hart NP, on 8/23/2024 most recently.  Please see her excellent note for further details.  Briefly, he had been noting increasing exertional shortness of breath over the past few months.  In addition, his renal function had been gradually declining, with his creatinine up to 2.3 from a baseline around 1.6.  He had not been weighing himself recently, but did not feel his weight was significantly rising or having more lower extremity swelling.  Dr. Marquez was out of town at the time of that visit, so Sindhu Hart had called me to discuss the case, particularly whether any revascularization options were available.  I had reviewed his angiogram, and did think that the LAD was reasonable to fix if this was causing him symptoms or contributing to his heart failure, but I had wanted to speak with him in clinic before proceeding in case there were other issues at play.  In the meantime, Dr. Marquez had returned and reviewed the chart,  and very appropriately ordered LFTs and a referral to nephrology.  LFTs did come back essentially normal, including protein, though it should be noted that a prior CT of the abdomen had seen some early fibrosis of the liver.  He was set up to see nephrology next week in clinic.  He also recently has been noting more abdominal swelling and lower extremity swelling, and his weight today is up over 20 pounds compared to a few weeks ago.  As a result, he was also set up for a paracentesis, and this is scheduled for next week as well.    In addition to the above testing, I also reviewed his most recent echocardiogram, cardiac MRI, lipid panel, chemistry panel, CBC, and the images of his most recent angiogram.      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%), markedly volume overloaded at present (potentially renal/hepatic dysfunction contributing, but does appear to be a HF component as well)  Mixed ischemic/nonischemic cardiomyopathy  Paroxysmal AF (declined watchman device previously)  CKD 3-4, with progressively worsening renal dysfunction  CAD with known RCA  and severe LAD stenosis, currently medically managed after being turned down for CABG due to lung disease  ILD/asbestosis, following with pulmonary  NIDDM2  History of gastric ulcer with GI hemorrhage in 7/2023  Intermittent ascites/lymphedema with prior hepatic fibrosis noted on CT A/P      It was a pleasure to speak with Conner and his daughter in clinic today.  We had a lengthy discussion today about several issues, including his decompensated heart failure, worsening renal dysfunction, and significant coronary disease.  While we had originally been scheduled to meet to discuss potential coronary intervention, he is not well compensated at this time to undergo this.  He would need significant diuresis (as well as paracentesis) prior to being ready for this.  Would also like to see his renal function stabilize.  The exact cause of his  decompensation is not entirely clear, but I do think that we need to act aggressively to hopefully prevent him from requiring hospitalization for decompensated heart failure/anasarca.  I recommended the following:      -Increase home torsemide from 20 mg daily to 60 mg daily for the next 3 days (9/9, 9/10, and 9/11).  We will also have him take 20 mEq of KCl as well given the higher diuretic dose  -Instructed patient to check daily weights on home scale  -Repeat labs on 9/12/2024 to guide next steps.  If weight is not declining and renal function is stable/worsening, we will need to consider inpatient IV diuresis (versus infusion clinic) as next step  -Agree with scheduled paracentesis and nephrology visits  -Given normal liver lab testing, I think it is reasonable to hold off on hepatology referral for now, but may consider repeat abdominal ultrasound in the future.  -Decrease Xarelto from 20 mg daily to 15 mg daily (GFR less than 50, and unlikely to return to a GFR over 50 even if his creatinine returns to baseline)  -Continue remainder of current medical regimen unchanged  -Consider LAD revascularization only once patient is more compensated and renal function stable        Follow-up: 2-4 weeks with GABRIELA, or sooner as needed      On the date of the patient's visit, I spent a total of 55 minutes reviewing the patient's chart; interviewing, examining, and counseling the patient; coordinating with other providers as necessary, entering orders, and documenting in the medical chart.      Lucian Biggs MD  Interventional Cardiology  September 9, 2024            Medications  Current Outpatient Medications   Medication Sig Dispense Refill     ACCU-CHEK GUIDE test strip USE TO TEST BLOOD SUGAR 1 TIMES DAILY OR AS DIRECTED. 100 strip 1     albuterol (PROAIR HFA/PROVENTIL HFA/VENTOLIN HFA) 108 (90 Base) MCG/ACT inhaler Inhale 2 puffs into the lungs every 6 hours as needed for shortness of breath or wheezing 18 g 5      alcohol swab prep pads Use to swab area of injection/fariha as directed. 100 each 3     blood glucose monitoring (ACCU-CHEK FASTCLIX) lancets USE WITH LANCETING DEVICE. TO ACCOMPANY: BLOOD GLUCOSE MONITOR BRANDS: PER INSURANCE. Pt tests once daily 102 each 2     blood glucose monitoring (NO BRAND SPECIFIED) meter device kit Use to test blood sugar 1 times daily or as directed. : per insurance. 1 kit 0     diltiazem ER (TIAZAC) 240 MG 24 hr ER beaded capsule Take 1 capsule (240 mg) by mouth daily 30 capsule 3     diltiazem ER COATED BEADS (CARDIZEM CD) 360 MG 24 hr capsule Take 1 capsule (360 mg) by mouth daily 90 capsule 2     Fluticasone-Umeclidin-Vilanterol (TRELEGY ELLIPTA) 200-62.5-25 MCG/ACT oral inhaler Inhale 1 puff into the lungs daily 60 each 11     metoprolol succinate ER (TOPROL XL) 100 MG 24 hr tablet Take 1 tablet (100 mg) by mouth 2 times daily 180 tablet 3     pantoprazole (PROTONIX) 40 MG EC tablet TAKE 1 TABLET BY MOUTH TWICE A  tablet 0     potassium chloride ER (K-TAB) 20 MEQ CR tablet Take 1 tablet (20 mEq) by mouth daily. 30 tablet 3     rivaroxaban ANTICOAGULANT (XARELTO ANTICOAGULANT) 15 MG TABS tablet Take 1 tablet (15 mg) by mouth daily (with dinner). 90 tablet 3     rosuvastatin (CRESTOR) 40 MG tablet Take 1 tablet (40 mg) by mouth at bedtime 90 tablet 3     sacubitril-valsartan (ENTRESTO) 24-26 MG per tablet Take 0.5 tablets by mouth once.       torsemide 60 MG TABS Take 60 mg by mouth daily. 30 tablet 4     glipiZIDE (GLUCOTROL XL) 5 MG 24 hr tablet TAKE 1 TABLET BY MOUTH EVERY DAY (Patient not taking: Reported on 8/23/2024) 90 tablet 1     metFORMIN (GLUCOPHAGE XR) 500 MG 24 hr tablet TAKE 2 TABLETS BY MOUTH DAILY WITH DINNER (Patient not taking: Reported on 8/23/2024) 180 tablet 3     No current facility-administered medications for this visit.     Allergies  No Known Allergies      Physical Exam      BP: 111/63 Pulse: 75     SpO2: 91 %      Vital Signs with Ranges  Pulse:  [75]  75  BP: (111)/(63) 111/63  SpO2:  [91 %] 91 %  249 lbs 11.2 oz    Constitutional: No acute distress  Respiratory: Normal respiratory effort, mild bibasilar crackles and end expiratory wheezes  Cardiovascular: Irregularly irregular, normal rate, 2/6 systolic murmur.  JVP difficult to assess due to body habitus, but appears elevated.  There is 3+ bilateral LE edema.  Normal carotid upstrokes, no carotid bruits.    Thank you for allowing me to participate in the care of your patient.      Sincerely,     Lucian Biggs MD     [facility]  cc:   Lorena Hart, JULIETH  [address] [place] [address]

## 2024-09-09 NOTE — PROGRESS NOTES
CARDIOLOGY CLINIC FOLLOW-UP NOTE      REASON FOR VISIT:   Decompensated HF, ascites    PRIMARY CARE PHYSICIAN:  Arline White        History of Present Illness   Booker Brown is an extremely pleasant 69 year old adult who follows with my colleague, Dr. Marquez, here as an urgent add-on patient.  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    He was seen by Sindhu Hart NP, on 8/23/2024 most recently.  Please see her excellent note for further details.  Briefly, he had been noting increasing exertional shortness of breath over the past few months.  In addition, his renal function had been gradually declining, with his creatinine up to 2.3 from a baseline around 1.6.  He had not been weighing himself recently, but did not feel his weight was significantly rising or having more lower extremity swelling.  Dr. Marquez was out of town at the time of that visit, so Sindhu Hart had called me to discuss the case, particularly whether any revascularization options were available.  I had reviewed his angiogram, and did think that the LAD was reasonable to fix if this was causing him symptoms or contributing to his heart failure, but I had wanted to speak with him in clinic before proceeding in case there were other issues at play.  In the meantime, Dr. Marquez had returned and reviewed the chart, and very appropriately ordered LFTs and a referral to nephrology.  LFTs did come back essentially normal, including protein, though it should be noted that a prior CT of the abdomen had seen some early  fibrosis of the liver.  He was set up to see nephrology next week in clinic.  He also recently has been noting more abdominal swelling and lower extremity swelling, and his weight today is up over 20 pounds compared to a few weeks ago.  As a result, he was also set up for a paracentesis, and this is scheduled for next week as well.    In addition to the above testing, I also reviewed his most recent echocardiogram, cardiac MRI, lipid panel, chemistry panel, CBC, and the images of his most recent angiogram.      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%), markedly volume overloaded at present (potentially renal/hepatic dysfunction contributing, but does appear to be a HF component as well)  Mixed ischemic/nonischemic cardiomyopathy  Paroxysmal AF (declined watchman device previously)  CKD 3-4, with progressively worsening renal dysfunction  CAD with known RCA  and severe LAD stenosis, currently medically managed after being turned down for CABG due to lung disease  ILD/asbestosis, following with pulmonary  NIDDM2  History of gastric ulcer with GI hemorrhage in 7/2023  Intermittent ascites/lymphedema with prior hepatic fibrosis noted on CT A/P      It was a pleasure to speak with Conner and his daughter in clinic today.  We had a lengthy discussion today about several issues, including his decompensated heart failure, worsening renal dysfunction, and significant coronary disease.  While we had originally been scheduled to meet to discuss potential coronary intervention, he is not well compensated at this time to undergo this.  He would need significant diuresis (as well as paracentesis) prior to being ready for this.  Would also like to see his renal function stabilize.  The exact cause of his decompensation is not entirely clear, but I do think that we need to act aggressively to hopefully prevent him from requiring hospitalization for decompensated heart failure/anasarca.  I recommended the  following:      -Increase home torsemide from 20 mg daily to 60 mg daily for the next 3 days (9/9, 9/10, and 9/11).  We will also have him take 20 mEq of KCl as well given the higher diuretic dose  -Instructed patient to check daily weights on home scale  -Repeat labs on 9/12/2024 to guide next steps.  If weight is not declining and renal function is stable/worsening, we will need to consider inpatient IV diuresis (versus infusion clinic) as next step  -Agree with scheduled paracentesis and nephrology visits  -Given normal liver lab testing, I think it is reasonable to hold off on hepatology referral for now, but may consider repeat abdominal ultrasound in the future.  -Decrease Xarelto from 20 mg daily to 15 mg daily (GFR less than 50, and unlikely to return to a GFR over 50 even if his creatinine returns to baseline)  -Continue remainder of current medical regimen unchanged  -Consider LAD revascularization only once patient is more compensated and renal function stable        Follow-up: 2-4 weeks with GABRIELA, or sooner as needed      On the date of the patient's visit, I spent a total of 55 minutes reviewing the patient's chart; interviewing, examining, and counseling the patient; coordinating with other providers as necessary, entering orders, and documenting in the medical chart.      Lucian Biggs MD  Interventional Cardiology  September 9, 2024            Medications   Current Outpatient Medications   Medication Sig Dispense Refill    ACCU-CHEK GUIDE test strip USE TO TEST BLOOD SUGAR 1 TIMES DAILY OR AS DIRECTED. 100 strip 1    albuterol (PROAIR HFA/PROVENTIL HFA/VENTOLIN HFA) 108 (90 Base) MCG/ACT inhaler Inhale 2 puffs into the lungs every 6 hours as needed for shortness of breath or wheezing 18 g 5    alcohol swab prep pads Use to swab area of injection/fariha as directed. 100 each 3    blood glucose monitoring (ACCU-CHEK FASTCLIX) lancets USE WITH LANCETING DEVICE. TO ACCOMPANY: BLOOD GLUCOSE MONITOR  BRANDS: PER INSURANCE. Pt tests once daily 102 each 2    blood glucose monitoring (NO BRAND SPECIFIED) meter device kit Use to test blood sugar 1 times daily or as directed. : per insurance. 1 kit 0    diltiazem ER (TIAZAC) 240 MG 24 hr ER beaded capsule Take 1 capsule (240 mg) by mouth daily 30 capsule 3    diltiazem ER COATED BEADS (CARDIZEM CD) 360 MG 24 hr capsule Take 1 capsule (360 mg) by mouth daily 90 capsule 2    Fluticasone-Umeclidin-Vilanterol (TRELEGY ELLIPTA) 200-62.5-25 MCG/ACT oral inhaler Inhale 1 puff into the lungs daily 60 each 11    metoprolol succinate ER (TOPROL XL) 100 MG 24 hr tablet Take 1 tablet (100 mg) by mouth 2 times daily 180 tablet 3    pantoprazole (PROTONIX) 40 MG EC tablet TAKE 1 TABLET BY MOUTH TWICE A  tablet 0    potassium chloride ER (K-TAB) 20 MEQ CR tablet Take 1 tablet (20 mEq) by mouth daily. 30 tablet 3    rivaroxaban ANTICOAGULANT (XARELTO ANTICOAGULANT) 15 MG TABS tablet Take 1 tablet (15 mg) by mouth daily (with dinner). 90 tablet 3    rosuvastatin (CRESTOR) 40 MG tablet Take 1 tablet (40 mg) by mouth at bedtime 90 tablet 3    sacubitril-valsartan (ENTRESTO) 24-26 MG per tablet Take 0.5 tablets by mouth once.      torsemide 60 MG TABS Take 60 mg by mouth daily. 30 tablet 4    glipiZIDE (GLUCOTROL XL) 5 MG 24 hr tablet TAKE 1 TABLET BY MOUTH EVERY DAY (Patient not taking: Reported on 8/23/2024) 90 tablet 1    metFORMIN (GLUCOPHAGE XR) 500 MG 24 hr tablet TAKE 2 TABLETS BY MOUTH DAILY WITH DINNER (Patient not taking: Reported on 8/23/2024) 180 tablet 3     No current facility-administered medications for this visit.     Allergies   No Known Allergies      Physical Exam       BP: 111/63 Pulse: 75     SpO2: 91 %      Vital Signs with Ranges  Pulse:  [75] 75  BP: (111)/(63) 111/63  SpO2:  [91 %] 91 %  249 lbs 11.2 oz    Constitutional: No acute distress  Respiratory: Normal respiratory effort, mild bibasilar crackles and end expiratory wheezes  Cardiovascular:  Irregularly irregular, normal rate, 2/6 systolic murmur.  JVP difficult to assess due to body habitus, but appears elevated.  There is 3+ bilateral LE edema.  Normal carotid upstrokes, no carotid bruits.

## 2024-09-16 NOTE — PROGRESS NOTES
Nephrology Clinic Visit  Booker Brown MRN: [id number] YOB: 1955  Primary Care Provider: Arline White  ----------------------------------------------------------------------------------------------------------------------  Visit 9/17/2024:  --BP Control:  --In office: 98/64.  --At home: Doesn't have a blood pressure cuff at home so hasn't been checking. Frequent orthostatic symptoms at home leading up to appt with me.   --Current Regimen: Dilt 240mg qDay (BP and Afib), Metop succinate 100mg BID (BP and Afib), Entresto 12-13mg qDay (HFrEF and BP), Torsemide 20mg qDay (was on 60mg x3 days and has been back on 20mg since 9/12/2024)  -DM Control: 5.6 (6/6/2024)  --Current Regimen: Glipizide 5mg qDay (held), Metformin (held)  -Creatinine Trend: 2.46 (9/12/2024) from 2.35 (9/9/2024)  -Nocturia: Maybe 1x per night.   -Hematuria: No  -Nephrolithiasis: Yes, a few years ago. None since.   -NSAID Use: No  -Family History of Kidney Disease: Yes, mom - renal failure (history of scarlet fever), Brother with bladder cancer  -Family/Friends on RRT/Kidney Transplant: No/No    -Other:  --He reports significant improvement in breathing and weight with the Torsemide 60mg qDay. Weight 249lbs prior to Torsemide. Down to ~234lbs while on 60mg qDay and has held in this range per home weights (reports being 236lbs on 9/15/2024. Didn't check his weight this AM prior to appt). RODRIGUEZ improved. He can now walk to the bathroom at home without needing to rest. He is using a wheelchair when he comes to appointments but this has been chronic.   --he is now having issues with dizziness and lightheadedness. He noticed these symptoms a few days before increasing dose of Torsemide.   --Poor appetite and taste change about 3 years ago. Reports COVID infection 3 years ago and COVID related PNA and taste never really came back after this.  --Using leg wraps at home but doesn't have them on now. Significant LE edema remains. We  discussed consistent fluid intake day to day. We discussed sodium restriction and I gave him NKF hadnout. We discussed using leg wraps and keeping legs elevated.   --He is getting a para tomorrow. Last was 7/2023. Total protein was 3.9. This is more suggestive of cardiac ascites as opposed to Cirrhosis (he does not currently carry a diagnosis of cirrhosis) or Ascites from nephrotic range proteinuria (I don't see a recent eval of his proteinuria. We will update this today)    Objective:  PAST MEDICAL HISTORY:  Past Medical History:   Diagnosis Date    Benign essential hypertension     CAD (coronary artery disease)     Chronic kidney disease, stage III (moderate) (H)     COPD (chronic obstructive pulmonary disease) (H)     ILD (interstitial lung disease) (H)     Obesity (BMI 30-39.9)     Persistent atrial fibrillation (H)     Pure hypercholesterolemia     Type 2 diabetes mellitus (H)        PAST SURGICAL HISTORY:  Past Surgical History:   Procedure Laterality Date    COLONOSCOPY N/A 8/24/2018    Procedure: COMBINED COLONOSCOPY, SINGLE OR MULTIPLE BIOPSY/POLYPECTOMY BY BIOPSY;;  Surgeon: Lawrence Mata MD;  Location:  GI    CV CORONARY ANGIOGRAM N/A 11/18/2022    Procedure: Coronary Angiogram;  Surgeon: Mason Lucas MD;  Location:  HEART CARDIAC CATH LAB    CV INSTANTANEOUS WAVE-FREE RATIO N/A 11/18/2022    Procedure: Instantaneous Wave-Free Ratio;  Surgeon: Mason Lucas MD;  Location:  HEART CARDIAC CATH LAB    CV LEFT HEART CATH N/A 11/18/2022    Procedure: Left Heart Catheterization;  Surgeon: Mason Lucas MD;  Location:  HEART CARDIAC CATH LAB    ESOPHAGOSCOPY, GASTROSCOPY, DUODENOSCOPY (EGD), COMBINED N/A 7/7/2023    Procedure: Esophagoscopy, gastroscopy, duodenoscopy (EGD), combined;  Surgeon: Nestor Louie MD;  Location:  GI       MEDICATIONS:  Current Outpatient Medications   Medication Instructions    albuterol (PROAIR HFA/PROVENTIL HFA/VENTOLIN HFA) 108 (90 Base) MCG/ACT  inhaler 2 puffs, Inhalation, EVERY 6 HOURS PRN    alcohol swab prep pads Use to swab area of injection/fariha as directed.    blood glucose (ACCU-CHEK GUIDE) test strip USE TO TEST BLOOD SUGAR 1 TIMES DAILY OR AS DIRECTED.    blood glucose monitoring (ACCU-CHEK FASTCLIX) lancets USE WITH LANCETING DEVICE. TO ACCOMPANY: BLOOD GLUCOSE MONITOR BRANDS: PER INSURANCE. Pt tests once daily    blood glucose monitoring (NO BRAND SPECIFIED) meter device kit Use to test blood sugar 1 times daily or as directed. : per insurance.    diltiazem ER (TIAZAC) 240 mg, Oral, DAILY    diltiazem ER COATED BEADS (CARDIZEM CD) 360 mg, Oral, DAILY    Fluticasone-Umeclidin-Vilanterol (TRELEGY ELLIPTA) 200-62.5-25 MCG/ACT oral inhaler 1 puff, Inhalation, DAILY    glipiZIDE (GLUCOTROL XL) 5 mg, Oral, DAILY    metFORMIN (GLUCOPHAGE XR) 1,000 mg, Oral, DAILY WITH SUPPER    metoprolol succinate ER (TOPROL XL) 100 mg, Oral, 2 TIMES DAILY    pantoprazole (PROTONIX) 40 mg, Oral, 2 TIMES DAILY    potassium chloride ER (K-TAB) 20 MEQ CR tablet 20 mEq, Oral, DAILY    rivaroxaban ANTICOAGULANT (XARELTO ANTICOAGULANT) 15 mg, Oral, DAILY WITH SUPPER    rosuvastatin (CRESTOR) 40 mg, Oral, AT BEDTIME    sacubitril-valsartan (ENTRESTO) 24-26 MG per tablet 0.5 tablets, Oral, ONCE    Torsemide 60 mg, Oral, DAILY       FAMILY MEDICAL HISTORY:   Family History   Problem Relation Age of Onset    Diabetes Type 2  Mother     Cerebral aneurysm Sister     Bladder Cancer Brother     Myocardial Infarction Brother         in his 50s    Cerebrovascular Disease No family hx of     Coronary Artery Disease Early Onset No family hx of     Prostate Cancer No family hx of     Colon Cancer No family hx of     Clotting Disorder No family hx of     Bleeding Disorder No family hx of     Anesthesia Reaction No family hx of        PHYSICAL EXAM:   BP 98/64   Pulse 83   Wt 110.2 kg (243 lb)   BMI 35.37 kg/m    GENERAL APPEARANCE: alert and no distress  EYES: nonicteric  ABDOMEN:  Mild abdominal distension present  Extremities: Severe LE edema b/l. 2-3+  MS: no evidence of inflammation in joints, no muscle tenderness  SKIN: no rash  NEURO: mentation intact and speech normal  PSYCH: affect normal    LABS REVIEWED BY ME:   ANEMIA  Recent Labs   Lab Test 08/12/24  1500 06/06/24  1207 07/19/23  1320 07/09/23  1130 07/07/23  0140 07/06/23  1624   HGB 8.7* 9.7* 8.9* 8.4*   < > 7.7*   IRONSAT  --   --   --   --   --  9*    < > = values in this interval not displayed.       BMP  Recent Labs   Lab Test 09/12/24  1136 09/09/24  0914 08/20/24  1427 08/16/24  1349 08/12/24  1500 06/06/24  1207 07/07/23  0519 07/06/23  1624 06/09/23  1510 02/21/23  1400 03/29/22  1401 02/27/22  1611 11/20/21  1459 05/14/21  0827    140 140 147*   < > 142   < > 144   < > 144   < > 135   < >  --    POTASSIUM 3.6 3.8 4.1 4.7   < > 4.4   < > 4.8   < > 4.5   < > 4.0   < > 3.9   CHLORIDE 96* 100 99 107   < > 102   < > 103   < > 98   < > 102   < >  --    CO2 30* 26 25 31   < > 25   < > 28   < > 30*   < > 27   < >  --    ANIONGAP 14 14 16* 9   < > 15   < > 13   < > 16*   < > 6   < >  --    BUN 35.9* 40.7* 37.0* 37*   < > 36.5*   < > 42.8*   < > 22.2   < > 32*   < >  --    CR 2.46* 2.35* 2.30* 2.30*   < > 1.90*   < > 1.65*   < > 1.35*   < > 1.63*   < >  --    GFRESTIMATED 28* 30* 30* 30*   < > 38*   < > 46*   < > 58*   < > 46*   < >  --    MAG 2.4*  --   --   --   --   --   --  2.2  --   --   --  1.9  --  1.9   PROTTOTAL  --  6.6  --   --   --  7.3  --  6.6  --  7.1   < >  --    < >  --     < > = values in this interval not displayed.       CBC  Recent Labs   Lab Test 08/12/24  1500 06/06/24  1207 07/19/23  1320 07/09/23  1130 07/07/23  0140 07/06/23  1624 02/21/23  1400 11/18/22  0714   HGB 8.7* 9.7* 8.9* 8.4*   < > 7.7* 11.8 14.2   WBC  --  9.7  --   --   --  10.3 10.2 12.8*   HCT  --  35.1  --   --   --  28.0* 40.8 46.0   MCV  --  72*  --   --   --  88 88 83   PLT  --  261  --   --   --  257 220 330    < > = values in  "this interval not displayed.       DIABETES  Recent Labs   Lab Test 06/06/24  1207 06/09/23  1510 10/12/22  1524 03/29/22  1401   A1C 5.6 5.6 7.3* 7.0*       HYPONATREMIANo lab results found.    Invalid input(s): \"UOSM\", \"OSM\"    MBD  Recent Labs   Lab Test 09/12/24  1136 09/09/24  0914 08/20/24  1427 08/16/24  1349 08/12/24  1500 06/06/24  1207 07/07/23  0519 07/06/23  1624 06/09/23  1510 02/21/23  1400   KURT 9.0 8.8 8.9 9.3   < > 9.6   < > 9.2   < > 9.7   ALBUMIN  --  3.9  --   --   --  4.3  --  3.3*  --  3.8   PHOS  --   --   --   --   --  3.0  --   --   --   --    PTHI  --   --   --   --   --  36  --   --   --   --     < > = values in this interval not displayed.        URINE STUDIES  Recent Labs   Lab Test 11/18/22  1201 05/12/21  0843 03/30/21  0952   COLOR Light Yellow Yellow Light Yellow   APPEARANCE Clear Clear Clear   URINEGLC Negative Negative 200*   URINEBILI Negative Negative Negative   URINEKETONE Negative Negative Negative   SG 1.010 >1.030 1.020   UBLD Trace* Negative Small*   URINEPH 6.0 5.5 5.5   PROTEIN 50* 100* 100*   UROBILINOGEN  --  0.2  --    NITRITE Negative Negative Negative   LEUKEST Negative Negative Negative   RBCU 1 O - 2 1   WBCU 1 0 - 5 1     No lab results found.    ADDITIONAL LABS ORDERED/REVIEWED BY ME:  See below    Assessment/Plan  CKD Stage G4A?  Established care with [facility] 9/17/2024    69 year old adult w/ Hx notable for Hypertension, DMT2, CAD, HFmrEF (45% 8/9/2024), Afib, Hx of ILD/Asbestosis, Obesity    Baseline creatinine appears to be around 1.4-1.7 from mid 2016 through mid 2024. He had uptrend of his creatinine when measured 6/6/2024 (1.90. Found during routine physical visit. No comment on edema/RODRIGUEZ/Orthopena at this visit but by 6/21/2024 notes comment on LE edema, worsening RODRIGUEZ, orthopena. BP also down to the 90's at this visit). Significant AKD on CKD with creatinine further worsening to the 2.3-2.5 range from 8/12/2024 up to establishing care with me. " "    Has been having issues with worsening fluid retention, RODRIGUEZ, weight gain, LE edema, and ascites build up leading up to establishing care with me. He required an increase in his Torsemide dose to 60mg qDay 9/9/2024 with significant improvement in his weight and symptoms. Creatinine did slightly increase to 2.46 on last check just after finishing Torsemide 60mg course and adjusting back to 20mg qDay.     Of note: LE edema and fluid retention have been longstanding issues for him with intermittent worsening. He also required a para back in 7/2023 and there is some concern on imaging for potential hepatic fibrosis/some potential cirrhotic changes. His para fluid 7/2023 was suggestive of cardiac ascites as opposed to portal HTN related. He had return of ascites and repeat para scheduled for 9/18/2024.     Sero workup in the past:  -LISA 12/30/2022 1:160  -RF 12/30/2022 negative  -SSA/SSB 12/30/2022 negative  -ANCA 12/30/2022 1:10 (p-ANCA)    CT 7/6/2023 w/ \"moderate atrophy of L kidney\". No hydro/obstructing stones.    Last UA was from 11/2022 (bland at that time. Dipstick albuminuria present)  Does not have recent quantification of proteinuria at the time of establishing care with me. Last I can see is 746mg/gr 5/3/2022.    CKD Etiology (Biopsy Proven: No):  -Reduced EABV (in the setting of HFrEF, CAD, Hypotension, Afib, Venous congestion, etc)  -Previous likely hemodynamically mediated AKIs  -Hypertensive Nephrosclerosis  -DKD  -Borderline ANCA positive - clinical history and current presentation not suggestive of ANCA vasc  -Positive LISA - he is without findings suggestive of SLE at the time of establishing care with me and LN felt to be very unlikely at this time.    Plan:  -Update UA and UPCR/UACR to eval for active urine sediment and to quantify proteinuria (do not favor that presentation is currently 2/2 nephrotic range proteinuria/nephrotic syndrome but do want to get a sense of his current proteinuria). "   -Update renal panel today  -Some abdominal distension, has plans for para 9/18/2024  -Optimize BP Control:   --Obtain home BP cuff. Recommended Omron brand. We need home readings to monitor therapy and to monitor his orthostatic symptoms.   --Currently on Dilt 240mg qDay, Metop succinate 100mg BID, Entresto 12-13mg qDay, Torsemide 60mg qDay  ---I have asked him to stop his Entresto given frequent orthostatic symptoms and relative hypotension at the time of establishing care with me. I will coordinate with cardiology regarding this change and on potential future resumption of entresto. Will also coordinate on potential further reduction in Dilt dosing.  -Would benefit SGLT2i when able to safely add. Discussed Jardiance 10mg qDay. Hold for now with unstable renal function and concern for hypotension  -Agree with holding Metformin given eGFR < 30  -Discussed low Na Diet. Given NKF handout today. Stressed importance of keeping Na intake <2g/day  -Discussed aiming for approximately 68 ounces of fluid intake consistently day in and day out to help prevent wide swings in his weight/volume status  -Discussed leg wraps and keeping legs elevated throughout the day  -Daily weights in the AM after 1st void and wearing hte same amount of clothes day to day. Need to trend his weights very closely to guide diuretic therapy  -Remainder per problem below.     Anemia of Renal Disease  Hemoglobin: 8.7 (8/12/2024)  Ferritin:  TSAT:    Plan:  -Update iron stores and hemoglobin today      Lipid Management in CKD  KDIGO 2013 Guidelines recommend the following for patients with CKD:  Adults >/= 51 yo w/ CKD stage 1 or 2: Statin  Adults >/= 51 yo w/ CKD stage 3-5: Statin + Ezetimibe or Statin alone  Adults 18-49 + 1 of the following (CAD, DM, Ischemic stroke, 10 yr ASCVD risk >10%): Statin    KDIGO guidelines recommend Simvastatin 20mg/Ezetimibe 10mg qDay for patients with CKD G3a-G5 (in line with the SHARP trial). If Simvastatin used  alone, 40mg qDay is referenced.   Other options include: Atorvastatin 20mg qDay (4D trial) and Rosuvastatin 10mg qDay (MELISSA trial)    Plan:  -Rosuvastatin 40mg qDay      Metabolic Acidosis of Renal Disease  Bicarb:    Plan:  -Follow up BMP      Mineral Bone Disease  KDIGO 2017 Guidelines recommend the following for patients with CKD U6w-E4T:  -Treatments of CKD MBD should be based on serial assessments of phosphate, calcium, and PTH levels, considered together (Not Graded).  -Lowering elevated phosphate levels toward the normal range (2C) and guiding decisions about phosphate-lowering treatment should be based on progressively or persistently elevated serum phosphate (Not Graded). Limiting dietary phosphate intake in the treatment of hyperphosphatemiaalone or in combination with other treatments (2D). It is reasonable to consider phosphate source (e.g., animal, vegetable, additives) in making dietary recommendations (Not Graded).  -Avoiding hypercalcemia (2C)  -In patients with CKD G3a-G5 not on dialysis, the optimal PTH level is not known. However, we suggest that patients with levels of intact PTH progressively rising or persistently above the upper normal limit for the assay be evaluated for modifiable factors, including hyperphosphatemia, hypocalcemia, high phosphate intake, and vitamin D deficiency (2C).  -In adult patients with CKD G3a-G5 not on dialysis, we suggest that calcitriol and vitamin D analogs not be routinely used (2C). It is reasonable to reserve the use of calcitriol and vitamin D analogs for patients with CKD G4-G5 with severe and progressive hyperparathyroidism (Not Graded).    PTH: 36 (6/6/2024)  Phos: 3 (6/6/2024)  Calcium: 9.2 (8/12/2024)  Vitamin D:    Plan:  -No need for phos binder    Other:  -CKD Journey: 9/17/2024    Return to clinic: TBD based on repeat lab work     Omega Oneill MD   of Medicine  Division of Nephrology and Hypertension  [facility]  [facility]    80 minutes spent on the date of the encounter doing chart review, history and exam, documentation and further activities as noted above    The longitudinal plan of care for Hypertension, CKD G4A?, HFrEF, Volume Overload was addressed during this visit. Due to the added complexity in care, I will continue to support Booker Brown in the subsequent management of this condition(s) and with the ongoing continuity of care of this condition(s).

## 2024-09-17 NOTE — PATIENT INSTRUCTIONS
"It was a pleasure to see you in nephrology clinic today. I've included a brief summary of our discussion and care plan from today's visit below.  _______________________________________________________________________    My recommendations are summarized as follows:  -Keep a Blood Pressure log. Please make sure that you are using a validated blood pressure device (check \"www.validatebp.org\").  -Avoid all NSAID's. Examples include Ibuprofen (Advil, Motrin), naprosyn (Aleve), celebrex among others. Acetaminophen (Tylenol) is ok with maximum dose in 24 hours of 3000mg.  -Healthy lifestyle measures will keep your kidney's functioning at their current best. This includes regular exercise, maintaining a healthy body weight and smoking cessation.   -Please obtain an Omron blood pressure cuff. You can get this form [employer], [employer], [employer], [employer], etc. We need to watch your blood pressure readings very closely. I need you to send me updated blood pressure numbers in about 1 week.   -I am going to reach out to your cardiologist to see if they are in agreement with holding off on the entresto medicine for the short term. I will send you a [employer] message once I've heard back from them. In the meantime, please do not take Entresto starting 9/18/2024.   -Our next potential change would be to further reduce your diltiazem dosage. We don't want your afib with RVR to come back but I worry about your blood pressure and kidney perfusion.   -Continue to supplement your nutrition with Boost. Make sure this is high protein version. Take 1 per day to supplement your other food.  -Follow that low sodium diet guide I gave you.  -Aim for a consistent 68 ounces of fluid intake per day. Do not go much above or much below this. This will help keep weight consistent from day to day (along with sodium restriction).    -Continue to weigh yourself every morning after your first void. Wear the same amount of clothes. Write your weights down every " day.   -I have enrolled you in something called CKD Journey.     Please return to Nephrology Clinic in D to review your progress.     Who do I call with any questions after my visit?  There are multiple ways to contact your nephrology care team:    -To schedule or reschedule an appointment, please call [phone].  -To contact my nurse Mae, please call [phone]  -Reach out via [employer]. These messages are answered by your nurse or doctor during business hours and typically in 1-2 days. [employer] messages are best for quick questions/clarifications/updates. Frequently, your doctor or nurse will recommend setting up a follow up appointment to address any significant questions/concerns.  -For urgent questions after business hours, you may reach the on-call Nephrology Fellow by contacting the [facility]  at [phone].    To schedule imaging:   -Please call [phone]     To schedule your lab appointment at [facility]  -Please call [phone]    Sincerely,    Dr. Omega Oneill   of Medicine  Division of Nephrology and Hypertension  [facility]

## 2024-09-18 NOTE — PROGRESS NOTES
Paracentesis performed by Dr. Pro using image guidance. Pt tolerated the procedure well. 1700mls of hazy elio fluid drained. Patient has mild hypoxia at time of visit, saturation levels between 88-91% on RA, patient states this is an ongoing issue for him and reports he does have access to supplemental oxygen at home. Oxygenation did improve with fluid removal. Patient left the department in stable condition.  1100mls of fluid brought to lab for analysis.

## 2024-09-21 PROBLEM — I46.9 CARDIAC ARREST (H): Status: ACTIVE | Noted: 2024-01-01

## 2024-09-21 NOTE — ED TRIAGE NOTES
EMS report: wife called EMS. Reported patient feeling generally unwell today. Was found unresponsive in chair. When FIRE arrived patient had weak carotid pulse. EKG a.fib. In ambulance lost pulse, CPR started, epi x1, bicarb, and calcium given. ETT placed. Got ROSC. Patient was biting at ETT so gave versed 5mg. IO in R shin.     Upon arrival palpated a weak carotid pulse     Triage Assessment (Adult)       Row Name 09/21/24 2058          Triage Assessment    Airway WDL X     Airway Symptoms artificial airway in place        Respiratory WDL    Respiratory WDL X  assisted ventilation upon arrival        Skin Circulation/Temperature WDL    Skin Circulation/Temperature WDL X  pale, BLE edema        Cardiac WDL    Cardiac WDL X;rhythm     Pulse Rate & Regularity apical pulse irregular  palpable carotid pulse        Cognitive/Neuro/Behavioral WDL    Cognitive/Neuro/Behavioral WDL X     Level of Consciousness unresponsive;sedated

## 2024-09-21 NOTE — PROVIDER NOTIFICATION
Patient arrived post-arrest, intubated with a size 7.5 ETT secured 25 cm at patient's teeth. Placed on mechanical ventilator with the following settings, patient parameters, and alarms listed below:     09/21/24 1644   Ventilator Settings    Vent Mode CMV/AC   Resp Rate (Set) 22 breaths/min   Tidal Volume (Set, mL) 520 mL   FiO2 30 %   PEEP (cm H2O) 8 cmH2O   Inspiratory Time (sec) 0.76 sec   Vent Humidifier - Heater/HME HME   Ventilator - Patient    Patient Resp Rate  24 breaths/min   Expiratory Vt (ml) 554   Minute Volume (ml) 11.4 L/min   Peak Inspiratory Pressure (cm H2O) 26 cmH2O   Mean Airway Pressure (cm H2O) 13 cmH2O   Ventilator Alarms   High Inspiratory Pressure Alarm (cmH2O) 50 cm H2O   Low Inspiratory Pressure (cm H2O) 8 cm H2O   High Respiratory Rate (breaths/min) 45 breaths/min   Minute Ventilation High (L) 20 L/min   Minute Ventilation Low (L) 3 L/min   Apnea Delay (sec) 20 sec     Tube burrell replaced with hospitals commercial tube burrell, end-tidal monitoring initiated, originally 55, now around 35. Will continue to monitor.     Petr Vigil, RT on 9/21/2024 at 6:12 PM

## 2024-09-21 NOTE — ED PROVIDER NOTES
Emergency Department Note      History of Present Illness     Chief Complaint   Cardiac Arrest      HPI   Booker Brown is a 69 year old adult with history of hypertension, diabetes, and hyperlipidemia who presents via EMS for cardiac arrest. Patient was found unresponsive by his wife. Wife went downstairs, but when she returned upstairs, patient was not breathing with faint pulse. Wife was away briefly and not for an extended period of time. Patient coded en route. EMS report no signs of stemi. Blood sugar 129. Wife denies history of cardiac arrest or stroke. Patient went to the dentist earlier today and started feeling unwell sometime after. EMS is uncertain whether he had a procedure done at dentist. He is on medications for what EMS believe to be hypertension.     Independent Historian   EMS as detailed above.    Review of External Notes   Cardiology clinic note from 9-9-2024    Past Medical History     Medical History and Problem List   Hypertension  CAD  CKD, stage 3  COPD  ILD  Hypercholesterolemia  Type 2 diabetes  GERD    Medications   Albuterol   Accu-chek  Tiazac  Cardizem CD  Metformin  Toprol   Protonix  Xarelto  Crestor  Entresto    Surgical History   Colonoscopy  Coronary angiogram  Instantaneous wave-free ratio  Left heart catheterization   EGD     Physical Exam     Patient Vitals for the past 24 hrs:   BP Temp Temp src Pulse Resp SpO2   09/21/24 1824 139/71 97.2  F (36.2  C) -- 81 -- 99 %   09/21/24 1818 (!) 140/68 97.3  F (36.3  C) -- 82 22 98 %   09/21/24 1816 (!) 140/73 97.3  F (36.3  C) -- 81 -- --   09/21/24 1809 126/71 97.3  F (36.3  C) -- 85 22 95 %   09/21/24 1806 (!) 140/75 97.5  F (36.4  C) Bladder 88 22 95 %   09/21/24 1737 -- -- -- -- -- 96 %   09/21/24 1736 92/54 -- -- 72 -- 91 %   09/21/24 1733 (!) 87/41 -- -- 76 (!) 66 --   09/21/24 1730 (!) 80/30 -- -- 74 -- --   09/21/24 1727 94/43 -- -- 77 -- --   09/21/24 1724 (!) 82/34 -- -- 77 26 100 %   09/21/24 1722 (!) 87/64 -- -- 78 22  98 %   09/21/24 1720 (!) 89/47 -- -- 80 26 --   09/21/24 1718 (!) 79/55 -- -- 90 13 --   09/21/24 1702 (!) 38/24 -- -- 81 14 99 %   09/21/24 1700 (!) 54/33 -- -- 82 -- --   09/21/24 1652 96/55 -- -- 105 14 97 %   09/21/24 1649 99/74 -- -- 98 17 99 %   09/21/24 1644 101/66 -- -- (!) 134 -- 99 %   09/21/24 1642 96/65 -- -- 90 -- --     Physical Exam  Gen: Intubated, unresponsive  Oral : Endotracheal tube in place  Nose: No rhinorhea  Ears: External near normal, without drainage  Eyes: periorbital tissues and sclera normal   Neck: supple, no abnormal swelling  Lungs: Clear bilaterally  CV: Regular rate  Abd: Mild Claudio distended  Ext: Symmetric lower extremity edema  Skin: Cool extremities  Neuro: GCS 3, biting at tube occasionally  Psych: Not applicable       Diagnostics     Lab Results   Labs Ordered and Resulted from Time of ED Arrival to Time of ED Departure   COMPREHENSIVE METABOLIC PANEL - Abnormal       Result Value    Sodium 137      Potassium 6.1 (*)     Carbon Dioxide (CO2) 17 (*)     Anion Gap 22 (*)     Urea Nitrogen 42.5 (*)     Creatinine 2.54 (*)     GFR Estimate 27 (*)     Calcium 9.9      Chloride 98      Glucose 94      Alkaline Phosphatase 78      AST        ALT 62      Protein Total 6.5      Albumin 3.7      Bilirubin Total 0.3     CBC WITH PLATELETS AND DIFFERENTIAL - Abnormal    WBC Count 12.3 (*)     RBC Count 4.63      Hemoglobin 8.7 (*)     Hematocrit 35.6      MCV 77 (*)     MCH 18.8 (*)     MCHC 24.4 (*)     RDW 21.6 (*)     Platelet Count 258      % Neutrophils 75      % Lymphocytes 16      % Monocytes 6      % Eosinophils 1      % Basophils 0      % Immature Granulocytes 2      NRBCs per 100 WBC 1 (*)     Absolute Neutrophils 9.2 (*)     Absolute Lymphocytes 1.9      Absolute Monocytes 0.8      Absolute Eosinophils 0.1      Absolute Basophils 0.0      Absolute Immature Granulocytes 0.3      Absolute NRBCs 0.1     LACTIC ACID WHOLE BLOOD - Abnormal    Lactic Acid 10.7 (*)    BLOOD GAS VENOUS -  Abnormal    pH Venous 6.99 (*)     pCO2 Venous 68 (*)     pO2 Venous 63 (*)     Bicarbonate Venous 16 (*)     Base Excess/Deficit Venous -14.8 (*)     FIO2 30      Oxyhemoglobin Venous 71      O2 Sat, Venous 73.3     ISTAT GASES LACTATE VENOUS POCT - Abnormal    Lactic Acid POCT 11.5 (*)     Bicarbonate Venous POCT 17 (*)     O2 Sat, Venous POCT 59 (*)     pCO2 Venous POCT 70 (*)     pH Venous POCT 7.00 (*)     pO2 Venous POCT 47      Base Excess/Deficit (+/-) POCT -14.0 (*)    INFLUENZA A/B, RSV, & SARS-COV2 PCR   TROPONIN T, HIGH SENSITIVITY   NT PROBNP INPATIENT   BLOOD CULTURE       Imaging   CT Head w/o Contrast   Final Result   IMPRESSION: Negative for acute intracranial hemorrhage, hydrocephalus or transcortical infarct. No skull fracture.      XR Chest Port 1 View    (Results Pending)   CT Chest Pulmonary Embolism w Contrast    (Results Pending)       Independent Interpretation   CT Head: No intracranial hemorrhage.    ED Course      Medications Administered   Medications   sodium chloride (PF) 0.9% PF flush 3 mL (has no administration in time range)   sodium chloride (PF) 0.9% PF flush 3 mL ( Intracatheter Canceled Entry 9/21/24 1823)   propofol (DIPRIVAN) infusion (0 mcg/kg/min × 110.2 kg Intravenous Stopped 9/21/24 1705)   fentaNYL (PF) (SUBLIMAZE) injection  mcg (has no administration in time range)   norepinephrine (LEVOPHED) 4 mg in  mL infusion PREMIX (0.3 mcg/kg/min × 110.2 kg Intravenous $New Bag 9/21/24 1826)   EPINEPHrine (ADRENALIN) 5 mg in  mL infusion (0.1 mcg/kg/min × 110.2 kg Intravenous Rate/Dose Change 9/21/24 1819)   piperacillin-tazobactam (ZOSYN) 4.5 g vial to attach to  mL bag (0 g Intravenous Stopped 9/21/24 1808)   sodium chloride 0.9% BOLUS 1,000 mL (0 mLs Intravenous Stopped 9/21/24 1730)   fentaNYL (PF) (SUBLIMAZE) injection 50 mcg (50 mcg Intravenous $Given 9/21/24 1643)   propofol (DIPRIVAN) injection 10 mg/mL vial (40 mg Intravenous $Given 9/21/24 3091)    atropine injection 1 mg (1 mg Intravenous $Given 9/21/24 1704)   atropine injection 0.5 mg (0.5 mg Intravenous $Given 9/21/24 1705)   EPINEPHrine (ADRENALIN) injection 1 mg (0.5 mg Intravenous $Given 9/21/24 1705)   iopamidol (ISOVUE-370) solution 79 mL (79 mLs Intravenous $Given 9/21/24 1743)   sodium chloride 0.9 % bag 500mL for CT scan flush use (100 mLs Intravenous $Given 9/21/24 1743)       Procedures   Procedures     Central Line Placement     Procedure:  Central Venous Catheter Insertion     Indication: Vasopressor administration and Vascular access    Consent:  Unable/Emergent  Risk Discussed: Unable/emergent    Universal Protocol: Universal protocol was followed and time out conducted just prior to starting procedure, confirming patient identity, site/side, procedure, patient position, and availability of correct equipment and implants.\     Anesthesia/Sedation: None    Procedure Detail:    Central line bundle elements were complete including preparatory hand leansing, donning full barrier precautions, use of a full body drape and chlorhxidine gluconate skin prep.   The Right internal jugular vein was selected as the optimal location for patient s condition.   Ultrasound was utilized for guidance: Yes  A finder needle was used to enter the vein, through which a guidewire was inserted. The finder needle was then removed and the tract was dilated.   A triple lumen central venous catheter was inserted over the guidewire without difficulty. The guidewire was removed and the catheter was sutured in place and covered with an appropriate dressing.   A post-procedure chest radiograph was performed.     Patient Status:  The patient tolerated the procedure well: Yes. There were no complications.     Discussion of Management   Intensivist    ED Course   ED Course as of 09/21/24 1829   Sat Sep 21, 2024   1638 I obtained history and examined the patient as noted above.     1825 I spoke with Dr. Ron, of the  hospitalist team, regarding the patient. They accepted the patient for admission.         Additional Documentation  None    Medical Decision Making / Diagnosis     CMS Diagnoses: Patient has elevated lactate likely from cardiac arrest    MIPS   CT PE ordered, patient high risk due to cardiac arrest    MDM   Booker Brown is a 69 year old adult presents the emergency department after cardiac arrest.  Patient is very chronically ill, on chronic oxygen, recent cardiac clinic note suggests decompensated heart failure in clinic, not medically optimized.  Patient has been weaker as of late had a mechanical fall today witnessed by family they got him up and helped him into a chair and then approximately 30 to 45 minutes later was found unresponsive in the chair.  Patient was found in PEA by EMS, had 3 rounds of epinephrine and patient was in abated on scene by EMS.  Had return of spontaneous circulation after that.  On arrival here patient is weak pulse, had difficulty getting access and low blood pressure so I was placing a jugular line on the right, patient started to bradycardia down the process received doses of atropine and a small dose of epinephrine after the central line was in place, norepinephrine started to help support blood pressure.  CPR was initiated and patient did arrest shortly thereafter had a wide-complex irregular rhythm that could have been torsades so he was given 1 200 J shock and 150 mg of amiodarone and a dose of epinephrine and had return of spontaneous circulation.  Epinephrine infusion started as well to help support blood pressure.  Was able to get the patient to the CT scanner which shows no intracranial hemorrhage no obvious pulmonary embolism.  Spoke to the intensivist who will continue management in the ICU.  EKG here shows possible idioventricular rhythm without acute ST segment changes.  Troponin pending.    Critical Care  Critical Care is defined as an illness or injury that acutely  impairs one or more vital organ systems such that there is a high probability of imminent or life-threatening deterioration in the patient's condition and that the failure to initiate these interventions on an urgent basis would likely result in sudden, clinically significant or life-threatening deterioration in the patient's condition.    The critical condition was: Cardiac Arrest    Critical interventions performed or strongly considered: CPR and Defibrillation/Cardioversion    Critical care time was 65 minutes exclusive of time spent on separately billable procedures.    For purposes of time:  Procedures included in CC time: interpretation of NICOM, CXR, SpO2, VBG/ABG, interpretation of physiologic data, OG placement, temporary transcutaneous/transvenous pacing, ventilator management  Common separately billable procedures (not included in CC time): CPR, wound repair, endotracheal intubation, central line placement, intraosseous placement, tube thoracostomy, temporary transvenous pacemaker, EKG, electrical cardioversion      Disposition   The patient was admitted to the hospital.     Diagnosis     ICD-10-CM    1. Cardiac arrest (H)  I46.9            Discharge Medications   New Prescriptions    No medications on file         Scribe Disclosure:  Harper LOVE, am serving as a scribe at 4:55 PM on 9/21/2024 to document services personally performed by Jay Stout MD based on my observations and the provider's statements to me.        Jay Stout MD  09/21/24 5040       Jay Stout MD  10/11/24 4241

## 2024-09-21 NOTE — ED NOTES
DATE/TIME OF CALL RECEIVED FROM LAB:  09/21/24 at 5:23 PM   LAB TEST:  pH 6.99 and lactic acid 10.7  LAB VALUE:  pH 6.99 and lactic acid 10.7  PROVIDER NOTIFIED?: Yes  PROVIDER NAME: Dr. Stout  DATE/TIME LAB VALUE REPORTED TO PROVIDER: 5:22 PM 9/21/24  MECHANISM OF PROVIDER NOTIFICATION: Face-To-Face  PROVIDER RESPONSE: acknowledged

## 2024-09-21 NOTE — ED NOTES
While undergoing CVC placement patient became bradycardic with HR 30s-50s with wide complex at 1704. See MAR for pre-arrest interventions.    HR stabilized on monitor but afterwards patient noted to be dusky and without SpO2 waveform. No pulse was palpated at 1712. CPR and ACLS intervention were started - see CODE BLUE paperwork for arrest documentation and interventions.    ROCS at 1716

## 2024-09-22 NOTE — PROGRESS NOTES
Cardiology Progress Note          Assessment and Plan:         69 year old with a past medical history notable for HFmrEF (LVEF 45-50%), potentially mixed ischemic/nonischemic cardiomyopathy, paroxysmal AF (declined watchman device previously), CKD 3-4, CAD with known RCA  and severe LAD stenosis, currently medically managed after being turned down for CABG due to lung disease, ILD/asbestosis, DM2, history of gastric ulcer with GI hemorrhage in 2023 who was admitted to [facility] on 2024 after a PEA arrest and subsequent ventricular tachycardia for which he underwent cardioversion.    Although troponins were elevated upon presentation his EKG demonstrated no significant ST changes.  Left ventricular systolic function was low normal.  There is concern for possible anoxic brain injury.    IMPRESSION:    Out-of-hospital PEA arrest.  Subsequent ventricular tachycardia requiring cardioversion.  Known coronary artery disease with RCA  and severe LAD stenosis, currently medically managed after being turned down for CABG due to lung disease        PLAN            Interval History:                  Review of Systems:   As per subjective, otherwise 5 systems reviewed and negative.           Physical Exam:   Blood pressure 112/53, pulse 78, temperature 98.6  F (37  C), resp. rate 10, weight 109.3 kg (240 lb 15.4 oz), SpO2 100%, unknown if currently breastfeeding.      Vital Sign Ranges  Temperature Temp  Av.9  F (36.6  C)  Min: 96.6  F (35.9  C)  Max: 99  F (37.2  C)   Blood pressure Systolic (24hrs), Av , Min:38 , Max:140        Diastolic (24hrs), Av, Min:24, Max:86      Pulse Pulse  Av.1  Min: 72  Max: 98   Respirations Resp  Av.2  Min: 10  Max: 66   Pulse oximetry SpO2  Av.4 %  Min: 86 %  Max: 100 %         Intake/Output Summary (Last 24 hours) at 2024 1656  Last data filed at 2024 1600  Gross per 24 hour   Intake 2973.7 ml   Output 963 ml   Net 2010.7 ml        Constitutional: NAD  Skin: Warm and dry  Neck: No JVD  Lungs: CTA  Cardiovascular: RRR, no m/r/g  Abdomen: Soft, non tender.  Extremities and Back: No LE edema  Neurological: Nonfocal           Medications:     I have reviewed this patient's current medications.              Data:     Labs reviewed.     Tele: RU Bangura MD, MALISIA.

## 2024-09-22 NOTE — PROGRESS NOTES
VEEG monitoring preliminary results:    VEEG has been running for over one hour.  EEG showed severe generalized slowing. There are intermittent bursts of generalized periodic discharges at 2 hz, and intermittent attenuation of the background. These activities were noted to correlate with patient's myoclonic jerks. 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      Mel Yates MD  Neurology

## 2024-09-22 NOTE — PHARMACY-ADMISSION MEDICATION HISTORY
Pharmacy Intern Admission Medication History    Admission medication history is complete. The information provided in this note is only as accurate as the sources available at the time of the update.    Information Source(s): Facility ([facility]/) medication list/MAR and CareEverywhere/SureScripts via N/A    Pertinent Information: Received the patient's medication list (as of 9/17/24) from a family member from an after visit summary on 9/17/24. Will be copying the list into the patient's chart. Was unable to collect last doses.  - Diltiazem 240 mg and 360 mg; on the patient's medication list, however there are notes on their chart from the doctor stating to stop both diltiazem medications for now (starting 9/18/24). The doctor also mentioned potentially reducing their dose if significant lightheadedness continues  - Ferrous sulfate; not on the medication list from the family member or in the patients fill history, but there is a note from the doctor in their chart to start on 9/20/24  - Glipizide; fill history is not recent, patient may be holding as of 9/17/24  - Metformin; fill history is not recent, patient may be holding as of 9/17/24    Changes made to PTA medication list:  Added: diltiazem 360 mg  Deleted: None  Changed: None    Allergies reviewed with patient and updates made in EHR: andrzej Guzman MD reviewed    Medication History Completed By: JEAN LOPEZ 9/22/2024 9:46 AM    PTA Med List   Medication Sig Note Last Dose    albuterol (PROAIR HFA/PROVENTIL HFA/VENTOLIN HFA) 108 (90 Base) MCG/ACT inhaler Inhale 2 puffs into the lungs every 6 hours as needed for shortness of breath or wheezing 9/21/2024: Last filled 7/11/24 Unknown at [facility]    diltiazem ER (TIAZAC) 240 MG 24 hr ER beaded capsule Take 1 capsule (240 mg) by mouth daily 9/21/2024: Filled 9/17/24 #30/30 day supply Unknown    diltiazem ER COATED BEADS (CARDIZEM CD) 360 MG 24 hr capsule Take 360 mg by mouth daily.  Unknown    ferrous sulfate (FEROSUL) 325  (65 Fe) MG tablet Take 1 tablet (325 mg) by mouth every other day.  Unknown    Fluticasone-Umeclidin-Vilanterol (TRELEGY ELLIPTA) 200-62.5-25 MCG/ACT oral inhaler Inhale 1 puff into the lungs daily 9/21/2024: Filled 7/1/24 (30 day supply) Unknown    metoprolol succinate ER (TOPROL XL) 100 MG 24 hr tablet Take 1 tablet (100 mg) by mouth 2 times daily 9/21/2024: Filled 6/25 #180/90 day supply Unknown    pantoprazole (PROTONIX) 40 MG EC tablet TAKE 1 TABLET BY MOUTH TWICE A DAY 9/21/2024: Filled 9/9 #180/90 day supply   Unknown    potassium chloride ER (K-TAB) 20 MEQ CR tablet Take 1 tablet (20 mEq) by mouth daily. 9/21/2024: Filled 9/9 #30/30 day supply Unknown    rivaroxaban ANTICOAGULANT (XARELTO ANTICOAGULANT) 15 MG TABS tablet Take 1 tablet (15 mg) by mouth daily (with dinner). 9/21/2024: Filled 9/9/24 #90/90 day supply Unknown    rosuvastatin (CRESTOR) 40 MG tablet Take 1 tablet (40 mg) by mouth at bedtime 9/21/2024: Filled 8/17 #90/90 day supply Unknown    sacubitril-valsartan (ENTRESTO) 24-26 MG per tablet Take 0.5 tablets by mouth once. 9/21/2024: Filled 7/19 #180/90 day supply Unknown    torsemide 60 MG TABS Take 60 mg by mouth daily. 9/21/2024: Filled 9/9 as 20 mg tabs #90/30 day supply Unknown

## 2024-09-22 NOTE — CONSULTS
[facility]    Electrophysiology Consultation     Date of Admission:  9/21/2024  Date of Consult: 09/22/24    Assessment & Plan   Booker Brown is a 69 year old adult who was admitted on 9/21/2024. We were asked to see the patient for cardiac arrest and history of CAD.  The patient was noted to have out of hospital PEA arrest and then had an episode of ventricular tachycardia while he was in the ED requiring defibrillation.  He does have elevated troponin with known severe coronary disease.  His presenting EKG did not show any significant ST-T changes.  He is currently intubated and sedated.  Unfortunately when sedation was titrated down this morning, he was noted to have seizure activity likely due to anoxic brain injury.        - Heparin drip  - Hold Xarelto  - Hold Entresto and diltiazem  - Resume low-dose metoprolol when he is off pressors      Eamon Flores MD        Code Status    Full Code    Reason for Consult   Reason for consult: Consult performed at the request of the attending physician, Papo Lorenz MD, for evaluation of cardiac arrest and CAD      Chief Complaint   Cardiac arrest    History is obtained from the patient and EPIC chart.      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  The patient was found unresponsive at the kitchen table by his wife and was noted to be in PEA when EMS arrived.  He had ROSC with CPR and 3 rounds of epinephrine.  After arrival to the ED, he developed bradycardia followed by a wide-complex tachycardia, received atropine, epinephrine, and then cardioversion.  He was noted to be severely acidotic.  Initial troponin was 414, now up to  1537.  EKG shows no significant ST-T changes.    Recent echocardiogram from 8/9/2024 showed mild LV dysfunction, EF 45 to 50% with wall motion abnormalities.  He was recently seen in cardiology clinic at the beginning of the month with acute on chronic heart failure and volume overload.  His torsemide was increased for diuresis.  His CAD has been medically managed, previously turned down for bypass surgery due to lung disease.    Patient remains intubated and sedated, and is having active seizures.  His wife and daughter are at bedside.      Past Medical History   I have reviewed this patient's medical history and updated it with pertinent information if needed.   Past Medical History:   Diagnosis Date    Benign essential hypertension     CAD (coronary artery disease)     Chronic kidney disease, stage III (moderate) (H)     COPD (chronic obstructive pulmonary disease) (H)     ILD (interstitial lung disease) (H)     Obesity (BMI 30-39.9)     Persistent atrial fibrillation (H)     Pure hypercholesterolemia     Type 2 diabetes mellitus (H)        Past Surgical History   I have reviewed this patient's surgical history and updated it with pertinent information if needed.  Past Surgical History:   Procedure Laterality Date    COLONOSCOPY N/A 8/24/2018    Procedure: COMBINED COLONOSCOPY, SINGLE OR MULTIPLE BIOPSY/POLYPECTOMY BY BIOPSY;;  Surgeon: Lawrence Mata MD;  Location:  GI    CV CORONARY ANGIOGRAM N/A 11/18/2022    Procedure: Coronary Angiogram;  Surgeon: Mason Lucas MD;  Location: [facility] CARDIAC CATH LAB    CV INSTANTANEOUS WAVE-FREE RATIO N/A 11/18/2022    Procedure: Instantaneous Wave-Free Ratio;  Surgeon: Mason Lucas MD;  Location:  HEART CARDIAC CATH LAB    CV LEFT HEART CATH N/A 11/18/2022    Procedure: Left Heart Catheterization;  Surgeon: Mason Lucas MD;  Location:  HEART CARDIAC CATH LAB    ESOPHAGOSCOPY, GASTROSCOPY, DUODENOSCOPY (EGD), COMBINED N/A 7/7/2023     Procedure: Esophagoscopy, gastroscopy, duodenoscopy (EGD), combined;  Surgeon: Nestor Louie MD;  Location:  GI       Prior to Admission Medications   Prior to Admission Medications   Prescriptions Last Dose Informant Patient Reported? Taking?   Fluticasone-Umeclidin-Vilanterol (TRELEGY ELLIPTA) 200-62.5-25 MCG/ACT oral inhaler Unknown  No Yes   Sig: Inhale 1 puff into the lungs daily   albuterol (PROAIR HFA/PROVENTIL HFA/VENTOLIN HFA) 108 (90 Base) MCG/ACT inhaler Unknown at PRN  No Yes   Sig: Inhale 2 puffs into the lungs every 6 hours as needed for shortness of breath or wheezing   alcohol swab prep pads   No No   Sig: Use to swab area of injection/fariha as directed.   blood glucose (ACCU-CHEK GUIDE) test strip   No No   Sig: USE TO TEST BLOOD SUGAR 1 TIMES DAILY OR AS DIRECTED.   blood glucose monitoring (ACCU-CHEK FASTCLIX) lancets   No No   Sig: USE WITH LANCETING DEVICE. TO ACCOMPANY: BLOOD GLUCOSE MONITOR BRANDS: PER INSURANCE. Pt tests once daily   blood glucose monitoring (NO BRAND SPECIFIED) meter device kit   No No   Sig: Use to test blood sugar 1 times daily or as directed. : per insurance.   diltiazem ER (TIAZAC) 240 MG 24 hr ER beaded capsule Unknown  No Yes   Sig: Take 1 capsule (240 mg) by mouth daily   diltiazem ER COATED BEADS (CARDIZEM CD) 360 MG 24 hr capsule   Yes No   Sig: Take 360 mg by mouth daily.   ferrous sulfate (FEROSUL) 325 (65 Fe) MG tablet Unknown  No Yes   Sig: Take 1 tablet (325 mg) by mouth every other day.   glipiZIDE (GLUCOTROL XL) 5 MG 24 hr tablet   No No   Sig: TAKE 1 TABLET BY MOUTH EVERY DAY   Patient not taking: Reported on 9/17/2024   metFORMIN (GLUCOPHAGE XR) 500 MG 24 hr tablet   No No   Sig: TAKE 2 TABLETS BY MOUTH DAILY WITH DINNER   Patient not taking: Reported on 8/23/2024   metoprolol succinate ER (TOPROL XL) 100 MG 24 hr tablet Unknown  No Yes   Sig: Take 1 tablet (100 mg) by mouth 2 times daily   pantoprazole (PROTONIX) 40 MG EC tablet Unknown  No Yes   Sig:  TAKE 1 TABLET BY MOUTH TWICE A DAY   potassium chloride ER (K-TAB) 20 MEQ CR tablet Unknown  No Yes   Sig: Take 1 tablet (20 mEq) by mouth daily.   rivaroxaban ANTICOAGULANT (XARELTO ANTICOAGULANT) 15 MG TABS tablet Unknown  No Yes   Sig: Take 1 tablet (15 mg) by mouth daily (with dinner).   rosuvastatin (CRESTOR) 40 MG tablet Unknown  No Yes   Sig: Take 1 tablet (40 mg) by mouth at bedtime   sacubitril-valsartan (ENTRESTO) 24-26 MG per tablet Unknown  Yes Yes   Sig: Take 0.5 tablets by mouth once.   torsemide 60 MG TABS Unknown  No Yes   Sig: Take 60 mg by mouth daily.      Facility-Administered Medications: None         Medications   Current Facility-Administered Medications   Medication Dose Route Frequency Provider Last Rate Last Admin    EPINEPHrine (ADRENALIN) 5 mg in  mL infusion  0.01-0.3 mcg/kg/min Intravenous Continuous Jay Stout MD 13.2 mL/hr at 09/22/24 0748 0.04 mcg/kg/min at 09/22/24 0748    norepinephrine (LEVOPHED) 4 mg in  mL infusion PREMIX  0.01-0.6 mcg/kg/min Intravenous Continuous Papo Lorenz MD 16.5 mL/hr at 09/22/24 1011 0.04 mcg/kg/min at 09/22/24 1011    propofol (DIPRIVAN) infusion  5-75 mcg/kg/min Intravenous Continuous Papo Lorenz MD 9.9 mL/hr at 09/22/24 1000 15 mcg/kg/min at 09/22/24 1000     Current Facility-Administered Medications   Medication Dose Route Frequency Provider Last Rate Last Admin    chlorhexidine (PERIDEX) 0.12 % solution 15 mL  15 mL Mouth/Throat Q12H Papo Lorenz MD   15 mL at 09/22/24 0745    heparin ANTICOAGULANT injection 5,000 Units  5,000 Units Subcutaneous Q8H Papo Lorenz MD   5,000 Units at 09/22/24 0610    insulin aspart (NovoLOG) injection (RAPID ACTING)  1-7 Units Subcutaneous Q4H Evaristo Dodge MD   1 Units at 09/22/24 0808    pantoprazole (PROTONIX) 2 mg/mL suspension 40 mg  40 mg Per Feeding Tube QAM AC Papo Lorenz G, MD        Or    pantoprazole (PROTONIX) IV push injection 40 mg  40 mg Intravenous  QAM AC Papo Lorenz MD   40 mg at 09/22/24 0631    piperacillin-tazobactam (ZOSYN) 3.375 g vial to attach to  mL bag  3.375 g Intravenous Q6H Papo Lorenz MD   3.375 g at 09/22/24 0557    sodium chloride (PF) 0.9% PF flush 3 mL  3 mL Intracatheter Q8H Papo Lorenz MD   3 mL at 09/22/24 0557       Allergies   No Known Allergies    Social History   I have updated and reviewed the following Social History Narrative:   Social History     Social History Narrative    .    Two adult children.    Four grandchildren.    No formal exercise.        Family History   I have reviewed this patient's family history and updated it with pertinent information if needed.   Family History   Problem Relation Age of Onset    Diabetes Type 2  Mother     Cerebral aneurysm Sister     Bladder Cancer Brother     Myocardial Infarction Brother         in his 50s    Cerebrovascular Disease No family hx of     Coronary Artery Disease Early Onset No family hx of     Prostate Cancer No family hx of     Colon Cancer No family hx of     Clotting Disorder No family hx of     Bleeding Disorder No family hx of     Anesthesia Reaction No family hx of        Review of Systems   A complete 10-point review of systems was done and is negative with the exceptions noted in HPI.      Physical Exam   Temp: 98.6  F (37  C) Temp src: Bladder BP: 124/56 Pulse: 77   Resp: 25 SpO2: 100 % O2 Device: Mechanical Ventilator    Vital Signs with Ranges  Temp:  [96.6  F (35.9  C)-98.6  F (37  C)] 98.6  F (37  C)  Pulse:  [] 77  Resp:  [13-66] 25  BP: ()/(24-86) 124/56  FiO2 (%):  [30 %-50 %] 30 %  SpO2:  [91 %-100 %] 100 %  240 lbs 15.4 oz    General: intubated, sedated   Card: Regular rate and rhythm   Neuro: Seizures      Diagnostics:  I personally reviewed the patient's EKG, lab work, and pertinent imaging    Recent Labs   Lab Test 09/22/24  0453 09/21/24  2033 09/21/24  1646 09/17/24  1537 08/12/24  1500 06/06/24  1207  11/21/22  1637 11/18/22  0714   WBC 23.2*  --  12.3*  --   --  9.7   < > 12.8*   HGB 8.7*  --  8.7* 8.1*   < > 9.7*   < > 14.2   HCT 31.4*  --  35.6  --   --  35.1   < > 46.0   MCV 71*  --  77*  --   --  72*   < > 83     --  258  --   --  261   < > 330    136 137 139   < > 142   < > 138   CO2 18* 20* 17* 29   < > 25   < > 32   BUN 52.2* 47.0* 42.5* 36.7*   < > 36.5*   < > 42*   CR 2.86* 2.60* 2.54* 2.45*   < > 1.90*   < > 1.91*   PTT  --   --   --   --   --   --   --  31    < > = values in this interval not displayed.       Last Comprehensive Metabolic Panel:  Lab Results   Component Value Date     09/22/2024    POTASSIUM 5.2 09/22/2024    CHLORIDE 102 09/22/2024    CO2 18 (L) 09/22/2024    ANIONGAP 17 (H) 09/22/2024     (H) 09/22/2024    BUN 52.2 (H) 09/22/2024    CR 2.86 (H) 09/22/2024    GFRESTIMATED 23 (L) 09/22/2024    KURT 8.4 (L) 09/22/2024         Results for orders placed or performed during the hospital encounter of 09/21/24 (from the past 24 hour(s))   CBC with platelets differential    Narrative    The following orders were created for panel order CBC with platelets differential.  Procedure                               Abnormality         Status                     ---------                               -----------         ------                     CBC with platelets and d...[[id number]]  Abnormal            Final result                 Please view results for these tests on the individual orders.   Comprehensive metabolic panel   Result Value Ref Range    Sodium 137 135 - 145 mmol/L    Potassium 6.1 (HH) 3.4 - 5.3 mmol/L    Carbon Dioxide (CO2) 17 (L) 22 - 29 mmol/L    Anion Gap 22 (H) 7 - 15 mmol/L    Urea Nitrogen 42.5 (H) 8.0 - 23.0 mg/dL    Creatinine 2.54 (H) 0.51 - 1.17 mg/dL    GFR Estimate 27 (L) >60 mL/min/1.73m2    Calcium 9.9 8.8 - 10.4 mg/dL    Chloride 98 98 - 107 mmol/L    Glucose 94 70 - 99 mg/dL    Alkaline Phosphatase 78 40 - 150 U/L    AST      ALT 62 0 - 70  "U/L    Protein Total 6.5 6.4 - 8.3 g/dL    Albumin 3.7 3.5 - 5.2 g/dL    Bilirubin Total 0.3 <=1.2 mg/dL    Narrative    The generation of reference intervals for this test is currently based on binary male or female sex. If the electronic health record information indicates another gender identity or if Legal Sex is recorded as \"Unknown\", both male and female reference intervals are provided where applicable, and should be considered according to the individual's appropriate clinical context.   CBC with platelets and differential   Result Value Ref Range    WBC Count 12.3 (H) 4.0 - 11.0 10e3/uL    RBC Count 4.63 3.80 - 5.90 10e6/uL    Hemoglobin 8.7 (L) 11.7 - 17.7 g/dL    Hematocrit 35.6 35.0 - 53.0 %    MCV 77 (L) 78 - 100 fL    MCH 18.8 (L) 26.5 - 33.0 pg    MCHC 24.4 (L) 31.5 - 36.5 g/dL    RDW 21.6 (H) 10.0 - 15.0 %    Platelet Count 258 150 - 450 10e3/uL    % Neutrophils 75 %    % Lymphocytes 16 %    % Monocytes 6 %    % Eosinophils 1 %    % Basophils 0 %    % Immature Granulocytes 2 %    NRBCs per 100 WBC 1 (H) <1 /100    Absolute Neutrophils 9.2 (H) 1.6 - 8.3 10e3/uL    Absolute Lymphocytes 1.9 0.8 - 5.3 10e3/uL    Absolute Monocytes 0.8 0.0 - 1.3 10e3/uL    Absolute Eosinophils 0.1 0.0 - 0.7 10e3/uL    Absolute Basophils 0.0 0.0 - 0.2 10e3/uL    Absolute Immature Granulocytes 0.3 <=0.4 10e3/uL    Absolute NRBCs 0.1 10e3/uL    Narrative    The generation of reference intervals for this test is currently based on binary male or female sex. If the electronic health record information indicates another gender identity or if Legal Sex is recorded as \"Unknown\", both male and female reference intervals are provided where applicable, and should be considered according to the individual's appropriate clinical context.   iStat Gases (lactate) venous, POCT   Result Value Ref Range    Lactic Acid POCT 11.5 (HH) <=2.0 mmol/L    Bicarbonate Venous POCT 17 (L) 21 - 28 mmol/L    O2 Sat, Venous POCT 59 (L) 70 - 75 %    " pCO2 Venous POCT 70 (H) 40 - 50 mm Hg    pH Venous POCT 7.00 (LL) 7.32 - 7.43    pO2 Venous POCT 47 25 - 47 mm Hg    Base Excess/Deficit (+/-) POCT -14.0 (L) -3.0 - 3.0 mmol/L   Blood Culture Peripheral Blood    Specimen: Peripheral Blood   Result Value Ref Range    Culture No growth after 12 hours    [place] Draw    Narrative    The following orders were created for panel order [place] Draw.  Procedure                               Abnormality         Status                     ---------                               -----------         ------                     Extra Blue Top Tube[[id number]]                              Final result               Extra Red Top Tube[[id number]]                               Final result                 Please view results for these tests on the individual orders.   Lactic acid whole blood   Result Value Ref Range    Lactic Acid 10.7 (HH) 0.7 - 2.0 mmol/L   Blood gas venous   Result Value Ref Range    pH Venous 6.99 (LL) 7.32 - 7.43    pCO2 Venous 68 (H) 40 - 50 mm Hg    pO2 Venous 63 (H) 25 - 47 mm Hg    Bicarbonate Venous 16 (L) 21 - 28 mmol/L    Base Excess/Deficit Venous -14.8 (L) -3.0 - 3.0 mmol/L    FIO2 30     Oxyhemoglobin Venous 71 70 - 75 %    O2 Sat, Venous 73.3 70.0 - 75.0 %    Narrative    In healthy individuals, oxyhemoglobin (O2Hb) and oxygen saturation (SO2) are approximately equal. In the presence of dyshemoglobins, oxyhemoglobin can be considerably lower than oxygen saturation.   Extra Blue Top Tube   Result Value Ref Range    Hold Specimen JIC    Extra Red Top Tube   Result Value Ref Range    Hold Specimen JIC    CT Head w/o Contrast    Narrative    EXAM: CT HEAD W/O CONTRAST  LOCATION: [facility]  DATE: 9/21/2024    INDICATION: unresponsive; No further details  COMPARISON: None.  TECHNIQUE: Routine CT Head without IV contrast. Multiplanar reformats. Dose reduction techniques were used.    FINDINGS:  INTRACRANIAL CONTENTS: No intracranial  hemorrhage, extraaxial collection, or mass effect.  No CT evidence of acute infarct. Normal parenchymal attenuation. Normal ventricles and sulci.     VISUALIZED ORBITS/SINUSES/MASTOIDS: No intraorbital abnormality. No paranasal sinus mucosal disease. No middle ear or mastoid effusion.    BONES/SOFT TISSUES: No acute abnormality.      Impression    IMPRESSION: Negative for acute intracranial hemorrhage, hydrocephalus or transcortical infarct. No skull fracture.   CT Chest Pulmonary Embolism w Contrast    Narrative    EXAM: CT CHEST PULMONARY EMBOLISM W CONTRAST  LOCATION: [facility]  DATE: 9/21/2024    INDICATION: cardiac arrest; Male sex; No prior imaging in the last 24 hours; Pulmonary Embolism Rule Out Criteria (PERC) score > 0; Revised [place] Score (RGS) not >= 11; No D dimer result available; D dimer not ordered  COMPARISON: Chest radiograph 8/30/2024  TECHNIQUE: CT chest pulmonary angiogram during arterial phase injection of IV contrast. Multiplanar reformats and MIP reconstructions were performed. Dose reduction techniques were used.   CONTRAST: 79 mL Isovue 370    FINDINGS:  ANGIOGRAM CHEST: Pulmonary arteries are mildly dilated but negative for pulmonary emboli. Thoracic aorta is not well opacified and is indeterminate for dissection. No CT evidence of acute right heart strain, although there is asymmetric enlargement of   the right atrium and right ventricle which is stable from prior chest CT.    LUNGS AND PLEURA: Mild to moderate upper lobe predominant emphysema, stable from prior exam. Similar small bilateral pleural effusions with associated compressive atelectasis. No new airspace consolidation or pneumothorax. Bilateral calcified pleural   plaques are again noted, compatible with prior asbestos exposure. Endotracheal tube with tip in the midthoracic trachea. Central airways are patent.    MEDIASTINUM/AXILLAE: No suspicious lymphadenopathy. Right internal jugular approach  central venous catheter with tip in the superior vena cava. Nasogastric tube is seen coursing through the thoracic esophagus, tip and sidehole in the stomach.    CORONARY ARTERY CALCIFICATION: Severe.    UPPER ABDOMEN: Reflux of contrast into the hepatic veins. Small volume upper abdominal ascites which is similar to prior chest CT.    MUSCULOSKELETAL: Acute, mildly displaced fractures of the anterior right third, fourth and sixth ribs. There is also a mildly displaced fracture of the costal cartilage of the right seventh rib.       Impression    IMPRESSION:  1.  No evidence of pulmonary embolism.  2.  Findings suggestive of right heart failure, including asymmetric enlargement of the right atrium and right ventricle and reflux of contrast into the hepatic veins.  3.  Multiple right-sided rib fractures, may be related to resuscitation. No pneumothorax.  4.  Lines and tubes as above.  5.  Stable volume of small bilateral pleural effusions and upper abdominal ascites.  6.  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   Troponin T, High Sensitivity   Result Value Ref Range    Troponin T, High Sensitivity 414 (HH) <=22 ng/L   BNP   Result Value Ref Range    N terminal Pro BNP Inpatient 13,211 (H) 0 - 900 pg/mL   Symptomatic Influenza A/B, RSV, & SARS-CoV2 PCR (COVID-19) Nasopharyngeal    Specimen: Nasopharyngeal; Swab   Result Value Ref Range    Influenza A PCR Negative Negative    Influenza B PCR Negative Negative    RSV PCR Negative Negative    SARS CoV2 PCR Negative Negative    Narrative    Testing was performed using the Xpert Xpress CoV2/Flu/RSV Assay on the [employer] GeneXpert Instrument. This test should be ordered for the detection of SARS-CoV2, influenza, and RSV viruses in individuals with signs and symptoms of respiratory tract infection. This test is for in vitro diagnostic use under the [place] FDA for laboratories certified under CLIA to perform high or moderate complexity testing. This test has been [place] FDA cleared. A  negative result does not rule out the presence of PCR inhibitors in the specimen or target RNA in concentration below the limit of detection for the assay. If only one viral target is positive but coinfection with multiple targets is suspected, the sample should be re-tested with another FDA cleared, approved, or authorized test, if coninfection would change clinical management. This test was validated by the [facility]. These laboratories are certified under the Clinical Laboratory Improvement Amendments of 1988 (CLIA-88) as qualified to perfom high complexity laboratory testing.   Glucose by meter   Result Value Ref Range    GLUCOSE BY METER POCT 85 70 - 99 mg/dL   XR Chest Port 1 View    Narrative    EXAM: XR CHEST PORT 1 VIEW  LOCATION: [facility]  DATE: 9/21/2024    INDICATION: Fever  COMPARISON: 8/16/2024      Impression    IMPRESSION: Endotracheal tube is 5 cm above the corinna. Enteric tube below the diaphragm. Heart mildly enlarged, unchanged. Right IJ central venous catheter tip in the mid SVC. Trace bilateral effusions with bibasilar atelectasis. Small patchy airspace   opacities are noted bilaterally, similar to prior examination.   Lactic acid whole blood   Result Value Ref Range    Lactic Acid 5.1 (HH) 0.7 - 2.0 mmol/L   Troponin T, High Sensitivity   Result Value Ref Range    Troponin T, High Sensitivity 1,054 (HH) <=22 ng/L   Basic metabolic panel   Result Value Ref Range    Sodium 136 135 - 145 mmol/L    Potassium 5.6 (H) 3.4 - 5.3 mmol/L    Chloride 99 98 - 107 mmol/L    Carbon Dioxide (CO2) 20 (L) 22 - 29 mmol/L    Anion Gap 17 (H) 7 - 15 mmol/L    Urea Nitrogen 47.0 (H) 8.0 - 23.0 mg/dL    Creatinine 2.60 (H) 0.51 - 1.17 mg/dL    GFR Estimate 26 (L) >60 mL/min/1.73m2    Calcium 8.6 (L) 8.8 - 10.4 mg/dL    Glucose 109 (H) 70 - 99 mg/dL    Narrative    The generation of reference intervals for this test is currently based on binary male or female sex. If  "the electronic health record information indicates another gender identity or if Legal Sex is recorded as \"Unknown\", both male and female reference intervals are provided where applicable, and should be considered according to the individual's appropriate clinical context.   Blood gas arterial   Result Value Ref Range    pH Arterial 7.18 (LL) 7.35 - 7.45    pCO2 Arterial 48 (H) 35 - 45 mm Hg    pO2 Arterial 202 (H) 80 - 105 mm Hg    FIO2 50     Bicarbonate Arterial 18 (L) 21 - 28 mmol/L    Base Excess/Deficit Arterial -10.2 (L) -3.0 - 3.0 mmol/L    Damaso's Test No     Oxyhemoglobin Arterial 98 92 - 100 %    O2 Sat, Arterial >100.0 (H) 95.0 - 96.0 %    Peep 8 cm H2O    Narrative    In healthy individuals, oxyhemoglobin (O2Hb) and oxygen saturation (SO2) are approximately equal. In the presence of dyshemoglobins, oxyhemoglobin can be considerably lower than oxygen saturation.   Troponin T, High Sensitivity   Result Value Ref Range    Troponin T, High Sensitivity 1,226 (HH) <=22 ng/L   US Lower Extremity Arterial rt    Narrative    EXAM: US LOWER EXTREMITY ARTERIAL RT  LOCATION: [facility]  DATE: 9/22/2024    INDICATION: Lack of pulses right LE  COMPARISON: None.  TECHNIQUE: Duplex utilizing 2D gray-scale imaging, Doppler interrogation with color-flow and spectral waveform analysis.    FINDINGS: Normal multiphasic waveforms in the arteries of the right lower extremity with no evidence for occlusion or significant stenosis.    RIGHT LOWER EXTREMITY ARTERIAL ASSESSMENT:  Common femoral artery: 114 cm/s  Profunda femoris artery: 105 cm/s  SFA (proximal): 142 cm/s  SFA (mid): 130 cm/s  SFA (distal): 96 cm/s  Popliteal artery: 108 cm/s  Anterior tibial artery: 57 cm/s  Posterior tibial artery: 93 cm/s  Dorsalis pedis artery: 40 cm/s      Impression    IMPRESSION:  1.  Normal right lower extremity arterial ultrasound.   Lactic acid whole blood   Result Value Ref Range    Lactic Acid 4.5 (HH) 0.7 - " 2.0 mmol/L   Blood gas arterial   Result Value Ref Range    pH Arterial 7.32 (L) 7.35 - 7.45    pCO2 Arterial 33 (L) 35 - 45 mm Hg    pO2 Arterial 185 (H) 80 - 105 mm Hg    FIO2 40     Bicarbonate Arterial 17 (L) 21 - 28 mmol/L    Base Excess/Deficit Arterial -8.4 (L) -3.0 - 3.0 mmol/L    Damaso's Test No     Oxyhemoglobin Arterial 98 92 - 100 %    O2 Sat, Arterial >100.0 (H) 95.0 - 96.0 %    Peep 5 cm H2O    Narrative    In healthy individuals, oxyhemoglobin (O2Hb) and oxygen saturation (SO2) are approximately equal. In the presence of dyshemoglobins, oxyhemoglobin can be considerably lower than oxygen saturation.   Glucose by meter   Result Value Ref Range    GLUCOSE BY METER POCT 141 (H) 70 - 99 mg/dL   EKG 12-lead, tracing only   Result Value Ref Range    Systolic Blood Pressure  mmHg    Diastolic Blood Pressure  mmHg    Ventricular Rate 84 BPM    Atrial Rate 84 BPM    KS Interval 174 ms    QRS Duration 112 ms     ms    QTc 588 ms    P Axis 77 degrees    R AXIS 108 degrees    T Axis 64 degrees    Interpretation ECG       Sinus rhythm  Incomplete right bundle branch block  Rightward axis  Low voltage QRS  Nonspecific T wave abnormality  Abnormal ECG  When compared with ECG of 21-Sep-2024 17:14, (unconfirmed)  Significant changes have occurred  Confirmed by MD STACY, PHONGOS ([id number]) on 9/22/2024 9:44:42 AM     CBC with Platelets & Differential    Narrative    The following orders were created for panel order CBC with Platelets & Differential.  Procedure                               Abnormality         Status                     ---------                               -----------         ------                     CBC with platelets and d...[[id number]]  Abnormal            Final result                 Please view results for these tests on the individual orders.   Lactic acid whole blood   Result Value Ref Range    Lactic Acid 2.9 (H) 0.7 - 2.0 mmol/L   Troponin T, High Sensitivity   Result Value Ref Range     "Troponin T, High Sensitivity 1,537 (HH) <=22 ng/L   Comprehensive metabolic panel   Result Value Ref Range    Sodium 137 135 - 145 mmol/L    Potassium 5.2 3.4 - 5.3 mmol/L    Carbon Dioxide (CO2) 18 (L) 22 - 29 mmol/L    Anion Gap 17 (H) 7 - 15 mmol/L    Urea Nitrogen 52.2 (H) 8.0 - 23.0 mg/dL    Creatinine 2.86 (H) 0.51 - 1.17 mg/dL    GFR Estimate 23 (L) >60 mL/min/1.73m2    Calcium 8.4 (L) 8.8 - 10.4 mg/dL    Chloride 102 98 - 107 mmol/L    Glucose 224 (H) 70 - 99 mg/dL    Alkaline Phosphatase 74 40 - 150 U/L     (H) 0 - 45 U/L     (H) 0 - 70 U/L    Protein Total 5.7 (L) 6.4 - 8.3 g/dL    Albumin 3.3 (L) 3.5 - 5.2 g/dL    Bilirubin Total 0.4 <=1.2 mg/dL    Narrative    The generation of reference intervals for this test is currently based on binary male or female sex. If the electronic health record information indicates another gender identity or if Legal Sex is recorded as \"Unknown\", both male and female reference intervals are provided where applicable, and should be considered according to the individual's appropriate clinical context.   Blood gas venous   Result Value Ref Range    pH Venous 7.35 7.32 - 7.43    pCO2 Venous 35 (L) 40 - 50 mm Hg    pO2 Venous 55 (H) 25 - 47 mm Hg    Bicarbonate Venous 19 (L) 21 - 28 mmol/L    Base Excess/Deficit Venous -5.8 (L) -3.0 - 3.0 mmol/L    FIO2 30     Oxyhemoglobin Venous 84 (H) 70 - 75 %    O2 Sat, Venous 85.8 (H) 70.0 - 75.0 %    Narrative    In healthy individuals, oxyhemoglobin (O2Hb) and oxygen saturation (SO2) are approximately equal. In the presence of dyshemoglobins, oxyhemoglobin can be considerably lower than oxygen saturation.   CBC with platelets and differential   Result Value Ref Range    WBC Count 23.2 (H) 4.0 - 11.0 10e3/uL    RBC Count 4.43 3.80 - 5.90 10e6/uL    Hemoglobin 8.7 (L) 11.7 - 17.7 g/dL    Hematocrit 31.4 (L) 35.0 - 53.0 %    MCV 71 (L) 78 - 100 fL    MCH 19.6 (L) 26.5 - 33.0 pg    MCHC 27.7 (L) 31.5 - 36.5 g/dL    RDW 21.2 " "(H) 10.0 - 15.0 %    Platelet Count 287 150 - 450 10e3/uL    % Neutrophils 92 %    % Lymphocytes 2 %    % Monocytes 6 %    % Eosinophils 0 %    % Basophils 0 %    % Immature Granulocytes 1 %    NRBCs per 100 WBC 0 <1 /100    Absolute Neutrophils 21.3 (H) 1.6 - 8.3 10e3/uL    Absolute Lymphocytes 0.4 (L) 0.8 - 5.3 10e3/uL    Absolute Monocytes 1.3 0.0 - 1.3 10e3/uL    Absolute Eosinophils 0.0 0.0 - 0.7 10e3/uL    Absolute Basophils 0.0 0.0 - 0.2 10e3/uL    Absolute Immature Granulocytes 0.1 <=0.4 10e3/uL    Absolute NRBCs 0.0 10e3/uL    Narrative    The generation of reference intervals for this test is currently based on binary male or female sex. If the electronic health record information indicates another gender identity or if Legal Sex is recorded as \"Unknown\", both male and female reference intervals are provided where applicable, and should be considered according to the individual's appropriate clinical context.   Glucose by meter   Result Value Ref Range    GLUCOSE BY METER POCT 200 (H) 70 - 99 mg/dL   XR Chest Port 1 View    Narrative    EXAM: XR CHEST PORT 1 VIEW  LOCATION: [facility]  DATE: 9/22/2024    INDICATION: Evaluate ETT  COMPARISON: 9/21/2024      Impression    IMPRESSION: Endotracheal tube tip approximately 5 cm above the corinna. Enteric drainage tube extends the stomach. Stable right IJ central venous catheter, tip mid SVC. Mild scattered bilateral airspace opacities unchanged. Trace right pleural   effusion.Pleural calcifications. Stable cardiomediastinal silhouette.   XR Abdomen Port 1 View    Narrative    EXAM: XR ABDOMEN PORT 1 VIEW  LOCATION: [facility]  DATE: 9/22/2024    INDICATION: for enteral tube placement  COMPARISON: None.      Impression    IMPRESSION: Enteric tube terminates over the proximal stomach with the side port likely just beyond the gastroesophageal junction. The distal stomach is gas-filled. Gas-filled, nondistended " "transverse colon.   Glucose by meter   Result Value Ref Range    GLUCOSE BY METER POCT 180 (H) 70 - 99 mg/dL         Clinically Significant Risk Factors Present on Admission        # Hyperkalemia: Highest K = 6.1 mmol/L in last 2 days, will monitor as appropriate   # Hypocalcemia: Lowest Ca = 8.4 mg/dL in last 2 days, will monitor and replace as appropriate    # Anion Gap Metabolic Acidosis: Highest Anion Gap = 22 mmol/L in last 2 days, will monitor and treat as appropriate  # Hypoalbuminemia: Lowest albumin = 3.3 g/dL at 9/22/2024  4:53 AM, will monitor as appropriate  # Drug Induced Coagulation Defect: home medication list includes an anticoagulant medication   # Acute Kidney Injury, unspecified: based on a >150% or 0.3 mg/dL increase in last creatinine compared to past 90 day average, will monitor renal function  # Hypertension: Noted on problem list  # Heart failure, NOS: home medication list includes sacubitril/valsartan (Entresto) and last echo with EF 40-50%  # Circulatory Shock: required vasopressors within past 24 hours     # Anemia: based on hgb <11       # Obesity: Estimated body mass index is 35.07 kg/m  as calculated from the following:    Height as of 9/9/24: 1.765 m (5' 9.5\").    Weight as of this encounter: 109.3 kg (240 lb 15.4 oz).             Native vessel CAD  Cardiac arrest    Severe brain condition POA : # Anoxic brain damage  Epilepsy    COPD      "

## 2024-09-22 NOTE — CONSULTS
[facility]    Vascular Neurology Consult Note    Reason for Consult:  S/P cardiac arrest, rhythmic movements    Chief Complaint: Cardiac Arrest       HPI  Booker Brown is a 69 year old man with PMHx significant for HTN, CAD, CKD, COPD/ILD on chronic supplemental O2, atrial fibrillation on xarelto, HLD on rosuvastatin, DM2. Wife called EMS 9/21 after pt was found unresponsive. He had PEA arrest in ambulance; 3 rounds epi and intubated before ROSC. While in the ED, again had PEA arrest w/ ROSC after 1 shock, amiodarone x 1, epi x 1.   Lactate: 10.7 -> 5.1 -> 4.5 -> 2.9  WBC 23.2  BC NGTD  SBPs , HR , afebrile  On epi gtt (0.04-0.15 mcg/kg/min overnight) and norepi (0.06-0.5 mcg/kg/min overnight) to keep MAP > 65  On propofol for sedation     Evaluation Summarized    MRI/Head CT CT: no acute pathology      Echocardiogram Limited echo: EF 50-55%, mild biatrial enlargement    EKG/Telemetry Sinus rhythm   Incomplete right bundle branch block   Possible Right ventricular hypertrophy   Nonspecific T wave abnormality    Other Testing Chest CT:   1.  No evidence of pulmonary embolism.  2.  Findings suggestive of right heart failure, including asymmetric enlargement of the right atrium and right ventricle and reflux of contrast into the hepatic veins.  3.  Multiple right-sided rib fractures, may be related to resuscitation. No pneumothorax.  4.  Lines and tubes as above.  5.  Stable volume of small bilateral pleural effusions and upper abdominal ascites.  6.  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 We will continue to follow.     Geraldine Kapoor NP  Vascular Neurology    To page me or covering stroke neurology team member, click here: [facility]  Choose \"On Call\" tab at top, then select \"NEUROLOGY/ALL SITES\" from middle drop-down box, press Enter, then look for \"stroke\" or \"telestroke\" for your site.  _____________________________________________________    Clinically Significant Risk Factors Present on Admission        # Hyperkalemia: Highest K = 6.1 mmol/L in last 2 days, will monitor as appropriate   # Hypocalcemia: Lowest Ca = 8.4 mg/dL in last 2 days, will monitor and replace as appropriate    # Anion Gap Metabolic Acidosis: Highest Anion Gap = 22 mmol/L in last 2 days, will monitor and treat as appropriate  # Hypoalbuminemia: Lowest albumin = 3.3 g/dL at 9/22/2024  4:53 AM, will monitor as appropriate  # Drug Induced Coagulation Defect: home medication list includes an anticoagulant medication   # Acute Kidney Injury, unspecified: based on a >150% or 0.3 mg/dL increase in last creatinine compared to past 90 day average, will monitor renal function  # Hypertension: Noted on problem list  # Heart failure, NOS: home medication list includes sacubitril/valsartan (Entresto) and last echo with EF 40-50%  # Circulatory Shock: required vasopressors within past 24 hours     # Anemia: based on hgb <11       # Obesity: Estimated body mass index is 35.07 kg/m  as calculated from the following:    Height as of 9/9/24: 1.765 m (5' 9.5\").    Weight as of this encounter: 109.3 kg (240 lb 15.4 oz).           # Coma: based on GCS score of <8  # CKD, Stage 4 (GFR 15-29): Will monitor and treat as appropriate  - last Cr =  2.86 mg/dL (Ref range: 0.51 - 1.17 mg/dL)  - last GFR = 23 mL/min/1.73m2 (Ref range: >60 mL/min/1.73m2)       Past Medical History    Past Medical History:   Diagnosis Date    Benign essential hypertension     CAD (coronary artery disease)     " Chronic kidney disease, stage III (moderate) (H)     COPD (chronic obstructive pulmonary disease) (H)     ILD (interstitial lung disease) (H)     Obesity (BMI 30-39.9)     Persistent atrial fibrillation (H)     Pure hypercholesterolemia     Type 2 diabetes mellitus (H)      Medications   Home Meds  Prior to Admission medications    Medication Sig Start Date End Date Taking? Authorizing Provider   albuterol (PROAIR HFA/PROVENTIL HFA/VENTOLIN HFA) 108 (90 Base) MCG/ACT inhaler Inhale 2 puffs into the lungs every 6 hours as needed for shortness of breath or wheezing 10/31/23   Booker Hopkins MD   alcohol swab prep pads Use to swab area of injection/fariha as directed. 10/30/19   Gage Zepeda MD   blood glucose (ACCU-CHEK GUIDE) test strip USE TO TEST BLOOD SUGAR 1 TIMES DAILY OR AS DIRECTED. 9/13/24   Arline White MD   blood glucose monitoring (ACCU-CHEK FASTCLIX) lancets USE WITH LANCETING DEVICE. TO ACCOMPANY: BLOOD GLUCOSE MONITOR BRANDS: PER INSURANCE. Pt tests once daily 8/21/24   Gage Zepeda MD   blood glucose monitoring (NO BRAND SPECIFIED) meter device kit Use to test blood sugar 1 times daily or as directed. : per insurance. 1/24/23   Arline White MD   diltiazem ER (TIAZAC) 240 MG 24 hr ER beaded capsule Take 1 capsule (240 mg) by mouth daily 8/12/24   Lorena Hart NP   ferrous sulfate (FEROSUL) 325 (65 Fe) MG tablet Take 1 tablet (325 mg) by mouth every other day. 9/20/24 9/15/25  Omega Oneill MD   Fluticasone-Umeclidin-Vilanterol (TRELEGY ELLIPTA) 200-62.5-25 MCG/ACT oral inhaler Inhale 1 puff into the lungs daily  Patient not taking: Reported on 9/17/2024 7/1/24   Booker Hopkins MD   glipiZIDE (GLUCOTROL XL) 5 MG 24 hr tablet TAKE 1 TABLET BY MOUTH EVERY DAY  Patient not taking: Reported on 9/17/2024 6/14/24   Arline White MD   metFORMIN (GLUCOPHAGE XR) 500 MG 24 hr tablet TAKE 2 TABLETS BY MOUTH DAILY WITH DINNER  Patient not taking:  Reported on 8/23/2024 1/22/24   Arline White MD   metoprolol succinate ER (TOPROL XL) 100 MG 24 hr tablet Take 1 tablet (100 mg) by mouth 2 times daily 8/12/24   Lorena Hart NP   pantoprazole (PROTONIX) 40 MG EC tablet TAKE 1 TABLET BY MOUTH TWICE A DAY 9/9/24   Arline White MD   potassium chloride ER (K-TAB) 20 MEQ CR tablet Take 1 tablet (20 mEq) by mouth daily. 9/9/24   Lucian Biggs MD   rivaroxaban ANTICOAGULANT (XARELTO ANTICOAGULANT) 15 MG TABS tablet Take 1 tablet (15 mg) by mouth daily (with dinner). 9/9/24   Lucian Biggs MD   rosuvastatin (CRESTOR) 40 MG tablet Take 1 tablet (40 mg) by mouth at bedtime 8/12/24   Lorena Hart NP   sacubitril-valsartan (ENTRESTO) 24-26 MG per tablet Take 0.5 tablets by mouth once. 8/23/24   Lorena Hart NP   torsemide 60 MG TABS Take 60 mg by mouth daily. 9/9/24   Lucian Biggs MD       Scheduled Meds  Current Facility-Administered Medications   Medication Dose Route Frequency Provider Last Rate Last Admin    chlorhexidine (PERIDEX) 0.12 % solution 15 mL  15 mL Mouth/Throat Q12H Papo Lorenz MD   15 mL at 09/22/24 0745    heparin ANTICOAGULANT injection 5,000 Units  5,000 Units Subcutaneous Q8H Papo Lorenz MD   5,000 Units at 09/22/24 0610    insulin aspart (NovoLOG) injection (RAPID ACTING)  1-7 Units Subcutaneous Q4H Evaristo Dodge MD   1 Units at 09/22/24 0808    pantoprazole (PROTONIX) 2 mg/mL suspension 40 mg  40 mg Per Feeding Tube QAM Papo Jerome MD        Or    pantoprazole (PROTONIX) IV push injection 40 mg  40 mg Intravenous QAM Papo Jerome MD   40 mg at 09/22/24 0631    piperacillin-tazobactam (ZOSYN) 3.375 g vial to attach to  mL bag  3.375 g Intravenous Q6H Papo Lorenz MD   3.375 g at 09/22/24 0557    sodium chloride (PF) 0.9% PF flush 3 mL  3 mL Intracatheter Q8H Papo Lorenz MD   3 mL at 09/22/24 0557       Infusion Meds  Current Facility-Administered  Medications   Medication Dose Route Frequency Provider Last Rate Last Admin    EPINEPHrine (ADRENALIN) 5 mg in  mL infusion  0.01-0.3 mcg/kg/min Intravenous Continuous Jay Stout MD 13.2 mL/hr at 09/22/24 0748 0.04 mcg/kg/min at 09/22/24 0748    norepinephrine (LEVOPHED) 4 mg in  mL infusion PREMIX  0.01-0.6 mcg/kg/min Intravenous Continuous Papo Lorenz MD 24.8 mL/hr at 09/22/24 0832 0.06 mcg/kg/min at 09/22/24 0832    propofol (DIPRIVAN) infusion  5-75 mcg/kg/min Intravenous Continuous Papo Lorenz MD 13.2 mL/hr at 09/22/24 0855 20 mcg/kg/min at 09/22/24 0855       Allergies   No Known Allergies       PHYSICAL EXAMINATION   Temp:  [96.6  F (35.9  C)-98.1  F (36.7  C)] 98.1  F (36.7  C)  Pulse:  [] 74  Resp:  [13-66] 25  BP: ()/(24-86) 115/54  FiO2 (%):  [30 %-50 %] 30 %  SpO2:  [91 %-100 %] 100 %    Neurologic  Mental Status:  unresponsive, not following commands, not tracking examiner    Cranial Nerves:  no blink to threat, PERRL but sluggish  Motor:  normal muscle tone and bulk, full body twitching in regular intervals (every 2-3 seconds), no spontaneous antigravity movement  Sensory:   withdraws from noxious stimuli in BUEs    Imaging  I personally reviewed all imaging; relevant findings per HPI.    Labs Data   CBC  Recent Labs   Lab 09/22/24  0453 09/21/24  1646 09/17/24  1537   WBC 23.2* 12.3*  --    RBC 4.43 4.63  --    HGB 8.7* 8.7* 8.1*   HCT 31.4* 35.6  --     258  --      Basic Metabolic Panel   Recent Labs   Lab 09/22/24  0805 09/22/24  0503 09/22/24  0453 09/22/24  0109 09/21/24 2033 09/21/24 2014 09/21/24  1646   NA  --   --  137  --  136  --  137   POTASSIUM  --   --  5.2  --  5.6*  --  6.1*   CHLORIDE  --   --  102  --  99  --  98   CO2  --   --  18*  --  20*  --  17*   BUN  --   --  52.2*  --  47.0*  --  42.5*   CR  --   --  2.86*  --  2.60*  --  2.54*   * 200* 224*   < > 109*   < > 94   KURT  --   --  8.4*  --  8.6*  --  9.9    < > =  values in this interval not displayed.     Liver Panel  Recent Labs   Lab 09/22/24  0453 09/21/24  1646 09/17/24  1537   PROTTOTAL 5.7* 6.5  --    ALBUMIN 3.3* 3.7 4.0   BILITOTAL 0.4 0.3  --    ALKPHOS 74 78  --    *  --   --    * 62  --      INR    Recent Labs   Lab Test 11/18/22  0714   INR 1.51*           Stroke Consult Data Data   This was a non-emergent, non-telestroke consult.  I personally examined and evaluated the patient today. At the time of my evaluation and management the patient was in critical condition today due to cardiac arrest. I personally managed review of labs/images, neuro exam. Key decisions made today included EEG. I spent a total of 35 minutes providing critical care services, evaluating the patient, directing care and reviewing laboratory values and radiologic reports.

## 2024-09-22 NOTE — PLAN OF CARE
Propofol weaned down to 15 early in shift, pt began showing signs of seizure activity w/ deviated eyes. MD notified, EEG and neuro consult ordered. Neuro started Keppra, and wanted propofol increased. Cards consult done, heparin gtt started and echo complete, EF 50-55. CXR done after echo. TF started at 25. 2 BMs this shift. Trending trops. Levophed, Epi, propofol, and heparin gtts continued. Wife and daughter at bedside most of shift.    Problem: Mechanical Ventilation Invasive  Goal: Mechanical Ventilation Liberation  Outcome: Progressing  Intervention: Promote Extubation and Mechanical Ventilation Liberation  Recent Flowsheet Documentation  Taken 9/22/2024 1600 by José Miguel Connors RN  Medication Review/Management:   medications reviewed   high-risk medications identified   infusion titrated  Taken 9/22/2024 1200 by José Miguel Connors RN  Medication Review/Management:   medications reviewed   high-risk medications identified   infusion titrated  Taken 9/22/2024 0800 by José Miguel Connors RN  Medication Review/Management:   medications reviewed   high-risk medications identified   infusion titrated     Problem: Mechanical Ventilation Invasive  Goal: Optimal Nutrition Delivery  Outcome: Progressing  Intervention: Optimize Nutrition Delivery  Recent Flowsheet Documentation  Taken 9/22/2024 0800 by José Miguel Connors RN  Nutrition Support Management: weight trending reviewed     Problem: Comorbidity Management  Goal: Maintenance of Seizure Control  Outcome: Progressing  Intervention: Maintain Seizure Symptom Control  Recent Flowsheet Documentation  Taken 9/22/2024 1600 by José Miguel Connors RN  Medication Review/Management:   medications reviewed   high-risk medications identified   infusion titrated  Taken 9/22/2024 1200 by José Miguel Connors RN  Medication Review/Management:   medications reviewed   high-risk medications identified   infusion titrated  Taken 9/22/2024 0800 by José Miguel Connors RN  Medication Review/Management:   medications  reviewed   high-risk medications identified   infusion titrated

## 2024-09-22 NOTE — PLAN OF CARE
Oxygen: CMV/AC: 26 / 520 / 30 / 5  Continuous Drips: Levo, Epi, Propofol  RASS: -3   Pain/CPOT: Per CPOT assessment, patient did not have any pain or distress this shift.   Mobility: Bedrest  Lines: R triple lumen internal jugular CVC, x2 PIV  Sofia: Ordered and in place for hourly I/Os. Adequate UO of about 30-40ml/hr  Skin: Mepilex is on. Prior to hospitalization sore on posterior L calf. Covered with mepilex. Changed IO site bandage.   Diet/Bowel: Last BM, unknown. NPO.   DVT/GI Prophylaxis: Heparin subcutaneous, SCDs, protonix  Glucose: Glucose elevated this morning. Sliding scale insulin ordered.   Antibiotics: zosyn   Events: Patient remained vitally stable overnight. NSR w/ BBB. 12 lead EKG completed. Unable to find pulse on RLE, ultrasound ordered with results of good blood flow. Due to edema, pulses are difficult to find with palpation and doppler. Patient synchronous with vent. LS clear. pH level normalizing, lactic acid trending down.   Neuro: Patient will not open eyes to stimulation. Withdraws from pain in upper extremities, no movement with stimulation in BLE. Pupils are equal in size and both sluggish.   Social: Ruby, patient's wife, was at bedside during admission. She is aware of the plan. She will be here this morning.       Problem: Mechanical Ventilation Invasive  Goal: Effective Communication  Outcome: Not Progressing  Intervention: Ensure Effective Communication  Recent Flowsheet Documentation  Taken 9/22/2024 0400 by Deisy Roche, RN  Trust Relationship/Rapport:   care explained   reassurance provided  Taken 9/22/2024 0000 by Deisy Roche, RN  Trust Relationship/Rapport:   care explained   reassurance provided  Taken 9/21/2024 2100 by Deisy Roche, RN  Family/Support System Care:   caregiver stress acknowledged   presence promoted   support provided  Trust Relationship/Rapport:   care explained   choices provided   emotional support provided   empathic listening provided    "questions answered   questions encouraged   reassurance provided   thoughts/feelings acknowledged  Goal: Optimal Nutrition Delivery  Outcome: Not Progressing     Problem: Adult Inpatient Plan of Care  Goal: Plan of Care Review  Description: The Plan of Care Review/Shift note should be completed every shift.  The Outcome Evaluation is a brief statement about your assessment that the patient is improving, declining, or no change.  This information will be displayed automatically on your shift  note.  Outcome: Progressing  Goal: Patient-Specific Goal (Individualized)  Description: You can add care plan individualizations to a care plan. Examples of Individualization might be:  \"Parent requests to be called daily at 9am for status\", \"I have a hard time hearing out of my right ear\", or \"Do not touch me to wake me up as it startles  me\".  Outcome: Progressing  Goal: Absence of Hospital-Acquired Illness or Injury  Outcome: Progressing  Intervention: Identify and Manage Fall Risk  Recent Flowsheet Documentation  Taken 9/22/2024 0400 by Deisy Roche, RN  Safety Promotion/Fall Prevention:   activity supervised   clutter free environment maintained   increased rounding and observation   increase visualization of patient   patient and family education   room door open   room near nurse's station   room organization consistent   safety round/check completed  Taken 9/22/2024 0000 by Deisy Roche, RN  Safety Promotion/Fall Prevention:   activity supervised   clutter free environment maintained   increased rounding and observation   increase visualization of patient   patient and family education   room door open   room near nurse's station   room organization consistent   safety round/check completed  Taken 9/21/2024 2100 by Deisy Roche, RN  Safety Promotion/Fall Prevention:   activity supervised   clutter free environment maintained   increased rounding and observation   increase visualization of patient   " patient and family education   room door open   room near nurse's station   room organization consistent   safety round/check completed  Intervention: Prevent Skin Injury  Recent Flowsheet Documentation  Taken 9/22/2024 0400 by Deisy Roche RN  Body Position:   turned   heels elevated   lower extremity elevated   upper extremity elevated  Skin Protection:   adhesive use limited   incontinence pads utilized   pulse oximeter probe site changed   silicone foam dressing in place   skin to device areas padded   skin to skin areas padded   transparent dressing maintained  Device Skin Pressure Protection:   absorbent pad utilized/changed   adhesive use limited   positioning supports utilized   pressure points protected   skin-to-device areas padded   skin-to-skin areas padded   tubing/devices free from skin contact  Taken 9/22/2024 0200 by Deisy Roche RN  Body Position:   turned   heels elevated   lower extremity elevated   upper extremity elevated  Taken 9/22/2024 0000 by Deisy Roche RN  Body Position:   turned   heels elevated   lower extremity elevated   upper extremity elevated  Skin Protection:   adhesive use limited   incontinence pads utilized   pulse oximeter probe site changed   silicone foam dressing in place   skin to device areas padded   skin to skin areas padded   transparent dressing maintained  Device Skin Pressure Protection:   absorbent pad utilized/changed   adhesive use limited   positioning supports utilized   pressure points protected   skin-to-device areas padded   skin-to-skin areas padded   tubing/devices free from skin contact  Taken 9/21/2024 2200 by Deisy Roche RN  Body Position:   turned   heels elevated   lower extremity elevated   upper extremity elevated  Taken 9/21/2024 2100 by Deisy Roche RN  Body Position:   turned   heels elevated   lower extremity elevated   upper extremity elevated  Skin Protection:   adhesive use limited   incontinence pads  utilized   pulse oximeter probe site changed   silicone foam dressing in place   skin to device areas padded   skin to skin areas padded   transparent dressing maintained  Device Skin Pressure Protection:   absorbent pad utilized/changed   adhesive use limited   positioning supports utilized   pressure points protected   skin-to-device areas padded   skin-to-skin areas padded   tubing/devices free from skin contact  Intervention: Prevent and Manage VTE (Venous Thromboembolism) Risk  Recent Flowsheet Documentation  Taken 9/22/2024 0400 by Deisy Roche RN  VTE Prevention/Management: SCDs on (sequential compression devices)  Taken 9/22/2024 0000 by Deisy Roche RN  VTE Prevention/Management: SCDs on (sequential compression devices)  Taken 9/21/2024 2100 by Deisy Roche RN  VTE Prevention/Management: SCDs off (sequential compression devices)  Intervention: Prevent Infection  Recent Flowsheet Documentation  Taken 9/22/2024 0400 by Deisy Roche RN  Infection Prevention:   cohorting utilized   environmental surveillance performed   equipment surfaces disinfected   hand hygiene promoted   personal protective equipment utilized   rest/sleep promoted   single patient room provided  Taken 9/22/2024 0000 by Deisy Roche RN  Infection Prevention:   cohorting utilized   environmental surveillance performed   equipment surfaces disinfected   hand hygiene promoted   personal protective equipment utilized   rest/sleep promoted   single patient room provided  Taken 9/21/2024 2100 by Deisy Roche RN  Infection Prevention:   cohorting utilized   environmental surveillance performed   equipment surfaces disinfected   hand hygiene promoted   personal protective equipment utilized   rest/sleep promoted   single patient room provided  Goal: Optimal Comfort and Wellbeing  Outcome: Progressing  Intervention: Provide Person-Centered Care  Recent Flowsheet Documentation  Taken 9/22/2024 0400 by Melchor  Deisy CARRILLO RN  Trust Relationship/Rapport:   care explained   reassurance provided  Taken 9/22/2024 0000 by Deisy Roche RN  Trust Relationship/Rapport:   care explained   reassurance provided  Taken 9/21/2024 2100 by Deisy Roche RN  Trust Relationship/Rapport:   care explained   choices provided   emotional support provided   empathic listening provided   questions answered   questions encouraged   reassurance provided   thoughts/feelings acknowledged  Goal: Readiness for Transition of Care  Outcome: Progressing     Problem: Skin Injury Risk Increased  Goal: Skin Health and Integrity  Outcome: Progressing  Intervention: Plan: Nurse Driven Intervention: Positioning  Recent Flowsheet Documentation  Taken 9/22/2024 0400 by Deisy Roche RN  Plan: Positioning Interventions:   REPOSITION Left/Right (No supine) q2h   Use wedge positioners   HOB 30 degrees or less   OFF-LOAD HEELS with pillows  Taken 9/22/2024 0000 by Deisy Roche RN  Plan: Positioning Interventions:   REPOSITION Left/Right (No supine) q2h   Use wedge positioners   HOB 30 degrees or less   OFF-LOAD HEELS with pillows  Taken 9/21/2024 2100 by Deisy Roche RN  Plan: Positioning Interventions:   REPOSITION Left/Right (No supine) q2h   Use wedge positioners   HOB 30 degrees or less   OFF-LOAD HEELS with pillows  Intervention: Plan: Nurse Driven Intervention: Moisture Management  Recent Flowsheet Documentation  Taken 9/22/2024 0400 by Deisy Roche RN  Moisture Interventions:   No brief in bed   Incontinence pad   Urinary collection device   Perineal cleanser  Taken 9/22/2024 0000 by Deisy Roche RN  Moisture Interventions:   No brief in bed   Incontinence pad   Urinary collection device   Perineal cleanser  Bathing/Skin Care:   back care   bath, chlorhexidine wipes   bath, complete   wipes, CHG   dressed/undressed   electrode patches/site rotation  Taken 9/21/2024 2100 by Deisy Roche, KUNAL  Moisture  Interventions:   No brief in bed   Incontinence pad   Urinary collection device   Perineal cleanser  Bathing/Skin Care:   back care   bath, chlorhexidine wipes   bath, complete   wipes, CHG   dressed/undressed   electrode patches/site rotation  Intervention: Plan: Nurse Driven Intervention: Friction and Shear  Recent Flowsheet Documentation  Taken 9/22/2024 0400 by Deisy Roche RN  Friction/Shear Interventions:   HOB 30 degrees or less   Silicone foam sacral dressing   Assistive lifting device (portable/ceiling lift, etc.)   Lateral transfer device (hovermat, etc.)   Repositioning device (TAP system, etc.)   Pad bony prominence (elbow pads, heel pads, ear protectors)  Taken 9/22/2024 0000 by Deisy Roche RN  Friction/Shear Interventions:   HOB 30 degrees or less   Silicone foam sacral dressing   Assistive lifting device (portable/ceiling lift, etc.)   Lateral transfer device (hovermat, etc.)   Repositioning device (TAP system, etc.)   Pad bony prominence (elbow pads, heel pads, ear protectors)  Taken 9/21/2024 2100 by Deisy Roche RN  Friction/Shear Interventions:   HOB 30 degrees or less   Silicone foam sacral dressing   Assistive lifting device (portable/ceiling lift, etc.)   Lateral transfer device (hovermat, etc.)   Repositioning device (TAP system, etc.)   Pad bony prominence (elbow pads, heel pads, ear protectors)  Intervention: Optimize Skin Protection  Recent Flowsheet Documentation  Taken 9/22/2024 0400 by Deisy Roche RN  Skin Protection:   adhesive use limited   incontinence pads utilized   pulse oximeter probe site changed   silicone foam dressing in place   skin to device areas padded   skin to skin areas padded   transparent dressing maintained  Activity Management: bedrest  Head of Bed (HOB) Positioning: HOB at 30 degrees  Taken 9/22/2024 0200 by Deisy Roche RN  Head of Bed (HOB) Positioning: HOB at 30 degrees  Taken 9/22/2024 0000 by Deisy Roche RN  Skin  Protection:   adhesive use limited   incontinence pads utilized   pulse oximeter probe site changed   silicone foam dressing in place   skin to device areas padded   skin to skin areas padded   transparent dressing maintained  Activity Management: bedrest  Head of Bed (HOB) Positioning: HOB at 30 degrees  Taken 9/21/2024 2200 by Deisy Roche RN  Head of Bed (HOB) Positioning: HOB at 30 degrees  Taken 9/21/2024 2100 by Deisy Roche RN  Skin Protection:   adhesive use limited   incontinence pads utilized   pulse oximeter probe site changed   silicone foam dressing in place   skin to device areas padded   skin to skin areas padded   transparent dressing maintained  Activity Management: bedrest  Head of Bed (HOB) Positioning: HOB at 30 degrees     Problem: Mechanical Ventilation Invasive  Goal: Optimal Device Function  Outcome: Progressing  Intervention: Optimize Device Care and Function  Recent Flowsheet Documentation  Taken 9/22/2024 0400 by Deisy Roche RN  Airway Safety Measures:   all equipment/monitors on and audible   mask at bedside   manual resuscitator/mask/valve at bedside   oxygen flowmeter   suction at bedside   suction equipment   suction regulator   high-efficiency antimicrobial filters maintained  Oral Care:   swabbed with antiseptic solution   suction provided  Taken 9/22/2024 0200 by Deisy Roche RN  Oral Care:   swabbed with antiseptic solution   suction provided   lip/mouth moisturizer applied  Taken 9/22/2024 0000 by Deisy Roche RN  Airway Safety Measures:   all equipment/monitors on and audible   mask at bedside   manual resuscitator/mask/valve at bedside   oxygen flowmeter   suction at bedside   suction equipment   suction regulator   high-efficiency antimicrobial filters maintained  Oral Care:   swabbed with antiseptic solution   suction provided  Taken 9/21/2024 2200 by Deisy Roche RN  Oral Care:   swabbed with antiseptic solution   suction provided    lip/mouth moisturizer applied  Taken 9/21/2024 2100 by Deisy Roche RN  Airway Safety Measures:   all equipment/monitors on and audible   mask at bedside   manual resuscitator/mask/valve at bedside   oxygen flowmeter   suction at bedside   suction equipment   suction regulator   high-efficiency antimicrobial filters maintained  Oral Care:   swabbed with antiseptic solution   suction provided  Goal: Mechanical Ventilation Liberation  Outcome: Progressing  Intervention: Promote Extubation and Mechanical Ventilation Liberation  Recent Flowsheet Documentation  Taken 9/22/2024 0400 by Deisy Roche RN  Medication Review/Management:   medications reviewed   high-risk medications identified   infusion titrated  Taken 9/22/2024 0000 by Deisy Roche RN  Medication Review/Management:   medications reviewed   high-risk medications identified   infusion titrated  Taken 9/21/2024 2100 by Deisy Roche RN  Medication Review/Management:   medications reviewed   high-risk medications identified   infusion titrated  Goal: Absence of Device-Related Skin and Tissue Injury  Outcome: Progressing  Intervention: Maintain Skin and Tissue Health  Recent Flowsheet Documentation  Taken 9/22/2024 0400 by Deisy Roche RN  Device Skin Pressure Protection:   absorbent pad utilized/changed   adhesive use limited   positioning supports utilized   pressure points protected   skin-to-device areas padded   skin-to-skin areas padded   tubing/devices free from skin contact  Taken 9/22/2024 0000 by Deisy Roche RN  Device Skin Pressure Protection:   absorbent pad utilized/changed   adhesive use limited   positioning supports utilized   pressure points protected   skin-to-device areas padded   skin-to-skin areas padded   tubing/devices free from skin contact  Taken 9/21/2024 2100 by Deisy Roche RN  Device Skin Pressure Protection:   absorbent pad utilized/changed   adhesive use limited   positioning supports  utilized   pressure points protected   skin-to-device areas padded   skin-to-skin areas padded   tubing/devices free from skin contact  Goal: Absence of Ventilator-Induced Lung Injury  Outcome: Progressing  Intervention: Prevent Ventilator-Associated Pneumonia  Recent Flowsheet Documentation  Taken 9/22/2024 0400 by Deisy Roche RN  Oral Care:   swabbed with antiseptic solution   suction provided  Head of Bed (HOB) Positioning: HOB at 30 degrees  Taken 9/22/2024 0200 by Deisy Roche RN  Oral Care:   swabbed with antiseptic solution   suction provided   lip/mouth moisturizer applied  Head of Bed (HOB) Positioning: HOB at 30 degrees  Taken 9/22/2024 0000 by Deisy Roche RN  Oral Care:   swabbed with antiseptic solution   suction provided  Head of Bed (HOB) Positioning: HOB at 30 degrees  Taken 9/21/2024 2200 by Deisy Roche RN  Oral Care:   swabbed with antiseptic solution   suction provided   lip/mouth moisturizer applied  Head of Bed (HOB) Positioning: HOB at 30 degrees  Taken 9/21/2024 2100 by Deisy Roche RN  Oral Care:   swabbed with antiseptic solution   suction provided  Head of Bed (HOB) Positioning: HOB at 30 degrees     Problem: Pain Acute  Goal: Optimal Pain Control and Function  Outcome: Met  Intervention: Prevent or Manage Pain  Recent Flowsheet Documentation  Taken 9/22/2024 0400 by Deisy Roche RN  Medication Review/Management:   medications reviewed   high-risk medications identified   infusion titrated  Taken 9/22/2024 0000 by Deisy Roche RN  Medication Review/Management:   medications reviewed   high-risk medications identified   infusion titrated  Taken 9/21/2024 2100 by Deisy Roche RN  Medication Review/Management:   medications reviewed   high-risk medications identified   infusion titrated    Deisy Roche RN on 9/22/2024 at 7:01 AM

## 2024-09-22 NOTE — PROGRESS NOTES
Replaced by [facility] ICU VENTILATOR RESPIRATORY NOTE  Date of Admission: 09/21/2024  Date of Intubation (most recent): 09/21/2024  Reason for Mechanical Ventilation: Cardiac Arrest and Airway protection  Number of Days on Mechanical Ventilation: 2  Met Criteria for Pressure Support Trial: No  Reason for No Pressure Support Trial: Deep Sedation  Significant Events Today: EEG started to day.     Skin: Skin integrity is good without any issues.     Plan:  Continue full ventilator support at this time. Assess for readiness to wean as tolerated.

## 2024-09-22 NOTE — PROGRESS NOTES
[facility] ICU RESPIRATORY NOTE        Date of Admission: 9/21/2024    Date of Intubation (most recent): 9/21/2024    Reason for Mechanical Ventilation: Cardiac arrest    Number of Days on Mechanical Ventilation: 2    Met Criteria for Spontaneous Breathing Trial: No    Reason for No Spontaneous Breathing Trial: Per MD    Bite Block: No    Significant Events Today: none    ABG Results:   Recent Labs   Lab 09/22/24  0106 09/21/24  2051 09/21/24  1651   PH 7.32* 7.18*  --    PCO2 33* 48*  --    PO2 185* 202*  --    HCO3 17* 18*  --    O2PER 40 50 30         Current Vent Settings: FiO2 (%): 30 %, Resp: 26, Vent Mode: CMV/AC, Resp Rate (Set): 26 breaths/min, Tidal Volume (Set, mL): 520 mL, PEEP (cm H2O): 5 cmH2O, Resp Rate (Set): 26 breaths/min, Tidal Volume (Set, mL): 520 mL, PEEP (cm H2O): 5 cmH2O    Skin Assessment: intact.    Plan: Continue full ventilatory support and wean as tolerated.    Angie Elder RT on 9/22/2024 at 4:38 AM

## 2024-09-22 NOTE — PHARMACY-ADMISSION MEDICATION HISTORY
Pharmacy Intern Admission Medication History    Admission medication history is complete. The information provided in this note is only as accurate as the sources available at the time of the update.    Information Source(s): Family member via  list provided by family. List from MD office 9/17/24    Pertinent Information: I believe the Diltiazem 360 mg was reduced to 240 mg per MD clinic notes recently. The entresto may being held in last few days but unsure as still on list. Glipizide/Metformin seems to being on hold as on 9/17/24    Changes made to PTA medication list:  Added: None  Deleted: None  Changed: None    Allergies reviewed with patient and updates made in EHR: no (done)    Medication History Completed By: Rick Zhou [facility] 9/22/2024 11:01 AM    PTA Med List   Medication Sig Note Last Dose    albuterol (PROAIR HFA/PROVENTIL HFA/VENTOLIN HFA) 108 (90 Base) MCG/ACT inhaler Inhale 2 puffs into the lungs every 6 hours as needed for shortness of breath or wheezing 9/21/2024: Last filled 7/11/24 Unknown at [facility]    diltiazem ER (TIAZAC) 240 MG 24 hr ER beaded capsule Take 1 capsule (240 mg) by mouth daily 9/21/2024: Filled 9/17/24 #30/30 day supply Unknown    ferrous sulfate (FEROSUL) 325 (65 Fe) MG tablet Take 1 tablet (325 mg) by mouth every other day.  Unknown    Fluticasone-Umeclidin-Vilanterol (TRELEGY ELLIPTA) 200-62.5-25 MCG/ACT oral inhaler Inhale 1 puff into the lungs daily 9/21/2024: Filled 7/1/24 (30 day supply) Unknown    metoprolol succinate ER (TOPROL XL) 100 MG 24 hr tablet Take 1 tablet (100 mg) by mouth 2 times daily 9/21/2024: Filled 6/25 #180/90 day supply Unknown    pantoprazole (PROTONIX) 40 MG EC tablet TAKE 1 TABLET BY MOUTH TWICE A DAY 9/21/2024: Filled 9/9 #180/90 day supply   Unknown    potassium chloride ER (K-TAB) 20 MEQ CR tablet Take 1 tablet (20 mEq) by mouth daily. 9/21/2024: Filled 9/9 #30/30 day supply Unknown    rivaroxaban ANTICOAGULANT (XARELTO ANTICOAGULANT) 15 MG TABS  tablet Take 1 tablet (15 mg) by mouth daily (with dinner). 9/21/2024: Filled 9/9/24 #90/90 day supply Unknown    rosuvastatin (CRESTOR) 40 MG tablet Take 1 tablet (40 mg) by mouth at bedtime 9/21/2024: Filled 8/17 #90/90 day supply Unknown    sacubitril-valsartan (ENTRESTO) 24-26 MG per tablet Take 0.5 tablets by mouth once. 9/21/2024: Filled 7/19 #180/90 day supply Unknown    torsemide 60 MG TABS Take 60 mg by mouth daily. 9/21/2024: Filled 9/9 as 20 mg tabs #90/30 day supply Unknown

## 2024-09-22 NOTE — H&P
"Intensivist H&P    68 YO presented to ED in cardiac arrest.  Found in PEA in field, underwent CPR with three round of epinepherine with ROSC.  While in ED with central line placement developed bradycardia followed by wide complex tachycardia; received atropine and epinepherine followed by cardioversion.  Severely acidotic in ED with elevated lactic acidosis, troponin 414.  Head CT without findings of bleed or stroke; CT PE study with no PE, bibasilar infiltrates, ETT appropriate.  In ED received Zosyn.  Last ECHO 8-24 with EF 45-50%, base to mid hypokinesis, mild increase in RV pressure, no pericardial effusion.    Found by wife unresponsive at kitchen table, was seen a few minutes prior to that.  Returned from dentist earlier in day, had dental extraction done under local anesthesia.  Has not been feeling well for the past several weeks with fatigue, dizziness, and SOB. SOB has been present the last 3-4 days, + cough that is not productive.  No chest pain.  Fell a few days ago injurying tooth, had dental extraction today under only local anesthesia.  Had MI one year ago, was told by MD in clinic, no catherization done secondary to other medical problems per wife.  Underwent pericardiocentesis one year ago, felt better, had repeat procedure on week ago without benefit.  Wife is unclear why fluid was around heart.  History of \"scarring\" in lung, ? Asbestos.  Worked in foundry.    All:  None known but unable to assess  Med Prob:  CHF, DM, CKD, hypertension, hyperlipidemia.  PSH:  Unable to assess  Meds:  Reviewed in EPIC    FH: Unable to obtain  secondary to be intubated and sedated    ROS:  Unable to obtain as  intubated and sedated    PE: Gen:  Sedated.  No seizure activity when evaluated  V:  AF P 80's /55  on NE and epinepherine  Vent AC 22  PEEP 8 FiO2 30%  VBG 6.99/68/63  HEENT:  Pupils equal and reactive, no sinus tenderness, orally intubated, n JVD  Lungs:  Decreased BS in bases, otherwise " clear  Ht:  RRR, no mgr  Abd:  Hypoactive BS, obese, NT  Ext:  2+ woody edema in LE  Neuro:  CN 2-12 intact, no reaction to deep pain but sedated on propofol    Labs: Fully reviewed in EPIC.  Na 137, K 6.1, HCO3 17, Glu 94, BUN 43, creat 2.54.  Ca 9.9, Alb 3.7, ALT 63.  WBC 12.3, Hb 8.7, plt 258; LA 11.5, 10.7.  Troponin 414.  BNP 69871.    CT PE personally reviewed:  No pulmonary embolism, patchy infiltrates, no pericardial effusion.    Assessment and plan:  68 YO with history of CAD and past pericardial effusion of unclear etiology who has been feeling poorly for the past several days with weakness and falls. Suffered cardiac arrest, initial rhythm was PEA, successful resuscitated.  MP, on vasopressors for shock.  Liklely aspiration pneumonia/pneumonitis.    Neuro- Cardiac arrest, significant down time until ROSC.  At risk for seizures, anoxic injury.  Initial head CT without findings of stroke or bleed.  Doing ok on propofol.  - Continue propofol  - Monitor for seizure activity  - Repeat head imaging (CT versus MRI) if does not awaken; may need EEG based on mental status    2. Pulmonary- acute respiratory failure.  Minimal oxygen requirement.  Aspiration pneumonia versus pneumonitis.  - Continue current vent support.  Follow-up ABG, was severely acidotic on admission  - Emperic Zosyn with concerns for aspiration  - Sputum gram stain and culture  - Assess for extubation depending on mental status and hemodynamics    3. CV- Cardiac arrest in setting of history of CAD.  Troponin increasing; EKG without acute changes  - Cardiology consult  - ECHO  - Discuss with Cardiology regarding heparin and ASA    4. Renal- MP, unclear baseline creatinine  - Follow UOP and electrolytes  - Renally adjust all medications    5. Endocrine- follow blood glucose    6. GI- no active issues, follow LFT's    Total Critical care time excluding time for teaching and procedures was 70 minutes.    Ibis Lorenz MD  [phone]

## 2024-09-22 NOTE — CONSULTS
"CLINICAL NUTRITION SERVICES  -  ASSESSMENT NOTE    Recommendations Ordered by Registered Dietitian (RD):     TF initiation today ~  Type of Feeding Tube: OG  Enteral Frequency:  Continuous  Enteral Regimen: Promote @ 55 mL/hr  --> Start @ 25 mL/hr and advance by 15 mL q 12 hours to goal rate.   Propofol at 13.2 mL/hr = 348 kcal  Total Enteral Provisions: 1320 kcals, 82 g PRO, 1107 ml free H20, 171 g CHO, and 0 g fiber daily.   + 1 pkt Prosource TF20 TID = 240 kcal, 60g PRO  Total (TF + propofol + prosource) = 1908 kcal (17 kcal/kg, 102% energy needs), 142g PRO (1.9 g/kg, 95% protein needs)  Free Water Flush: 60 mL q 4 hours for tube patency (360 mL daily)  Total Free Water Provisions: 1467 mL daily    - Ordered Mg and Phos labs     Malnutrition:   % Weight Loss:  AMBER  % Intake:  AMBER  Subcutaneous Fat Loss:  Orbital region mild depletion, need full NFPE  Muscle Loss:  Temporal region moderate depletion, need full NFPE  Fluid Retention: trace generalized    Malnutrition Diagnosis: Unable to determine due to lack of full NFPE and nutrition/wt hx     REASON FOR ASSESSMENT  Booker Brown is a 69 year old adult seen by Registered Dietitian for Provider Order - Registered Dietitian to Assess and Order TF per Medical Nutrition Therapy Protocol.    PMH of CHF, DM, CKD, HTN, HLD. Presents d/t cardiac arrest.    NUTRITION HISTORY    - Patient currently intubated/sedated. Daughter in room and said he has lost >20# in the past year. She didn't elaborate on why but just said he has been eating less. Discussed with daughter plan for TF initiation today.    CURRENT NUTRITION ORDERS  Diet Order: NPO     Current Intake/Tolerance:  N/A    NUTRITION FOCUSED PHYSICAL ASSESSMENT FOR DIAGNOSING MALNUTRITION)  Yes - visual as other cares were occurring          Observed:    Muscle wasting (refer to documentation in Malnutrition section)    ANTHROPOMETRICS  Height: 5'9.5\"  Weight: 109.3 kg, 240 lbs 15.4 oz  Body mass index is 35.07 " kg/m .  Weight Status:  Obesity Grade II BMI 35-39.9  IBW: 74.1 kg  % IBW: 148%  Weight History: wt down since 2023 (suspect fluid up this admit). Difficult to assess true dry weight loss with fluid status.  Wt Readings from Last 15 Encounters:   09/22/24 109.3 kg (240 lb 15.4 oz)   09/17/24 110.2 kg (243 lb)   09/09/24 113.3 kg (249 lb 11.2 oz)   08/23/24 102.1 kg (225 lb)   08/12/24 102.1 kg (225 lb)   06/21/24 102.1 kg (225 lb)   06/06/24 103.2 kg (227 lb 8 oz)   06/05/24 102.3 kg (225 lb 8 oz)   01/05/24 105.2 kg (232 lb)   12/15/23 102.8 kg (226 lb 11.2 oz)   07/28/23 115.2 kg (254 lb)   07/19/23 115.2 kg (254 lb)   07/09/23 (P) 114.5 kg (252 lb 8 oz)   06/06/23 117 kg (258 lb)   03/30/23 117.9 kg (260 lb)     LABS  BUN 52.2 (H), Cr 2.86 (H),  (H),  (H), -224 (H)    MEDICATIONS  Medium sliding scale insulin  Epinephrine  Levophed  Propofol    ASSESSED NUTRITION NEEDS PER APPROVED PRACTICE GUIDELINES:  Dosing Weight 109.3 kg (energy); 74.1 kg IBW (protein)  Estimated Energy Needs: 1802-6858 kcals (14-17 Kcal/Kg)  Justification: obese and vented  Estimated Protein Needs: 148+ grams protein (2+ g pro/Kg)  Justification: obesity guidelines   Estimated Fluid Needs: 5176-4599 mL (1 mL/Kcal)  Justification: maintenance or per MD    NUTRITION DIAGNOSIS:  Inadequate oral intake related to NPO as evidenced by need for nutrition support to be initiated today    NUTRITION INTERVENTIONS  Recommendations / Nutrition Prescription  See above    Implementation  Nutrition education: Not appropriate at this time due to patient condition  EN Composition, EN Schedule, and Feeding Tube Flush - ordered  Collaboration of care - discussed TF start with MD  Ordered Phos and Mg labs    Nutrition Goals  EN to meet % estimated needs at goal    MONITORING AND EVALUATION:  Progress towards goals will be monitored and evaluated per protocol and Practice Guidelines    Lina Flores RD, LD  Clinical Dietitian -  [facility]

## 2024-09-22 NOTE — PROGRESS NOTES
"Intensivist H&P    68 YO presented to ED in cardiac arrest.  Found in PEA in field, underwent CPR with three round of epinepherine with ROSC.  While in ED with central line placement developed bradycardia followed by wide complex tachycardia; received atropine and epinepherine followed by cardioversion.  Severely acidotic in ED with elevated lactic acidosis, troponin 414.  Head CT without findings of bleed or stroke; CT PE study with no PE, bibasilar infiltrates, ETT appropriate.  In ED received Zosyn.  Last ECHO 8-24 with EF 45-50%, base to mid hypokinesis, mild increase in RV pressure, no pericardial effusion.    Found by wife unresponsive at kitchen table, was seen a few minutes prior to that.  Returned from dentist earlier in day, had dental extraction done under local anesthesia.  Has not been feeling well for the past several weeks with fatigue, dizziness, and SOB. SOB has been present the last 3-4 days, + cough that is not productive.  No chest pain.  Fell a few days ago injurying tooth, had dental extraction today under only local anesthesia.  Had MI one year ago, was told by MD in clinic, no catherization done secondary to other medical problems per wife.  Underwent pericardiocentesis one year ago, felt better, had repeat procedure on week ago without benefit.  Wife is unclear why fluid was around heart.  History of \"scarring\" in lung, ? Asbestos.  Worked in foundry.    All:  None known but unable to assess  Med Prob:  CHF, DM, CKD, hypertension, hyperlipidemia.  PSH:  Unable to assess  Meds:  Reviewed in EPIC    FH: Unable to obtain  secondary to be intubated and sedated    ROS:  Unable to obtain as  intubated and sedated    PE: Gen:  Sedated.  No seizure activity when evaluated  V:  AF P 80's /55  on NE and epinepherine  Vent AC 22  PEEP 8 FiO2 30%  VBG 6.99/68/63  HEENT:  Pupils equal and reactive, no sinus tenderness, orally intubated, n JVD  Lungs:  Decreased BS in bases, otherwise " clear  Ht:  RRR, no mgr  Abd:  Hypoactive BS, obese, NT  Ext:  2+ woody edema in LE  Neuro:  CN 2-12 intact, no reaction to deep pain but sedated on propofol    Labs: Fully reviewed in EPIC.  Na 137, K 6.1, HCO3 17, Glu 94, BUN 43, creat 2.54.  Ca 9.9, Alb 3.7, ALT 63.  WBC 12.3, Hb 8.7, plt 258; LA 11.5, 10.7.  Troponin 414.  BNP 61918.    CT PE personally reviewed:  No pulmonary embolism, patchy infiltrates, no pericardial effusion.    Assessment and plan:  68 YO with history of CAD and past pericardial effusion of unclear etiology who has been feeling poorly for the past several days with weakness and falls. Suffered cardiac arrest, initial rhythm was PEA, successful resuscitated.  MP, on vasopressors for shock.  Liklely aspiration pneumonia/pneumonitis.    Neuro- Cardiac arrest, significant down time until ROSC.  At risk for seizures, anoxic injury.  Initial head CT without findings of stroke or bleed.  Doing ok on propofol.  - Continue propofol  - Monitor for seizure activity  - Repeat head imaging (CT versus MRI) if does not awaken; may need EEG based on mental status    2. Pulmonary- acute respiratory failure.  Minimal oxygen requirement.  Aspiration pneumonia versus pneumonitis.  - Continue current vent support.  Follow-up ABG, was severely acidotic on admission  - Emperic Zosyn with concerns for aspiration  - Sputum gram stain and culture  - Assess for extubation depending on mental status and hemodynamics    3. CV- Cardiac arrest in setting of history of CAD.  Troponin increasing; EKG without acute changes  - Cardiology consult  - ECHO  - Discuss with Cardiology regarding heparin and ASA    4. Renal- MP, unclear baseline creatinine  - Follow UOP and electrolytes  - Renally adjust all medications    5. Endocrine- follow blood glucose    6. GI- no active issues, follow LFT's    Total Critical care time excluding time for teaching and procedures was 50 minutes.    Hussain Ron MD  [phone]

## 2024-09-23 NOTE — PROGRESS NOTES
ICU PROGRESS NOTE  09/23/2024        Date of Service (when I saw the patient): 09/23/2024    ASSESSMENT:  70 YO with history of CAD and past pericardial effusion of unclear etiology who has been feeling poorly for the past several days with weakness and falls. Suffered cardiac arrest, initial rhythm was PEA, successful resuscitated. MP, on vasopressors for shock aspiration pneumonia/pneumonitis and   myoclonic Status epilepticus.     CHANGES and MAJOR THINGS TODAY:   Transition to comfort care when family ready  - See [facility] for orders   Family updated at bedside, comfort cares and dying process discussed with family.   Pallative to see family today if able       Time spent on this Encounter   Billing:  I spent 60 minutes bedside and on the inpatient unit today managing the critical care of Booker Brown, excluding procedures in relation to the issues listed in this note.      Matthew Wylie PA-C    ====================================  INTERVAL HISTORY:  Course reviewed. Sedated, not able to perform ROS. Wife at bedside. Cares discussed. Plan to transition to comfort cares     OBJECTIVE:   1. VITAL SIGNS:   Temp:  [98.2  F (36.8  C)-99.5  F (37.5  C)] 99  F (37.2  C)  Pulse:  [] 72  Resp:  [10-29] 25  BP: ()/(41-69) 122/58  FiO2 (%):  [30 %] 30 %  SpO2:  [86 %-100 %] 100 %  FiO2 (%): 30 %, Resp: 25, Vent Mode: CMV/AC, Resp Rate (Set): 26 breaths/min, Tidal Volume (Set, mL): 520 mL, PEEP (cm H2O): 5 cmH2O, Resp Rate (Set): 26 breaths/min, Tidal Volume (Set, mL): 520 mL, PEEP (cm H2O): 5 cmH2O    2. INTAKE/ OUTPUT:   I/O last 3 completed shifts:  In: 2653.77 [I.V.:1433.77; NG/GT:240]  Out: 1655 [Urine:1655]    3. PHYSICAL EXAMINATION:  General: sedated  HEENT: pupils 3mm and reactive. Cont. V EEG monitoring in place. ETT present and secured.   Neuro:  sedated   Pulm/Resp: Clear breath sounds bilaterally without rhonchi, crackles or wheezes/   CV: RRR S/S2   Abdomen: Soft, non-distended, non-tender  no rebound tenderness or guarding, no masses  : (+) lozada catheter in place, urine yellow and clear  Incisions/Skin: warm and dry,no rashes    4. INVESTIGATIONS:   Arterial Blood Gases   Recent Labs   Lab 24   PH 7.32* 7.18*   PCO2 33* 48*   PO2 185* 202*   HCO3 17* 18*     Complete Blood Count   Recent Labs   Lab 24  164   WBC 15.1* 16.1* 23.2* 12.3*   HGB 8.9* 6.8* 8.7* 8.7*    189 287 258     Basic Metabolic Panel  Recent Labs   Lab 24  0809 24  0004 24   NA  --   --  139  --   --  140  --  137  --  136   POTASSIUM  --   --  4.4  --   --  4.5  --  5.2  --  5.6*   CHLORIDE  --   --  105  --   --  106  --  102  --  99   CO2  --   --  20*  --   --  20*  --  18*  --  20*   BUN  --   --  57.1*  --   --  57.9*  --  52.2*  --  47.0*   CR  --   --  3.30*  --   --  3.17*  --  2.86*  --  2.60*   * 168* 212* 163*   < > 195*   < > 224*   < > 109*    < > = values in this interval not displayed.     Liver Function Tests  Recent Labs   Lab 24  1646   AST 91* 119* 243*  --    ALT 93* 107* 154* 62   ALKPHOS 67 57 74 78   BILITOTAL 0.4 0.4 0.4 0.3   ALBUMIN 3.1* 3.1* 3.3* 3.7     Pancreatic Enzymes  No lab results found in last 7 days.  Coagulation Profile  Recent Labs   Lab 24 24  1402   PTT 49* 116* 36         5. RADIOLOGY:   Recent Results (from the past 24 hour(s))   Echo Limited   Result Value    LVEF  50-55%    Narrative    [id number]  [id number]  [id number]  [id number]^CHARISSE^AIME^[place]     [facility]  Echocardiography Laboratory  [facility] [id number]     Name: OMEGA WILLSI  MRN: [id number]  : 1955  Study Date: 2024 01:54 PM  Age: 69 yrs  Gender: Male  Patient Location:  [facility]  Reason For Study: Cardiac Arrest  Ordering Physician: AIME MCQUEEN  Referring Physician: Arline White,  Performed By: Roro Kingston     BSA: 2.2 m2  Height: 69 in  Weight: 243 lb  HR: 78  BP: 112/51 mmHg  ______________________________________________________________________________  Procedure  Limited Portable Echo Adult. Optison (NDC #[id number]) given intravenously.  ______________________________________________________________________________  Interpretation Summary     Left ventricular systolic function is low normal. The visual ejection fraction  is 50-55%.  Basal to mid inferior hypokinesis and septal flattenning consistent with RV  pressure/volume overload.  The right ventricle is normal in size and function.  There is mild (1+) tricuspid regurgitation.  Mild (35-45mmHg) pulmonary hypertension is present.     In compartison to the echo from 8/9/24, there is now a large right pleural  effusion.     ______________________________________________________________________________  Left Ventricle  The left ventricle is normal in size. There is normal left ventricular wall  thickness. The visual ejection fraction is 50-55%. Left ventricular systolic  function is low normal.     Right Ventricle  The right ventricle is normal in size and function.     Atria  There is mild biatrial enlargement.     Mitral Valve  There is mild mitral annular calcification. There is mild (1+) mitral  regurgitation.     Tricuspid Valve  There is mild (1+) tricuspid regurgitation. The right ventricular systolic  pressure is elevated at 36.0 mmHg. Mild (35-45mmHg) pulmonary hypertension is  present.     Aortic Valve  There is mild (1+) aortic regurgitation.     Pulmonic Valve  The pulmonic valve is not well visualized. There is trace to mild pulmonic  valvular regurgitation.     Vessels  Normal size ascending aorta. Unable to assess mean RA pressure given the  patient is on a ventilator.     Rhythm  Sinus rhythm was  noted.     ______________________________________________________________________________  MMode/2D Measurements & Calculations  IVSd: 0.97 cm  LVIDd: 5.1 cm  LVIDs: 3.7 cm  LVPWd: 0.92 cm  FS: 27.4 %  LV mass(C)d: 172.0 grams  LV mass(C)dI: 76.7 grams/m2     LA dimension: 4.4 cm  asc Aorta Diam: 3.7 cm  Asc Ao diam index BSA (cm/m2): 1.6  Asc Ao diam index Ht(cm/m): 2.1  RWT: 0.36  TAPSE: 2.0 cm     Doppler Measurements & Calculations  TR max nas: 256.7 cm/sec  TR max P.0 mmHg  RV S Nas: 8.9 cm/sec     ______________________________________________________________________________  Report approved by: MD Ruth Mays 2024 03:16 PM         XR Chest Port 1 View    Narrative    EXAM: XR CHEST PORT 1 VIEW  LOCATION: [facility]  DATE: 2024    INDICATION: Concern for worsenign right pleural effusion, intubated  COMPARISON: Same day chest radiograph.      Impression    IMPRESSION: Stable enlargement of the cardiomediastinal silhouette. Stable position of support lines and tubes. Bilateral calcified pleural plaques, compatible with asbestos exposure. No significant change in volume of small bilateral pleural effusions.   No definite pneumothorax. Bones are unchanged.       =========================================

## 2024-09-23 NOTE — DISCHARGE SUMMARY
Discharge Summary  [facility] ICU       Date of Admission:  9/21/2024  Date of Discharge:  9/23/2024  4:08 PM  Discharging Provider: Matthew Wylie PA-C  Date of Service (when I saw the patient): 09/23/24    Discharge Diagnoses   Primary Diagnoses   Death     Secondary Diagnoses   Cardiac Arrest-PEA   Anoxic Brain injury  Encephalopathy   Myoclonic status epilepticus   Aspiration pneumonia/pneumonitis   COPD on chronic supplemental oxygen   Atrial fibrillation   Chronic heart failure with preserved EF  Diabetes Mellitus   Hypertension   Hyperlipidemia   Acute kidney injury       History of Present Illness  and Hospital Course   Booker Bronw is an 69 year old male with past medical history notable for  CAD, CKD, COPD/ILD on chronic supplemental O2, atrial fibrillation on xarelto, hyperlipidemia on rosuvastatin, DM2 who presented to [facility] on 9/21/24 in cardiac arrest. Per EMS, Wife called EMS 9/21 after pt was found unresponsive. He was reported to be not breathing with faint carotid pulse. He Underwent CPR with three round of epinepherine with ROSC. While in ED, he developed bradycardia followed by wide complex tachycardia; he received atropine and epinepherine followed by cardioversion amiodarone, x1, Epinephrine x1 with ROSC.       He was admitted to the ICU for post cardiac cares.  Initial head CT without findings of stroke or bleed , but due to his cardiac arrest, there was concern for anoxic brain injury, Neurology was consulted. He was monitored with continuous vEEG  which showed myoclonic status epilepticus and treated with IV Keppra and suppression with propofol. Cardiology was consulted and this did not show and ST- T changes on EKG.  He required  2 unit pRBC blood transfusion on 9/22/24 for hgb of 6.8 and carotid massage for episodes of SVT. Given his guarded prognosis and multiple co-morbid medical conditions, patient's goals of care were discussed with family.  Ultimately transitioned to  comfort cares and patient  24 at 12:46pm.        Matthew Wylie PA-C      Consultations This Hospital Stay   Cardiology   Neurology Critical care   Palliative care     Code Status   DNR/DNI, comfort cares     Primary Care Physician   Arline White      Time Spent on this Encounter   I, Matthew Wylie PA-C, personally saw the patient today and spent greater than 30 minutes discharging this patient.    Discharge Disposition   Patient  during this admission  Condition at discharge:

## 2024-09-23 NOTE — PROGRESS NOTES
"Palliative consult received over the weekend. Reviewed case and discussed with Yadira SYED. Per Yadira, family likely preparing for a comfort plan of care. No clear identifiable palliative need at this time. Will hold on palliative consult at this time, and plan for further discussion with Dr. Young today. Thanks.    Natasha MOORE, AURA  Palliative Medicine   [facility]  Securely message with [employer]   *If you are having trouble locating my name, please ensure \"[facility], [facility], and [facility]\" is checked under contacts tab, within the sites button. My name will be listed as Taryn Montero.    "

## 2024-09-23 NOTE — PROGRESS NOTES
1230: Patient extubated with comfort cares.     1245: Asystole noted on EKG, PA at bedside to declare death. Family at bedside. Will be contacting ANS about  home.     Yadira Snow RN

## 2024-09-23 NOTE — PROGRESS NOTES
"      [facility]    Vascular Neurology Progress Note    Interval History     2 units PRBCs for Hgb 6.8, recheck 8.9  CT attempted this morning but, when transferring pt to CT cart, he went in to SVT; transport aborted   SBPs , HR , temp max 99.5  WBC 15.1  Creatinine 3.3 (trending up)  Epinephrine gtt off since ~ 0600    Hospital Course     Chief complaint: Cardiac Arrest    Booker Brown is a 69 year old man with PMHx significant for HTN, CAD, CKD, COPD/ILD on chronic supplemental O2, atrial fibrillation on xarelto, HLD on rosuvastatin, DM2. Wife called EMS 9/21 after pt was found unresponsive. He had PEA arrest in ambulance; 3 rounds epi and intubated before ROSC. While in the ED, again had PEA arrest w/ ROSC after 1 shock, amiodarone x 1, epi x 1.   When propofol was weaned on 9/22, he was noted to have rhythmic full body jerking; EEG consistent with myoclonic status epilepticus. Propofol was increased and he was started on keppra.   Met with family at the bedside this morning. They are planning to transition Conner to comfort care. They would like to defer any further neuroimaging and agree that EEG can be discontinued.     Assessment and Plan     Impression  Cardiac Arrest 9/19  Encephalopathy  Myoclonic status epilepticus   Anoxic brain injury      Recommendations   - discontinue vEEG   - continue keppra 1 gm BID for seizure suppression   - titrate propofol to cessation of rhythmic muscle movements       Family have elected to transition pt to comfort care. Vascular neurology service will sign off but please contact us if family have any further questions/concerns.       Geraldine Kapoor NP  Vascular Neurology    To page me or covering stroke neurology team member, click here: [facility]  Choose \"On Call\" tab at top, then select \"NEUROLOGY/ALL SITES\" from middle drop-down box, press Enter, then look for \"stroke\" or \"telestroke\" for your site.    Physical Examination     Temp: " 99.5  F (37.5  C) Temp src: Bladder BP: (!) 141/64 Pulse: 75   Resp: 26 SpO2: 100 % O2 Device: Mechanical Ventilator      Neurologic  Exam deferred d/t family's decision to transition to comfort care.       Imaging/Labs   (Bolded imaging and labs new and/or personally reviewed or re-reviewed by me today)    MRI/Head CT CT: no acute pathology      Echocardiogram Limited echo: EF 50-55%, mild biatrial enlargement    EKG/Telemetry Sinus rhythm   Incomplete right bundle branch block   Possible Right ventricular hypertrophy   Nonspecific T wave abnormality    Other testing Chest CT:   1.  No evidence of pulmonary embolism.  2.  Findings suggestive of right heart failure, including asymmetric enlargement of the right atrium and right ventricle and reflux of contrast into the hepatic veins.  3.  Multiple right-sided rib fractures, may be related to resuscitation. No pneumothorax.  4.  Lines and tubes as above.  5.  Stable volume of small bilateral pleural effusions and upper abdominal ascites.  6.  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    EEG day 2:   Video EEG through approximately 9 AM today reviewed.  Following increase of propofol drip to 40 mcg/kg/min, myoclonic jerks not seen during frequent video samples.  Background significantly attenuated.  Generalized periodic discharges occur at times with rates ranging between 1.0 and 1.7 Hz.  At other times, lower amplitude periodic delta is seen.  Stimulation associated with the cares routinely increases amplitude and frequency of generalized periodic discharges.  Organized electrographic seizures not seen.  Generalized periodic discharges persist but have lower frequencies and are intermittent.  Review of frequent video samples indicates that generalized periodic discharges no longer associated with myoclonic jerks when propofol drip exceeds 30 mcg/kg/min.  Thus, findings are no longer meet standard criteria for myoclonic status epilepticus.  Findings continue consistent with  severe diffuse encephalopathy with significant irritative potential.    EEG day 1:   VEEG has been running for over one hour.  EEG showed severe generalized slowing. There are intermittent bursts of generalized periodic discharges at 2 hz, and intermittent attenuation of the background. These activities were noted to correlate with patient's myoclonic jerks. 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      Troponin 9/23/2024: 1,438 ng/L     Other labs reviewed by me today:  CBC, VBG, CMP    Time Spent on this Encounter   Billing: I have personally spent a total of 35 minutes providing care today, time spent in reviewing medical records and devising the plan as recorded above.

## 2024-09-23 NOTE — PROGRESS NOTES
EEG CLINICAL NEUROPHYSIOLOGY PRELIMINARY REPORT    Video EEG through approximately 9 AM today reviewed.  Following increase of propofol drip to 40 mcg/kg/min, myoclonic jerks not seen during frequent video samples.  Background significantly attenuated.  Generalized periodic discharges occur at times with rates ranging between 1.0 and 1.7 Hz.  At other times, lower amplitude periodic delta is seen.  Stimulation associated with the cares routinely increases amplitude and frequency of generalized periodic discharges.  Organized electrographic seizures not seen.    Generalized periodic discharges persist but have lower frequencies and are intermittent.  Review of frequent video samples indicates that generalized periodic discharges no longer associated with myoclonic jerks when propofol drip exceeds 30 mcg/kg/min.  Thus, findings are no longer meet standard criteria for myoclonic status epilepticus.  Findings continue consistent with severe diffuse encephalopathy with significant irritative potential.    Full report to follow.    Vinod Quiles MD  Contact information for physicians covering Epilepsy and EEG is available on [facility].  Click search, enter neurology adult/[facility] in group name box, click on neurology adult/[facility], then click Staff Epilepsy and EEG.

## 2024-09-23 NOTE — PROGRESS NOTES
[facility] ICU RESPIRATORY NOTE      Date of Admission: 9/21/2024     Date of Intubation (most recent): 9/21/2024     Reason for Mechanical Ventilation: Cardiac arrest     Number of Days on Mechanical Ventilation: 3     Met Criteria for Spontaneous Breathing Trial: No     Reason for No Spontaneous Breathing Trial: Per MD     Bite Block: No     Significant Events Today: none       ABG Results:   Recent Labs   Lab 09/23/24  0356 09/22/24 2028 09/22/24  0453 09/22/24  0106 09/21/24 2051   PH  --   --   --  7.32* 7.18*   PCO2  --   --   --  33* 48*   PO2  --   --   --  185* 202*   HCO3  --   --   --  17* 18*   O2PER 30 30 30 40 50         Current Vent Settings: FiO2 (%): 30 %, Resp: 14, Vent Mode: CMV/AC, Resp Rate (Set): 26 breaths/min, Tidal Volume (Set, mL): 520 mL, PEEP (cm H2O): 5 cmH2O, Resp Rate (Set): 26 breaths/min, Tidal Volume (Set, mL): 520 mL, PEEP (cm H2O): 5 cmH2O    Skin Assessment: clean/dry    Plan: Continue full ventilatory support and assess for daily weaning trial.    Angie Elder, RT on 9/23/2024 at 5:35 AM

## 2024-09-23 NOTE — DEATH PRONOUNCEMENT
MD DEATH PRONOUNCEMENT    Called to pronounce Booker Brown's death     Physical Exam:   Unresponsive to noxious stimuli   Pupillary light reflex absent, and Corneal blink reflex absent  Spontaneous respirations absent, no breath sounds on ausculation   Heart sounds absent    Patient was pronounced dead at 12:46 PM, September 23, 2024.    Body disposition: Autopsy was discussed with wife. Family declined.       Matthew Wylie PA-C    ICU attending, Dr Cortez made aware of patient's death.

## 2024-09-23 NOTE — PLAN OF CARE
Oxygen: CMV/AC: rate 26 / vol 520 / fi02 30 / peep 5  Continuous Drips: Levo, heparin, propofol   RASS: -4  Pain/CPOT: No pain per CPOT scoring. X1 dose fentanyl given while transporting to CT bed.   Mobility: Bedrest  Restraints: Not ordered. Patient is not moving extremities and remains safe.  Lines:  R internal jugular triple lumen CVC, x2 PIV  Sofia: Ordered and in place for hourly I/Os  Skin: Mepilex is on. Please see chart for more details.   Diet/Bowel: BM x3 overnight. Small, soft, brown. Hypoactive bowel sounds. TF advanced at 0400 to 40ml/hr. At 1600 should advance to goal rate of 55. Tolerating well.   DVT/GI Prophylaxis: Heparin drip, protonix  Glucose: Insulin coverage needed. BG elevated.   Antibiotics: zosyn  Events: Hgb 6.8, patient received two units of PRBCs. Hemoglobin 8.9 this morning. Patient remained vitally stable until attempt for CT this morning, After lifting patient to transport bed, patient went into SVT 160s. Aborted CT to stabilize patient. Attempted suctioning, HR went down to 80s and resolved. Shortly after, patient went into SVT again 160s sustained. MD notified and carotid massage was done. HR now stable. 12 lead EKG done. No seizure like activity noted.   Neuro: Pupils are equal in size but sluggish. Deviated upward. BLE withdraw from pain, BUE respond to no stimulation. Patient will not arouse. No cough when suctioned. Will plan to attempt CT again today.   Social:  Ruby, wife, not present at bedside this shift.       Problem: Adult Inpatient Plan of Care  Goal: Readiness for Transition of Care  Outcome: Not Progressing     Problem: Mechanical Ventilation Invasive  Goal: Effective Communication  Outcome: Not Progressing  Intervention: Ensure Effective Communication  Recent Flowsheet Documentation  Taken 9/23/2024 0400 by Deisy Roche, RN  Communication Enhancement Strategies: one-step directions provided  Trust Relationship/Rapport:   care explained   reassurance  provided  Taken 9/23/2024 0000 by Deisy Roche RN  Communication Enhancement Strategies: one-step directions provided  Trust Relationship/Rapport:   care explained   reassurance provided  Taken 9/22/2024 2000 by Deisy Roche RN  Communication Enhancement Strategies: one-step directions provided  Trust Relationship/Rapport:   care explained   reassurance provided     Problem: Adult Inpatient Plan of Care  Goal: Absence of Hospital-Acquired Illness or Injury  Outcome: Progressing  Intervention: Identify and Manage Fall Risk  Recent Flowsheet Documentation  Taken 9/23/2024 0400 by Deisy Roche RN  Safety Promotion/Fall Prevention:   activity supervised   clutter free environment maintained   increased rounding and observation   increase visualization of patient   patient and family education   room door open   room near nurse's station   room organization consistent   safety round/check completed  Taken 9/23/2024 0000 by Deisy Roche RN  Safety Promotion/Fall Prevention:   activity supervised   clutter free environment maintained   increased rounding and observation   increase visualization of patient   patient and family education   room door open   room near nurse's station   room organization consistent   safety round/check completed  Taken 9/22/2024 2000 by Deisy Roche RN  Safety Promotion/Fall Prevention:   activity supervised   clutter free environment maintained   increased rounding and observation   increase visualization of patient   patient and family education   room door open   room near nurse's station   room organization consistent   safety round/check completed  Intervention: Prevent Skin Injury  Recent Flowsheet Documentation  Taken 9/23/2024 0600 by Deisy Roche RN  Body Position:   turned   heels elevated   legs elevated   upper extremity elevated  Taken 9/23/2024 0400 by Deisy Roche RN  Body Position:   turned   heels elevated   lower extremity  elevated   upper extremity elevated  Skin Protection:   adhesive use limited   incontinence pads utilized   pulse oximeter probe site changed   silicone foam dressing in place   skin to device areas padded   skin to skin areas padded   transparent dressing maintained  Device Skin Pressure Protection:   absorbent pad utilized/changed   adhesive use limited   positioning supports utilized   pressure points protected   skin-to-device areas padded   skin-to-skin areas padded   tubing/devices free from skin contact  Taken 9/23/2024 0200 by Deisy Roche RN  Body Position:   turned   heels elevated   lower extremity elevated   upper extremity elevated  Taken 9/23/2024 0000 by Deisy Roche RN  Body Position:   turned   heels elevated   lower extremity elevated   upper extremity elevated  Skin Protection:   adhesive use limited   incontinence pads utilized   pulse oximeter probe site changed   silicone foam dressing in place   skin to device areas padded   skin to skin areas padded   transparent dressing maintained  Device Skin Pressure Protection:   absorbent pad utilized/changed   adhesive use limited   positioning supports utilized   pressure points protected   skin-to-device areas padded   skin-to-skin areas padded   tubing/devices free from skin contact  Taken 9/22/2024 2200 by Deisy Roche RN  Body Position:   turned   heels elevated   legs elevated   lower extremity elevated   upper extremity elevated  Taken 9/22/2024 2000 by Deisy Roche RN  Body Position:   turned   heels elevated   lower extremity elevated   upper extremity elevated  Skin Protection:   adhesive use limited   incontinence pads utilized   pulse oximeter probe site changed   silicone foam dressing in place   skin to device areas padded   skin to skin areas padded   transparent dressing maintained  Device Skin Pressure Protection:   absorbent pad utilized/changed   adhesive use limited   positioning supports utilized    pressure points protected   skin-to-device areas padded   skin-to-skin areas padded   tubing/devices free from skin contact  Intervention: Prevent and Manage VTE (Venous Thromboembolism) Risk  Recent Flowsheet Documentation  Taken 9/23/2024 0400 by Deisy Roche RN  VTE Prevention/Management: SCDs on (sequential compression devices)  Taken 9/23/2024 0000 by Deisy Roche RN  VTE Prevention/Management: SCDs on (sequential compression devices)  Taken 9/22/2024 2000 by Deisy Roche RN  VTE Prevention/Management: SCDs on (sequential compression devices)  Intervention: Prevent Infection  Recent Flowsheet Documentation  Taken 9/23/2024 0400 by Deisy Roche RN  Infection Prevention:   cohorting utilized   environmental surveillance performed   equipment surfaces disinfected   hand hygiene promoted   personal protective equipment utilized   rest/sleep promoted   single patient room provided  Taken 9/23/2024 0000 by Deisy Roche RN  Infection Prevention:   cohorting utilized   environmental surveillance performed   equipment surfaces disinfected   hand hygiene promoted   personal protective equipment utilized   rest/sleep promoted   single patient room provided  Taken 9/22/2024 2000 by Deisy Roche RN  Infection Prevention:   cohorting utilized   environmental surveillance performed   equipment surfaces disinfected   hand hygiene promoted   personal protective equipment utilized   rest/sleep promoted   single patient room provided  Goal: Optimal Comfort and Wellbeing  Outcome: Progressing  Intervention: Provide Person-Centered Care  Recent Flowsheet Documentation  Taken 9/23/2024 0400 by Deisy Roche RN  Trust Relationship/Rapport:   care explained   reassurance provided  Taken 9/23/2024 0000 by Deisy Roche RN  Trust Relationship/Rapport:   care explained   reassurance provided  Taken 9/22/2024 2000 by Deisy Roche RN  Trust Relationship/Rapport:   care  explained   reassurance provided     Problem: Skin Injury Risk Increased  Goal: Skin Health and Integrity  Outcome: Progressing  Intervention: Plan: Nurse Driven Intervention: Positioning  Recent Flowsheet Documentation  Taken 9/23/2024 0400 by Deisy Roche RN  Plan: Positioning Interventions:   REPOSITION Left/Right (No supine) q2h   Use wedge positioners   HOB 30 degrees or less   OFF-LOAD HEELS with pillows  Taken 9/23/2024 0000 by Deisy Roche RN  Plan: Positioning Interventions:   REPOSITION Left/Right (No supine) q2h   Use wedge positioners   HOB 30 degrees or less   OFF-LOAD HEELS with pillows  Taken 9/22/2024 2000 by Deisy Roche RN  Plan: Positioning Interventions:   REPOSITION Left/Right (No supine) q2h   Use wedge positioners   HOB 30 degrees or less   OFF-LOAD HEELS with pillows  Intervention: Plan: Nurse Driven Intervention: Moisture Management  Recent Flowsheet Documentation  Taken 9/23/2024 0400 by Deisy Roche RN  Moisture Interventions:   No brief in bed   Incontinence pad   Urinary collection device   Perineal cleanser  Bathing/Skin Care: incontinence care  Taken 9/23/2024 0000 by Deisy Roche RN  Moisture Interventions:   No brief in bed   Incontinence pad   Urinary collection device   Perineal cleanser  Bathing/Skin Care: incontinence care  Taken 9/22/2024 2000 by Deisy Roche RN  Moisture Interventions:   No brief in bed   Incontinence pad   Urinary collection device   Perineal cleanser  Bathing/Skin Care:   back care   bath, chlorhexidine wipes   bath, complete   wipes, CHG   dressed/undressed   electrode patches/site rotation   incontinence care   linen changed   moisturizer applied  Intervention: Plan: Nurse Driven Intervention: Friction and Shear  Recent Flowsheet Documentation  Taken 9/23/2024 0400 by Deisy Roche, RN  Friction/Shear Interventions:   HOB 30 degrees or less   Silicone foam sacral dressing   Pad bony prominence (elbow pads, heel  pads, ear protectors)   Assistive lifting device (portable/ceiling lift, etc.)   Lateral transfer device (hovermat, etc.)   Repositioning device (TAP system, etc.)  Taken 9/23/2024 0000 by Deisy Roche RN  Friction/Shear Interventions:   HOB 30 degrees or less   Silicone foam sacral dressing   Pad bony prominence (elbow pads, heel pads, ear protectors)   Assistive lifting device (portable/ceiling lift, etc.)   Lateral transfer device (hovermat, etc.)   Repositioning device (TAP system, etc.)  Taken 9/22/2024 2000 by Deisy Roche RN  Friction/Shear Interventions:   HOB 30 degrees or less   Silicone foam sacral dressing   Pad bony prominence (elbow pads, heel pads, ear protectors)   Assistive lifting device (portable/ceiling lift, etc.)   Lateral transfer device (hovermat, etc.)   Repositioning device (TAP system, etc.)  Intervention: Optimize Skin Protection  Recent Flowsheet Documentation  Taken 9/23/2024 0600 by Deisy Roche RN  Head of Bed ([facility]) Positioning: HOB at 30 degrees  Taken 9/23/2024 0400 by Deisy Roche RN  Pressure Reduction Techniques:   heels elevated off bed   pressure points protected   weight shift assistance provided  Pressure Reduction Devices:   heel offloading device utilized   pressure-redistributing mattress utilized   positioning supports utilized  Skin Protection:   adhesive use limited   incontinence pads utilized   pulse oximeter probe site changed   silicone foam dressing in place   skin to device areas padded   skin to skin areas padded   transparent dressing maintained  Activity Management: bedrest  Head of Bed ([facility]) Positioning: HOB at 30 degrees  Taken 9/23/2024 0200 by Deisy Roche RN  Head of Bed ([facility]) Positioning: HOB at 30 degrees  Taken 9/23/2024 0000 by Deisy Roche RN  Pressure Reduction Techniques:   heels elevated off bed   pressure points protected   weight shift assistance provided  Pressure Reduction Devices:   heel offloading  device utilized   pressure-redistributing mattress utilized   positioning supports utilized  Skin Protection:   adhesive use limited   incontinence pads utilized   pulse oximeter probe site changed   silicone foam dressing in place   skin to device areas padded   skin to skin areas padded   transparent dressing maintained  Activity Management: bedrest  Head of Bed ([facility]) Positioning: HOB at 30 degrees  Taken 9/22/2024 2200 by Deisy Roche RN  Head of Bed ([facility]) Positioning: HOB at 30 degrees  Taken 9/22/2024 2000 by Deisy Roche RN  Pressure Reduction Techniques:   heels elevated off bed   pressure points protected   weight shift assistance provided  Pressure Reduction Devices:   heel offloading device utilized   pressure-redistributing mattress utilized   positioning supports utilized  Skin Protection:   adhesive use limited   incontinence pads utilized   pulse oximeter probe site changed   silicone foam dressing in place   skin to device areas padded   skin to skin areas padded   transparent dressing maintained  Activity Management: bedrest  Head of Bed (HOB) Positioning: HOB at 30 degrees     Problem: Mechanical Ventilation Invasive  Goal: Optimal Device Function  Outcome: Progressing  Intervention: Optimize Device Care and Function  Recent Flowsheet Documentation  Taken 9/23/2024 0600 by Deisy Roche RN  Oral Care:   swabbed with antiseptic solution   suction provided   lip/mouth moisturizer applied  Taken 9/23/2024 0400 by Deisy Roche RN  Airway Safety Measures:   all equipment/monitors on and audible   mask at bedside   manual resuscitator/mask/valve at bedside   oxygen flowmeter   suction at bedside   suction equipment   suction regulator   high-efficiency antimicrobial filters maintained  Airway/Ventilation Management:   airway patency maintained   humidification applied   pulmonary hygiene promoted   calming measures promoted  Oral Care:   swabbed with antiseptic solution    suction provided   lip/mouth moisturizer applied  Taken 9/23/2024 0200 by Deisy Roche RN  Oral Care:   swabbed with antiseptic solution   suction provided   lip/mouth moisturizer applied  Taken 9/23/2024 0000 by Deisy Roche RN  Airway Safety Measures:   all equipment/monitors on and audible   mask at bedside   manual resuscitator/mask/valve at bedside   oxygen flowmeter   suction at bedside   suction equipment   suction regulator   high-efficiency antimicrobial filters maintained  Airway/Ventilation Management:   airway patency maintained   humidification applied   pulmonary hygiene promoted   calming measures promoted  Oral Care:   swabbed with antiseptic solution   suction provided   lip/mouth moisturizer applied  Taken 9/22/2024 2200 by Deisy Roche RN  Oral Care:   swabbed with antiseptic solution   suction provided   lip/mouth moisturizer applied  Taken 9/22/2024 2000 by Deisy Roche RN  Airway Safety Measures:   all equipment/monitors on and audible   mask at bedside   manual resuscitator/mask/valve at bedside   oxygen flowmeter   suction at bedside   suction equipment   suction regulator   high-efficiency antimicrobial filters maintained  Airway/Ventilation Management:   airway patency maintained   humidification applied   pulmonary hygiene promoted   calming measures promoted  Oral Care:   swabbed with antiseptic solution   suction provided   lip/mouth moisturizer applied  Goal: Mechanical Ventilation Liberation  Outcome: Progressing  Intervention: Promote Extubation and Mechanical Ventilation Liberation  Recent Flowsheet Documentation  Taken 9/23/2024 0400 by Deisy Roche RN  Medication Review/Management:   medications reviewed   high-risk medications identified   infusion titrated  Environmental Support:   calm environment promoted   environmental consistency promoted  Taken 9/23/2024 0000 by Deisy Roche RN  Medication Review/Management:   medications reviewed    high-risk medications identified   infusion titrated  Environmental Support:   calm environment promoted   environmental consistency promoted  Taken 9/22/2024 2000 by Deisy Roche RN  Medication Review/Management:   medications reviewed   high-risk medications identified   infusion titrated  Environmental Support:   calm environment promoted   environmental consistency promoted  Goal: Optimal Nutrition Delivery  Outcome: Progressing  Intervention: Optimize Nutrition Delivery  Recent Flowsheet Documentation  Taken 9/23/2024 0400 by Deisy Roche RN  Nutrition Support Management: weight trending reviewed  Taken 9/23/2024 0000 by Deisy Roche RN  Nutrition Support Management: weight trending reviewed  Taken 9/22/2024 2000 by Deisy Roche RN  Nutrition Support Management: weight trending reviewed  Goal: Absence of Device-Related Skin and Tissue Injury  Outcome: Progressing  Intervention: Maintain Skin and Tissue Health  Recent Flowsheet Documentation  Taken 9/23/2024 0400 by Deisy Roche RN  Device Skin Pressure Protection:   absorbent pad utilized/changed   adhesive use limited   positioning supports utilized   pressure points protected   skin-to-device areas padded   skin-to-skin areas padded   tubing/devices free from skin contact  Taken 9/23/2024 0000 by Deisy Roche RN  Device Skin Pressure Protection:   absorbent pad utilized/changed   adhesive use limited   positioning supports utilized   pressure points protected   skin-to-device areas padded   skin-to-skin areas padded   tubing/devices free from skin contact  Taken 9/22/2024 2000 by Deisy Roche RN  Device Skin Pressure Protection:   absorbent pad utilized/changed   adhesive use limited   positioning supports utilized   pressure points protected   skin-to-device areas padded   skin-to-skin areas padded   tubing/devices free from skin contact  Goal: Absence of Ventilator-Induced Lung Injury  Outcome:  Progressing  Intervention: Facilitate Lung-Protection Measures  Recent Flowsheet Documentation  Taken 9/23/2024 0400 by Deisy Roche RN  Lung Protection Measures:   fluid excess minimized   lung compliance monitored   optimal PEEP applied   ventilator synchrony promoted   ventilator waveforms monitored  Taken 9/23/2024 0000 by Deisy Roche RN  Lung Protection Measures:   fluid excess minimized   lung compliance monitored   optimal PEEP applied   ventilator synchrony promoted   ventilator waveforms monitored  Taken 9/22/2024 2000 by Deisy Roche RN  Lung Protection Measures:   fluid excess minimized   lung compliance monitored   optimal PEEP applied   ventilator synchrony promoted   ventilator waveforms monitored  Intervention: Prevent Ventilator-Associated Pneumonia  Recent Flowsheet Documentation  Taken 9/23/2024 0600 by Deisy Roche RN  Oral Care:   swabbed with antiseptic solution   suction provided   lip/mouth moisturizer applied  Head of Bed (HOB) Positioning: HOB at 30 degrees  Taken 9/23/2024 0400 by Deisy Roche RN  VAP Prevention Bundle:   HOB elevation maintained   oral care regularly provided   readiness to extubate assessed   stress ulcer prophylaxis provided   vent circuit breaks minimized   VTE prophylaxis provided  Oral Care:   swabbed with antiseptic solution   suction provided   lip/mouth moisturizer applied  Head of Bed (H[facility]) Positioning: HOB at 30 degrees  VAP Prevention Measures: completed  Taken 9/23/2024 0200 by Deisy Roche RN  Oral Care:   swabbed with antiseptic solution   suction provided   lip/mouth moisturizer applied  Head of Bed (HOB) Positioning: HOB at 30 degrees  Taken 9/23/2024 0000 by Deisy Roche RN  VAP Prevention Bundle:   HOB elevation maintained   oral care regularly provided   readiness to extubate assessed   stress ulcer prophylaxis provided   vent circuit breaks minimized   VTE prophylaxis provided  Oral Care:   swabbed with  antiseptic solution   suction provided   lip/mouth moisturizer applied  Head of Bed (HOB) Positioning: HOB at 30 degrees  VAP Prevention Measures: completed  Taken 9/22/2024 2200 by Deisy Roche RN  Oral Care:   swabbed with antiseptic solution   suction provided   lip/mouth moisturizer applied  Head of Bed (HOB) Positioning: HOB at 30 degrees  Taken 9/22/2024 2000 by Deisy Roche RN  VAP Prevention Bundle:   HOB elevation maintained   oral care regularly provided   readiness to extubate assessed   stress ulcer prophylaxis provided   vent circuit breaks minimized   VTE prophylaxis provided  Oral Care:   swabbed with antiseptic solution   suction provided   lip/mouth moisturizer applied  Head of Bed (HOB) Positioning: HOB at 30 degrees  VAP Prevention Measures: completed     Problem: Comorbidity Management  Goal: Blood Glucose Levels Within Targeted Range  Outcome: Progressing  Intervention: Monitor and Manage Glycemia  Recent Flowsheet Documentation  Taken 9/23/2024 0400 by Deisy Roche RN  Glycemic Management: blood glucose monitored  Medication Review/Management:   medications reviewed   high-risk medications identified   infusion titrated  Taken 9/23/2024 0000 by Deisy Roche RN  Glycemic Management: blood glucose monitored  Medication Review/Management:   medications reviewed   high-risk medications identified   infusion titrated  Taken 9/22/2024 2000 by Deisy Roche RN  Glycemic Management: blood glucose monitored  Medication Review/Management:   medications reviewed   high-risk medications identified   infusion titrated  Goal: Blood Pressure in Desired Range  Outcome: Progressing  Intervention: Maintain Blood Pressure Management  Recent Flowsheet Documentation  Taken 9/23/2024 0400 by Deisy Roche RN  Medication Review/Management:   medications reviewed   high-risk medications identified   infusion titrated  Taken 9/23/2024 0000 by Deisy Roche RN  Medication  Review/Management:   medications reviewed   high-risk medications identified   infusion titrated  Taken 9/22/2024 2000 by Deisy Roche RN  Medication Review/Management:   medications reviewed   high-risk medications identified   infusion titrated  Goal: Maintenance of Seizure Control  Outcome: Progressing  Intervention: Maintain Seizure Symptom Control  Recent Flowsheet Documentation  Taken 9/23/2024 0400 by Deisy Roche RN  Sensory Stimulation Regulation:   auditory stimulation minimized   care clustered   lighting decreased   quiet environment promoted   visual stimulation minimized  Medication Review/Management:   medications reviewed   high-risk medications identified   infusion titrated  Taken 9/23/2024 0000 by Deisy Roche RN  Sensory Stimulation Regulation:   auditory stimulation minimized   care clustered   lighting decreased   quiet environment promoted   visual stimulation minimized  Medication Review/Management:   medications reviewed   high-risk medications identified   infusion titrated  Taken 9/22/2024 2000 by Deisy Roche RN  Sensory Stimulation Regulation:   auditory stimulation minimized   care clustered   lighting decreased   quiet environment promoted   visual stimulation minimized  Medication Review/Management:   medications reviewed   high-risk medications identified   infusion titrated     Problem: Fall Injury Risk  Goal: Absence of Fall and Fall-Related Injury  Outcome: Met  Intervention: Identify and Manage Contributors  Recent Flowsheet Documentation  Taken 9/23/2024 0400 by Deisy Roche RN  Medication Review/Management:   medications reviewed   high-risk medications identified   infusion titrated  Taken 9/23/2024 0000 by Deisy Roche RN  Medication Review/Management:   medications reviewed   high-risk medications identified   infusion titrated  Taken 9/22/2024 2000 by Deisy Roche RN  Medication Review/Management:   medications reviewed    high-risk medications identified   infusion titrated  Intervention: Promote Injury-Free Environment  Recent Flowsheet Documentation  Taken 9/23/2024 0400 by Deisy Roche RN  Safety Promotion/Fall Prevention:   activity supervised   clutter free environment maintained   increased rounding and observation   increase visualization of patient   patient and family education   room door open   room near nurse's station   room organization consistent   safety round/check completed  Taken 9/23/2024 0000 by Deisy Roche RN  Safety Promotion/Fall Prevention:   activity supervised   clutter free environment maintained   increased rounding and observation   increase visualization of patient   patient and family education   room door open   room near nurse's station   room organization consistent   safety round/check completed  Taken 9/22/2024 2000 by Deisy Roche RN  Safety Promotion/Fall Prevention:   activity supervised   clutter free environment maintained   increased rounding and observation   increase visualization of patient   patient and family education   room door open   room near nurse's station   room organization consistent   safety round/check completed     Problem: Pain Acute  Goal: Optimal Pain Control and Function  Intervention: Prevent or Manage Pain  Recent Flowsheet Documentation  Taken 9/23/2024 0400 by Deisy Roche RN  Sensory Stimulation Regulation:   auditory stimulation minimized   care clustered   lighting decreased   quiet environment promoted   visual stimulation minimized  Medication Review/Management:   medications reviewed   high-risk medications identified   infusion titrated  Taken 9/23/2024 0000 by Deisy Roche RN  Sensory Stimulation Regulation:   auditory stimulation minimized   care clustered   lighting decreased   quiet environment promoted   visual stimulation minimized  Medication Review/Management:   medications reviewed   high-risk medications  identified   infusion titrated  Taken 9/22/2024 2000 by Deisy Roche, RN  Sensory Stimulation Regulation:   auditory stimulation minimized   care clustered   lighting decreased   quiet environment promoted   visual stimulation minimized  Medication Review/Management:   medications reviewed   high-risk medications identified   infusion titrated  Intervention: Optimize Psychosocial Wellbeing  Recent Flowsheet Documentation  Taken 9/23/2024 0400 by Deisy Roche, RN  Supportive Measures: relaxation techniques promoted  Taken 9/23/2024 0000 by Deisy Roche, RN  Supportive Measures: relaxation techniques promoted  Taken 9/22/2024 2000 by Deisy Roche, RN  Supportive Measures: relaxation techniques promoted     Deisy Roche RN on 9/23/2024 at 6:48 AM

## 2024-09-26 ENCOUNTER — PATIENT OUTREACH (OUTPATIENT)
Dept: NEPHROLOGY | Facility: CLINIC | Age: 69
End: 2024-09-26
Payer: COMMERCIAL

## 2024-09-26 LAB — BACTERIA BLD CULT: NO GROWTH

## 2024-09-26 NOTE — CONFIDENTIAL NOTE
Per Dr Oneill patient referred to CKD Journey. Journey resolved and chart updated.  KUNAL Wall Care Coordinator  Nephrology

## 2024-10-09 ENCOUNTER — PRE VISIT (OUTPATIENT)
Dept: NEPHROLOGY | Facility: CLINIC | Age: 69
End: 2024-10-09

## 2024-12-07 DIAGNOSIS — K92.1 MELENA: ICD-10-CM

## 2024-12-07 RX ORDER — PANTOPRAZOLE SODIUM 40 MG/1
40 TABLET, DELAYED RELEASE ORAL 2 TIMES DAILY
Qty: 180 TABLET | Refills: 0 | OUTPATIENT
Start: 2024-12-07

## 2025-01-21 NOTE — TELEPHONE ENCOUNTER
Called spoke with patient. Patient's spouse will stop by and  note.    LVM to schedule annual and advised on further fills

## 2025-07-06 NOTE — LETTER
8/12/2024    Arline White MD  [address] [facility] [address]    RE: Booker Brown       Dear Colleague,     I had the pleasure of seeing Booker Brown in the [facility].  Cardiology Clinic Progress Note  Booker Brown MRN# [id number]   YOB: 1955 Age: 69 year old   Primary Cardiologist: Dr. Marquez Reason for visit: Annual follow up             Assessment and Plan:     1.  HFrEF       Cardiomyopathy, etiology unclear, likely ischemic  -1/2023 cardiac MRI EF 55%, late gadolinium enhancement involving the mid to basal inferior and inferolateral walls consistent with previous infarct in the RCA distribution, severe hypokinesis of the mid to basal inferior and inferolateral walls, 13% scar burden  -5/2023 transthoracic echocardiogram with ejection fraction 40 to 45% mid to basal inferior akinesis new compared to prior study  - LVEF: 45-50%  - NYHA class III  - Etiology: likely ischemic  - Fluid status: euvolemic; suspected dry weight: unclear as patient has been losing weight   - Diuretic regimen: torsemide 20mg daily   - Ischemic evaluation: 11/2022 Coronary angiogram showing severe 3v CAD, not a surgical candidate due to lung disease   - Guideline directed medical therapy:  - Beta blocker: Metoprolol  mg twice daily  - ACEI/ARB/ARNI: Entresto 24-26 mg twice daily  - Aldactone antagonist: None secondary to #4  - SGLT2 inhibitor: None secondary #4  -Last hospitalization 6/2023 with removal of 4.4L fluid via paracentesis     2.  Acute blood loss anemia, secondary to gastric ulcer  -7/2023 EGD showed 2 gastric ulcers, 1 with recent GI bleeding  -Initiated on PPI in patient and Xarelto resumed prior to discharge    3. Paroxysmal atrial fibrillation, [id number] 3  -Referral structural Watchman device discussed while inpatient, however patient declined  -Rate controlled with diltiazem and metoprolol 100mg   -Continue Xarelto 20 mg daily indefinitely    4. CKD stage  No III  -Baseline creatinine ~1.5-1.7    5. Coronary artery disease with known occlusion of RCA and LCx  -Not a candidate for CABG given his lung disease  -6/2023 LDL 42  -Continue rosuvastatin 40mg daily   -No current angina, none historically    6. Type II diabetes, controlled  -6/2023 Hemoglobin A1c 0.6%  -Given his underlying CKD, PCP could consider alternative to metformin    7.  Lymphedema, improved     8.  COPD, ILD, follows with Dr. Hopkins  -Uses intermittent oxygen, last PFTs stable     Patient with continued progression in shortness of breath despite continued weight losses and improvement in lower extremity edema. Will check lab work today to reassess for anemia given hx of gastric ulcers as well as BMP/BMP which will help guide if diuretic titration is needed.     With his ongoing cardiomyopathy and adequately controlled rates, will reduce diltiazem.     Plan:  Continue torsemide 20mg daily, entresto, metoprolol, rosuvastatin and xarelto  Hgb, BMP, BNP today  Reduce diltiazem to 240mg daily, ideally would taper off this medication given cardiomyopathy, but historically needed higher doses for rate control     Follow up plan: Follow-up with me in 1 month for continued medication titration and symptom reassessment         History of Presenting Illness:    Booker Brown is a very pleasant 68 year old adult with a history of persistent atrial fibrillation, CKD stage III, lymphedema, type 2 diabetes, hypertension, coronary artery disease, hyperlipidemia, COPD, ILD.     In 2022 patient was hospitalized with atrial fibrillation with RVR and started on combination of metoprolol and diltiazem.  He was readmitted later that month with COVID and a CT chest was obtained in March which ruled out PE but most suggestive of asbestos related lung disease.    Conner also has complex coronary disease with a known occluded RCA and LCx.  His LAD is open but there is flow-limiting lesion heavily calcified in the proximal and mid  LAD.  He had been turned down for CABG due to his lung disease.    He had an echocardiogram in May 2023 which showed new reduced ejection fraction 40 to 45% with mild reduction in systolic function, mid to basal inferior wall akinesis, mild aortic root dilation mild ascending aortic dilation, trace aortic regurgitation.  He saw Dr. Marquez on 6/6/2023 and reviewed his complex coronary anatomy.  He felt that intervention of his known severe proximal to mid LAD lesion being critically high risk, given his lack of concerning symptoms, conservative management was recommended.  Please see that note for additional details.    Conner had been experiencing progressive exertional dyspnea, abdominal bloating, leg swelling in early July and checked his primary care provider who resumed as oral furosemide, however his symptoms progressed and he was admitted on 7/6/2023 with chronic hypoxic respiratory failure, anemia due to acute blood loss.  His stool was occult positive and Xarelto held.  He was given IV furosemide in the emergency room which helped with his breathing and edema.  CT abdomen pelvis showed new, patchy, multifocal groundglass opacities probably due to fluid overload.  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  He underwent a paracentesis due to new ascites and 4.4 L of fluid was removed.  Fluid was sent for cytology and felt to be consistent with heart failure exacerbation versus liver disease.  Her underwent an EGD which showed 2 gastric ulcers, 1 with recent bleeding.  He was started on IV PPI.  His NT proBNP was 7325, high-sensitivity troponin 27, normal sodium, potassium, CO2 35, BUN 39, creatinine 1.67, and GFR 45.  His hemoglobin was 7.7 on admission which was 8.4 at discharge after a unit of PRBCs.    Patient was last seen in our clinic in July 2023 following his hospitalization and was feeling well from a  cardiovascular. Since then he has not further hospitalizations.    He saw pulmonology in June 2024 who noted his PFTs were improved since his previous testing as well as CT imaging was stable.     Repeat echo prior to our visit showed EF 45-50% with basal-mid segments hypokinetic. Function best preserved at the apex. Mildly decreased RV systolic function and no significant valvular disease. Normal IVC.     Patient is here today for follow-up.  His wife is present with him today. He feels like since he saw pulmonology 2 months ago, he is actually more short of breath. Weights at home had been gradually trending down over the last several months, now stable recently at 225#. During our visit last year, was in the 250 range. Continues to have lower extremity edema, however  feels it has improved over the last months. Denies abdominal bloating.     Patient denies chest pain or chest tightness. Denies other presyncopal symptoms. Denies tachycardia or palpitations.     Blood pressure 107/70 and HR 80 in clinic today. He is compliant with his medications. Denies any issues with bleeding.     He is monitoring his salt at home.     Labs from 6/6/24 showing sodium 142, potassium 4.4,  BUN 36.5, creatinine 1.9, and GFR 38.         Recent Hospitalizations   July 2023 heart failure exacerbation with paracentesis and removal of 4.4 L fluid          Social History      Social History     Socioeconomic History     Marital status:      Spouse name: Not on file     Number of children: Not on file     Years of education: Not on file     Highest education level: Not on file   Occupational History     Occupation: Shipping & Receiving   Tobacco Use     Smoking status: Former     Types: Cigarettes     Smokeless tobacco: Never     Tobacco comments:     Quit in 1987; smoked for 16 years; 2ppd at his most.    Vaping Use     Vaping status: Never Used   Substance and Sexual Activity     Alcohol use: No     Drug use: No     Sexual  "activity: Yes     Partners: Female   Other Topics Concern     Parent/sibling w/ CABG, MI or angioplasty before 65F 55M? Yes      Service No     Blood Transfusions No     Caffeine Concern No     Occupational Exposure No     Hobby Hazards No     Sleep Concern No     Stress Concern No     Weight Concern Yes     Special Diet No     Back Care No     Exercise Yes     Bike Helmet No     Comment: n/a     Seat Belt Yes     Self-Exams No   Social History Narrative    .    Two adult children.    Four grandchildren.    No formal exercise.     Social Determinants of Health     Financial Resource Strain: Not on file   Food Insecurity: Not on file   Transportation Needs: Not on file   Physical Activity: Not on file   Stress: Not on file   Social Connections: Not on file   Interpersonal Safety: Not on file   Housing Stability: Not on file            Review of Systems:   Skin:        Eyes:       ENT:       Respiratory:  Positive for shortness of breath;dyspnea on exertion  Cardiovascular:    lightheadedness;dizziness;fatigue;Positive for  Gastroenterology:      Genitourinary:       Musculoskeletal:       Neurologic:       Psychiatric:       Heme/Lymph/Imm:       Endocrine:  Positive for diabetes         Physical Exam:   Vitals: /70   Pulse 80   Ht 1.765 m (5' 9.5\")   Wt 102.1 kg (225 lb)   SpO2 97%   BMI 32.75 kg/m     Wt Readings from Last 4 Encounters:   08/12/24 102.1 kg (225 lb)   06/21/24 102.1 kg (225 lb)   06/06/24 103.2 kg (227 lb 8 oz)   06/05/24 102.3 kg (225 lb 8 oz)     GEN: well nourished, in no acute distress. Sitting in wheelchair  HEENT:  Pupils equal, round. Sclerae nonicteric.   NECK: Supple, no masses appreciated.   C/V:  Irregularly irregular rate and rhythm, no murmur, rub or gallop.    RESP: Respirations are unlabored. Clear to auscultation bilaterally without wheezing, rales, or rhonchi.  GI: Abdomen soft, nontender.  EXTREM: 1-2+ pitting bilateral lymphedema   NEURO: Alert and " oriented, cooperative.  SKIN: Warm and dry.        Data:       LIPID RESULTS:  Lab Results   Component Value Date    CHOL 107 06/06/2024    CHOL 124 05/12/2021    HDL 41 06/06/2024    HDL 31 (L) 05/12/2021    LDL 53 06/06/2024    LDL 70 05/12/2021    TRIG 67 06/06/2024    TRIG 116 05/12/2021    CHOLHDLRATIO 4.5 09/28/2015     LIVER ENZYME RESULTS:  Lab Results   Component Value Date    AST 15 06/06/2024    AST 20 05/12/2021    ALT 11 06/06/2024    ALT 28 05/12/2021     CBC RESULTS:  Lab Results   Component Value Date    WBC 9.7 06/06/2024    WBC 11.0 05/12/2021    RBC 4.88 06/06/2024    RBC 5.15 05/12/2021    HGB 9.7 (L) 06/06/2024    HGB 14.1 05/12/2021    HCT 35.1 06/06/2024    HCT 44.2 05/12/2021    MCV 72 (L) 06/06/2024    MCV 86 05/12/2021    MCH 19.9 (L) 06/06/2024    MCH 27.4 05/12/2021    MCHC 27.6 (L) 06/06/2024    MCHC 31.9 05/12/2021    RDW 20.2 (H) 06/06/2024    RDW 14.7 05/12/2021     06/06/2024     05/12/2021     BMP RESULTS:  Lab Results   Component Value Date     06/06/2024     05/12/2021    POTASSIUM 4.4 06/06/2024    POTASSIUM 3.9 11/18/2022    POTASSIUM 3.9 05/14/2021    CHLORIDE 102 06/06/2024    CHLORIDE 101 11/18/2022    CHLORIDE 104 05/12/2021    CO2 25 06/06/2024    CO2 32 11/18/2022    CO2 29 05/12/2021    ANIONGAP 15 06/06/2024    ANIONGAP 5 11/18/2022    ANIONGAP 4 05/12/2021     (H) 06/06/2024     (H) 07/09/2023     (H) 11/18/2022     (H) 05/12/2021    BUN 36.5 (H) 06/06/2024    BUN 42 (H) 11/18/2022    BUN 36 (H) 05/12/2021    CR 1.90 (H) 06/06/2024    CR 1.87 (H) 05/12/2021    GFRESTIMATED 38 (L) 06/06/2024    GFRESTIMATED 37 (L) 05/12/2021    GFRESTBLACK 43 (L) 05/12/2021    KURT 9.6 06/06/2024    KURT 9.2 05/12/2021      A1C RESULTS:  Lab Results   Component Value Date    A1C 5.6 06/06/2024    A1C 8.6 (H) 05/12/2021     INR RESULTS:  Lab Results   Component Value Date    INR 1.51 (H) 11/18/2022            Medications     Current  Outpatient Medications   Medication Sig Dispense Refill     ACCU-CHEK GUIDE test strip USE TO TEST BLOOD SUGAR 1 TIMES DAILY OR AS DIRECTED. 100 strip 1     albuterol (PROAIR HFA/PROVENTIL HFA/VENTOLIN HFA) 108 (90 Base) MCG/ACT inhaler Inhale 2 puffs into the lungs every 6 hours as needed for shortness of breath or wheezing 18 g 5     alcohol swab prep pads Use to swab area of injection/fariha as directed. 100 each 3     blood glucose monitoring (NO BRAND SPECIFIED) meter device kit Use to test blood sugar 1 times daily or as directed. : per insurance. 1 kit 0     diltiazem ER (TIAZAC) 240 MG 24 hr ER beaded capsule Take 1 capsule (240 mg) by mouth daily 30 capsule 3     diltiazem ER COATED BEADS (CARDIZEM CD) 360 MG 24 hr capsule Take 1 capsule (360 mg) by mouth daily 90 capsule 2     Fluticasone-Umeclidin-Vilanterol (TRELEGY ELLIPTA) 200-62.5-25 MCG/ACT oral inhaler Inhale 1 puff into the lungs daily 60 each 11     glipiZIDE (GLUCOTROL XL) 5 MG 24 hr tablet TAKE 1 TABLET BY MOUTH EVERY DAY 90 tablet 1     metFORMIN (GLUCOPHAGE XR) 500 MG 24 hr tablet TAKE 2 TABLETS BY MOUTH DAILY WITH DINNER 180 tablet 3     metoprolol succinate ER (TOPROL XL) 100 MG 24 hr tablet Take 1 tablet (100 mg) by mouth 2 times daily 180 tablet 3     pantoprazole (PROTONIX) 40 MG EC tablet TAKE 1 TABLET BY MOUTH TWICE A  tablet 0     rivaroxaban ANTICOAGULANT (XARELTO ANTICOAGULANT) 20 MG TABS tablet Take 1 tablet (20 mg) by mouth daily (with dinner) 90 tablet 3     rosuvastatin (CRESTOR) 40 MG tablet Take 1 tablet (40 mg) by mouth at bedtime 90 tablet 3     sacubitril-valsartan (ENTRESTO) 24-26 MG per tablet Take 1 tablet by mouth 2 times daily 180 tablet 3     thin (NO BRAND SPECIFIED) lancets Use with lanceting device. To accompany: Blood Glucose Monitor Brands: per insurance. 1 each 6     torsemide (DEMADEX) 20 MG tablet Take 1 tablet (20 mg) by mouth daily 90 tablet 3          Past Medical History     Past Medical History:    Diagnosis Date     Benign essential hypertension      CAD (coronary artery disease)      Chronic kidney disease, stage III (moderate) (H)      COPD (chronic obstructive pulmonary disease) (H)      ILD (interstitial lung disease) (H)      Obesity (BMI 30-39.9)      Persistent atrial fibrillation (H)      Pure hypercholesterolemia      Type 2 diabetes mellitus (H)      Past Surgical History:   Procedure Laterality Date     COLONOSCOPY N/A 8/24/2018    Procedure: COMBINED COLONOSCOPY, SINGLE OR MULTIPLE BIOPSY/POLYPECTOMY BY BIOPSY;;  Surgeon: Lawrence Mata MD;  Location:  GI     CV CORONARY ANGIOGRAM N/A 11/18/2022    Procedure: Coronary Angiogram;  Surgeon: Mason Lucas MD;  Location:  HEART CARDIAC CATH LAB     CV INSTANTANEOUS WAVE-FREE RATIO N/A 11/18/2022    Procedure: Instantaneous Wave-Free Ratio;  Surgeon: Mason Lucas MD;  Location:  HEART CARDIAC CATH LAB     CV LEFT HEART CATH N/A 11/18/2022    Procedure: Left Heart Catheterization;  Surgeon: Mason Lucas MD;  Location:  HEART CARDIAC CATH LAB     ESOPHAGOSCOPY, GASTROSCOPY, DUODENOSCOPY (EGD), COMBINED N/A 7/7/2023    Procedure: Esophagoscopy, gastroscopy, duodenoscopy (EGD), combined;  Surgeon: Nestor Louie MD;  Location:  GI     Family History   Problem Relation Age of Onset     Diabetes Type 2  Mother      Cerebral aneurysm Sister      Bladder Cancer Brother      Myocardial Infarction Brother         in his 50s     Cerebrovascular Disease No family hx of      Coronary Artery Disease Early Onset No family hx of      Prostate Cancer No family hx of      Colon Cancer No family hx of      Clotting Disorder No family hx of      Bleeding Disorder No family hx of      Anesthesia Reaction No family hx of             Allergies   Patient has no known allergies.        Lorena Hart NP  [facility]  Pager: [phone]      Thank you for allowing me to participate in the care of your  patient.      Sincerely,     Lorena Hart NP     [facility]  cc:   Arline White MD  [address] [place] [id number]

## (undated) DEVICE — CATH ANGIO JUDKINS JL4 6FRX100CM INFINITI 534620T

## (undated) DEVICE — TOTE ANGIO CORP PC15AT SAN32CC83O

## (undated) DEVICE — MANIFOLD KIT ANGIO AUTOMATED 014613

## (undated) DEVICE — INTRO GLIDESHEATH SLENDER 6FR 10X45CM 60-1060

## (undated) DEVICE — SYR ANGIOGRAPHY MULTIUSE KIT ACIST 014612

## (undated) DEVICE — SLEEVE TR BAND RADIAL COMPRESSION DEVICE 24CM TRB24-REG

## (undated) DEVICE — CATH ANGIO JUDKINS R4 6FRX100CM INFINITI 534621T

## (undated) DEVICE — WIRE GUIDE 0.035"X260CM SAFE-T-J EXCHANGE G00517

## (undated) DEVICE — KIT HAND CONTROL ANGIOTOUCH ACIST 65CM AT-P65

## (undated) DEVICE — DEFIB PRO-PADZ LVP LQD GEL ADULT 8900-2105-01

## (undated) DEVICE — CATH ANGIO INFINITI PIGTAIL 145 6 SH 6FRX110CM  534-652S

## (undated) DEVICE — GW VASC OMNIWIRE J L185CM PRESSURE 89185J

## (undated) RX ORDER — FENTANYL CITRATE 50 UG/ML
INJECTION, SOLUTION INTRAMUSCULAR; INTRAVENOUS
Status: DISPENSED
Start: 2018-08-24

## (undated) RX ORDER — ASPIRIN 325 MG
TABLET ORAL
Status: DISPENSED
Start: 2022-11-18

## (undated) RX ORDER — LIDOCAINE HYDROCHLORIDE 40 MG/ML
SOLUTION TOPICAL
Status: DISPENSED
Start: 2021-05-14

## (undated) RX ORDER — NALOXONE HYDROCHLORIDE 0.4 MG/ML
INJECTION, SOLUTION INTRAMUSCULAR; INTRAVENOUS; SUBCUTANEOUS
Status: DISPENSED
Start: 2021-05-14

## (undated) RX ORDER — FENTANYL CITRATE 50 UG/ML
INJECTION, SOLUTION INTRAMUSCULAR; INTRAVENOUS
Status: DISPENSED
Start: 2022-11-18

## (undated) RX ORDER — FLUMAZENIL 0.1 MG/ML
INJECTION, SOLUTION INTRAVENOUS
Status: DISPENSED
Start: 2021-05-14

## (undated) RX ORDER — FENTANYL CITRATE 50 UG/ML
INJECTION, SOLUTION INTRAMUSCULAR; INTRAVENOUS
Status: DISPENSED
Start: 2021-05-14

## (undated) RX ORDER — DEXTROSE MONOHYDRATE 25 G/50ML
INJECTION, SOLUTION INTRAVENOUS
Status: DISPENSED
Start: 2021-05-14

## (undated) RX ORDER — GLYCOPYRROLATE 0.2 MG/ML
INJECTION, SOLUTION INTRAMUSCULAR; INTRAVENOUS
Status: DISPENSED
Start: 2021-05-14